# Patient Record
Sex: MALE | Race: BLACK OR AFRICAN AMERICAN | NOT HISPANIC OR LATINO | URBAN - METROPOLITAN AREA
[De-identification: names, ages, dates, MRNs, and addresses within clinical notes are randomized per-mention and may not be internally consistent; named-entity substitution may affect disease eponyms.]

---

## 2021-06-28 ENCOUNTER — INPATIENT (INPATIENT)
Facility: HOSPITAL | Age: 71
LOS: 36 days | Discharge: NURSING, REHAB OR LONG-TERM CARE | DRG: 640 | End: 2021-08-04
Attending: GENERAL ACUTE CARE HOSPITAL | Admitting: GENERAL ACUTE CARE HOSPITAL
Payer: MEDICARE

## 2021-06-28 VITALS
OXYGEN SATURATION: 98 % | RESPIRATION RATE: 18 BRPM | HEART RATE: 137 BPM | TEMPERATURE: 98 F | DIASTOLIC BLOOD PRESSURE: 76 MMHG | SYSTOLIC BLOOD PRESSURE: 115 MMHG | HEIGHT: 76 IN

## 2021-06-28 DIAGNOSIS — R53.1 WEAKNESS: ICD-10-CM

## 2021-06-28 LAB
ALBUMIN SERPL ELPH-MCNC: 2.9 G/DL — LOW (ref 3.3–5)
ALP SERPL-CCNC: 460 U/L — HIGH (ref 40–120)
ALT FLD-CCNC: 14 U/L — SIGNIFICANT CHANGE UP (ref 10–45)
ANION GAP SERPL CALC-SCNC: 14 MMOL/L — SIGNIFICANT CHANGE UP (ref 5–17)
APPEARANCE UR: CLEAR — SIGNIFICANT CHANGE UP
APTT BLD: 32.3 SEC — SIGNIFICANT CHANGE UP (ref 27.5–35.5)
AST SERPL-CCNC: 19 U/L — SIGNIFICANT CHANGE UP (ref 10–40)
BACTERIA # UR AUTO: NEGATIVE — SIGNIFICANT CHANGE UP
BASOPHILS # BLD AUTO: 0 K/UL — SIGNIFICANT CHANGE UP (ref 0–0.2)
BASOPHILS NFR BLD AUTO: 0 % — SIGNIFICANT CHANGE UP (ref 0–2)
BILIRUB SERPL-MCNC: 1.3 MG/DL — HIGH (ref 0.2–1.2)
BILIRUB UR-MCNC: NEGATIVE — SIGNIFICANT CHANGE UP
BUN SERPL-MCNC: 25 MG/DL — HIGH (ref 7–23)
CALCIUM SERPL-MCNC: 9 MG/DL — SIGNIFICANT CHANGE UP (ref 8.4–10.5)
CHLORIDE SERPL-SCNC: 97 MMOL/L — SIGNIFICANT CHANGE UP (ref 96–108)
CO2 SERPL-SCNC: 22 MMOL/L — SIGNIFICANT CHANGE UP (ref 22–31)
COLOR SPEC: ABNORMAL
COMMENT - URINE: SIGNIFICANT CHANGE UP
CREAT SERPL-MCNC: 0.68 MG/DL — SIGNIFICANT CHANGE UP (ref 0.5–1.3)
DIFF PNL FLD: ABNORMAL
EOSINOPHIL # BLD AUTO: 0.03 K/UL — SIGNIFICANT CHANGE UP (ref 0–0.5)
EOSINOPHIL NFR BLD AUTO: 0.9 % — SIGNIFICANT CHANGE UP (ref 0–6)
EPI CELLS # UR: 1 /HPF — SIGNIFICANT CHANGE UP
GAS PNL BLDV: SIGNIFICANT CHANGE UP
GAS PNL BLDV: SIGNIFICANT CHANGE UP
GLUCOSE BLDC GLUCOMTR-MCNC: 334 MG/DL — HIGH (ref 70–99)
GLUCOSE BLDC GLUCOMTR-MCNC: 439 MG/DL — HIGH (ref 70–99)
GLUCOSE BLDC GLUCOMTR-MCNC: 449 MG/DL — HIGH (ref 70–99)
GLUCOSE BLDC GLUCOMTR-MCNC: 472 MG/DL — CRITICAL HIGH (ref 70–99)
GLUCOSE SERPL-MCNC: 596 MG/DL — CRITICAL HIGH (ref 70–99)
GLUCOSE UR QL: ABNORMAL
HCT VFR BLD CALC: 26.4 % — LOW (ref 39–50)
HGB BLD-MCNC: 8.1 G/DL — LOW (ref 13–17)
HYALINE CASTS # UR AUTO: 1 /LPF — SIGNIFICANT CHANGE UP (ref 0–2)
INR BLD: 1.42 RATIO — HIGH (ref 0.88–1.16)
KETONES UR-MCNC: NEGATIVE — SIGNIFICANT CHANGE UP
LEUKOCYTE ESTERASE UR-ACNC: ABNORMAL
LYMPHOCYTES # BLD AUTO: 0.61 K/UL — LOW (ref 1–3.3)
LYMPHOCYTES # BLD AUTO: 17.5 % — SIGNIFICANT CHANGE UP (ref 13–44)
MAGNESIUM SERPL-MCNC: 1.8 MG/DL — SIGNIFICANT CHANGE UP (ref 1.6–2.6)
MCHC RBC-ENTMCNC: 28.5 PG — SIGNIFICANT CHANGE UP (ref 27–34)
MCHC RBC-ENTMCNC: 30.7 GM/DL — LOW (ref 32–36)
MCV RBC AUTO: 93 FL — SIGNIFICANT CHANGE UP (ref 80–100)
MONOCYTES # BLD AUTO: 0.33 K/UL — SIGNIFICANT CHANGE UP (ref 0–0.9)
MONOCYTES NFR BLD AUTO: 9.6 % — SIGNIFICANT CHANGE UP (ref 2–14)
NEUTROPHILS # BLD AUTO: 2.49 K/UL — SIGNIFICANT CHANGE UP (ref 1.8–7.4)
NEUTROPHILS NFR BLD AUTO: 71.1 % — SIGNIFICANT CHANGE UP (ref 43–77)
NITRITE UR-MCNC: NEGATIVE — SIGNIFICANT CHANGE UP
PH UR: 6 — SIGNIFICANT CHANGE UP (ref 5–8)
PHOSPHATE SERPL-MCNC: 2.9 MG/DL — SIGNIFICANT CHANGE UP (ref 2.5–4.5)
PLATELET # BLD AUTO: 166 K/UL — SIGNIFICANT CHANGE UP (ref 150–400)
POTASSIUM SERPL-MCNC: 5.2 MMOL/L — SIGNIFICANT CHANGE UP (ref 3.5–5.3)
POTASSIUM SERPL-SCNC: 5.2 MMOL/L — SIGNIFICANT CHANGE UP (ref 3.5–5.3)
PROT SERPL-MCNC: 6.3 G/DL — SIGNIFICANT CHANGE UP (ref 6–8.3)
PROT UR-MCNC: SIGNIFICANT CHANGE UP
PROTHROM AB SERPL-ACNC: 16.8 SEC — HIGH (ref 10.6–13.6)
RAPID RVP RESULT: SIGNIFICANT CHANGE UP
RBC # BLD: 2.84 M/UL — LOW (ref 4.2–5.8)
RBC # FLD: 23.3 % — HIGH (ref 10.3–14.5)
RBC CASTS # UR COMP ASSIST: 2 /HPF — SIGNIFICANT CHANGE UP (ref 0–4)
SARS-COV-2 RNA SPEC QL NAA+PROBE: SIGNIFICANT CHANGE UP
SODIUM SERPL-SCNC: 133 MMOL/L — LOW (ref 135–145)
SP GR SPEC: 1.03 — HIGH (ref 1.01–1.02)
UROBILINOGEN FLD QL: NEGATIVE — SIGNIFICANT CHANGE UP
WBC # BLD: 3.46 K/UL — LOW (ref 3.8–10.5)
WBC # FLD AUTO: 3.46 K/UL — LOW (ref 3.8–10.5)
WBC UR QL: 3 /HPF — SIGNIFICANT CHANGE UP (ref 0–5)

## 2021-06-28 PROCEDURE — 93010 ELECTROCARDIOGRAM REPORT: CPT

## 2021-06-28 PROCEDURE — 74177 CT ABD & PELVIS W/CONTRAST: CPT | Mod: 26,MA

## 2021-06-28 PROCEDURE — 71260 CT THORAX DX C+: CPT | Mod: 26

## 2021-06-28 PROCEDURE — 99285 EMERGENCY DEPT VISIT HI MDM: CPT

## 2021-06-28 PROCEDURE — 71045 X-RAY EXAM CHEST 1 VIEW: CPT | Mod: 26

## 2021-06-28 RX ORDER — PIPERACILLIN AND TAZOBACTAM 4; .5 G/20ML; G/20ML
3.38 INJECTION, POWDER, LYOPHILIZED, FOR SOLUTION INTRAVENOUS ONCE
Refills: 0 | Status: COMPLETED | OUTPATIENT
Start: 2021-06-28 | End: 2021-06-28

## 2021-06-28 RX ORDER — GLUCAGON INJECTION, SOLUTION 0.5 MG/.1ML
1 INJECTION, SOLUTION SUBCUTANEOUS ONCE
Refills: 0 | Status: DISCONTINUED | OUTPATIENT
Start: 2021-06-28 | End: 2021-08-04

## 2021-06-28 RX ORDER — SODIUM CHLORIDE 9 MG/ML
1000 INJECTION INTRAMUSCULAR; INTRAVENOUS; SUBCUTANEOUS
Refills: 0 | Status: DISCONTINUED | OUTPATIENT
Start: 2021-06-28 | End: 2021-07-10

## 2021-06-28 RX ORDER — INSULIN LISPRO 100/ML
VIAL (ML) SUBCUTANEOUS AT BEDTIME
Refills: 0 | Status: DISCONTINUED | OUTPATIENT
Start: 2021-06-28 | End: 2021-08-04

## 2021-06-28 RX ORDER — INSULIN LISPRO 100/ML
VIAL (ML) SUBCUTANEOUS
Refills: 0 | Status: DISCONTINUED | OUTPATIENT
Start: 2021-06-28 | End: 2021-08-04

## 2021-06-28 RX ORDER — SODIUM CHLORIDE 9 MG/ML
1000 INJECTION, SOLUTION INTRAVENOUS ONCE
Refills: 0 | Status: COMPLETED | OUTPATIENT
Start: 2021-06-28 | End: 2021-06-28

## 2021-06-28 RX ORDER — DEXTROSE 50 % IN WATER 50 %
12.5 SYRINGE (ML) INTRAVENOUS ONCE
Refills: 0 | Status: DISCONTINUED | OUTPATIENT
Start: 2021-06-28 | End: 2021-08-04

## 2021-06-28 RX ORDER — SODIUM CHLORIDE 9 MG/ML
1000 INJECTION, SOLUTION INTRAVENOUS ONCE
Refills: 0 | Status: DISCONTINUED | OUTPATIENT
Start: 2021-06-28 | End: 2021-06-28

## 2021-06-28 RX ORDER — INSULIN LISPRO 100/ML
5 VIAL (ML) SUBCUTANEOUS ONCE
Refills: 0 | Status: COMPLETED | OUTPATIENT
Start: 2021-06-28 | End: 2021-06-28

## 2021-06-28 RX ORDER — DEXTROSE 50 % IN WATER 50 %
15 SYRINGE (ML) INTRAVENOUS ONCE
Refills: 0 | Status: DISCONTINUED | OUTPATIENT
Start: 2021-06-28 | End: 2021-08-04

## 2021-06-28 RX ORDER — SODIUM CHLORIDE 9 MG/ML
1000 INJECTION, SOLUTION INTRAVENOUS
Refills: 0 | Status: DISCONTINUED | OUTPATIENT
Start: 2021-06-28 | End: 2021-08-04

## 2021-06-28 RX ORDER — INSULIN GLARGINE 100 [IU]/ML
10 INJECTION, SOLUTION SUBCUTANEOUS AT BEDTIME
Refills: 0 | Status: COMPLETED | OUTPATIENT
Start: 2021-06-28 | End: 2021-06-28

## 2021-06-28 RX ORDER — DEXTROSE 50 % IN WATER 50 %
25 SYRINGE (ML) INTRAVENOUS ONCE
Refills: 0 | Status: DISCONTINUED | OUTPATIENT
Start: 2021-06-28 | End: 2021-08-04

## 2021-06-28 RX ADMIN — Medication 6: at 18:36

## 2021-06-28 RX ADMIN — PIPERACILLIN AND TAZOBACTAM 200 GRAM(S): 4; .5 INJECTION, POWDER, LYOPHILIZED, FOR SOLUTION INTRAVENOUS at 13:58

## 2021-06-28 RX ADMIN — Medication 5 UNIT(S): at 21:46

## 2021-06-28 RX ADMIN — Medication 4: at 23:35

## 2021-06-28 RX ADMIN — SODIUM CHLORIDE 1000 MILLILITER(S): 9 INJECTION, SOLUTION INTRAVENOUS at 16:51

## 2021-06-28 RX ADMIN — INSULIN GLARGINE 10 UNIT(S): 100 INJECTION, SOLUTION SUBCUTANEOUS at 23:36

## 2021-06-28 RX ADMIN — SODIUM CHLORIDE 1000 MILLILITER(S): 9 INJECTION, SOLUTION INTRAVENOUS at 13:16

## 2021-06-28 NOTE — ED ADULT NURSE NOTE - OBJECTIVE STATEMENT
72 yo male presents to ED from home with wife via ambulette c/o weakness and fatigue. Patient's wife states patient dx with Hodgkin's lymphoma approximately 1 month ago, started hospice care, however since stopped hospice and presents to ED today for start of treatment. Patient's wife states he was admitted to OSH approx 1 month ago for sepsis. Patient is frail appearing with chronic escamilla secondary to incontinence after recent admission. Patient denies SOB, CP, nvd, fever/chills, sick contacts, falls/loc. Patient A&OX3, breathing spontaneously , airway patent, bl clear lungs, abdomen tender to palpation, +pulses, cap refill <2 seconds. patient resting in bed, side rails up, plan of care explained. MD at bedside.

## 2021-06-28 NOTE — ED ADULT NURSE NOTE - NSIMPLEMENTINTERV_GEN_ALL_ED
Implemented All Fall Risk Interventions:  [place] to call system. Call bell, personal items and telephone within reach. Instruct patient to call for assistance. Room bathroom lighting operational. Non-slip footwear when patient is off stretcher. Physically safe environment: no spills, clutter or unnecessary equipment. Stretcher in lowest position, wheels locked, appropriate side rails in place. Provide visual cue, wrist band, yellow gown, etc. Monitor gait and stability. Monitor for mental status changes and reorient to person, place, and time. Review medications for side effects contributing to fall risk. Reinforce activity limits and safety measures with patient and family.

## 2021-06-28 NOTE — ED PROVIDER NOTE - OBJECTIVE STATEMENT
72 y/o M w/ pmhx of radha gonzalesma recently dx'd presents to the ED w/ weakness and FTT. Per pt's doctor the pt had sepsis about one month ago     Spoke w/ Dr. Larios around 1250 on 6/28/21 who states the family wants treatment and the pt is not hospice. Per Dr. Larios admission to [facility] 72 y/o M w/ pmhx of CVA this past August and got TPA per family member, radha boyd recently dx'd presents to the ED w/ weakness and FTT. Per pt's doctor the pt had sepsis about one month ago. Pt denies recent f/c, n/v, cp, sob. Does endorses some RLQ pain. Per family and PCP pt's legs have been very weak since August. Spoke w/ Dr. Larios around 1250 on 6/28/21 who states the family wants treatment and the pt is not hospice. Per Dr. Larios admission to [facility] 72 y/o M w/ pmhx of CVA this past August and got TPA per family member, hodgkin lympoma recently dx'd presents to the ED w/ weakness and FTT. Per family, pt with progressive weakness and fatigue. No trauma. No falls. Notes abdominal pain, diffuse, but worse on R side. No fevers. Pt denies recent f/c, n/v, cp, sob.     Per pt's doctor the pt admitted at OSH about one month ago (?sepsis). Per family and PCP pt's has had progressive LE weakness since August, no new or acute change to strength. Spoke w/ Dr. Larios around 1250 on 6/28/21 who states the family wants treatment and the pt is not hospice. Per Dr. Larios admission to [facility]

## 2021-06-28 NOTE — ED PROVIDER NOTE - PROGRESS NOTE DETAILS
Noted hyperglycemia and lactic acidosis. +Mild abdominal tenderness. Elevated bili - will empirically tx with Zosyn, CT abd/pelvis for further imaging

## 2021-06-28 NOTE — ED PROVIDER NOTE - CLINICAL SUMMARY MEDICAL DECISION MAKING FREE TEXT BOX
O'Domenico DO PGY-1: pt recently dx'd w/ Hodkin Lymphoma p/w gen weakness and FTT. recently had sepsis. TBA. Spoke w/ Dr. Larios. Pt will be going to Dr. [facility]. O'Fisher DO PGY-1: pt recently dx'd w/ Hodkin Lymphoma p/w gen weakness and FTT. recently had sepsis. TBA. Spoke w/ Dr. Larios. Pt will be going to Dr. Alas.    Nunu Smith MD - Attending Physician: Pt here with generalized weakness, abdominal pain. Noted tachycardia, appears significantly dehydrated. Afebrile now; however, high risk. Labs, cultures, Xr, Ua, TBA

## 2021-06-28 NOTE — ED ADULT NURSE REASSESSMENT NOTE - NS ED NURSE REASSESS COMMENT FT1
WOO Walters made aware of patient's repeat finger stick of 449. Per WOO Walters [id number], no immediate RN interventions are needed to be performed at this time. Pt is to have a repeat finger stick in 6hours per WOO Walters. Pt remains resting comfortably in stretcher, a/ox4 and in NAD at this time.

## 2021-06-28 NOTE — H&P ADULT - ASSESSMENT
70 y/o M w/ pmhx of CVA this past August and got TPA per family member, DM , BPH with obstruction s/p escamilla catheter , s/p ERCP w Sphincteretomy recent admission to [facility] for sepsis  hodgkin lympoma was not offered any chemo and was placed on hospice.   npw presenting with weakness and FTT.   pt denies cp / SOB / plapiatiaons   no focal neuro complaints .. at baseline RLE weakness   no N/V   had one episode of diarrah   no urinary s x   abdominal pain, diffuse, but worse on R side.     - failure to thrive : cultures sent   CT chest ordered   nutrition consult       - lymphoma : fu wtih Dr. Larios    DM with hyperglycemia :     - anemia  : moniot rH/H   transfuse prn     d/w pt and sister

## 2021-06-28 NOTE — ED ADULT NURSE REASSESSMENT NOTE - NS ED NURSE REASSESS COMMENT FT1
Received report from NIKOS Bonilla. Pt is a/ox4, VSS, sensory/motor function intact, speaking with soft voice, but coherently, follows commands and in NAD at this time. Lung sounds are clear and equal b/l with no labored breathing noted. Abdomen is soft, nontender and nondistended. Peripheral pulses are strong and equal b/l with no edema noted. Pt denies CP/SOB, f/c, n/v/d, dizziness/lightheadedness, numbness/tingling/weakness at this time. Pt endorsing weakness, not new compared to baseline upon arriving to ED per pt and day shift RN. Plan of care discussed and initiated to patient, pt verbalizes understanding. Will continue to monitor.

## 2021-06-28 NOTE — H&P ADULT - HISTORY OF PRESENT ILLNESS
72 y/o M w/ pmhx of CVA this past August and got TPA per family member, DM , BPH with obstruction s/p escamilla catheter , s/p ERCP w Sphincteretomy recent admission to [facility] for sepsis  hodgkin lympoma was not offered any chemo and was placed on hospice.   npw presenting with weakness and FTT.   pt denies cp / SOB / plapiatiaons   no focal neuro complaints .. at baseline RLE weakness   no N/V   had one episode of diarrah   no urinary s x   abdominal pain, diffuse, but worse on R side.

## 2021-06-28 NOTE — ED PROVIDER NOTE - NS ED ROS FT
CONSTITUTIONAL - No fever, No diaphoresis, No weight change  EYES - No eye pain, No blurred vision  ENT - No change in hearing, No sore throat, No neck pain, No rhinorrhea, No ear pain  RESPIRATORY - No shortness of breath, No cough  CARDIAC -No chest pain, No palpitations  GI - No abdominal pain, No nausea, No vomiting, No diarrhea, No constipation  - No dysuria, no frequency, no hematuria.   MUSCULOSKELETAL - No joint pain, No swelling, No back pain  NEUROLOGIC - No numbness, +weakness, No headache, No dizziness CONSTITUTIONAL - No fever, No diaphoresis, No weight change  EYES - No eye pain, No blurred vision  ENT - No change in hearing, No sore throat, No neck pain, No rhinorrhea, No ear pain  RESPIRATORY - No shortness of breath, No cough  CARDIAC -No chest pain, No palpitations  GI - No abdominal pain, No nausea, No vomiting, No diarrhea, No constipation  - No dysuria, no frequency, no hematuria.   MUSCULOSKELETAL - No joint pain, No swelling, No back pain  NEUROLOGIC - No numbness, +weakness, No headache, No dizziness    All other systems negative

## 2021-06-28 NOTE — ED PROVIDER NOTE - PHYSICAL EXAMINATION
CONSTITUTIONAL: Well-developed; well-nourished; in no acute distress.   SKIN: warm, dry  HEAD: Normocephalic; atraumatic.  EYES: no conjunctival injection. PERRL. EOMI   ENT: No nasal discharge; airway clear.  NECK: Supple; non tender.  CARD: S1, S2 normal; no murmurs, gallops, or rubs. Regular rate and rhythm.   RESP: No wheezes, rales or rhonchi. Good air movement bilaterally.   ABD: RLQ TTP.   EXT: Ambulates independently.  No cyanosis or edema.   NEURO: Alert, oriented, grossly unremarkable. facial asymmetry when asked to smile. Left lower extremity motor strength 3/5. R lower extremity 4/5.   PSYCH: Cooperative, appropriate. CONSTITUTIONAL: Chronically ill appearing, but no acute distress.   SKIN: warm, dry  HEAD: Normocephalic; atraumatic. +Temporal wasting  EYES: no conjunctival injection. PERRL. EOMI   ENT: No nasal discharge; airway clear.  NECK: Supple; non tender.  CARD: S1, S2 normal; no murmurs, gallops, or rubs. Regular rate and rhythm.   RESP: No wheezes, rales or rhonchi. Good air movement bilaterally.   ABD: TTP mild diffusely, worse in RUQ and RLQ.   EXT: No cyanosis or edema.   NEURO: Alert, oriented, grossly unremarkable. facial asymmetry when asked to smile. Left lower extremity motor strength 3/5. R lower extremity 4/5.   PSYCH: Cooperative, appropriate.

## 2021-06-29 DIAGNOSIS — E11.9 TYPE 2 DIABETES MELLITUS WITHOUT COMPLICATIONS: ICD-10-CM

## 2021-06-29 DIAGNOSIS — R49.0 DYSPHONIA: ICD-10-CM

## 2021-06-29 DIAGNOSIS — C85.90 NON-HODGKIN LYMPHOMA, UNSPECIFIED, UNSPECIFIED SITE: ICD-10-CM

## 2021-06-29 DIAGNOSIS — D64.9 ANEMIA, UNSPECIFIED: ICD-10-CM

## 2021-06-29 LAB
A1C WITH ESTIMATED AVERAGE GLUCOSE RESULT: 8.8 % — HIGH (ref 4–5.6)
ALBUMIN SERPL ELPH-MCNC: 2.8 G/DL — LOW (ref 3.3–5)
ALP SERPL-CCNC: 378 U/L — HIGH (ref 40–120)
ALT FLD-CCNC: 11 U/L — SIGNIFICANT CHANGE UP (ref 10–45)
ANION GAP SERPL CALC-SCNC: 13 MMOL/L — SIGNIFICANT CHANGE UP (ref 5–17)
AST SERPL-CCNC: 14 U/L — SIGNIFICANT CHANGE UP (ref 10–40)
BILIRUB SERPL-MCNC: 1.4 MG/DL — HIGH (ref 0.2–1.2)
BLD GP AB SCN SERPL QL: NEGATIVE — SIGNIFICANT CHANGE UP
BUN SERPL-MCNC: 20 MG/DL — SIGNIFICANT CHANGE UP (ref 7–23)
CALCIUM SERPL-MCNC: 8.8 MG/DL — SIGNIFICANT CHANGE UP (ref 8.4–10.5)
CHLORIDE SERPL-SCNC: 102 MMOL/L — SIGNIFICANT CHANGE UP (ref 96–108)
CO2 SERPL-SCNC: 25 MMOL/L — SIGNIFICANT CHANGE UP (ref 22–31)
COVID-19 SPIKE DOMAIN AB INTERP: POSITIVE
COVID-19 SPIKE DOMAIN ANTIBODY RESULT: 1.54 U/ML — HIGH
CREAT SERPL-MCNC: 0.69 MG/DL — SIGNIFICANT CHANGE UP (ref 0.5–1.3)
CULTURE RESULTS: SIGNIFICANT CHANGE UP
ESTIMATED AVERAGE GLUCOSE: 206 MG/DL — HIGH (ref 68–114)
FERRITIN SERPL-MCNC: 5749 NG/ML — HIGH (ref 30–400)
FOLATE SERPL-MCNC: 8.8 NG/ML — SIGNIFICANT CHANGE UP
GLUCOSE BLDC GLUCOMTR-MCNC: 156 MG/DL — HIGH (ref 70–99)
GLUCOSE BLDC GLUCOMTR-MCNC: 169 MG/DL — HIGH (ref 70–99)
GLUCOSE BLDC GLUCOMTR-MCNC: 176 MG/DL — HIGH (ref 70–99)
GLUCOSE BLDC GLUCOMTR-MCNC: 177 MG/DL — HIGH (ref 70–99)
GLUCOSE BLDC GLUCOMTR-MCNC: 190 MG/DL — HIGH (ref 70–99)
GLUCOSE SERPL-MCNC: 173 MG/DL — HIGH (ref 70–99)
HCT VFR BLD CALC: 25.3 % — LOW (ref 39–50)
HCV AB S/CO SERPL IA: 0.1 S/CO — SIGNIFICANT CHANGE UP (ref 0–0.99)
HCV AB SERPL-IMP: SIGNIFICANT CHANGE UP
HGB BLD-MCNC: 7.8 G/DL — LOW (ref 13–17)
IRON SATN MFR SERPL: 33 % — SIGNIFICANT CHANGE UP (ref 16–55)
IRON SATN MFR SERPL: 55 UG/DL — SIGNIFICANT CHANGE UP (ref 45–165)
LDH SERPL L TO P-CCNC: 250 U/L — HIGH (ref 50–242)
MCHC RBC-ENTMCNC: 29.4 PG — SIGNIFICANT CHANGE UP (ref 27–34)
MCHC RBC-ENTMCNC: 30.8 GM/DL — LOW (ref 32–36)
MCV RBC AUTO: 95.5 FL — SIGNIFICANT CHANGE UP (ref 80–100)
NRBC # BLD: 2 /100 WBCS — HIGH (ref 0–0)
PLATELET # BLD AUTO: 144 K/UL — LOW (ref 150–400)
POTASSIUM SERPL-MCNC: 4.6 MMOL/L — SIGNIFICANT CHANGE UP (ref 3.5–5.3)
POTASSIUM SERPL-SCNC: 4.6 MMOL/L — SIGNIFICANT CHANGE UP (ref 3.5–5.3)
PROT SERPL-MCNC: 6.2 G/DL — SIGNIFICANT CHANGE UP (ref 6–8.3)
RBC # BLD: 2.65 M/UL — LOW (ref 4.2–5.8)
RBC # FLD: 23.2 % — HIGH (ref 10.3–14.5)
RH IG SCN BLD-IMP: POSITIVE — SIGNIFICANT CHANGE UP
SARS-COV-2 IGG+IGM SERPL QL IA: 1.54 U/ML — HIGH
SARS-COV-2 IGG+IGM SERPL QL IA: POSITIVE
SODIUM SERPL-SCNC: 140 MMOL/L — SIGNIFICANT CHANGE UP (ref 135–145)
SPECIMEN SOURCE: SIGNIFICANT CHANGE UP
TIBC SERPL-MCNC: 165 UG/DL — LOW (ref 220–430)
TSH SERPL-MCNC: 0.43 UIU/ML — SIGNIFICANT CHANGE UP (ref 0.27–4.2)
UIBC SERPL-MCNC: 110 UG/DL — SIGNIFICANT CHANGE UP (ref 110–370)
URATE SERPL-MCNC: 4.5 MG/DL — SIGNIFICANT CHANGE UP (ref 3.4–8.8)
VIT B12 SERPL-MCNC: 1978 PG/ML — HIGH (ref 232–1245)
WBC # BLD: 2.36 K/UL — LOW (ref 3.8–10.5)
WBC # FLD AUTO: 2.36 K/UL — LOW (ref 3.8–10.5)

## 2021-06-29 PROCEDURE — 99222 1ST HOSP IP/OBS MODERATE 55: CPT | Mod: 25

## 2021-06-29 PROCEDURE — 31575 DIAGNOSTIC LARYNGOSCOPY: CPT

## 2021-06-29 PROCEDURE — 78472 GATED HEART PLANAR SINGLE: CPT | Mod: 26

## 2021-06-29 RX ORDER — ASPIRIN/CALCIUM CARB/MAGNESIUM 324 MG
81 TABLET ORAL DAILY
Refills: 0 | Status: DISCONTINUED | OUTPATIENT
Start: 2021-06-29 | End: 2021-08-04

## 2021-06-29 RX ORDER — ACETAMINOPHEN 500 MG
650 TABLET ORAL EVERY 6 HOURS
Refills: 0 | Status: DISCONTINUED | OUTPATIENT
Start: 2021-06-29 | End: 2021-06-30

## 2021-06-29 RX ORDER — FINASTERIDE 5 MG/1
5 TABLET, FILM COATED ORAL DAILY
Refills: 0 | Status: DISCONTINUED | OUTPATIENT
Start: 2021-06-29 | End: 2021-08-04

## 2021-06-29 RX ORDER — INSULIN GLARGINE 100 [IU]/ML
10 INJECTION, SOLUTION SUBCUTANEOUS AT BEDTIME
Refills: 0 | Status: DISCONTINUED | OUTPATIENT
Start: 2021-06-29 | End: 2021-07-08

## 2021-06-29 RX ORDER — PANTOPRAZOLE SODIUM 20 MG/1
40 TABLET, DELAYED RELEASE ORAL
Refills: 0 | Status: DISCONTINUED | OUTPATIENT
Start: 2021-06-29 | End: 2021-07-08

## 2021-06-29 RX ORDER — TAMSULOSIN HYDROCHLORIDE 0.4 MG/1
0.4 CAPSULE ORAL AT BEDTIME
Refills: 0 | Status: DISCONTINUED | OUTPATIENT
Start: 2021-06-29 | End: 2021-08-04

## 2021-06-29 RX ORDER — ACETAMINOPHEN 500 MG
650 TABLET ORAL ONCE
Refills: 0 | Status: COMPLETED | OUTPATIENT
Start: 2021-06-29 | End: 2021-06-29

## 2021-06-29 RX ADMIN — Medication 650 MILLIGRAM(S): at 16:46

## 2021-06-29 RX ADMIN — Medication 1: at 11:40

## 2021-06-29 RX ADMIN — SODIUM CHLORIDE 100 MILLILITER(S): 9 INJECTION INTRAMUSCULAR; INTRAVENOUS; SUBCUTANEOUS at 01:24

## 2021-06-29 RX ADMIN — FINASTERIDE 5 MILLIGRAM(S): 5 TABLET, FILM COATED ORAL at 16:49

## 2021-06-29 RX ADMIN — Medication 650 MILLIGRAM(S): at 06:13

## 2021-06-29 RX ADMIN — INSULIN GLARGINE 10 UNIT(S): 100 INJECTION, SOLUTION SUBCUTANEOUS at 21:42

## 2021-06-29 RX ADMIN — Medication 1: at 16:45

## 2021-06-29 RX ADMIN — SODIUM CHLORIDE 100 MILLILITER(S): 9 INJECTION INTRAMUSCULAR; INTRAVENOUS; SUBCUTANEOUS at 19:34

## 2021-06-29 RX ADMIN — Medication 1: at 07:44

## 2021-06-29 RX ADMIN — Medication 81 MILLIGRAM(S): at 16:47

## 2021-06-29 RX ADMIN — Medication 650 MILLIGRAM(S): at 06:00

## 2021-06-29 RX ADMIN — Medication 650 MILLIGRAM(S): at 17:15

## 2021-06-29 RX ADMIN — TAMSULOSIN HYDROCHLORIDE 0.4 MILLIGRAM(S): 0.4 CAPSULE ORAL at 21:42

## 2021-06-29 NOTE — PROGRESS NOTE ADULT - ASSESSMENT
72 y/o M w/ pmhx of CVA this past August and got TPA per family member, DM , BPH with obstruction s/p escamilla catheter , s/p ERCP w Sphincteretomy recent admission to [facility] for sepsis  hodgkin lympoma was not offered any chemo and was placed on hospice.   npw presenting with weakness and FTT.   pt denies cp / SOB / plapiatiaons   no focal neuro complaints .. at baseline RLE weakness   no N/V   had one episode of diarrah   no urinary s x   abdominal pain, diffuse, but worse on R side.     - failure to thrive : cultures sent   CT chest ordered   nutrition consult       - lymphoma : fu wtih Dr. Larios    DM with hyperglycemia :     - anemia  : moniot rH/H   transfuse prn     d/w pt and sister

## 2021-06-29 NOTE — PROGRESS NOTE ADULT - SUBJECTIVE AND OBJECTIVE BOX
Date of service: 06-29-21 @ 00:03      Patient is a 71y old  Male who presents with a chief complaint of                                                              INTERVAL HPI/OVERNIGHT EVENTS:    REVIEW OF SYSTEMS:     CONSTITUTIONAL: No weakness, fevers or chills  EYES/ENT: No visual changes , no ear ache   NECK: No pain or stiffness  RESPIRATORY: No cough, wheezing,  No shortness of breath  CARDIOVASCULAR: No chest pain or palpitations  GASTROINTESTINAL: No abdominal pain  . No nausea, vomiting, or hematemesis; No diarrhea or constipation. No melena or hematochezia.  GENITOURINARY: No dysuria, frequency or hematuria  NEUROLOGICAL: No numbness or weakness  SKIN: No itching, burning, rashes, or lesions                                                                                                                                                                                                                                                                                 Medications:  MEDICATIONS  (STANDING):  aspirin  chewable 81 milliGRAM(s) Oral daily  dextrose 40% Gel 15 Gram(s) Oral once  dextrose 5%. 1000 milliLiter(s) (50 mL/Hr) IV Continuous <Continuous>  dextrose 5%. 1000 milliLiter(s) (100 mL/Hr) IV Continuous <Continuous>  dextrose 50% Injectable 25 Gram(s) IV Push once  dextrose 50% Injectable 12.5 Gram(s) IV Push once  dextrose 50% Injectable 25 Gram(s) IV Push once  finasteride 5 milliGRAM(s) Oral daily  glucagon  Injectable 1 milliGRAM(s) IntraMuscular once  insulin glargine Injectable (LANTUS) 10 Unit(s) SubCutaneous at bedtime  insulin lispro (ADMELOG) corrective regimen sliding scale   SubCutaneous three times a day before meals  insulin lispro (ADMELOG) corrective regimen sliding scale   SubCutaneous at bedtime  pantoprazole    Tablet 40 milliGRAM(s) Oral before breakfast  sodium chloride 0.9%. 1000 milliLiter(s) (100 mL/Hr) IV Continuous <Continuous>  tamsulosin 0.4 milliGRAM(s) Oral at bedtime    MEDICATIONS  (PRN):  acetaminophen   Tablet .. 650 milliGRAM(s) Oral every 6 hours PRN Moderate Pain (4 - 6)       Allergies    No Known Allergies    Intolerances      Vital Signs Last 24 Hrs  T(C): 37.2 (29 Jun 2021 20:12), Max: 37.7 (29 Jun 2021 04:50)  T(F): 98.9 (29 Jun 2021 20:12), Max: 99.9 (29 Jun 2021 04:50)  HR: 103 (29 Jun 2021 20:12) (97 - 113)  BP: 103/67 (29 Jun 2021 20:12) (100/63 - 117/76)  BP(mean): --  RR: 17 (29 Jun 2021 20:12) (17 - 18)  SpO2: 97% (29 Jun 2021 20:12) (95% - 100%)  CAPILLARY BLOOD GLUCOSE      POCT Blood Glucose.: 156 mg/dL (29 Jun 2021 21:20)  POCT Blood Glucose.: 169 mg/dL (29 Jun 2021 16:29)  POCT Blood Glucose.: 176 mg/dL (29 Jun 2021 11:32)  POCT Blood Glucose.: 190 mg/dL (29 Jun 2021 07:20)  POCT Blood Glucose.: 177 mg/dL (29 Jun 2021 04:25)      06-28 @ 07:01  -  06-29 @ 07:00  --------------------------------------------------------  IN: 0 mL / OUT: 150 mL / NET: -150 mL    06-29 @ 07:01  -  06-30 @ 00:03  --------------------------------------------------------  IN: 1570 mL / OUT: 500 mL / NET: 1070 mL      Physical Exam:    Daily     Daily   General:  Well appearing, NAD, not cachetic  HEENT:  Nonicteric, PERRLA  CV:  RRR, S1S2   Lungs:  CTA B/L, no wheezes, rales, rhonchi  Abdomen:  Soft, non-tender, no distended, positive BS  Extremities:  2+ pulses, no c/c, no edema  Skin:  Warm and dry, no rashes  :  No escamilla  Neuro:  AAOx3, non-focal, grossly intact                                                                                                                                                                                                                                                                                                LABS:                               7.8    2.36  )-----------( 144      ( 29 Jun 2021 05:59 )             25.3                      06-29    140  |  102  |  20  ----------------------------<  173<H>  4.6   |  25  |  0.69    Ca    8.8      29 Jun 2021 05:59  Phos  2.9     06-28  Mg     1.8     06-28    TPro  6.2  /  Alb  2.8<L>  /  TBili  1.4<H>  /  DBili  x   /  AST  14  /  ALT  11  /  AlkPhos  378<H>  06-29                       RADIOLOGY & ADDITIONAL TESTS         I personally reviewed: [  ]EKG   [  ]CXR    [  ] CT      A/P:         Discussed with :     Augusto consultants' Notes   Time spent :

## 2021-06-29 NOTE — PHYSICAL THERAPY INITIAL EVALUATION ADULT - PRECAUTIONS/LIMITATIONS, REHAB EVAL
HISTORY AND RESULTS CONTINUED: CT CHEST: Patchy groundglass opacities and scattered nodular opacities throughout all lobes of the lungs. Findings may be related to known lymphoma or may be seen in the setting of multifocal infection. Mildly enlarged supraclavicular, mediastinal, hilar, and retroperitoneal lymphadenopathy likely related to known lymphoma. CT ABDOMEN/PELVIS: Indeterminant nodular lung opacities which dedicated follow-up chest CT can be performed for further evaluation. Abdominal lymphadenopathy, which may be related to patient's known history of lymphoma. Correlate with prior imaging if available for comparison. Enlarged prostate. CHEST XRAY: Faint 8 mm nodular opacity projecting over the left mid to lower chest. Question nipple versus pulmonary nodule. Repeat chest x-ray with nipple markers may be of benefit for further evaluation, if felt to be clinically indicated. Remainder of the lungs are clear. NM MUGA Scan: Normal resting left ventricular ejection fraction of 50% and normal right and left ventricular wall motion./fall precautions

## 2021-06-29 NOTE — CONSULT NOTE ADULT - SUBJECTIVE AND OBJECTIVE BOX
CC: hoarseness    HPI: 70 y/o M w/ pmhx of CVA this past August and got TPA per family member, DM , BPH with obstruction s/p escamilla catheter , s/p ERCP w Sphincteretomy recent admission to [facility] for sepsis, hodgkin's lymphoma was not offered any chemo and was placed on hospice. ENT was consulted for hoarseness. As per wife, pt has been having hoarseness on and off for the past few months, but worsened over the past week. Pt is currently on a modified diet. Pt admits to sore throat, but denies fevers, cough, SOB, hemoptysis.        PAST MEDICAL & SURGICAL HISTORY:  Cerebrovascular accident (CVA)    Diabetes mellitus    No significant past surgical history      Allergies    No Known Allergies    Intolerances      MEDICATIONS  (STANDING):  dextrose 40% Gel 15 Gram(s) Oral once  dextrose 5%. 1000 milliLiter(s) (50 mL/Hr) IV Continuous <Continuous>  dextrose 5%. 1000 milliLiter(s) (100 mL/Hr) IV Continuous <Continuous>  dextrose 50% Injectable 25 Gram(s) IV Push once  dextrose 50% Injectable 12.5 Gram(s) IV Push once  dextrose 50% Injectable 25 Gram(s) IV Push once  glucagon  Injectable 1 milliGRAM(s) IntraMuscular once  insulin lispro (ADMELOG) corrective regimen sliding scale   SubCutaneous three times a day before meals  insulin lispro (ADMELOG) corrective regimen sliding scale   SubCutaneous at bedtime  sodium chloride 0.9%. 1000 milliLiter(s) (100 mL/Hr) IV Continuous <Continuous>    MEDICATIONS  (PRN):      Social History: no tobacco use    Family history: no pertinent FHx    ROS:   ENT: all negative except as noted in HPI   CV: denies palpitations  Pulm: denies SOB, cough, hemoptysis  GI: denies change in apetite, indigestion, n/v  : denies pertinent urinary symptoms, urgency  Neuro: denies numbness/tingling, loss of sensation  Psych: denies anxiety  MS: denies muscle weakness, instability  Heme: denies easy bruising or bleeding  Endo: denies heat/cold intolerance, excessive sweating  Vascular: denies LE edema    Vital Signs Last 24 Hrs  T(C): 36.6 (29 Jun 2021 11:14), Max: 38 (28 Jun 2021 20:52)  T(F): 97.8 (29 Jun 2021 11:14), Max: 100.4 (28 Jun 2021 20:52)  HR: 97 (29 Jun 2021 11:14) (97 - 119)  BP: 104/70 (29 Jun 2021 11:14) (97/82 - 124/77)  BP(mean): 89 (28 Jun 2021 18:30) (89 - 89)  RR: 18 (29 Jun 2021 11:14) (16 - 18)  SpO2: 96% (29 Jun 2021 11:14) (96% - 100%)                          7.8    2.36  )-----------( 144      ( 29 Jun 2021 05:59 )             25.3    06-29    140  |  102  |  20  ----------------------------<  173<H>  4.6   |  25  |  0.69    Ca    8.8      29 Jun 2021 05:59  Phos  2.9     06-28  Mg     1.8     06-28    TPro  6.2  /  Alb  2.8<L>  /  TBili  1.4<H>  /  DBili  x   /  AST  14  /  ALT  11  /  AlkPhos  378<H>  06-29   PT/INR - ( 28 Jun 2021 13:49 )   PT: 16.8 sec;   INR: 1.42 ratio         PTT - ( 28 Jun 2021 13:49 )  PTT:32.3 sec    PHYSICAL EXAM:  Gen: NAD  Skin: No rashes, bruises, or lesions  Head: Normocephalic, Atraumatic  Face: no edema, erythema, or fluctuance. Parotid glands soft without mass  Eyes: no scleral injection  Nose: Nares bilaterally patent, no discharge  Mouth: No Stridor / Drooling / Trismus.  Mucosa moist, tongue/uvula midline, oropharynx clear  Neck: Flat, supple, no lymphadenopathy, trachea midline, no masses  Lymphatic: No lymphadenopathy  Resp: breathing easily, no stridor  CV: no peripheral edema/cyanosis  GI: nondistended   Peripheral vascular: no JVD or edema  Neuro: facial nerve intact, no facial droop    Fiberoptic Indirect laryngoscopy:  (Scope #2 used)      Reason for Laryngoscopy:    Patient was unable to cooperate with mirror.  Left VC weakness, small glottic gap, airway patent, Nasopharynx, oropharynx, and hypopharynx clear, no bleeding. Tongue base, posterior pharyngeal wall, vallecula, epiglottis, and subglottis appear normal. No erythema, edema, pooling of secretions, masses or lesions. Airway patent, no foreign body visualized. No glottic/supraglottic edema.  arytenoids, vestibular folds, ventricles, pyriform sinuses, and aryepiglottic folds appear normal bilaterally.      CC: hoarseness    HPI: 70 y/o M w/ pmhx of CVA this past August and got TPA per family member, DM , BPH with obstruction s/p escamilla catheter , s/p ERCP w Sphincteretomy recent admission to [facility] for sepsis, hodgkin's lymphoma was not offered any chemo and was placed on hospice. ENT was consulted for hoarseness. As per wife, pt has been having hoarseness on and off for the past few months, but worsened over the past week. Pt is currently on a modified diet. Pt admits to sore throat, but denies fevers, cough, SOB, hemoptysis.      PAST MEDICAL & SURGICAL HISTORY:  Cerebrovascular accident (CVA)    Diabetes mellitus    No significant past surgical history      Allergies    No Known Allergies    Intolerances      MEDICATIONS  (STANDING):  dextrose 40% Gel 15 Gram(s) Oral once  dextrose 5%. 1000 milliLiter(s) (50 mL/Hr) IV Continuous <Continuous>  dextrose 5%. 1000 milliLiter(s) (100 mL/Hr) IV Continuous <Continuous>  dextrose 50% Injectable 25 Gram(s) IV Push once  dextrose 50% Injectable 12.5 Gram(s) IV Push once  dextrose 50% Injectable 25 Gram(s) IV Push once  glucagon  Injectable 1 milliGRAM(s) IntraMuscular once  insulin lispro (ADMELOG) corrective regimen sliding scale   SubCutaneous three times a day before meals  insulin lispro (ADMELOG) corrective regimen sliding scale   SubCutaneous at bedtime  sodium chloride 0.9%. 1000 milliLiter(s) (100 mL/Hr) IV Continuous <Continuous>    MEDICATIONS  (PRN):      Social History: no tobacco use    Family history: no pertinent FHx    ROS:   ENT: all negative except as noted in HPI   CV: denies palpitations  Pulm: denies SOB, cough, hemoptysis  GI: denies change in apetite, indigestion, n/v  : denies pertinent urinary symptoms, urgency  Neuro: denies numbness/tingling, loss of sensation  Psych: denies anxiety  MS: denies muscle weakness, instability  Heme: denies easy bruising or bleeding  Endo: denies heat/cold intolerance, excessive sweating  Vascular: denies LE edema    Vital Signs Last 24 Hrs  T(C): 36.6 (29 Jun 2021 11:14), Max: 38 (28 Jun 2021 20:52)  T(F): 97.8 (29 Jun 2021 11:14), Max: 100.4 (28 Jun 2021 20:52)  HR: 97 (29 Jun 2021 11:14) (97 - 119)  BP: 104/70 (29 Jun 2021 11:14) (97/82 - 124/77)  BP(mean): 89 (28 Jun 2021 18:30) (89 - 89)  RR: 18 (29 Jun 2021 11:14) (16 - 18)  SpO2: 96% (29 Jun 2021 11:14) (96% - 100%)                          7.8    2.36  )-----------( 144      ( 29 Jun 2021 05:59 )             25.3    06-29    140  |  102  |  20  ----------------------------<  173<H>  4.6   |  25  |  0.69    Ca    8.8      29 Jun 2021 05:59  Phos  2.9     06-28  Mg     1.8     06-28    TPro  6.2  /  Alb  2.8<L>  /  TBili  1.4<H>  /  DBili  x   /  AST  14  /  ALT  11  /  AlkPhos  378<H>  06-29   PT/INR - ( 28 Jun 2021 13:49 )   PT: 16.8 sec;   INR: 1.42 ratio         PTT - ( 28 Jun 2021 13:49 )  PTT:32.3 sec    PHYSICAL EXAM:  Gen: NAD  Skin: No rashes, bruises, or lesions  Head: Normocephalic, Atraumatic  Face: no edema, erythema, or fluctuance. Parotid glands soft without mass  Eyes: no scleral injection  Nose: Nares bilaterally patent, no discharge  Mouth: No Stridor / Drooling / Trismus.  Mucosa moist, tongue/uvula midline, oropharynx clear  Neck: Flat, supple, no lymphadenopathy, trachea midline, no masses  Lymphatic: No lymphadenopathy  Resp: breathing easily, no stridor  CV: no peripheral edema/cyanosis  GI: nondistended   Peripheral vascular: no JVD or edema  Neuro: facial nerve intact, no facial droop    Fiberoptic Indirect laryngoscopy:  (Scope #2 used)      Reason for Laryngoscopy:    Patient was unable to cooperate with mirror.  Cords mobile but overall very poor effort and small glottic gap, airway patent, Nasopharynx, oropharynx, and hypopharynx clear, no bleeding. Tongue base, posterior pharyngeal wall, vallecula, epiglottis, and subglottis appear normal. No erythema, edema, pooling of secretions, masses or lesions. Airway patent, no foreign body visualized. No glottic/supraglottic edema.  arytenoids, vestibular folds, ventricles, pyriform sinuses, and aryepiglottic folds appear normal bilaterally.

## 2021-06-29 NOTE — CONSULT NOTE ADULT - SUBJECTIVE AND OBJECTIVE BOX
History of Present Illness:   70 y/o M w/ pmhx of CVA this past August and got TPA per family member, DM , BPH with obstruction s/p escamilla catheter , s/p ERCP w Sphincteretomy recent admission to [facility] for sepsis  hodgkin lympoma was not offered any chemo and was placed on hospice.   npw presenting with weakness and FTT.   pt denies cp / SOB / plapiatiaons   no focal neuro complaints .. at baseline RLE weakness   no N/V   had one episode of diarrah   no urinary s x   abdominal pain, diffuse, but worse on R side.     PAST MEDICAL & SURGICAL HISTORY:  Cerebrovascular accident (CVA)  Diabetes mellitus  HTN  BPH  No significant past surgical history  	  Endocrine history obtained with help from family at the bedside.  Patient has history of diabetes, A1C 8.8%, was on oral meds at home (Janumet 50/1000 BID), family reports patient previously for hospice and FS testing was not recommended, follows up with PCP for diabetes management.  Endo was consulted for glycemic control.     History of Present Illness:   70 y/o M w/ pmhx of CVA this past August and got TPA per family member, DM , BPH with obstruction s/p escamilla catheter , s/p ERCP w Sphincteretomy recent admission to [facility] for sepsis  hodgkin lympoma was not offered any chemo and was placed on hospice.   npw  presenting with weakness and FTT.   pt denies cp / SOB / plapiatiaons   no focal neuro complaints .. at baseline RLE weakness   no N/V   had one episode of diarrah   no urinary s x   abdominal pain, diffuse, but worse on R side.     PAST MEDICAL & SURGICAL HISTORY:  Cerebrovascular accident (CVA)  Diabetes mellitus  HTN  BPH  No significant past surgical history  	  Endocrine history obtained with help from family at the bedside.  Patient has history of diabetes, A1C 8.8%, was on oral meds at home (Janumet 50/1000 BID), family reports patient previously for hospice and FS testing was not recommended, follows up with PCP for diabetes management.  Endo was consulted for glycemic control.

## 2021-06-29 NOTE — CONSULT NOTE ADULT - PROBLEM SELECTOR RECOMMENDATION 9
Anti reflux meds  Diet per speech   F/U with ENT as outpt Anti reflux meds  Diet per speech   F/U with ENT as outpt  Rec. Speech therapy

## 2021-06-29 NOTE — CONSULT NOTE ADULT - PROBLEM SELECTOR RECOMMENDATION 9
Will start Lantus 10u at bedtime and continue Admelog correction scale coverage ac/hs.  Will continue monitoring FS and FU.  Patient counseled for compliance with consistent low carb diet. Discussed with family at bedside.

## 2021-06-29 NOTE — PHYSICAL THERAPY INITIAL EVALUATION ADULT - DISCHARGE DISPOSITION, PT EVAL
[facility]hab. *IF discharge home recommend home PT, assist with ALL ADLs and functional mobility, recommend mechanical (pola) lift for transfers and ambulance transportation home.

## 2021-06-29 NOTE — PHYSICAL THERAPY INITIAL EVALUATION ADULT - ADDITIONAL COMMENTS
pt states he lives with spouse in a private home with 5 steps to enter (+handrail) and a flight of steps inside (+handrail). Pt states prior to admission being independent with all functional mobility and ADLs. pt states he owns a RW, shower chair, wheelchair, and hospital bed.

## 2021-06-29 NOTE — CONSULT NOTE ADULT - ASSESSMENT
Assessment  DMT2: 71y Male with DM T2 with hyperglycemia, A1C 8.8%, was on oral meds/Janumet at home, admitted with weakness/fatigue and hyperglycemia -500 range, patient received one-time dose Lantus last night, now on insulin coverage, blood sugars are improving and trending within overall acceptable range today, no hypoglycemic episodes. Patient has history of lymphoma, appears lethargic, on puree diet, has poor appetite.  Lymphoma: on medications, monitored, FU Heme/Onc.  Anemia: stable, monitored.      Shahriar Kahn MD  Cell: [phone]  Office: [phone]       Assessment  DMT2: 71y Male with DM T2 with hyperglycemia, A1C 8.8%, was on oral meds/Janumet at home, admitted with weakness/fatigue and  hyperglycemia -500 range, patient received one-time dose Lantus last night, now on insulin coverage, blood sugars are improving and trending within overall acceptable range today, no hypoglycemic episodes. Patient has history of lymphoma, appears lethargic, on puree diet, has poor appetite.  Lymphoma: on medications, monitored, FU Heme/Onc.  Anemia: stable, monitored.      Shahriar Kahn MD  Cell: [phone]  Office: [phone]

## 2021-06-29 NOTE — CONSULT NOTE ADULT - ASSESSMENT
1) Hodgkin's lymphoma  - repeat CT scans, results pending  - obtain muga  - pending work up can consider systemic treatment vs hospice.    2) ENT- voice change is a new finding as per patient's sister.  - would ask ent to eval.    3)Hyperglycemia- mgmt as per med    4)Weakness. 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  Would ask neuro to eval.    5) anemia- will obtain anemia work up.  1) Hodgkin's lymphoma  - repeat CT scans, results pending  - obtain muga  - pending work up can consider systemic treatment vs hospice.    2) ENT- voice change is a new finding as per patient's sister.  - would ask ent to eval., speech and swallow as well    3)Hyperglycemia- mgmt as per med    4)Weakness. 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  Would ask neuro to eval.    5) anemia- will obtain anemia work up.

## 2021-06-29 NOTE — PHYSICAL THERAPY INITIAL EVALUATION ADULT - GAIT TRAINING, PT EVAL
GOAL: Pt will ambulate 100 feet with least restrictive device as appropriate independently within 4 weeks

## 2021-06-29 NOTE — CONSULT NOTE ADULT - SUBJECTIVE AND OBJECTIVE BOX
LIZETT RIVERA  MRN-[id number]    Patient is a 71y old  Male who presents with a chief complaint of     HPI:  72 y/o M w/ pmhx of CVA this past August and got TPA per family member, DM , BPH with obstruction s/p escamilla catheter , s/p ERCP w Sphincteretomy recent admission to [facility] for sepsis  hodgkin lympoma was not offered any chemo and was placed on hospice.   now presenting with weakness and FTT.   pt denies cp / SOB / plapiatiaons   no focal neuro complaints . at baseline RLE weakness   no N/V   had one episode of diarrhea   no urinary s x   abdominal pain, diffuse, but worse on R side.   	 (28 Jun 2021 22:08)  Hoarseness of voice.       PAST MEDICAL & SURGICAL HISTORY:  Cerebrovascular accident (CVA)  Diabetes mellitus    No significant past surgical history      MEDICATIONS  (STANDING):  dextrose 40% Gel 15 Gram(s) Oral once  dextrose 5%. 1000 milliLiter(s) (50 mL/Hr) IV Continuous <Continuous>  dextrose 5%. 1000 milliLiter(s) (100 mL/Hr) IV Continuous <Continuous>  dextrose 50% Injectable 25 Gram(s) IV Push once  dextrose 50% Injectable 12.5 Gram(s) IV Push once  dextrose 50% Injectable 25 Gram(s) IV Push once  glucagon  Injectable 1 milliGRAM(s) IntraMuscular once  insulin lispro (ADMELOG) corrective regimen sliding scale   SubCutaneous three times a day before meals  insulin lispro (ADMELOG) corrective regimen sliding scale   SubCutaneous at bedtime  sodium chloride 0.9%. 1000 milliLiter(s) (100 mL/Hr) IV Continuous <Continuous>    MEDICATIONS  (PRN):    Allergies    No Known Allergies      Social History  , Retired. No tob.  No etoh    Family History  FAMILY HISTORY:  No pertinent family history in first degree relatives        Review of System  REVIEW OF SYSTEMS      General:	Denies fatigue, fevers, chills, sweats, decreased appetite.    Skin/Breast: denies pruritis, rash  	  Ophthalmologic: no change in vision or blurring  	  HEENT	Denies dry mouth, oral sores, dysphagia,  change in hearing.    Respiratory and Thorax:  cough, sob, wheeze, hemoptysis  	  Cardiovascular:	no cp , palp, orthopnea    Gastrointestinal:	no n/v/d constipation    Genitourinary:	no dysuria of frequency, no hematuria, no flank pain    Musculoskeletal:	no bone or joint pain. no muscle aches.     Neurological:	no change in sensory or motor function. no headache. no weakness.     Psychiatric:	no depression, no anxiety, insomnia.     Hematology/Lymphatics:	no bleeding or bruising        Vitals  Vital Signs Last 24 Hrs  T(C): 37.7 (29 Jun 2021 04:50), Max: 38 (28 Jun 2021 20:52)  T(F): 99.9 (29 Jun 2021 04:50), Max: 100.4 (28 Jun 2021 20:52)  HR: 108 (29 Jun 2021 04:50) (108 - 137)  BP: 108/69 (29 Jun 2021 04:50) (97/82 - 124/77)  BP(mean): 89 (28 Jun 2021 18:30) (89 - 89)  RR: 18 (29 Jun 2021 04:50) (16 - 22)  SpO2: 99% (29 Jun 2021 04:50) (98% - 100%)    Physical Exam  PHYSICAL EXAM:      Constitutional: NAD    Eyes: PERRLA EOMI, anicteric sclera    Heent :No oral sores, no pharyngeal injection. moist mucosa.    Neck: supple, no jvd, no LAD    Respiratory: CTA b/l     Cardiovascular: s1s2, no m/g/r    Gastrointestinal: soft, nt, nd, + BS    Extremities: no c/c/e    Neurological:A&O x 3 moves all ext.    Skin: no rash on exposed skin    Lymph Nodes: no lymphadenopathy.      Lab  CBC Full  -  ( 29 Jun 2021 05:59 )  WBC Count : 2.36 K/uL  RBC Count : 2.65 M/uL  Hemoglobin : 7.8 g/dL  Hematocrit : 25.3 %  Platelet Count - Automated : 144 K/uL  Mean Cell Volume : 95.5 fl  Mean Cell Hemoglobin : 29.4 pg  Mean Cell Hemoglobin Concentration : 30.8 gm/dL  Auto Neutrophil # : x  Auto Lymphocyte # : x  Auto Monocyte # : x  Auto Eosinophil # : x  Auto Basophil # : x  Auto Neutrophil % : x  Auto Lymphocyte % : x  Auto Monocyte % : x  Auto Eosinophil % : x  Auto Basophil % : x    06-29    140  |  102  |  20  ----------------------------<  173<H>  4.6   |  25  |  0.69    Ca    8.8      29 Jun 2021 05:59  Phos  2.9     06-28  Mg     1.8     06-28    TPro  6.2  /  Alb  2.8<L>  /  TBili  1.4<H>  /  DBili  x   /  AST  14  /  ALT  11  /  AlkPhos  378<H>  06-29    PT/INR - ( 28 Jun 2021 13:49 )   PT: 16.8 sec;   INR: 1.42 ratio         PTT - ( 28 Jun 2021 13:49 )  PTT:32.3 sec    Rad:    Assessment/Plan         LIZETT RIVERA  MRN-[id number]    Patient is a 71y old  Male who presents with a chief complaint of     HPI:  72 y/o M w/ pmhx of CVA this past August and got TPA per family member, DM , BPH with obstruction s/p escamilla catheter , s/p ERCP w Sphincteretomy recent admission to [facility] for sepsis  hodgkin lympoma was not offered any chemo and was placed on hospice.   now presenting with weakness and FTT. Patient was independent and self reliant until 2/2021  pt denies cp / SOB / plapiatiaons   no focal neuro complaints . at baseline RLE weakness   no N/V   had one episode of diarrhea   no urinary s x   abdominal pain, diffuse, but worse on R side.   	 (28 Jun 2021 22:08)  Hoarseness of voice. New in onset.      PAST MEDICAL & SURGICAL HISTORY:  Cerebrovascular accident (CVA)  Diabetes mellitus  HTN  BPH    No significant past surgical history      MEDICATIONS  (STANDING):  dextrose 40% Gel 15 Gram(s) Oral once  dextrose 5%. 1000 milliLiter(s) (50 mL/Hr) IV Continuous <Continuous>  dextrose 5%. 1000 milliLiter(s) (100 mL/Hr) IV Continuous <Continuous>  dextrose 50% Injectable 25 Gram(s) IV Push once  dextrose 50% Injectable 12.5 Gram(s) IV Push once  dextrose 50% Injectable 25 Gram(s) IV Push once  glucagon  Injectable 1 milliGRAM(s) IntraMuscular once  insulin lispro (ADMELOG) corrective regimen sliding scale   SubCutaneous three times a day before meals  insulin lispro (ADMELOG) corrective regimen sliding scale   SubCutaneous at bedtime  sodium chloride 0.9%. 1000 milliLiter(s) (100 mL/Hr) IV Continuous <Continuous>    MEDICATIONS  (PRN):    Allergies    No Known Allergies      Social History  , Retired. No tob.  No etoh    Family History  FAMILY HISTORY:  No pertinent family history in first degree relatives        Review of System  REVIEW OF SYSTEMS      General:	Denies fatigue, fevers, chills, sweats, decreased appetite.    Skin/Breast: denies pruritis, rash  	  Ophthalmologic: no change in vision or blurring  	  HEENT	Denies dry mouth, oral sores, dysphagia,  change in hearing.    Respiratory and Thorax:  cough, sob, wheeze, hemoptysis  	  Cardiovascular:	no cp , palp, orthopnea    Gastrointestinal:	no n/v/d constipation    Genitourinary:	no dysuria of frequency, no hematuria, no flank pain    Musculoskeletal:	no bone or joint pain. no muscle aches.     Neurological:	no change in sensory or motor function. no headache. no weakness.     Psychiatric:	no depression, no anxiety, insomnia.     Hematology/Lymphatics:	no bleeding or bruising        Vitals  Vital Signs Last 24 Hrs  T(C): 37.7 (29 Jun 2021 04:50), Max: 38 (28 Jun 2021 20:52)  T(F): 99.9 (29 Jun 2021 04:50), Max: 100.4 (28 Jun 2021 20:52)  HR: 108 (29 Jun 2021 04:50) (108 - 137)  BP: 108/69 (29 Jun 2021 04:50) (97/82 - 124/77)  BP(mean): 89 (28 Jun 2021 18:30) (89 - 89)  RR: 18 (29 Jun 2021 04:50) (16 - 22)  SpO2: 99% (29 Jun 2021 04:50) (98% - 100%)      PHYSICAL EXAM:    Constitutional: NAD    Eyes: PERRLA EOMI, anicteric sclera    Heent :No oral sores, no pharyngeal injection. moist mucosa.    Neck: supple, no jvd, no LAD    Respiratory: CTA b/l     Cardiovascular: s1s2, no m/g/r    Gastrointestinal: soft, nt, nd, + BS    Extremities: no c/c/e    Neurological:A&O x 3 moves all ext.    Skin: no rash on exposed skin    Lymph Nodes: no lymphadenopathy.      Lab  CBC Full  -  ( 29 Jun 2021 05:59 )  WBC Count : 2.36 K/uL  RBC Count : 2.65 M/uL  Hemoglobin : 7.8 g/dL  Hematocrit : 25.3 %  Platelet Count - Automated : 144 K/uL  Mean Cell Volume : 95.5 fl  Mean Cell Hemoglobin : 29.4 pg  Mean Cell Hemoglobin Concentration : 30.8 gm/dL  Auto Neutrophil # : x  Auto Lymphocyte # : x  Auto Monocyte # : x  Auto Eosinophil # : x  Auto Basophil # : x  Auto Neutrophil % : x  Auto Lymphocyte % : x  Auto Monocyte % : x  Auto Eosinophil % : x  Auto Basophil % : x    06-29    140  |  102  |  20  ----------------------------<  173<H>  4.6   |  25  |  0.69    Ca    8.8      29 Jun 2021 05:59  Phos  2.9     06-28  Mg     1.8     06-28    TPro  6.2  /  Alb  2.8<L>  /  TBili  1.4<H>  /  DBili  x   /  AST  14  /  ALT  11  /  AlkPhos  378<H>  06-29    PT/INR - ( 28 Jun 2021 13:49 )   PT: 16.8 sec;   INR: 1.42 ratio         PTT - ( 28 Jun 2021 13:49 )  PTT:32.3 sec    Rad:    Assessment/Plan

## 2021-06-29 NOTE — PHYSICAL THERAPY INITIAL EVALUATION ADULT - GENERAL OBSERVATIONS, REHAB EVAL
pt vel 35 min eval poor. pt with diagnosis of hodgkin lymphoma, was home on hospice, now a/w FTT and weakness. pt rec'd in bed, peripheral IV line, NAD, agreed to session, hypophonic, A&Ox2, following all 1 step commands. bed mobility, max Ax1. pt with poor balance at EOB. unable to safely transfer/participate further at this time. Pt left in bed, alarm, RN Barbara aware, NAD, all lines intact, cb in reach, all needs met/5Ps.

## 2021-06-29 NOTE — PROVIDER CONTACT NOTE (OTHER) - ASSESSMENT
Pt is A&Ox2, VSS, denies cp/sob. Pt complaint of abdominal pain 5/10, pt states last BM was 2 days ago

## 2021-06-29 NOTE — CONSULT NOTE ADULT - ASSESSMENT
72yo male with hoarseness likely due to left vocal cord weakness and a small glottic gap found bedside indirect laryngoscopy. 70yo male with hoarseness likely due to left vocal cord weakness (poor conditioning) and a small glottic gap found bedside indirect laryngoscopy. 72yo male with hoarseness likely due to overall weakness (poor conditioning) and a small glottic gap found bedside indirect laryngoscopy. No vocal cord paralysis appreciated.

## 2021-06-29 NOTE — PHYSICAL THERAPY INITIAL EVALUATION ADULT - PLANNED THERAPY INTERVENTIONS, PT EVAL
GOAL: Pt will negotiate 5 steps with unilateral handrail in a step-to pattern independently within 4 weeks./balance training/bed mobility training/gait training/strengthening/transfer training

## 2021-06-29 NOTE — PHYSICAL THERAPY INITIAL EVALUATION ADULT - PERTINENT HX OF CURRENT PROBLEM, REHAB EVAL
70 y/o M with PMH of CVA this past August and got tPA per family member, DM, BPH with obstruction s/p escamilla catheter, s/p ERCP with sphincterectomy and recent admission to [facility] for sepsis in setting of hodgkin lymphoma, was not offered chemo, and was placed on hospice. pt p/w weakness and FTT.

## 2021-06-29 NOTE — CONSULT NOTE ADULT - ATTENDING COMMENTS
Laryngoscopy with normal mobility but significant overall weakness limiting his effort. recommend speech therapy for voice exercises and also swallow eval.

## 2021-06-30 LAB
ALBUMIN SERPL ELPH-MCNC: 2.5 G/DL — LOW (ref 3.3–5)
ALP SERPL-CCNC: 342 U/L — HIGH (ref 40–120)
ALT FLD-CCNC: 9 U/L — LOW (ref 10–45)
ANION GAP SERPL CALC-SCNC: 12 MMOL/L — SIGNIFICANT CHANGE UP (ref 5–17)
AST SERPL-CCNC: 17 U/L — SIGNIFICANT CHANGE UP (ref 10–40)
BILIRUB SERPL-MCNC: 1.2 MG/DL — SIGNIFICANT CHANGE UP (ref 0.2–1.2)
BLD GP AB SCN SERPL QL: NEGATIVE — SIGNIFICANT CHANGE UP
BUN SERPL-MCNC: 20 MG/DL — SIGNIFICANT CHANGE UP (ref 7–23)
CALCIUM SERPL-MCNC: 8.2 MG/DL — LOW (ref 8.4–10.5)
CHLORIDE SERPL-SCNC: 106 MMOL/L — SIGNIFICANT CHANGE UP (ref 96–108)
CO2 SERPL-SCNC: 23 MMOL/L — SIGNIFICANT CHANGE UP (ref 22–31)
CREAT SERPL-MCNC: 0.75 MG/DL — SIGNIFICANT CHANGE UP (ref 0.5–1.3)
GLUCOSE BLDC GLUCOMTR-MCNC: 142 MG/DL — HIGH (ref 70–99)
GLUCOSE BLDC GLUCOMTR-MCNC: 148 MG/DL — HIGH (ref 70–99)
GLUCOSE BLDC GLUCOMTR-MCNC: 149 MG/DL — HIGH (ref 70–99)
GLUCOSE BLDC GLUCOMTR-MCNC: 208 MG/DL — HIGH (ref 70–99)
GLUCOSE SERPL-MCNC: 120 MG/DL — HIGH (ref 70–99)
HCT VFR BLD CALC: 23.6 % — LOW (ref 39–50)
HCT VFR BLD CALC: 24.9 % — LOW (ref 39–50)
HGB BLD-MCNC: 7.1 G/DL — LOW (ref 13–17)
HGB BLD-MCNC: 7.8 G/DL — LOW (ref 13–17)
MAGNESIUM SERPL-MCNC: 1.7 MG/DL — SIGNIFICANT CHANGE UP (ref 1.6–2.6)
MCHC RBC-ENTMCNC: 29.2 PG — SIGNIFICANT CHANGE UP (ref 27–34)
MCHC RBC-ENTMCNC: 30 PG — SIGNIFICANT CHANGE UP (ref 27–34)
MCHC RBC-ENTMCNC: 30.1 GM/DL — LOW (ref 32–36)
MCHC RBC-ENTMCNC: 31.3 GM/DL — LOW (ref 32–36)
MCV RBC AUTO: 95.8 FL — SIGNIFICANT CHANGE UP (ref 80–100)
MCV RBC AUTO: 97.1 FL — SIGNIFICANT CHANGE UP (ref 80–100)
NRBC # BLD: 0 /100 WBCS — SIGNIFICANT CHANGE UP (ref 0–0)
NRBC # BLD: 1 /100 WBCS — HIGH (ref 0–0)
PLATELET # BLD AUTO: 106 K/UL — LOW (ref 150–400)
POTASSIUM SERPL-MCNC: 4.2 MMOL/L — SIGNIFICANT CHANGE UP (ref 3.5–5.3)
POTASSIUM SERPL-SCNC: 4.2 MMOL/L — SIGNIFICANT CHANGE UP (ref 3.5–5.3)
PROT SERPL-MCNC: 5 G/DL — LOW (ref 6–8.3)
PROT SERPL-MCNC: 5 G/DL — LOW (ref 6–8.3)
PROT SERPL-MCNC: 5.6 G/DL — LOW (ref 6–8.3)
RBC # BLD: 2.43 M/UL — LOW (ref 4.2–5.8)
RBC # BLD: 2.6 M/UL — LOW (ref 4.2–5.8)
RBC # FLD: 21.4 % — HIGH (ref 10.3–14.5)
RBC # FLD: 23.7 % — HIGH (ref 10.3–14.5)
RH IG SCN BLD-IMP: POSITIVE — SIGNIFICANT CHANGE UP
SODIUM SERPL-SCNC: 141 MMOL/L — SIGNIFICANT CHANGE UP (ref 135–145)
WBC # BLD: 2.01 K/UL — LOW (ref 3.8–10.5)
WBC # BLD: 2.61 K/UL — LOW (ref 3.8–10.5)
WBC # FLD AUTO: 2.01 K/UL — LOW (ref 3.8–10.5)
WBC # FLD AUTO: 2.61 K/UL — LOW (ref 3.8–10.5)

## 2021-06-30 RX ORDER — ACETAMINOPHEN 500 MG
650 TABLET ORAL EVERY 6 HOURS
Refills: 0 | Status: DISCONTINUED | OUTPATIENT
Start: 2021-06-30 | End: 2021-08-04

## 2021-06-30 RX ORDER — PIPERACILLIN AND TAZOBACTAM 4; .5 G/20ML; G/20ML
3.38 INJECTION, POWDER, LYOPHILIZED, FOR SOLUTION INTRAVENOUS ONCE
Refills: 0 | Status: COMPLETED | OUTPATIENT
Start: 2021-06-30 | End: 2021-06-30

## 2021-06-30 RX ORDER — PIPERACILLIN AND TAZOBACTAM 4; .5 G/20ML; G/20ML
3.38 INJECTION, POWDER, LYOPHILIZED, FOR SOLUTION INTRAVENOUS EVERY 8 HOURS
Refills: 0 | Status: DISCONTINUED | OUTPATIENT
Start: 2021-06-30 | End: 2021-07-03

## 2021-06-30 RX ADMIN — TAMSULOSIN HYDROCHLORIDE 0.4 MILLIGRAM(S): 0.4 CAPSULE ORAL at 22:38

## 2021-06-30 RX ADMIN — Medication 650 MILLIGRAM(S): at 16:57

## 2021-06-30 RX ADMIN — FINASTERIDE 5 MILLIGRAM(S): 5 TABLET, FILM COATED ORAL at 11:22

## 2021-06-30 RX ADMIN — SODIUM CHLORIDE 100 MILLILITER(S): 9 INJECTION INTRAMUSCULAR; INTRAVENOUS; SUBCUTANEOUS at 15:43

## 2021-06-30 RX ADMIN — INSULIN GLARGINE 10 UNIT(S): 100 INJECTION, SOLUTION SUBCUTANEOUS at 22:04

## 2021-06-30 RX ADMIN — PIPERACILLIN AND TAZOBACTAM 25 GRAM(S): 4; .5 INJECTION, POWDER, LYOPHILIZED, FOR SOLUTION INTRAVENOUS at 23:32

## 2021-06-30 RX ADMIN — PANTOPRAZOLE SODIUM 40 MILLIGRAM(S): 20 TABLET, DELAYED RELEASE ORAL at 05:46

## 2021-06-30 RX ADMIN — PIPERACILLIN AND TAZOBACTAM 200 GRAM(S): 4; .5 INJECTION, POWDER, LYOPHILIZED, FOR SOLUTION INTRAVENOUS at 22:41

## 2021-06-30 RX ADMIN — Medication 650 MILLIGRAM(S): at 17:27

## 2021-06-30 RX ADMIN — Medication 81 MILLIGRAM(S): at 11:22

## 2021-06-30 RX ADMIN — Medication 2: at 11:45

## 2021-06-30 NOTE — SWALLOW BEDSIDE ASSESSMENT ADULT - COMMENTS
6/28 CT Chest: IMPRESSION: Patchy groundglass opacities and scattered nodular opacities throughout all lobes of the lungs. Findings may be related to known lymphoma or may be seen in the setting of multifocal infection. Mildly enlarged supraclavicular, mediastinal, hilar, and retroperitoneal lymphadenopathy likely related to known lymphoma.

## 2021-06-30 NOTE — SWALLOW BEDSIDE ASSESSMENT ADULT - SLP PERTINENT HISTORY OF CURRENT PROBLEM
70 y/o M w/ pmhx of CVA this past August and got TPA per family member, DM , BPH with obstruction s/p escamilla catheter , s/p ERCP w Sphincteretomy recent admission to [facility] for sepsis  hodgkin lympoma was not offered any chemo and was placed on hospice. now presenting with weakness and FTT. Cultures sent. CT chest ordered . Heme/onc consulted -> pending work up can consider systemic treatment vs hospice. ENT consulted for hoarseness -> left vocal cord weakness (poor conditioning) and a small glottic gap found bedside indirect laryngoscopy. Pt on modified diet. Diet per speech.

## 2021-06-30 NOTE — PROGRESS NOTE ADULT - ASSESSMENT
Assessment  DMT2: 71y Male with DM T2 with hyperglycemia, A1C 8.8%, was on oral meds/Janumet at home, admitted with weakness/fatigue and hyperglycemia, now on basal insulin and coverage, blood sugars are improving and in overall acceptable range with intermittent elevations, no hypoglycemic episodes, he appears comfortable though lethargic, not eating much.  Lymphoma: on medications, monitored, FU Heme/Onc.  Anemia: stable, monitored.      Shahriar Kahn MD  Cell: [phone]  Office: [phone]       Assessment  DMT2: 71y Male with DM T2 with hyperglycemia, A1C 8.8%, was on oral meds/Janumet at home, admitted with weakness/fatigue and hyperglycemia, now on basal insulin and coverage, blood sugars are improving and in overall acceptable range  with intermittent elevations, no hypoglycemic episodes, he appears comfortable though lethargic, not eating much.  Lymphoma: on medications, monitored, FU Heme/Onc.  Anemia: stable, monitored.      Shahriar Kahn MD  Cell: [phone]  Office: [phone]

## 2021-06-30 NOTE — PROGRESS NOTE ADULT - ASSESSMENT
1) Hodgkin's lymphoma  - repeat CT scans, results pending  - obtain muga  - pending work up can consider systemic treatment vs hospice.    2) ENT- voice change is a new finding as per patient's sister.  - would ask ent to eval., speech and swallow as well    3)Hyperglycemia- mgmt as per med    4)Weakness. 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  Would ask neuro to eval.    5) anemia- will obtain anemia work up.  1) Hodgkin's lymphoma  - repeat CT scans, results noted  - obtain muga  - will plan family meeting to discuss rx plan vs supportive care    2) ENT- voice change is a new finding as per patient's sister.  - ENT input note, f/u s/s    3)Hyperglycemia- mgmt as per med    4)Weakness. 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  Would ask neuro and PT to eval.    5) anemia- w/u this far unremarkable. possible aocd.  will order stool studies.  would either repeat cbc or transfuse 1u

## 2021-06-30 NOTE — PROGRESS NOTE ADULT - SUBJECTIVE AND OBJECTIVE BOX
Chief complaint  Patient is a 71y old  Male who presents with a chief complaint of  Review of systems  Patient in bed, looks comfortable, no hypoglycemic episodes.    Labs and Fingersticks  CAPILLARY BLOOD GLUCOSE      POCT Blood Glucose.: 208 mg/dL (30 Jun 2021 11:35)  POCT Blood Glucose.: 149 mg/dL (30 Jun 2021 07:19)  POCT Blood Glucose.: 156 mg/dL (29 Jun 2021 21:20)  POCT Blood Glucose.: 169 mg/dL (29 Jun 2021 16:29)      Anion Gap, Serum: 12 (06-30 @ 06:24)  Anion Gap, Serum: 13 (06-29 @ 05:59)      Calcium, Total Serum: 8.2 *L* (06-30 @ 06:24)  Calcium, Total Serum: 8.8 (06-29 @ 05:59)  Albumin, Serum: 2.5 *L* (06-30 @ 06:24)  Albumin, Serum: 2.8 *L* (06-29 @ 05:59)    Alanine Aminotransferase (ALT/SGPT): 9 *L* (06-30 @ 06:24)  Alanine Aminotransferase (ALT/SGPT): 11 (06-29 @ 05:59)  Alkaline Phosphatase, Serum: 342 *H* (06-30 @ 06:24)  Alkaline Phosphatase, Serum: 378 *H* (06-29 @ 05:59)  Aspartate Aminotransferase (AST/SGOT): 17 (06-30 @ 06:24)  Aspartate Aminotransferase (AST/SGOT): 14 (06-29 @ 05:59)        06-30    141  |  106  |  20  ----------------------------<  120<H>  4.2   |  23  |  0.75    Ca    8.2<L>      30 Jun 2021 06:24  Mg     1.7     06-30    TPro  5.0<L>  /  Alb  x   /  TBili  x   /  DBili  x   /  AST  x   /  ALT  x   /  AlkPhos  x   06-30                        7.1    2.61  )-----------( 106      ( 30 Jun 2021 06:25 )             23.6     Medications  MEDICATIONS  (STANDING):  aspirin  chewable 81 milliGRAM(s) Oral daily  dextrose 40% Gel 15 Gram(s) Oral once  dextrose 5%. 1000 milliLiter(s) (50 mL/Hr) IV Continuous <Continuous>  dextrose 5%. 1000 milliLiter(s) (100 mL/Hr) IV Continuous <Continuous>  dextrose 50% Injectable 25 Gram(s) IV Push once  dextrose 50% Injectable 12.5 Gram(s) IV Push once  dextrose 50% Injectable 25 Gram(s) IV Push once  finasteride 5 milliGRAM(s) Oral daily  glucagon  Injectable 1 milliGRAM(s) IntraMuscular once  insulin glargine Injectable (LANTUS) 10 Unit(s) SubCutaneous at bedtime  insulin lispro (ADMELOG) corrective regimen sliding scale   SubCutaneous three times a day before meals  insulin lispro (ADMELOG) corrective regimen sliding scale   SubCutaneous at bedtime  pantoprazole    Tablet 40 milliGRAM(s) Oral before breakfast  sodium chloride 0.9%. 1000 milliLiter(s) (100 mL/Hr) IV Continuous <Continuous>  tamsulosin 0.4 milliGRAM(s) Oral at bedtime      Physical Exam  General: Patient comfortable in bed  Vital Signs Last 12 Hrs  T(F): 97.5 (06-30-21 @ 11:16), Max: 97.8 (06-30-21 @ 04:19)  HR: 100 (06-30-21 @ 11:16) (100 - 113)  BP: 109/71 (06-30-21 @ 11:16) (109/71 - 120/75)  BP(mean): --  RR: 18 (06-30-21 @ 11:16) (17 - 18)  SpO2: 98% (06-30-21 @ 11:16) (97% - 98%)  Neck: No palpable thyroid nodules.  CVS: S1S2, No murmurs         Chief complaint  Patient is a 71y old  Male who presents with a chief complaint of  Review of systems  Patient in bed, looks comfortable, no hypoglycemic episodes.    Labs and Fingersticks  CAPILLARY BLOOD GLUCOSE      POCT Blood Glucose.: 208 mg/dL (30 Jun 2021 11:35)  POCT Blood Glucose.: 149 mg/dL (30 Jun 2021 07:19)  POCT Blood Glucose.: 156 mg/dL (29 Jun 2021 21:20)  POCT Blood Glucose.: 169 mg/dL (29 Jun 2021 16:29)      Anion Gap, Serum: 12 (06-30 @ 06:24)  Anion Gap, Serum: 13 (06-29 @ 05:59)      Calcium, Total Serum: 8.2 *L* (06-30 @ 06:24)  Calcium, Total Serum: 8.8 (06-29 @ 05:59)  Albumin, Serum: 2.5 *L* (06-30 @ 06:24)  Albumin, Serum: 2.8 *L* (06-29 @ 05:59)    Alanine Aminotransferase (ALT/SGPT): 9 *L* (06-30 @ 06:24)  Alanine Aminotransferase (ALT/SGPT): 11 (06-29 @ 05:59)  Alkaline Phosphatase, Serum: 342 *H* (06-30 @ 06:24)  Alkaline Phosphatase, Serum: 378 *H* (06-29 @ 05:59)  Aspartate Aminotransferase (AST/SGOT): 17 (06-30 @ 06:24)  Aspartate Aminotransferase (AST/SGOT): 14 (06-29 @ 05:59)        06-30    141  |  106  |  20  ----------------------------<  120<H>  4.2   |  23  |  0.75    Ca    8.2<L>      30 Jun 2021 06:24  Mg     1.7     06-30    TPro  5.0<L>  /  Alb  x   /  TBili  x   /  DBili  x   /  AST  x   /  ALT  x   /  AlkPhos  x   06-30                        7.1    2.61  )-----------( 106      ( 30 Jun 2021 06:25 )             23.6     Medications  MEDICATIONS  (STANDING):  aspirin  chewable 81 milliGRAM(s) Oral daily  dextrose 40% Gel 15 Gram(s) Oral once  dextrose 5%. 1000 milliLiter(s) (50 mL/Hr) IV Continuous <Continuous>  dextrose 5%. 1000 milliLiter(s) (100 mL/Hr) IV Continuous <Continuous>  dextrose 50% Injectable 25 Gram(s) IV Push once  dextrose 50% Injectable 12.5 Gram(s) IV Push once  dextrose 50% Injectable 25 Gram(s) IV Push once  finasteride 5 milliGRAM(s) Oral daily  glucagon  Injectable 1 milliGRAM(s) IntraMuscular once  insulin glargine Injectable (LANTUS) 10 Unit(s) SubCutaneous at bedtime  insulin lispro (ADMELOG) corrective regimen sliding scale   SubCutaneous three times a day before meals  insulin lispro (ADMELOG) corrective regimen sliding scale   SubCutaneous at bedtime  pantoprazole    Tablet 40 milliGRAM(s) Oral before breakfast  sodium chloride 0.9%. 1000 milliLiter(s) (100 mL/Hr) IV Continuous <Continuous>  tamsulosin 0.4 milliGRAM(s) Oral at bedtime      Physical Exam  General: Patient comfortable in bed  Vital Signs Last 12 Hrs  T(F): 97.5 (06-30-21 @ 11:16), Max: 97.8 (06-30-21 @ 04:19)  HR: 100 (06-30-21 @ 11:16) (100 - 113)  BP: 109/71 (06-30-21 @ 11:16) (109/71 - 120/75)  BP(mean): --  RR: 18 (06-30-21 @ 11:16) (17 - 18)  SpO2: 98% (06-30-21 @ 11:16) (97% - 98%)  Neck: No palpable thyroid nodules.  CVS: S1S2, No murmurs

## 2021-06-30 NOTE — CONSULT NOTE ADULT - ASSESSMENT
72 y/o AAM w/ Stroke this past August and got TPA per family member had L weakness resolved, DM , BPH with obstruction s/p escamilla catheter , s/p ERCP w Sphincteretomy recent admission to [facility] for sepsis  hodgkin lympoma was not offered any chemo and was placed on hospice.   npw presenting with generalized weakness and FTT.   has baseline R LE weakness.  + hypophonic   Hgb 7.1, platelets 106, elevated alk phos, A1c 8.8%  o/e with R NLF flattening, hypophonic, dysarthria, and generalized weakness. more likely 2/2 underlying medical condition as opposed to new stroke.   - check CTH   - c/w ASA 81mg daily  - lipitor 40mg if no CI. elevated alk phos  - check A1c and lipids.   - telemetry  - PT/OT/SS/SLP, OOBC  - check FS, glucose control <180  - GI/DVT ppx  - Counseling on diet, exercise, and medication adherence was done  - Counseling on smoking cessation and alcohol consumption offered when appropriate.  - Pain assessed and judicious use of narcotics when appropriate was discussed.    - Stroke education given when appropriate.  - Importance of fall prevention discussed.   - Differential diagnosis and plan of care discussed with patient and/or family and primary team  - Thank you for allowing me to participate in the care of this patient. Call with questions.   Juan العراقي MD  Vascular Neurology  Office: [phone]  70 y/o AAM w/ Stroke this past August and got TPA per family member had L weakness resolved, DM , BPH with obstruction s/p escamilla catheter , s/p ERCP w Sphincteretomy recent admission to [facility] for sepsis  hodgkin lympoma was not offered any chemo and was placed on hospice.   npw presenting with generalized weakness and FTT.   has baseline R LE weakness.  + hypophonic   Hgb 7.1, platelets 106, elevated alk phos, A1c 8.8%  o/e with R NLF flattening, hypophonic, dysarthria, and generalized weakness. more likely 2/2 underlying medical condition as opposed to new stroke.   - check CTH   - c/w ASA 81mg daily  - correct metabolic derrangements   - lipitor 40mg if no CI. elevated alk phos  - check A1c and lipids.   - telemetry  - PT/OT/SS/SLP, OOBC  - check FS, glucose control <180  - GI/DVT ppx  - Counseling on diet, exercise, and medication adherence was done  - Counseling on smoking cessation and alcohol consumption offered when appropriate.  - Pain assessed and judicious use of narcotics when appropriate was discussed.    - Stroke education given when appropriate.  - Importance of fall prevention discussed.   - Differential diagnosis and plan of care discussed with patient and/or family and primary team  - Thank you for allowing me to participate in the care of this patient. Call with questions.   - [facility] discussion for hospice   Juan العراقي MD  Vascular Neurology  Office: [phone]

## 2021-06-30 NOTE — PROGRESS NOTE ADULT - SUBJECTIVE AND OBJECTIVE BOX
Date of service: 06-30-21 @ 23:53      Patient is a 71y old  Male who presents with a chief complaint of                                                              INTERVAL HPI/OVERNIGHT EVENTS:    REVIEW OF SYSTEMS:     CONSTITUTIONAL: No weakness, fevers or chills  EYES/ENT: No visual changes , no ear ache   NECK: No pain or stiffness  RESPIRATORY: No cough, wheezing,  No shortness of breath  CARDIOVASCULAR: No chest pain or palpitations  GASTROINTESTINAL: No abdominal pain  . No nausea, vomiting, or hematemesis; No diarrhea or constipation. No melena or hematochezia.  GENITOURINARY: No dysuria, frequency or hematuria  NEUROLOGICAL: No numbness or weakness  SKIN: No itching, burning, rashes, or lesions                                                                                                                                                                                                                                                                                 Medications:  MEDICATIONS  (STANDING):  aspirin  chewable 81 milliGRAM(s) Oral daily  dextrose 40% Gel 15 Gram(s) Oral once  dextrose 5%. 1000 milliLiter(s) (50 mL/Hr) IV Continuous <Continuous>  dextrose 5%. 1000 milliLiter(s) (100 mL/Hr) IV Continuous <Continuous>  dextrose 50% Injectable 25 Gram(s) IV Push once  dextrose 50% Injectable 12.5 Gram(s) IV Push once  dextrose 50% Injectable 25 Gram(s) IV Push once  finasteride 5 milliGRAM(s) Oral daily  glucagon  Injectable 1 milliGRAM(s) IntraMuscular once  insulin glargine Injectable (LANTUS) 10 Unit(s) SubCutaneous at bedtime  insulin lispro (ADMELOG) corrective regimen sliding scale   SubCutaneous three times a day before meals  insulin lispro (ADMELOG) corrective regimen sliding scale   SubCutaneous at bedtime  pantoprazole    Tablet 40 milliGRAM(s) Oral before breakfast  piperacillin/tazobactam IVPB.. 3.375 Gram(s) IV Intermittent every 8 hours  sodium chloride 0.9%. 1000 milliLiter(s) (100 mL/Hr) IV Continuous <Continuous>  tamsulosin 0.4 milliGRAM(s) Oral at bedtime    MEDICATIONS  (PRN):  acetaminophen   Tablet .. 650 milliGRAM(s) Oral every 6 hours PRN Temp greater or equal to 38C (100.4F), Moderate Pain (4 - 6)       Allergies    No Known Allergies    Intolerances      Vital Signs Last 24 Hrs  T(C): 36.8 (30 Jun 2021 20:21), Max: 38.5 (30 Jun 2021 16:46)  T(F): 98.3 (30 Jun 2021 20:21), Max: 101.3 (30 Jun 2021 16:46)  HR: 87 (30 Jun 2021 20:21) (87 - 113)  BP: 97/61 (30 Jun 2021 20:21) (97/61 - 120/75)  BP(mean): --  RR: 17 (30 Jun 2021 20:21) (17 - 18)  SpO2: 96% (30 Jun 2021 20:21) (96% - 98%)  CAPILLARY BLOOD GLUCOSE      POCT Blood Glucose.: 142 mg/dL (30 Jun 2021 21:09)  POCT Blood Glucose.: 148 mg/dL (30 Jun 2021 16:26)  POCT Blood Glucose.: 208 mg/dL (30 Jun 2021 11:35)  POCT Blood Glucose.: 149 mg/dL (30 Jun 2021 07:19)      06-29 @ 07:01  -  06-30 @ 07:00  --------------------------------------------------------  IN: 1570 mL / OUT: 900 mL / NET: 670 mL    06-30 @ 07:01  -  06-30 @ 23:53  --------------------------------------------------------  IN: 240 mL / OUT: 0 mL / NET: 240 mL      Physical Exam:    Daily     Daily   General:  Well appearing, NAD, not cachetic  HEENT:  Nonicteric, PERRLA  CV:  RRR, S1S2   Lungs:  CTA B/L, no wheezes, rales, rhonchi  Abdomen:  Soft, non-tender, no distended, positive BS  Extremities:  2+ pulses, no c/c, no edema  Skin:  Warm and dry, no rashes  :  No escamilla  Neuro:  AAOx3, non-focal, grossly intact                                                                                                                                                                                                                                                                                                LABS:                               7.8    2.01  )-----------( x        ( 30 Jun 2021 22:47 )             24.9                      06-30    141  |  106  |  20  ----------------------------<  120<H>  4.2   |  23  |  0.75    Ca    8.2<L>      30 Jun 2021 06:24  Mg     1.7     06-30    TPro  5.0<L>  /  Alb  x   /  TBili  x   /  DBili  x   /  AST  x   /  ALT  x   /  AlkPhos  x   06-30                       RADIOLOGY & ADDITIONAL TESTS         I personally reviewed: [  ]EKG   [  ]CXR    [  ] CT      A/P:         Discussed with :     [facility] consultants' Notes   Time spent :

## 2021-06-30 NOTE — PROVIDER CONTACT NOTE (OTHER) - SITUATION
PT noted with elevated temp prior to Blood transfusion.  Temp noted 100.2 then 99.3.  Provider made aware.  Okay to give blood.  Temp 15 min later rectal 101.2.  Tylenol ordered to be given.

## 2021-06-30 NOTE — SWALLOW BEDSIDE ASSESSMENT ADULT - SWALLOW EVAL: DIAGNOSIS
Pt presents with an oropharyngeal dysphagia. There is significant prolonged and inefficient mastication with solid textures, with minimal residue remaining on the lingual surface after the swallow. Pt required verbal cued to clear residue via repeat swallows and liquid wash. There is suspected delayed pharyngeal swallow trigger. Immediate cough post-swallow with thin liquids is concerning for laryngeal penetration/aspiration. Pt presents with: 1) An oropharyngeal dysphagia. There is significant prolonged and inefficient mastication with solid textures, with minimal residue remaining on the lingual surface after the swallow. Pt required verbal cued to clear residue via repeat swallows and liquid wash. There is suspected delayed pharyngeal swallow trigger. Immediate cough post-swallow with thin liquids is concerning for laryngeal penetration/aspiration. 2) Hypophonia

## 2021-06-30 NOTE — SWALLOW BEDSIDE ASSESSMENT ADULT - ASPIRATION PRECAUTIONS
Monitor for s/s aspiration/laryngeal penetration. If noted:  D/C p.o. intake, provide non-oral nutrition/hydration/meds, and contact this service @ [id number]

## 2021-06-30 NOTE — PROGRESS NOTE ADULT - ASSESSMENT
72 y/o M w/ pmhx of CVA this past August and got TPA per family member, DM , BPH with obstruction s/p escamilla catheter , s/p ERCP w Sphincteretomy recent admission to [facility] for sepsis  hodgkin lympoma was not offered any chemo and was placed on hospice.   npw presenting with weakness and FTT.   pt denies cp / SOB / plapiatiaons   no focal neuro complaints .. at baseline RLE weakness   no N/V   had one episode of diarrah   no urinary s x   abdominal pain, diffuse, but worse on R side.     - failure to thrive : cultures sent   CT chest ordered   nutrition consult       - lymphoma : fu wtih Dr. Larios    DM with hyperglycemia :     - anemia  : moniot rH/H   transfuse prn     d/w pt and sister

## 2021-06-30 NOTE — CHART NOTE - NSCHARTNOTEFT_GEN_A_CORE
70 y/o M w pmhx stroke this past August with TPA per family member, DM , BPH with obstruction s/p escamilla catheter, s/p ERCP recent admission to [facility] for sepsis  Hodgkin lymphoma was not offered any chemo and was placed on hospice. Now presenting with generalized weakness. Notified by RN - patient's oral temperature 100.2, repeat 101.3 just prior to blood transfusion. Patient also c/o mild frontal headache x 1 hour. Patient denies nausea, vomiting, lightheadedness, visual changes, CP, SOB, palpitations, abdominal pain.        Labs                      7.1    2.61  )-----------( 106      ( 30 Jun 2021 06:25 )             23.6   06-30    141  |  106  |  20  ----------------------------<  120<H>  4.2   |  23  |  0.75    Ca    8.2<L>      30 Jun 2021 06:24  Mg     1.7     06-30  TPro  5.0<L>  /  Alb  x   /  TBili  x   /  DBili  x   /  AST  x   /  ALT  x   /  AlkPhos  x   06-30      Vital Signs Last 24 Hrs  T(C): 38.5 (30 Jun 2021 16:46), Max: 38.5 (30 Jun 2021 16:46)  T(F): 101.3 (30 Jun 2021 16:46), Max: 101.3 (30 Jun 2021 16:46)  HR: 98 (30 Jun 2021 16:46) (98 - 113)  BP: 104/69 (30 Jun 2021 16:46) (100/63 - 120/75)  RR: 18 (30 Jun 2021 16:46) (17 - 18)  SpO2: 97% (30 Jun 2021 16:46) (95% - 98%)    Physical Exam:   General: NAD; appropriate affect   Head: Normocephalic, atraumatic   ENT: Moist mucus membranes; hypophonic   CV: RRR; no murmur   Resp: CTA bilat, no wheezes, rhonchi, or rales, normal WOB  GI: Soft, nontender, nondistended, normoactive bowel sounds  Extremities: No edema   Skin: No rashes or lesions    A/P Fever, unknown origin  --PO Tylenol  --BCx x 2, UA  --Empiric zosyn to cover for possible aspiration pneumonia   --OK to proceed with blood transfusion per Dr. Alas  --Pending CT head and neck r/o metastatic disease    MARIE ZelayaC   Department of Medicine 70 y/o M w pmhx stroke this past August with TPA per family member, DM , BPH with obstruction s/p escamilla catheter, s/p ERCP recent admission to [facility] for sepsis  Hodgkin lymphoma was not offered any chemo and was placed on hospice. Now presenting with generalized weakness. Notified by RN - patient's oral temperature 100.2, rectal 101.3 just prior to blood transfusion. Patient also c/o mild frontal headache x 1 hour. Patient denies nausea, vomiting, lightheadedness, visual changes, CP, SOB, palpitations, abdominal pain.        Labs                      7.1    2.61  )-----------( 106      ( 30 Jun 2021 06:25 )             23.6   06-30    141  |  106  |  20  ----------------------------<  120<H>  4.2   |  23  |  0.75    Ca    8.2<L>      30 Jun 2021 06:24  Mg     1.7     06-30  TPro  5.0<L>  /  Alb  x   /  TBili  x   /  DBili  x   /  AST  x   /  ALT  x   /  AlkPhos  x   06-30      Vital Signs Last 24 Hrs  T(C): 38.5 (30 Jun 2021 16:46), Max: 38.5 (30 Jun 2021 16:46)  T(F): 101.3 (30 Jun 2021 16:46), Max: 101.3 (30 Jun 2021 16:46)  HR: 98 (30 Jun 2021 16:46) (98 - 113)  BP: 104/69 (30 Jun 2021 16:46) (100/63 - 120/75)  RR: 18 (30 Jun 2021 16:46) (17 - 18)  SpO2: 97% (30 Jun 2021 16:46) (95% - 98%)    Physical Exam:   General: NAD; appropriate affect   Head: Normocephalic, atraumatic   ENT: Moist mucus membranes; hypophonic   CV: RRR; no murmur   Resp: CTA bilat, no wheezes, rhonchi, or rales, normal WOB  GI: Soft, nontender, nondistended, normoactive bowel sounds  Extremities: No edema   Skin: No rashes or lesions    A/P Fever, unknown origin  --PO Tylenol  --BCx x 2, UA  --Empiric zosyn to cover for possible aspiration pneumonia   --OK to proceed with blood transfusion per Dr. Alas  --Pending CT head and neck r/o metastatic disease    MARIE ZelayaC   Department of Medicine 70 y/o M w pmhx stroke this past August with TPA per family member, DM , BPH with obstruction s/p escamilla catheter, s/p ERCP recent admission to [facility] for sepsis, hx Hodgkin lymphoma was not offered any chemo and was placed on hospice. Now presenting with generalized weakness. Notified by RN - patient's oral temperature 100.2 F just prior to blood transfusion. Repeat rectal 101.3 F. Patient also c/o mild frontal headache x 1 hour. Patient denies nausea, vomiting, lightheadedness, visual changes, CP, SOB, palpitations, abdominal pain.        Labs                      7.1    2.61  )-----------( 106      ( 30 Jun 2021 06:25 )             23.6   06-30    141  |  106  |  20  ----------------------------<  120<H>  4.2   |  23  |  0.75    Ca    8.2<L>      30 Jun 2021 06:24  Mg     1.7     06-30  TPro  5.0<L>  /  Alb  x   /  TBili  x   /  DBili  x   /  AST  x   /  ALT  x   /  AlkPhos  x   06-30      Vital Signs Last 24 Hrs  T(C): 38.5 (30 Jun 2021 16:46), Max: 38.5 (30 Jun 2021 16:46)  T(F): 101.3 (30 Jun 2021 16:46), Max: 101.3 (30 Jun 2021 16:46)  HR: 98 (30 Jun 2021 16:46) (98 - 113)  BP: 104/69 (30 Jun 2021 16:46) (100/63 - 120/75)  RR: 18 (30 Jun 2021 16:46) (17 - 18)  SpO2: 97% (30 Jun 2021 16:46) (95% - 98%)    Physical Exam:   General: NAD; appropriate affect   Head: Normocephalic, atraumatic   ENT: Moist mucus membranes; hypophonic   CV: RRR; no murmur   Resp: CTA bilat, no wheezes, rhonchi, or rales, normal WOB  GI: Soft, nontender, nondistended, normoactive bowel sounds  Extremities: No edema   Skin: No rashes or lesions    A/P Fever:  --PO Tylenol  --BCx x 2, UA  --Empiric zosyn to cover for possible aspiration pneumonia - start after cultures drawn  --OK to proceed with blood transfusion per Dr. Alas  --Pending CT head and neck r/o metastatic disease    Shira Reed PA-C   Department of Medicine      **Addendum @ 20:12 - patient tolerated blood transfusion well, afebrile, check CBC in 2 hours

## 2021-06-30 NOTE — PROGRESS NOTE ADULT - SUBJECTIVE AND OBJECTIVE BOX
Date of service: 06-30-21 @ 00:01      Patient is a 71y old  Male who presents with a chief complaint of                                                              INTERVAL HPI/OVERNIGHT EVENTS:    REVIEW OF SYSTEMS:     CONSTITUTIONAL: No weakness, fevers or chills  EYES/ENT: No visual changes , no ear ache   NECK: No pain or stiffness  RESPIRATORY: No cough, wheezing,  No shortness of breath  CARDIOVASCULAR: No chest pain or palpitations  GASTROINTESTINAL: No abdominal pain  . No nausea, vomiting, or hematemesis; No diarrhea or constipation. No melena or hematochezia.  GENITOURINARY: No dysuria, frequency or hematuria  NEUROLOGICAL: No numbness or weakness  SKIN: No itching, burning, rashes, or lesions                                                                                                                                                                                                                                                                                 Medications:  MEDICATIONS  (STANDING):  aspirin  chewable 81 milliGRAM(s) Oral daily  dextrose 40% Gel 15 Gram(s) Oral once  dextrose 5%. 1000 milliLiter(s) (50 mL/Hr) IV Continuous <Continuous>  dextrose 5%. 1000 milliLiter(s) (100 mL/Hr) IV Continuous <Continuous>  dextrose 50% Injectable 25 Gram(s) IV Push once  dextrose 50% Injectable 12.5 Gram(s) IV Push once  dextrose 50% Injectable 25 Gram(s) IV Push once  finasteride 5 milliGRAM(s) Oral daily  glucagon  Injectable 1 milliGRAM(s) IntraMuscular once  insulin glargine Injectable (LANTUS) 10 Unit(s) SubCutaneous at bedtime  insulin lispro (ADMELOG) corrective regimen sliding scale   SubCutaneous three times a day before meals  insulin lispro (ADMELOG) corrective regimen sliding scale   SubCutaneous at bedtime  pantoprazole    Tablet 40 milliGRAM(s) Oral before breakfast  sodium chloride 0.9%. 1000 milliLiter(s) (100 mL/Hr) IV Continuous <Continuous>  tamsulosin 0.4 milliGRAM(s) Oral at bedtime    MEDICATIONS  (PRN):  acetaminophen   Tablet .. 650 milliGRAM(s) Oral every 6 hours PRN Moderate Pain (4 - 6)       Allergies    No Known Allergies    Intolerances      Vital Signs Last 24 Hrs  T(C): 37.2 (29 Jun 2021 20:12), Max: 37.7 (29 Jun 2021 04:50)  T(F): 98.9 (29 Jun 2021 20:12), Max: 99.9 (29 Jun 2021 04:50)  HR: 103 (29 Jun 2021 20:12) (97 - 113)  BP: 103/67 (29 Jun 2021 20:12) (100/63 - 117/76)  BP(mean): --  RR: 17 (29 Jun 2021 20:12) (17 - 18)  SpO2: 97% (29 Jun 2021 20:12) (95% - 100%)  CAPILLARY BLOOD GLUCOSE      POCT Blood Glucose.: 156 mg/dL (29 Jun 2021 21:20)  POCT Blood Glucose.: 169 mg/dL (29 Jun 2021 16:29)  POCT Blood Glucose.: 176 mg/dL (29 Jun 2021 11:32)  POCT Blood Glucose.: 190 mg/dL (29 Jun 2021 07:20)  POCT Blood Glucose.: 177 mg/dL (29 Jun 2021 04:25)      06-28 @ 07:01  -  06-29 @ 07:00  --------------------------------------------------------  IN: 0 mL / OUT: 150 mL / NET: -150 mL    06-29 @ 07:01  -  06-30 @ 00:01  --------------------------------------------------------  IN: 1570 mL / OUT: 500 mL / NET: 1070 mL      Physical Exam:    Daily     Daily   General:  Well appearing, NAD, not cachetic  HEENT:  Nonicteric, PERRLA  CV:  RRR, S1S2   Lungs:  CTA B/L, no wheezes, rales, rhonchi  Abdomen:  Soft, non-tender, no distended, positive BS  Extremities:  2+ pulses, no c/c, no edema  Skin:  Warm and dry, no rashes  :  No escamilla  Neuro:  AAOx3, non-focal, grossly intact                                                                                                                                                                                                                                                                                                LABS:                               7.8    2.36  )-----------( 144      ( 29 Jun 2021 05:59 )             25.3                      06-29    140  |  102  |  20  ----------------------------<  173<H>  4.6   |  25  |  0.69    Ca    8.8      29 Jun 2021 05:59  Phos  2.9     06-28  Mg     1.8     06-28    TPro  6.2  /  Alb  2.8<L>  /  TBili  1.4<H>  /  DBili  x   /  AST  14  /  ALT  11  /  AlkPhos  378<H>  06-29                       RADIOLOGY & ADDITIONAL TESTS         I personally reviewed: [  ]EKG   [  ]CXR    [  ] CT      A/P:         Discussed with :     [facility] consultants' Notes   Time spent :

## 2021-06-30 NOTE — PROGRESS NOTE ADULT - SUBJECTIVE AND OBJECTIVE BOX
LIZETT RIVERA  MRN-[id number]    Patient is a 71y old  Male who presents with a chief complaint of     Review of System  REVIEW OF SYSTEMS      General:	Denies fatigue, fevers, chills, sweats, decreased appetite.    Skin/Breast: denies pruritis, rash  	  Ophthalmologic: no change in vision or blurring  	  HEENT	Denies dry mouth, oral sores, dysphagia,  change in hearing.    Respiratory and Thorax:  cough, sob, wheeze, hemoptysis  	  Cardiovascular:	no cp , palp, orthopnea    Gastrointestinal:	no n/v/d constipation    Genitourinary:	no dysuria of frequency, no hematuria, no flank pain    Musculoskeletal:	no bone or joint pain. no muscle aches.     Neurological:	no change in sensory or motor function. no headache. no weakness.     Psychiatric:	no depression, no anxiety, insomnia.     Hematology/Lymphatics:	no bleeding or bruising        Current Meds  MEDICATIONS  (STANDING):  aspirin  chewable 81 milliGRAM(s) Oral daily  dextrose 40% Gel 15 Gram(s) Oral once  dextrose 5%. 1000 milliLiter(s) (50 mL/Hr) IV Continuous <Continuous>  dextrose 5%. 1000 milliLiter(s) (100 mL/Hr) IV Continuous <Continuous>  dextrose 50% Injectable 25 Gram(s) IV Push once  dextrose 50% Injectable 12.5 Gram(s) IV Push once  dextrose 50% Injectable 25 Gram(s) IV Push once  finasteride 5 milliGRAM(s) Oral daily  glucagon  Injectable 1 milliGRAM(s) IntraMuscular once  insulin glargine Injectable (LANTUS) 10 Unit(s) SubCutaneous at bedtime  insulin lispro (ADMELOG) corrective regimen sliding scale   SubCutaneous three times a day before meals  insulin lispro (ADMELOG) corrective regimen sliding scale   SubCutaneous at bedtime  pantoprazole    Tablet 40 milliGRAM(s) Oral before breakfast  sodium chloride 0.9%. 1000 milliLiter(s) (100 mL/Hr) IV Continuous <Continuous>  tamsulosin 0.4 milliGRAM(s) Oral at bedtime    MEDICATIONS  (PRN):  acetaminophen   Tablet .. 650 milliGRAM(s) Oral every 6 hours PRN Moderate Pain (4 - 6)      Vitals  Vital Signs Last 24 Hrs  T(C): 36.6 (30 Jun 2021 04:19), Max: 37.2 (29 Jun 2021 20:12)  T(F): 97.8 (30 Jun 2021 04:19), Max: 98.9 (29 Jun 2021 20:12)  HR: 113 (30 Jun 2021 04:19) (97 - 113)  BP: 120/75 (30 Jun 2021 04:19) (100/63 - 120/75)  BP(mean): --  RR: 17 (30 Jun 2021 04:19) (17 - 18)  SpO2: 97% (30 Jun 2021 04:19) (95% - 97%)    Physical Exam  PHYSICAL EXAM:      Constitutional: NAD    Eyes: PERRLA EOMI, anicteric sclera    Heent :No oral sores, no pharyngeal injection. moist mucosa.    Neck: supple, no jvd, no LAD    Respiratory: CTA b/l     Cardiovascular: s1s2, no m/g/r    Gastrointestinal: soft, nt, nd, + BS    Extremities: no c/c/e    Neurological:A&O x 3 moves all ext.    Skin: no rash on exposed skin    Lymph Nodes: no lymphadenopathy.              Lab  CBC Full  -  ( 30 Jun 2021 06:25 )  WBC Count : 2.61 K/uL  RBC Count : 2.43 M/uL  Hemoglobin : 7.1 g/dL  Hematocrit : 23.6 %  Platelet Count - Automated : 106 K/uL  Mean Cell Volume : 97.1 fl  Mean Cell Hemoglobin : 29.2 pg  Mean Cell Hemoglobin Concentration : 30.1 gm/dL  Auto Neutrophil # : x  Auto Lymphocyte # : x  Auto Monocyte # : x  Auto Eosinophil # : x  Auto Basophil # : x  Auto Neutrophil % : x  Auto Lymphocyte % : x  Auto Monocyte % : x  Auto Eosinophil % : x  Auto Basophil % : x    06-30    141  |  106  |  20  ----------------------------<  120<H>  4.2   |  23  |  0.75    Ca    8.2<L>      30 Jun 2021 06:24  Phos  2.9     06-28  Mg     1.7     06-30    TPro  5.6<L>  /  Alb  2.5<L>  /  TBili  1.2  /  DBili  x   /  AST  17  /  ALT  9<L>  /  AlkPhos  342<H>  06-30    PT/INR - ( 28 Jun 2021 13:49 )   PT: 16.8 sec;   INR: 1.42 ratio         PTT - ( 28 Jun 2021 13:49 )  PTT:32.3 sec    Rad:    Assessment/Plan   LIZETT RIVERA  MRN-[id number]    Patient is a 71y old  Male who presents with a chief complaint of     Review of System    No new events    Current Meds  MEDICATIONS  (STANDING):  aspirin  chewable 81 milliGRAM(s) Oral daily  dextrose 40% Gel 15 Gram(s) Oral once  dextrose 5%. 1000 milliLiter(s) (50 mL/Hr) IV Continuous <Continuous>  dextrose 5%. 1000 milliLiter(s) (100 mL/Hr) IV Continuous <Continuous>  dextrose 50% Injectable 25 Gram(s) IV Push once  dextrose 50% Injectable 12.5 Gram(s) IV Push once  dextrose 50% Injectable 25 Gram(s) IV Push once  finasteride 5 milliGRAM(s) Oral daily  glucagon  Injectable 1 milliGRAM(s) IntraMuscular once  insulin glargine Injectable (LANTUS) 10 Unit(s) SubCutaneous at bedtime  insulin lispro (ADMELOG) corrective regimen sliding scale   SubCutaneous three times a day before meals  insulin lispro (ADMELOG) corrective regimen sliding scale   SubCutaneous at bedtime  pantoprazole    Tablet 40 milliGRAM(s) Oral before breakfast  sodium chloride 0.9%. 1000 milliLiter(s) (100 mL/Hr) IV Continuous <Continuous>  tamsulosin 0.4 milliGRAM(s) Oral at bedtime    MEDICATIONS  (PRN):  acetaminophen   Tablet .. 650 milliGRAM(s) Oral every 6 hours PRN Moderate Pain (4 - 6)        Vital Signs Last 24 Hrs  T(C): 36.6 (30 Jun 2021 04:19), Max: 37.2 (29 Jun 2021 20:12)  T(F): 97.8 (30 Jun 2021 04:19), Max: 98.9 (29 Jun 2021 20:12)  HR: 113 (30 Jun 2021 04:19) (97 - 113)  BP: 120/75 (30 Jun 2021 04:19) (100/63 - 120/75)  BP(mean): --  RR: 17 (30 Jun 2021 04:19) (17 - 18)  SpO2: 97% (30 Jun 2021 04:19) (95% - 97%)      PHYSICAL EXAM:      NAD    Lab  CBC Full  -  ( 30 Jun 2021 06:25 )  WBC Count : 2.61 K/uL  RBC Count : 2.43 M/uL  Hemoglobin : 7.1 g/dL  Hematocrit : 23.6 %  Platelet Count - Automated : 106 K/uL  Mean Cell Volume : 97.1 fl  Mean Cell Hemoglobin : 29.2 pg  Mean Cell Hemoglobin Concentration : 30.1 gm/dL  Auto Neutrophil # : x  Auto Lymphocyte # : x  Auto Monocyte # : x  Auto Eosinophil # : x  Auto Basophil # : x  Auto Neutrophil % : x  Auto Lymphocyte % : x  Auto Monocyte % : x  Auto Eosinophil % : x  Auto Basophil % : x    06-30    141  |  106  |  20  ----------------------------<  120<H>  4.2   |  23  |  0.75    Ca    8.2<L>      30 Jun 2021 06:24  Phos  2.9     06-28  Mg     1.7     06-30    TPro  5.6<L>  /  Alb  2.5<L>  /  TBili  1.2  /  DBili  x   /  AST  17  /  ALT  9<L>  /  AlkPhos  342<H>  06-30    PT/INR - ( 28 Jun 2021 13:49 )   PT: 16.8 sec;   INR: 1.42 ratio         PTT - ( 28 Jun 2021 13:49 )  PTT:32.3 sec    Rad:    Assessment/Plan

## 2021-06-30 NOTE — SWALLOW BEDSIDE ASSESSMENT ADULT - SLP GENERAL OBSERVATIONS
Pt encountered in bed, awake, on room air. A&Ox2, to self and place. Significant hypophonia. Able to follow directives for exam purposes.

## 2021-06-30 NOTE — PROGRESS NOTE ADULT - ASSESSMENT
70 y/o M w/ pmhx of CVA this past August and got TPA per family member, DM , BPH with obstruction s/p escamilla catheter , s/p ERCP w Sphincteretomy recent admission to [facility] for sepsis  hodgkin lympoma was not offered any chemo and was placed on hospice.   npw presenting with weakness and FTT.   pt denies cp / SOB / plapiatiaons   no focal neuro complaints .. at baseline RLE weakness   no N/V   had one episode of diarrah   no urinary s x   abdominal pain, diffuse, but worse on R side.     - failure to thrive : cultures sent   CT chest ordered   nutrition consult       - lymphoma : fu wtih Dr. Larios    DM with hyperglycemia :     - anemia  : moniot rH/H   transfuse prn     d/w pt and sister

## 2021-06-30 NOTE — CONSULT NOTE ADULT - SUBJECTIVE AND OBJECTIVE BOX
Neurology Consult    Reason for Consult: Patient is a 71y old  Male who presents with a chief complaint of weakness     HPI:  72 y/o AAM w/ Stroke this past August and got TPA per family member had L weakness resolved, DM , BPH with obstruction s/p escamilla catheter , s/p ERCP w Sphincteretomy recent admission to [facility] for sepsis  hodgkin lympoma was not offered any chemo and was placed on hospice.   npw presenting with generalized weakness and FTT.   has baseline R LE weakness.  + hypophonic   Hgb 7.1, platelets 106, elevated alk phos, A1c 8.8%,      PAST MEDICAL & SURGICAL HISTORY:  Cerebrovascular accident (CVA)    Diabetes mellitus    No significant past surgical history        Allergies: Allergies    No Known Allergies    Intolerances        Social History: Denies toxic habits including tobacco, ETOH or illicit drugs.    Family History: FAMILY HISTORY:  No pertinent family history in first degree relatives    . No family history of strokes    Medications: MEDICATIONS  (STANDING):  aspirin  chewable 81 milliGRAM(s) Oral daily  dextrose 40% Gel 15 Gram(s) Oral once  dextrose 5%. 1000 milliLiter(s) (50 mL/Hr) IV Continuous <Continuous>  dextrose 5%. 1000 milliLiter(s) (100 mL/Hr) IV Continuous <Continuous>  dextrose 50% Injectable 25 Gram(s) IV Push once  dextrose 50% Injectable 12.5 Gram(s) IV Push once  dextrose 50% Injectable 25 Gram(s) IV Push once  finasteride 5 milliGRAM(s) Oral daily  glucagon  Injectable 1 milliGRAM(s) IntraMuscular once  insulin glargine Injectable (LANTUS) 10 Unit(s) SubCutaneous at bedtime  insulin lispro (ADMELOG) corrective regimen sliding scale   SubCutaneous three times a day before meals  insulin lispro (ADMELOG) corrective regimen sliding scale   SubCutaneous at bedtime  pantoprazole    Tablet 40 milliGRAM(s) Oral before breakfast  sodium chloride 0.9%. 1000 milliLiter(s) (100 mL/Hr) IV Continuous <Continuous>  tamsulosin 0.4 milliGRAM(s) Oral at bedtime    MEDICATIONS  (PRN):  acetaminophen   Tablet .. 650 milliGRAM(s) Oral every 6 hours PRN Moderate Pain (4 - 6)      Review of Systems:  CONSTITUTIONAL:  No weight loss, fever, chills, weakness or fatigue.  HEENT:  Eyes:  No visual loss, blurred vision, double vision or yellow sclera. Ears, Nose, Throat:  No hearing loss, sneezing, congestion, runny nose or sore throat. + hypohponic   SKIN:  No rash or itching.  CARDIOVASCULAR:  No chest pain, chest pressure or chest discomfort. No palpitations or edema.  RESPIRATORY:  No shortness of breath, cough or sputum.  GASTROINTESTINAL:  No anorexia, nausea, vomiting or diarrhea. No abdominal pain or blood.  GENITOURINARY:  No burning on urination or incontinence   NEUROLOGICAL:  No headache, dizziness, syncope, paralysis, ataxia, numbness or tingling in the extremities. No change in bowel or bladder control. no limb weakness. no vision changes.   MUSCULOSKELETAL:  No muscle, back pain, joint pain or stiffness.  HEMATOLOGIC:  No anemia, bleeding or bruising.  LYMPHATICS:  No enlarged nodes. No history of splenectomy.  PSYCHIATRIC:  No history of depression or anxiety.  ENDOCRINOLOGIC:  No reports of sweating, cold or heat intolerance. No polyuria or polydipsia.      Vitals:  Vital Signs Last 24 Hrs  T(C): 36.6 (2021 04:19), Max: 37.2 (2021 20:12)  T(F): 97.8 (2021 04:19), Max: 98.9 (2021 20:12)  HR: 113 (2021 04:19) (97 - 113)  BP: 120/75 (2021 04:19) (100/63 - 120/75)  BP(mean): --  RR: 17 (2021 04:19) (17 - 18)  SpO2: 97% (2021 04:19) (95% - 97%)    General Exam:   General Appearance: Appropriately dressed and in no acute distress       Head: Normocephalic, atraumatic and no dysmorphic features  Ear, Nose, and Throat: Moist mucous membranes  CVS: S1S2+  Resp: No SOB, no wheeze or rhonchi  GI: soft NT/ND  Extremities: No edema or cyanosis  Skin: No bruises or rashes     Neurological Exam:  Mental Status: Awake, alert and oriented x 1-2.  Able to follow simple and complex verbal commands. Able to name and repeat. fluent speech. No obvious aphasia mild dysarthria, + hypophonic   Cranial Nerves: PERRL, EOMI, VFFC, sensation V1-V3 intact,  R facial asymmetry, equal elevation of palate, scm/trap 5/5, tongue is midline on protrusion. no obvious papilledema on fundoscopic exam. hearing is grossly intact.   Motor: generalized weakness but FRANCOIS. minimal weakness B/L LE R>L  Sensation: Intact to light touch and pinprick throughout. no right/left confusion. no extinction to tactile on DSS. Romberg was negative.   Reflexes: 1+ throughout at biceps, brachioradialis, triceps, patellars and ankles bilaterally and equal. No clonus. R toe and L toe were both downgoing.  Coordination: No dysmetria on FNF    Gait: deferred     Data/Labs/Imaging which I personally reviewed.     Labs:     CBC Full  -  ( 2021 06:25 )  WBC Count : 2.61 K/uL  RBC Count : 2.43 M/uL  Hemoglobin : 7.1 g/dL  Hematocrit : 23.6 %  Platelet Count - Automated : 106 K/uL  Mean Cell Volume : 97.1 fl  Mean Cell Hemoglobin : 29.2 pg  Mean Cell Hemoglobin Concentration : 30.1 gm/dL  Auto Neutrophil # : x  Auto Lymphocyte # : x  Auto Monocyte # : x  Auto Eosinophil # : x  Auto Basophil # : x  Auto Neutrophil % : x  Auto Lymphocyte % : x  Auto Monocyte % : x  Auto Eosinophil % : x  Auto Basophil % : x        141  |  106  |  20  ----------------------------<  120<H>  4.2   |  23  |  0.75    Ca    8.2<L>      2021 06:24  Phos  2.9       Mg     1.7         TPro  5.6<L>  /  Alb  2.5<L>  /  TBili  1.2  /  DBili  x   /  AST  17  /  ALT  9<L>  /  AlkPhos  342<H>      LIVER FUNCTIONS - ( 2021 06:24 )  Alb: 2.5 g/dL / Pro: 5.6 g/dL / ALK PHOS: 342 U/L / ALT: 9 U/L / AST: 17 U/L / GGT: x           PT/INR - ( 2021 13:49 )   PT: 16.8 sec;   INR: 1.42 ratio         PTT - ( 2021 13:49 )  PTT:32.3 sec  Urinalysis Basic - ( 2021 13:27 )    Color: Dark Yellow / Appearance: Clear / S.032 / pH: x  Gluc: x / Ketone: Negative  / Bili: Negative / Urobili: Negative   Blood: x / Protein: Trace / Nitrite: Negative   Leuk Esterase: Small / RBC: 2 /hpf / WBC 3 /HPF   Sq Epi: x / Non Sq Epi: 1 /hpf / Bacteria: Negative

## 2021-06-30 NOTE — PROGRESS NOTE ADULT - PROBLEM SELECTOR PLAN 1
Will continue current insulin regimen for now. Will continue monitoring FS, log, and FU.  Counseled for compliance with consistent low-carb diet.

## 2021-07-01 LAB
A1C WITH ESTIMATED AVERAGE GLUCOSE RESULT: 7.7 % — HIGH (ref 4–5.6)
ANION GAP SERPL CALC-SCNC: 11 MMOL/L — SIGNIFICANT CHANGE UP (ref 5–17)
BUN SERPL-MCNC: 18 MG/DL — SIGNIFICANT CHANGE UP (ref 7–23)
CALCIUM SERPL-MCNC: 7.7 MG/DL — LOW (ref 8.4–10.5)
CHLORIDE SERPL-SCNC: 107 MMOL/L — SIGNIFICANT CHANGE UP (ref 96–108)
CHOLEST SERPL-MCNC: 63 MG/DL — SIGNIFICANT CHANGE UP
CK MB CFR SERPL CALC: 1 NG/ML — SIGNIFICANT CHANGE UP (ref 0–6.7)
CK SERPL-CCNC: <10 U/L — LOW (ref 30–200)
CO2 SERPL-SCNC: 21 MMOL/L — LOW (ref 22–31)
CREAT SERPL-MCNC: 0.76 MG/DL — SIGNIFICANT CHANGE UP (ref 0.5–1.3)
ESTIMATED AVERAGE GLUCOSE: 174 MG/DL — HIGH (ref 68–114)
GLUCOSE BLDC GLUCOMTR-MCNC: 130 MG/DL — HIGH (ref 70–99)
GLUCOSE BLDC GLUCOMTR-MCNC: 140 MG/DL — HIGH (ref 70–99)
GLUCOSE BLDC GLUCOMTR-MCNC: 231 MG/DL — HIGH (ref 70–99)
GLUCOSE BLDC GLUCOMTR-MCNC: 231 MG/DL — HIGH (ref 70–99)
GLUCOSE SERPL-MCNC: 134 MG/DL — HIGH (ref 70–99)
HCT VFR BLD CALC: 22.4 % — LOW (ref 39–50)
HDLC SERPL-MCNC: 12 MG/DL — LOW
HGB BLD-MCNC: 7.1 G/DL — LOW (ref 13–17)
LIPID PNL WITH DIRECT LDL SERPL: 30 MG/DL — SIGNIFICANT CHANGE UP
MCHC RBC-ENTMCNC: 30.2 PG — SIGNIFICANT CHANGE UP (ref 27–34)
MCHC RBC-ENTMCNC: 31.7 GM/DL — LOW (ref 32–36)
MCV RBC AUTO: 95.3 FL — SIGNIFICANT CHANGE UP (ref 80–100)
NON HDL CHOLESTEROL: 50 MG/DL — SIGNIFICANT CHANGE UP
NRBC # BLD: 0 /100 WBCS — SIGNIFICANT CHANGE UP (ref 0–0)
PLATELET # BLD AUTO: 71 K/UL — LOW (ref 150–400)
PLATELET # BLD AUTO: 85 K/UL — LOW (ref 150–400)
POTASSIUM SERPL-MCNC: 3.7 MMOL/L — SIGNIFICANT CHANGE UP (ref 3.5–5.3)
POTASSIUM SERPL-SCNC: 3.7 MMOL/L — SIGNIFICANT CHANGE UP (ref 3.5–5.3)
RBC # BLD: 2.35 M/UL — LOW (ref 4.2–5.8)
RBC # FLD: 21.2 % — HIGH (ref 10.3–14.5)
SODIUM SERPL-SCNC: 139 MMOL/L — SIGNIFICANT CHANGE UP (ref 135–145)
TRIGL SERPL-MCNC: 100 MG/DL — SIGNIFICANT CHANGE UP
TROPONIN T, HIGH SENSITIVITY RESULT: 21 NG/L — SIGNIFICANT CHANGE UP (ref 0–51)
WBC # BLD: 1.93 K/UL — LOW (ref 3.8–10.5)
WBC # FLD AUTO: 1.93 K/UL — LOW (ref 3.8–10.5)

## 2021-07-01 PROCEDURE — 99222 1ST HOSP IP/OBS MODERATE 55: CPT

## 2021-07-01 PROCEDURE — 93010 ELECTROCARDIOGRAM REPORT: CPT

## 2021-07-01 RX ORDER — QUETIAPINE FUMARATE 200 MG/1
25 TABLET, FILM COATED ORAL AT BEDTIME
Refills: 0 | Status: DISCONTINUED | OUTPATIENT
Start: 2021-07-01 | End: 2021-08-04

## 2021-07-01 RX ORDER — ONDANSETRON 8 MG/1
4 TABLET, FILM COATED ORAL ONCE
Refills: 0 | Status: COMPLETED | OUTPATIENT
Start: 2021-07-01 | End: 2021-07-01

## 2021-07-01 RX ADMIN — PANTOPRAZOLE SODIUM 40 MILLIGRAM(S): 20 TABLET, DELAYED RELEASE ORAL at 05:05

## 2021-07-01 RX ADMIN — PIPERACILLIN AND TAZOBACTAM 25 GRAM(S): 4; .5 INJECTION, POWDER, LYOPHILIZED, FOR SOLUTION INTRAVENOUS at 08:09

## 2021-07-01 RX ADMIN — FINASTERIDE 5 MILLIGRAM(S): 5 TABLET, FILM COATED ORAL at 12:12

## 2021-07-01 RX ADMIN — Medication 2: at 18:08

## 2021-07-01 RX ADMIN — Medication 81 MILLIGRAM(S): at 12:11

## 2021-07-01 RX ADMIN — TAMSULOSIN HYDROCHLORIDE 0.4 MILLIGRAM(S): 0.4 CAPSULE ORAL at 22:01

## 2021-07-01 RX ADMIN — SODIUM CHLORIDE 100 MILLILITER(S): 9 INJECTION INTRAMUSCULAR; INTRAVENOUS; SUBCUTANEOUS at 15:33

## 2021-07-01 RX ADMIN — PIPERACILLIN AND TAZOBACTAM 25 GRAM(S): 4; .5 INJECTION, POWDER, LYOPHILIZED, FOR SOLUTION INTRAVENOUS at 23:00

## 2021-07-01 RX ADMIN — PIPERACILLIN AND TAZOBACTAM 25 GRAM(S): 4; .5 INJECTION, POWDER, LYOPHILIZED, FOR SOLUTION INTRAVENOUS at 15:33

## 2021-07-01 RX ADMIN — INSULIN GLARGINE 10 UNIT(S): 100 INJECTION, SOLUTION SUBCUTANEOUS at 22:01

## 2021-07-01 RX ADMIN — Medication 650 MILLIGRAM(S): at 06:34

## 2021-07-01 RX ADMIN — ONDANSETRON 4 MILLIGRAM(S): 8 TABLET, FILM COATED ORAL at 15:10

## 2021-07-01 RX ADMIN — QUETIAPINE FUMARATE 25 MILLIGRAM(S): 200 TABLET, FILM COATED ORAL at 22:01

## 2021-07-01 RX ADMIN — Medication 650 MILLIGRAM(S): at 07:30

## 2021-07-01 NOTE — PROGRESS NOTE ADULT - ASSESSMENT
72 y/o M w/ pmhx of CVA this past August and got TPA per family member, DM , BPH with obstruction s/p escamilla catheter , s/p ERCP w Sphincteretomy recent admission to [facility] for sepsis  hodgkin lympoma was not offered any chemo and was placed on hospice.   npw presenting with weakness and FTT.   pt denies cp / SOB / plapiatiaons   no focal neuro complaints .. at baseline RLE weakness   no N/V   had one episode of diarrah   no urinary s x   abdominal pain, diffuse, but worse on R side.     - failure to thrive : cultures sent   CT chest ordered   nutrition consult       - lymphoma : fu wtih Dr. Larios    DM with hyperglycemia :     - anemia  : moniot rH/H   transfuse prn     d/w pt and sister

## 2021-07-01 NOTE — DIETITIAN INITIAL EVALUATION ADULT. - REASON FOR ADMISSION
Per chart, "72 y/o M w/ pmhx of DM T2 with hyperglycemia, A1C 8.8%,on oral meds/Janumet,CVA this past August and got TPA per family member, baseline RLE weakness, DM , BPH with obstruction s/p escamilla catheter , s/p ERCP w Sphincteretomy recent admission to [facility] for sepsis and hodgkin lymphoma was not offered any chemo and was placed on hospice. P/w presenting with weakness and FTT."

## 2021-07-01 NOTE — BH CONSULTATION LIAISON ASSESSMENT NOTE - RISK ASSESSMENT
Risk factors: SI without intent, delirium, acute/chronic medical issues    Protective factors: no intent for harm, no h/o SA/SIB, no h/o psych admissions, no access to weapons, no active substance abuse, spirituality, domiciled, intact marriage, social supports    Pt is at chronically elevated risk of harm mitigated by multiple protective factors and does not meet criteria for psychiatric admission at this time.

## 2021-07-01 NOTE — DIETITIAN INITIAL EVALUATION ADULT. - SIGNS/SYMPTOMS
11.4% weight loss x3 months; <75% of EER >/=3 months; visual signs of moderate muscle mass depletion

## 2021-07-01 NOTE — BH CONSULTATION LIAISON ASSESSMENT NOTE - HPI (INCLUDE ILLNESS QUALITY, SEVERITY, DURATION, TIMING, CONTEXT, MODIFYING FACTORS, ASSOCIATED SIGNS AND SYMPTOMS)
DISCHARGE 71M domiciled with wife, retired, with no formal PPH, no prior SA or psych admissions, no active substance abuse, PMH significant for CVA this past August, DM, BPH with obstruction s/p escamilla catheter, s/p ERCP with sphincteretomy, recent admission to [facility] for sepsis, Hodgkin Lymphoma placed on hospice, now p/w weakness and FTT pending optimization for potential chemotherapy, psychiatry consulted to evaluate for depression, SI, and capacity to make decisions regarding chemo.    On interview pt seen lying in bed, alert, oriented to self and being in a hospital, off on month, disoriented to situation.  Pt states he was in another facility and "came back after 1 day."  States he has cancer, but unable to specify what kind, unaware of the plan to be optimized for chemo.  Repeatedly stating he doesn't know why he’s in the hospital and wants to go home, even after it is explained to him.  Pt struggles to give his age or describe how many grandchildren he has, limited historian.  Reports he feels confused upon awakening in the morning, not knowing where he is which lasts throughout the day. He doesn’t remember coming to the hospital but knows that his sister brought him in. He says “I'm upset at my sister for bringing me here.”  Reports SI, states he just wants to die, acknowledges statement to cut his wrists but states he will never do it, he is [employer] and knows that "it is wrong."  Denies prior SA or psych admissions.  He endorses feeling anxious and depressed.  Reports his sister and wife help take care of him.  Denies AVH, denies substance use, denies access to guns.    Collateral obtained from pt's nephew Kevin who states pt has seemed confused to family for past several days, disoriented at times.  Pt has also been depressed, ambivalent about receiving chemo because the first hospital told the pt his case was terminal and he only had a short time to live, which nephew thinks might have affected how the pt thinks about things.  States pt has made statements about wanting to die, but family is not concerned he will try to harm himself, they state he is expressing frustration, confirms pt has no h/o attempts or admissions.

## 2021-07-01 NOTE — BH CONSULTATION LIAISON ASSESSMENT NOTE - NSICDXPASTMEDICALHX_GEN_ALL_CORE_FT
PAST MEDICAL HISTORY:  Cerebrovascular accident (CVA)     Diabetes mellitus     Hodgkin lymphoma

## 2021-07-01 NOTE — DIETITIAN INITIAL EVALUATION ADULT. - PERTINENT LABORATORY DATA
(7/1) Na 139, BUN 18, Cr 0.76, <H>, K+ 3.7; POCT Blood Glucose x24 hrs: 130-208 mg/dL   (6/29) Hgb A1c 8.8%

## 2021-07-01 NOTE — PROGRESS NOTE ADULT - SUBJECTIVE AND OBJECTIVE BOX
Neurology Progress Note    S: Patient seen and examined.  . patient denied CP, SOB, HA or pain. now on 1:1 for SI     Medication:  acetaminophen   Tablet .. 650 milliGRAM(s) Oral every 6 hours PRN  aspirin  chewable 81 milliGRAM(s) Oral daily  dextrose 40% Gel 15 Gram(s) Oral once  dextrose 5%. 1000 milliLiter(s) IV Continuous <Continuous>  dextrose 5%. 1000 milliLiter(s) IV Continuous <Continuous>  dextrose 50% Injectable 25 Gram(s) IV Push once  dextrose 50% Injectable 12.5 Gram(s) IV Push once  dextrose 50% Injectable 25 Gram(s) IV Push once  finasteride 5 milliGRAM(s) Oral daily  glucagon  Injectable 1 milliGRAM(s) IntraMuscular once  insulin glargine Injectable (LANTUS) 10 Unit(s) SubCutaneous at bedtime  insulin lispro (ADMELOG) corrective regimen sliding scale   SubCutaneous three times a day before meals  insulin lispro (ADMELOG) corrective regimen sliding scale   SubCutaneous at bedtime  pantoprazole    Tablet 40 milliGRAM(s) Oral before breakfast  piperacillin/tazobactam IVPB.. 3.375 Gram(s) IV Intermittent every 8 hours  sodium chloride 0.9%. 1000 milliLiter(s) IV Continuous <Continuous>  tamsulosin 0.4 milliGRAM(s) Oral at bedtime      Vitals:  Vital Signs Last 24 Hrs  T(C): 39.2 (01 Jul 2021 04:00), Max: 39.2 (01 Jul 2021 04:00)  T(F): 102.5 (01 Jul 2021 04:00), Max: 102.5 (01 Jul 2021 04:00)  HR: 98 (01 Jul 2021 04:00) (87 - 100)  BP: 112/67 (01 Jul 2021 04:00) (97/61 - 112/68)  BP(mean): --  RR: 18 (01 Jul 2021 04:00) (17 - 18)  SpO2: 97% (01 Jul 2021 04:00) (96% - 98%)    General Exam:   General Appearance: Appropriately dressed and in no acute distress       Head: Normocephalic, atraumatic and no dysmorphic features  Ear, Nose, and Throat: Moist mucous membranes  CVS: S1S2+  Resp: No SOB, no wheeze or rhonchi  GI: soft NT/ND  Extremities: No edema or cyanosis  Skin: No bruises or rashes     Neurological Exam:  Mental Status: Awake, alert and oriented x 1-2.  Able to follow simple and complex verbal commands. Able to name and repeat. fluent speech. No obvious aphasia mild dysarthria, + hypophonic   Cranial Nerves: PERRL, EOMI, VFFC, sensation V1-V3 intact,  R facial asymmetry, equal elevation of palate, scm/trap 5/5, tongue is midline on protrusion. no obvious papilledema on fundoscopic exam. hearing is grossly intact.   Motor: generalized weakness but FRANCOIS. minimal weakness B/L LE R>L  Sensation: Intact to light touch and pinprick throughout. no right/left confusion. no extinction to tactile on DSS. Romberg was negative.   Reflexes: 1+ throughout at biceps, brachioradialis, triceps, patellars and ankles bilaterally and equal. No clonus. R toe and L toe were both downgoing.  Coordination: No dysmetria on FNF    Gait: deferred     I personally reviewed the below data/images/labs:      CBC Full  -  ( 01 Jul 2021 09:49 )  WBC Count : 1.93 K/uL  RBC Count : 2.35 M/uL  Hemoglobin : 7.1 g/dL  Hematocrit : 22.4 %  Platelet Count - Automated : 71 K/uL  Mean Cell Volume : 95.3 fl  Mean Cell Hemoglobin : 30.2 pg  Mean Cell Hemoglobin Concentration : 31.7 gm/dL  Auto Neutrophil # : x  Auto Lymphocyte # : x  Auto Monocyte # : x  Auto Eosinophil # : x  Auto Basophil # : x  Auto Neutrophil % : x  Auto Lymphocyte % : x  Auto Monocyte % : x  Auto Eosinophil % : x  Auto Basophil % : x    07-01    139  |  107  |  18  ----------------------------<  134<H>  3.7   |  21<L>  |  0.76    Ca    7.7<L>      01 Jul 2021 09:49  Mg     1.7     06-30    TPro  5.0<L>  /  Alb  x   /  TBili  x   /  DBili  x   /  AST  x   /  ALT  x   /  AlkPhos  x   06-30    LIVER FUNCTIONS - ( 30 Jun 2021 08:54 )  Alb: x     / Pro: 5.0 g/dL / ALK PHOS: x     / ALT: x     / AST: x     / GGT: x

## 2021-07-01 NOTE — DIETITIAN INITIAL EVALUATION ADULT. - OTHER CALCULATIONS
estimated nutrient needs based on obtained bed-scale weight of 87.7 Kg with considerations for malnutrition; fluids per team

## 2021-07-01 NOTE — DIETITIAN INITIAL EVALUATION ADULT. - ADD RECOMMEND
1. Recommend liberalize to Consistent Carbohydrate Diet {Evening Snack}; defer consistency per SLP recommendations 2. Recommend Glucerna x3/day (provides additional 220kcal, 10g pro per 8 oz. serving) -- monitor Pt's acceptance, consider Ensure per Pt's preference if FS are WNL 3. New malnutrition alert placed; will follow-up according to protocol 4. Obtain food preferences, honor as able 5. Provide nutritionally relevant education as appropriate 6. Nutrition-focused physical exam as appropriate 7. Consider multivitamin if no contraindications 8. Monitor weight trends, PO intake, GI function, electrolytes, and skin integrity

## 2021-07-01 NOTE — PROGRESS NOTE ADULT - PROBLEM SELECTOR PLAN 1
Will continue current insulin regimen for now. Will continue monitoring FS and FU.  Counseled for compliance with consistent low-carb diet, nutritional shakes and supplements as tolerated. FU RD recommendations.  Can DC on his prior Janumet 50/1000, endo FU 3 months.

## 2021-07-01 NOTE — BH CONSULTATION LIAISON ASSESSMENT NOTE - NSBHCHARTREVIEWINVESTIGATE_PSY_A_CORE FT
< from: 12 Lead ECG (06.28.21 @ 12:52) >      Ventricular Rate 125 BPM    Atrial Rate 125 BPM    P-R Interval 148 ms    QRS Duration 86 ms    Q-T Interval 300 ms    QTC Calculation(Bazett) 433 ms    P Axis 54 degrees    R Axis 29 degrees    T Axis 73 degrees    Diagnosis Line SINUS TACHYCARDIA  NONSPECIFIC T WAVE ABNORMALITY  ABNORMAL ECG  NO PREVIOUS ECGS AVAILABLE  Confirmed by YOSELIN JAMESON MD ([id number]) on 6/29/2021 9:35:43 PM    < end of copied text >    < from: CT Chest w/ IV Cont (06.28.21 @ 19:01) >        < end of copied text >

## 2021-07-01 NOTE — DIETITIAN INITIAL EVALUATION ADULT. - +GENDER
Patient Seen in: [facility]      History   Patient presents with:  Laceration Abrasion (integumentary)    Stated Complaint: right index laceration    HPI    81-year-old female presents today for evaluation of right index fi with redness, swelling around the distal phalanx, distal tip of the finger. Localized tenderness. Superficial oozing of the blood without any active bleeding. No foreign bodies. No bony tenderness. Good cap refill, pulses, sensations.   Area of skin av Statement Selected

## 2021-07-01 NOTE — PROGRESS NOTE ADULT - ASSESSMENT
Assessment  DMT2: 71y Male with DM T2 with hyperglycemia, A1C 8.8%, was on oral meds/Janumet at home, admitted with weakness/fatigue and hyperglycemia, now on basal insulin and coverage, blood sugars are improved/stable and trending within acceptable range, no hypoglycemic episodes. Patient appears comfortable in NAD, not eating much, now on constant observation for SI.  Lymphoma: on medications, monitored, FU Heme/Onc.  Anemia: stable, monitored.      Shahriar Kahn MD  Cell: [phone]  Office: [phone]       Assessment  DMT2: 71y Male with DM T2 with hyperglycemia, A1C 8.8%, was on oral meds/Janumet at home, admitted with weakness/fatigue and hyperglycemia, now on basal insulin and coverage, blood sugars are improved/stable and trending  within acceptable range, no hypoglycemic episodes. Patient appears comfortable in NAD, not eating much, now on constant observation for SI.  Lymphoma: on medications, monitored, FU Heme/Onc.  Anemia: stable, monitored.      Shahriar Kahn MD  Cell: [phone]  Office: [phone]

## 2021-07-01 NOTE — CONSULT NOTE ADULT - SUBJECTIVE AND OBJECTIVE BOX
HPI:   Patient is a 71y male with history of cva in the past. had a stay back in April for FTT and diagnosed with Hodgkins Disease and had an  found to have diffuse adenopathy and had a bx. He also underwent ercp and sphinterotomy. He has been requiring a escamilla catheter for bph and retention. He was readmitted to [facility] about a month ago with fever, treated for escamilla associated uti and also given xarxio for neutropenia attributed to his lymphoma. At some point after the diagnosis it was decided patient was not a chemotherapy candidate and has been on hospice. He is now admitted to [facility] 3 days ago for further evaluation of weakness and FTT with fever.  The patient has hoarseness but denies any pain anywhere.     REVIEW OF SYSTEMS:  All other review of systems negative (Comprehensive ROS)    PAST MEDICAL & SURGICAL HISTORY:  Cerebrovascular accident (CVA)    Diabetes mellitus    Hodgkin lymphoma    No significant past surgical history        Allergies    No Known Allergies    Intolerances        Antimicrobials Day #  :3  piperacillin/tazobactam IVPB.. 3.375 Gram(s) IV Intermittent every 8 hours    Other Medications:  acetaminophen   Tablet .. 650 milliGRAM(s) Oral every 6 hours PRN  aspirin  chewable 81 milliGRAM(s) Oral daily  dextrose 40% Gel 15 Gram(s) Oral once  dextrose 5%. 1000 milliLiter(s) IV Continuous <Continuous>  dextrose 5%. 1000 milliLiter(s) IV Continuous <Continuous>  dextrose 50% Injectable 25 Gram(s) IV Push once  dextrose 50% Injectable 12.5 Gram(s) IV Push once  dextrose 50% Injectable 25 Gram(s) IV Push once  finasteride 5 milliGRAM(s) Oral daily  glucagon  Injectable 1 milliGRAM(s) IntraMuscular once  insulin glargine Injectable (LANTUS) 10 Unit(s) SubCutaneous at bedtime  insulin lispro (ADMELOG) corrective regimen sliding scale   SubCutaneous three times a day before meals  insulin lispro (ADMELOG) corrective regimen sliding scale   SubCutaneous at bedtime  pantoprazole    Tablet 40 milliGRAM(s) Oral before breakfast  QUEtiapine 25 milliGRAM(s) Oral at bedtime  sodium chloride 0.9%. 1000 milliLiter(s) IV Continuous <Continuous>  tamsulosin 0.4 milliGRAM(s) Oral at bedtime      FAMILY HISTORY:  No pertinent family history in first degree relatives        SOCIAL HISTORY:  Smoking: [ ]Yes [ ]No  ETOH: [ ]Yes [ ]No  Drug Use: [ ]Yes [ ]No   [ ] Single[ ]    T(F): 98.2 (07-01-21 @ 14:50), Max: 102.5 (07-01-21 @ 04:00)  HR: 93 (07-01-21 @ 14:50)  BP: 99/68 (07-01-21 @ 14:50)  RR: 18 (07-01-21 @ 14:50)  SpO2: 98% (07-01-21 @ 14:50)  Wt(kg): --    PHYSICAL EXAM:  General: alert, no acute distress  Eyes:  anicteric, no conjunctival injection, no discharge  Oropharynx: no lesions or injection 	  Neck: supple, without adenopathy  Lungs: clear to auscultation  Heart: regular rate and rhythm; no murmur, rubs or gallops  Abdomen: soft, nondistended, nontender, without mass or organomegaly  Skin: no lesions  Extremities: no clubbing, cyanosis, or edema  Neurologic: alert, oriented, moves all extremities    LAB RESULTS:                        7.1    1.93  )-----------( 71       ( 01 Jul 2021 09:49 )             22.4     07-01    139  |  107  |  18  ----------------------------<  134<H>  3.7   |  21<L>  |  0.76    Ca    7.7<L>      01 Jul 2021 09:49  Mg     1.7     06-30    TPro  5.0<L>  /  Alb  x   /  TBili  x   /  DBili  x   /  AST  x   /  ALT  x   /  AlkPhos  x   06-30    LIVER FUNCTIONS - ( 30 Jun 2021 08:54 )  Alb: x     / Pro: 5.0 g/dL / ALK PHOS: x     / ALT: x     / AST: x     / GGT: x               MICROBIOLOGY:  RECENT CULTURES:  06-28 @ 17:26 .Urine Clean Catch (Midstream)     <10,000 CFU/mL Normal Urogenital Jade      06-28 @ 16:28 .Blood Blood-Peripheral     No growth to date.      06-28 @ 16:26 .Blood Blood-Peripheral     No growth to date.            RADIOLOGY REVIEWED:    < from: CT Abdomen and Pelvis w/ IV Cont (06.28.21 @ 14:26) >    EXAM:  CT ABDOMEN AND PELVIS IC                            PROCEDURE DATE:  06/28/2021            INTERPRETATION:  CLINICAL INFORMATION: Right lower quadrant pain. Hodgkin's lymphoma.    COMPARISON: None.    CONTRAST/COMPLICATIONS:  IV Contrast: Omnipaque 350  90 cc administered   10 cc discarded  Oral Contrast: None  Complications: None reported    PROCEDURE:  CT of the Abdomen and Pelvis was performed.  Sagittal and coronal reformats were performed.    FINDINGS:  LOWER CHEST: Nodular opacities in the right middle and bilateral lower lobes.    LIVER: Within normal limits.  BILE DUCTS: Normal caliber.  GALLBLADDER: Within normal limits.  SPLEEN: Within normal limits.  PANCREAS: Within normal limits.  ADRENALS: Within normal limits.  KIDNEYS/URETERS: Duplex left collecting system/proximal ureters. Distal ureter is not well evaluated. Right renal cyst. No hydronephrosis.    BLADDER: Escamilla catheter.  REPRODUCTIVE ORGANS: Prostate is enlarged, measuring 6.5 cm in transverse dimension.    BOWEL: No bowel obstruction. Appendix is not visualized. No evidence of inflammation in the pericecal region.  PERITONEUM: No ascites.  VESSELS: Atherosclerotic changes.  RETROPERITONEUM/LYMPH NODES: Periportal and retroperitoneal lymphadenopathy. For reference, a conglomerate periportal node (2, 34) measures 5.1 x 4.4 cm. A left para-aortic lymph node (2, 53) measures 2.6 x 1.4 cm.  ABDOMINAL WALL: Left inguinal hernia containing nonobstructed sigmoid colon.  BONES: Degenerative changes.    IMPRESSION:  Indeterminant nodular lung opacities which dedicated follow-up chest CT can be performed for further evaluation.    Abdominal lymphadenopathy, which may be related to patient's known history of lymphoma. Correlate with prior imaging if availablefor comparison.    Enlarged prostate.        < end of copied text >    < from: CT Chest w/ IV Cont (06.28.21 @ 19:01) >    EXAM:  CT CHEST IC                            PROCEDURE DATE:  06/28/2021            INTERPRETATION:  CLINICAL INFORMATION: Lymphoma, evaluate extent of thoracic disease.    COMPARISON: CT abdomen pelvis dated 6/28/2021. Chest x-ray dated 6/20/2021.    CONTRAST/COMPLICATIONS:  IV Contrast: 40 mL of Omnipaque 350 were administered. 60 mL discarded.  Oral Contrast: None.  Complications: None reported.    PROCEDURE:  CT of the Chest was performed.  Sagittal and coronal reformats were performed.    FINDINGS: The quality of the images are degraded by respiratory motion and streak artifact.    LUNGS AND AIRWAYS: Patent central airways. Patchy groundglass opacities and scattered nodular opacities throughout all lobes of the lungs. For example:  A left upper lobe nodule measures 0.9 cm (series 3 image 33).  A right middle lobe nodule measures 2.0 x 0.8 cm (series 3 image 76).  3.5 x 2.1 cm solid opacity within the superior segment of right lower lobe (series 3 image 71).  A right lower lobe nodule at the costophrenic angle measures 1.9 x 1.1 cm (series 3 image 108).    PLEURA: No pleural effusion or pneumothorax.    MEDIASTINUM AND SARTHAK: Mildly enlarged mediastinal, hilar, and supraclavicular lymphadenopathy. A right hilar lymph node measures 1.8 x 1.4 cm (series 3 image 90). A left supraclavicular node measures 2.5 x 1.5 cm (series 3 image 19). A subcarinal node measures 2.2 x 1.3 cm (series 3 image 63).    VESSELS: Coronary artery calcifications.    HEART: Heart size is normal. No pericardial effusion.    CHEST WALL AND LOWER NECK: Within normal limits.    VISUALIZED UPPER ABDOMEN: Retroperitoneal lymphadenopathy, as seen on abdominal CT from the same date.    BONES: Degenerative changes of the spine.    IMPRESSION:    Patchy groundglass opacities and scattered nodular opacities throughout all lobes of the lungs. Findings may be related to known lymphoma or may be seen in the setting of multifocal infection.    Mildly enlarged supraclavicular, mediastinal, hilar, and retroperitoneal lymphadenopathy likely related to known lymphoma.      < end of copied text >      Impression:  72 y/o man dx with hodgkin disease back in April, had ercp and sphincterotomy as well. It was decided he is not a chemotx candidate for unclear reasons to me. He was sent to hospice. He was admitted to [facility] in septic shock back in early june, urine grew enterococcus , bc neg, he has a chronic escamilla and required some manipulations. Sepsis was attributed to escamilla associated uti. He also was pancytopenic and required xarxio. He went home to hospice. He now returns to hospital for FTT and fever. THe patient is hoarse but not other new symptoms. He has no pain, escamilla is still in lace. He was given zosyn for the past couple of days for fever, but fever persists. Imaging shows adenopathy and lung nodules with some ggo as well. HE is not coughing. He remains pancytopenic. I suspect that we are witnessing the natural history of untreated Hodgkins Disease but given the lung findings will add doxycycline in case of atypical pneumonia and check for legionella. HE may need xarxio as he did during prior admissions. He is borderline neutropenic so will also do fungal markers given the lung findings. His urine culture was negative so not looking like a uti at present. He was scoped by ent and no throat infection found.         Recommendations:  will continue zosyn pending final cultures  will add doxycycline and check urine legionella  await planned head and neck ct  check fungal markers but not fungitel since on zosyn

## 2021-07-01 NOTE — DIETITIAN INITIAL EVALUATION ADULT. - OTHER INFO
Pt reports poor appetite/PO intake in-house; states that he did not consume dinner last night or breakfast this morning. Per recall, Pt likely meeting <75% of EER >/=3 months. Pt reports that he had been consuming x3 Ensure Enlive PTA and would like to continue to receive x3 nutritional oral supplementation in-house.     Pt with HbA1c of 8.8% indicating suboptimal glycemic control. Pt unable to provide DM management PTA, unavailable at this time per H&P note. Education on nutritional management of DM inappropriate at this time.     GI: Pt without current complaints of nausea or vomiting. No BM documented at this time per nursing flowsheets; Pt is currently not on bowel regimen in-house.     (6/30) SLP performed bed-side swallow evaluation and recommended to continue dysphagia 1 with nectar thick liquids.     No dosing weight listed per chart at this time.  No weight Hx available per [facility].   RD obtained bed-scale weight of 87.7 Kg at time of visit.  Pt unable to recall UBW, however endorses ~11.3 Kg weight loss over the past 3 months.  -- Pt with reported 11.4% weight loss x3 months; clinically significant per ASPEN guidelines.

## 2021-07-01 NOTE — DIETITIAN INITIAL EVALUATION ADULT. - REASON
Nutrition-focused physical exam at time of visit; defer as appropriate. RD did observe overt visual signs of moderate muscle mass depletion to Pt's clavicles.

## 2021-07-01 NOTE — BH CONSULTATION LIAISON ASSESSMENT NOTE - NSBHCHARTREVIEWLAB_PSY_A_CORE FT
7.1    1.93  )-----------( 71 ( 01 Jul 2021 09:49 )             22.4     07-01    139  |  107  |  18  ----------------------------<  134<H>  3.7   |  21<L>  |  0.76    Ca    7.7<L>      01 Jul 2021 09:49  Mg     1.7     06-30    TPro  5.0<L>  /  Alb  x   /  TBili  x   /  DBili  x   /  AST  x   /  ALT  x   /  AlkPhos  x   06-30

## 2021-07-01 NOTE — DIETITIAN INITIAL EVALUATION ADULT. - REASON INDICATOR FOR ASSESSMENT
Consult received and appreciated.  Source: electronic medical record, Pt  -- Pt noted "confused, alert" per nursing flowsheets. Able to respond to most of RD questions.

## 2021-07-01 NOTE — BH CONSULTATION LIAISON ASSESSMENT NOTE - NSBHCHARTREVIEWVS_PSY_A_CORE FT
Vital Signs Last 24 Hrs  T(C): 39.2 (01 Jul 2021 04:00), Max: 39.2 (01 Jul 2021 04:00)  T(F): 102.5 (01 Jul 2021 04:00), Max: 102.5 (01 Jul 2021 04:00)  HR: 98 (01 Jul 2021 04:00) (87 - 100)  BP: 112/67 (01 Jul 2021 04:00) (97/61 - 112/68)  BP(mean): --  RR: 18 (01 Jul 2021 04:00) (17 - 18)  SpO2: 97% (01 Jul 2021 04:00) (96% - 98%)

## 2021-07-01 NOTE — DIETITIAN INITIAL EVALUATION ADULT. - PERTINENT MEDS FT
MEDICATIONS  (STANDING):  aspirin  chewable 81 milliGRAM(s) Oral daily  dextrose 40% Gel 15 Gram(s) Oral once  dextrose 5%. 1000 milliLiter(s) (50 mL/Hr) IV Continuous <Continuous>  dextrose 5%. 1000 milliLiter(s) (100 mL/Hr) IV Continuous <Continuous>  dextrose 50% Injectable 25 Gram(s) IV Push once  dextrose 50% Injectable 12.5 Gram(s) IV Push once  dextrose 50% Injectable 25 Gram(s) IV Push once  finasteride 5 milliGRAM(s) Oral daily  glucagon  Injectable 1 milliGRAM(s) IntraMuscular once  insulin glargine Injectable (LANTUS) 10 Unit(s) SubCutaneous at bedtime  insulin lispro (ADMELOG) corrective regimen sliding scale   SubCutaneous three times a day before meals  insulin lispro (ADMELOG) corrective regimen sliding scale   SubCutaneous at bedtime  pantoprazole    Tablet 40 milliGRAM(s) Oral before breakfast  piperacillin/tazobactam IVPB.. 3.375 Gram(s) IV Intermittent every 8 hours  sodium chloride 0.9%. 1000 milliLiter(s) (100 mL/Hr) IV Continuous <Continuous>  tamsulosin 0.4 milliGRAM(s) Oral at bedtime    MEDICATIONS  (PRN):  acetaminophen   Tablet .. 650 milliGRAM(s) Oral every 6 hours PRN Temp greater or equal to 38C (100.4F), Moderate Pain (4 - 6)

## 2021-07-01 NOTE — PROGRESS NOTE ADULT - SUBJECTIVE AND OBJECTIVE BOX
Chief complaint  Patient is a 71y old  Male who presents with a chief complaint of Per chart, "72 y/o M w/ pmhx of DM T2 with hyperglycemia, A1C 8.8%,on oral meds/Janumet,CVA this past August and got TPA per family member, baseline RLE weakness, DM , BPH with obstruction s/p escamilla catheter , s/p ERCP w Sphincteretomy recent admission to [facility] for sepsis and hodgkin lymphoma was not offered any chemo and was placed on hospice. P/w presenting with weakness and FTT." (01 Jul 2021 11:43)   Review of systems  Patient on 1:1 for SI, looks comfortable, no hypoglycemic episodes.    Labs and Fingersticks  CAPILLARY BLOOD GLUCOSE      POCT Blood Glucose.: 140 mg/dL (01 Jul 2021 11:32)  POCT Blood Glucose.: 130 mg/dL (01 Jul 2021 07:39)  POCT Blood Glucose.: 142 mg/dL (30 Jun 2021 21:09)  POCT Blood Glucose.: 148 mg/dL (30 Jun 2021 16:26)      Anion Gap, Serum: 11 (07-01 @ 09:49)  Anion Gap, Serum: 12 (06-30 @ 06:24)      Calcium, Total Serum: 7.7 *L* (07-01 @ 09:49)  Calcium, Total Serum: 8.2 *L* (06-30 @ 06:24)  Albumin, Serum: 2.5 *L* (06-30 @ 06:24)    Alanine Aminotransferase (ALT/SGPT): 9 *L* (06-30 @ 06:24)  Alkaline Phosphatase, Serum: 342 *H* (06-30 @ 06:24)  Aspartate Aminotransferase (AST/SGOT): 17 (06-30 @ 06:24)        07-01    139  |  107  |  18  ----------------------------<  134<H>  3.7   |  21<L>  |  0.76    Ca    7.7<L>      01 Jul 2021 09:49  Mg     1.7     06-30    TPro  5.0<L>  /  Alb  x   /  TBili  x   /  DBili  x   /  AST  x   /  ALT  x   /  AlkPhos  x   06-30                        7.1    1.93  )-----------( 72       ( 01 Jul 2021 09:49 )             22.4     Medications  MEDICATIONS  (STANDING):  aspirin  chewable 81 milliGRAM(s) Oral daily  dextrose 40% Gel 15 Gram(s) Oral once  dextrose 5%. 1000 milliLiter(s) (50 mL/Hr) IV Continuous <Continuous>  dextrose 5%. 1000 milliLiter(s) (100 mL/Hr) IV Continuous <Continuous>  dextrose 50% Injectable 25 Gram(s) IV Push once  dextrose 50% Injectable 12.5 Gram(s) IV Push once  dextrose 50% Injectable 25 Gram(s) IV Push once  finasteride 5 milliGRAM(s) Oral daily  glucagon  Injectable 1 milliGRAM(s) IntraMuscular once  insulin glargine Injectable (LANTUS) 10 Unit(s) SubCutaneous at bedtime  insulin lispro (ADMELOG) corrective regimen sliding scale   SubCutaneous three times a day before meals  insulin lispro (ADMELOG) corrective regimen sliding scale   SubCutaneous at bedtime  pantoprazole    Tablet 40 milliGRAM(s) Oral before breakfast  piperacillin/tazobactam IVPB.. 3.375 Gram(s) IV Intermittent every 8 hours  QUEtiapine 25 milliGRAM(s) Oral at bedtime  sodium chloride 0.9%. 1000 milliLiter(s) (100 mL/Hr) IV Continuous <Continuous>  tamsulosin 0.4 milliGRAM(s) Oral at bedtime      Physical Exam  General: Patient comfortable in bed  Vital Signs Last 12 Hrs  T(F): 98 (07-01-21 @ 11:26), Max: 102.5 (07-01-21 @ 04:00)  HR: 77 (07-01-21 @ 11:26) (77 - 98)  BP: 103/65 (07-01-21 @ 11:26) (103/65 - 112/67)  BP(mean): --  RR: 18 (07-01-21 @ 11:26) (18 - 18)  SpO2: 96% (07-01-21 @ 11:26) (96% - 97%)         Chief complaint  Patient is a 71y old  Male who presents with a chief complaint of Per chart, "72 y/o M w/ pmhx of DM T2 with hyperglycemia, A1C 8.8%,on oral meds/Janumet,CVA this past August and got TPA per family member, baseline RLE weakness, DM , BPH with obstruction s/p escamilla catheter , s/p ERCP w Sphincteretomy recent admission to [facility] for sepsis and hodgkin lymphoma was not offered any chemo and was placed on hospice. P/w presenting with weakness and FTT." (01 Jul 2021 11:43)   Review of systems  Patient on 1:1 for SI, looks comfortable, no hypoglycemic episodes.    Labs and Fingersticks  CAPILLARY BLOOD GLUCOSE      POCT Blood Glucose.: 140 mg/dL (01 Jul 2021 11:32)  POCT Blood Glucose.: 130 mg/dL (01 Jul 2021 07:39)  POCT Blood Glucose.: 142 mg/dL (30 Jun 2021 21:09)  POCT Blood Glucose.: 148 mg/dL (30 Jun 2021 16:26)      Anion Gap, Serum: 11 (07-01 @ 09:49)  Anion Gap, Serum: 12 (06-30 @ 06:24)      Calcium, Total Serum: 7.7 *L* (07-01 @ 09:49)  Calcium, Total Serum: 8.2 *L* (06-30 @ 06:24)  Albumin, Serum: 2.5 *L* (06-30 @ 06:24)    Alanine Aminotransferase (ALT/SGPT): 9 *L* (06-30 @ 06:24)  Alkaline Phosphatase, Serum: 342 *H* (06-30 @ 06:24)  Aspartate Aminotransferase (AST/SGOT): 17 (06-30 @ 06:24)        07-01    139  |  107  |  18  ----------------------------<  134<H>  3.7   |  21<L>  |  0.76    Ca    7.7<L>      01 Jul 2021 09:49  Mg     1.7     06-30    TPro  5.0<L>  /  Alb  x   /  TBili  x   /  DBili  x   /  AST  x   /  ALT  x   /  AlkPhos  x   06-30                        7.1    1.93  )-----------( 72       ( 01 Jul 2021 09:49 )             22.4     Medications  MEDICATIONS  (STANDING):  aspirin  chewable 81 milliGRAM(s) Oral daily  dextrose 40% Gel 15 Gram(s) Oral once  dextrose 5%. 1000 milliLiter(s) (50 mL/Hr) IV Continuous <Continuous>  dextrose 5%. 1000 milliLiter(s) (100 mL/Hr) IV Continuous <Continuous>  dextrose 50% Injectable 25 Gram(s) IV Push once  dextrose 50% Injectable 12.5 Gram(s) IV Push once  dextrose 50% Injectable 25 Gram(s) IV Push once  finasteride 5 milliGRAM(s) Oral daily  glucagon  Injectable 1 milliGRAM(s) IntraMuscular once  insulin glargine Injectable (LANTUS) 10 Unit(s) SubCutaneous at bedtime  insulin lispro (ADMELOG) corrective regimen sliding scale   SubCutaneous three times a day before meals  insulin lispro (ADMELOG) corrective regimen sliding scale   SubCutaneous at bedtime  pantoprazole    Tablet 40 milliGRAM(s) Oral before breakfast  piperacillin/tazobactam IVPB.. 3.375 Gram(s) IV Intermittent every 8 hours  QUEtiapine 25 milliGRAM(s) Oral at bedtime  sodium chloride 0.9%. 1000 milliLiter(s) (100 mL/Hr) IV Continuous <Continuous>  tamsulosin 0.4 milliGRAM(s) Oral at bedtime      Physical Exam  General: Patient comfortable in bed  Vital Signs Last 12 Hrs  T(F): 98 (07-01-21 @ 11:26), Max: 102.5 (07-01-21 @ 04:00)  HR: 77 (07-01-21 @ 11:26) (77 - 98)  BP: 103/65 (07-01-21 @ 11:26) (103/65 - 112/67)  BP(mean): --  RR: 18 (07-01-21 @ 11:26) (18 - 18)  SpO2: 96% (07-01-21 @ 11:26) (96% - 97%)

## 2021-07-01 NOTE — PROGRESS NOTE ADULT - SUBJECTIVE AND OBJECTIVE BOX
LIZETT RIVERA  MRN-[id number]    Patient is a 71y old  Male who presents with a chief complaint of     Review of System  REVIEW OF SYSTEMS      General:	Denies fatigue, fevers, chills, sweats, decreased appetite.    Skin/Breast: denies pruritis, rash  	  Ophthalmologic: no change in vision or blurring  	  HEENT	Denies dry mouth, oral sores, dysphagia,  change in hearing.    Respiratory and Thorax:  cough, sob, wheeze, hemoptysis  	  Cardiovascular:	no cp , palp, orthopnea    Gastrointestinal:	no n/v/d constipation    Genitourinary:	no dysuria of frequency, no hematuria, no flank pain    Musculoskeletal:	no bone or joint pain. no muscle aches.     Neurological:	no change in sensory or motor function. no headache. no weakness.     Psychiatric:	no depression, no anxiety, insomnia.     Hematology/Lymphatics:	no bleeding or bruising        Current Meds  MEDICATIONS  (STANDING):  aspirin  chewable 81 milliGRAM(s) Oral daily  dextrose 40% Gel 15 Gram(s) Oral once  dextrose 5%. 1000 milliLiter(s) (50 mL/Hr) IV Continuous <Continuous>  dextrose 5%. 1000 milliLiter(s) (100 mL/Hr) IV Continuous <Continuous>  dextrose 50% Injectable 25 Gram(s) IV Push once  dextrose 50% Injectable 12.5 Gram(s) IV Push once  dextrose 50% Injectable 25 Gram(s) IV Push once  finasteride 5 milliGRAM(s) Oral daily  glucagon  Injectable 1 milliGRAM(s) IntraMuscular once  insulin glargine Injectable (LANTUS) 10 Unit(s) SubCutaneous at bedtime  insulin lispro (ADMELOG) corrective regimen sliding scale   SubCutaneous three times a day before meals  insulin lispro (ADMELOG) corrective regimen sliding scale   SubCutaneous at bedtime  pantoprazole    Tablet 40 milliGRAM(s) Oral before breakfast  piperacillin/tazobactam IVPB.. 3.375 Gram(s) IV Intermittent every 8 hours  sodium chloride 0.9%. 1000 milliLiter(s) (100 mL/Hr) IV Continuous <Continuous>  tamsulosin 0.4 milliGRAM(s) Oral at bedtime    MEDICATIONS  (PRN):  acetaminophen   Tablet .. 650 milliGRAM(s) Oral every 6 hours PRN Temp greater or equal to 38C (100.4F), Moderate Pain (4 - 6)      Vitals  Vital Signs Last 24 Hrs  T(C): 39.2 (01 Jul 2021 04:00), Max: 39.2 (01 Jul 2021 04:00)  T(F): 102.5 (01 Jul 2021 04:00), Max: 102.5 (01 Jul 2021 04:00)  HR: 98 (01 Jul 2021 04:00) (87 - 100)  BP: 112/67 (01 Jul 2021 04:00) (97/61 - 112/68)  BP(mean): --  RR: 18 (01 Jul 2021 04:00) (17 - 18)  SpO2: 97% (01 Jul 2021 04:00) (96% - 98%)    Physical Exam  PHYSICAL EXAM:      Constitutional: NAD    Eyes: PERRLA EOMI, anicteric sclera    Heent :No oral sores, no pharyngeal injection. moist mucosa.    Neck: supple, no jvd, no LAD    Respiratory: CTA b/l     Cardiovascular: s1s2, no m/g/r    Gastrointestinal: soft, nt, nd, + BS    Extremities: no c/c/e    Neurological:A&O x 3 moves all ext.    Skin: no rash on exposed skin    Lymph Nodes: no lymphadenopathy.        Lab  CBC Full  -  ( 30 Jun 2021 22:47 )  WBC Count : 2.01 K/uL  RBC Count : 2.60 M/uL  Hemoglobin : 7.8 g/dL  Hematocrit : 24.9 %  Platelet Count - Automated : 85 K/uL  Mean Cell Volume : 95.8 fl  Mean Cell Hemoglobin : 30.0 pg  Mean Cell Hemoglobin Concentration : 31.3 gm/dL  Auto Neutrophil # : x  Auto Lymphocyte # : x  Auto Monocyte # : x  Auto Eosinophil # : x  Auto Basophil # : x  Auto Neutrophil % : x  Auto Lymphocyte % : x  Auto Monocyte % : x  Auto Eosinophil % : x  Auto Basophil % : x    06-30    141  |  106  |  20  ----------------------------<  120<H>  4.2   |  23  |  0.75    Ca    8.2<L>      30 Jun 2021 06:24  Mg     1.7     06-30    TPro  5.0<L>  /  Alb  x   /  TBili  x   /  DBili  x   /  AST  x   /  ALT  x   /  AlkPhos  x   06-30        Rad:    Assessment/Plan

## 2021-07-01 NOTE — BH CONSULTATION LIAISON ASSESSMENT NOTE - NSSUICPROTFACT_PSY_ALL_CORE
Responsibility to children, family, or others/Identifies reasons for living/Supportive social network of family or friends/Fear of death or the actual act of killing self/Cultural, spiritual and/or moral attitudes against suicide/Mu-ism beliefs

## 2021-07-01 NOTE — PROGRESS NOTE ADULT - ASSESSMENT
72 y/o AAM w/ Stroke this past August and got TPA per family member had L weakness resolved, DM , BPH with obstruction s/p escamilla catheter , s/p ERCP w Sphincteretomy recent admission to [facility] for sepsis  hodgkin lympoma was not offered any chemo and was placed on hospice.   npw presenting with generalized weakness and FTT.   has baseline R LE weakness.  + hypophonic   Hgb 7.1, platelets 106, elevated alk phos, A1c 8.8%  on 1:1 for + SI   o/e with R NLF flattening, hypophonic, dysarthria, and generalized weakness. more likely 2/2 underlying medical condition as opposed to new stroke.   - check CTH pending.   - psych f/u for SI   - c/w ASA 81mg daily  - correct metabolic derangements   - lipitor 40mg if no CI. elevated alk phos  - check A1c and lipids.   - telemetry  - PT/OT/SS/SLP, OOBC  - check FS, glucose control <180  - GI/DVT ppx  - Counseling on diet, exercise, and medication adherence was done  - Counseling on smoking cessation and alcohol consumption offered when appropriate.  - Pain assessed and judicious use of narcotics when appropriate was discussed.    - Stroke education given when appropriate.  - Importance of fall prevention discussed.   - Differential diagnosis and plan of care discussed with patient and/or family and primary team  - Thank you for allowing me to participate in the care of this patient. Call with questions.   - GOC discussion for hospice ; family meeting planned   Juan العراقي MD  Vascular Neurology  Office: [phone]

## 2021-07-01 NOTE — PROGRESS NOTE ADULT - SUBJECTIVE AND OBJECTIVE BOX
Date of service: 07-01-21 @ 23:57      Patient is a 71y old  Male who presents with a chief complaint of FTT, hodgkin disease (01 Jul 2021 17:25)                                                               INTERVAL HPI/OVERNIGHT EVENTS:    REVIEW OF SYSTEMS:     CONSTITUTIONAL: No weakness, fevers or chills  EYES/ENT: No visual changes , no ear ache   NECK: No pain or stiffness  RESPIRATORY: No cough, wheezing,  No shortness of breath  CARDIOVASCULAR: No chest pain or palpitations  GASTROINTESTINAL: No abdominal pain  . No nausea, vomiting, or hematemesis; No diarrhea or constipation. No melena or hematochezia.  GENITOURINARY: No dysuria, frequency or hematuria  NEUROLOGICAL: No numbness or weakness  SKIN: No itching, burning, rashes, or lesions                                                                                                                                                                                                                                                                                 Medications:  MEDICATIONS  (STANDING):  aspirin  chewable 81 milliGRAM(s) Oral daily  dextrose 40% Gel 15 Gram(s) Oral once  dextrose 5%. 1000 milliLiter(s) (50 mL/Hr) IV Continuous <Continuous>  dextrose 5%. 1000 milliLiter(s) (100 mL/Hr) IV Continuous <Continuous>  dextrose 50% Injectable 25 Gram(s) IV Push once  dextrose 50% Injectable 12.5 Gram(s) IV Push once  dextrose 50% Injectable 25 Gram(s) IV Push once  finasteride 5 milliGRAM(s) Oral daily  glucagon  Injectable 1 milliGRAM(s) IntraMuscular once  insulin glargine Injectable (LANTUS) 10 Unit(s) SubCutaneous at bedtime  insulin lispro (ADMELOG) corrective regimen sliding scale   SubCutaneous three times a day before meals  insulin lispro (ADMELOG) corrective regimen sliding scale   SubCutaneous at bedtime  pantoprazole    Tablet 40 milliGRAM(s) Oral before breakfast  piperacillin/tazobactam IVPB.. 3.375 Gram(s) IV Intermittent every 8 hours  QUEtiapine 25 milliGRAM(s) Oral at bedtime  sodium chloride 0.9%. 1000 milliLiter(s) (100 mL/Hr) IV Continuous <Continuous>  tamsulosin 0.4 milliGRAM(s) Oral at bedtime    MEDICATIONS  (PRN):  acetaminophen   Tablet .. 650 milliGRAM(s) Oral every 6 hours PRN Temp greater or equal to 38C (100.4F), Moderate Pain (4 - 6)       Allergies    No Known Allergies    Intolerances      Vital Signs Last 24 Hrs  T(C): 36.8 (01 Jul 2021 14:50), Max: 39.2 (01 Jul 2021 04:00)  T(F): 98.2 (01 Jul 2021 14:50), Max: 102.5 (01 Jul 2021 04:00)  HR: 93 (01 Jul 2021 14:50) (77 - 98)  BP: 99/68 (01 Jul 2021 14:50) (99/68 - 112/67)  BP(mean): --  RR: 18 (01 Jul 2021 14:50) (18 - 18)  SpO2: 98% (01 Jul 2021 14:50) (96% - 98%)  CAPILLARY BLOOD GLUCOSE      POCT Blood Glucose.: 231 mg/dL (01 Jul 2021 21:02)  POCT Blood Glucose.: 231 mg/dL (01 Jul 2021 17:48)  POCT Blood Glucose.: 140 mg/dL (01 Jul 2021 11:32)  POCT Blood Glucose.: 130 mg/dL (01 Jul 2021 07:39)      06-30 @ 07:01 - 07-01 @ 07:00  --------------------------------------------------------  IN: 240 mL / OUT: 700 mL / NET: -460 mL    07-01 @ 07:01 - 07-01 @ 23:57  --------------------------------------------------------  IN: 1700 mL / OUT: 300 mL / NET: 1400 mL      Physical Exam:    Daily     Daily   General:  Well appearing, NAD, not cachetic  HEENT:  Nonicteric, PERRLA  CV:  RRR, S1S2   Lungs:  CTA B/L, no wheezes, rales, rhonchi  Abdomen:  Soft, non-tender, no distended, positive BS  Extremities:  2+ pulses, no c/c, no edema  Skin:  Warm and dry, no rashes  :  No escamilla  Neuro:  AAOx3, non-focal, grossly intact                                                                                                                                                                                                                                                                                                LABS:                               7.1    1.93  )-----------( 72       ( 01 Jul 2021 09:49 )             22.4                      07-01    139  |  107  |  18  ----------------------------<  134<H>  3.7   |  21<L>  |  0.76    Ca    7.7<L>      01 Jul 2021 09:49  Mg     1.7     06-30    TPro  5.0<L>  /  Alb  x   /  TBili  x   /  DBili  x   /  AST  x   /  ALT  x   /  AlkPhos  x   06-30                       RADIOLOGY & ADDITIONAL TESTS         I personally reviewed: [  ]EKG   [  ]CXR    [  ] CT      A/P:         Discussed with :     Augusto consultants' Notes   Time spent :

## 2021-07-01 NOTE — PROGRESS NOTE ADULT - ASSESSMENT
1) Hodgkin's lymphoma  - repeat CT scans, results noted  - obtain muga  - will plan family meeting to discuss rx plan vs supportive care    2) ENT- voice change is a new finding as per patient's sister.  - ENT input note, f/u s/s    3)Hyperglycemia- mgmt as per med    4)Weakness. 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  - Neurology input noted.     5) Pancytopenia  anemia- w/u this far unremarkable. possible aocd.  transfusional support as needed    Leukopenia- unclear etiology- possibly due to infxn or malignancy. Also could be related to zosyn  will consider bmbx as part of work up with family when we have meeting on friday.    Thrombocytopenia- has developed while here.  Can't r/o malignancy vs. due to zosyn.   manage supportively for now.    6. Fever- possibly related to malignancy.   Will ask ID to [email or web address].

## 2021-07-01 NOTE — DIETITIAN INITIAL EVALUATION ADULT. - PHYSCIAL ASSESSMENT
Weight per obtained bed-scale weight at time of visit.    Skin: no pressure-related injuries per nursing flowsheets

## 2021-07-01 NOTE — BH CONSULTATION LIAISON ASSESSMENT NOTE - DESCRIPTION
Lives with wife, who takes care of him on a daily basis along with his sister. Retired, worked at . No history of tobacco, alcohol, or illicit drug use. He has 2 sons and 4 grandchildren who lives locally.

## 2021-07-01 NOTE — DIETITIAN NUTRITION RISK NOTIFICATION - TREATMENT: THE FOLLOWING DIET HAS BEEN RECOMMENDED
Diet, Dysphagia 1 Pureed-Nectar Consistency Fluid:   Consistent Carbohydrate {No Snacks} (CSTCHO)  Supplement Feeding Modality:  Oral  Ensure Enlive Servings Per Day:  3       Frequency:  Daily (07-01-21 @ 12:09) [Active]

## 2021-07-01 NOTE — BH CONSULTATION LIAISON ASSESSMENT NOTE - NSBHCONSULTRECOMMENDOTHER_PSY_A_CORE FT
1. Start Seroquel 25mg PO qHS, monitor EKG for QTc<500, FDA BB warning d/w pt's family, in agreement for use.    2. PRN: Seroquel 25mg PO q6h PRN agitation.  Melatonin 3mg PO qHS PRN insomnia.    3. Can consider CTH for AMS    4. Minimize use of benzos, opioids, anticholinergics, or other deliriogenic agents when possible.  Maintain sleep wake cycle.  Provide frequent reorientation and redirection.  Family member at bedside if possible. Assess for need for glasses and hearing aid (if applicable).    5. Pt does not have capacity to make decisions regarding chemo    6. OP psych f/u at [facility]- [phone]/[facility] Crisis clinic- [phone]

## 2021-07-01 NOTE — BH CONSULTATION LIAISON ASSESSMENT NOTE - SUMMARY
71M domiciled with wife, retired, with no formal PPH, no prior SA or psych admissions, no active substance abuse, PMH significant for CVA this past August, DM, BPH with obstruction s/p escamilla catheter, s/p ERCP with sphincteretomy, recent admission to [facility] for sepsis, Hodgkin Lymphoma placed on hospice, now p/w weakness and FTT pending optimization for potential chemotherapy, psychiatry consulted to evaluate for depression, SI, and capacity to make decisions regarding chemo.  Pt AOx1 on interview, disoriented to situation and intermittently to place, expressing depressed mood and SI in the setting of health stressors without intent for harm (cites Hindu as protective factor), family not concerned for self-harm.  Pt unable to demonstrate understanding of his condition or proposed treatments; unable to give a consistent choice; therefore, does not have capacity to make decisions regarding chemo at present time.

## 2021-07-01 NOTE — CHART NOTE - NSCHARTNOTEFT_GEN_A_CORE
MEDICINE PA NOTE    Event Summary:   Notified by RN patient c/o chest pain 4/10 - resolved by the time patient seen and evaluated at bedside. Pt admits to left sided chest pain which lasted a few seconds to minutes and occurred while working with PT in bed. Of note patient c/o nausea a few minutes earlier.   Denies SOB, radiation, jaw pain, neck pain.   Vital signs stable.     >VITAL SIGNS:  T(C): 36.8 (07-01-21 @ 14:50), Max: 39.2 (07-01-21 @ 04:00)  T(F): 98.2 (07-01-21 @ 14:50), Max: 102.5 (07-01-21 @ 04:00)  HR: 93 (07-01-21 @ 14:50) (77 - 98)  BP: 99/68 (07-01-21 @ 14:50) (97/61 - 112/68)  RR: 18 (07-01-21 @ 14:50) (17 - 18)  SpO2: 98% (07-01-21 @ 14:50) (96% - 98%)    >PHYSICAL EXAM:  GENERAL: NAD  NERVOUS SYSTEM:  AOX1-2  CHEST/LUNG: Clear to percussion bilaterally; No rales, rhonchi, wheezing, or rubs  HEART: Regular rate and rhythm; No murmurs, rubs, or gallops  ABDOMEN: Soft, Nontender, Nondistended; Bowel sounds present  EXTREMITIES:  2+ Peripheral Pulses, No clubbing, cyanosis, or edema  LYMPH: No lymphadenopathy noted    >ASSESSMENT/PLAN:   72 y/o M w/ pmhx of DM T2 with hyperglycemia, A1C 8.8%,on oral meds/Janumet,CVA this past August and got TPA per family member, baseline RLE weakness, DM , BPH with obstruction s/p escamilla catheter, s/p ERCP w sphincterotomy recent admission to [facility] for sepsis and Hodgkin lymphoma was not offered any chemo and was placed on hospice. P/w presenting with weakness and FTT. Awaiting family meeting with heme/onc tomorrow AM to discuss rx plan vs supportive care. Also started on Zosyn on 6/30 for fever and possible aspiration PNA.   Now c/o chest pain while working with PT in bed, self resolved.     # Chest pain  -EKG no ST-T wave abn noted  -stat cardiac enzymes sent and were negative  -Zofran for nausea  -continue to monitor vitals/labs      WOO Amin  [id number]

## 2021-07-02 LAB
ANION GAP SERPL CALC-SCNC: 13 MMOL/L — SIGNIFICANT CHANGE UP (ref 5–17)
BUN SERPL-MCNC: 19 MG/DL — SIGNIFICANT CHANGE UP (ref 7–23)
CALCIUM SERPL-MCNC: 7.8 MG/DL — LOW (ref 8.4–10.5)
CHLORIDE SERPL-SCNC: 107 MMOL/L — SIGNIFICANT CHANGE UP (ref 96–108)
CO2 SERPL-SCNC: 21 MMOL/L — LOW (ref 22–31)
CREAT SERPL-MCNC: 0.76 MG/DL — SIGNIFICANT CHANGE UP (ref 0.5–1.3)
GLUCOSE BLDC GLUCOMTR-MCNC: 122 MG/DL — HIGH (ref 70–99)
GLUCOSE BLDC GLUCOMTR-MCNC: 157 MG/DL — HIGH (ref 70–99)
GLUCOSE BLDC GLUCOMTR-MCNC: 188 MG/DL — HIGH (ref 70–99)
GLUCOSE BLDC GLUCOMTR-MCNC: 217 MG/DL — HIGH (ref 70–99)
GLUCOSE SERPL-MCNC: 135 MG/DL — HIGH (ref 70–99)
HCT VFR BLD CALC: 27.3 % — LOW (ref 39–50)
HGB BLD-MCNC: 8.5 G/DL — LOW (ref 13–17)
MAGNESIUM SERPL-MCNC: 1.4 MG/DL — LOW (ref 1.6–2.6)
MCHC RBC-ENTMCNC: 29.6 PG — SIGNIFICANT CHANGE UP (ref 27–34)
MCHC RBC-ENTMCNC: 31.1 GM/DL — LOW (ref 32–36)
MCV RBC AUTO: 95.1 FL — SIGNIFICANT CHANGE UP (ref 80–100)
NRBC # BLD: 0 /100 WBCS — SIGNIFICANT CHANGE UP (ref 0–0)
PHOSPHATE SERPL-MCNC: 2.1 MG/DL — LOW (ref 2.5–4.5)
PLATELET # BLD AUTO: 68 K/UL — LOW (ref 150–400)
POTASSIUM SERPL-MCNC: 3.6 MMOL/L — SIGNIFICANT CHANGE UP (ref 3.5–5.3)
POTASSIUM SERPL-SCNC: 3.6 MMOL/L — SIGNIFICANT CHANGE UP (ref 3.5–5.3)
RBC # BLD: 2.87 M/UL — LOW (ref 4.2–5.8)
RBC # FLD: 21.3 % — HIGH (ref 10.3–14.5)
SODIUM SERPL-SCNC: 141 MMOL/L — SIGNIFICANT CHANGE UP (ref 135–145)
WBC # BLD: 2.03 K/UL — LOW (ref 3.8–10.5)
WBC # FLD AUTO: 2.03 K/UL — LOW (ref 3.8–10.5)

## 2021-07-02 PROCEDURE — 99232 SBSQ HOSP IP/OBS MODERATE 35: CPT

## 2021-07-02 RX ORDER — LIDOCAINE HCL 20 MG/ML
1 VIAL (ML) INJECTION ONCE
Refills: 0 | Status: COMPLETED | OUTPATIENT
Start: 2021-07-02 | End: 2021-07-02

## 2021-07-02 RX ADMIN — Medication 81 MILLIGRAM(S): at 12:17

## 2021-07-02 RX ADMIN — TAMSULOSIN HYDROCHLORIDE 0.4 MILLIGRAM(S): 0.4 CAPSULE ORAL at 22:24

## 2021-07-02 RX ADMIN — PIPERACILLIN AND TAZOBACTAM 25 GRAM(S): 4; .5 INJECTION, POWDER, LYOPHILIZED, FOR SOLUTION INTRAVENOUS at 08:33

## 2021-07-02 RX ADMIN — Medication 650 MILLIGRAM(S): at 06:19

## 2021-07-02 RX ADMIN — Medication 1: at 07:51

## 2021-07-02 RX ADMIN — FINASTERIDE 5 MILLIGRAM(S): 5 TABLET, FILM COATED ORAL at 12:17

## 2021-07-02 RX ADMIN — INSULIN GLARGINE 10 UNIT(S): 100 INJECTION, SOLUTION SUBCUTANEOUS at 22:19

## 2021-07-02 RX ADMIN — Medication 2: at 12:15

## 2021-07-02 RX ADMIN — SODIUM CHLORIDE 100 MILLILITER(S): 9 INJECTION INTRAMUSCULAR; INTRAVENOUS; SUBCUTANEOUS at 16:44

## 2021-07-02 RX ADMIN — Medication 1: at 16:45

## 2021-07-02 RX ADMIN — QUETIAPINE FUMARATE 25 MILLIGRAM(S): 200 TABLET, FILM COATED ORAL at 22:24

## 2021-07-02 RX ADMIN — Medication 650 MILLIGRAM(S): at 06:56

## 2021-07-02 RX ADMIN — Medication 1 MILLILITER(S): at 15:59

## 2021-07-02 RX ADMIN — PIPERACILLIN AND TAZOBACTAM 25 GRAM(S): 4; .5 INJECTION, POWDER, LYOPHILIZED, FOR SOLUTION INTRAVENOUS at 16:41

## 2021-07-02 RX ADMIN — PANTOPRAZOLE SODIUM 40 MILLIGRAM(S): 20 TABLET, DELAYED RELEASE ORAL at 04:48

## 2021-07-02 NOTE — CHART NOTE - NSCHARTNOTEFT_GEN_A_CORE
Patient is currently complaining of a burning sensation around the catheter, which was exchanged this AM by the RN for leakage of urine overnight    Urology was called to evaluate the patient for persistent leakage of urine around the escamilla catheter without drainage through it.  Per family, this issue has occurred before he was admitted to the hospital.    Upon inspection, the catheter is currently draining clear urine with some sediment.    T(C): 37.8 (07-02-21 @ 05:00), Max: 38.8 (07-02-21 @ 04:42)  HR: 107 (07-02-21 @ 04:42) (93 - 107)  BP: 114/73 (07-02-21 @ 04:42) (99/68 - 114/73)  RR: 18 (07-02-21 @ 04:42) (18 - 18)  SpO2: 98% (07-02-21 @ 04:42) (98% - 98%)  Wt(kg): --    Physical Exam:    General: NAD, A+Ox3  Abdomen: soft, non-tender, non-distended      07-01 @ 07:01  -  07-02 @ 07:00  --------------------------------------------------------  IN: 1700 mL / OUT: 900 mL / NET: 800 mL      Escamilla catheter currently draining clear urine; no active leakage seen      A/P: 71 year old male with leakage around the escamilla catheter, recently exchanged this AM and currently draining    -catheter is in good position and draining well  -no need for irrigation or catheter replacement at this time  -pain control as needed  -f/u with outside urologist upon discharge

## 2021-07-02 NOTE — BH CONSULTATION LIAISON PROGRESS NOTE - NSBHCHARTREVIEWVS_PSY_A_CORE FT
Vital Signs Last 24 Hrs  T(C): 36.8 (02 Jul 2021 11:32), Max: 38.8 (02 Jul 2021 04:42)  T(F): 98.3 (02 Jul 2021 11:32), Max: 101.9 (02 Jul 2021 04:42)  HR: 90 (02 Jul 2021 11:32) (90 - 107)  BP: 106/69 (02 Jul 2021 11:32) (99/68 - 114/73)  BP(mean): --  RR: 18 (02 Jul 2021 04:42) (18 - 18)  SpO2: 97% (02 Jul 2021 11:32) (97% - 98%)

## 2021-07-02 NOTE — PROGRESS NOTE ADULT - SUBJECTIVE AND OBJECTIVE BOX
LIZETT RIVERA  MRN-[id number]    Patient is a 71y old  Male who presents with a chief complaint of FTT, hodgkin disease (01 Jul 2021 17:25)      Review of Systems  Patient continues to have trouble participating with ROS due to dysphonia.  Patient seen for family meeting with family members present at bedside    Current Meds  MEDICATIONS  (STANDING):  aspirin  chewable 81 milliGRAM(s) Oral daily  dextrose 40% Gel 15 Gram(s) Oral once  dextrose 5%. 1000 milliLiter(s) (50 mL/Hr) IV Continuous <Continuous>  dextrose 5%. 1000 milliLiter(s) (100 mL/Hr) IV Continuous <Continuous>  dextrose 50% Injectable 25 Gram(s) IV Push once  dextrose 50% Injectable 12.5 Gram(s) IV Push once  dextrose 50% Injectable 25 Gram(s) IV Push once  finasteride 5 milliGRAM(s) Oral daily  glucagon  Injectable 1 milliGRAM(s) IntraMuscular once  insulin glargine Injectable (LANTUS) 10 Unit(s) SubCutaneous at bedtime  insulin lispro (ADMELOG) corrective regimen sliding scale   SubCutaneous three times a day before meals  insulin lispro (ADMELOG) corrective regimen sliding scale   SubCutaneous at bedtime  pantoprazole    Tablet 40 milliGRAM(s) Oral before breakfast  piperacillin/tazobactam IVPB.. 3.375 Gram(s) IV Intermittent every 8 hours  QUEtiapine 25 milliGRAM(s) Oral at bedtime  sodium chloride 0.9%. 1000 milliLiter(s) (100 mL/Hr) IV Continuous <Continuous>  tamsulosin 0.4 milliGRAM(s) Oral at bedtime    MEDICATIONS  (PRN):  acetaminophen   Tablet .. 650 milliGRAM(s) Oral every 6 hours PRN Temp greater or equal to 38C (100.4F), Moderate Pain (4 - 6)      Vital Signs Last 24 Hrs  T(C): 37.8 (02 Jul 2021 05:00), Max: 38.8 (02 Jul 2021 04:42)  T(F): 100 (02 Jul 2021 05:00), Max: 101.9 (02 Jul 2021 04:42)  HR: 107 (02 Jul 2021 04:42) (77 - 107)  BP: 114/73 (02 Jul 2021 04:42) (99/68 - 114/73)  BP(mean): --  RR: 18 (02 Jul 2021 04:42) (18 - 18)  SpO2: 98% (02 Jul 2021 04:42) (96% - 98%)      PHYSICAL EXAM:    Constitutional: NAD    Eyes: PERRLA EOMI, anicteric sclera    Heent :No oral sores, no pharyngeal injection. moist mucosa.    Neck: supple, no jvd, no LAD    Respiratory: CTA b/l     Cardiovascular: s1s2, no m/g/r    Gastrointestinal: soft, nt, nd, + BS    Extremities: no c/c/e    Neurological:A&O x 3 moves all ext.    Skin: no rash on exposed skin    Lymph Nodes: no lymphadenopathy.      Lab  CBC Full  -  ( 02 Jul 2021 06:30 )  WBC Count : 2.03 K/uL  RBC Count : 2.87 M/uL  Hemoglobin : 8.5 g/dL  Hematocrit : 27.3 %  Platelet Count - Automated : 68 K/uL  Mean Cell Volume : 95.1 fl  Mean Cell Hemoglobin : 29.6 pg  Mean Cell Hemoglobin Concentration : 31.1 gm/dL  Auto Neutrophil # : x  Auto Lymphocyte # : x  Auto Monocyte # : x  Auto Eosinophil # : x  Auto Basophil # : x  Auto Neutrophil % : x  Auto Lymphocyte % : x  Auto Monocyte % : x  Auto Eosinophil % : x  Auto Basophil % : x    07-02    141  |  107  |  19  ----------------------------<  135<H>  3.6   |  21<L>  |  0.76    Ca    7.8<L>      02 Jul 2021 06:30  Phos  2.1     07-02  Mg     1.4     07-02          Rad:    Assessment/Plan

## 2021-07-02 NOTE — PROGRESS NOTE ADULT - ASSESSMENT
72 y/o AAM w/ Stroke this past August and got TPA per family member had L weakness resolved, DM , BPH with obstruction s/p escamilla catheter , s/p ERCP w Sphincteretomy recent admission to [facility] for sepsis  hodgkin lympoma was not offered any chemo and was placed on hospice.   npw presenting with generalized weakness and FTT.   has baseline R LE weakness.  + hypophonic   Hgb 7.1, platelets 106, elevated alk phos, A1c 8.8%  on 1:1 for + SI   LDL 30, A1c 7.7   o/e with R NLF flattening, hypophonic, dysarthria, and generalized weakness. more likely 2/2 underlying medical condition as opposed to new stroke.   - check CTH pending. and soft tissue neck   - fever 101.9, infectious workup.   - psych f/u for SI --> no capacity.   - c/w ASA 81mg daily  - correct metabolic derangements   - lipitor 40mg if no CI. elevated alk phos   - telemetry  - PT/OT/SS/SLP, OOBC  - check FS, glucose control <180  - GI/DVT ppx  - Counseling on diet, exercise, and medication adherence was done  - Counseling on smoking cessation and alcohol consumption offered when appropriate.  - Pain assessed and judicious use of narcotics when appropriate was discussed.    - Stroke education given when appropriate.  - Importance of fall prevention discussed.   - Differential diagnosis and plan of care discussed with patient and/or family and primary team  - Thank you for allowing me to participate in the care of this patient. Call with questions.   - GOC discussion for hospice ; family meeting planned   Juan العراقي MD  Vascular Neurology  Office: [phone]

## 2021-07-02 NOTE — PROGRESS NOTE ADULT - ASSESSMENT
1) Hodgkin's lymphoma  - repeat CT scans, results noted  -Family meeting held at bedside. We discussed rx options vs comfort care.   I explained that HD is a potentially treatable and curable entity.  Patient not a good candidate for CTX at this time and i'd therefore favor treatment with immunotherapy at this time, Nivolumab. Consent obtained.   Would try and give monthly.  I explained that inpt rehab and PT are crucial elements of any potential recovery. Pt isn't sure he is willing to pursue this. If patient chooses to go home, then he would have to be able to return to my office to pursue additional rx.    Patient's spouse states that she can't take proper care of him at home. He will need input from JAZZY, CC     2) ENT- voice change is a new finding as per patient's sister.  - ENT input note, f/u s/s    3)Hyperglycemia- mgmt as per med    4)Weakness. 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  - Neurology input noted.     5) Pancytopenia  anemia- w/u this far unremarkable. possible aocd.  transfusional support as needed    Leukopenia- unclear etiology- possibly due to infxn or malignancy. Also could be related to zosyn  findigs d/w ID     Thrombocytopenia- has developed while here.  Can't r/o malignancy vs. due to zosyn.   manage supportively for now.    6. Fever- possibly related to malignancy.   ID following

## 2021-07-02 NOTE — PROGRESS NOTE ADULT - SUBJECTIVE AND OBJECTIVE BOX
Neurology Progress Note    S: Patient seen and examined.  . patient denied CP, SOB, HA or pain. now on 1:1 for SI family at bedside. febrile 101.9    Medication:  MEDICATIONS  (STANDING):  aspirin  chewable 81 milliGRAM(s) Oral daily  dextrose 40% Gel 15 Gram(s) Oral once  dextrose 5%. 1000 milliLiter(s) (50 mL/Hr) IV Continuous <Continuous>  dextrose 5%. 1000 milliLiter(s) (100 mL/Hr) IV Continuous <Continuous>  dextrose 50% Injectable 25 Gram(s) IV Push once  dextrose 50% Injectable 12.5 Gram(s) IV Push once  dextrose 50% Injectable 25 Gram(s) IV Push once  finasteride 5 milliGRAM(s) Oral daily  glucagon  Injectable 1 milliGRAM(s) IntraMuscular once  insulin glargine Injectable (LANTUS) 10 Unit(s) SubCutaneous at bedtime  insulin lispro (ADMELOG) corrective regimen sliding scale   SubCutaneous three times a day before meals  insulin lispro (ADMELOG) corrective regimen sliding scale   SubCutaneous at bedtime  pantoprazole    Tablet 40 milliGRAM(s) Oral before breakfast  piperacillin/tazobactam IVPB.. 3.375 Gram(s) IV Intermittent every 8 hours  QUEtiapine 25 milliGRAM(s) Oral at bedtime  sodium chloride 0.9%. 1000 milliLiter(s) (100 mL/Hr) IV Continuous <Continuous>  tamsulosin 0.4 milliGRAM(s) Oral at bedtime    MEDICATIONS  (PRN):  acetaminophen   Tablet .. 650 milliGRAM(s) Oral every 6 hours PRN Temp greater or equal to 38C (100.4F), Moderate Pain (4 - 6)      Vitals:  Vital Signs Last 24 Hrs  T(C): 36.8 (02 Jul 2021 11:32), Max: 38.8 (02 Jul 2021 04:42)  T(F): 98.3 (02 Jul 2021 11:32), Max: 101.9 (02 Jul 2021 04:42)  HR: 90 (02 Jul 2021 11:32) (90 - 107)  BP: 106/69 (02 Jul 2021 11:32) (99/68 - 114/73)  BP(mean): --  RR: 18 (02 Jul 2021 04:42) (18 - 18)  SpO2: 97% (02 Jul 2021 11:32) (97% - 98%)    General Exam:   General Appearance: Appropriately dressed and in no acute distress       Head: Normocephalic, atraumatic and no dysmorphic features  Ear, Nose, and Throat: Moist mucous membranes  CVS: S1S2+  Resp: No SOB, no wheeze or rhonchi  GI: soft NT/ND  Extremities: No edema or cyanosis  Skin: No bruises or rashes     Neurological Exam:  Mental Status: Awake, alert and oriented x 1-2.  Able to follow simple and complex verbal commands. Able to name and repeat. fluent speech. No obvious aphasia mild dysarthria, + hypophonic   Cranial Nerves: PERRL, EOMI, VFFC, sensation V1-V3 intact,  R facial asymmetry, equal elevation of palate, scm/trap 5/5, tongue is midline on protrusion. no obvious papilledema on fundoscopic exam. hearing is grossly intact.   Motor: generalized weakness but FRANCOIS. minimal weakness B/L LE R>L  Sensation: Intact to light touch and pinprick throughout. no right/left confusion. no extinction to tactile on DSS. Romberg was negative.   Reflexes: 1+ throughout at biceps, brachioradialis, triceps, patellars and ankles bilaterally and equal. No clonus. R toe and L toe were both downgoing.  Coordination: No dysmetria on FNF    Gait: deferred     I personally reviewed the below data/images/labs:    CBC Full  -  ( 02 Jul 2021 06:30 )  WBC Count : 2.03 K/uL  RBC Count : 2.87 M/uL  Hemoglobin : 8.5 g/dL  Hematocrit : 27.3 %  Platelet Count - Automated : 68 K/uL  Mean Cell Volume : 95.1 fl  Mean Cell Hemoglobin : 29.6 pg  Mean Cell Hemoglobin Concentration : 31.1 gm/dL  Auto Neutrophil # : x  Auto Lymphocyte # : x  Auto Monocyte # : x  Auto Eosinophil # : x  Auto Basophil # : x  Auto Neutrophil % : x  Auto Lymphocyte % : x  Auto Monocyte % : x  Auto Eosinophil % : x  Auto Basophil % : x      07-02    141  |  107  |  19  ----------------------------<  135<H>  3.6   |  21<L>  |  0.76    Ca    7.8<L>      02 Jul 2021 06:30  Phos  2.1     07-02  Mg     1.4     07-02    < from: CT Chest w/ IV Cont (06.28.21 @ 19:01) >    EXAM:  CT CHEST IC                            PROCEDURE DATE:  06/28/2021            INTERPRETATION:  CLINICAL INFORMATION: Lymphoma, evaluate extent of thoracic disease.    COMPARISON: CT abdomen pelvis dated 6/28/2021. Chest x-ray dated 6/20/2021.    CONTRAST/COMPLICATIONS:  IV Contrast: 40 mL of Omnipaque 350 were administered. 60 mL discarded.  Oral Contrast: None.  Complications: None reported.    PROCEDURE:  CT of the Chest was performed.  Sagittal and coronal reformats were performed.    FINDINGS: The quality of the images are degraded by respiratory motion and streak artifact.    LUNGS AND AIRWAYS: Patent central airways. Patchy groundglass opacities and scattered nodular opacities throughout all lobes of the lungs. For example:  A left upper lobe nodule measures 0.9 cm (series 3 image 33).  A right middle lobe nodule measures 2.0 x 0.8 cm (series 3 image 76).  3.5 x 2.1 cm solid opacity within the superior segment of right lower lobe (series 3 image 71).  A right lower lobe nodule at the costophrenic angle measures 1.9 x 1.1 cm (series 3 image 108).    PLEURA: No pleural effusion or pneumothorax.    MEDIASTINUM AND SARTHAK: Mildly enlarged mediastinal, hilar, and supraclavicular lymphadenopathy. A right hilar lymph node measures 1.8 x 1.4 cm (series 3 image 90). A left supraclavicular node measures 2.5 x 1.5 cm (series 3 image 19). A subcarinal node measures 2.2 x 1.3 cm (series 3 image 63).    VESSELS: Coronary artery calcifications.    HEART: Heart size is normal. No pericardial effusion.    CHEST WALL AND LOWER NECK: Within normal limits.    VISUALIZED UPPER ABDOMEN: Retroperitoneal lymphadenopathy, as seen on abdominal CT from the same date.    BONES: Degenerative changes of the spine.    IMPRESSION:    Patchy groundglass opacities and scattered nodular opacities throughout all lobes of the lungs. Findings may be related to known lymphoma or may be seen in the setting of multifocal infection.    Mildly enlarged supraclavicular, mediastinal, hilar, and retroperitoneal lymphadenopathy likely related to known lymphoma.              LEXIS BAKER MD; Resident Radiology  This document has been electronically signed.  ISABEL JACOBO MD; Attending Radiologist  This document has been electronically signed. Jun 29 [id number]    < end of copied text >

## 2021-07-02 NOTE — BH CONSULTATION LIAISON PROGRESS NOTE - NSBHCHARTREVIEWLAB_PSY_A_CORE FT
8.5    2.03  )-----------( 68 ( 02 Jul 2021 06:30 )             27.3     07-02    141  |  107  |  19  ----------------------------<  135<H>  3.6   |  21<L>  |  0.76    Ca    7.8<L>      02 Jul 2021 06:30  Phos  2.1     07-02  Mg     1.4     07-02

## 2021-07-02 NOTE — PROGRESS NOTE ADULT - ASSESSMENT
70 y/o M w/ pmhx of CVA this past August and got TPA per family member, DM , BPH with obstruction s/p escamilla catheter , s/p ERCP w Sphincteretomy recent admission to [facility] for sepsis  hodgkin lympoma was not offered any chemo and was placed on hospice.   npw presenting with weakness and FTT.   pt denies cp / SOB / plapiatiaons   no focal neuro complaints .. at baseline RLE weakness   no N/V   had one episode of diarrah   no urinary s x   abdominal pain, diffuse, but worse on R side.     - failure to thrive : cultures sent   CT chest ordered   nutrition consult       - lymphoma : fu wtih Dr. Larios: possible immunotherapy and chemo at later time post rehab if pt/family agree     DM with hyperglycemia :     - anemia  : moniot rH/H   transfuse prn     - fever : doubt infectious etiology   fu with ID     d/w pt and sister

## 2021-07-02 NOTE — CHART NOTE - NSCHARTNOTEFT_GEN_A_CORE
Nutrition Follow Up Note  Patient seen for: malnutrition follow-up    Chart reviewed, events noted.    Source: [x] Patient       [x] EMR        [] RN        [] Family at bedside       [] Other:    -If unable to interview patient: [] Trach/Vent/BiPAP  [] Disoriented/confused/inappropriate to interview    Diet Order:   Diet, Dysphagia 1 Pureed-Nectar Consistency Fluid:   Consistent Carbohydrate {No Snacks} (CSTCHO)  Supplement Feeding Modality:  Oral  Ensure Enlive Servings Per Day:  3       Frequency:  Daily (07-01-21)    Pt visited at bedside who reports poor appetite, consuming <50% of dinner last night and breakfast this morning. States that he consumed ~50% of Ensure this morning and accidentally left it on the tray to have been thrown out. Pt requesting more Ensure at time of visit, RD to provide as able.     GI: No current complaints of nausea or vomiting.  No BM documented per chart. Bowel Regimen? [] Yes   [x] No    Current dosing weight: no dosing weight per chart  Weight via bed-scale obtained yesterday: 87.7 Kg (7/1)    Nutritionally Pertinent MEDICATIONS  (STANDING):  dextrose 40% Gel  dextrose 5%.  dextrose 5%.  dextrose 50% Injectable  dextrose 50% Injectable  dextrose 50% Injectable  finasteride  glucagon  Injectable  insulin glargine Injectable (LANTUS)  insulin lispro (ADMELOG) corrective regimen sliding scale  insulin lispro (ADMELOG) corrective regimen sliding scale  pantoprazole    Tablet  piperacillin/tazobactam IVPB..  sodium chloride 0.9%.  tamsulosin    Pertinent Labs: 07-02 @ 06:30: Na 141, BUN 19, Cr 0.76, <H>, K+ 3.6, Phos 2.1<L>, Mg 1.4<L>; POCT Blood Glucose x24 hrs: 140-231 mg/dL     A1C with Estimated Average Glucose Result: 7.7 % (07-01-21 @ 12:40)    Skin: no pressure-related injuries per nursing flowsheets   Edema: no edema noted per nursing flowsheets     Estimated Needs:   [x] no change since previous assessment    Previous Nutrition Diagnosis: Severe Malnutrition, Chronic.  Nutrition Diagnosis is: [x] ongoing --- being addressed with nutritional oral supplementation, monitoring PO intake and weight trends     New Nutrition Diagnosis: [x] Not applicable    Education Provided:       [] Yes:  [x] No:     Recommendations/Interventions:  1. Continue diet; consistency per SLP recommendations  2. Continue Ensure Enlive x3/day (provides 350cal, 20Gm protein per 8oz serving) as ordered   2. Consider bowel regimen as Pt without documented BM per chart at this time  3. Obtain food preferences, honor as able   4. Continue to monitor weight trends, PO intake, GI function, electrolytes, and skin integrity     RD remains available upon request and will follow up per protocol.     Maria Victoria Walter RD CDN (Pager [phone])

## 2021-07-02 NOTE — BH CONSULTATION LIAISON PROGRESS NOTE - NSBHASSESSMENTFT_PSY_ALL_CORE
71M domiciled with wife, retired, with no formal PPH, no prior SA or psych admissions, no active substance abuse, PMH significant for CVA this past August, DM, BPH with obstruction s/p escamilla catheter, s/p ERCP with sphincteretomy, recent admission to [facility] for sepsis, Hodgkin Lymphoma placed on hospice, now p/w weakness and FTT pending optimization for potential chemotherapy, psychiatry consulted to evaluate for depression, SI, and capacity to make decisions regarding chemo.  Pt AOx1 on interview, disoriented to situation and intermittently to place, expressing depressed mood and SI in the setting of health stressors without intent for harm (cites Anglican as protective factor), family not concerned for self-harm.  Pt unable to demonstrate understanding of his condition or proposed treatments; unable to give a consistent choice; therefore, does not have capacity to make decisions regarding chemo at present time.  7/2: Pt with improving mental status since starting Seroquel, more amenable for tx, denies active or passive SIIP/HIIP and mood is better, although still c/o trouble sleeping.  Family without acute safety concerns.

## 2021-07-02 NOTE — PROGRESS NOTE ADULT - ASSESSMENT
72 yo male with failure to thrive in  setting of recent diagnosis of Hodgkins lymphoma.  He has been treated for enterococcal UTI at [facility] although never bacteremic.  He also has had a recent ERCP.He requires a chronic escamilla.  I suspect B symptoms of lymphoma,  this is a diagnosis  of exclusion.  His chest CT findings are nonspecific and he does not seem to have respiratory symptoms.  Antibiotics are empiric.Blood and urine cultures are negative  Suggest:  1.With negative blood cultures  can stop zosyn at any point  2.Will await legionella urine antigen, will not add additional antimicrobials at this point  3.Can consider a Pulmonary opinion on CT scan findings  4.I think ultimately he can be followed off antibiotics and receive immunotherapy

## 2021-07-02 NOTE — PROGRESS NOTE ADULT - ASSESSMENT
Assessment  DMT2: 71y Male with DM T2 with hyperglycemia, A1C 8.8%, was on oral meds/Janumet at home, admitted with weakness/fatigue and hyperglycemia, now on basal insulin and coverage, blood sugars are fluctuating within overall acceptable range with intermittent elevations, no hypoglycemic episodes, appears comfortable in NAD, eating small portions on dysphagia diet.  Lymphoma: on medications, monitored, FU Heme/Onc.  Anemia: stable, monitored.      Shahriar Kahn MD  Cell: [phone]  Office: [phone]       Assessment  DMT2: 71y Male with DM T2 with hyperglycemia, A1C 8.8%, was on oral meds/Janumet at home, admitted with weakness/fatigue and hyperglycemia, now on  basal insulin and coverage, blood sugars are fluctuating within overall acceptable range with intermittent elevations, no hypoglycemic episodes, appears comfortable in NAD, eating small portions on dysphagia diet.  Lymphoma: on medications, monitored, FU Heme/Onc.  Anemia: stable, monitored.      Shahriar Kahn MD  Cell: [phone]  Office: [phone]

## 2021-07-02 NOTE — PROGRESS NOTE ADULT - SUBJECTIVE AND OBJECTIVE BOX
Date of service: 07-02-21 @ 12:56      Patient is a 71y old  Male who presents with a chief complaint of failure to thrive (02 Jul 2021 11:06)                                                               INTERVAL HPI/OVERNIGHT EVENTS:    REVIEW OF SYSTEMS:     CONSTITUTIONAL: No weakness, fevers or chills  RESPIRATORY: No cough, wheezing,  No shortness of breath  CARDIOVASCULAR: No chest pain or palpitations  GASTROINTESTINAL: No abdominal pain  . No nausea, vomiting, or hematemesis; No diarrhea or constipation. No melena or hematochezia.  GENITOURINARY: No dysuria, frequency or hematuria  NEUROLOGICAL: No numbness or weakness                                                                                                                                                                                                                                                                                Medications:  MEDICATIONS  (STANDING):  aspirin  chewable 81 milliGRAM(s) Oral daily  dextrose 40% Gel 15 Gram(s) Oral once  dextrose 5%. 1000 milliLiter(s) (50 mL/Hr) IV Continuous <Continuous>  dextrose 5%. 1000 milliLiter(s) (100 mL/Hr) IV Continuous <Continuous>  dextrose 50% Injectable 25 Gram(s) IV Push once  dextrose 50% Injectable 12.5 Gram(s) IV Push once  dextrose 50% Injectable 25 Gram(s) IV Push once  finasteride 5 milliGRAM(s) Oral daily  glucagon  Injectable 1 milliGRAM(s) IntraMuscular once  insulin glargine Injectable (LANTUS) 10 Unit(s) SubCutaneous at bedtime  insulin lispro (ADMELOG) corrective regimen sliding scale   SubCutaneous three times a day before meals  insulin lispro (ADMELOG) corrective regimen sliding scale   SubCutaneous at bedtime  pantoprazole    Tablet 40 milliGRAM(s) Oral before breakfast  piperacillin/tazobactam IVPB.. 3.375 Gram(s) IV Intermittent every 8 hours  QUEtiapine 25 milliGRAM(s) Oral at bedtime  sodium chloride 0.9%. 1000 milliLiter(s) (100 mL/Hr) IV Continuous <Continuous>  tamsulosin 0.4 milliGRAM(s) Oral at bedtime    MEDICATIONS  (PRN):  acetaminophen   Tablet .. 650 milliGRAM(s) Oral every 6 hours PRN Temp greater or equal to 38C (100.4F), Moderate Pain (4 - 6)       Allergies    No Known Allergies    Intolerances      Vital Signs Last 24 Hrs  T(C): 36.8 (02 Jul 2021 11:32), Max: 38.8 (02 Jul 2021 04:42)  T(F): 98.3 (02 Jul 2021 11:32), Max: 101.9 (02 Jul 2021 04:42)  HR: 90 (02 Jul 2021 11:32) (90 - 107)  BP: 106/69 (02 Jul 2021 11:32) (99/68 - 114/73)  BP(mean): --  RR: 18 (02 Jul 2021 04:42) (18 - 18)  SpO2: 97% (02 Jul 2021 11:32) (97% - 98%)  CAPILLARY BLOOD GLUCOSE      POCT Blood Glucose.: 217 mg/dL (02 Jul 2021 11:54)  POCT Blood Glucose.: 157 mg/dL (02 Jul 2021 07:44)  POCT Blood Glucose.: 231 mg/dL (01 Jul 2021 21:02)  POCT Blood Glucose.: 231 mg/dL (01 Jul 2021 17:48)      07-01 @ 07:01  -  07-02 @ 07:00  --------------------------------------------------------  IN: 1700 mL / OUT: 900 mL / NET: 800 mL    07-02 @ 07:01 - 07-02 @ 12:56  --------------------------------------------------------  IN: 230 mL / OUT: 200 mL / NET: 30 mL      Physical Exam:    Daily     Daily   General:  cachetic  HEENT:  Nonicteric, PERRLA  CV:  RRR, S1S2   Lungs:  CTA B/L, no wheezes, rales, rhonchi  Abdomen:  Soft, non-tender, no distended, positive BS  Extremities:  2+ pulses, no c/c, no edema  Skin:  Warm and dry, no rashes  :  No escamilla  Neuro:  AAOx3, non-focal, grossly intact                                                                                                                                                                                                                                                                                                LABS:                               8.5    2.03  )-----------( 68       ( 02 Jul 2021 06:30 )             27.3                      07-02    141  |  107  |  19  ----------------------------<  135<H>  3.6   |  21<L>  |  0.76    Ca    7.8<L>      02 Jul 2021 06:30  Phos  2.1     07-02  Mg     1.4     07-02                         RADIOLOGY & ADDITIONAL TESTS         I personally reviewed: [  ]EKG   [  ]CXR    [  ] CT      A/P:         Discussed with :     Augusto consultants' Notes   Time spent :

## 2021-07-02 NOTE — PROGRESS NOTE ADULT - SUBJECTIVE AND OBJECTIVE BOX
CC: f/u for failure to thrive and fever    Patient reports: he is withdrawn, offers no focal complaints    REVIEW OF SYSTEMS:  All other review of systems negative (Comprehensive ROS)    Antimicrobials Day #  :day 2-3  piperacillin/tazobactam IVPB.. 3.375 Gram(s) IV Intermittent every 8 hours    Other Medications Reviewed    T(F): 100 (07-02-21 @ 05:00), Max: 101.9 (07-02-21 @ 04:42)  HR: 107 (07-02-21 @ 04:42)  BP: 114/73 (07-02-21 @ 04:42)  RR: 18 (07-02-21 @ 04:42)  SpO2: 98% (07-02-21 @ 04:42)  Wt(kg): --    PHYSICAL EXAM:  General: alert, no acute distress  Eyes:  anicteric, no conjunctival injection, no discharge  Oropharynx: no lesions or injection 	  Neck: supple, without adenopathy  Lungs: clear to auscultation, decreased at bases  Heart: regular rate and rhythm; no murmur, rubs or gallops  Abdomen: soft, nondistended, nontender, without mass or organomegaly  Skin: no lesions  Extremities: no clubbing, cyanosis, or edema  Neurologic: alert, oriented, moves all extremities  : escamilla in place  LAB RESULTS:                        8.5    2.03  )-----------( 68       ( 02 Jul 2021 06:30 )             27.3     07-02    141  |  107  |  19  ----------------------------<  135<H>  3.6   |  21<L>  |  0.76    Ca    7.8<L>      02 Jul 2021 06:30  Phos  2.1     07-02  Mg     1.4     07-02          MICROBIOLOGY:  RECENT CULTURES:  07-01 @ 02:34 .Blood Blood     No growth to date.      06-28 @ 17:26 .Urine Clean Catch (Midstream)     <10,000 CFU/mL Normal Urogenital Jade      06-28 @ 16:28 .Blood Blood-Peripheral     No growth to date.      06-28 @ 16:26 .Blood Blood-Peripheral     No growth to date.          RADIOLOGY REVIEWED:  < from: CT Chest w/ IV Cont (06.28.21 @ 19:01) >  IMPRESSION:    Patchy groundglass opacities and scattered nodular opacities throughout all lobes of the lungs. Findings may be related to known lymphoma or may be seen in the setting of multifocal infection.    Mildly enlarged supraclavicular, mediastinal, hilar, and retroperitoneal lymphadenopathy likely related to known lymphoma.    < end of copied text >  < from: CT Abdomen and Pelvis w/ IV Cont (06.28.21 @ 14:26) >  IMPRESSION:  Indeterminant nodular lung opacities which dedicated follow-up chest CT can be performed for further evaluation.    Abdominal lymphadenopathy, which may be related to patient's known history of lymphoma. Correlate with prior imaging if availablefor comparison.    Enlarged prostate.    < end of copied text >

## 2021-07-02 NOTE — BH CONSULTATION LIAISON PROGRESS NOTE - CURRENT MEDICATION
MEDICATIONS  (STANDING):  aspirin  chewable 81 milliGRAM(s) Oral daily  dextrose 40% Gel 15 Gram(s) Oral once  dextrose 5%. 1000 milliLiter(s) (50 mL/Hr) IV Continuous <Continuous>  dextrose 5%. 1000 milliLiter(s) (100 mL/Hr) IV Continuous <Continuous>  dextrose 50% Injectable 25 Gram(s) IV Push once  dextrose 50% Injectable 12.5 Gram(s) IV Push once  dextrose 50% Injectable 25 Gram(s) IV Push once  finasteride 5 milliGRAM(s) Oral daily  glucagon  Injectable 1 milliGRAM(s) IntraMuscular once  insulin glargine Injectable (LANTUS) 10 Unit(s) SubCutaneous at bedtime  insulin lispro (ADMELOG) corrective regimen sliding scale   SubCutaneous three times a day before meals  insulin lispro (ADMELOG) corrective regimen sliding scale   SubCutaneous at bedtime  pantoprazole    Tablet 40 milliGRAM(s) Oral before breakfast  piperacillin/tazobactam IVPB.. 3.375 Gram(s) IV Intermittent every 8 hours  QUEtiapine 25 milliGRAM(s) Oral at bedtime  sodium chloride 0.9%. 1000 milliLiter(s) (100 mL/Hr) IV Continuous <Continuous>  tamsulosin 0.4 milliGRAM(s) Oral at bedtime    MEDICATIONS  (PRN):  acetaminophen   Tablet .. 650 milliGRAM(s) Oral every 6 hours PRN Temp greater or equal to 38C (100.4F), Moderate Pain (4 - 6)

## 2021-07-02 NOTE — BH CONSULTATION LIAISON PROGRESS NOTE - NSBHFUPINTERVALHXFT_PSY_A_CORE
Pt was seen lying in bed with family at bedside, including sister, sister in law, wife, and nephew. Pt interviewed privately; states he feels “alright”, denies feeling depressed or anxious and denies active or passive SIIP.  Better oriented today to person, place, slightly off on date. Endorses feeling "much better" now that his family is here with him. He also states he is motivated to get better, wants to start chemo after his physician discussed a concrete treatment plan.  Complains that he didn’t sleep well last night 2/2 worrying about his family, unable to elaborate.     Spoke with family separately who report pt seems better to them today, and they are encouraging and supportive him to get better and start chemo, they state he is more hopeful and told them he wants to engage in tx.  They have no concerns for suicidality.

## 2021-07-02 NOTE — BH CONSULTATION LIAISON PROGRESS NOTE - NSBHCHARTREVIEWINVESTIGATE_PSY_A_CORE FT
< from: 12 Lead ECG (06.28.21 @ 12:52) >      Ventricular Rate 125 BPM    Atrial Rate 125 BPM    P-R Interval 148 ms    QRS Duration 86 ms    Q-T Interval 300 ms    QTC Calculation(Bazett) 433 ms    P Axis 54 degrees    R Axis 29 degrees    T Axis 73 degrees    Diagnosis Line SINUS TACHYCARDIA  NONSPECIFIC T WAVE ABNORMALITY  ABNORMAL ECG  NO PREVIOUS ECGS AVAILABLE  Confirmed by YOSELIN JAMESON MD ([id number]) on 6/29/2021 9:35:43 PM    < end of copied text >    < from: CT Chest w/ IV Cont (06.28.21 @ 19:01) >        < end of copied text >

## 2021-07-02 NOTE — PROGRESS NOTE ADULT - SUBJECTIVE AND OBJECTIVE BOX
Chief complaint  Patient is a 71y old  Male who presents with a chief complaint of FTT, hodgkin disease (01 Jul 2021 17:25)   Review of systems  Patient in bed, looks comfortable, no hypoglycemic episodes.    Labs and Fingersticks  CAPILLARY BLOOD GLUCOSE      POCT Blood Glucose.: 157 mg/dL (02 Jul 2021 07:44)  POCT Blood Glucose.: 231 mg/dL (01 Jul 2021 21:02)  POCT Blood Glucose.: 231 mg/dL (01 Jul 2021 17:48)  POCT Blood Glucose.: 140 mg/dL (01 Jul 2021 11:32)      Anion Gap, Serum: 13 (07-02 @ 06:30)  Anion Gap, Serum: 11 (07-01 @ 09:49)      Calcium, Total Serum: 7.8 *L* (07-02 @ 06:30)  Calcium, Total Serum: 7.7 *L* (07-01 @ 09:49)          07-02    141  |  107  |  19  ----------------------------<  135<H>  3.6   |  21<L>  |  0.76    Ca    7.8<L>      02 Jul 2021 06:30  Phos  2.1     07-02  Mg     1.4     07-02                          8.5    2.03  )-----------( 68       ( 02 Jul 2021 06:30 )             27.3     Medications  MEDICATIONS  (STANDING):  aspirin  chewable 81 milliGRAM(s) Oral daily  dextrose 40% Gel 15 Gram(s) Oral once  dextrose 5%. 1000 milliLiter(s) (50 mL/Hr) IV Continuous <Continuous>  dextrose 5%. 1000 milliLiter(s) (100 mL/Hr) IV Continuous <Continuous>  dextrose 50% Injectable 25 Gram(s) IV Push once  dextrose 50% Injectable 12.5 Gram(s) IV Push once  dextrose 50% Injectable 25 Gram(s) IV Push once  finasteride 5 milliGRAM(s) Oral daily  glucagon  Injectable 1 milliGRAM(s) IntraMuscular once  insulin glargine Injectable (LANTUS) 10 Unit(s) SubCutaneous at bedtime  insulin lispro (ADMELOG) corrective regimen sliding scale   SubCutaneous three times a day before meals  insulin lispro (ADMELOG) corrective regimen sliding scale   SubCutaneous at bedtime  pantoprazole    Tablet 40 milliGRAM(s) Oral before breakfast  piperacillin/tazobactam IVPB.. 3.375 Gram(s) IV Intermittent every 8 hours  QUEtiapine 25 milliGRAM(s) Oral at bedtime  sodium chloride 0.9%. 1000 milliLiter(s) (100 mL/Hr) IV Continuous <Continuous>  tamsulosin 0.4 milliGRAM(s) Oral at bedtime      Physical Exam  General: Patient comfortable in bed  Vital Signs Last 12 Hrs  T(F): 100 (07-02-21 @ 05:00), Max: 101.9 (07-02-21 @ 04:42)  HR: 107 (07-02-21 @ 04:42) (107 - 107)  BP: 114/73 (07-02-21 @ 04:42) (114/73 - 114/73)  BP(mean): --  RR: 18 (07-02-21 @ 04:42) (18 - 18)  SpO2: 98% (07-02-21 @ 04:42) (98% - 98%)               Chief complaint  Patient is a 71y old  Male who presents with a chief complaint of FTT, hodgkin disease (01 Jul 2021 17:25)   Review of systems  Patient in bed, looks comfortable, no hypoglycemic episodes.    Labs and Fingersticks  CAPILLARY BLOOD GLUCOSE      POCT Blood Glucose.: 157 mg/dL (02 Jul 2021 07:44)  POCT Blood Glucose.: 231 mg/dL (01 Jul 2021 21:02)  POCT Blood Glucose.: 231 mg/dL (01 Jul 2021 17:48)  POCT Blood Glucose.: 140 mg/dL (01 Jul 2021 11:32)      Anion Gap, Serum: 13 (07-02 @ 06:30)  Anion Gap, Serum: 11 (07-01 @ 09:49)      Calcium, Total Serum: 7.8 *L* (07-02 @ 06:30)  Calcium, Total Serum: 7.7 *L* (07-01 @ 09:49)          07-02    141  |  107  |  19  ----------------------------<  135<H>  3.6   |  21<L>  |  0.76    Ca    7.8<L>      02 Jul 2021 06:30  Phos  2.1     07-02  Mg     1.4     07-02                          8.5    2.03  )-----------( 68       ( 02 Jul 2021 06:30 )             27.3     Medications  MEDICATIONS  (STANDING):  aspirin  chewable 81 milliGRAM(s) Oral daily  dextrose 40% Gel 15 Gram(s) Oral once  dextrose 5%. 1000 milliLiter(s) (50 mL/Hr) IV Continuous <Continuous>  dextrose 5%. 1000 milliLiter(s) (100 mL/Hr) IV Continuous <Continuous>  dextrose 50% Injectable 25 Gram(s) IV Push once  dextrose 50% Injectable 12.5 Gram(s) IV Push once  dextrose 50% Injectable 25 Gram(s) IV Push once  finasteride 5 milliGRAM(s) Oral daily  glucagon  Injectable 1 milliGRAM(s) IntraMuscular once  insulin glargine Injectable (LANTUS) 10 Unit(s) SubCutaneous at bedtime  insulin lispro (ADMELOG) corrective regimen sliding scale   SubCutaneous three times a day before meals  insulin lispro (ADMELOG) corrective regimen sliding scale   SubCutaneous at bedtime  pantoprazole    Tablet 40 milliGRAM(s) Oral before breakfast  piperacillin/tazobactam IVPB.. 3.375 Gram(s) IV Intermittent every 8 hours  QUEtiapine 25 milliGRAM(s) Oral at bedtime  sodium chloride 0.9%. 1000 milliLiter(s) (100 mL/Hr) IV Continuous <Continuous>  tamsulosin 0.4 milliGRAM(s) Oral at bedtime      Physical Exam  General: Patient comfortable in bed  Vital Signs Last 12 Hrs  T(F): 100 (07-02-21 @ 05:00), Max: 101.9 (07-02-21 @ 04:42)  HR: 107 (07-02-21 @ 04:42) (107 - 107)  BP: 114/73 (07-02-21 @ 04:42) (114/73 - 114/73)  BP(mean): --  RR: 18 (07-02-21 @ 04:42) (18 - 18)  SpO2: 98% (07-02-21 @ 04:42) (98% - 98%)

## 2021-07-02 NOTE — BH CONSULTATION LIAISON PROGRESS NOTE - NSBHCONSULTRECOMMENDOTHER_PSY_A_CORE FT
1. C/w Seroquel 25mg PO qHS, monitor EKG for QTc<500.  Can use melatonin 3mg PO qHS PRN insomnia as pt c/o poor sleep.    2. PRN: Seroquel 25mg PO q6h PRN agitation.      3. Minimize use of benzos, opioids, anticholinergics, or other deliriogenic agents when possible.  Maintain sleep wake cycle.  Provide frequent reorientation and redirection.  Family member at bedside if possible. Assess for need for glasses and hearing aid (if applicable).    4. OP psych f/u at [facility] GO- [phone]/[facility]- [phone]

## 2021-07-03 LAB
ANION GAP SERPL CALC-SCNC: 13 MMOL/L — SIGNIFICANT CHANGE UP (ref 5–17)
BLD GP AB SCN SERPL QL: NEGATIVE — SIGNIFICANT CHANGE UP
BUN SERPL-MCNC: 16 MG/DL — SIGNIFICANT CHANGE UP (ref 7–23)
CALCIUM SERPL-MCNC: 7.4 MG/DL — LOW (ref 8.4–10.5)
CHLORIDE SERPL-SCNC: 106 MMOL/L — SIGNIFICANT CHANGE UP (ref 96–108)
CO2 SERPL-SCNC: 20 MMOL/L — LOW (ref 22–31)
CREAT SERPL-MCNC: 0.71 MG/DL — SIGNIFICANT CHANGE UP (ref 0.5–1.3)
CULTURE RESULTS: SIGNIFICANT CHANGE UP
CULTURE RESULTS: SIGNIFICANT CHANGE UP
GLUCOSE BLDC GLUCOMTR-MCNC: 111 MG/DL — HIGH (ref 70–99)
GLUCOSE BLDC GLUCOMTR-MCNC: 160 MG/DL — HIGH (ref 70–99)
GLUCOSE BLDC GLUCOMTR-MCNC: 185 MG/DL — HIGH (ref 70–99)
GLUCOSE BLDC GLUCOMTR-MCNC: 227 MG/DL — HIGH (ref 70–99)
GLUCOSE SERPL-MCNC: 102 MG/DL — HIGH (ref 70–99)
HCT VFR BLD CALC: 24 % — LOW (ref 39–50)
HCT VFR BLD CALC: 24.2 % — LOW (ref 39–50)
HGB BLD-MCNC: 7.4 G/DL — LOW (ref 13–17)
HGB BLD-MCNC: 7.5 G/DL — LOW (ref 13–17)
MCHC RBC-ENTMCNC: 29.7 PG — SIGNIFICANT CHANGE UP (ref 27–34)
MCHC RBC-ENTMCNC: 29.8 PG — SIGNIFICANT CHANGE UP (ref 27–34)
MCHC RBC-ENTMCNC: 30.8 GM/DL — LOW (ref 32–36)
MCHC RBC-ENTMCNC: 31 GM/DL — LOW (ref 32–36)
MCV RBC AUTO: 96 FL — SIGNIFICANT CHANGE UP (ref 80–100)
MCV RBC AUTO: 96.4 FL — SIGNIFICANT CHANGE UP (ref 80–100)
NRBC # BLD: 0 /100 WBCS — SIGNIFICANT CHANGE UP (ref 0–0)
NRBC # BLD: 0 /100 WBCS — SIGNIFICANT CHANGE UP (ref 0–0)
PLATELET # BLD AUTO: 62 K/UL — LOW (ref 150–400)
PLATELET # BLD AUTO: 63 K/UL — LOW (ref 150–400)
POTASSIUM SERPL-MCNC: 3.6 MMOL/L — SIGNIFICANT CHANGE UP (ref 3.5–5.3)
POTASSIUM SERPL-SCNC: 3.6 MMOL/L — SIGNIFICANT CHANGE UP (ref 3.5–5.3)
RBC # BLD: 2.49 M/UL — LOW (ref 4.2–5.8)
RBC # BLD: 2.52 M/UL — LOW (ref 4.2–5.8)
RBC # FLD: 21.4 % — HIGH (ref 10.3–14.5)
RBC # FLD: 21.7 % — HIGH (ref 10.3–14.5)
RH IG SCN BLD-IMP: POSITIVE — SIGNIFICANT CHANGE UP
SODIUM SERPL-SCNC: 139 MMOL/L — SIGNIFICANT CHANGE UP (ref 135–145)
SPECIMEN SOURCE: SIGNIFICANT CHANGE UP
SPECIMEN SOURCE: SIGNIFICANT CHANGE UP
WBC # BLD: 2.02 K/UL — LOW (ref 3.8–10.5)
WBC # BLD: 2.3 K/UL — LOW (ref 3.8–10.5)
WBC # FLD AUTO: 2.02 K/UL — LOW (ref 3.8–10.5)
WBC # FLD AUTO: 2.3 K/UL — LOW (ref 3.8–10.5)

## 2021-07-03 PROCEDURE — 70460 CT HEAD/BRAIN W/DYE: CPT | Mod: 26

## 2021-07-03 PROCEDURE — 70491 CT SOFT TISSUE NECK W/DYE: CPT | Mod: 26

## 2021-07-03 RX ADMIN — QUETIAPINE FUMARATE 25 MILLIGRAM(S): 200 TABLET, FILM COATED ORAL at 21:23

## 2021-07-03 RX ADMIN — TAMSULOSIN HYDROCHLORIDE 0.4 MILLIGRAM(S): 0.4 CAPSULE ORAL at 21:23

## 2021-07-03 RX ADMIN — PANTOPRAZOLE SODIUM 40 MILLIGRAM(S): 20 TABLET, DELAYED RELEASE ORAL at 08:46

## 2021-07-03 RX ADMIN — SODIUM CHLORIDE 100 MILLILITER(S): 9 INJECTION INTRAMUSCULAR; INTRAVENOUS; SUBCUTANEOUS at 11:54

## 2021-07-03 RX ADMIN — Medication 1: at 11:55

## 2021-07-03 RX ADMIN — INSULIN GLARGINE 10 UNIT(S): 100 INJECTION, SOLUTION SUBCUTANEOUS at 21:23

## 2021-07-03 RX ADMIN — Medication 2: at 16:47

## 2021-07-03 RX ADMIN — Medication 81 MILLIGRAM(S): at 11:55

## 2021-07-03 RX ADMIN — FINASTERIDE 5 MILLIGRAM(S): 5 TABLET, FILM COATED ORAL at 11:56

## 2021-07-03 RX ADMIN — PIPERACILLIN AND TAZOBACTAM 25 GRAM(S): 4; .5 INJECTION, POWDER, LYOPHILIZED, FOR SOLUTION INTRAVENOUS at 00:56

## 2021-07-03 NOTE — PROGRESS NOTE ADULT - SUBJECTIVE AND OBJECTIVE BOX
Chief complaint    Patient is a 71y old  Male who presents with a chief complaint of fever and failure to thrive (03 Jul 2021 07:27)   Review of systems  Patient in bed, appears comfortable.    Labs and Fingersticks  CAPILLARY BLOOD GLUCOSE      POCT Blood Glucose.: 227 mg/dL (03 Jul 2021 16:32)  POCT Blood Glucose.: 185 mg/dL (03 Jul 2021 11:34)  POCT Blood Glucose.: 111 mg/dL (03 Jul 2021 07:36)  POCT Blood Glucose.: 122 mg/dL (02 Jul 2021 21:19)      Anion Gap, Serum: 13 (07-03 @ 07:31)  Anion Gap, Serum: 13 (07-02 @ 06:30)      Calcium, Total Serum: 7.4 *L* (07-03 @ 07:31)  Calcium, Total Serum: 7.8 *L* (07-02 @ 06:30)          07-03    139  |  106  |  16  ----------------------------<  102<H>  3.6   |  20<L>  |  0.71    Ca    7.4<L>      03 Jul 2021 07:31  Phos  2.1     07-02  Mg     1.4     07-02                          7.4    2.30  )-----------( 63       ( 03 Jul 2021 16:01 )             24.0     Medications  MEDICATIONS  (STANDING):  aspirin  chewable 81 milliGRAM(s) Oral daily  dextrose 40% Gel 15 Gram(s) Oral once  dextrose 5%. 1000 milliLiter(s) (50 mL/Hr) IV Continuous <Continuous>  dextrose 5%. 1000 milliLiter(s) (100 mL/Hr) IV Continuous <Continuous>  dextrose 50% Injectable 25 Gram(s) IV Push once  dextrose 50% Injectable 12.5 Gram(s) IV Push once  dextrose 50% Injectable 25 Gram(s) IV Push once  finasteride 5 milliGRAM(s) Oral daily  glucagon  Injectable 1 milliGRAM(s) IntraMuscular once  insulin glargine Injectable (LANTUS) 10 Unit(s) SubCutaneous at bedtime  insulin lispro (ADMELOG) corrective regimen sliding scale   SubCutaneous three times a day before meals  insulin lispro (ADMELOG) corrective regimen sliding scale   SubCutaneous at bedtime  pantoprazole    Tablet 40 milliGRAM(s) Oral before breakfast  QUEtiapine 25 milliGRAM(s) Oral at bedtime  sodium chloride 0.9%. 1000 milliLiter(s) (100 mL/Hr) IV Continuous <Continuous>  tamsulosin 0.4 milliGRAM(s) Oral at bedtime      Physical Exam  General: Patient comfortable in bed  Vital Signs Last 12 Hrs  T(F): 97.8 (07-03-21 @ 19:54), Max: 98.6 (07-03-21 @ 15:15)  HR: 102 (07-03-21 @ 19:54) (102 - 106)  BP: 111/67 (07-03-21 @ 19:54) (106/70 - 116/71)  BP(mean): --  RR: 18 (07-03-21 @ 19:54) (18 - 18)  SpO2: 97% (07-03-21 @ 19:54) (97% - 100%)  Neck: No palpable thyroid nodules.  CVS: S1S2, No murmurs  Respiratory: No wheezing, no crepitations  GI: Abdomen soft, bowel sounds positive  Musculoskeletal:  edema lower extremities.     Diagnostics

## 2021-07-03 NOTE — PROGRESS NOTE ADULT - SUBJECTIVE AND OBJECTIVE BOX
Date of service: 07-03-21 @ 23:02      Patient is a 71y old  Male who presents with a chief complaint of fever and failure to thrive (03 Jul 2021 07:27)                                                               INTERVAL HPI/OVERNIGHT EVENTS:    REVIEW OF SYSTEMS:     CONSTITUTIONAL: No weakness, fevers or chills  EYES/ENT: No visual changes , no ear ache   NECK: No pain or stiffness  RESPIRATORY: No cough, wheezing,  No shortness of breath  CARDIOVASCULAR: No chest pain or palpitations  GASTROINTESTINAL: No abdominal pain  . No nausea, vomiting, or hematemesis; No diarrhea or constipation. No melena or hematochezia.  GENITOURINARY: No dysuria, frequency or hematuria  NEUROLOGICAL: No numbness or weakness  SKIN: No itching, burning, rashes, or lesions                                                                                                                                                                                                                                                                                 Medications:  MEDICATIONS  (STANDING):  aspirin  chewable 81 milliGRAM(s) Oral daily  dextrose 40% Gel 15 Gram(s) Oral once  dextrose 5%. 1000 milliLiter(s) (50 mL/Hr) IV Continuous <Continuous>  dextrose 5%. 1000 milliLiter(s) (100 mL/Hr) IV Continuous <Continuous>  dextrose 50% Injectable 25 Gram(s) IV Push once  dextrose 50% Injectable 12.5 Gram(s) IV Push once  dextrose 50% Injectable 25 Gram(s) IV Push once  finasteride 5 milliGRAM(s) Oral daily  glucagon  Injectable 1 milliGRAM(s) IntraMuscular once  insulin glargine Injectable (LANTUS) 10 Unit(s) SubCutaneous at bedtime  insulin lispro (ADMELOG) corrective regimen sliding scale   SubCutaneous three times a day before meals  insulin lispro (ADMELOG) corrective regimen sliding scale   SubCutaneous at bedtime  pantoprazole    Tablet 40 milliGRAM(s) Oral before breakfast  QUEtiapine 25 milliGRAM(s) Oral at bedtime  sodium chloride 0.9%. 1000 milliLiter(s) (100 mL/Hr) IV Continuous <Continuous>  tamsulosin 0.4 milliGRAM(s) Oral at bedtime    MEDICATIONS  (PRN):  acetaminophen   Tablet .. 650 milliGRAM(s) Oral every 6 hours PRN Temp greater or equal to 38C (100.4F), Moderate Pain (4 - 6)       Allergies    No Known Allergies    Intolerances      Vital Signs Last 24 Hrs  T(C): 36.6 (03 Jul 2021 19:54), Max: 37.1 (03 Jul 2021 04:59)  T(F): 97.8 (03 Jul 2021 19:54), Max: 98.8 (03 Jul 2021 04:59)  HR: 102 (03 Jul 2021 19:54) (102 - 106)  BP: 111/67 (03 Jul 2021 19:54) (102/67 - 116/71)  BP(mean): --  RR: 18 (03 Jul 2021 19:54) (18 - 18)  SpO2: 97% (03 Jul 2021 19:54) (97% - 100%)  CAPILLARY BLOOD GLUCOSE      POCT Blood Glucose.: 160 mg/dL (03 Jul 2021 21:01)  POCT Blood Glucose.: 227 mg/dL (03 Jul 2021 16:32)  POCT Blood Glucose.: 185 mg/dL (03 Jul 2021 11:34)  POCT Blood Glucose.: 111 mg/dL (03 Jul 2021 07:36)      07-02 @ 07:01  -  07-03 @ 07:00  --------------------------------------------------------  IN: 2810 mL / OUT: 1150 mL / NET: 1660 mL    07-03 @ 07:01  -  07-04 @ 00:02  --------------------------------------------------------  IN: 1440 mL / OUT: 1100 mL / NET: 340 mL      Physical Exam:    Daily     Daily   General:  Well appearing, NAD, not cachetic  HEENT:  Nonicteric, PERRLA  CV:  RRR, S1S2   Lungs:  CTA B/L, no wheezes, rales, rhonchi  Abdomen:  Soft, non-tender, no distended, positive BS  Extremities:  2+ pulses, no c/c, no edema  Skin:  Warm and dry, no rashes  :  No escamilla  Neuro:  AAOx3, non-focal, grossly intact                                                                                                                                                                                                                                                                                                LABS:                               7.4    2.30  )-----------( 63       ( 03 Jul 2021 16:01 )             24.0                      07-03    139  |  106  |  16  ----------------------------<  102<H>  3.6   |  20<L>  |  0.71    Ca    7.4<L>      03 Jul 2021 07:31  Phos  2.1     07-02  Mg     1.4     07-02                         RADIOLOGY & ADDITIONAL TESTS         I personally reviewed: [  ]EKG   [  ]CXR    [  ] CT      A/P:         Discussed with :     [facility] consultants' Notes   Time spent :

## 2021-07-03 NOTE — PROGRESS NOTE ADULT - ASSESSMENT
70 yo male with failure to thrive in  setting of recent diagnosis of Hodgkins lymphoma.  He has been treated for enterococcal UTI at [facility] although never bacteremic.  He also has had a recent ERCP.He requires a chronic escamilla.  I suspect B symptoms of lymphoma,  this is a diagnosis  of exclusion.  His chest CT findings are nonspecific and he does not seem to have respiratory symptoms.  Antibiotics are empiric.Blood and urine cultures are negative  Appreciate neurology input  Suggest:  1.With negative blood cultures  can stop zosyn at any point, will d/c today  2.Will await legionella urine antigen, will not add additional antimicrobials at this point  3.Can consider a Pulmonary opinion on CT scan findings  4.I think ultimately he can be followed off antibiotics and receive immunotherapy  5.additional ID w/u pending course, neoplastic fever is always a diagnosis of exclusion

## 2021-07-03 NOTE — PROGRESS NOTE ADULT - ASSESSMENT
72 y/o M w/ pmhx of CVA this past August and got TPA per family member, DM , BPH with obstruction s/p escamilla catheter , s/p ERCP w Sphincteretomy recent admission to [facility] for sepsis  hodgkin lympoma was not offered any chemo and was placed on hospice.   npw presenting with weakness and FTT.   pt denies cp / SOB / plapiatiaons   no focal neuro complaints .. at baseline RLE weakness   no N/V   had one episode of diarrah   no urinary s x   abdominal pain, diffuse, but worse on R side.     - failure to thrive : cultures sent   CT chest ordered   nutrition consult       - lymphoma : fu wtih Dr. Larios: possible immunotherapy and chemo at later time post rehab if pt/family agree     DM with hyperglycemia :     - anemia  : moniot rH/H   transfuse prn     - fever : doubt infectious etiology   fu with ID     d/w pt and sister

## 2021-07-04 LAB
ANION GAP SERPL CALC-SCNC: 11 MMOL/L — SIGNIFICANT CHANGE UP (ref 5–17)
BUN SERPL-MCNC: 13 MG/DL — SIGNIFICANT CHANGE UP (ref 7–23)
CALCIUM SERPL-MCNC: 7.2 MG/DL — LOW (ref 8.4–10.5)
CHLORIDE SERPL-SCNC: 107 MMOL/L — SIGNIFICANT CHANGE UP (ref 96–108)
CO2 SERPL-SCNC: 20 MMOL/L — LOW (ref 22–31)
CREAT SERPL-MCNC: 0.53 MG/DL — SIGNIFICANT CHANGE UP (ref 0.5–1.3)
GLUCOSE BLDC GLUCOMTR-MCNC: 114 MG/DL — HIGH (ref 70–99)
GLUCOSE BLDC GLUCOMTR-MCNC: 175 MG/DL — HIGH (ref 70–99)
GLUCOSE BLDC GLUCOMTR-MCNC: 197 MG/DL — HIGH (ref 70–99)
GLUCOSE BLDC GLUCOMTR-MCNC: 229 MG/DL — HIGH (ref 70–99)
GLUCOSE SERPL-MCNC: 87 MG/DL — SIGNIFICANT CHANGE UP (ref 70–99)
HCT VFR BLD CALC: 21.3 % — LOW (ref 39–50)
HCT VFR BLD CALC: 24.3 % — LOW (ref 39–50)
HGB BLD-MCNC: 6.6 G/DL — CRITICAL LOW (ref 13–17)
HGB BLD-MCNC: 7.6 G/DL — LOW (ref 13–17)
MCHC RBC-ENTMCNC: 28.4 PG — SIGNIFICANT CHANGE UP (ref 27–34)
MCHC RBC-ENTMCNC: 29.6 PG — SIGNIFICANT CHANGE UP (ref 27–34)
MCHC RBC-ENTMCNC: 31 GM/DL — LOW (ref 32–36)
MCHC RBC-ENTMCNC: 31.3 GM/DL — LOW (ref 32–36)
MCV RBC AUTO: 90.7 FL — SIGNIFICANT CHANGE UP (ref 80–100)
MCV RBC AUTO: 95.5 FL — SIGNIFICANT CHANGE UP (ref 80–100)
NRBC # BLD: 0 /100 WBCS — SIGNIFICANT CHANGE UP (ref 0–0)
NRBC # BLD: 0 /100 WBCS — SIGNIFICANT CHANGE UP (ref 0–0)
PLATELET # BLD AUTO: 46 K/UL — LOW (ref 150–400)
PLATELET # BLD AUTO: 52 K/UL — LOW (ref 150–400)
POTASSIUM SERPL-MCNC: 3.6 MMOL/L — SIGNIFICANT CHANGE UP (ref 3.5–5.3)
POTASSIUM SERPL-SCNC: 3.6 MMOL/L — SIGNIFICANT CHANGE UP (ref 3.5–5.3)
RBC # BLD: 2.23 M/UL — LOW (ref 4.2–5.8)
RBC # BLD: 2.68 M/UL — LOW (ref 4.2–5.8)
RBC # FLD: 21.2 % — HIGH (ref 10.3–14.5)
RBC # FLD: 22.7 % — HIGH (ref 10.3–14.5)
SODIUM SERPL-SCNC: 138 MMOL/L — SIGNIFICANT CHANGE UP (ref 135–145)
WBC # BLD: 1.55 K/UL — LOW (ref 3.8–10.5)
WBC # BLD: 1.83 K/UL — LOW (ref 3.8–10.5)
WBC # FLD AUTO: 1.55 K/UL — LOW (ref 3.8–10.5)
WBC # FLD AUTO: 1.83 K/UL — LOW (ref 3.8–10.5)

## 2021-07-04 RX ADMIN — INSULIN GLARGINE 10 UNIT(S): 100 INJECTION, SOLUTION SUBCUTANEOUS at 21:25

## 2021-07-04 RX ADMIN — Medication 1: at 16:24

## 2021-07-04 RX ADMIN — Medication 1: at 11:56

## 2021-07-04 RX ADMIN — QUETIAPINE FUMARATE 25 MILLIGRAM(S): 200 TABLET, FILM COATED ORAL at 21:06

## 2021-07-04 RX ADMIN — SODIUM CHLORIDE 100 MILLILITER(S): 9 INJECTION INTRAMUSCULAR; INTRAVENOUS; SUBCUTANEOUS at 06:53

## 2021-07-04 RX ADMIN — Medication 650 MILLIGRAM(S): at 08:04

## 2021-07-04 RX ADMIN — SODIUM CHLORIDE 100 MILLILITER(S): 9 INJECTION INTRAMUSCULAR; INTRAVENOUS; SUBCUTANEOUS at 22:31

## 2021-07-04 RX ADMIN — TAMSULOSIN HYDROCHLORIDE 0.4 MILLIGRAM(S): 0.4 CAPSULE ORAL at 21:06

## 2021-07-04 RX ADMIN — PANTOPRAZOLE SODIUM 40 MILLIGRAM(S): 20 TABLET, DELAYED RELEASE ORAL at 05:42

## 2021-07-04 RX ADMIN — Medication 81 MILLIGRAM(S): at 11:54

## 2021-07-04 RX ADMIN — Medication 650 MILLIGRAM(S): at 08:34

## 2021-07-04 RX ADMIN — FINASTERIDE 5 MILLIGRAM(S): 5 TABLET, FILM COATED ORAL at 11:54

## 2021-07-04 NOTE — PROGRESS NOTE ADULT - SUBJECTIVE AND OBJECTIVE BOX
Date of service: 07-04-21 @ 23:38      Patient is a 71y old  Male who presents with a chief complaint of failure to thrive and fever (04 Jul 2021 07:09)                                                               INTERVAL HPI/OVERNIGHT EVENTS:    REVIEW OF SYSTEMS:     CONSTITUTIONAL: No weakness, fevers or chills  EYES/ENT: No visual changes , no ear ache   NECK: No pain or stiffness  RESPIRATORY: No cough, wheezing,  No shortness of breath  CARDIOVASCULAR: No chest pain or palpitations  GASTROINTESTINAL: No abdominal pain  . No nausea, vomiting, or hematemesis; No diarrhea or constipation. No melena or hematochezia.  GENITOURINARY: No dysuria, frequency or hematuria  NEUROLOGICAL: No numbness or weakness  SKIN: No itching, burning, rashes, or lesions                                                                                                                                                                                                                                                                                 Medications:  MEDICATIONS  (STANDING):  aspirin  chewable 81 milliGRAM(s) Oral daily  dextrose 40% Gel 15 Gram(s) Oral once  dextrose 5%. 1000 milliLiter(s) (50 mL/Hr) IV Continuous <Continuous>  dextrose 5%. 1000 milliLiter(s) (100 mL/Hr) IV Continuous <Continuous>  dextrose 50% Injectable 25 Gram(s) IV Push once  dextrose 50% Injectable 12.5 Gram(s) IV Push once  dextrose 50% Injectable 25 Gram(s) IV Push once  finasteride 5 milliGRAM(s) Oral daily  glucagon  Injectable 1 milliGRAM(s) IntraMuscular once  insulin glargine Injectable (LANTUS) 10 Unit(s) SubCutaneous at bedtime  insulin lispro (ADMELOG) corrective regimen sliding scale   SubCutaneous three times a day before meals  insulin lispro (ADMELOG) corrective regimen sliding scale   SubCutaneous at bedtime  pantoprazole    Tablet 40 milliGRAM(s) Oral before breakfast  QUEtiapine 25 milliGRAM(s) Oral at bedtime  sodium chloride 0.9%. 1000 milliLiter(s) (100 mL/Hr) IV Continuous <Continuous>  tamsulosin 0.4 milliGRAM(s) Oral at bedtime    MEDICATIONS  (PRN):  acetaminophen   Tablet .. 650 milliGRAM(s) Oral every 6 hours PRN Temp greater or equal to 38C (100.4F), Moderate Pain (4 - 6)       Allergies    No Known Allergies    Intolerances      Vital Signs Last 24 Hrs  T(C): 37.2 (04 Jul 2021 20:04), Max: 37.7 (04 Jul 2021 08:00)  T(F): 98.9 (04 Jul 2021 20:04), Max: 99.9 (04 Jul 2021 08:00)  HR: 99 (04 Jul 2021 20:04) (87 - 100)  BP: 113/67 (04 Jul 2021 20:04) (100/63 - 115/70)  BP(mean): --  RR: 17 (04 Jul 2021 20:04) (17 - 18)  SpO2: 98% (04 Jul 2021 20:04) (97% - 99%)  CAPILLARY BLOOD GLUCOSE      POCT Blood Glucose.: 229 mg/dL (04 Jul 2021 21:02)  POCT Blood Glucose.: 197 mg/dL (04 Jul 2021 16:18)  POCT Blood Glucose.: 175 mg/dL (04 Jul 2021 11:44)  POCT Blood Glucose.: 114 mg/dL (04 Jul 2021 07:28)      07-03 @ 07:01 - 07-04 @ 07:00  --------------------------------------------------------  IN: 1440 mL / OUT: 1100 mL / NET: 340 mL    07-04 @ 07:01  -  07-04 @ 23:38  --------------------------------------------------------  IN: 2300 mL / OUT: 1600 mL / NET: 700 mL      Physical Exam:    Daily     Daily   General:  Well appearing, NAD, not cachetic  HEENT:  Nonicteric, PERRLA  CV:  RRR, S1S2   Lungs:  CTA B/L, no wheezes, rales, rhonchi  Abdomen:  Soft, non-tender, no distended, positive BS  Extremities:  2+ pulses, no c/c, no edema  Skin:  Warm and dry, no rashes  :  No escamilla  Neuro:  AAOx3, non-focal, grossly intact                                                                                                                                                                                                                                                                                                LABS:                               7.6    1.83  )-----------( 46       ( 04 Jul 2021 15:32 )             24.3                      07-04    138  |  107  |  13  ----------------------------<  87  3.6   |  20<L>  |  0.53    Ca    7.2<L>      04 Jul 2021 06:12                         RADIOLOGY & ADDITIONAL TESTS         I personally reviewed: [  ]EKG   [  ]CXR    [  ] CT      A/P:         Discussed with :     [facility] consultants' Notes   Time spent :

## 2021-07-04 NOTE — PROGRESS NOTE ADULT - SUBJECTIVE AND OBJECTIVE BOX
Chief complaint    Patient is a 71y old  Male who presents with a chief complaint of failure to thrive and fever (04 Jul 2021 07:09)   Review of systems  Patient in bed, appears comfortable.    Labs and Fingersticks  CAPILLARY BLOOD GLUCOSE        POCT Blood Glucose.: 175 mg/dL (04 Jul 2021 11:44)  POCT Blood Glucose.: 114 mg/dL (04 Jul 2021 07:28)  POCT Blood Glucose.: 160 mg/dL (03 Jul 2021 21:01)  POCT Blood Glucose.: 227 mg/dL (03 Jul 2021 16:32)      Anion Gap, Serum: 11 (07-04 @ 06:12)  Anion Gap, Serum: 13 (07-03 @ 07:31)      Calcium, Total Serum: 7.2 *L* (07-04 @ 06:12)  Calcium, Total Serum: 7.4 *L* (07-03 @ 07:31)          07-04    138  |  107  |  13  ----------------------------<  87  3.6   |  20<L>  |  0.53    Ca    7.2<L>      04 Jul 2021 06:12                          7.6    1.83  )-----------( 46       ( 04 Jul 2021 15:32 )             24.3     Medications  MEDICATIONS  (STANDING):  aspirin  chewable 81 milliGRAM(s) Oral daily  dextrose 40% Gel 15 Gram(s) Oral once  dextrose 5%. 1000 milliLiter(s) (50 mL/Hr) IV Continuous <Continuous>  dextrose 5%. 1000 milliLiter(s) (100 mL/Hr) IV Continuous <Continuous>  dextrose 50% Injectable 25 Gram(s) IV Push once  dextrose 50% Injectable 12.5 Gram(s) IV Push once  dextrose 50% Injectable 25 Gram(s) IV Push once  finasteride 5 milliGRAM(s) Oral daily  glucagon  Injectable 1 milliGRAM(s) IntraMuscular once  insulin glargine Injectable (LANTUS) 10 Unit(s) SubCutaneous at bedtime  insulin lispro (ADMELOG) corrective regimen sliding scale   SubCutaneous three times a day before meals  insulin lispro (ADMELOG) corrective regimen sliding scale   SubCutaneous at bedtime  pantoprazole    Tablet 40 milliGRAM(s) Oral before breakfast  QUEtiapine 25 milliGRAM(s) Oral at bedtime  sodium chloride 0.9%. 1000 milliLiter(s) (100 mL/Hr) IV Continuous <Continuous>  tamsulosin 0.4 milliGRAM(s) Oral at bedtime      Physical Exam  General: Patient comfortable in bed  Vital Signs Last 12 Hrs  T(F): 98.6 (07-04-21 @ 12:09), Max: 99.9 (07-04-21 @ 08:00)  HR: 87 (07-04-21 @ 12:09) (87 - 100)  BP: 115/70 (07-04-21 @ 12:09) (100/63 - 115/70)  BP(mean): --  RR: 18 (07-04-21 @ 12:09) (18 - 18)  SpO2: 99% (07-04-21 @ 12:09) (97% - 99%)  Neck: No palpable thyroid nodules.  CVS: S1S2, No murmurs  Respiratory: No wheezing, no crepitations  GI: Abdomen soft, bowel sounds positive  Musculoskeletal:  edema lower extremities.     Diagnostics

## 2021-07-04 NOTE — PROGRESS NOTE ADULT - SUBJECTIVE AND OBJECTIVE BOX
CC: f/u for fever    Patient reports: he is withdrawn with hypophonic voice, he offers no complaints    REVIEW OF SYSTEMS:  All other review of systems negative (Comprehensive ROS)    Antimicrobials Day #  :off    Other Medications Reviewed  MEDICATIONS  (STANDING):  aspirin  chewable 81 milliGRAM(s) Oral daily  dextrose 40% Gel 15 Gram(s) Oral once  dextrose 5%. 1000 milliLiter(s) (50 mL/Hr) IV Continuous <Continuous>  dextrose 5%. 1000 milliLiter(s) (100 mL/Hr) IV Continuous <Continuous>  dextrose 50% Injectable 25 Gram(s) IV Push once  dextrose 50% Injectable 12.5 Gram(s) IV Push once  dextrose 50% Injectable 25 Gram(s) IV Push once  finasteride 5 milliGRAM(s) Oral daily  glucagon  Injectable 1 milliGRAM(s) IntraMuscular once  insulin glargine Injectable (LANTUS) 10 Unit(s) SubCutaneous at bedtime  insulin lispro (ADMELOG) corrective regimen sliding scale   SubCutaneous three times a day before meals  insulin lispro (ADMELOG) corrective regimen sliding scale   SubCutaneous at bedtime  pantoprazole    Tablet 40 milliGRAM(s) Oral before breakfast  QUEtiapine 25 milliGRAM(s) Oral at bedtime  sodium chloride 0.9%. 1000 milliLiter(s) (100 mL/Hr) IV Continuous <Continuous>  tamsulosin 0.4 milliGRAM(s) Oral at bedtime    T(F): 97.9 (07-04-21 @ 04:58), Max: 98.6 (07-03-21 @ 15:15)  HR: 100 (07-04-21 @ 04:58)  BP: 115/68 (07-04-21 @ 04:58)  RR: 18 (07-04-21 @ 04:58)  SpO2: 98% (07-04-21 @ 04:58)  Wt(kg): --    PHYSICAL EXAM:  General: alert, no acute distress  Eyes:  anicteric, no conjunctival injection, no discharge  Oropharynx: no lesions or injection 	  Neck: supple, without adenopathy  Lungs: clear to auscultation  Heart: regular rate and rhythm; no murmur, rubs or gallops  Abdomen: soft, nondistended, nontender, without mass or organomegaly  Skin: no lesions  Extremities: no clubbing, cyanosis, or edema  Neurologic: alert, oriented, moves all extremities, generalized weakness    LAB RESULTS:                        6.6    1.55  )-----------( 52       ( 04 Jul 2021 06:12 )             21.3     07-04    138  |  107  |  13  ----------------------------<  87  3.6   |  20<L>  |  0.53    Ca    7.2<L>      04 Jul 2021 06:12          MICROBIOLOGY:  RECENT CULTURES:  07-01 @ 02:34 .Blood Blood     No growth to date.          RADIOLOGY REVIEWED:    < from: CT Neck Soft Tissue w/ IV Cont (07.03.21 @ 15:37) >  IMPRESSION:  CT head:  -No acute intracranial findings.  -No abnormal intracranial enhancement identified by CT. Consider MRI with contrast for increased sensitivity.    CT neck:  -Again noted enlarged left supraclavicular node.  -Asymmetric enlargement of the left palatine tonsil, suspicious for lymphomatous involvement given history. Correlate with direct visualization.    < end of copied text >

## 2021-07-04 NOTE — PROGRESS NOTE ADULT - ASSESSMENT
70 yo male with failure to thrive in  setting of recent diagnosis of Hodgkins lymphoma.  He has been treated for enterococcal UTI at [facility] although never bacteremic.  He also has had a recent ERCP.He requires a chronic escamilla.  I suspect B symptoms of lymphoma,  this is a diagnosis  of exclusion.  His chest CT findings are nonspecific and he does not seem to have respiratory symptoms.  Antibiotics are empiric.Blood and urine cultures are negative  Appreciate neurology input  Empiric zosyn stopped 7/3 with negative blood cultures  Legionella antigen never sent but his respiratory status is stable.  Cytopenias may reflect lymphoma, I think he would be toxic in appearance if he had a systemic infection with marrow involvement.  Suggest:  1.observe off antibiotics  2.Will await legionella urine antigen, will not add additional antimicrobials at this point  3.Can consider a Pulmonary opinion on CT scan findings  4.I think ultimately he can be followed off antibiotics and receive immunotherapy  5.additional ID w/u pending course, neoplastic fever is always a diagnosis of exclusion

## 2021-07-04 NOTE — PROGRESS NOTE ADULT - ASSESSMENT
72 y/o M w/ pmhx of CVA this past August and got TPA per family member, DM , BPH with obstruction s/p escamilla catheter , s/p ERCP w Sphincteretomy recent admission to [facility] for sepsis  hodgkin lympoma was not offered any chemo and was placed on hospice.   npw presenting with weakness and FTT.   pt denies cp / SOB / plapiatiaons   no focal neuro complaints .. at baseline RLE weakness   no N/V   had one episode of diarrah   no urinary s x   abdominal pain, diffuse, but worse on R side.     - failure to thrive : cultures sent   CT chest noted   nutrition consult       - lymphoma : fu wtih Dr. Larios: possible immunotherapy and chemo at later time post rehab if pt/family agree     DM with hyperglycemia :     - anemia  : moniot rH/H   transfuse prn     - fever : doubt infectious etiology   fu with ID     - CT neck : Asymmetric enlargement of the left palatine tonsil, suspicious for lymphomatous involvement given history. Correlate with direct visualization.      d/w pt and sister

## 2021-07-04 NOTE — PROGRESS NOTE ADULT - ASSESSMENT
Assessment  DMT2: 71y Male with DM T2 with hyperglycemia, A1C 8.8%, was on oral meds/Janumet at home, admitted with weakness/fatigue and hyperglycemia, on  basal insulin and coverage, FS within acceptable range, , no hypoglycemic episodes, eating partial meals.  Lymphoma: on medications, monitored, FU Heme/Onc.  Anemia: stable, monitored.      Shahriar Kahn MD  Cell: [phone]  Office: [phone]

## 2021-07-04 NOTE — PROGRESS NOTE ADULT - ASSESSMENT
70 y/o AAM w/ Stroke this past August and got TPA per family member had L weakness resolved, DM , BPH with obstruction s/p escamilla catheter , s/p ERCP w Sphincteretomy recent admission to [facility] for sepsis  hodgkin lympoma was not offered any chemo and was placed on hospice.   npw presenting with generalized weakness and FTT.   has baseline R LE weakness.  + hypophonic   Hgb 7.1, platelets 106, elevated alk phos, A1c 8.8%  off 1:1 for + SI. off 1:1 now   LDL 30, A1c 7.7   repeat CTH 7/3 as above. CT neck as above   o/e with R NLF flattening, hypophonic, dysarthria, and generalized weakness. more likely 2/2 underlying medical condition as opposed to new stroke.   - fever 101.9, infectious workup. now afebrile.    - psych f/u for SI --> no capacity. now off 1:1   - c/w ASA 81mg daily  - correct metabolic derangements   - lipitor 40mg if no CI. elevated alk phos   - telemetry  - heme/onc recs appreciated.   - PT/OT/SS/SLP, OOBC  - check FS, glucose control <180  - GI/DVT ppx  - Counseling on diet, exercise, and medication adherence was done  - Counseling on smoking cessation and alcohol consumption offered when appropriate.  - Pain assessed and judicious use of narcotics when appropriate was discussed.    - Stroke education given when appropriate.  - Importance of fall prevention discussed.   - Differential diagnosis and plan of care discussed with patient and/or family and primary team  - Thank you for allowing me to participate in the care of this patient. Call with questions.   - [facility] discussion for hospice  dc plan   Juan العراقي MD  Vascular Neurology  Office: [phone]

## 2021-07-04 NOTE — PROGRESS NOTE ADULT - ASSESSMENT
1) Hodgkin's lymphoma  - repeat CT scans, results noted  -Family meeting held at bedside. We discussed rx options vs comfort care.   I explained that HD is a potentially treatable and curable entity.  Patient not a good candidate for CTX at this time and i'd therefore favor treatment with immunotherapy at this time, Nivolumab. Consent obtained.   Would try and give monthly.  I explained that inpt rehab and PT are crucial elements of any potential recovery. Pt isn't sure he is willing to pursue this. If patient chooses to go home, then he would have to be able to return to my office to pursue additional rx.    Patient's spouse states that she can't take proper care of him at home. He will need input from JAZZY, CC     2) ENT- voice change is a new finding as per patient's sister.  - ENT input note, f/u s/s    3)Hyperglycemia- mgmt as per med    4)Weakness. 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  - Neurology input noted.     5) Pancytopenia  anemia- w/u this far unremarkable. possible aocd.  transfusional support as needed    Leukopenia- unclear etiology- possibly due to infxn or malignancy. Also could be related to zosyn  findigs d/w ID     Thrombocytopenia- has developed while here.  Can't r/o malignancy vs. due to zosyn.   manage supportively for now.    6. Fever- possibly related to malignancy.   ID following 1) Hodgkin's lymphoma  - repeat CT scans, results noted  -Family meeting held at bedside on 7/2. We discussed rx options vs comfort care.   I explained that HD is a potentially treatable and curable entity.  Patient not a good candidate for CTX at this time and i'd therefore favor treatment with immunotherapy at this time, Nivolumab. Consent obtained.   Would try and give monthly.  I explained that inpt rehab and PT are crucial elements of any potential recovery.    Hope to get drug approval and to proceed with rx on Tuesday     2) ENT- voice change is a new finding as per patient's sister.  - ENT input note, f/u s/s    3)Hyperglycemia- mgmt as per med    4)Weakness. 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  - Neurology input noted.     5) Pancytopenia  anemia- w/u this far unremarkable. possible aocd.  transfusional support as needed    Leukopenia- unclear etiology- possibly due to infxn or malignancy. Also could be related to zosyn  findigs d/w ID     Thrombocytopenia- has developed while here.  Can't r/o malignancy vs. due to zosyn.   manage supportively for now.    6. Fever- possibly related to malignancy.   ID following

## 2021-07-04 NOTE — CHART NOTE - NSCHARTNOTEFT_GEN_A_CORE
MRN-[id number]  DAYANA RIVERAENCE    Interval History: Patient with CBC resulted as below:                          6.6    1.55  )-----------( 52       ( 04 Jul 2021 06:12 )             21.3       Downtrend from 7.4/24.0.  Patient HD stable with no overt signs of bleeding.  Anemia secondary likely to malignancy.  Will continue to closely follow and assess.  Day team and attending to follow.    -Uche Stone PA-C, [id number], Dept of Medicine

## 2021-07-04 NOTE — PROGRESS NOTE ADULT - SUBJECTIVE AND OBJECTIVE BOX
LIZETT RIVERA  MRN-[id number]    Patient is a 71y old  Male who presents with a chief complaint of fever and failure to thrive (03 Jul 2021 07:27)      Review of System  REVIEW OF SYSTEMS      General:	Denies fatigue, fevers, chills, sweats, decreased appetite.    Skin/Breast: denies pruritis, rash  	  Ophthalmologic: no change in vision or blurring  	  HEENT	Denies dry mouth, oral sores, dysphagia,  change in hearing.    Respiratory and Thorax:  cough, sob, wheeze, hemoptysis  	  Cardiovascular:	no cp , palp, orthopnea    Gastrointestinal:	no n/v/d constipation    Genitourinary:	no dysuria of frequency, no hematuria, no flank pain    Musculoskeletal:	no bone or joint pain. no muscle aches.     Neurological:	no change in sensory or motor function. no headache. no weakness.     Psychiatric:	no depression, no anxiety, insomnia.     Hematology/Lymphatics:	no bleeding or bruising        Current Meds  MEDICATIONS  (STANDING):  aspirin  chewable 81 milliGRAM(s) Oral daily  dextrose 40% Gel 15 Gram(s) Oral once  dextrose 5%. 1000 milliLiter(s) (50 mL/Hr) IV Continuous <Continuous>  dextrose 5%. 1000 milliLiter(s) (100 mL/Hr) IV Continuous <Continuous>  dextrose 50% Injectable 25 Gram(s) IV Push once  dextrose 50% Injectable 12.5 Gram(s) IV Push once  dextrose 50% Injectable 25 Gram(s) IV Push once  finasteride 5 milliGRAM(s) Oral daily  glucagon  Injectable 1 milliGRAM(s) IntraMuscular once  insulin glargine Injectable (LANTUS) 10 Unit(s) SubCutaneous at bedtime  insulin lispro (ADMELOG) corrective regimen sliding scale   SubCutaneous three times a day before meals  insulin lispro (ADMELOG) corrective regimen sliding scale   SubCutaneous at bedtime  pantoprazole    Tablet 40 milliGRAM(s) Oral before breakfast  QUEtiapine 25 milliGRAM(s) Oral at bedtime  sodium chloride 0.9%. 1000 milliLiter(s) (100 mL/Hr) IV Continuous <Continuous>  tamsulosin 0.4 milliGRAM(s) Oral at bedtime    MEDICATIONS  (PRN):  acetaminophen   Tablet .. 650 milliGRAM(s) Oral every 6 hours PRN Temp greater or equal to 38C (100.4F), Moderate Pain (4 - 6)      Vitals  Vital Signs Last 24 Hrs  T(C): 36.6 (03 Jul 2021 19:54), Max: 37.1 (03 Jul 2021 04:59)  T(F): 97.8 (03 Jul 2021 19:54), Max: 98.8 (03 Jul 2021 04:59)  HR: 102 (03 Jul 2021 19:54) (102 - 106)  BP: 111/67 (03 Jul 2021 19:54) (102/67 - 116/71)  BP(mean): --  RR: 18 (03 Jul 2021 19:54) (18 - 18)  SpO2: 97% (03 Jul 2021 19:54) (97% - 100%)    Physical Exam  PHYSICAL EXAM:      Constitutional: NAD    Eyes: PERRLA EOMI, anicteric sclera    Heent :No oral sores, no pharyngeal injection. moist mucosa.    Neck: supple, no jvd, no LAD    Respiratory: CTA b/l     Cardiovascular: s1s2, no m/g/r    Gastrointestinal: soft, nt, nd, + BS    Extremities: no c/c/e    Neurological:A&O x 3 moves all ext.    Skin: no rash on exposed skin    Lymph Nodes: no lymphadenopathy.              Lab  CBC Full  -  ( 03 Jul 2021 16:01 )  WBC Count : 2.30 K/uL  RBC Count : 2.49 M/uL  Hemoglobin : 7.4 g/dL  Hematocrit : 24.0 %  Platelet Count - Automated : 63 K/uL  Mean Cell Volume : 96.4 fl  Mean Cell Hemoglobin : 29.7 pg  Mean Cell Hemoglobin Concentration : 30.8 gm/dL  Auto Neutrophil # : x  Auto Lymphocyte # : x  Auto Monocyte # : x  Auto Eosinophil # : x  Auto Basophil # : x  Auto Neutrophil % : x  Auto Lymphocyte % : x  Auto Monocyte % : x  Auto Eosinophil % : x  Auto Basophil % : x    07-03    139  |  106  |  16  ----------------------------<  102<H>  3.6   |  20<L>  |  0.71    Ca    7.4<L>      03 Jul 2021 07:31  Phos  2.1     07-02  Mg     1.4     07-02          Rad:    Assessment/Plan   LIZETT RIVERA  MRN-[id number]    Patient is a 71y old  Male who presents with a chief complaint of fever and failure to thrive (03 Jul 2021 07:27)      Review of System    Resting comfortably  s/p transfusion    Current Meds  MEDICATIONS  (STANDING):  aspirin  chewable 81 milliGRAM(s) Oral daily  dextrose 40% Gel 15 Gram(s) Oral once  dextrose 5%. 1000 milliLiter(s) (50 mL/Hr) IV Continuous <Continuous>  dextrose 5%. 1000 milliLiter(s) (100 mL/Hr) IV Continuous <Continuous>  dextrose 50% Injectable 25 Gram(s) IV Push once  dextrose 50% Injectable 12.5 Gram(s) IV Push once  dextrose 50% Injectable 25 Gram(s) IV Push once  finasteride 5 milliGRAM(s) Oral daily  glucagon  Injectable 1 milliGRAM(s) IntraMuscular once  insulin glargine Injectable (LANTUS) 10 Unit(s) SubCutaneous at bedtime  insulin lispro (ADMELOG) corrective regimen sliding scale   SubCutaneous three times a day before meals  insulin lispro (ADMELOG) corrective regimen sliding scale   SubCutaneous at bedtime  pantoprazole    Tablet 40 milliGRAM(s) Oral before breakfast  QUEtiapine 25 milliGRAM(s) Oral at bedtime  sodium chloride 0.9%. 1000 milliLiter(s) (100 mL/Hr) IV Continuous <Continuous>  tamsulosin 0.4 milliGRAM(s) Oral at bedtime    MEDICATIONS  (PRN):  acetaminophen   Tablet .. 650 milliGRAM(s) Oral every 6 hours PRN Temp greater or equal to 38C (100.4F), Moderate Pain (4 - 6)      Vital Signs Last 24 Hrs  T(C): 36.6 (03 Jul 2021 19:54), Max: 37.1 (03 Jul 2021 04:59)  T(F): 97.8 (03 Jul 2021 19:54), Max: 98.8 (03 Jul 2021 04:59)  HR: 102 (03 Jul 2021 19:54) (102 - 106)  BP: 111/67 (03 Jul 2021 19:54) (102/67 - 116/71)  BP(mean): --  RR: 18 (03 Jul 2021 19:54) (18 - 18)  SpO2: 97% (03 Jul 2021 19:54) (97% - 100%)      PHYSICAL EXAM:       NAD    Lab  CBC Full  -  ( 03 Jul 2021 16:01 )  WBC Count : 2.30 K/uL  RBC Count : 2.49 M/uL  Hemoglobin : 7.4 g/dL  Hematocrit : 24.0 %  Platelet Count - Automated : 63 K/uL  Mean Cell Volume : 96.4 fl  Mean Cell Hemoglobin : 29.7 pg  Mean Cell Hemoglobin Concentration : 30.8 gm/dL  Auto Neutrophil # : x  Auto Lymphocyte # : x  Auto Monocyte # : x  Auto Eosinophil # : x  Auto Basophil # : x  Auto Neutrophil % : x  Auto Lymphocyte % : x  Auto Monocyte % : x  Auto Eosinophil % : x  Auto Basophil % : x    07-03    139  |  106  |  16  ----------------------------<  102<H>  3.6   |  20<L>  |  0.71    Ca    7.4<L>      03 Jul 2021 07:31  Phos  2.1     07-02  Mg     1.4     07-02          Rad:    Assessment/Plan

## 2021-07-04 NOTE — PROGRESS NOTE ADULT - SUBJECTIVE AND OBJECTIVE BOX
Neurology Progress Note    S: Patient seen and examined.  . patient denied CP, SOB, HA or pain. off 1:1    Medication:  MEDICATIONS  (STANDING):  aspirin  chewable 81 milliGRAM(s) Oral daily  dextrose 40% Gel 15 Gram(s) Oral once  dextrose 5%. 1000 milliLiter(s) (50 mL/Hr) IV Continuous <Continuous>  dextrose 5%. 1000 milliLiter(s) (100 mL/Hr) IV Continuous <Continuous>  dextrose 50% Injectable 25 Gram(s) IV Push once  dextrose 50% Injectable 12.5 Gram(s) IV Push once  dextrose 50% Injectable 25 Gram(s) IV Push once  finasteride 5 milliGRAM(s) Oral daily  glucagon  Injectable 1 milliGRAM(s) IntraMuscular once  insulin glargine Injectable (LANTUS) 10 Unit(s) SubCutaneous at bedtime  insulin lispro (ADMELOG) corrective regimen sliding scale   SubCutaneous three times a day before meals  insulin lispro (ADMELOG) corrective regimen sliding scale   SubCutaneous at bedtime  pantoprazole    Tablet 40 milliGRAM(s) Oral before breakfast  QUEtiapine 25 milliGRAM(s) Oral at bedtime  sodium chloride 0.9%. 1000 milliLiter(s) (100 mL/Hr) IV Continuous <Continuous>  tamsulosin 0.4 milliGRAM(s) Oral at bedtime    MEDICATIONS  (PRN):  acetaminophen   Tablet .. 650 milliGRAM(s) Oral every 6 hours PRN Temp greater or equal to 38C (100.4F), Moderate Pain (4 - 6)      Vitals:  Vital Signs Last 24 Hrs  T(C): 36.6 (04 Jul 2021 04:58), Max: 37 (03 Jul 2021 15:15)  T(F): 97.9 (04 Jul 2021 04:58), Max: 98.6 (03 Jul 2021 15:15)  HR: 100 (04 Jul 2021 04:58) (100 - 106)  BP: 115/68 (04 Jul 2021 04:58) (106/70 - 116/71)  BP(mean): --  RR: 18 (04 Jul 2021 04:58) (18 - 18)  SpO2: 98% (04 Jul 2021 04:58) (97% - 100%)    General Exam:   General Appearance: Appropriately dressed and in no acute distress       Head: Normocephalic, atraumatic and no dysmorphic features  Ear, Nose, and Throat: Moist mucous membranes  CVS: S1S2+  Resp: No SOB, no wheeze or rhonchi  GI: soft NT/ND  Extremities: No edema or cyanosis  Skin: No bruises or rashes     Neurological Exam:  Mental Status: Awake, alert and oriented x 1-2.  Able to follow simple and complex verbal commands. Able to name and repeat. fluent speech. No obvious aphasia mild dysarthria, + hypophonic   Cranial Nerves: PERRL, EOMI, VFFC, sensation V1-V3 intact,  R facial asymmetry, equal elevation of palate, scm/trap 5/5, tongue is midline on protrusion. no obvious papilledema on fundoscopic exam. hearing is grossly intact.   Motor: generalized weakness but FRANCOIS. minimal weakness B/L LE R>L  Sensation: Intact to light touch and pinprick throughout. no right/left confusion. no extinction to tactile on DSS. Romberg was negative.   Reflexes: 1+ throughout at biceps, brachioradialis, triceps, patellars and ankles bilaterally and equal. No clonus. R toe and L toe were both downgoing.  Coordination: No dysmetria on FNF    Gait: deferred     I personally reviewed the below data/images/labs:    CBC Full  -  ( 04 Jul 2021 06:12 )  WBC Count : 1.55 K/uL  RBC Count : 2.23 M/uL  Hemoglobin : 6.6 g/dL  Hematocrit : 21.3 %  Platelet Count - Automated : 52 K/uL  Mean Cell Volume : 95.5 fl  Mean Cell Hemoglobin : 29.6 pg  Mean Cell Hemoglobin Concentration : 31.0 gm/dL  Auto Neutrophil # : x  Auto Lymphocyte # : x  Auto Monocyte # : x  Auto Eosinophil # : x  Auto Basophil # : x  Auto Neutrophil % : x  Auto Lymphocyte % : x  Auto Monocyte % : x  Auto Eosinophil % : x  Auto Basophil % : x    07-04    138  |  107  |  13  ----------------------------<  87  3.6   |  20<L>  |  0.53    Ca    7.2<L>      04 Jul 2021 06:12        < from: CT Chest w/ IV Cont (06.28.21 @ 19:01) >    EXAM:  CT CHEST IC                            PROCEDURE DATE:  06/28/2021            INTERPRETATION:  CLINICAL INFORMATION: Lymphoma, evaluate extent of thoracic disease.    COMPARISON: CT abdomen pelvis dated 6/28/2021. Chest x-ray dated 6/20/2021.    CONTRAST/COMPLICATIONS:  IV Contrast: 40 mL of Omnipaque 350 were administered. 60 mL discarded.  Oral Contrast: None.  Complications: None reported.    PROCEDURE:  CT of the Chest was performed.  Sagittal and coronal reformats were performed.    FINDINGS: The quality of the images are degraded by respiratory motion and streak artifact.    LUNGS AND AIRWAYS: Patent central airways. Patchy groundglass opacities and scattered nodular opacities throughout all lobes of the lungs. For example:  A left upper lobe nodule measures 0.9 cm (series 3 image 33).  A right middle lobe nodule measures 2.0 x 0.8 cm (series 3 image 76).  3.5 x 2.1 cm solid opacity within the superior segment of right lower lobe (series 3 image 71).  A right lower lobe nodule at the costophrenic angle measures 1.9 x 1.1 cm (series 3 image 108).    PLEURA: No pleural effusion or pneumothorax.    MEDIASTINUM AND SARTHAK: Mildly enlarged mediastinal, hilar, and supraclavicular lymphadenopathy. A right hilar lymph node measures 1.8 x 1.4 cm (series 3 image 90). A left supraclavicular node measures 2.5 x 1.5 cm (series 3 image 19). A subcarinal node measures 2.2 x 1.3 cm (series 3 image 63).    VESSELS: Coronary artery calcifications.    HEART: Heart size is normal. No pericardial effusion.    CHEST WALL AND LOWER NECK: Within normal limits.    VISUALIZED UPPER ABDOMEN: Retroperitoneal lymphadenopathy, as seen on abdominal CT from the same date.    BONES: Degenerative changes of the spine.    IMPRESSION:    Patchy groundglass opacities and scattered nodular opacities throughout all lobes of the lungs. Findings may be related to known lymphoma or may be seen in the setting of multifocal infection.    Mildly enlarged supraclavicular, mediastinal, hilar, and retroperitoneal lymphadenopathy likely related to known lymphoma.              LEXIS BAKER MD; Resident Radiology  This document has been electronically signed.  ISABEL JACOBO MD; Attending Radiologist  This document has been electronically signed. Jun 29 [id number]    < end of copied text >      < from: CT Neck Soft Tissue w/ IV Cont (07.03.21 @ 15:37) >    EXAM:  CT NECK SOFT TISSUE IC                          EXAM:  CT BRAIN IC                            PROCEDURE DATE:  07/03/2021            INTERPRETATION:  CLINICAL INDICATION: Hodgkin's lymphoma. Rule out metastatic disease.    TECHNIQUE: CT of the head and neck soft tissues was performed before and after administration of IV contrast. Contrast dose: 90 cc Omnipaque 300 IV contrast.    COMPARISON: CT chest 6/28/2021.    FINDINGS:    CT HEAD:  No acute transcortical infarct or intracranial hemorrhage. No abnormal intracranial enhancement is identified.    White matter hypoattenuating foci are noted, nonspecific but likely representing small vessel disease.    The ventricles are normal without evidence of hydrocephalus. There are no extra-axial fluid collections.    The visualized intraorbital contents are unremarkable. Mild mucosal thickening in the right maxillary sinus. The mastoid air cells are clear. The visualized soft tissues and osseous structures appear normal.    CT NECK:  Aerodigestive structures: Asymmetric enlargement of the left palatine tonsil is noted. Remainder of the aerodigestive structures are unremarkable.    Lymph nodes: Again noted left supraclavicular node measuring up to 2.4 x 1.5 cm.    Parotid and submandibular glands:  Normal.    Thyroid gland: Normal.    Vascular structures: Unremarkable.    Osseous structures: No fracture, dislocation or destructive lesion.    Partially visualized lung apices: Normal.      IMPRESSION:  CT head:  -No acute intracranial findings.  -No abnormal intracranial enhancement identified by CT. Consider MRI with contrast for increased sensitivity.    CT neck:  -Again noted enlarged left supraclavicular node.  -Asymmetric enlargement of the left palatine tonsil, suspicious for lymphomatous involvement given history. Correlate with direct visualization.                ZOEY TORRES MD; Attending Radiologist  This document has been electronically signed. Jul  3 2021  3:51PM    < end of copied text >

## 2021-07-04 NOTE — CHART NOTE - NSCHARTNOTEFT_GEN_A_CORE
New order received for bedside swallow evaluation, requesting input re: diet advancement. Pt being followed by this service. Pt seen for initial evaluation on 6/30, with recommendations for a dysphagia 1 diet with nectar thick liquids. Pt presented with an oropharyngeal dysphagia and hypophonia. There is significant prolonged and inefficient mastication with solid textures, with minimal residue remaining on the lingual surface after the swallow. Pt required verbal cued to clear residue via repeat swallows and liquid wash. There is suspected delayed pharyngeal swallow trigger. Immediate cough post-swallow with thin liquids is concerning for laryngeal penetration/aspiration. Given dysphagic profile, do not suspect that pt is a candidate for diet advancement. CONNIE Arnold made aware. This service will continue to follow, as schedule allows, to ensure diet tolerance.     Keren Moore CCC-SLP pgr #[phone] New order received for bedside swallow evaluation, requesting input re: diet advancement. Pt being followed by this service. Pt seen for initial evaluation on 6/30, with recommendations for a dysphagia 1 diet with nectar thick liquids. Pt presented with an oropharyngeal dysphagia and hypophonia. There was significant prolonged and inefficient mastication with solid textures, with minimal residue remaining on the lingual surface after the swallow. Pt required verbal cued to clear residue via repeat swallows and liquid wash. There was suspected delayed pharyngeal swallow trigger. Immediate cough post-swallow with thin liquids is concerning for laryngeal penetration/aspiration. Given dysphagic profile, do not suspect that pt is a candidate for diet advancement. CONNIE Arnold made aware. This service will continue to follow, as schedule allows, to ensure diet tolerance.     Keren Moore CCC-SLP pgr #[phone]

## 2021-07-05 LAB
ANION GAP SERPL CALC-SCNC: 10 MMOL/L — SIGNIFICANT CHANGE UP (ref 5–17)
BUN SERPL-MCNC: 12 MG/DL — SIGNIFICANT CHANGE UP (ref 7–23)
CALCIUM SERPL-MCNC: 6.8 MG/DL — LOW (ref 8.4–10.5)
CHLORIDE SERPL-SCNC: 109 MMOL/L — HIGH (ref 96–108)
CO2 SERPL-SCNC: 18 MMOL/L — LOW (ref 22–31)
CREAT SERPL-MCNC: 0.47 MG/DL — LOW (ref 0.5–1.3)
GLUCOSE BLDC GLUCOMTR-MCNC: 124 MG/DL — HIGH (ref 70–99)
GLUCOSE BLDC GLUCOMTR-MCNC: 140 MG/DL — HIGH (ref 70–99)
GLUCOSE BLDC GLUCOMTR-MCNC: 168 MG/DL — HIGH (ref 70–99)
GLUCOSE BLDC GLUCOMTR-MCNC: 210 MG/DL — HIGH (ref 70–99)
GLUCOSE SERPL-MCNC: 121 MG/DL — HIGH (ref 70–99)
HAV IGM SER-ACNC: SIGNIFICANT CHANGE UP
HBV CORE AB SER-ACNC: REACTIVE
HBV CORE IGM SER-ACNC: SIGNIFICANT CHANGE UP
HBV SURFACE AB SER-ACNC: REACTIVE
HBV SURFACE AG SER-ACNC: SIGNIFICANT CHANGE UP
HCT VFR BLD CALC: 22.8 % — LOW (ref 39–50)
HCV AB S/CO SERPL IA: 0.13 S/CO — SIGNIFICANT CHANGE UP (ref 0–0.99)
HCV AB SERPL-IMP: SIGNIFICANT CHANGE UP
HGB BLD-MCNC: 7.2 G/DL — LOW (ref 13–17)
LEGIONELLA AG UR QL: NEGATIVE — SIGNIFICANT CHANGE UP
MCHC RBC-ENTMCNC: 28.3 PG — SIGNIFICANT CHANGE UP (ref 27–34)
MCHC RBC-ENTMCNC: 31.6 GM/DL — LOW (ref 32–36)
MCV RBC AUTO: 89.8 FL — SIGNIFICANT CHANGE UP (ref 80–100)
NRBC # BLD: 0 /100 WBCS — SIGNIFICANT CHANGE UP (ref 0–0)
PLATELET # BLD AUTO: 45 K/UL — LOW (ref 150–400)
POTASSIUM SERPL-MCNC: 3.2 MMOL/L — LOW (ref 3.5–5.3)
POTASSIUM SERPL-SCNC: 3.2 MMOL/L — LOW (ref 3.5–5.3)
RBC # BLD: 2.54 M/UL — LOW (ref 4.2–5.8)
RBC # FLD: 23.2 % — HIGH (ref 10.3–14.5)
SODIUM SERPL-SCNC: 137 MMOL/L — SIGNIFICANT CHANGE UP (ref 135–145)
WBC # BLD: 1.53 K/UL — LOW (ref 3.8–10.5)
WBC # FLD AUTO: 1.53 K/UL — LOW (ref 3.8–10.5)

## 2021-07-05 RX ORDER — ALPRAZOLAM 0.25 MG
0.25 TABLET ORAL ONCE
Refills: 0 | Status: DISCONTINUED | OUTPATIENT
Start: 2021-07-05 | End: 2021-07-06

## 2021-07-05 RX ADMIN — Medication 650 MILLIGRAM(S): at 19:06

## 2021-07-05 RX ADMIN — INSULIN GLARGINE 10 UNIT(S): 100 INJECTION, SOLUTION SUBCUTANEOUS at 21:48

## 2021-07-05 RX ADMIN — Medication 650 MILLIGRAM(S): at 02:14

## 2021-07-05 RX ADMIN — PANTOPRAZOLE SODIUM 40 MILLIGRAM(S): 20 TABLET, DELAYED RELEASE ORAL at 06:22

## 2021-07-05 RX ADMIN — QUETIAPINE FUMARATE 25 MILLIGRAM(S): 200 TABLET, FILM COATED ORAL at 21:50

## 2021-07-05 RX ADMIN — Medication 1: at 16:31

## 2021-07-05 RX ADMIN — TAMSULOSIN HYDROCHLORIDE 0.4 MILLIGRAM(S): 0.4 CAPSULE ORAL at 21:50

## 2021-07-05 RX ADMIN — Medication 81 MILLIGRAM(S): at 12:08

## 2021-07-05 RX ADMIN — Medication 650 MILLIGRAM(S): at 01:44

## 2021-07-05 RX ADMIN — FINASTERIDE 5 MILLIGRAM(S): 5 TABLET, FILM COATED ORAL at 12:07

## 2021-07-05 RX ADMIN — Medication 650 MILLIGRAM(S): at 18:13

## 2021-07-05 RX ADMIN — SODIUM CHLORIDE 100 MILLILITER(S): 9 INJECTION INTRAMUSCULAR; INTRAVENOUS; SUBCUTANEOUS at 06:48

## 2021-07-05 NOTE — PROGRESS NOTE ADULT - ASSESSMENT
Assessment  DMT2: 71y Male with DM T2 with hyperglycemia, A1C 8.8%, was on oral meds/Janumet at home, admitted with weakness/fatigue and hyperglycemia, on  basal insulin and coverage, blood sugars trending within acceptable range with intermittent elevations, no hypoglycemic episodes, eating partial meals, failed speech & swallow eval, appears comfortable.  Lymphoma: on medications, monitored, FU Heme/Onc.  Anemia: stable, monitored.      Shahriar Kahn MD  Cell: [phone]  Office: [phone]       Assessment  DMT2: 71y Male with DM T2 with hyperglycemia, A1C 8.8%, was on oral meds/Janumet at home, admitted with weakness/fatigue and hyperglycemia, on  basal insulin and coverage, blood sugars trending within acceptable range with intermittent elevations, no  hypoglycemic episodes, eating partial meals, failed speech & swallow eval, appears comfortable.  Lymphoma: on medications, monitored, FU Heme/Onc.  Anemia: stable, monitored.      Shahriar Kahn MD  Cell: [phone]  Office: [phone]

## 2021-07-05 NOTE — PROGRESS NOTE ADULT - ASSESSMENT
1) Hodgkin's lymphoma  - repeat CT scans, results noted  -Family meeting held at bedside on 7/2. We discussed rx options vs comfort care.   I explained that HD is a potentially treatable and curable entity.  Patient not a good candidate for CTX at this time and i'd therefore favor treatment with immunotherapy at this time, Nivolumab. Consent obtained.   Would try and give monthly.  I explained that inpt rehab and PT are crucial elements of any potential recovery.    Hope to get drug approval and to proceed with rx on Tuesday     2) ENT- voice change is a new finding as per patient's sister. Better today  - ENT input note, f/u s/s    3)Hyperglycemia- mgmt as per med    4)Weakness. 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  - Neurology input noted.     5) Pancytopenia  anemia- w/u this far unremarkable. possible aocd.  transfusional support as needed    Leukopenia- unclear etiology- possibly due to infxn or malignancy. Also could be related to zosyn. Now off of abx. monitor cbc     Thrombocytopenia- has developed while here.  Can't r/o malignancy vs. due to zosyn.   manage supportively for now.    6. Fever- possibly related to malignancy.   ID following

## 2021-07-05 NOTE — PROGRESS NOTE ADULT - SUBJECTIVE AND OBJECTIVE BOX
Chief complaint  Patient is a 71y old  Male who presents with a chief complaint of lymphoma (05 Jul 2021 11:05)   Review of systems  Patient in bed, looks comfortable, no hypoglycemic episodes.    Labs and Fingersticks  CAPILLARY BLOOD GLUCOSE      POCT Blood Glucose.: 140 mg/dL (05 Jul 2021 11:48)  POCT Blood Glucose.: 124 mg/dL (05 Jul 2021 07:37)  POCT Blood Glucose.: 229 mg/dL (04 Jul 2021 21:02)  POCT Blood Glucose.: 197 mg/dL (04 Jul 2021 16:18)      Medications  MEDICATIONS  (STANDING):  aspirin  chewable 81 milliGRAM(s) Oral daily  dextrose 40% Gel 15 Gram(s) Oral once  dextrose 5%. 1000 milliLiter(s) (50 mL/Hr) IV Continuous <Continuous>  dextrose 5%. 1000 milliLiter(s) (100 mL/Hr) IV Continuous <Continuous>  dextrose 50% Injectable 25 Gram(s) IV Push once  dextrose 50% Injectable 12.5 Gram(s) IV Push once  dextrose 50% Injectable 25 Gram(s) IV Push once  finasteride 5 milliGRAM(s) Oral daily  glucagon  Injectable 1 milliGRAM(s) IntraMuscular once  insulin glargine Injectable (LANTUS) 10 Unit(s) SubCutaneous at bedtime  insulin lispro (ADMELOG) corrective regimen sliding scale   SubCutaneous three times a day before meals  insulin lispro (ADMELOG) corrective regimen sliding scale   SubCutaneous at bedtime  pantoprazole    Tablet 40 milliGRAM(s) Oral before breakfast  QUEtiapine 25 milliGRAM(s) Oral at bedtime  sodium chloride 0.9%. 1000 milliLiter(s) (100 mL/Hr) IV Continuous <Continuous>  tamsulosin 0.4 milliGRAM(s) Oral at bedtime      Physical Exam  General: Patient comfortable in bed  Vital Signs Last 12 Hrs  T(F): 98.4 (07-05-21 @ 11:21), Max: 100.9 (07-05-21 @ 02:00)  HR: 94 (07-05-21 @ 11:21) (94 - 94)  BP: 109/69 (07-05-21 @ 11:21) (108/70 - 109/69)  BP(mean): --  RR: 18 (07-05-21 @ 11:21) (18 - 18)  SpO2: 100% (07-05-21 @ 11:21) (97% - 100%)         Chief complaint  Patient is a 71y old  Male who presents with a chief complaint of lymphoma (05 Jul 2021 11:05)   Review of systems  Patient in bed, looks comfortable, no hypoglycemic episodes.    Labs and Fingersticks  CAPILLARY BLOOD GLUCOSE      POCT Blood Glucose.: 140 mg/dL (05 Jul 2021 11:48)  POCT Blood Glucose.: 124 mg/dL (05 Jul 2021 07:37)  POCT Blood Glucose.: 229 mg/dL (04 Jul 2021 21:02)  POCT Blood Glucose.: 197 mg/dL (04 Jul 2021 16:18)      Medications  MEDICATIONS  (STANDING):  aspirin  chewable 81 milliGRAM(s) Oral daily  dextrose 40% Gel 15 Gram(s) Oral once  dextrose 5%. 1000 milliLiter(s) (50 mL/Hr) IV Continuous <Continuous>  dextrose 5%. 1000 milliLiter(s) (100 mL/Hr) IV Continuous <Continuous>  dextrose 50% Injectable 25 Gram(s) IV Push once  dextrose 50% Injectable 12.5 Gram(s) IV Push once  dextrose 50% Injectable 25 Gram(s) IV Push once  finasteride 5 milliGRAM(s) Oral daily  glucagon  Injectable 1 milliGRAM(s) IntraMuscular once  insulin glargine Injectable (LANTUS) 10 Unit(s) SubCutaneous at bedtime  insulin lispro (ADMELOG) corrective regimen sliding scale   SubCutaneous three times a day before meals  insulin lispro (ADMELOG) corrective regimen sliding scale   SubCutaneous at bedtime  pantoprazole    Tablet 40 milliGRAM(s) Oral before breakfast  QUEtiapine 25 milliGRAM(s) Oral at bedtime  sodium chloride 0.9%. 1000 milliLiter(s) (100 mL/Hr) IV Continuous <Continuous>  tamsulosin 0.4 milliGRAM(s) Oral at bedtime      Physical Exam  General: Patient comfortable in bed  Vital Signs Last 12 Hrs  T(F): 98.4 (07-05-21 @ 11:21), Max: 100.9 (07-05-21 @ 02:00)  HR: 94 (07-05-21 @ 11:21) (94 - 94)  BP: 109/69 (07-05-21 @ 11:21) (108/70 - 109/69)  BP(mean): --  RR: 18 (07-05-21 @ 11:21) (18 - 18)  SpO2: 100% (07-05-21 @ 11:21) (97% - 100%)

## 2021-07-05 NOTE — PROGRESS NOTE ADULT - SUBJECTIVE AND OBJECTIVE BOX
CC: f/u for fever    Patient reports feels ok, voice is much batter    REVIEW OF SYSTEMS:  All other review of systems negative (Comprehensive ROS)    Antimicrobials Day #  :    Other Medications Reviewed    T(F): 98.5 (07-05-21 @ 04:56), Max: 100.9 (07-05-21 @ 02:00)  HR: 94 (07-05-21 @ 04:56)  BP: 108/70 (07-05-21 @ 04:56)  RR: 18 (07-05-21 @ 04:56)  SpO2: 97% (07-05-21 @ 04:56)  Wt(kg): --    PHYSICAL EXAM:  General: alert, no acute distress  Eyes:  anicteric, no conjunctival injection, no discharge  Oropharynx: no lesions or injection 	  Neck: supple, without adenopathy  Lungs: clear to auscultation  Heart: regular rate and rhythm; no murmur, rubs or gallops  Abdomen: soft, nondistended, nontender, without mass or organomegaly  Skin: no lesions  Extremities: no clubbing, cyanosis, or edema  Neurologic: alert, oriented, moves all extremities    LAB RESULTS:                        7.2    1.53  )-----------( 45       ( 05 Jul 2021 06:50 )             22.8     07-05    137  |  109<H>  |  12  ----------------------------<  121<H>  3.2<L>   |  18<L>  |  0.47<L>    Ca    6.8<L>      05 Jul 2021 06:51          MICROBIOLOGY:  RECENT CULTURES:  07-01 @ 02:34 .Blood Blood     No growth to date.          RADIOLOGY REVIEWED:  < from: CT Neck Soft Tissue w/ IV Cont (07.03.21 @ 15:37) >  EXAM:  CT NECK SOFT TISSUE IC                          EXAM:  CT BRAIN IC                            PROCEDURE DATE:  07/03/2021            INTERPRETATION:  CLINICAL INDICATION: Hodgkin's lymphoma. Rule out metastatic disease.    TECHNIQUE: CT of the head and neck soft tissues was performed before and after administration of IV contrast. Contrast dose: 90 cc Omnipaque 300 IV contrast.    COMPARISON: CT chest 6/28/2021.    FINDINGS:    CT HEAD:  No acute transcortical infarct or intracranial hemorrhage. No abnormal intracranial enhancement is identified.    White matter hypoattenuating foci are noted, nonspecific but likely representing small vessel disease.    The ventricles are normal without evidence of hydrocephalus. There are no extra-axial fluid collections.    The visualized intraorbital contents are unremarkable. Mild mucosal thickening in the right maxillary sinus. The mastoid air cells are clear. The visualized soft tissues and osseous structures appear normal.    CT NECK:  Aerodigestive structures: Asymmetric enlargement of the left palatine tonsil is noted. Remainder of the aerodigestive structures are unremarkable.    Lymph nodes: Again noted left supraclavicular node measuring up to 2.4 x 1.5 cm.    Parotid and submandibular glands:  Normal.    Thyroid gland: Normal.    Vascular structures: Unremarkable.    Osseous structures: No fracture, dislocation or destructive lesion.    Partially visualized lung apices: Normal.      IMPRESSION:  CT head:  -No acute intracranial findings.  -No abnormal intracranial enhancement identified by CT. Consider MRI with contrast for increased sensitivity.    CT neck:  -Again noted enlarged left supraclavicular node.  -Asymmetric enlargement of the left palatine tonsil, suspicious for lymphomatous involvement given history. Correlate with direct visualization.    < from: CT Chest w/ IV Cont (06.28.21 @ 19:01) >    EXAM:  CT CHEST IC                            PROCEDURE DATE:  06/28/2021            INTERPRETATION:  CLINICAL INFORMATION: Lymphoma, evaluate extent of thoracic disease.    COMPARISON: CT abdomen pelvis dated 6/28/2021. Chest x-ray dated 6/20/2021.    CONTRAST/COMPLICATIONS:  IV Contrast: 40 mL of Omnipaque 350 were administered. 60 mL discarded.  Oral Contrast: None.  Complications: None reported.    PROCEDURE:  CT of the Chest was performed.  Sagittal and coronal reformats were performed.    FINDINGS: The quality of the images are degraded by respiratory motion and streak artifact.    LUNGS AND AIRWAYS: Patent central airways. Patchy groundglass opacities and scattered nodular opacities throughout all lobes of the lungs. For example:  A left upper lobe nodule measures 0.9 cm (series 3 image 33).  A right middle lobe nodule measures 2.0 x 0.8 cm (series 3 image 76).  3.5 x 2.1 cm solid opacity within the superior segment of right lower lobe (series 3 image 71).  A right lower lobe nodule at the costophrenic angle measures 1.9 x 1.1 cm (series 3 image 108).    PLEURA: No pleural effusion or pneumothorax.    MEDIASTINUM AND SARTHAK: Mildly enlarged mediastinal, hilar, and supraclavicular lymphadenopathy. A right hilar lymph node measures 1.8 x 1.4 cm (series 3 image 90). A left supraclavicular node measures 2.5 x 1.5 cm (series 3 image 19). A subcarinal node measures 2.2 x 1.3 cm (series 3 image 63).    VESSELS: Coronary artery calcifications.    HEART: Heart size is normal. No pericardial effusion.    CHEST WALL AND LOWER NECK: Within normal limits.    VISUALIZED UPPER ABDOMEN: Retroperitoneal lymphadenopathy, as seen on abdominal CT from the same date.    BONES: Degenerative changes of the spine.    IMPRESSION:    Patchy groundglass opacities and scattered nodular opacities throughout all lobes of the lungs. Findings may be related to known lymphoma or may be seen in the setting of multifocal infection.    Mildly enlarged supraclavicular, mediastinal, hilar, and retroperitoneal lymphadenopathy likely related to known lymphoma.      Assessment: Patient diagnosed with hodgkins disease back in April, required ercp and sphincterotomy, has required escamilla for retention, had a stay last month at [facility] for sepsis. He was deemed not a chemo candidate for unclear reasons and is now here for reassessment. He has been with hypophonation and it is better, found to have likely tonsillar involvement with disease as well as lung. He has been with fevers and cytopenia but no infection has been found on cultures and not behaving like a pneumonia but legionella pends. He had a course of zosyn no stopped. Cytopenia concern raised for antibiotic induced now off. Plan to start immunotherapy tomorrow noted.     Plan:  monitor off antibiotics  will reorder fungal markers since never sent and now off zosyn  await legionella ag  check strongyloides, hep serology and quant gold

## 2021-07-05 NOTE — PROGRESS NOTE ADULT - SUBJECTIVE AND OBJECTIVE BOX
Date of service: 07-05-21 @ 23:52      Patient is a 71y old  Male who presents with a chief complaint of lymphoma (05 Jul 2021 11:05)                                                               INTERVAL HPI/OVERNIGHT EVENTS:    REVIEW OF SYSTEMS:     CONSTITUTIONAL: No weakness, fevers or chills  EYES/ENT: No visual changes , no ear ache   NECK: No pain or stiffness  RESPIRATORY: No cough, wheezing,  No shortness of breath  CARDIOVASCULAR: No chest pain or palpitations  GASTROINTESTINAL: No abdominal pain  . No nausea, vomiting, or hematemesis; No diarrhea or constipation. No melena or hematochezia.  GENITOURINARY: No dysuria, frequency or hematuria  NEUROLOGICAL: No numbness or weakness  SKIN: No itching, burning, rashes, or lesions                                                                                                                                                                                                                                                                                 Medications:  MEDICATIONS  (STANDING):  ALPRAZolam 0.25 milliGRAM(s) Oral once  aspirin  chewable 81 milliGRAM(s) Oral daily  dextrose 40% Gel 15 Gram(s) Oral once  dextrose 5%. 1000 milliLiter(s) (50 mL/Hr) IV Continuous <Continuous>  dextrose 5%. 1000 milliLiter(s) (100 mL/Hr) IV Continuous <Continuous>  dextrose 50% Injectable 25 Gram(s) IV Push once  dextrose 50% Injectable 12.5 Gram(s) IV Push once  dextrose 50% Injectable 25 Gram(s) IV Push once  finasteride 5 milliGRAM(s) Oral daily  glucagon  Injectable 1 milliGRAM(s) IntraMuscular once  insulin glargine Injectable (LANTUS) 10 Unit(s) SubCutaneous at bedtime  insulin lispro (ADMELOG) corrective regimen sliding scale   SubCutaneous three times a day before meals  insulin lispro (ADMELOG) corrective regimen sliding scale   SubCutaneous at bedtime  pantoprazole    Tablet 40 milliGRAM(s) Oral before breakfast  QUEtiapine 25 milliGRAM(s) Oral at bedtime  sodium chloride 0.9%. 1000 milliLiter(s) (100 mL/Hr) IV Continuous <Continuous>  tamsulosin 0.4 milliGRAM(s) Oral at bedtime    MEDICATIONS  (PRN):  acetaminophen   Tablet .. 650 milliGRAM(s) Oral every 6 hours PRN Temp greater or equal to 38C (100.4F), Moderate Pain (4 - 6)       Allergies    No Known Allergies    Intolerances      Vital Signs Last 24 Hrs  T(C): 37.1 (05 Jul 2021 20:14), Max: 38.3 (05 Jul 2021 02:00)  T(F): 98.8 (05 Jul 2021 20:14), Max: 100.9 (05 Jul 2021 02:00)  HR: 102 (05 Jul 2021 20:14) (94 - 108)  BP: 107/60 (05 Jul 2021 20:14) (107/60 - 122/71)  BP(mean): --  RR: 18 (05 Jul 2021 20:14) (18 - 18)  SpO2: 98% (05 Jul 2021 20:14) (96% - 100%)  CAPILLARY BLOOD GLUCOSE      POCT Blood Glucose.: 210 mg/dL (05 Jul 2021 21:11)  POCT Blood Glucose.: 168 mg/dL (05 Jul 2021 16:10)  POCT Blood Glucose.: 140 mg/dL (05 Jul 2021 11:48)  POCT Blood Glucose.: 124 mg/dL (05 Jul 2021 07:37)      07-04 @ 07:01  -  07-05 @ 07:00  --------------------------------------------------------  IN: 3700 mL / OUT: 2000 mL / NET: 1700 mL    07-05 @ 07:01  -  07-05 @ 23:52  --------------------------------------------------------  IN: 440 mL / OUT: 380 mL / NET: 60 mL      Physical Exam:    Daily     Daily   General:  Well appearing, NAD, not cachetic  HEENT:  Nonicteric, PERRLA  CV:  RRR, S1S2   Lungs:  CTA B/L, no wheezes, rales, rhonchi  Abdomen:  Soft, non-tender, no distended, positive BS  Extremities:  2+ pulses, no c/c, no edema  Skin:  Warm and dry, no rashes  :  No escamilla  Neuro:  AAOx3, non-focal, grossly intact                                                                                                                                                                                                                                                                                                LABS:                               7.2    1.53  )-----------( 45       ( 05 Jul 2021 06:50 )             22.8                      07-05    137  |  109<H>  |  12  ----------------------------<  121<H>  3.2<L>   |  18<L>  |  0.47<L>    Ca    6.8<L>      05 Jul 2021 06:51                         RADIOLOGY & ADDITIONAL TESTS         I personally reviewed: [  ]EKG   [  ]CXR    [  ] CT      A/P:         Discussed with :     [facility] consultants' Notes   Time spent :

## 2021-07-05 NOTE — PROGRESS NOTE ADULT - SUBJECTIVE AND OBJECTIVE BOX
LIZETT RIVERA  MRN-[id number]    Patient is a 71y old  Male who presents with a chief complaint of failure to thrive and fever (04 Jul 2021 07:09)      Review of System    Voice seems stronger.  Comfortable sitting up in bed.    Current Meds  MEDICATIONS  (STANDING):  aspirin  chewable 81 milliGRAM(s) Oral daily  dextrose 40% Gel 15 Gram(s) Oral once  dextrose 5%. 1000 milliLiter(s) (50 mL/Hr) IV Continuous <Continuous>  dextrose 5%. 1000 milliLiter(s) (100 mL/Hr) IV Continuous <Continuous>  dextrose 50% Injectable 25 Gram(s) IV Push once  dextrose 50% Injectable 12.5 Gram(s) IV Push once  dextrose 50% Injectable 25 Gram(s) IV Push once  finasteride 5 milliGRAM(s) Oral daily  glucagon  Injectable 1 milliGRAM(s) IntraMuscular once  insulin glargine Injectable (LANTUS) 10 Unit(s) SubCutaneous at bedtime  insulin lispro (ADMELOG) corrective regimen sliding scale   SubCutaneous three times a day before meals  insulin lispro (ADMELOG) corrective regimen sliding scale   SubCutaneous at bedtime  pantoprazole    Tablet 40 milliGRAM(s) Oral before breakfast  QUEtiapine 25 milliGRAM(s) Oral at bedtime  sodium chloride 0.9%. 1000 milliLiter(s) (100 mL/Hr) IV Continuous <Continuous>  tamsulosin 0.4 milliGRAM(s) Oral at bedtime    MEDICATIONS  (PRN):  acetaminophen   Tablet .. 650 milliGRAM(s) Oral every 6 hours PRN Temp greater or equal to 38C (100.4F), Moderate Pain (4 - 6)      Vital Signs Last 24 Hrs  T(C): 36.9 (05 Jul 2021 04:56), Max: 38.3 (05 Jul 2021 02:00)  T(F): 98.5 (05 Jul 2021 04:56), Max: 100.9 (05 Jul 2021 02:00)  HR: 94 (05 Jul 2021 04:56) (87 - 108)  BP: 108/70 (05 Jul 2021 04:56) (106/63 - 122/71)  BP(mean): --  RR: 18 (05 Jul 2021 04:56) (17 - 18)  SpO2: 97% (05 Jul 2021 04:56) (96% - 99%)    Physical Exam    NAD    Eyes: PERRLA EOMI, anicteric sclera    Heent :No oral sores, no pharyngeal injection. moist mucosa.    Neck: supple, no jvd, no LAD    Respiratory: CTA b/l     Cardiovascular: s1s2, no m/g/r    Gastrointestinal: soft, nt, nd, + BS    Extremities: no c/c/e    Neurological:A&O x 3 moves all ext.    Skin: no rash on exposed skin    Lymph Nodes: no lymphadenopathy.      Lab  CBC Full  -  ( 05 Jul 2021 06:50 )  WBC Count : 1.53 K/uL  RBC Count : 2.54 M/uL  Hemoglobin : 7.2 g/dL  Hematocrit : 22.8 %  Platelet Count - Automated : 45 K/uL  Mean Cell Volume : 89.8 fl  Mean Cell Hemoglobin : 28.3 pg  Mean Cell Hemoglobin Concentration : 31.6 gm/dL  Auto Neutrophil # : x  Auto Lymphocyte # : x  Auto Monocyte # : x  Auto Eosinophil # : x  Auto Basophil # : x  Auto Neutrophil % : x  Auto Lymphocyte % : x  Auto Monocyte % : x  Auto Eosinophil % : x  Auto Basophil % : x    07-05    137  |  109<H>  |  12  ----------------------------<  121<H>  3.2<L>   |  18<L>  |  0.47<L>    Ca    6.8<L>      05 Jul 2021 06:51          Rad:    Assessment/Plan

## 2021-07-05 NOTE — PROGRESS NOTE ADULT - SUBJECTIVE AND OBJECTIVE BOX
Neurology Progress Note    S: Patient seen and examined.  . patient denied CP, SOB, HA or pain.  doing okay Tmax 100.9    Medication:  MEDICATIONS  (STANDING):  aspirin  chewable 81 milliGRAM(s) Oral daily  dextrose 40% Gel 15 Gram(s) Oral once  dextrose 5%. 1000 milliLiter(s) (50 mL/Hr) IV Continuous <Continuous>  dextrose 5%. 1000 milliLiter(s) (100 mL/Hr) IV Continuous <Continuous>  dextrose 50% Injectable 25 Gram(s) IV Push once  dextrose 50% Injectable 12.5 Gram(s) IV Push once  dextrose 50% Injectable 25 Gram(s) IV Push once  finasteride 5 milliGRAM(s) Oral daily  glucagon  Injectable 1 milliGRAM(s) IntraMuscular once  insulin glargine Injectable (LANTUS) 10 Unit(s) SubCutaneous at bedtime  insulin lispro (ADMELOG) corrective regimen sliding scale   SubCutaneous three times a day before meals  insulin lispro (ADMELOG) corrective regimen sliding scale   SubCutaneous at bedtime  pantoprazole    Tablet 40 milliGRAM(s) Oral before breakfast  QUEtiapine 25 milliGRAM(s) Oral at bedtime  sodium chloride 0.9%. 1000 milliLiter(s) (100 mL/Hr) IV Continuous <Continuous>  tamsulosin 0.4 milliGRAM(s) Oral at bedtime    MEDICATIONS  (PRN):  acetaminophen   Tablet .. 650 milliGRAM(s) Oral every 6 hours PRN Temp greater or equal to 38C (100.4F), Moderate Pain (4 - 6)      Vitals:  Vital Signs Last 24 Hrs  T(C): 36.9 (05 Jul 2021 04:56), Max: 38.3 (05 Jul 2021 02:00)  T(F): 98.5 (05 Jul 2021 04:56), Max: 100.9 (05 Jul 2021 02:00)  HR: 94 (05 Jul 2021 04:56) (87 - 108)  BP: 108/70 (05 Jul 2021 04:56) (106/63 - 122/71)  BP(mean): --  RR: 18 (05 Jul 2021 04:56) (17 - 18)  SpO2: 97% (05 Jul 2021 04:56) (96% - 99%)    General Exam:   General Appearance: Appropriately dressed and in no acute distress       Head: Normocephalic, atraumatic and no dysmorphic features  Ear, Nose, and Throat: Moist mucous membranes  CVS: S1S2+  Resp: No SOB, no wheeze or rhonchi  GI: soft NT/ND  Extremities: No edema or cyanosis  Skin: No bruises or rashes     Neurological Exam:  Mental Status: Awake, alert and oriented x 1-2.  Able to follow simple and complex verbal commands. Able to name and repeat. fluent speech. No obvious aphasia mild dysarthria, + hypophonic   Cranial Nerves: PERRL, EOMI, VFFC, sensation V1-V3 intact,  R facial asymmetry, equal elevation of palate, scm/trap 5/5, tongue is midline on protrusion. no obvious papilledema on fundoscopic exam. hearing is grossly intact.   Motor: generalized weakness but FRANCOIS. minimal weakness B/L LE R>L  Sensation: Intact to light touch and pinprick throughout. no right/left confusion. no extinction to tactile on DSS. Romberg was negative.   Reflexes: 1+ throughout at biceps, brachioradialis, triceps, patellars and ankles bilaterally and equal. No clonus. R toe and L toe were both downgoing.  Coordination: No dysmetria on FNF    Gait: deferred     I personally reviewed the below data/images/labs:    CBC Full  -  ( 05 Jul 2021 06:50 )  WBC Count : 1.53 K/uL  RBC Count : 2.54 M/uL  Hemoglobin : 7.2 g/dL  Hematocrit : 22.8 %  Platelet Count - Automated : 45 K/uL  Mean Cell Volume : 89.8 fl  Mean Cell Hemoglobin : 28.3 pg  Mean Cell Hemoglobin Concentration : 31.6 gm/dL  Auto Neutrophil # : x  Auto Lymphocyte # : x  Auto Monocyte # : x  Auto Eosinophil # : x  Auto Basophil # : x  Auto Neutrophil % : x  Auto Lymphocyte % : x  Auto Monocyte % : x  Auto Eosinophil % : x  Auto Basophil % : x      07-05    137  |  109<H>  |  12  ----------------------------<  121<H>  3.2<L>   |  18<L>  |  0.47<L>    Ca    6.8<L>      05 Jul 2021 06:51          < from: CT Chest w/ IV Cont (06.28.21 @ 19:01) >    EXAM:  CT CHEST IC                            PROCEDURE DATE:  06/28/2021            INTERPRETATION:  CLINICAL INFORMATION: Lymphoma, evaluate extent of thoracic disease.    COMPARISON: CT abdomen pelvis dated 6/28/2021. Chest x-ray dated 6/20/2021.    CONTRAST/COMPLICATIONS:  IV Contrast: 40 mL of Omnipaque 350 were administered. 60 mL discarded.  Oral Contrast: None.  Complications: None reported.    PROCEDURE:  CT of the Chest was performed.  Sagittal and coronal reformats were performed.    FINDINGS: The quality of the images are degraded by respiratory motion and streak artifact.    LUNGS AND AIRWAYS: Patent central airways. Patchy groundglass opacities and scattered nodular opacities throughout all lobes of the lungs. For example:  A left upper lobe nodule measures 0.9 cm (series 3 image 33).  A right middle lobe nodule measures 2.0 x 0.8 cm (series 3 image 76).  3.5 x 2.1 cm solid opacity within the superior segment of right lower lobe (series 3 image 71).  A right lower lobe nodule at the costophrenic angle measures 1.9 x 1.1 cm (series 3 image 108).    PLEURA: No pleural effusion or pneumothorax.    MEDIASTINUM AND SARTHAK: Mildly enlarged mediastinal, hilar, and supraclavicular lymphadenopathy. A right hilar lymph node measures 1.8 x 1.4 cm (series 3 image 90). A left supraclavicular node measures 2.5 x 1.5 cm (series 3 image 19). A subcarinal node measures 2.2 x 1.3 cm (series 3 image 63).    VESSELS: Coronary artery calcifications.    HEART: Heart size is normal. No pericardial effusion.    CHEST WALL AND LOWER NECK: Within normal limits.    VISUALIZED UPPER ABDOMEN: Retroperitoneal lymphadenopathy, as seen on abdominal CT from the same date.    BONES: Degenerative changes of the spine.    IMPRESSION:    Patchy groundglass opacities and scattered nodular opacities throughout all lobes of the lungs. Findings may be related to known lymphoma or may be seen in the setting of multifocal infection.    Mildly enlarged supraclavicular, mediastinal, hilar, and retroperitoneal lymphadenopathy likely related to known lymphoma.              LEXIS BAKER MD; Resident Radiology  This document has been electronically signed.  ISABEL JACOBO MD; Attending Radiologist  This document has been electronically signed. Jun 29 [id number]    < end of copied text >      < from: CT Neck Soft Tissue w/ IV Cont (07.03.21 @ 15:37) >    EXAM:  CT NECK SOFT TISSUE IC                          EXAM:  CT BRAIN IC                            PROCEDURE DATE:  07/03/2021            INTERPRETATION:  CLINICAL INDICATION: Hodgkin's lymphoma. Rule out metastatic disease.    TECHNIQUE: CT of the head and neck soft tissues was performed before and after administration of IV contrast. Contrast dose: 90 cc Omnipaque 300 IV contrast.    COMPARISON: CT chest 6/28/2021.    FINDINGS:    CT HEAD:  No acute transcortical infarct or intracranial hemorrhage. No abnormal intracranial enhancement is identified.    White matter hypoattenuating foci are noted, nonspecific but likely representing small vessel disease.    The ventricles are normal without evidence of hydrocephalus. There are no extra-axial fluid collections.    The visualized intraorbital contents are unremarkable. Mild mucosal thickening in the right maxillary sinus. The mastoid air cells are clear. The visualized soft tissues and osseous structures appear normal.    CT NECK:  Aerodigestive structures: Asymmetric enlargement of the left palatine tonsil is noted. Remainder of the aerodigestive structures are unremarkable.    Lymph nodes: Again noted left supraclavicular node measuring up to 2.4 x 1.5 cm.    Parotid and submandibular glands:  Normal.    Thyroid gland: Normal.    Vascular structures: Unremarkable.    Osseous structures: No fracture, dislocation or destructive lesion.    Partially visualized lung apices: Normal.      IMPRESSION:  CT head:  -No acute intracranial findings.  -No abnormal intracranial enhancement identified by CT. Consider MRI with contrast for increased sensitivity.    CT neck:  -Again noted enlarged left supraclavicular node.  -Asymmetric enlargement of the left palatine tonsil, suspicious for lymphomatous involvement given history. Correlate with direct visualization.                ZOEY TORRES MD; Attending Radiologist  This document has been electronically signed. Jul  3 2021  3:51PM    < end of copied text >

## 2021-07-05 NOTE — PROGRESS NOTE ADULT - ASSESSMENT
70 y/o AAM w/ Stroke this past August and got TPA per family member had L weakness resolved, DM , BPH with obstruction s/p escamilla catheter , s/p ERCP w Sphincteretomy recent admission to [facility] for sepsis  hodgkin lympoma was not offered any chemo and was placed on hospice.   npw presenting with generalized weakness and FTT.   has baseline R LE weakness.  + hypophonic   Hgb 7.1, platelets 106, elevated alk phos, A1c 8.8%  off 1:1 for + SI. off 1:1 now   LDL 30, A1c 7.7   repeat CTH 7/3 as above. CT neck as above   o/e with R NLF flattening, hypophonic, dysarthria, and generalized weakness. more likely 2/2 underlying medical condition as opposed to new stroke.   - fever was  101.9, infectious workup.  Tmax 100.9   - psych f/u for SI --> no capacity. now off 1:1   - c/w ASA 81mg daily  - correct metabolic derangements   - lipitor 40mg if no CI. elevated alk phos   - telemetry  - heme/onc recs appreciated. --> possible treatment 7/6 with immunotherapy if approved   - PT/OT/SS/SLP, OOBC  - check FS, glucose control <180  - GI/DVT ppx  - Counseling on diet, exercise, and medication adherence was done  - Counseling on smoking cessation and alcohol consumption offered when appropriate.  - Pain assessed and judicious use of narcotics when appropriate was discussed.    - Stroke education given when appropriate.  - Importance of fall prevention discussed.   - Differential diagnosis and plan of care discussed with patient and/or family and primary team  - Thank you for allowing me to participate in the care of this patient. Call with questions.   - [facility] discussion for hospice  dc plan   Juan العراقي MD  Vascular Neurology  Office: [phone]

## 2021-07-06 LAB
% ALBUMIN: 44 % — SIGNIFICANT CHANGE UP
% ALPHA 1: 9.6 % — SIGNIFICANT CHANGE UP
% ALPHA 2: 13.2 % — SIGNIFICANT CHANGE UP
% BETA: 12.3 % — SIGNIFICANT CHANGE UP
% GAMMA: 20.9 % — SIGNIFICANT CHANGE UP
ALBUMIN SERPL ELPH-MCNC: 2.1 G/DL — LOW (ref 3.3–5)
ALBUMIN SERPL ELPH-MCNC: 2.2 G/DL — LOW (ref 3.6–5.5)
ALBUMIN/GLOB SERPL ELPH: 0.8 RATIO — SIGNIFICANT CHANGE UP
ALP SERPL-CCNC: 567 U/L — HIGH (ref 40–120)
ALPHA1 GLOB SERPL ELPH-MCNC: 0.5 G/DL — HIGH (ref 0.1–0.4)
ALPHA2 GLOB SERPL ELPH-MCNC: 0.7 G/DL — SIGNIFICANT CHANGE UP (ref 0.5–1)
ALT FLD-CCNC: 17 U/L — SIGNIFICANT CHANGE UP (ref 10–45)
ANION GAP SERPL CALC-SCNC: 13 MMOL/L — SIGNIFICANT CHANGE UP (ref 5–17)
ANISOCYTOSIS BLD QL: SIGNIFICANT CHANGE UP
AST SERPL-CCNC: 35 U/L — SIGNIFICANT CHANGE UP (ref 10–40)
B-GLOBULIN SERPL ELPH-MCNC: 0.6 G/DL — SIGNIFICANT CHANGE UP (ref 0.5–1)
BASE EXCESS BLDV CALC-SCNC: -4.6 MMOL/L — LOW (ref -2–2)
BASOPHILS # BLD AUTO: 0.02 K/UL — SIGNIFICANT CHANGE UP (ref 0–0.2)
BASOPHILS NFR BLD AUTO: 0.9 % — SIGNIFICANT CHANGE UP (ref 0–2)
BILIRUB SERPL-MCNC: 1.7 MG/DL — HIGH (ref 0.2–1.2)
BUN SERPL-MCNC: 13 MG/DL — SIGNIFICANT CHANGE UP (ref 7–23)
CA-I SERPL-SCNC: 1 MMOL/L — LOW (ref 1.12–1.3)
CALCIUM SERPL-MCNC: 7.1 MG/DL — LOW (ref 8.4–10.5)
CHLORIDE BLDV-SCNC: 108 MMOL/L — SIGNIFICANT CHANGE UP (ref 96–108)
CHLORIDE SERPL-SCNC: 104 MMOL/L — SIGNIFICANT CHANGE UP (ref 96–108)
CO2 BLDV-SCNC: 18 MMOL/L — LOW (ref 22–30)
CO2 SERPL-SCNC: 16 MMOL/L — LOW (ref 22–31)
CREAT SERPL-MCNC: 0.54 MG/DL — SIGNIFICANT CHANGE UP (ref 0.5–1.3)
CRP SERPL-MCNC: 55 MG/L — HIGH (ref 0–4)
CRYPTOC AG FLD QL: NEGATIVE — SIGNIFICANT CHANGE UP
CULTURE RESULTS: SIGNIFICANT CHANGE UP
CULTURE RESULTS: SIGNIFICANT CHANGE UP
DACRYOCYTES BLD QL SMEAR: SLIGHT — SIGNIFICANT CHANGE UP
EOSINOPHIL # BLD AUTO: 0 K/UL — SIGNIFICANT CHANGE UP (ref 0–0.5)
EOSINOPHIL NFR BLD AUTO: 0 % — SIGNIFICANT CHANGE UP (ref 0–6)
GAMMA GLOBULIN: 1 G/DL — SIGNIFICANT CHANGE UP (ref 0.6–1.6)
GAMMA INTERFERON BACKGROUND BLD IA-ACNC: 0.61 IU/ML — SIGNIFICANT CHANGE UP
GAS PNL BLDV: 132 MMOL/L — LOW (ref 135–145)
GAS PNL BLDV: SIGNIFICANT CHANGE UP
GAS PNL BLDV: SIGNIFICANT CHANGE UP
GLUCOSE BLDC GLUCOMTR-MCNC: 121 MG/DL — HIGH (ref 70–99)
GLUCOSE BLDC GLUCOMTR-MCNC: 141 MG/DL — HIGH (ref 70–99)
GLUCOSE BLDC GLUCOMTR-MCNC: 151 MG/DL — HIGH (ref 70–99)
GLUCOSE BLDC GLUCOMTR-MCNC: 163 MG/DL — HIGH (ref 70–99)
GLUCOSE BLDC GLUCOMTR-MCNC: 173 MG/DL — HIGH (ref 70–99)
GLUCOSE BLDV-MCNC: 174 MG/DL — HIGH (ref 70–99)
GLUCOSE SERPL-MCNC: 169 MG/DL — HIGH (ref 70–99)
HCO3 BLDV-SCNC: 17 MMOL/L — LOW (ref 21–29)
HCT VFR BLD CALC: 25.6 % — LOW (ref 39–50)
HCT VFR BLDA CALC: 26 % — LOW (ref 39–50)
HGB BLD CALC-MCNC: 8.4 G/DL — LOW (ref 13–17)
HGB BLD-MCNC: 8.2 G/DL — LOW (ref 13–17)
INTERPRETATION SERPL IFE-IMP: SIGNIFICANT CHANGE UP
LACTATE BLDV-MCNC: 3.6 MMOL/L — HIGH (ref 0.7–2)
LYMPHOCYTES # BLD AUTO: 0.03 K/UL — LOW (ref 1–3.3)
LYMPHOCYTES # BLD AUTO: 1.7 % — LOW (ref 13–44)
M TB IFN-G BLD-IMP: NEGATIVE — SIGNIFICANT CHANGE UP
M TB IFN-G CD4+ BCKGRND COR BLD-ACNC: 0.02 IU/ML — SIGNIFICANT CHANGE UP
M TB IFN-G CD4+CD8+ BCKGRND COR BLD-ACNC: 0 IU/ML — SIGNIFICANT CHANGE UP
MAGNESIUM SERPL-MCNC: 1.2 MG/DL — LOW (ref 1.6–2.6)
MANUAL SMEAR VERIFICATION: SIGNIFICANT CHANGE UP
MCHC RBC-ENTMCNC: 28.5 PG — SIGNIFICANT CHANGE UP (ref 27–34)
MCHC RBC-ENTMCNC: 32 GM/DL — SIGNIFICANT CHANGE UP (ref 32–36)
MCV RBC AUTO: 88.9 FL — SIGNIFICANT CHANGE UP (ref 80–100)
MONOCYTES # BLD AUTO: 0.03 K/UL — SIGNIFICANT CHANGE UP (ref 0–0.9)
MONOCYTES NFR BLD AUTO: 1.7 % — LOW (ref 2–14)
MYELOCYTES NFR BLD: 0.9 % — HIGH (ref 0–0)
NEUTROPHILS # BLD AUTO: 1.65 K/UL — LOW (ref 1.8–7.4)
NEUTROPHILS NFR BLD AUTO: 93.9 % — HIGH (ref 43–77)
NRBC # BLD: 1 /100 — HIGH (ref 0–0)
OTHER CELLS CSF MANUAL: 10 ML/DL — LOW (ref 18–22)
OVALOCYTES BLD QL SMEAR: SLIGHT — SIGNIFICANT CHANGE UP
PCO2 BLDV: 22 MMHG — LOW (ref 35–50)
PH BLDV: 7.5 — HIGH (ref 7.35–7.45)
PHOSPHATE SERPL-MCNC: 1.3 MG/DL — LOW (ref 2.5–4.5)
PLAT MORPH BLD: NORMAL — SIGNIFICANT CHANGE UP
PLATELET # BLD AUTO: 63 K/UL — LOW (ref 150–400)
PO2 BLDV: 54 MMHG — HIGH (ref 25–45)
POIKILOCYTOSIS BLD QL AUTO: SLIGHT — SIGNIFICANT CHANGE UP
POLYCHROMASIA BLD QL SMEAR: SLIGHT — SIGNIFICANT CHANGE UP
POTASSIUM BLDV-SCNC: 3.3 MMOL/L — LOW (ref 3.5–5.3)
POTASSIUM SERPL-MCNC: 3.5 MMOL/L — SIGNIFICANT CHANGE UP (ref 3.5–5.3)
POTASSIUM SERPL-SCNC: 3.5 MMOL/L — SIGNIFICANT CHANGE UP (ref 3.5–5.3)
PROCALCITONIN SERPL-MCNC: 3.58 NG/ML — HIGH (ref 0.02–0.1)
PROT PATTERN SERPL ELPH-IMP: SIGNIFICANT CHANGE UP
PROT SERPL-MCNC: 5.3 G/DL — LOW (ref 6–8.3)
QUANT TB PLUS MITOGEN MINUS NIL: 0.8 IU/ML — SIGNIFICANT CHANGE UP
RBC # BLD: 2.88 M/UL — LOW (ref 4.2–5.8)
RBC # FLD: 22.7 % — HIGH (ref 10.3–14.5)
RBC BLD AUTO: ABNORMAL
SAO2 % BLDV: 90 % — HIGH (ref 67–88)
SCHISTOCYTES BLD QL AUTO: SLIGHT — SIGNIFICANT CHANGE UP
SODIUM SERPL-SCNC: 133 MMOL/L — LOW (ref 135–145)
SPECIMEN SOURCE: SIGNIFICANT CHANGE UP
SPECIMEN SOURCE: SIGNIFICANT CHANGE UP
TARGETS BLD QL SMEAR: SLIGHT — SIGNIFICANT CHANGE UP
VARIANT LYMPHS # BLD: 0.9 % — SIGNIFICANT CHANGE UP (ref 0–6)
WBC # BLD: 1.76 K/UL — LOW (ref 3.8–10.5)
WBC # FLD AUTO: 1.76 K/UL — LOW (ref 3.8–10.5)

## 2021-07-06 PROCEDURE — 99291 CRITICAL CARE FIRST HOUR: CPT

## 2021-07-06 PROCEDURE — 71045 X-RAY EXAM CHEST 1 VIEW: CPT | Mod: 26

## 2021-07-06 PROCEDURE — 93010 ELECTROCARDIOGRAM REPORT: CPT

## 2021-07-06 RX ORDER — MAGNESIUM SULFATE 500 MG/ML
2 VIAL (ML) INJECTION ONCE
Refills: 0 | Status: COMPLETED | OUTPATIENT
Start: 2021-07-06 | End: 2021-07-07

## 2021-07-06 RX ORDER — PIPERACILLIN AND TAZOBACTAM 4; .5 G/20ML; G/20ML
3.38 INJECTION, POWDER, LYOPHILIZED, FOR SOLUTION INTRAVENOUS ONCE
Refills: 0 | Status: COMPLETED | OUTPATIENT
Start: 2021-07-06 | End: 2021-07-06

## 2021-07-06 RX ORDER — SODIUM CHLORIDE 9 MG/ML
1000 INJECTION INTRAMUSCULAR; INTRAVENOUS; SUBCUTANEOUS ONCE
Refills: 0 | Status: COMPLETED | OUTPATIENT
Start: 2021-07-06 | End: 2021-07-06

## 2021-07-06 RX ORDER — NIVOLUMAB 10 MG/ML
480 INJECTION INTRAVENOUS ONCE
Refills: 0 | Status: COMPLETED | OUTPATIENT
Start: 2021-07-06 | End: 2021-07-06

## 2021-07-06 RX ORDER — ALLOPURINOL 300 MG
300 TABLET ORAL DAILY
Refills: 0 | Status: DISCONTINUED | OUTPATIENT
Start: 2021-07-06 | End: 2021-08-04

## 2021-07-06 RX ORDER — PIPERACILLIN AND TAZOBACTAM 4; .5 G/20ML; G/20ML
3.38 INJECTION, POWDER, LYOPHILIZED, FOR SOLUTION INTRAVENOUS EVERY 8 HOURS
Refills: 0 | Status: DISCONTINUED | OUTPATIENT
Start: 2021-07-07 | End: 2021-07-07

## 2021-07-06 RX ORDER — VANCOMYCIN HCL 1 G
1000 VIAL (EA) INTRAVENOUS ONCE
Refills: 0 | Status: COMPLETED | OUTPATIENT
Start: 2021-07-06 | End: 2021-07-06

## 2021-07-06 RX ORDER — POTASSIUM PHOSPHATE, MONOBASIC POTASSIUM PHOSPHATE, DIBASIC 236; 224 MG/ML; MG/ML
30 INJECTION, SOLUTION INTRAVENOUS ONCE
Refills: 0 | Status: COMPLETED | OUTPATIENT
Start: 2021-07-06 | End: 2021-07-07

## 2021-07-06 RX ORDER — ACETAMINOPHEN 500 MG
1000 TABLET ORAL ONCE
Refills: 0 | Status: COMPLETED | OUTPATIENT
Start: 2021-07-06 | End: 2021-07-06

## 2021-07-06 RX ADMIN — TAMSULOSIN HYDROCHLORIDE 0.4 MILLIGRAM(S): 0.4 CAPSULE ORAL at 21:06

## 2021-07-06 RX ADMIN — PIPERACILLIN AND TAZOBACTAM 200 GRAM(S): 4; .5 INJECTION, POWDER, LYOPHILIZED, FOR SOLUTION INTRAVENOUS at 22:28

## 2021-07-06 RX ADMIN — Medication 0.25 MILLIGRAM(S): at 18:17

## 2021-07-06 RX ADMIN — INSULIN GLARGINE 10 UNIT(S): 100 INJECTION, SOLUTION SUBCUTANEOUS at 21:17

## 2021-07-06 RX ADMIN — Medication 300 MILLIGRAM(S): at 11:06

## 2021-07-06 RX ADMIN — SODIUM CHLORIDE 100 MILLILITER(S): 9 INJECTION INTRAMUSCULAR; INTRAVENOUS; SUBCUTANEOUS at 11:51

## 2021-07-06 RX ADMIN — PANTOPRAZOLE SODIUM 40 MILLIGRAM(S): 20 TABLET, DELAYED RELEASE ORAL at 06:41

## 2021-07-06 RX ADMIN — FINASTERIDE 5 MILLIGRAM(S): 5 TABLET, FILM COATED ORAL at 11:07

## 2021-07-06 RX ADMIN — Medication 81 MILLIGRAM(S): at 11:06

## 2021-07-06 RX ADMIN — Medication 400 MILLIGRAM(S): at 21:10

## 2021-07-06 RX ADMIN — Medication 1: at 16:25

## 2021-07-06 RX ADMIN — NIVOLUMAB 196 MILLIGRAM(S): 10 INJECTION INTRAVENOUS at 16:18

## 2021-07-06 RX ADMIN — SODIUM CHLORIDE 1000 MILLILITER(S): 9 INJECTION INTRAMUSCULAR; INTRAVENOUS; SUBCUTANEOUS at 22:14

## 2021-07-06 RX ADMIN — QUETIAPINE FUMARATE 25 MILLIGRAM(S): 200 TABLET, FILM COATED ORAL at 21:07

## 2021-07-06 RX ADMIN — Medication 1000 MILLIGRAM(S): at 21:40

## 2021-07-06 RX ADMIN — Medication 250 MILLIGRAM(S): at 22:28

## 2021-07-06 NOTE — ADVANCED PRACTICE NURSE CONSULT - ASSESSMENT
Pt with dose 1/6 cycle 1 of tx, labs noted in sunrise, height, weight and bsa verified, okay to proceed with tx as per MD Sanz.  Pt with left TLC + blood return noted, no pain, redness or swelling noted at the site.  Pt premedicated as noted in sunrise.  Pt adminsitered cyclophosphamide 200 mg/m2 = 350 mg iv over 1 hour via locked pump. Pt is under sedation and intubated.

## 2021-07-06 NOTE — RAPID RESPONSE TEAM SUMMARY - NSADDTLFINDINGSRRT_GEN_ALL_CORE
On arrival to RRT pts /70, HR 140s sinus tachycardia on EKG, SpO2 97% on RA, RR 24, , febrile to 103.2F orally. Pt  AAOx2 and hypophonic as he has been this admission, without focal deficits, diaphoretic with c/o RUQ pain nontender, nondistended on abdominal exam. As per primary team, pt has been tachycardic throughout the night as high as 150s while mounting a fever for which he had received Ofirmev 1g IVPB about 1 hr prior to RRT however with no response. To note, a family meeting was held at pts bedside with Oncology team 7/2 for which he was deemed not a good candidate for chemotherapy at this time however agreed to undergo immunotherapy with Nivolumab for which he had received today (7/6). Pt had last received a short course of Zosyn (6/28-6/30) and is currently being monitored off antibiotics at this time, most recent cultures (6/5) NGTD.

## 2021-07-06 NOTE — PROGRESS NOTE ADULT - SUBJECTIVE AND OBJECTIVE BOX
Date of service: 07-06-21 @ 23:13      Patient is a 71y old  Male who presents with a chief complaint of fever, FTT (06 Jul 2021 17:35)                                                               INTERVAL HPI/OVERNIGHT EVENTS:    REVIEW OF SYSTEMS:     CONSTITUTIONAL: No weakness, fevers or chills  EYES/ENT: No visual changes , no ear ache   NECK: No pain or stiffness  RESPIRATORY: No cough, wheezing,  No shortness of breath  CARDIOVASCULAR: No chest pain or palpitations  GASTROINTESTINAL: No abdominal pain  . No nausea, vomiting, or hematemesis; No diarrhea or constipation. No melena or hematochezia.  GENITOURINARY: No dysuria, frequency or hematuria  NEUROLOGICAL: No numbness or weakness  SKIN: No itching, burning, rashes, or lesions                                                                                                                                                                                                                                                                                 Medications:  MEDICATIONS  (STANDING):  allopurinol 300 milliGRAM(s) Oral daily  aspirin  chewable 81 milliGRAM(s) Oral daily  dextrose 40% Gel 15 Gram(s) Oral once  dextrose 5%. 1000 milliLiter(s) (50 mL/Hr) IV Continuous <Continuous>  dextrose 5%. 1000 milliLiter(s) (100 mL/Hr) IV Continuous <Continuous>  dextrose 50% Injectable 25 Gram(s) IV Push once  dextrose 50% Injectable 12.5 Gram(s) IV Push once  dextrose 50% Injectable 25 Gram(s) IV Push once  finasteride 5 milliGRAM(s) Oral daily  glucagon  Injectable 1 milliGRAM(s) IntraMuscular once  insulin glargine Injectable (LANTUS) 10 Unit(s) SubCutaneous at bedtime  insulin lispro (ADMELOG) corrective regimen sliding scale   SubCutaneous three times a day before meals  insulin lispro (ADMELOG) corrective regimen sliding scale   SubCutaneous at bedtime  magnesium sulfate  IVPB 2 Gram(s) IV Intermittent once  pantoprazole    Tablet 40 milliGRAM(s) Oral before breakfast  piperacillin/tazobactam IVPB.. 3.375 Gram(s) IV Intermittent every 8 hours  potassium phosphate IVPB 30 milliMole(s) IV Intermittent once  QUEtiapine 25 milliGRAM(s) Oral at bedtime  sodium chloride 0.9%. 1000 milliLiter(s) (100 mL/Hr) IV Continuous <Continuous>  tamsulosin 0.4 milliGRAM(s) Oral at bedtime    MEDICATIONS  (PRN):  acetaminophen   Tablet .. 650 milliGRAM(s) Oral every 6 hours PRN Temp greater or equal to 38C (100.4F), Moderate Pain (4 - 6)       Allergies    No Known Allergies    Intolerances      Vital Signs Last 24 Hrs  T(C): 38.2 (06 Jul 2021 22:37), Max: 39.1 (06 Jul 2021 20:57)  T(F): 100.8 (06 Jul 2021 22:37), Max: 102.4 (06 Jul 2021 20:57)  HR: 145 (06 Jul 2021 22:37) (102 - 169)  BP: 103/61 (06 Jul 2021 22:37) (103/61 - 127/63)  BP(mean): --  RR: 18 (06 Jul 2021 22:37) (18 - 18)  SpO2: 96% (06 Jul 2021 22:37) (96% - 100%)  CAPILLARY BLOOD GLUCOSE      POCT Blood Glucose.: 173 mg/dL (06 Jul 2021 21:53)  POCT Blood Glucose.: 163 mg/dL (06 Jul 2021 21:13)  POCT Blood Glucose.: 151 mg/dL (06 Jul 2021 16:19)  POCT Blood Glucose.: 141 mg/dL (06 Jul 2021 11:27)  POCT Blood Glucose.: 121 mg/dL (06 Jul 2021 07:12)      07-05 @ 07:01  -  07-06 @ 07:00  --------------------------------------------------------  IN: 1890 mL / OUT: 1030 mL / NET: 860 mL    07-06 @ 07:01  -  07-06 @ 23:13  --------------------------------------------------------  IN: 578 mL / OUT: 800 mL / NET: -222 mL      Physical Exam:    Daily     Daily   General:  Well appearing, NAD, not cachetic  HEENT:  Nonicteric, PERRLA  CV:  RRR, S1S2   Lungs:  CTA B/L, no wheezes, rales, rhonchi  Abdomen:  Soft, non-tender, no distended, positive BS  Extremities:  2+ pulses, no c/c, no edema  Skin:  Warm and dry, no rashes  :  No escamilla  Neuro:  AAOx3, non-focal, grossly intact                                                                                                                                                                                                                                                                                                LABS:                               8.2    1.76  )-----------( 63       ( 06 Jul 2021 22:16 )             25.6                      07-06    133<L>  |  104  |  13  ----------------------------<  169<H>  3.5   |  16<L>  |  0.54    Ca    7.1<L>      06 Jul 2021 22:16  Phos  1.3     07-06  Mg     1.2     07-06    TPro  5.3<L>  /  Alb  2.1<L>  /  TBili  1.7<H>  /  DBili  x   /  AST  35  /  ALT  17  /  AlkPhos  567<H>  07-06                       RADIOLOGY & ADDITIONAL TESTS         I personally reviewed: [  ]EKG   [  ]CXR    [  ] CT      A/P:         Discussed with :     [facility] consultants' Notes   Time spent :

## 2021-07-06 NOTE — PROGRESS NOTE ADULT - ASSESSMENT
1) Hodgkin's lymphoma  - repeat CT scans, results noted  -Family meeting held at bedside on 7/2. We discussed rx options vs comfort care.   I explained that HD is a potentially treatable and curable entity.  Patient not a good candidate for CTX at this time and i'd therefore favor treatment with immunotherapy at this time, Nivolumab. Consent obtained.   Would try and give monthly.  I explained that inpt rehab and PT are crucial elements of any potential recovery.  - will add allopurinol  Hope to get drug approval and to proceed with rx on Tuesday     2) ENT- voice change is a new finding as per patient's sister. Better today  - ENT input note, f/u s/s    3)Hyperglycemia- mgmt as per med    4)Weakness. 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  - Neurology input noted.     5) Pancytopenia  anemia- w/u this far unremarkable. possible aocd.  transfusional support as needed    Leukopenia- unclear etiology- possibly due to infxn or malignancy. Also could be related to zosyn. Now off of abx. monitor cbc     Thrombocytopenia- has developed while here.  Can't r/o malignancy vs. due to zosyn.   manage supportively for now.    6. Fever- possibly related to malignancy.   ID following

## 2021-07-06 NOTE — PROGRESS NOTE ADULT - SUBJECTIVE AND OBJECTIVE BOX
Neurology Progress Note    S: Patient seen and examined.  . patient denied CP, SOB, HA or pain.  doing okay afebrile     Medication:  MEDICATIONS  (STANDING):  MEDICATIONS  (STANDING):  allopurinol 300 milliGRAM(s) Oral daily  ALPRAZolam 0.25 milliGRAM(s) Oral once  aspirin  chewable 81 milliGRAM(s) Oral daily  dextrose 40% Gel 15 Gram(s) Oral once  dextrose 5%. 1000 milliLiter(s) (50 mL/Hr) IV Continuous <Continuous>  dextrose 5%. 1000 milliLiter(s) (100 mL/Hr) IV Continuous <Continuous>  dextrose 50% Injectable 25 Gram(s) IV Push once  dextrose 50% Injectable 12.5 Gram(s) IV Push once  dextrose 50% Injectable 25 Gram(s) IV Push once  finasteride 5 milliGRAM(s) Oral daily  glucagon  Injectable 1 milliGRAM(s) IntraMuscular once  insulin glargine Injectable (LANTUS) 10 Unit(s) SubCutaneous at bedtime  insulin lispro (ADMELOG) corrective regimen sliding scale   SubCutaneous three times a day before meals  insulin lispro (ADMELOG) corrective regimen sliding scale   SubCutaneous at bedtime  pantoprazole    Tablet 40 milliGRAM(s) Oral before breakfast  QUEtiapine 25 milliGRAM(s) Oral at bedtime  sodium chloride 0.9%. 1000 milliLiter(s) (100 mL/Hr) IV Continuous <Continuous>  tamsulosin 0.4 milliGRAM(s) Oral at bedtime    MEDICATIONS  (PRN):  acetaminophen   Tablet .. 650 milliGRAM(s) Oral every 6 hours PRN Temp greater or equal to 38C (100.4F), Moderate Pain (4 - 6)        Vitals:  Vital Signs Last 24 Hrs  T(C): 37.6 (06 Jul 2021 11:17), Max: 37.6 (06 Jul 2021 11:17)  T(F): 99.6 (06 Jul 2021 11:17), Max: 99.6 (06 Jul 2021 11:17)  HR: 102 (06 Jul 2021 11:17) (102 - 108)  BP: 110/73 (06 Jul 2021 11:17) (107/60 - 118/74)  BP(mean): --  RR: 18 (06 Jul 2021 11:17) (18 - 18)  SpO2: 100% (06 Jul 2021 11:17) (98% - 100%)    General Exam:   General Appearance: Appropriately dressed and in no acute distress       Head: Normocephalic, atraumatic and no dysmorphic features  Ear, Nose, and Throat: Moist mucous membranes  CVS: S1S2+  Resp: No SOB, no wheeze or rhonchi  GI: soft NT/ND  Extremities: No edema or cyanosis  Skin: No bruises or rashes     Neurological Exam:  Mental Status: Awake, alert and oriented x 1-2.  Able to follow simple and complex verbal commands. Able to name and repeat. fluent speech. No obvious aphasia mild dysarthria, + hypophonic   Cranial Nerves: PERRL, EOMI, VFFC, sensation V1-V3 intact,  R facial asymmetry, equal elevation of palate, scm/trap 5/5, tongue is midline on protrusion. no obvious papilledema on fundoscopic exam. hearing is grossly intact.   Motor: generalized weakness but FRANCOIS. minimal weakness B/L LE R>L  Sensation: Intact to light touch and pinprick throughout. no right/left confusion. no extinction to tactile on DSS. Romberg was negative.   Reflexes: 1+ throughout at biceps, brachioradialis, triceps, patellars and ankles bilaterally and equal. No clonus. R toe and L toe were both downgoing.  Coordination: No dysmetria on FNF    Gait: deferred     I personally reviewed the below data/images/labs:    CBC Full  -  ( 05 Jul 2021 06:50 )  WBC Count : 1.53 K/uL  RBC Count : 2.54 M/uL  Hemoglobin : 7.2 g/dL  Hematocrit : 22.8 %  Platelet Count - Automated : 45 K/uL  Mean Cell Volume : 89.8 fl  Mean Cell Hemoglobin : 28.3 pg  Mean Cell Hemoglobin Concentration : 31.6 gm/dL  Auto Neutrophil # : x  Auto Lymphocyte # : x  Auto Monocyte # : x  Auto Eosinophil # : x  Auto Basophil # : x  Auto Neutrophil % : x  Auto Lymphocyte % : x  Auto Monocyte % : x  Auto Eosinophil % : x  Auto Basophil % : x    07-05    137  |  109<H>  |  12  ----------------------------<  121<H>  3.2<L>   |  18<L>  |  0.47<L>    Ca    6.8<L>      05 Jul 2021 06:51          < from: CT Chest w/ IV Cont (06.28.21 @ 19:01) >    EXAM:  CT CHEST IC                            PROCEDURE DATE:  06/28/2021            INTERPRETATION:  CLINICAL INFORMATION: Lymphoma, evaluate extent of thoracic disease.    COMPARISON: CT abdomen pelvis dated 6/28/2021. Chest x-ray dated 6/20/2021.    CONTRAST/COMPLICATIONS:  IV Contrast: 40 mL of Omnipaque 350 were administered. 60 mL discarded.  Oral Contrast: None.  Complications: None reported.    PROCEDURE:  CT of the Chest was performed.  Sagittal and coronal reformats were performed.    FINDINGS: The quality of the images are degraded by respiratory motion and streak artifact.    LUNGS AND AIRWAYS: Patent central airways. Patchy groundglass opacities and scattered nodular opacities throughout all lobes of the lungs. For example:  A left upper lobe nodule measures 0.9 cm (series 3 image 33).  A right middle lobe nodule measures 2.0 x 0.8 cm (series 3 image 76).  3.5 x 2.1 cm solid opacity within the superior segment of right lower lobe (series 3 image 71).  A right lower lobe nodule at the costophrenic angle measures 1.9 x 1.1 cm (series 3 image 108).    PLEURA: No pleural effusion or pneumothorax.    MEDIASTINUM AND SARTHAK: Mildly enlarged mediastinal, hilar, and supraclavicular lymphadenopathy. A right hilar lymph node measures 1.8 x 1.4 cm (series 3 image 90). A left supraclavicular node measures 2.5 x 1.5 cm (series 3 image 19). A subcarinal node measures 2.2 x 1.3 cm (series 3 image 63).    VESSELS: Coronary artery calcifications.    HEART: Heart size is normal. No pericardial effusion.    CHEST WALL AND LOWER NECK: Within normal limits.    VISUALIZED UPPER ABDOMEN: Retroperitoneal lymphadenopathy, as seen on abdominal CT from the same date.    BONES: Degenerative changes of the spine.    IMPRESSION:    Patchy groundglass opacities and scattered nodular opacities throughout all lobes of the lungs. Findings may be related to known lymphoma or may be seen in the setting of multifocal infection.    Mildly enlarged supraclavicular, mediastinal, hilar, and retroperitoneal lymphadenopathy likely related to known lymphoma.              LEXIS BAKER MD; Resident Radiology  This document has been electronically signed.  ISABEL JACOBO MD; Attending Radiologist  This document has been electronically signed. Jun 29 [id number]    < end of copied text >      < from: CT Neck Soft Tissue w/ IV Cont (07.03.21 @ 15:37) >    EXAM:  CT NECK SOFT TISSUE IC                          EXAM:  CT BRAIN IC                            PROCEDURE DATE:  07/03/2021            INTERPRETATION:  CLINICAL INDICATION: Hodgkin's lymphoma. Rule out metastatic disease.    TECHNIQUE: CT of the head and neck soft tissues was performed before and after administration of IV contrast. Contrast dose: 90 cc Omnipaque 300 IV contrast.    COMPARISON: CT chest 6/28/2021.    FINDINGS:    CT HEAD:  No acute transcortical infarct or intracranial hemorrhage. No abnormal intracranial enhancement is identified.    White matter hypoattenuating foci are noted, nonspecific but likely representing small vessel disease.    The ventricles are normal without evidence of hydrocephalus. There are no extra-axial fluid collections.    The visualized intraorbital contents are unremarkable. Mild mucosal thickening in the right maxillary sinus. The mastoid air cells are clear. The visualized soft tissues and osseous structures appear normal.    CT NECK:  Aerodigestive structures: Asymmetric enlargement of the left palatine tonsil is noted. Remainder of the aerodigestive structures are unremarkable.    Lymph nodes: Again noted left supraclavicular node measuring up to 2.4 x 1.5 cm.    Parotid and submandibular glands:  Normal.    Thyroid gland: Normal.    Vascular structures: Unremarkable.    Osseous structures: No fracture, dislocation or destructive lesion.    Partially visualized lung apices: Normal.      IMPRESSION:  CT head:  -No acute intracranial findings.  -No abnormal intracranial enhancement identified by CT. Consider MRI with contrast for increased sensitivity.    CT neck:  -Again noted enlarged left supraclavicular node.  -Asymmetric enlargement of the left palatine tonsil, suspicious for lymphomatous involvement given history. Correlate with direct visualization.                ZOEY TORRES MD; Attending Radiologist  This document has been electronically signed. Jul  3 2021  3:51PM    < end of copied text >

## 2021-07-06 NOTE — RAPID RESPONSE TEAM SUMMARY - NSOTHERSPECIFYRRT3_GEN_ALL_CORE
CXR, VBG with lytes, CBC, CMP, Mg, Phos, CRP, ESR, procalcitonin, blood cx x2, UA, MRSA swab, fungitell, cooling blanket, RUQ ultrasound.

## 2021-07-06 NOTE — PROGRESS NOTE ADULT - SUBJECTIVE AND OBJECTIVE BOX
CC: f/u for  fever  Patient reports he feels ok    REVIEW OF SYSTEMS:  All other review of systems negative (Comprehensive ROS)    Antimicrobials Day #  :    Other Medications Reviewed    T(F): 99.6 (07-06-21 @ 11:17), Max: 99.6 (07-06-21 @ 11:17)  HR: 102 (07-06-21 @ 11:17)  BP: 110/73 (07-06-21 @ 11:17)  RR: 18 (07-06-21 @ 11:17)  SpO2: 100% (07-06-21 @ 11:17)  Wt(kg): --    PHYSICAL EXAM:  General: alert, no acute distress  Eyes:  anicteric, no conjunctival injection, no discharge  Oropharynx: no lesions or injection 	  Neck: supple, without adenopathy  Lungs: clear to auscultation  Heart: regular rate and rhythm; no murmur, rubs or gallops  Abdomen: soft, nondistended, nontender, without mass or organomegaly  Skin: no lesions  Extremities: no clubbing, cyanosis, or edema  Neurologic: awake, weak all over, oriented, moves all extremities  backside no wounds  scrotum no swelling  LAB RESULTS:                        7.2    1.53  )-----------( 45       ( 05 Jul 2021 06:50 )             22.8     07-05    137  |  109<H>  |  12  ----------------------------<  121<H>  3.2<L>   |  18<L>  |  0.47<L>    Ca    6.8<L>      05 Jul 2021 06:51          MICROBIOLOGY:  RECENT CULTURES:  07-05 @ 03:54 .Blood Blood-Peripheral     No growth to date.          RADIOLOGY REVIEWED:  < from: CT Neck Soft Tissue w/ IV Cont (07.03.21 @ 15:37) >  EXAM:  CT NECK SOFT TISSUE IC                          EXAM:  CT BRAIN IC                            PROCEDURE DATE:  07/03/2021            INTERPRETATION:  CLINICAL INDICATION: Hodgkin's lymphoma. Rule out metastatic disease.    TECHNIQUE: CT of the head and neck soft tissues was performed before and after administration of IV contrast. Contrast dose: 90 cc Omnipaque 300 IV contrast.    COMPARISON: CT chest 6/28/2021.    FINDINGS:    CT HEAD:  No acute transcortical infarct or intracranial hemorrhage. No abnormal intracranial enhancement is identified.    White matter hypoattenuating foci are noted, nonspecific but likely representing small vessel disease.    The ventricles are normal without evidence of hydrocephalus. There are no extra-axial fluid collections.    The visualized intraorbital contents are unremarkable. Mild mucosal thickening in the right maxillary sinus. The mastoid air cells are clear. The visualized soft tissues and osseous structures appear normal.    CT NECK:  Aerodigestive structures: Asymmetric enlargement of the left palatine tonsil is noted. Remainder of the aerodigestive structures are unremarkable.    Lymph nodes: Again noted left supraclavicular node measuring up to 2.4 x 1.5 cm.    Parotid and submandibular glands:  Normal.    Thyroid gland: Normal.    Vascular structures: Unremarkable.    Osseous structures: No fracture, dislocation or destructive lesion.    Partially visualized lung apices: Normal.      IMPRESSION:  CT head:  -No acute intracranial findings.  -No abnormal intracranial enhancement identified by CT. Consider MRI with contrast for increased sensitivity.    CT neck:  -Again noted enlarged left supraclavicular node.  -Asymmetric enlargement of the left palatine tonsil, suspicious for lymphomatous involvement given history. Correlate with direct visualization.                ZOEY TORRES MD; Attending Radiologist  This document has been electronically signed. Jul  3 2021  3:51PM    < end of copied text >  `< from: CT Abdomen and Pelvis w/ IV Cont (06.28.21 @ 14:26) >      PROCEDURE DATE:  06/28/2021            INTERPRETATION:  CLINICAL INFORMATION: Right lower quadrant pain. Hodgkin's lymphoma.    COMPARISON: None.    CONTRAST/COMPLICATIONS:  IV Contrast: Omnipaque 350  90 cc administered   10 cc discarded  Oral Contrast: None  Complications: None reported    PROCEDURE:  CT of the Abdomen and Pelvis was performed.  Sagittal and coronal reformats were performed.    FINDINGS:  LOWER CHEST: Nodular opacities in the right middle and bilateral lower lobes.    LIVER: Within normal limits.  BILE DUCTS: Normal caliber.  GALLBLADDER: Within normal limits.  SPLEEN: Within normal limits.  PANCREAS: Within normal limits.  ADRENALS: Within normal limits.  KIDNEYS/URETERS: Duplex left collecting system/proximal ureters. Distal ureter is not well evaluated. Right renal cyst. No hydronephrosis.    BLADDER: Escamilla catheter.  REPRODUCTIVE ORGANS: Prostate is enlarged, measuring 6.5 cm in transverse dimension.    BOWEL: No bowel obstruction. Appendix is not visualized. No evidence of inflammation in the pericecal region.  PERITONEUM: No ascites.  VESSELS: Atherosclerotic changes.  RETROPERITONEUM/LYMPH NODES: Periportal and retroperitoneal lymphadenopathy. For reference, a conglomerate periportal node (2, 34) measures 5.1 x 4.4 cm. A left para-aortic lymph node (2, 53) measures 2.6 x 1.4 cm.  ABDOMINAL WALL: Left inguinal hernia containing nonobstructed sigmoid colon.  BONES: Degenerative changes.    IMPRESSION:  Indeterminant nodular lung opacities which dedicated follow-up chest CT can be performed for further evaluation.    Abdominal lymphadenopathy, which may be related to patient's known history of lymphoma. Correlate with prior imaging if availablefor comparison.    Enlarged prostate.                TRISTAN BAKER MD; Attending Radiologist    < end of copied text >              < from: CT Chest w/ IV Cont (06.28.21 @ 19:01) >    EXAM:  CT CHEST IC                            PROCEDURE DATE:  06/28/2021            INTERPRETATION:  CLINICAL INFORMATION: Lymphoma, evaluate extent of thoracic disease.    COMPARISON: CT abdomen pelvis dated 6/28/2021. Chest x-ray dated 6/20/2021.    CONTRAST/COMPLICATIONS:  IV Contrast: 40 mL of Omnipaque 350 were administered. 60 mL discarded.  Oral Contrast: None.  Complications: None reported.    PROCEDURE:  CT of the Chest was performed.  Sagittal and coronal reformats were performed.    FINDINGS: The quality of the images are degraded by respiratory motion and streak artifact.    LUNGS AND AIRWAYS: Patent central airways. Patchy groundglass opacities and scattered nodular opacities throughout all lobes of the lungs. For example:  A left upper lobe nodule measures 0.9 cm (series 3 image 33).  A right middle lobe nodule measures 2.0 x 0.8 cm (series 3 image 76).  3.5 x 2.1 cm solid opacity within the superior segment of right lower lobe (series 3 image 71).  A right lower lobe nodule at the costophrenic angle measures 1.9 x 1.1 cm (series 3 image 108).    PLEURA: No pleural effusion or pneumothorax.    MEDIASTINUM AND SARTHAK: Mildly enlarged mediastinal, hilar, and supraclavicular lymphadenopathy. A right hilar lymph node measures 1.8 x 1.4 cm (series 3 image 90). A left supraclavicular node measures 2.5 x 1.5 cm (series 3 image 19). A subcarinal node measures 2.2 x 1.3 cm (series 3 image 63).    VESSELS: Coronary artery calcifications.    HEART: Heart size is normal. No pericardial effusion.    CHEST WALL AND LOWER NECK: Within normal limits.    VISUALIZED UPPER ABDOMEN: Retroperitoneal lymphadenopathy, as seen on abdominal CT from the same date.    BONES: Degenerative changes of the spine.    IMPRESSION:    Patchy groundglass opacities and scattered nodular opacities throughout all lobes of the lungs. Findings may be related to known lymphoma or may be seen in the setting of multifocal infection.    Mildly enlarged supraclavicular, mediastinal, hilar, and retroperitoneal lymphadenopathy likely related to known lymphoma.              LEXIS BAKER     < end of copied text >  Assessment:  Patient with recently diagnosed hodgkin disease, sent to hospice without any treatment, had recent stay [facility] for sepsis maybe from gu infection, has chronic escamilla, here for FTT, Fever which is likely b symptom. Has pancytopenia presumedly from the hodgkin. He is planned to start check point inhibitor. 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 There is not large but there is a small risk the cpi could reactivate hep b  Plan:  continue to monitor off abx  check hep b dna

## 2021-07-06 NOTE — CHART NOTE - NSCHARTNOTEFT_GEN_A_CORE
7/6/2021 2223    Critical Care Note--Medicine Attending    See  RRT sheet for detail, assessment and full plan.   I personally provided 35 minutes of critical care time for fever, tachycardia, chills  in this 72 y/o M with recently diagnosed Hodgkin's lymphoma with pancytopaenia on immunotherapy, evaluated with bedside assessment and treated with IVF, empiric IV antibiotics, cultures, cooling blanket with improvement of symptoms.  Hemodynamics stable.  Patient aware of course and agrees with above.  Given patient's comorbidities, patient's long term prognosis is guarded.   Emotional support provided to patient.  Care reviewed with house staff and covering NP/PA, covering NP/PA to update family and primary provider.    Fritz Song MD  [phone] 7/6/2021 2223    Critical Care Note--Medicine Attending    See  RRT sheet for detail, assessment and full plan.   I personally provided 35 minutes of critical care time for fever, tachycardia, chills  in this 72 y/o M--patient UNKNOWN to me previously, with recently diagnosed Hodgkin's lymphoma with pancytopaenia on immunotherapy, evaluated with bedside assessment and treated with IVF, empiric IV antibiotics, cultures, cooling blanket with improvement of symptoms.  Hemodynamics stable.  Patient aware of course and agrees with above.  Given patient's comorbidities, patient's long term prognosis is guarded.   Emotional support provided to patient.  Care reviewed with house staff and covering NP/PA, covering NP/PA to update family and primary provider.    Fritz Song MD  [phone] 7/6/2021 2223    Critical Care Note--Medicine Attending    See  RRT sheet for detail, assessment and full plan.   I personally provided 35 minutes of critical care time for fever, tachycardia, chills  in this 70 y/o M--patient UNKNOWN to me previously, with recently diagnosed Hodgkin's lymphoma with pancytopaenia on immunotherapy, evaluated with bedside assessment and treated with IVF, empiric IV antibiotics, cultures, cooling blanket with improvement of symptoms.  Chest radiograph.  Hemodynamics stable.  Patient aware of course and agrees with above.  Given patient's comorbidities, patient's long term prognosis is guarded.   Emotional support provided to patient.  Care reviewed with house staff and covering NP/PA, covering NP/PA to update family and primary provider.    Fritz Song MD  [phone]

## 2021-07-06 NOTE — CHART NOTE - NSCHARTNOTEFT_GEN_A_CORE
Pt is a 70 yo male with failure to thrive in setting of recent diagnosis of Hodgkins lymphoma. Pt is a 70 yo male with failure to thrive in setting of recent diagnosis of Hodgkins lymphoma.    Pt seen for initial bedside swallow evaluation on 6/30, with recommendations for a dysphagia 1 diet with nectar thick liquids. Pt presented with: 1) An oropharyngeal dysphagia. There is significant prolonged and inefficient mastication with solid textures, with minimal residue remaining on the lingual surface after the swallow. Pt required verbal cued to clear residue via repeat swallows and liquid wash. There is suspected delayed pharyngeal swallow trigger. Immediate cough post-swallow with thin liquids is concerning for laryngeal penetration/aspiration. 2) Hypophonia    Pt seen for follow-up on this date to ensure diet tolerance. Pt encountered in bed, awake, on room air. Pt with significant improvement in hypophonic vocal quality from previous assessment. Requesting diet advancement 2/2 dislike of current textures. Pt self-administered PO trials of thick puree, nectar-thick, and thin liquids. Swallow function notable for suspected delayed pharyngeal swallow trigger. Slight change in vocal quality noted post-swallow with thin liquids; however, no coughing as noted on previous assessment. Change in vocal quality may be suggestive of laryngeal penetration/aspiration. Would therefore recommend an MBS to objectively assess swallow function. Pt made aware and in agreement with recommendation. 5 p's addressed and pt left in no distress.     Impressions: Pt presents with improved vocal quality and an oropharyngeal dysphagia, though with clinical improvement from previous assessment.     Recommendations:  1. Continue current diet, dysphagia 1 with nectar thick liquids  2. Maintain aspiration precautions  3. An MBS is recommended to objectively assess candidacy for diet advancement. Will tentatively schedule for tomorrow, 7/7     Pt, NIKOS Carrion, and WOO Willett made aware.

## 2021-07-06 NOTE — RAPID RESPONSE TEAM SUMMARY - NSOTHERINTERVENTIONSRRT_GEN_ALL_CORE
Sinus tachycardia I/s/o Fever. Pt cooled with ice packs with slight improvement in tachycardia to 130s, to be placed on cooling blanket and can receive another dose of IV Ofirmev 6 hrs s/p last dose. Recommend to complete 1L IVF bolus and continue maintenance IVF. Continue Antibiotics for now and f/u with ID in am and f/u infectious workup (cultures, procal, CRP, ESR, fungitell, MRSA swab, CXR). F/u RUQ ultrasound and CMP as pt c/o RUQ pain. Discussed with primary team at bedside. Sinus tachycardia I/s/o Fever. Pt cooled with ice packs with slight improvement in tachycardia to 130s, to be placed on cooling blanket and can receive another dose of IV Ofirmev 6 hrs s/p last dose. Recommend to complete 1L IVF bolus and continue maintenance IVF. Continue Antibiotics for now and f/u with ID in am and f/u infectious workup (cultures, procal, CRP, ESR, fungitell, MRSA swab). F/u CXR as pt high risk for aspiration on dysphagia diet and hypophonic at baseline. F/u RUQ ultrasound and CMP as pt c/o RUQ pain. Discussed with primary team at bedside. Sinus tachycardia I/s/o Fever. Pt cooled with ice packs with slight improvement in tachycardia to 130s, to be placed on cooling blanket and can receive another dose of IV Ofirmev 6 hrs s/p last dose if still febrile. Recommend to complete 1L IVF bolus and continue maintenance IVF. Continue Antibiotics for now and f/u with ID in am and infectious workup (cultures, procal, CRP, ESR, fungitell, MRSA swab). F/u CXR as pt high risk for aspiration on dysphagia diet and hypophonic at baseline. RUQ ultrasound ordered and f/u CMP as pt c/o RUQ pain. Discussed with primary team at bedside. Sinus tachycardia I/s/o Fever. Pt cooled with ice packs with slight improvement in symptoms and tachycardia to 130s, to be placed on cooling blanket and can receive another dose of IV Ofirmev 6 hrs s/p last dose if still febrile. Recommend to complete 1L IVF bolus and continue maintenance IVF. Continue Antibiotics for now and f/u with ID in am and infectious workup (cultures, procal, CRP, ESR, fungitell, MRSA swab). F/u CXR as pt high risk for aspiration on dysphagia diet and hypophonic at baseline. RUQ ultrasound ordered and f/u CMP as pt c/o RUQ pain. Discussed with primary team at bedside.

## 2021-07-06 NOTE — PROGRESS NOTE ADULT - ASSESSMENT
70 y/o M w/ pmhx of CVA this past August and got TPA per family member, DM , BPH with obstruction s/p escamilla catheter , s/p ERCP w Sphincteretomy recent admission to [facility] for sepsis  hodgkin lympoma was not offered any chemo and was placed on hospice.   npw presenting with weakness and FTT.   pt denies cp / SOB / plapiatiaons   no focal neuro complaints .. at baseline RLE weakness   no N/V   had one episode of diarrah   no urinary s x   abdominal pain, diffuse, but worse on R side.     - failure to thrive : cultures neg  CT chest noted   nutrition consult       - lymphoma : fu wtih Dr. Larios: possible immunotherapy and chemo at later time post rehab if pt/family agree     DM with hyperglycemia : improved     - anemia  : moniot rH/H   transfuse prn     - fever : doubt infectious etiology   fu with ID     - CT neck : Asymmetric enlargement of the left palatine tonsil, suspicious for lymphomatous involvement given history. Correlate with direct visualization.      d/w pt and sister

## 2021-07-06 NOTE — RAPID RESPONSE TEAM SUMMARY - NSSITUATIONBACKGROUNDRRT_GEN_ALL_CORE
72 yo M with PMHx of CVA 8/2020 s/p TPA, RLE weakness and hypophonia, DM, BPH with obstruction s/p escamilla catheter, s/p ERCP with sphincterectomy with recent admission for sepsis and recent diagnosis of Hodgkin's lymphoma c/b pancytopenia not offered chemo and placed on hospice now admitted 6/28 with generalized weakness and FTT. RRT called this evening for tachycardia.

## 2021-07-06 NOTE — PROGRESS NOTE ADULT - SUBJECTIVE AND OBJECTIVE BOX
Chief complaint  Patient is a 71y old  Male who presents with a chief complaint of lymphoma (05 Jul 2021 11:05)   Review of systems  Patient in bed, looks comfortable, no hypoglycemic episodes.    Labs and Fingersticks  CAPILLARY BLOOD GLUCOSE      POCT Blood Glucose.: 141 mg/dL (06 Jul 2021 11:27)  POCT Blood Glucose.: 121 mg/dL (06 Jul 2021 07:12)  POCT Blood Glucose.: 210 mg/dL (05 Jul 2021 21:11)  POCT Blood Glucose.: 168 mg/dL (05 Jul 2021 16:10)      Anion Gap, Serum: 10 (07-05 @ 06:51)      Calcium, Total Serum: 6.8 *L* (07-05 @ 06:51)          07-05    137  |  109<H>  |  12  ----------------------------<  121<H>  3.2<L>   |  18<L>  |  0.47<L>    Ca    6.8<L>      05 Jul 2021 06:51                          7.2    1.53  )-----------( 45       ( 05 Jul 2021 06:50 )             22.8     Medications  MEDICATIONS  (STANDING):  allopurinol 300 milliGRAM(s) Oral daily  ALPRAZolam 0.25 milliGRAM(s) Oral once  aspirin  chewable 81 milliGRAM(s) Oral daily  dextrose 40% Gel 15 Gram(s) Oral once  dextrose 5%. 1000 milliLiter(s) (50 mL/Hr) IV Continuous <Continuous>  dextrose 5%. 1000 milliLiter(s) (100 mL/Hr) IV Continuous <Continuous>  dextrose 50% Injectable 25 Gram(s) IV Push once  dextrose 50% Injectable 12.5 Gram(s) IV Push once  dextrose 50% Injectable 25 Gram(s) IV Push once  finasteride 5 milliGRAM(s) Oral daily  glucagon  Injectable 1 milliGRAM(s) IntraMuscular once  insulin glargine Injectable (LANTUS) 10 Unit(s) SubCutaneous at bedtime  insulin lispro (ADMELOG) corrective regimen sliding scale   SubCutaneous three times a day before meals  insulin lispro (ADMELOG) corrective regimen sliding scale   SubCutaneous at bedtime  nivolumab IVPB (eMAR) 480 milliGRAM(s) IV Intermittent once  pantoprazole    Tablet 40 milliGRAM(s) Oral before breakfast  QUEtiapine 25 milliGRAM(s) Oral at bedtime  sodium chloride 0.9%. 1000 milliLiter(s) (100 mL/Hr) IV Continuous <Continuous>  tamsulosin 0.4 milliGRAM(s) Oral at bedtime      Physical Exam  General: Patient comfortable in bed  Vital Signs Last 12 Hrs  T(F): 99.6 (07-06-21 @ 11:17), Max: 99.6 (07-06-21 @ 11:17)  HR: 102 (07-06-21 @ 11:17) (102 - 108)  BP: 110/73 (07-06-21 @ 11:17) (110/73 - 118/74)  BP(mean): --  RR: 18 (07-06-21 @ 11:17) (18 - 18)  SpO2: 100% (07-06-21 @ 11:17) (99% - 100%)  Neck: No palpable thyroid nodules.  CVS: S1S2, No murmurs  Respiratory: No wheezing, no crepitations  GI: Abdomen soft, bowel sounds positive  Musculoskeletal:  edema lower extremities.   Skin: No skin rashes, no ecchymosis    Diagnostics             Chief complaint  Patient is a 71y old  Male who presents with a chief complaint of lymphoma (05 Jul 2021 11:05)   Review of systems  Patient in bed, looks comfortable, no hypoglycemic episodes.    Labs and Fingersticks  CAPILLARY BLOOD GLUCOSE      POCT Blood Glucose.: 141 mg/dL (06 Jul 2021 11:27)  POCT Blood Glucose.: 121 mg/dL (06 Jul 2021 07:12)  POCT Blood Glucose.: 210 mg/dL (05 Jul 2021 21:11)  POCT Blood Glucose.: 168 mg/dL (05 Jul 2021 16:10)      Anion Gap, Serum: 10 (07-05 @ 06:51)      Calcium, Total Serum: 6.8 *L* (07-05 @ 06:51)          07-05    137  |  109<H>  |  12  ----------------------------<  121<H>  3.2<L>   |  18<L>  |  0.47<L>    Ca    6.8<L>      05 Jul 2021 06:51                          7.2    1.53  )-----------( 45       ( 05 Jul 2021 06:50 )             22.8     Medications  MEDICATIONS  (STANDING):  allopurinol 300 milliGRAM(s) Oral daily  ALPRAZolam 0.25 milliGRAM(s) Oral once  aspirin  chewable 81 milliGRAM(s) Oral daily  dextrose 40% Gel 15 Gram(s) Oral once  dextrose 5%. 1000 milliLiter(s) (50 mL/Hr) IV Continuous <Continuous>  dextrose 5%. 1000 milliLiter(s) (100 mL/Hr) IV Continuous <Continuous>  dextrose 50% Injectable 25 Gram(s) IV Push once  dextrose 50% Injectable 12.5 Gram(s) IV Push once  dextrose 50% Injectable 25 Gram(s) IV Push once  finasteride 5 milliGRAM(s) Oral daily  glucagon  Injectable 1 milliGRAM(s) IntraMuscular once  insulin glargine Injectable (LANTUS) 10 Unit(s) SubCutaneous at bedtime  insulin lispro (ADMELOG) corrective regimen sliding scale   SubCutaneous three times a day before meals  insulin lispro (ADMELOG) corrective regimen sliding scale   SubCutaneous at bedtime  nivolumab IVPB (eMAR) 480 milliGRAM(s) IV Intermittent once  pantoprazole    Tablet 40 milliGRAM(s) Oral before breakfast  QUEtiapine 25 milliGRAM(s) Oral at bedtime  sodium chloride 0.9%. 1000 milliLiter(s) (100 mL/Hr) IV Continuous <Continuous>  tamsulosin 0.4 milliGRAM(s) Oral at bedtime      Physical Exam  General: Patient comfortable in bed  Vital Signs Last 12 Hrs  T(F): 99.6 (07-06-21 @ 11:17), Max: 99.6 (07-06-21 @ 11:17)  HR: 102 (07-06-21 @ 11:17) (102 - 108)  BP: 110/73 (07-06-21 @ 11:17) (110/73 - 118/74)  BP(mean): --  RR: 18 (07-06-21 @ 11:17) (18 - 18)  SpO2: 100% (07-06-21 @ 11:17) (99% - 100%)  Neck: No palpable thyroid nodules.  CVS: S1S2, No murmurs  Respiratory: No wheezing, no crepitations  GI: Abdomen soft, bowel sounds positive  Musculoskeletal:  edema lower extremities.   Skin: No skin rashes, no ecchymosis    Diagnostics

## 2021-07-06 NOTE — PROGRESS NOTE ADULT - SUBJECTIVE AND OBJECTIVE BOX
LIZETT RIVERA  MRN-[id number]    Patient is a 71y old  Male who presents with a chief complaint of lymphoma (05 Jul 2021 11:05)      Review of System    Feels ok.  No new events    Current Meds  MEDICATIONS  (STANDING):  ALPRAZolam 0.25 milliGRAM(s) Oral once  aspirin  chewable 81 milliGRAM(s) Oral daily  dextrose 40% Gel 15 Gram(s) Oral once  dextrose 5%. 1000 milliLiter(s) (50 mL/Hr) IV Continuous <Continuous>  dextrose 5%. 1000 milliLiter(s) (100 mL/Hr) IV Continuous <Continuous>  dextrose 50% Injectable 25 Gram(s) IV Push once  dextrose 50% Injectable 12.5 Gram(s) IV Push once  dextrose 50% Injectable 25 Gram(s) IV Push once  finasteride 5 milliGRAM(s) Oral daily  glucagon  Injectable 1 milliGRAM(s) IntraMuscular once  insulin glargine Injectable (LANTUS) 10 Unit(s) SubCutaneous at bedtime  insulin lispro (ADMELOG) corrective regimen sliding scale   SubCutaneous three times a day before meals  insulin lispro (ADMELOG) corrective regimen sliding scale   SubCutaneous at bedtime  pantoprazole    Tablet 40 milliGRAM(s) Oral before breakfast  QUEtiapine 25 milliGRAM(s) Oral at bedtime  sodium chloride 0.9%. 1000 milliLiter(s) (100 mL/Hr) IV Continuous <Continuous>  tamsulosin 0.4 milliGRAM(s) Oral at bedtime    MEDICATIONS  (PRN):  acetaminophen   Tablet .. 650 milliGRAM(s) Oral every 6 hours PRN Temp greater or equal to 38C (100.4F), Moderate Pain (4 - 6)      Vital Signs Last 24 Hrs  T(C): 36.3 (06 Jul 2021 04:26), Max: 37.1 (05 Jul 2021 20:14)  T(F): 97.4 (06 Jul 2021 04:26), Max: 98.8 (05 Jul 2021 20:14)  HR: 108 (06 Jul 2021 04:26) (94 - 108)  BP: 118/74 (06 Jul 2021 04:26) (107/60 - 118/74)  BP(mean): --  RR: 18 (06 Jul 2021 04:26) (18 - 18)  SpO2: 99% (06 Jul 2021 04:26) (98% - 100%)      PHYSICAL EXAM:    NAD      Lab  CBC Full  -  ( 05 Jul 2021 06:50 )  WBC Count : 1.53 K/uL  RBC Count : 2.54 M/uL  Hemoglobin : 7.2 g/dL  Hematocrit : 22.8 %  Platelet Count - Automated : 45 K/uL  Mean Cell Volume : 89.8 fl  Mean Cell Hemoglobin : 28.3 pg  Mean Cell Hemoglobin Concentration : 31.6 gm/dL  Auto Neutrophil # : x  Auto Lymphocyte # : x  Auto Monocyte # : x  Auto Eosinophil # : x  Auto Basophil # : x  Auto Neutrophil % : x  Auto Lymphocyte % : x  Auto Monocyte % : x  Auto Eosinophil % : x  Auto Basophil % : x    07-05    137  |  109<H>  |  12  ----------------------------<  121<H>  3.2<L>   |  18<L>  |  0.47<L>    Ca    6.8<L>      05 Jul 2021 06:51          Rad:    Assessment/Plan

## 2021-07-06 NOTE — ADVANCED PRACTICE NURSE CONSULT - ASSESSMENT
Pt with day 1/1 tx, labs noted in sunrise, okay to proceed with tx as per MD Larios pending approval.  Pt with right piv + blood return noted, no pain, redness or swelling noted at site.  Pt administered Nivolumab 480 mg iv over 30 minutes via locked pump.  Pt with family at bedside, written material provided.  Emotional support provided.  Report given to area rn.

## 2021-07-06 NOTE — PROGRESS NOTE ADULT - ASSESSMENT
70 y/o AAM w/ Stroke this past August and got TPA per family member had L weakness resolved, DM , BPH with obstruction s/p escamilla catheter , s/p ERCP w Sphincteretomy recent admission to [facility] for sepsis  hodgkin lympoma was not offered any chemo and was placed on hospice.   npw presenting with generalized weakness and FTT.   has baseline R LE weakness.  + hypophonic   Hgb 7.1, platelets 106, elevated alk phos, A1c 8.8%  off 1:1 for + SI. off 1:1 now   LDL 30, A1c 7.7   repeat CTH 7/3 as above. CT neck as above   afebrile today   o/e with R NLF flattening, hypophonic, dysarthria, and generalized weakness. more likely 2/2 underlying medical condition as opposed to new stroke.   - fever was  101.9, infectious workup.  Tmax 100.9 --> now afebrtile   - psych f/u for SI --> no capacity. now off 1:1   - c/w ASA 81mg daily  - correct metabolic derangements   - lipitor 40mg if no CI. elevated alk phos   - telemetry  - heme/onc recs appreciated. --> possible treatment 7/6 with immunotherapy if approved   - PT/OT/SS/SLP, OOBC  - check FS, glucose control <180  - GI/DVT ppx  - Counseling on diet, exercise, and medication adherence was done  - Counseling on smoking cessation and alcohol consumption offered when appropriate.  - Pain assessed and judicious use of narcotics when appropriate was discussed.    - Stroke education given when appropriate.  - Importance of fall prevention discussed.   - Differential diagnosis and plan of care discussed with patient and/or family and primary team  - Thank you for allowing me to participate in the care of this patient. Call with questions.   - GOC discussion for hospice  dc plan   Juan العراقي MD  Vascular Neurology  Office: [phone]

## 2021-07-06 NOTE — PROGRESS NOTE ADULT - ASSESSMENT
The patient is a 41y Male complaining of hand pain/injury.   Assessment  DMT2: 71y Male with DM T2 with hyperglycemia, A1C 8.8%, was on oral meds/Janumet at home, admitted with weakness/fatigue and hyperglycemia, on  basal insulin and coverage, FS in acceptable range, no hypoglycemic episodes, eating partial meals on dysphagia diet, planning possible MBS tomorrow.  Lymphoma: on medications, monitored, FU Heme/Onc.  Anemia: stable, monitored.      Shahriar Kahn MD  Cell: [phone]  Office: [phone]       Assessment  DMT2: 71y Male with DM T2 with hyperglycemia, A1C 8.8%, was on oral meds/Janumet at home, admitted with weakness/fatigue and hyperglycemia, on  basal insulin and coverage, FS in acceptable range, no hypoglycemic episodes, eating partial meals on dysphagia diet,  planning possible MBS tomorrow.  Lymphoma: on medications, monitored, FU Heme/Onc.  Anemia: stable, monitored.      Shahriar Kahn MD  Cell: [phone]  Office: [phone]

## 2021-07-07 LAB
ANION GAP SERPL CALC-SCNC: 14 MMOL/L — SIGNIFICANT CHANGE UP (ref 5–17)
APPEARANCE UR: ABNORMAL
BACTERIA # UR AUTO: NEGATIVE — SIGNIFICANT CHANGE UP
BILIRUB UR-MCNC: NEGATIVE — SIGNIFICANT CHANGE UP
BLD GP AB SCN SERPL QL: NEGATIVE — SIGNIFICANT CHANGE UP
BUN SERPL-MCNC: 11 MG/DL — SIGNIFICANT CHANGE UP (ref 7–23)
CALCIUM SERPL-MCNC: 5.9 MG/DL — CRITICAL LOW (ref 8.4–10.5)
CHLORIDE SERPL-SCNC: 110 MMOL/L — HIGH (ref 96–108)
CO2 SERPL-SCNC: 13 MMOL/L — LOW (ref 22–31)
COLOR SPEC: YELLOW — SIGNIFICANT CHANGE UP
COMMENT - URINE: SIGNIFICANT CHANGE UP
CREAT SERPL-MCNC: 0.44 MG/DL — LOW (ref 0.5–1.3)
DIFF PNL FLD: NEGATIVE — SIGNIFICANT CHANGE UP
EPI CELLS # UR: 3 /HPF — SIGNIFICANT CHANGE UP
ERYTHROCYTE [SEDIMENTATION RATE] IN BLOOD: 45 MM/HR — HIGH (ref 0–20)
GALACTOMANNAN AG SERPL-ACNC: 0.07 INDEX — SIGNIFICANT CHANGE UP (ref 0–0.49)
GLUCOSE BLDC GLUCOMTR-MCNC: 104 MG/DL — HIGH (ref 70–99)
GLUCOSE BLDC GLUCOMTR-MCNC: 108 MG/DL — HIGH (ref 70–99)
GLUCOSE BLDC GLUCOMTR-MCNC: 133 MG/DL — HIGH (ref 70–99)
GLUCOSE BLDC GLUCOMTR-MCNC: 148 MG/DL — HIGH (ref 70–99)
GLUCOSE SERPL-MCNC: 128 MG/DL — HIGH (ref 70–99)
GLUCOSE UR QL: NEGATIVE — SIGNIFICANT CHANGE UP
HAPTOGLOB SERPL-MCNC: 94 MG/DL — SIGNIFICANT CHANGE UP (ref 34–200)
HBV DNA # SERPL NAA+PROBE: SIGNIFICANT CHANGE UP IU/ML
HBV DNA SERPL NAA+PROBE-LOG#: SIGNIFICANT CHANGE UP LOG10IU/ML
HCT VFR BLD CALC: 20.3 % — CRITICAL LOW (ref 39–50)
HCT VFR BLD CALC: 28.7 % — LOW (ref 39–50)
HGB BLD-MCNC: 6.2 G/DL — CRITICAL LOW (ref 13–17)
HGB BLD-MCNC: 9.1 G/DL — LOW (ref 13–17)
HYALINE CASTS # UR AUTO: 21 /LPF — HIGH (ref 0–2)
KETONES UR-MCNC: NEGATIVE — SIGNIFICANT CHANGE UP
LACTATE SERPL-SCNC: 3.6 MMOL/L — HIGH (ref 0.7–2)
LACTATE SERPL-SCNC: 4.1 MMOL/L — CRITICAL HIGH (ref 0.7–2)
LACTATE SERPL-SCNC: 4.7 MMOL/L — CRITICAL HIGH (ref 0.7–2)
LEUKOCYTE ESTERASE UR-ACNC: NEGATIVE — SIGNIFICANT CHANGE UP
MCHC RBC-ENTMCNC: 27.7 PG — SIGNIFICANT CHANGE UP (ref 27–34)
MCHC RBC-ENTMCNC: 28.3 PG — SIGNIFICANT CHANGE UP (ref 27–34)
MCHC RBC-ENTMCNC: 30.5 GM/DL — LOW (ref 32–36)
MCHC RBC-ENTMCNC: 31.7 GM/DL — LOW (ref 32–36)
MCV RBC AUTO: 87.5 FL — SIGNIFICANT CHANGE UP (ref 80–100)
MCV RBC AUTO: 92.7 FL — SIGNIFICANT CHANGE UP (ref 80–100)
MRSA PCR RESULT.: SIGNIFICANT CHANGE UP
NITRITE UR-MCNC: NEGATIVE — SIGNIFICANT CHANGE UP
NRBC # BLD: 0 /100 WBCS — SIGNIFICANT CHANGE UP (ref 0–0)
NRBC # BLD: 0 /100 WBCS — SIGNIFICANT CHANGE UP (ref 0–0)
PH UR: 5 — SIGNIFICANT CHANGE UP (ref 5–8)
PLATELET # BLD AUTO: 62 K/UL — LOW (ref 150–400)
PLATELET # BLD AUTO: 67 K/UL — LOW (ref 150–400)
POTASSIUM SERPL-MCNC: 3.6 MMOL/L — SIGNIFICANT CHANGE UP (ref 3.5–5.3)
POTASSIUM SERPL-SCNC: 3.6 MMOL/L — SIGNIFICANT CHANGE UP (ref 3.5–5.3)
PROT UR-MCNC: ABNORMAL
RBC # BLD: 2.19 M/UL — LOW (ref 4.2–5.8)
RBC # BLD: 3.28 M/UL — LOW (ref 4.2–5.8)
RBC # FLD: 22.4 % — HIGH (ref 10.3–14.5)
RBC # FLD: 22.8 % — HIGH (ref 10.3–14.5)
RBC CASTS # UR COMP ASSIST: 2 /HPF — SIGNIFICANT CHANGE UP (ref 0–4)
RH IG SCN BLD-IMP: POSITIVE — SIGNIFICANT CHANGE UP
S AUREUS DNA NOSE QL NAA+PROBE: SIGNIFICANT CHANGE UP
SODIUM SERPL-SCNC: 137 MMOL/L — SIGNIFICANT CHANGE UP (ref 135–145)
SP GR SPEC: 1.02 — SIGNIFICANT CHANGE UP (ref 1.01–1.02)
UROBILINOGEN FLD QL: ABNORMAL
WBC # BLD: 1.6 K/UL — LOW (ref 3.8–10.5)
WBC # BLD: 2.43 K/UL — LOW (ref 3.8–10.5)
WBC # FLD AUTO: 1.6 K/UL — LOW (ref 3.8–10.5)
WBC # FLD AUTO: 2.43 K/UL — LOW (ref 3.8–10.5)
WBC UR QL: 5 /HPF — SIGNIFICANT CHANGE UP (ref 0–5)

## 2021-07-07 PROCEDURE — 76700 US EXAM ABDOM COMPLETE: CPT | Mod: 26

## 2021-07-07 RX ORDER — METOPROLOL TARTRATE 50 MG
5 TABLET ORAL ONCE
Refills: 0 | Status: DISCONTINUED | OUTPATIENT
Start: 2021-07-07 | End: 2021-07-07

## 2021-07-07 RX ORDER — ACETAMINOPHEN 500 MG
1000 TABLET ORAL ONCE
Refills: 0 | Status: COMPLETED | OUTPATIENT
Start: 2021-07-07 | End: 2021-07-07

## 2021-07-07 RX ORDER — SODIUM ZIRCONIUM CYCLOSILICATE 10 G/10G
10 POWDER, FOR SUSPENSION ORAL ONCE
Refills: 0 | Status: DISCONTINUED | OUTPATIENT
Start: 2021-07-07 | End: 2021-07-07

## 2021-07-07 RX ADMIN — TAMSULOSIN HYDROCHLORIDE 0.4 MILLIGRAM(S): 0.4 CAPSULE ORAL at 21:00

## 2021-07-07 RX ADMIN — QUETIAPINE FUMARATE 25 MILLIGRAM(S): 200 TABLET, FILM COATED ORAL at 21:04

## 2021-07-07 RX ADMIN — FINASTERIDE 5 MILLIGRAM(S): 5 TABLET, FILM COATED ORAL at 11:01

## 2021-07-07 RX ADMIN — Medication 50 GRAM(S): at 00:07

## 2021-07-07 RX ADMIN — INSULIN GLARGINE 10 UNIT(S): 100 INJECTION, SOLUTION SUBCUTANEOUS at 22:09

## 2021-07-07 RX ADMIN — Medication 81 MILLIGRAM(S): at 11:01

## 2021-07-07 RX ADMIN — SODIUM CHLORIDE 100 MILLILITER(S): 9 INJECTION INTRAMUSCULAR; INTRAVENOUS; SUBCUTANEOUS at 13:01

## 2021-07-07 RX ADMIN — Medication 1000 MILLIGRAM(S): at 06:33

## 2021-07-07 RX ADMIN — PIPERACILLIN AND TAZOBACTAM 25 GRAM(S): 4; .5 INJECTION, POWDER, LYOPHILIZED, FOR SOLUTION INTRAVENOUS at 05:05

## 2021-07-07 RX ADMIN — Medication 400 MILLIGRAM(S): at 06:03

## 2021-07-07 RX ADMIN — POTASSIUM PHOSPHATE, MONOBASIC POTASSIUM PHOSPHATE, DIBASIC 83.33 MILLIMOLE(S): 236; 224 INJECTION, SOLUTION INTRAVENOUS at 01:29

## 2021-07-07 RX ADMIN — Medication 300 MILLIGRAM(S): at 11:01

## 2021-07-07 NOTE — PROGRESS NOTE ADULT - SUBJECTIVE AND OBJECTIVE BOX
CC: f/u for fever    Patient reports feels fine now    REVIEW OF SYSTEMS:  All other review of systems negative (Comprehensive ROS)    Antimicrobials Day #  :1  piperacillin/tazobactam IVPB.. 3.375 Gram(s) IV Intermittent every 8 hours    Other Medications Reviewed    T(F): 97.7 (07-07-21 @ 08:48), Max: 102.4 (07-06-21 @ 20:57)  HR: 102 (07-07-21 @ 08:48)  BP: 100/65 (07-07-21 @ 08:48)  RR: 18 (07-07-21 @ 08:48)  SpO2: 100% (07-07-21 @ 08:48)  Wt(kg): --    PHYSICAL EXAM:  General: alert, no acute distress  Eyes:  anicteric, no conjunctival injection, no discharge  Oropharynx: no lesions or injection 	  Neck: supple, without adenopathy  Lungs: clear to auscultation  Heart: regular rate and rhythm; no murmur, rubs or gallops  Abdomen: soft, nondistended, nontender, without mass or organomegaly  Skin: no lesions  Extremities: no clubbing, cyanosis, or edema  Neurologic: alert, oriented, moves all extremities  back with small area of excoriation  LAB RESULTS:                        6.2    1.60  )-----------( 62       ( 07 Jul 2021 06:16 )             20.3     07-07    137  |  110<H>  |  11  ----------------------------<  128<H>  3.6   |  13<L>  |  0.44<L>    Ca    5.9<LL>      07 Jul 2021 06:16  Phos  1.3     07-06  Mg     1.2     07-06    TPro  5.3<L>  /  Alb  2.1<L>  /  TBili  1.7<H>  /  DBili  x   /  AST  35  /  ALT  17  /  AlkPhos  567<H>  07-06    LIVER FUNCTIONS - ( 06 Jul 2021 22:16 )  Alb: 2.1 g/dL / Pro: 5.3 g/dL / ALK PHOS: 567 U/L / ALT: 17 U/L / AST: 35 U/L / GGT: x             MICROBIOLOGY:  RECENT CULTURES:  07-05 @ 03:54 .Blood Blood-Peripheral     No growth to date.          RADIOLOGY REVIEWED:  < from: Xray Chest 1 View AP/PA (07.06.21 @ 22:19) >  ******PRELIMINARY REPORT******    ******PRELIMINARY REPORT******          EXAM:  XR CHEST AP OR PA 1V                            PROCEDURE DATE:  07/06/2021      ******PRELIMINARY REPORT******    ******PRELIMINARY REPORT******              INTERPRETATION:  Clear lungs    Spoke with Zoroastrianism.    < end of copied text >              < from: CT Neck Soft Tissue w/ IV Cont (07.03.21 @ 15:37) >  EXAM:  CT NECK SOFT TISSUE IC                          EXAM:  CT BRAIN IC                            PROCEDURE DATE:  07/03/2021            INTERPRETATION:  CLINICAL INDICATION: Hodgkin's lymphoma. Rule out metastatic disease.    TECHNIQUE: CT of the head and neck soft tissues was performed before and after administration of IV contrast. Contrast dose: 90 cc Omnipaque 300 IV contrast.    COMPARISON: CT chest 6/28/2021.    FINDINGS:    CT HEAD:  No acute transcortical infarct or intracranial hemorrhage. No abnormal intracranial enhancement is identified.    White matter hypoattenuating foci are noted, nonspecific but likely representing small vessel disease.    The ventricles are normal without evidence of hydrocephalus. There are no extra-axial fluid collections.    The visualized intraorbital contents are unremarkable. Mild mucosal thickening in the right maxillary sinus. The mastoid air cells are clear. The visualized soft tissues and osseous structures appear normal.    CT NECK:  Aerodigestive structures: Asymmetric enlargement of the left palatine tonsil is noted. Remainder of the aerodigestive structures are unremarkable.    Lymph nodes: Again noted left supraclavicular node measuring up to 2.4 x 1.5 cm.    Parotid and submandibular glands:  Normal.    Thyroid gland: Normal.    Vascular structures: Unremarkable.    Osseous structures: No fracture, dislocation or destructive lesion.    Partially visualized lung apices: Normal.      IMPRESSION:  CT head:  -No acute intracranial findings.  -No abnormal intracranial enhancement identified by CT. Consider MRI with contrast for increased sensitivity.    CT neck:  -Again noted enlarged left supraclavicular node.  -Asymmetric enlargement of the left palatine tonsil, suspicious for lymphomatous involvement given history. Correlate with direct visualization.  `< from: CT Chest w/ IV Cont (06.28.21 @ 19:01) >    EXAM:  CT CHEST IC                            PROCEDURE DATE:  06/28/2021            INTERPRETATION:  CLINICAL INFORMATION: Lymphoma, evaluate extent of thoracic disease.    COMPARISON: CT abdomen pelvis dated 6/28/2021. Chest x-ray dated 6/20/2021.    CONTRAST/COMPLICATIONS:  IV Contrast: 40 mL of Omnipaque 350 were administered. 60 mL discarded.  Oral Contrast: None.  Complications: None reported.    PROCEDURE:  CT of the Chest was performed.  Sagittal and coronal reformats were performed.    FINDINGS: The quality of the images are degraded by respiratory motion and streak artifact.    LUNGS AND AIRWAYS: Patent central airways. Patchy groundglass opacities and scattered nodular opacities throughout all lobes of the lungs. For example:  A left upper lobe nodule measures 0.9 cm (series 3 image 33).  A right middle lobe nodule measures 2.0 x 0.8 cm (series 3 image 76).  3.5 x 2.1 cm solid opacity within the superior segment of right lower lobe (series 3 image 71).  A right lower lobe nodule at the costophrenic angle measures 1.9 x 1.1 cm (series 3 image 108).    PLEURA: No pleural effusion or pneumothorax.    MEDIASTINUM AND SARTHAK: Mildly enlarged mediastinal, hilar, and supraclavicular lymphadenopathy. A right hilar lymph node measures 1.8 x 1.4 cm (series 3 image 90). A left supraclavicular node measures 2.5 x 1.5 cm (series 3 image 19). A subcarinal node measures 2.2 x 1.3 cm (series 3 image 63).    VESSELS: Coronary artery calcifications.    HEART: Heart size is normal. No pericardial effusion.    CHEST WALL AND LOWER NECK: Within normal limits.    VISUALIZED UPPER ABDOMEN: Retroperitoneal lymphadenopathy, as seen on abdominal CT from the same date.    BONES: Degenerative changes of the spine.    IMPRESSION:    Patchy groundglass opacities and scattered nodular opacities throughout all lobes of the lungs. Findings may be related to known lymphoma or may be seen in the setting of multifocal infection.    Mildly enlarged supraclavicular, mediastinal, hilar, and retroperitoneal lymphadenopathy likely related to known lymphoma.    < from: CT Abdomen and Pelvis w/ IV Cont (06.28.21 @ 14:26) >  EXAM:  CT ABDOMEN AND PELVIS IC                            PROCEDURE DATE:  06/28/2021            INTERPRETATION:  CLINICAL INFORMATION: Right lower quadrant pain. Hodgkin's lymphoma.    COMPARISON: None.    CONTRAST/COMPLICATIONS:  IV Contrast: Omnipaque 350  90 cc administered   10 cc discarded  Oral Contrast: None  Complications: None reported    PROCEDURE:  CT of the Abdomen and Pelvis was performed.  Sagittal and coronal reformats were performed.    FINDINGS:  LOWER CHEST: Nodular opacities in the right middle and bilateral lower lobes.    LIVER: Within normal limits.  BILE DUCTS: Normal caliber.  GALLBLADDER: Within normal limits.  SPLEEN: Within normal limits.  PANCREAS: Within normal limits.  ADRENALS: Within normal limits.  KIDNEYS/URETERS: Duplex left collecting system/proximal ureters. Distal ureter is not well evaluated. Right renal cyst. No hydronephrosis.    BLADDER: Escamilla catheter.  REPRODUCTIVE ORGANS: Prostate is enlarged, measuring 6.5 cm in transverse dimension.    BOWEL: No bowel obstruction. Appendix is not visualized. No evidence of inflammation in the pericecal region.  PERITONEUM: No ascites.  VESSELS: Atherosclerotic changes.  RETROPERITONEUM/LYMPH NODES: Periportal and retroperitoneal lymphadenopathy. For reference, a conglomerate periportal node (2, 34) measures 5.1 x 4.4 cm. A left para-aortic lymph node (2, 53) measures 2.6 x 1.4 cm.  ABDOMINAL WALL: Left inguinal hernia containing nonobstructed sigmoid colon.  BONES: Degenerative changes.    IMPRESSION:  Indeterminant nodular lung opacities which dedicated follow-up chest CT can be performed for further evaluation.    Abdominal lymphadenopathy, which may be related to patient's known history of lymphoma. Correlate with prior imaging if availablefor comparison.    Enlarged prostate.      Assessment:  Patient with hodgkins disease diagnosed in April, had recent stay at [facility] for sepsis maybe gu origin, has chronic escamilla, deemed hospice candidate so sent to home hospice, now comes in for treatment with FTT and ongoing fever. Yesterday had episode of fever with tachycardia which I still suspect are b symptoms but await latest cultures. He continues to oxygenate fine, renal function is normal, mentation is normal, exam unrevealing, clear cxr. High alk p present since admission and likely is from his underlying disease. Recent ct shows no liver or gb lesions.   Plan:  will hold further zosyn for now  await planned ruq us  await heme plans for check point inhibitor

## 2021-07-07 NOTE — PROGRESS NOTE ADULT - SUBJECTIVE AND OBJECTIVE BOX
Chief complaint  Patient is a 71y old  Male who presents with a chief complaint of fever, FTT (07 Jul 2021 10:31)   Review of systems    RRT overnight for fever and tachycardia.  Patient in bed, looks comfortable, no hypoglycemic episodes.    Labs and Fingersticks  CAPILLARY BLOOD GLUCOSE      POCT Blood Glucose.: 108 mg/dL (07 Jul 2021 11:30)  POCT Blood Glucose.: 133 mg/dL (07 Jul 2021 07:18)  POCT Blood Glucose.: 173 mg/dL (06 Jul 2021 21:53)  POCT Blood Glucose.: 163 mg/dL (06 Jul 2021 21:13)  POCT Blood Glucose.: 151 mg/dL (06 Jul 2021 16:19)      Medications  MEDICATIONS  (STANDING):  allopurinol 300 milliGRAM(s) Oral daily  aspirin  chewable 81 milliGRAM(s) Oral daily  dextrose 40% Gel 15 Gram(s) Oral once  dextrose 5%. 1000 milliLiter(s) (50 mL/Hr) IV Continuous <Continuous>  dextrose 5%. 1000 milliLiter(s) (100 mL/Hr) IV Continuous <Continuous>  dextrose 50% Injectable 25 Gram(s) IV Push once  dextrose 50% Injectable 12.5 Gram(s) IV Push once  dextrose 50% Injectable 25 Gram(s) IV Push once  finasteride 5 milliGRAM(s) Oral daily  glucagon  Injectable 1 milliGRAM(s) IntraMuscular once  insulin glargine Injectable (LANTUS) 10 Unit(s) SubCutaneous at bedtime  insulin lispro (ADMELOG) corrective regimen sliding scale   SubCutaneous three times a day before meals  insulin lispro (ADMELOG) corrective regimen sliding scale   SubCutaneous at bedtime  pantoprazole    Tablet 40 milliGRAM(s) Oral before breakfast  QUEtiapine 25 milliGRAM(s) Oral at bedtime  sodium chloride 0.9%. 1000 milliLiter(s) (100 mL/Hr) IV Continuous <Continuous>  tamsulosin 0.4 milliGRAM(s) Oral at bedtime      Physical Exam  General: Patient comfortable in bed  Vital Signs Last 12 Hrs  T(F): 97.7 (07-07-21 @ 11:21), Max: 100.1 (07-07-21 @ 05:52)  HR: 92 (07-07-21 @ 11:21) (92 - 136)  BP: 106/71 (07-07-21 @ 11:21) (100/65 - 119/72)  BP(mean): --  RR: 18 (07-07-21 @ 11:21) (18 - 18)  SpO2: 100% (07-07-21 @ 11:21) (99% - 100%)             Chief complaint  Patient is a 71y old  Male who presents with a chief complaint of fever, FTT (07 Jul 2021 10:31)   Review of systems    RRT overnight for fever and tachycardia.  Patient in bed, looks comfortable, no hypoglycemic episodes.    Labs and Fingersticks  CAPILLARY BLOOD GLUCOSE      POCT Blood Glucose.: 108 mg/dL (07 Jul 2021 11:30)  POCT Blood Glucose.: 133 mg/dL (07 Jul 2021 07:18)  POCT Blood Glucose.: 173 mg/dL (06 Jul 2021 21:53)  POCT Blood Glucose.: 163 mg/dL (06 Jul 2021 21:13)  POCT Blood Glucose.: 151 mg/dL (06 Jul 2021 16:19)      Medications  MEDICATIONS  (STANDING):  allopurinol 300 milliGRAM(s) Oral daily  aspirin  chewable 81 milliGRAM(s) Oral daily  dextrose 40% Gel 15 Gram(s) Oral once  dextrose 5%. 1000 milliLiter(s) (50 mL/Hr) IV Continuous <Continuous>  dextrose 5%. 1000 milliLiter(s) (100 mL/Hr) IV Continuous <Continuous>  dextrose 50% Injectable 25 Gram(s) IV Push once  dextrose 50% Injectable 12.5 Gram(s) IV Push once  dextrose 50% Injectable 25 Gram(s) IV Push once  finasteride 5 milliGRAM(s) Oral daily  glucagon  Injectable 1 milliGRAM(s) IntraMuscular once  insulin glargine Injectable (LANTUS) 10 Unit(s) SubCutaneous at bedtime  insulin lispro (ADMELOG) corrective regimen sliding scale   SubCutaneous three times a day before meals  insulin lispro (ADMELOG) corrective regimen sliding scale   SubCutaneous at bedtime  pantoprazole    Tablet 40 milliGRAM(s) Oral before breakfast  QUEtiapine 25 milliGRAM(s) Oral at bedtime  sodium chloride 0.9%. 1000 milliLiter(s) (100 mL/Hr) IV Continuous <Continuous>  tamsulosin 0.4 milliGRAM(s) Oral at bedtime      Physical Exam  General: Patient comfortable in bed  Vital Signs Last 12 Hrs  T(F): 97.7 (07-07-21 @ 11:21), Max: 100.1 (07-07-21 @ 05:52)  HR: 92 (07-07-21 @ 11:21) (92 - 136)  BP: 106/71 (07-07-21 @ 11:21) (100/65 - 119/72)  BP(mean): --  RR: 18 (07-07-21 @ 11:21) (18 - 18)  SpO2: 100% (07-07-21 @ 11:21) (99% - 100%)

## 2021-07-07 NOTE — PROGRESS NOTE ADULT - ASSESSMENT
70 y/o M w/ pmhx of CVA this past August and got TPA per family member, DM , BPH with obstruction s/p escamilla catheter , s/p ERCP w Sphincteretomy recent admission to [facility] for sepsis  hodgkin lympoma was not offered any chemo and was placed on hospice.   npw presenting with weakness and FTT.   pt denies cp / SOB / plapiatiaons   no focal neuro complaints .. at baseline RLE weakness   no N/V   had one episode of diarrah   no urinary s x   abdominal pain, diffuse, but worse on R side.     - failure to thrive : cultures neg  CT chest noted   nutrition consult     - lymphoma : fu wtih Dr. Larios: s/p  immunotherapy and chemo at later time post rehab if pt/family agree     DM with hyperglycemia : improved     - anemia  : moniot rH/H post transfusion     - fever : doubt infectious etiology   fu cultures   abx dced   cont ivf     -voice changes  CT neck : Asymmetric enlargement of the left palatine tonsil, suspicious for lymphomatous involvement given history. Correlate with direct visualization.      d/w pt and sister

## 2021-07-07 NOTE — SWALLOW VFSS/MBS ASSESSMENT ADULT - DIAGNOSTIC IMPRESSIONS
Pt planned for MBS this morning however noted with RRT overnight for fever and tachycardia, now pending transfusion. Case d/w RN and PA: Brennon who agree that pt is not appropriate or stable to come off floor for exam. This service will f/u at bedside to assess tolerance of current diet. Order for MBS to be discontinued.

## 2021-07-07 NOTE — PROGRESS NOTE ADULT - ASSESSMENT
Assessment  DMT2: 71y Male with DM T2 with hyperglycemia, A1C 8.8%, was on oral meds/Janumet at home, admitted with weakness/fatigue and hyperglycemia, on  basal insulin and coverage, blood sugars trending within acceptable range, no hypoglycemic episodes. Patient is s/p RRT overnight for fever and tachycardia, MBS delayed today, appears comfortable in NAD.  Lymphoma: on medications, monitored, FU Heme/Onc.  Anemia: stable, monitored.      Shahriar Kahn MD  Cell: [phone]  Office: [phone]       Assessment  DMT2: 71y Male with DM T2 with hyperglycemia, A1C 8.8%, was on oral meds/Janumet at home, admitted with weakness/fatigue and hyperglycemia, on  basal insulin and coverage, blood sugars trending within acceptable range, no hypoglycemic episodes.  Patient is s/p RRT overnight for fever and tachycardia, MBS delayed today, appears comfortable in NAD.  Lymphoma: on medications, monitored, FU Heme/Onc.  Anemia: stable, monitored.      Shahriar Kahn MD  Cell: [phone]  Office: [phone]

## 2021-07-07 NOTE — PROGRESS NOTE ADULT - ASSESSMENT
1) Hodgkin's lymphoma  - repeat CT scans, results noted  -Family meeting held at bedside on 7/2. We discussed rx options vs comfort care.   I explained that HD is a potentially treatable and curable entity.  Patient not a good candidate for CTX at this time and i'd therefore favor treatment with immunotherapy at this time, Nivolumab. Consent obtained.   Would try and give monthly.  I explained that inpt rehab and PT are crucial elements of any potential recovery.  - will add allopurinol  - pt s/p 1st rx with opdivo. next due on 8/3     2) ENT- voice change is a new finding as per patient's sister. Better   - ENT input note, f/u s/s    3)Hyperglycemia- mgmt as per med    4)Weakness. 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  - Neurology input noted.  - cont pt.  - bed to chair     5) Pancytopenia  anemia- w/u this far unremarkable. possible aocd.  Stool guaiac requested  transfusional support as needed    Leukopenia- unclear etiology- possibly due to infxn or malignancy. Also could be related to zosyn. Now off of abx. monitor cbc     Thrombocytopenia- has developed while here.  Can't r/o malignancy vs. due to zosyn.   manage supportively for now.    6. Fever- possibly related to malignancy.   ID following

## 2021-07-07 NOTE — PROGRESS NOTE ADULT - SUBJECTIVE AND OBJECTIVE BOX
Date of service: 07-07-21 @ 22:49      Patient is a 71y old  Male who presents with a chief complaint of fever, FTT (07 Jul 2021 10:31)                                                               INTERVAL HPI/OVERNIGHT EVENTS:  fever overnight     REVIEW OF SYSTEMS:     CONSTITUTIONAL: No weakness, fevers or chills  RESPIRATORY: No cough, wheezing,  No shortness of breath  CARDIOVASCULAR: No chest pain or palpitations  GASTROINTESTINAL: No abdominal pain  . No nausea, vomiting, or hematemesis; No diarrhea or constipation. No melena or hematochezia.  GENITOURINARY: No dysuria, frequency or hematuria  NEUROLOGICAL: No numbness or weakness                                                                                                                                                                                                                                                                                 Medications:  MEDICATIONS  (STANDING):  allopurinol 300 milliGRAM(s) Oral daily  aspirin  chewable 81 milliGRAM(s) Oral daily  dextrose 40% Gel 15 Gram(s) Oral once  dextrose 5%. 1000 milliLiter(s) (50 mL/Hr) IV Continuous <Continuous>  dextrose 5%. 1000 milliLiter(s) (100 mL/Hr) IV Continuous <Continuous>  dextrose 50% Injectable 25 Gram(s) IV Push once  dextrose 50% Injectable 12.5 Gram(s) IV Push once  dextrose 50% Injectable 25 Gram(s) IV Push once  finasteride 5 milliGRAM(s) Oral daily  glucagon  Injectable 1 milliGRAM(s) IntraMuscular once  insulin glargine Injectable (LANTUS) 10 Unit(s) SubCutaneous at bedtime  insulin lispro (ADMELOG) corrective regimen sliding scale   SubCutaneous three times a day before meals  insulin lispro (ADMELOG) corrective regimen sliding scale   SubCutaneous at bedtime  pantoprazole    Tablet 40 milliGRAM(s) Oral before breakfast  QUEtiapine 25 milliGRAM(s) Oral at bedtime  sodium chloride 0.9%. 1000 milliLiter(s) (100 mL/Hr) IV Continuous <Continuous>  tamsulosin 0.4 milliGRAM(s) Oral at bedtime    MEDICATIONS  (PRN):  acetaminophen   Tablet .. 650 milliGRAM(s) Oral every 6 hours PRN Temp greater or equal to 38C (100.4F), Moderate Pain (4 - 6)       Allergies    No Known Allergies    Intolerances      Vital Signs Last 24 Hrs  T(C): 36.7 (07 Jul 2021 19:28), Max: 37.8 (07 Jul 2021 02:41)  T(F): 98.1 (07 Jul 2021 19:28), Max: 100.1 (07 Jul 2021 05:52)  HR: 123 (07 Jul 2021 19:28) (92 - 136)  BP: 109/72 (07 Jul 2021 19:28) (100/65 - 122/72)  BP(mean): --  RR: 18 (07 Jul 2021 19:28) (18 - 18)  SpO2: 100% (07 Jul 2021 19:28) (99% - 100%)  CAPILLARY BLOOD GLUCOSE      POCT Blood Glucose.: 148 mg/dL (07 Jul 2021 22:04)  POCT Blood Glucose.: 104 mg/dL (07 Jul 2021 16:07)  POCT Blood Glucose.: 108 mg/dL (07 Jul 2021 11:30)  POCT Blood Glucose.: 133 mg/dL (07 Jul 2021 07:18)      07-06 @ 07:01  -  07-07 @ 07:00  --------------------------------------------------------  IN: 578 mL / OUT: 1000 mL / NET: -422 mL    07-07 @ 07:01  -  07-07 @ 22:49  --------------------------------------------------------  IN: 1901 mL / OUT: 550 mL / NET: 1351 mL      Physical Exam:    Daily     Daily   General:  Well appearing, NAD, not cachetic  HEENT:  Nonicteric, PERRLA  CV:  RRR, S1S2   Lungs:  CTA B/L, no wheezes, rales, rhonchi  Abdomen:  Soft, non-tender, no distended, positive BS  Extremities:  no edema   Firth in place                                                                                                                                                                                                                                                                                                LABS:                               9.1    2.43  )-----------( 67       ( 07 Jul 2021 19:26 )             28.7                      07-07    137  |  110<H>  |  11  ----------------------------<  128<H>  3.6   |  13<L>  |  0.44<L>    Ca    5.9<LL>      07 Jul 2021 06:16  Phos  1.3     07-06  Mg     1.2     07-06    TPro  5.3<L>  /  Alb  2.1<L>  /  TBili  1.7<H>  /  DBili  x   /  AST  35  /  ALT  17  /  AlkPhos  567<H>  07-06                       RADIOLOGY & ADDITIONAL TESTS         I personally reviewed: [  ]EKG   [  ]CXR    [  ] CT      A/P:         Discussed with :     Augusto consultants' Notes   Time spent :

## 2021-07-07 NOTE — PROGRESS NOTE ADULT - SUBJECTIVE AND OBJECTIVE BOX
LIZETT RIVERA  MRN-[id number]    Patient is a 71y old  Male who presents with a chief complaint of fever, FTT (06 Jul 2021 17:35)      Review of System  REVIEW OF SYSTEMS      General:	Denies fatigue, fevers, chills, sweats, decreased appetite.    Skin/Breast: denies pruritis, rash  	  Ophthalmologic: no change in vision or blurring  	  HEENT	Denies dry mouth, oral sores, dysphagia,  change in hearing.    Respiratory and Thorax:  cough, sob, wheeze, hemoptysis  	  Cardiovascular:	no cp , palp, orthopnea    Gastrointestinal:	no n/v/d constipation    Genitourinary:	no dysuria of frequency, no hematuria, no flank pain    Musculoskeletal:	no bone or joint pain. no muscle aches.     Neurological:	no change in sensory or motor function. no headache. no weakness.     Psychiatric:	no depression, no anxiety, insomnia.     Hematology/Lymphatics:	no bleeding or bruising        Current Meds  MEDICATIONS  (STANDING):  allopurinol 300 milliGRAM(s) Oral daily  aspirin  chewable 81 milliGRAM(s) Oral daily  dextrose 40% Gel 15 Gram(s) Oral once  dextrose 5%. 1000 milliLiter(s) (50 mL/Hr) IV Continuous <Continuous>  dextrose 5%. 1000 milliLiter(s) (100 mL/Hr) IV Continuous <Continuous>  dextrose 50% Injectable 25 Gram(s) IV Push once  dextrose 50% Injectable 12.5 Gram(s) IV Push once  dextrose 50% Injectable 25 Gram(s) IV Push once  finasteride 5 milliGRAM(s) Oral daily  glucagon  Injectable 1 milliGRAM(s) IntraMuscular once  insulin glargine Injectable (LANTUS) 10 Unit(s) SubCutaneous at bedtime  insulin lispro (ADMELOG) corrective regimen sliding scale   SubCutaneous three times a day before meals  insulin lispro (ADMELOG) corrective regimen sliding scale   SubCutaneous at bedtime  pantoprazole    Tablet 40 milliGRAM(s) Oral before breakfast  piperacillin/tazobactam IVPB.. 3.375 Gram(s) IV Intermittent every 8 hours  QUEtiapine 25 milliGRAM(s) Oral at bedtime  sodium chloride 0.9%. 1000 milliLiter(s) (100 mL/Hr) IV Continuous <Continuous>  tamsulosin 0.4 milliGRAM(s) Oral at bedtime    MEDICATIONS  (PRN):  acetaminophen   Tablet .. 650 milliGRAM(s) Oral every 6 hours PRN Temp greater or equal to 38C (100.4F), Moderate Pain (4 - 6)      Vitals  Vital Signs Last 24 Hrs  T(C): 37.8 (07 Jul 2021 05:52), Max: 39.1 (06 Jul 2021 20:57)  T(F): 100.1 (07 Jul 2021 05:52), Max: 102.4 (06 Jul 2021 20:57)  HR: 123 (07 Jul 2021 05:52) (102 - 169)  BP: 109/70 (07 Jul 2021 05:52) (103/61 - 127/63)  BP(mean): --  RR: 18 (07 Jul 2021 05:52) (18 - 18)  SpO2: 100% (07 Jul 2021 05:52) (96% - 100%)    Physical Exam  PHYSICAL EXAM:      Constitutional: NAD    Eyes: PERRLA EOMI, anicteric sclera    Heent :No oral sores, no pharyngeal injection. moist mucosa.    Neck: supple, no jvd, no LAD    Respiratory: CTA b/l     Cardiovascular: s1s2, no m/g/r    Gastrointestinal: soft, nt, nd, + BS    Extremities: no c/c/e    Neurological:A&O x 3 moves all ext.    Skin: no rash on exposed skin    Lymph Nodes: no lymphadenopathy.              Lab  CBC Full  -  ( 07 Jul 2021 06:16 )  WBC Count : 1.60 K/uL  RBC Count : 2.19 M/uL  Hemoglobin : 6.2 g/dL  Hematocrit : 20.3 %  Platelet Count - Automated : 62 K/uL  Mean Cell Volume : 92.7 fl  Mean Cell Hemoglobin : 28.3 pg  Mean Cell Hemoglobin Concentration : 30.5 gm/dL  Auto Neutrophil # : x  Auto Lymphocyte # : x  Auto Monocyte # : x  Auto Eosinophil # : x  Auto Basophil # : x  Auto Neutrophil % : x  Auto Lymphocyte % : x  Auto Monocyte % : x  Auto Eosinophil % : x  Auto Basophil % : x    07-07    137  |  110<H>  |  11  ----------------------------<  128<H>  3.6   |  13<L>  |  0.44<L>    Ca    5.9<LL>      07 Jul 2021 06:16  Phos  1.3     07-06  Mg     1.2     07-06    TPro  5.3<L>  /  Alb  2.1<L>  /  TBili  1.7<H>  /  DBili  x   /  AST  35  /  ALT  17  /  AlkPhos  567<H>  07-06        Rad:    Assessment/Plan   LIZETT RIVERA  MRN-[id number]    Patient is a 71y old  Male who presents with a chief complaint of fever, FTT (06 Jul 2021 17:35)      Review of System    weak.  no new complaints    Current Meds  MEDICATIONS  (STANDING):  allopurinol 300 milliGRAM(s) Oral daily  aspirin  chewable 81 milliGRAM(s) Oral daily  dextrose 40% Gel 15 Gram(s) Oral once  dextrose 5%. 1000 milliLiter(s) (50 mL/Hr) IV Continuous <Continuous>  dextrose 5%. 1000 milliLiter(s) (100 mL/Hr) IV Continuous <Continuous>  dextrose 50% Injectable 25 Gram(s) IV Push once  dextrose 50% Injectable 12.5 Gram(s) IV Push once  dextrose 50% Injectable 25 Gram(s) IV Push once  finasteride 5 milliGRAM(s) Oral daily  glucagon  Injectable 1 milliGRAM(s) IntraMuscular once  insulin glargine Injectable (LANTUS) 10 Unit(s) SubCutaneous at bedtime  insulin lispro (ADMELOG) corrective regimen sliding scale   SubCutaneous three times a day before meals  insulin lispro (ADMELOG) corrective regimen sliding scale   SubCutaneous at bedtime  pantoprazole    Tablet 40 milliGRAM(s) Oral before breakfast  piperacillin/tazobactam IVPB.. 3.375 Gram(s) IV Intermittent every 8 hours  QUEtiapine 25 milliGRAM(s) Oral at bedtime  sodium chloride 0.9%. 1000 milliLiter(s) (100 mL/Hr) IV Continuous <Continuous>  tamsulosin 0.4 milliGRAM(s) Oral at bedtime    MEDICATIONS  (PRN):  acetaminophen   Tablet .. 650 milliGRAM(s) Oral every 6 hours PRN Temp greater or equal to 38C (100.4F), Moderate Pain (4 - 6)      Vitals  Vital Signs Last 24 Hrs  T(C): 37.8 (07 Jul 2021 05:52), Max: 39.1 (06 Jul 2021 20:57)  T(F): 100.1 (07 Jul 2021 05:52), Max: 102.4 (06 Jul 2021 20:57)  HR: 123 (07 Jul 2021 05:52) (102 - 169)  BP: 109/70 (07 Jul 2021 05:52) (103/61 - 127/63)  BP(mean): --  RR: 18 (07 Jul 2021 05:52) (18 - 18)  SpO2: 100% (07 Jul 2021 05:52) (96% - 100%)      PHYSICAL EXAM:    NAD    Lab  CBC Full  -  ( 07 Jul 2021 06:16 )  WBC Count : 1.60 K/uL  RBC Count : 2.19 M/uL  Hemoglobin : 6.2 g/dL  Hematocrit : 20.3 %  Platelet Count - Automated : 62 K/uL  Mean Cell Volume : 92.7 fl  Mean Cell Hemoglobin : 28.3 pg  Mean Cell Hemoglobin Concentration : 30.5 gm/dL  Auto Neutrophil # : x  Auto Lymphocyte # : x  Auto Monocyte # : x  Auto Eosinophil # : x  Auto Basophil # : x  Auto Neutrophil % : x  Auto Lymphocyte % : x  Auto Monocyte % : x  Auto Eosinophil % : x  Auto Basophil % : x    07-07    137  |  110<H>  |  11  ----------------------------<  128<H>  3.6   |  13<L>  |  0.44<L>    Ca    5.9<LL>      07 Jul 2021 06:16  Phos  1.3     07-06  Mg     1.2     07-06    TPro  5.3<L>  /  Alb  2.1<L>  /  TBili  1.7<H>  /  DBili  x   /  AST  35  /  ALT  17  /  AlkPhos  567<H>  07-06        Rad:    Assessment/Plan

## 2021-07-07 NOTE — CHART NOTE - NSCHARTNOTEFT_GEN_A_CORE
Medicine PA    Patient is a 71y old  Male who presents with a chief complaint of fever, FTT (06 Jul 2021 17:35)  RN notified patient is having a temp of 102.6 w/ HR in 150's.   EKG done sinus tachycardia w/  bpm. IV Tylenol ordered. Spoke with  and recommended to continue monitoring off abx and not to remove Sauceda.   After vitals were rechecked temp increased to 103.2, now pt is diaphoretic, does not feel good and felt lightheadedness w/ increased HR to 150's,  also complained of RUQ pain which was nontender, nondistended on abdominal exam.  RRT was called.   As per RRT- Blood, EKG, IV fluid bolus, CXR, VBG with lytes, CBC, CMP, Mg, Phos, CRP, ESR, procalcitonin, blood cx x2, UA, MRSA swab, fungitell, cooling blanket, RUQ ultrasound.  1L IVF Bolus, Vancomycin 1g x1, Zosyn 3.375 q8 hrs  Placed on cooling blanket and continue maintenance IVF.  Continue Antibiotics for now and f/u with ID in am and infectious workup (cultures, procal, CRP, ESR, fungitell, MRSA swab).  RUQ ultrasound ordered and f/u CMP as pt c/o RUQ pain.  Mg and Phosp supplemented    Vital Signs Last 24 Hrs  T(C): 37.8 (07 Jul 2021 02:41), Max: 39.1 (06 Jul 2021 20:57)  T(F): 100 (07 Jul 2021 02:41), Max: 102.4 (06 Jul 2021 20:57)  HR: 124 (07 Jul 2021 00:08) (102 - 169)  BP: 122/72 (07 Jul 2021 00:08) (103/61 - 127/63)  RR: 18 (07 Jul 2021 00:08) (18 - 18)  SpO2: 100% (07 Jul 2021 00:08) (96% - 100%)      Labs:                        8.2    1.76  )-----------( 63       ( 06 Jul 2021 22:16 )             25.6     07-06    133<L>  |  104  |  13  ----------------------------<  169<H>  3.5   |  16<L>  |  0.54    Ca    7.1<L>      06 Jul 2021 22:16  Phos  1.3     07-06  Mg     1.2     07-06    TPro  5.3<L>  /  Alb  2.1<L>  /  TBili  1.7<H>  /  DBili  x   /  AST  35  /  ALT  17  /  AlkPhos  567<H>  07-06        Radiology:  < from: Xray Chest 1 View AP/PA (07.06.21 @ 22:19) >  INTERPRETATION:  Clear lungs  Spoke with [employer].  ******PRELIMINARY REPORT******    < end of copied text >    Addendum:    Lactate 3.6>> repeat after 3 hours 4.7. Continue IVF and abx. Lactate for AM.   VS Q4. Low threshold for MICU evaluation if patient clinically decompensates.     Lily Watt PA-C  Spectra #[id number]

## 2021-07-07 NOTE — PROVIDER CONTACT NOTE (OTHER) - ASSESSMENT
Patient is A&Ox2-3, able to make needs known. Patient denies any pain, SOB, palpitations. No distress at this time.

## 2021-07-08 LAB
ANION GAP SERPL CALC-SCNC: 11 MMOL/L — SIGNIFICANT CHANGE UP (ref 5–17)
BUN SERPL-MCNC: 11 MG/DL — SIGNIFICANT CHANGE UP (ref 7–23)
CALCIUM SERPL-MCNC: 6.8 MG/DL — LOW (ref 8.4–10.5)
CHLORIDE SERPL-SCNC: 105 MMOL/L — SIGNIFICANT CHANGE UP (ref 96–108)
CO2 SERPL-SCNC: 18 MMOL/L — LOW (ref 22–31)
CREAT SERPL-MCNC: 0.41 MG/DL — LOW (ref 0.5–1.3)
FUNGITELL: 256 PG/ML — HIGH
GLUCOSE BLDC GLUCOMTR-MCNC: 135 MG/DL — HIGH (ref 70–99)
GLUCOSE BLDC GLUCOMTR-MCNC: 158 MG/DL — HIGH (ref 70–99)
GLUCOSE BLDC GLUCOMTR-MCNC: 191 MG/DL — HIGH (ref 70–99)
GLUCOSE BLDC GLUCOMTR-MCNC: 75 MG/DL — SIGNIFICANT CHANGE UP (ref 70–99)
GLUCOSE SERPL-MCNC: 84 MG/DL — SIGNIFICANT CHANGE UP (ref 70–99)
HCT VFR BLD CALC: 22.9 % — LOW (ref 39–50)
HCT VFR BLD CALC: 24.4 % — LOW (ref 39–50)
HCT VFR BLD CALC: 24.8 % — LOW (ref 39–50)
HGB BLD-MCNC: 7.4 G/DL — LOW (ref 13–17)
HGB BLD-MCNC: 7.8 G/DL — LOW (ref 13–17)
HGB BLD-MCNC: 8.1 G/DL — LOW (ref 13–17)
MCHC RBC-ENTMCNC: 27.8 PG — SIGNIFICANT CHANGE UP (ref 27–34)
MCHC RBC-ENTMCNC: 28 PG — SIGNIFICANT CHANGE UP (ref 27–34)
MCHC RBC-ENTMCNC: 28.3 PG — SIGNIFICANT CHANGE UP (ref 27–34)
MCHC RBC-ENTMCNC: 32 GM/DL — SIGNIFICANT CHANGE UP (ref 32–36)
MCHC RBC-ENTMCNC: 32.3 GM/DL — SIGNIFICANT CHANGE UP (ref 32–36)
MCHC RBC-ENTMCNC: 32.7 GM/DL — SIGNIFICANT CHANGE UP (ref 32–36)
MCV RBC AUTO: 86.1 FL — SIGNIFICANT CHANGE UP (ref 80–100)
MCV RBC AUTO: 86.7 FL — SIGNIFICANT CHANGE UP (ref 80–100)
MCV RBC AUTO: 87.5 FL — SIGNIFICANT CHANGE UP (ref 80–100)
NRBC # BLD: 0 /100 WBCS — SIGNIFICANT CHANGE UP (ref 0–0)
NRBC # BLD: 0 /100 WBCS — SIGNIFICANT CHANGE UP (ref 0–0)
OB PNL STL: NEGATIVE — SIGNIFICANT CHANGE UP
PLATELET # BLD AUTO: 46 K/UL — LOW (ref 150–400)
PLATELET # BLD AUTO: 53 K/UL — LOW (ref 150–400)
PLATELET # BLD AUTO: 56 K/UL — LOW (ref 150–400)
POTASSIUM SERPL-MCNC: 3.5 MMOL/L — SIGNIFICANT CHANGE UP (ref 3.5–5.3)
POTASSIUM SERPL-SCNC: 3.5 MMOL/L — SIGNIFICANT CHANGE UP (ref 3.5–5.3)
RBC # BLD: 2.66 M/UL — LOW (ref 4.2–5.8)
RBC # BLD: 2.79 M/UL — LOW (ref 4.2–5.8)
RBC # BLD: 2.86 M/UL — LOW (ref 4.2–5.8)
RBC # FLD: 22.5 % — HIGH (ref 10.3–14.5)
RBC # FLD: 22.6 % — HIGH (ref 10.3–14.5)
RBC # FLD: 22.7 % — HIGH (ref 10.3–14.5)
SODIUM SERPL-SCNC: 134 MMOL/L — LOW (ref 135–145)
WBC # BLD: 1.8 K/UL — LOW (ref 3.8–10.5)
WBC # BLD: 2.03 K/UL — LOW (ref 3.8–10.5)
WBC # BLD: 2.11 K/UL — LOW (ref 3.8–10.5)
WBC # FLD AUTO: 1.8 K/UL — LOW (ref 3.8–10.5)
WBC # FLD AUTO: 2.03 K/UL — LOW (ref 3.8–10.5)
WBC # FLD AUTO: 2.11 K/UL — LOW (ref 3.8–10.5)

## 2021-07-08 PROCEDURE — 93970 EXTREMITY STUDY: CPT | Mod: 26

## 2021-07-08 PROCEDURE — 93306 TTE W/DOPPLER COMPLETE: CPT | Mod: 26

## 2021-07-08 RX ORDER — INSULIN GLARGINE 100 [IU]/ML
8 INJECTION, SOLUTION SUBCUTANEOUS AT BEDTIME
Refills: 0 | Status: DISCONTINUED | OUTPATIENT
Start: 2021-07-08 | End: 2021-07-12

## 2021-07-08 RX ORDER — POTASSIUM CHLORIDE 20 MEQ
40 PACKET (EA) ORAL ONCE
Refills: 0 | Status: COMPLETED | OUTPATIENT
Start: 2021-07-08 | End: 2021-07-08

## 2021-07-08 RX ORDER — PANTOPRAZOLE SODIUM 20 MG/1
40 TABLET, DELAYED RELEASE ORAL DAILY
Refills: 0 | Status: DISCONTINUED | OUTPATIENT
Start: 2021-07-08 | End: 2021-08-04

## 2021-07-08 RX ADMIN — INSULIN GLARGINE 8 UNIT(S): 100 INJECTION, SOLUTION SUBCUTANEOUS at 21:36

## 2021-07-08 RX ADMIN — QUETIAPINE FUMARATE 25 MILLIGRAM(S): 200 TABLET, FILM COATED ORAL at 20:33

## 2021-07-08 RX ADMIN — Medication 81 MILLIGRAM(S): at 11:39

## 2021-07-08 RX ADMIN — Medication 300 MILLIGRAM(S): at 11:39

## 2021-07-08 RX ADMIN — Medication 40 MILLIEQUIVALENT(S): at 09:19

## 2021-07-08 RX ADMIN — FINASTERIDE 5 MILLIGRAM(S): 5 TABLET, FILM COATED ORAL at 11:39

## 2021-07-08 RX ADMIN — Medication 1: at 16:32

## 2021-07-08 RX ADMIN — PANTOPRAZOLE SODIUM 40 MILLIGRAM(S): 20 TABLET, DELAYED RELEASE ORAL at 11:39

## 2021-07-08 RX ADMIN — TAMSULOSIN HYDROCHLORIDE 0.4 MILLIGRAM(S): 0.4 CAPSULE ORAL at 20:33

## 2021-07-08 NOTE — CHART NOTE - NSCHARTNOTEFT_GEN_A_CORE
Nutrition Follow Up Note  Patient seen for: malnutrition follow-up    Chart reviewed, events noted.    Source: [] Patient       [x] EMR        [] RN        [] Family at bedside       [] Other:  - If unable to interview patient: [] Trach/Vent/BiPAP  [] Disoriented/confused/inappropriate to interview  - Pt off floor at time of visit, per chart Pt scheduled for MBS today    Diet Order:   Diet, Dysphagia 1 Pureed-Nectar Consistency Fluid:   Consistent Carbohydrate {No Snacks} (CSTCHO)  Supplement Feeding Modality:  Oral  Ensure Enlive Servings Per Day:  3       Frequency:  Daily (07-01-21)    Pt off floor likely for MBS, will continue to defer diet texture per SLP recommendations. Per RN flowsheets, Pt with ~45% intake over the past week. Pt likely consuming Ensure Enlive as well, per previous discussion with Pt he stated that he tolerates nutritional oral supplementation and enjoys them.     GI: Per chart, Pt without GI distress (nausea/vomiting/diarrhea/constipation). Last BM x1 today (7/8) per chart.   Bowel Regimen? [] Yes   [x] No    Current dosing weight: no dosing weight per chart  Weight via bed-scale obtained yesterday: 87.7 Kg (7/1)    Nutritionally Pertinent MEDICATIONS  (STANDING):  allopurinol  dextrose 40% Gel  dextrose 5%.  dextrose 5%.  dextrose 50% Injectable  dextrose 50% Injectable  dextrose 50% Injectable  finasteride  glucagon  Injectable  insulin glargine Injectable (LANTUS)  insulin lispro (ADMELOG) corrective regimen sliding scale  insulin lispro (ADMELOG) corrective regimen sliding scale  pantoprazole   Suspension  sodium chloride 0.9%.  tamsulosin    Pertinent Labs: 07-08 @ 06:11: Na 134<L>, BUN 11, Cr 0.41<L>, BG 84, K+ 3.5; POCT Blood Glucose x24 hrs:  mg/dL     A1C with Estimated Average Glucose Result: 7.7 % (07-01-21 @ 12:40)    Skin: no pressure-related injuries per nursing flowsheets   Edema: no edema noted per nursing flowsheets     Estimated Needs:   [x] no change since previous assessment    Previous Nutrition Diagnosis: Severe Malnutrition, Chronic.  Nutrition Diagnosis is: [x] ongoing --- being addressed with nutritional oral supplementation, monitoring PO intake and weight trends     New Nutrition Diagnosis: [x] Not applicable    Education Provided:       [] Yes:  [x] No:     Recommendations/Interventions:  1. Continue diet; consistency per SLP recommendations  2. Continue Ensure Enlive x3/day (provides 350cal, 20Gm protein per 8oz serving) as ordered   3. Obtain food preferences, honor as able   4. Continue to monitor weight trends, PO intake, GI function, electrolytes, and skin integrity     RD remains available upon request and will follow up per protocol.     Maria Victoria Walter RD CDN (Pager [phone]).

## 2021-07-08 NOTE — PROGRESS NOTE ADULT - SUBJECTIVE AND OBJECTIVE BOX
LIZETT RIVERA  MRN-[id number]    Patient is a 71y old  Male who presents with a chief complaint of fever, FTT (07 Jul 2021 10:31)      Review of System    Without complaints.  eager to leave hospital    Current Meds  MEDICATIONS  (STANDING):  allopurinol 300 milliGRAM(s) Oral daily  aspirin  chewable 81 milliGRAM(s) Oral daily  dextrose 40% Gel 15 Gram(s) Oral once  dextrose 5%. 1000 milliLiter(s) (50 mL/Hr) IV Continuous <Continuous>  dextrose 5%. 1000 milliLiter(s) (100 mL/Hr) IV Continuous <Continuous>  dextrose 50% Injectable 25 Gram(s) IV Push once  dextrose 50% Injectable 12.5 Gram(s) IV Push once  dextrose 50% Injectable 25 Gram(s) IV Push once  finasteride 5 milliGRAM(s) Oral daily  glucagon  Injectable 1 milliGRAM(s) IntraMuscular once  insulin glargine Injectable (LANTUS) 8 Unit(s) SubCutaneous at bedtime  insulin lispro (ADMELOG) corrective regimen sliding scale   SubCutaneous three times a day before meals  insulin lispro (ADMELOG) corrective regimen sliding scale   SubCutaneous at bedtime  pantoprazole   Suspension 40 milliGRAM(s) Oral daily  QUEtiapine 25 milliGRAM(s) Oral at bedtime  sodium chloride 0.9%. 1000 milliLiter(s) (100 mL/Hr) IV Continuous <Continuous>  tamsulosin 0.4 milliGRAM(s) Oral at bedtime    MEDICATIONS  (PRN):  acetaminophen   Tablet .. 650 milliGRAM(s) Oral every 6 hours PRN Temp greater or equal to 38C (100.4F), Moderate Pain (4 - 6)      Vital Signs Last 24 Hrs  T(C): 36.9 (08 Jul 2021 16:53), Max: 37.7 (08 Jul 2021 04:15)  T(F): 98.4 (08 Jul 2021 16:53), Max: 99.8 (08 Jul 2021 04:15)  HR: 99 (08 Jul 2021 16:53) (99 - 123)  BP: 114/77 (08 Jul 2021 16:53) (101/69 - 114/77)  BP(mean): --  RR: 18 (08 Jul 2021 16:53) (18 - 18)  SpO2: 98% (08 Jul 2021 16:53) (96% - 100%)    PHYSICAL EXAM:      NAD    Lab  CBC Full  -  ( 08 Jul 2021 14:25 )  WBC Count : 2.03 K/uL  RBC Count : 2.66 M/uL  Hemoglobin : 7.4 g/dL  Hematocrit : 22.9 %  Platelet Count - Automated : 56 K/uL  Mean Cell Volume : 86.1 fl  Mean Cell Hemoglobin : 27.8 pg  Mean Cell Hemoglobin Concentration : 32.3 gm/dL  Auto Neutrophil # : x  Auto Lymphocyte # : x  Auto Monocyte # : x  Auto Eosinophil # : x  Auto Basophil # : x  Auto Neutrophil % : x  Auto Lymphocyte % : x  Auto Monocyte % : x  Auto Eosinophil % : x  Auto Basophil % : x    07-08    134<L>  |  105  |  11  ----------------------------<  84  3.5   |  18<L>  |  0.41<L>    Ca    6.8<L>      08 Jul 2021 06:11  Phos  1.3     07-06  Mg     1.2     07-06    TPro  5.3<L>  /  Alb  2.1<L>  /  TBili  1.7<H>  /  DBili  x   /  AST  35  /  ALT  17  /  AlkPhos  567<H>  07-06        Rad:    Assessment/Plan

## 2021-07-08 NOTE — PROGRESS NOTE ADULT - ASSESSMENT
Assessment  DMT2: 71y Male with DM T2 with hyperglycemia, A1C 8.8%, was on oral meds/Janumet at home, admitted with weakness/fatigue and hyperglycemia, on basal insulin and coverage, blood sugars trending down this AM, no hypoglycemic episodes. Patient eating partial meals on dysphagia diet + nutritional supplements/shakes, FLORINDA anglin reviewed, he appears comfortable in NAD.  Lymphoma: on medications, monitored, FU Heme/Onc.  Anemia: stable, monitored.      Shahriar Kahn MD  Cell: [phone]  Office: [phone]       Assessment  DMT2: 71y Male with DM T2 with hyperglycemia, A1C 8.8%, was on oral meds/Janumet at home, admitted with weakness/fatigue and hyperglycemia, on basal insulin and coverage, blood sugars trending  down this AM, no hypoglycemic episodes. Patient eating partial meals on dysphagia diet + nutritional supplements/shakes, FLORINDA anglin reviewed, he appears comfortable in NAD.  Lymphoma: on medications, monitored, FU Heme/Onc.  Anemia: stable, monitored.      Shahriar Kahn MD  Cell: [phone]  Office: [phone]

## 2021-07-08 NOTE — PROGRESS NOTE ADULT - SUBJECTIVE AND OBJECTIVE BOX
Neurology Progress Note    S: Patient seen and examined.  . patient denied CP, SOB, HA or pain.  doing okay  was a bit tachycardic     Medication:  MEDICATIONS  (STANDING):  allopurinol 300 milliGRAM(s) Oral daily  aspirin  chewable 81 milliGRAM(s) Oral daily  dextrose 40% Gel 15 Gram(s) Oral once  dextrose 5%. 1000 milliLiter(s) (50 mL/Hr) IV Continuous <Continuous>  dextrose 5%. 1000 milliLiter(s) (100 mL/Hr) IV Continuous <Continuous>  dextrose 50% Injectable 25 Gram(s) IV Push once  dextrose 50% Injectable 12.5 Gram(s) IV Push once  dextrose 50% Injectable 25 Gram(s) IV Push once  finasteride 5 milliGRAM(s) Oral daily  glucagon  Injectable 1 milliGRAM(s) IntraMuscular once  insulin glargine Injectable (LANTUS) 10 Unit(s) SubCutaneous at bedtime  insulin lispro (ADMELOG) corrective regimen sliding scale   SubCutaneous three times a day before meals  insulin lispro (ADMELOG) corrective regimen sliding scale   SubCutaneous at bedtime  pantoprazole   Suspension 40 milliGRAM(s) Oral daily  QUEtiapine 25 milliGRAM(s) Oral at bedtime  sodium chloride 0.9%. 1000 milliLiter(s) (100 mL/Hr) IV Continuous <Continuous>  tamsulosin 0.4 milliGRAM(s) Oral at bedtime    MEDICATIONS  (PRN):  acetaminophen   Tablet .. 650 milliGRAM(s) Oral every 6 hours PRN Temp greater or equal to 38C (100.4F), Moderate Pain (4 - 6)        Vitals:  Vital Signs Last 24 Hrs  T(C): 36.6 (08 Jul 2021 09:33), Max: 37.7 (08 Jul 2021 04:15)  T(F): 97.9 (08 Jul 2021 09:33), Max: 99.8 (08 Jul 2021 04:15)  HR: 114 (08 Jul 2021 04:15) (93 - 123)  BP: 104/63 (08 Jul 2021 04:15) (104/63 - 115/77)  BP(mean): --  RR: 18 (08 Jul 2021 04:15) (18 - 18)  SpO2: 96% (08 Jul 2021 04:15) (96% - 100%)    General Exam:   General Appearance: Appropriately dressed and in no acute distress       Head: Normocephalic, atraumatic and no dysmorphic features  Ear, Nose, and Throat: Moist mucous membranes  CVS: S1S2+  Resp: No SOB, no wheeze or rhonchi  GI: soft NT/ND  Extremities: No edema or cyanosis  Skin: No bruises or rashes     Neurological Exam:  Mental Status: Awake, alert and oriented x 1-2.  Able to follow simple and complex verbal commands. Able to name and repeat. fluent speech. No obvious aphasia mild dysarthria, + hypophonic   Cranial Nerves: PERRL, EOMI, VFFC, sensation V1-V3 intact,  R facial asymmetry, equal elevation of palate, scm/trap 5/5, tongue is midline on protrusion. no obvious papilledema on fundoscopic exam. hearing is grossly intact.   Motor: generalized weakness but FRANCOIS. minimal weakness B/L LE R>L  Sensation: Intact to light touch and pinprick throughout. no right/left confusion. no extinction to tactile on DSS. Romberg was negative.   Reflexes: 1+ throughout at biceps, brachioradialis, triceps, patellars and ankles bilaterally and equal. No clonus. R toe and L toe were both downgoing.  Coordination: No dysmetria on FNF    Gait: deferred     I personally reviewed the below data/images/labs:    CBC Full  -  ( 08 Jul 2021 06:11 )  WBC Count : 1.80 K/uL  RBC Count : 2.86 M/uL  Hemoglobin : 8.1 g/dL  Hematocrit : 24.8 %  Platelet Count - Automated : 53 K/uL  Mean Cell Volume : 86.7 fl  Mean Cell Hemoglobin : 28.3 pg  Mean Cell Hemoglobin Concentration : 32.7 gm/dL  Auto Neutrophil # : x  Auto Lymphocyte # : x  Auto Monocyte # : x  Auto Eosinophil # : x  Auto Basophil # : x  Auto Neutrophil % : x  Auto Lymphocyte % : x  Auto Monocyte % : x  Auto Eosinophil % : x  Auto Basophil % : x    07-08    134<L>  |  105  |  11  ----------------------------<  84  3.5   |  18<L>  |  0.41<L>    Ca    6.8<L>      08 Jul 2021 06:11  Phos  1.3     07-06  Mg     1.2     07-06    TPro  5.3<L>  /  Alb  2.1<L>  /  TBili  1.7<H>  /  DBili  x   /  AST  35  /  ALT  17  /  AlkPhos  567<H>  07-06        < from: CT Chest w/ IV Cont (06.28.21 @ 19:01) >    EXAM:  CT CHEST IC                            PROCEDURE DATE:  06/28/2021            INTERPRETATION:  CLINICAL INFORMATION: Lymphoma, evaluate extent of thoracic disease.    COMPARISON: CT abdomen pelvis dated 6/28/2021. Chest x-ray dated 6/20/2021.    CONTRAST/COMPLICATIONS:  IV Contrast: 40 mL of Omnipaque 350 were administered. 60 mL discarded.  Oral Contrast: None.  Complications: None reported.    PROCEDURE:  CT of the Chest was performed.  Sagittal and coronal reformats were performed.    FINDINGS: The quality of the images are degraded by respiratory motion and streak artifact.    LUNGS AND AIRWAYS: Patent central airways. Patchy groundglass opacities and scattered nodular opacities throughout all lobes of the lungs. For example:  A left upper lobe nodule measures 0.9 cm (series 3 image 33).  A right middle lobe nodule measures 2.0 x 0.8 cm (series 3 image 76).  3.5 x 2.1 cm solid opacity within the superior segment of right lower lobe (series 3 image 71).  A right lower lobe nodule at the costophrenic angle measures 1.9 x 1.1 cm (series 3 image 108).    PLEURA: No pleural effusion or pneumothorax.    MEDIASTINUM AND SARTHAK: Mildly enlarged mediastinal, hilar, and supraclavicular lymphadenopathy. A right hilar lymph node measures 1.8 x 1.4 cm (series 3 image 90). A left supraclavicular node measures 2.5 x 1.5 cm (series 3 image 19). A subcarinal node measures 2.2 x 1.3 cm (series 3 image 63).    VESSELS: Coronary artery calcifications.    HEART: Heart size is normal. No pericardial effusion.    CHEST WALL AND LOWER NECK: Within normal limits.    VISUALIZED UPPER ABDOMEN: Retroperitoneal lymphadenopathy, as seen on abdominal CT from the same date.    BONES: Degenerative changes of the spine.    IMPRESSION:    Patchy groundglass opacities and scattered nodular opacities throughout all lobes of the lungs. Findings may be related to known lymphoma or may be seen in the setting of multifocal infection.    Mildly enlarged supraclavicular, mediastinal, hilar, and retroperitoneal lymphadenopathy likely related to known lymphoma.              LEXIS BAKER MD; Resident Radiology  This document has been electronically signed.  ISABEL JACOBO MD; Attending Radiologist  This document has been electronically signed. Jun 29 [id number]    < end of copied text >      < from: CT Neck Soft Tissue w/ IV Cont (07.03.21 @ 15:37) >    EXAM:  CT NECK SOFT TISSUE IC                          EXAM:  CT BRAIN IC                            PROCEDURE DATE:  07/03/2021            INTERPRETATION:  CLINICAL INDICATION: Hodgkin's lymphoma. Rule out metastatic disease.    TECHNIQUE: CT of the head and neck soft tissues was performed before and after administration of IV contrast. Contrast dose: 90 cc Omnipaque 300 IV contrast.    COMPARISON: CT chest 6/28/2021.    FINDINGS:    CT HEAD:  No acute transcortical infarct or intracranial hemorrhage. No abnormal intracranial enhancement is identified.    White matter hypoattenuating foci are noted, nonspecific but likely representing small vessel disease.    The ventricles are normal without evidence of hydrocephalus. There are no extra-axial fluid collections.    The visualized intraorbital contents are unremarkable. Mild mucosal thickening in the right maxillary sinus. The mastoid air cells are clear. The visualized soft tissues and osseous structures appear normal.    CT NECK:  Aerodigestive structures: Asymmetric enlargement of the left palatine tonsil is noted. Remainder of the aerodigestive structures are unremarkable.    Lymph nodes: Again noted left supraclavicular node measuring up to 2.4 x 1.5 cm.    Parotid and submandibular glands:  Normal.    Thyroid gland: Normal.    Vascular structures: Unremarkable.    Osseous structures: No fracture, dislocation or destructive lesion.    Partially visualized lung apices: Normal.      IMPRESSION:  CT head:  -No acute intracranial findings.  -No abnormal intracranial enhancement identified by CT. Consider MRI with contrast for increased sensitivity.    CT neck:  -Again noted enlarged left supraclavicular node.  -Asymmetric enlargement of the left palatine tonsil, suspicious for lymphomatous involvement given history. Correlate with direct visualization.                ZOEY TORRES MD; Attending Radiologist  This document has been electronically signed. Jul  3 2021  3:51PM    < end of copied text >

## 2021-07-08 NOTE — PROGRESS NOTE ADULT - PROBLEM SELECTOR PLAN 1
Will decrease Lantus to 8u at bedtime and continue coverage scale. Will continue monitoring FS and FU.  Counseled for compliance with consistent low-carb diet, nutritional shakes and supplements as tolerated. FU RD recommendations.  Can DC on his prior Janumet 50/1000, endo FU 3 months.

## 2021-07-08 NOTE — PROGRESS NOTE ADULT - ASSESSMENT
70 y/o M w/ pmhx of CVA this past August and got TPA per family member, DM , BPH with obstruction s/p escamilla catheter , s/p ERCP w Sphincteretomy recent admission to [facility] for sepsis  hodgkin lympoma was not offered any chemo and was placed on hospice.   npw presenting with weakness and FTT.   pt denies cp / SOB / plapiatiaons   no focal neuro complaints .. at baseline RLE weakness   no N/V   had one episode of diarrah   no urinary s x   abdominal pain, diffuse, but worse on R side.     - failure to thrive : cultures neg  CT chest noted   nutrition consult     - lymphoma : d/w Dr. Larios: s/p  immunotherapy and chemo at later time post rehab       DM with hyperglycemia : improved     - anemia  : moniot rH/H post transfusion     - fever : doubt infectious etiology   culture:neg   abx dced   cont ivf     -voice changes  : CT neck : Asymmetric enlargement of the left palatine tonsil, suspicious for lymphomatous involvement given history. Correlate with direct visualization.  ENT had eval pt : no gross pathology on visulaization   S/S eval       Tahcycardia : paige check VA dupelx r/o DVT

## 2021-07-08 NOTE — PROGRESS NOTE ADULT - ASSESSMENT
1) Hodgkin's lymphoma  - repeat CT scans, results noted  -Family meeting held at bedside on 7/2. We discussed rx options vs comfort care.   - allopurinol  - pt s/p 1st rx with opdivo. next due on 8/3     2) ENT- voice change is a new finding as per patient's sister. Better   - ENT input note, f/u s/s    3)Hyperglycemia- mgmt as per med    4)Weakness. 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  - Neurology input noted.  - cont pt.  - bed to chair     5) Pancytopenia  anemia- w/u this far unremarkable. possible aocd.  Stool guaiac requested  transfusional support as needed    Leukopenia- unclear etiology- possibly due to infxn or malignancy. Also could be related to zosyn. Now off of abx. monitor cbc     Thrombocytopenia- has developed while here.  Can't r/o malignancy vs. due to zosyn.   manage supportively for now.    6. Fever- possibly related to malignancy.   ID following

## 2021-07-08 NOTE — PROGRESS NOTE ADULT - SUBJECTIVE AND OBJECTIVE BOX
Date of service: 07-08-21 @ 22:10      Patient is a 71y old  Male who presents with a chief complaint of fever, ftt (08 Jul 2021 20:24)                                                               INTERVAL HPI/OVERNIGHT EVENTS:    REVIEW OF SYSTEMS:     CONSTITUTIONAL: No weakness, fevers or chills  EYES/ENT: No visual changes , no ear ache   NECK: No pain or stiffness  RESPIRATORY: No cough, wheezing,  No shortness of breath  CARDIOVASCULAR: No chest pain or palpitations  GASTROINTESTINAL: No abdominal pain  . No nausea, vomiting, or hematemesis; No diarrhea or constipation. No melena or hematochezia.  GENITOURINARY: No dysuria, frequency or hematuria  NEUROLOGICAL: No numbness or weakness  SKIN: No itching, burning, rashes, or lesions                                                                                                                                                                                                                                                                                 Medications:  MEDICATIONS  (STANDING):  allopurinol 300 milliGRAM(s) Oral daily  aspirin  chewable 81 milliGRAM(s) Oral daily  dextrose 40% Gel 15 Gram(s) Oral once  dextrose 5%. 1000 milliLiter(s) (50 mL/Hr) IV Continuous <Continuous>  dextrose 5%. 1000 milliLiter(s) (100 mL/Hr) IV Continuous <Continuous>  dextrose 50% Injectable 25 Gram(s) IV Push once  dextrose 50% Injectable 12.5 Gram(s) IV Push once  dextrose 50% Injectable 25 Gram(s) IV Push once  finasteride 5 milliGRAM(s) Oral daily  glucagon  Injectable 1 milliGRAM(s) IntraMuscular once  insulin glargine Injectable (LANTUS) 8 Unit(s) SubCutaneous at bedtime  insulin lispro (ADMELOG) corrective regimen sliding scale   SubCutaneous three times a day before meals  insulin lispro (ADMELOG) corrective regimen sliding scale   SubCutaneous at bedtime  pantoprazole   Suspension 40 milliGRAM(s) Oral daily  QUEtiapine 25 milliGRAM(s) Oral at bedtime  sodium chloride 0.9%. 1000 milliLiter(s) (100 mL/Hr) IV Continuous <Continuous>  tamsulosin 0.4 milliGRAM(s) Oral at bedtime    MEDICATIONS  (PRN):  acetaminophen   Tablet .. 650 milliGRAM(s) Oral every 6 hours PRN Temp greater or equal to 38C (100.4F), Moderate Pain (4 - 6)       Allergies    No Known Allergies    Intolerances      Vital Signs Last 24 Hrs  T(C): 37.1 (08 Jul 2021 20:35), Max: 37.7 (08 Jul 2021 04:15)  T(F): 98.7 (08 Jul 2021 20:35), Max: 99.8 (08 Jul 2021 04:15)  HR: 105 (08 Jul 2021 20:35) (99 - 116)  BP: 115/73 (08 Jul 2021 20:35) (101/69 - 115/73)  BP(mean): --  RR: 18 (08 Jul 2021 20:35) (18 - 18)  SpO2: 98% (08 Jul 2021 20:35) (96% - 98%)  CAPILLARY BLOOD GLUCOSE      POCT Blood Glucose.: 191 mg/dL (08 Jul 2021 21:21)  POCT Blood Glucose.: 158 mg/dL (08 Jul 2021 16:15)  POCT Blood Glucose.: 135 mg/dL (08 Jul 2021 11:31)  POCT Blood Glucose.: 75 mg/dL (08 Jul 2021 07:27)      07-07 @ 07:01  -  07-08 @ 07:00  --------------------------------------------------------  IN: 1901 mL / OUT: 1400 mL / NET: 501 mL    07-08 @ 07:01  -  07-08 @ 22:10  --------------------------------------------------------  IN: 1440 mL / OUT: 550 mL / NET: 890 mL      Physical Exam:    Daily     Daily   General:  Well appearing, NAD, not cachetic  HEENT:  Nonicteric, PERRLA  CV:  RRR, S1S2   Lungs:  CTA B/L, no wheezes, rales, rhonchi  Abdomen:  Soft, non-tender, no distended, positive BS  Extremities:  2+ pulses, no c/c, no edema  Skin:  Warm and dry, no rashes  :  No escamilla  Neuro:  AAOx3, non-focal, grossly intact                                                                                                                                                                                                                                                                                                LABS:                               7.8    2.11  )-----------( 46       ( 08 Jul 2021 20:34 )             24.4                      07-08    134<L>  |  105  |  11  ----------------------------<  84  3.5   |  18<L>  |  0.41<L>    Ca    6.8<L>      08 Jul 2021 06:11  Phos  1.3     07-06  Mg     1.2     07-06    TPro  5.3<L>  /  Alb  2.1<L>  /  TBili  1.7<H>  /  DBili  x   /  AST  35  /  ALT  17  /  AlkPhos  567<H>  07-06                       RADIOLOGY & ADDITIONAL TESTS         I personally reviewed: [  ]EKG   [  ]CXR    [  ] CT      A/P:         Discussed with :     Augusto consultants' Notes   Time spent :

## 2021-07-08 NOTE — PROGRESS NOTE ADULT - SUBJECTIVE AND OBJECTIVE BOX
CC: f/u for fever  Patient reports feels ok, does not want to turn now    REVIEW OF SYSTEMS:  All other review of systems negative (Comprehensive ROS)    Antimicrobials Day #  :    Other Medications Reviewed    T(F): 98.4 (21 @ 16:53), Max: 99.8 (21 @ 04:15)  HR: 99 (21 @ 16:53)  BP: 114/77 (21 @ 16:53)  RR: 18 (21 @ 16:53)  SpO2: 98% (21 @ 16:53)  Wt(kg): --    PHYSICAL EXAM:  General: alert, no acute distress  Eyes:  anicteric, no conjunctival injection, no discharge  Oropharynx: no lesions or injection 	  Neck: supple, without adenopathy  Lungs: anterior clear to auscultation  Heart: regular rate and rhythm; no murmur, rubs or gallops  Abdomen: soft, nondistended, nontender, without mass or organomegaly  Skin: no lesions  Extremities: no clubbing, cyanosis, or edema  Neurologic: alert, oriented, moves all extremities    LAB RESULTS:                        7.4    2.03  )-----------( 56       ( 2021 14:25 )             22.9     07-08    134<L>  |  105  |  11  ----------------------------<  84  3.5   |  18<L>  |  0.41<L>    Ca    6.8<L>      2021 06:11  Phos  1.3     07-  Mg     1.2     07-06    TPro  5.3<L>  /  Alb  2.1<L>  /  TBili  1.7<H>  /  DBili  x   /  AST  35  /  ALT  17  /  AlkPhos  567<H>  07-    LIVER FUNCTIONS - ( 2021 22:16 )  Alb: 2.1 g/dL / Pro: 5.3 g/dL / ALK PHOS: 567 U/L / ALT: 17 U/L / AST: 35 U/L / GGT: x           Urinalysis Basic - ( 2021 10:45 )    Color: Yellow / Appearance: Slightly Turbid / S.025 / pH: x  Gluc: x / Ketone: Negative  / Bili: Negative / Urobili: 2 mg/dL   Blood: x / Protein: Trace / Nitrite: Negative   Leuk Esterase: Negative / RBC: 2 /hpf / WBC 5 /HPF   Sq Epi: x / Non Sq Epi: 3 /hpf / Bacteria: Negative      MICROBIOLOGY:  RECENT CULTURES:   @ 01:44 .Blood Blood-Peripheral     No growth to date.       @ 03:54 .Blood Blood-Peripheral     No growth to date.          RADIOLOGY REVIEWED:    < from: CT Abdomen and Pelvis w/ IV Cont (21 @ 14:26) >  EXAM:  CT ABDOMEN AND PELVIS IC                            PROCEDURE DATE:  2021            INTERPRETATION:  CLINICAL INFORMATION: Right lower quadrant pain. Hodgkin's lymphoma.    COMPARISON: None.    CONTRAST/COMPLICATIONS:  IV Contrast: Omnipaque 350  90 cc administered   10 cc discarded  Oral Contrast: None  Complications: None reported    PROCEDURE:  CT of the Abdomen and Pelvis was performed.  Sagittal and coronal reformats were performed.    FINDINGS:  LOWER CHEST: Nodular opacities in the right middle and bilateral lower lobes.    LIVER: Within normal limits.  BILE DUCTS: Normal caliber.  GALLBLADDER: Within normal limits.  SPLEEN: Within normal limits.  PANCREAS: Within normal limits.  ADRENALS: Within normal limits.  KIDNEYS/URETERS: Duplex left collecting system/proximal ureters. Distal ureter is not well evaluated. Right renal cyst. No hydronephrosis.    BLADDER: Escamilla catheter.  REPRODUCTIVE ORGANS: Prostate is enlarged, measuring 6.5 cm in transverse dimension.    BOWEL: No bowel obstruction. Appendix is not visualized. No evidence of inflammation in the pericecal region.  PERITONEUM: No ascites.  VESSELS: Atherosclerotic changes.  RETROPERITONEUM/LYMPH NODES: Periportal and retroperitoneal lymphadenopathy. For reference, a conglomerate periportal node (2, 34) measures 5.1 x 4.4 cm. A left para-aortic lymph node (2, 53) measures 2.6 x 1.4 cm.  ABDOMINAL WALL: Left inguinal hernia containing nonobstructed sigmoid colon.  BONES: Degenerative changes.    IMPRESSION:  Indeterminant nodular lung opacities which dedicated follow-up chest CT can be performed for further evaluation.    Abdominal lymphadenopathy, which may be related to patient's known history of lymphoma. Correlate with prior imaging if availablefor comparison.    Enlarged prostate.                < end of copied text >            Assessment:  Patient dx with hodgkin disease in April, deemed not chemo candidate, sent to home hospice, recent stay for sepsis at [facility], has chronic escamilla, came in with FTT, fever, no infection found. Now received check point inhibitor. Had fever and tachycardia 2 nights ago. I think that was a reaction to the immunotx. No infection found.He is stable going forward. Hep b dna is undetectable. Will hold off on hep b tx  Plan:  Monitor off antibiotics

## 2021-07-08 NOTE — PROGRESS NOTE ADULT - ASSESSMENT
72 y/o AAM w/ Stroke this past August and got TPA per family member had L weakness resolved, DM , BPH with obstruction s/p escamilla catheter , s/p ERCP w Sphincteretomy recent admission to [facility] for sepsis  hodgkin lympoma was not offered any chemo and was placed on hospice.   npw presenting with generalized weakness and FTT.   has baseline R LE weakness.  + hypophonic   Hgb 7.1, platelets 106, elevated alk phos, A1c 8.8%  off 1:1 for + SI. off 1:1 now   LDL 30, A1c 7.7   repeat CTH 7/3 as above. CT neck as above   afebrile today was a bit tachycardic   o/e with R NLF flattening, hypophonic, dysarthria, and generalized weakness. more likely 2/2 underlying medical condition as opposed to new stroke.   - fever was  101.9, infectious workup.  Tmax 100.9 --> now afebrtile   - psych f/u for SI --> no capacity. now off 1:1   - c/w ASA 81mg daily  - correct metabolic derangements   - lipitor 40mg if no CI. elevated alk phos   - telemetry  - heme/onc recs appreciated. --> possible treatment 7/6 with immunotherapy if approved   - PT/OT/SS/SLP, OOBC  - check FS, glucose control <180  - GI/DVT ppx  - Counseling on diet, exercise, and medication adherence was done  - Counseling on smoking cessation and alcohol consumption offered when appropriate.  - Pain assessed and judicious use of narcotics when appropriate was discussed.    - Stroke education given when appropriate.  - Importance of fall prevention discussed.   - Differential diagnosis and plan of care discussed with patient and/or family and primary team  - Thank you for allowing me to participate in the care of this patient. Call with questions.   - C discussion for hospice  dc plan   Juan العراقي MD  Vascular Neurology  Office: [phone]  70 y/o AAM w/ Stroke this past August and got TPA per family member had L weakness resolved, DM , BPH with obstruction s/p escamilla catheter , s/p ERCP w Sphincteretomy recent admission to [facility] for sepsis  hodgkin lympoma was not offered any chemo and was placed on hospice.   npw presenting with generalized weakness and FTT.   has baseline R LE weakness.  + hypophonic   Hgb 7.1, platelets 106, elevated alk phos, A1c 8.8%  off 1:1 for + SI. off 1:1   LDL 30, A1c 7.7   repeat CTH 7/3 as above. CT neck as above   afebrile today was a bit tachycardic but mental status better   o/e with R NLF flattening, hypophonic, dysarthria, and generalized weakness. more likely 2/2 underlying medical condition as opposed to new stroke.   - fever was  101.9, infectious workup.  Tmax 100.9 --> now afebrile, infectious workup    - psych f/u for SI --> no capacity. now off 1:1   - c/w ASA 81mg daily  - correct metabolic derangements   - lipitor 40mg if no CI. elevated alk phos   - telemetry  - heme/onc recs appreciated. --> possible treatment 7/6 with immunotherapy if approved   - PT/OT/SS/SLP, OOBC  - check FS, glucose control <180  - GI/DVT ppx  - Counseling on diet, exercise, and medication adherence was done  - Counseling on smoking cessation and alcohol consumption offered when appropriate.  - Pain assessed and judicious use of narcotics when appropriate was discussed.    - Stroke education given when appropriate.  - Importance of fall prevention discussed.   - Differential diagnosis and plan of care discussed with patient and/or family and primary team  - Thank you for allowing me to participate in the care of this patient. Call with questions.   - [facility] discussion for hospice  dc plan   Juan العراقي MD  Vascular Neurology  Office: [phone]

## 2021-07-08 NOTE — PROGRESS NOTE ADULT - SUBJECTIVE AND OBJECTIVE BOX
Chief complaint  Patient is a 71y old  Male who presents with a chief complaint of fever, FTT (07 Jul 2021 10:31)   Review of systems  Patient in bed, looks comfortable, no hypoglycemic episodes.    Labs and Fingersticks  CAPILLARY BLOOD GLUCOSE      POCT Blood Glucose.: 158 mg/dL (08 Jul 2021 16:15)  POCT Blood Glucose.: 135 mg/dL (08 Jul 2021 11:31)  POCT Blood Glucose.: 75 mg/dL (08 Jul 2021 07:27)  POCT Blood Glucose.: 148 mg/dL (07 Jul 2021 22:04)      Anion Gap, Serum: 11 (07-08 @ 06:11)  Anion Gap, Serum: 14 (07-07 @ 06:16)  Anion Gap, Serum: 13 (07-06 @ 22:16)      Calcium, Total Serum: 6.8 *L* (07-08 @ 06:11)  Calcium, Total Serum: 5.9 *LL* (07-07 @ 06:16)  Calcium, Total Serum: 7.1 *L* (07-06 @ 22:16)  Albumin, Serum: 2.1 *L* (07-06 @ 22:16)    Alanine Aminotransferase (ALT/SGPT): 17 (07-06 @ 22:16)  Alkaline Phosphatase, Serum: 567 *H* (07-06 @ 22:16)  Aspartate Aminotransferase (AST/SGOT): 35 (07-06 @ 22:16)        07-08    134<L>  |  105  |  11  ----------------------------<  84  3.5   |  18<L>  |  0.41<L>    Ca    6.8<L>      08 Jul 2021 06:11  Phos  1.3     07-06  Mg     1.2     07-06    TPro  5.3<L>  /  Alb  2.1<L>  /  TBili  1.7<H>  /  DBili  x   /  AST  35  /  ALT  17  /  AlkPhos  567<H>  07-06                        7.4    2.03  )-----------( 56       ( 08 Jul 2021 14:25 )             22.9     Medications  MEDICATIONS  (STANDING):  allopurinol 300 milliGRAM(s) Oral daily  aspirin  chewable 81 milliGRAM(s) Oral daily  dextrose 40% Gel 15 Gram(s) Oral once  dextrose 5%. 1000 milliLiter(s) (50 mL/Hr) IV Continuous <Continuous>  dextrose 5%. 1000 milliLiter(s) (100 mL/Hr) IV Continuous <Continuous>  dextrose 50% Injectable 25 Gram(s) IV Push once  dextrose 50% Injectable 12.5 Gram(s) IV Push once  dextrose 50% Injectable 25 Gram(s) IV Push once  finasteride 5 milliGRAM(s) Oral daily  glucagon  Injectable 1 milliGRAM(s) IntraMuscular once  insulin glargine Injectable (LANTUS) 8 Unit(s) SubCutaneous at bedtime  insulin lispro (ADMELOG) corrective regimen sliding scale   SubCutaneous three times a day before meals  insulin lispro (ADMELOG) corrective regimen sliding scale   SubCutaneous at bedtime  pantoprazole   Suspension 40 milliGRAM(s) Oral daily  QUEtiapine 25 milliGRAM(s) Oral at bedtime  sodium chloride 0.9%. 1000 milliLiter(s) (100 mL/Hr) IV Continuous <Continuous>  tamsulosin 0.4 milliGRAM(s) Oral at bedtime      Physical Exam  General: Patient comfortable in bed  Vital Signs Last 12 Hrs  T(F): 98 (07-08-21 @ 11:59), Max: 98 (07-08-21 @ 11:59)  HR: 108 (07-08-21 @ 11:59) (108 - 108)  BP: 101/69 (07-08-21 @ 11:59) (101/69 - 101/69)  BP(mean): --  RR: 18 (07-08-21 @ 11:59) (18 - 18)  SpO2: 97% (07-08-21 @ 11:59) (97% - 97%)  Neck: No palpable thyroid nodules.  CVS: S1S2, No murmurs  Respiratory: No wheezing, no crepitations  GI: Abdomen soft, bowel sounds positive  Musculoskeletal:  edema lower extremities.   Skin: No skin rashes, no ecchymosis    Diagnostics             Chief complaint  Patient is a 71y old  Male who presents with a chief complaint of fever, FTT (07 Jul 2021 10:31)   Review of systems  Patient in bed, looks comfortable, no hypoglycemic episodes.    Labs and Fingersticks  CAPILLARY BLOOD GLUCOSE      POCT Blood Glucose.: 158 mg/dL (08 Jul 2021 16:15)  POCT Blood Glucose.: 135 mg/dL (08 Jul 2021 11:31)  POCT Blood Glucose.: 75 mg/dL (08 Jul 2021 07:27)  POCT Blood Glucose.: 148 mg/dL (07 Jul 2021 22:04)      Anion Gap, Serum: 11 (07-08 @ 06:11)  Anion Gap, Serum: 14 (07-07 @ 06:16)  Anion Gap, Serum: 13 (07-06 @ 22:16)      Calcium, Total Serum: 6.8 *L* (07-08 @ 06:11)  Calcium, Total Serum: 5.9 *LL* (07-07 @ 06:16)  Calcium, Total Serum: 7.1 *L* (07-06 @ 22:16)  Albumin, Serum: 2.1 *L* (07-06 @ 22:16)    Alanine Aminotransferase (ALT/SGPT): 17 (07-06 @ 22:16)  Alkaline Phosphatase, Serum: 567 *H* (07-06 @ 22:16)  Aspartate Aminotransferase (AST/SGOT): 35 (07-06 @ 22:16)        07-08    134<L>  |  105  |  11  ----------------------------<  84  3.5   |  18<L>  |  0.41<L>    Ca    6.8<L>      08 Jul 2021 06:11  Phos  1.3     07-06  Mg     1.2     07-06    TPro  5.3<L>  /  Alb  2.1<L>  /  TBili  1.7<H>  /  DBili  x   /  AST  35  /  ALT  17  /  AlkPhos  567<H>  07-06                        7.4    2.03  )-----------( 56       ( 08 Jul 2021 14:25 )             22.9     Medications  MEDICATIONS  (STANDING):  allopurinol 300 milliGRAM(s) Oral daily  aspirin  chewable 81 milliGRAM(s) Oral daily  dextrose 40% Gel 15 Gram(s) Oral once  dextrose 5%. 1000 milliLiter(s) (50 mL/Hr) IV Continuous <Continuous>  dextrose 5%. 1000 milliLiter(s) (100 mL/Hr) IV Continuous <Continuous>  dextrose 50% Injectable 25 Gram(s) IV Push once  dextrose 50% Injectable 12.5 Gram(s) IV Push once  dextrose 50% Injectable 25 Gram(s) IV Push once  finasteride 5 milliGRAM(s) Oral daily  glucagon  Injectable 1 milliGRAM(s) IntraMuscular once  insulin glargine Injectable (LANTUS) 8 Unit(s) SubCutaneous at bedtime  insulin lispro (ADMELOG) corrective regimen sliding scale   SubCutaneous three times a day before meals  insulin lispro (ADMELOG) corrective regimen sliding scale   SubCutaneous at bedtime  pantoprazole   Suspension 40 milliGRAM(s) Oral daily  QUEtiapine 25 milliGRAM(s) Oral at bedtime  sodium chloride 0.9%. 1000 milliLiter(s) (100 mL/Hr) IV Continuous <Continuous>  tamsulosin 0.4 milliGRAM(s) Oral at bedtime      Physical Exam  General: Patient comfortable in bed  Vital Signs Last 12 Hrs  T(F): 98 (07-08-21 @ 11:59), Max: 98 (07-08-21 @ 11:59)  HR: 108 (07-08-21 @ 11:59) (108 - 108)  BP: 101/69 (07-08-21 @ 11:59) (101/69 - 101/69)  BP(mean): --  RR: 18 (07-08-21 @ 11:59) (18 - 18)  SpO2: 97% (07-08-21 @ 11:59) (97% - 97%)  Neck: No palpable thyroid nodules.  CVS: S1S2, No murmurs  Respiratory: No wheezing, no crepitations  GI: Abdomen soft, bowel sounds positive  Musculoskeletal:  edema lower extremities.   Skin: No skin rashes, no ecchymosis    Diagnostics

## 2021-07-09 LAB
ALBUMIN SERPL ELPH-MCNC: 1.6 G/DL — LOW (ref 3.3–5)
ALP SERPL-CCNC: 475 U/L — HIGH (ref 40–120)
ALT FLD-CCNC: 20 U/L — SIGNIFICANT CHANGE UP (ref 10–45)
ANION GAP SERPL CALC-SCNC: 9 MMOL/L — SIGNIFICANT CHANGE UP (ref 5–17)
AST SERPL-CCNC: 47 U/L — HIGH (ref 10–40)
BASOPHILS # BLD AUTO: 0 K/UL — SIGNIFICANT CHANGE UP (ref 0–0.2)
BASOPHILS NFR BLD AUTO: 0 % — SIGNIFICANT CHANGE UP (ref 0–2)
BILIRUB SERPL-MCNC: 0.8 MG/DL — SIGNIFICANT CHANGE UP (ref 0.2–1.2)
BLASTS # FLD: 0.9 % — HIGH (ref 0–0)
BUN SERPL-MCNC: 10 MG/DL — SIGNIFICANT CHANGE UP (ref 7–23)
CALCIUM SERPL-MCNC: 7.1 MG/DL — LOW (ref 8.4–10.5)
CHLORIDE SERPL-SCNC: 106 MMOL/L — SIGNIFICANT CHANGE UP (ref 96–108)
CO2 SERPL-SCNC: 19 MMOL/L — LOW (ref 22–31)
CREAT SERPL-MCNC: 0.37 MG/DL — LOW (ref 0.5–1.3)
EOSINOPHIL # BLD AUTO: 0 K/UL — SIGNIFICANT CHANGE UP (ref 0–0.5)
EOSINOPHIL NFR BLD AUTO: 0 % — SIGNIFICANT CHANGE UP (ref 0–6)
GIANT PLATELETS BLD QL SMEAR: PRESENT — SIGNIFICANT CHANGE UP
GLUCOSE BLDC GLUCOMTR-MCNC: 132 MG/DL — HIGH (ref 70–99)
GLUCOSE BLDC GLUCOMTR-MCNC: 157 MG/DL — HIGH (ref 70–99)
GLUCOSE BLDC GLUCOMTR-MCNC: 189 MG/DL — HIGH (ref 70–99)
GLUCOSE BLDC GLUCOMTR-MCNC: 95 MG/DL — SIGNIFICANT CHANGE UP (ref 70–99)
GLUCOSE SERPL-MCNC: 98 MG/DL — SIGNIFICANT CHANGE UP (ref 70–99)
HCT VFR BLD CALC: 22.9 % — LOW (ref 39–50)
HCT VFR BLD CALC: 24.5 % — LOW (ref 39–50)
HGB BLD-MCNC: 7.3 G/DL — LOW (ref 13–17)
HGB BLD-MCNC: 7.7 G/DL — LOW (ref 13–17)
LDH SERPL L TO P-CCNC: 290 U/L — HIGH (ref 50–242)
LYMPHOCYTES # BLD AUTO: 0.17 K/UL — LOW (ref 1–3.3)
LYMPHOCYTES # BLD AUTO: 8.3 % — LOW (ref 13–44)
MAGNESIUM SERPL-MCNC: 1.4 MG/DL — LOW (ref 1.6–2.6)
MANUAL SMEAR VERIFICATION: SIGNIFICANT CHANGE UP
MCHC RBC-ENTMCNC: 27.5 PG — SIGNIFICANT CHANGE UP (ref 27–34)
MCHC RBC-ENTMCNC: 27.9 PG — SIGNIFICANT CHANGE UP (ref 27–34)
MCHC RBC-ENTMCNC: 31.4 GM/DL — LOW (ref 32–36)
MCHC RBC-ENTMCNC: 31.9 GM/DL — LOW (ref 32–36)
MCV RBC AUTO: 87.4 FL — SIGNIFICANT CHANGE UP (ref 80–100)
MCV RBC AUTO: 87.5 FL — SIGNIFICANT CHANGE UP (ref 80–100)
METAMYELOCYTES # FLD: 1.8 % — HIGH (ref 0–0)
MONOCYTES # BLD AUTO: 0.11 K/UL — SIGNIFICANT CHANGE UP (ref 0–0.9)
MONOCYTES NFR BLD AUTO: 5.5 % — SIGNIFICANT CHANGE UP (ref 2–14)
NEUTROPHILS # BLD AUTO: 1.6 K/UL — LOW (ref 1.8–7.4)
NEUTROPHILS NFR BLD AUTO: 78 % — HIGH (ref 43–77)
NEUTS BAND # BLD: 0.9 % — SIGNIFICANT CHANGE UP (ref 0–8)
NRBC # BLD: 0 /100 WBCS — SIGNIFICANT CHANGE UP (ref 0–0)
NRBC # BLD: 1 /100 — HIGH (ref 0–0)
PLAT MORPH BLD: NORMAL — SIGNIFICANT CHANGE UP
PLATELET # BLD AUTO: 44 K/UL — LOW (ref 150–400)
PLATELET # BLD AUTO: 47 K/UL — LOW (ref 150–400)
POTASSIUM SERPL-MCNC: 3.8 MMOL/L — SIGNIFICANT CHANGE UP (ref 3.5–5.3)
POTASSIUM SERPL-SCNC: 3.8 MMOL/L — SIGNIFICANT CHANGE UP (ref 3.5–5.3)
PROT SERPL-MCNC: 4.4 G/DL — LOW (ref 6–8.3)
RBC # BLD: 2.62 M/UL — LOW (ref 4.2–5.8)
RBC # BLD: 2.8 M/UL — LOW (ref 4.2–5.8)
RBC # FLD: 22.7 % — HIGH (ref 10.3–14.5)
RBC # FLD: 22.7 % — HIGH (ref 10.3–14.5)
RBC BLD AUTO: SIGNIFICANT CHANGE UP
SMUDGE CELLS # BLD: PRESENT — SIGNIFICANT CHANGE UP
SODIUM SERPL-SCNC: 134 MMOL/L — LOW (ref 135–145)
STRONGYLOIDES AB SER-ACNC: NEGATIVE — SIGNIFICANT CHANGE UP
T4 FREE SERPL-MCNC: 1 NG/DL — SIGNIFICANT CHANGE UP (ref 0.9–1.8)
TSH SERPL-MCNC: 1 UIU/ML — SIGNIFICANT CHANGE UP (ref 0.27–4.2)
URATE SERPL-MCNC: 2.1 MG/DL — LOW (ref 3.4–8.8)
VARIANT LYMPHS # BLD: 4.6 % — SIGNIFICANT CHANGE UP (ref 0–6)
WBC # BLD: 1.74 K/UL — LOW (ref 3.8–10.5)
WBC # BLD: 2.03 K/UL — LOW (ref 3.8–10.5)
WBC # FLD AUTO: 1.74 K/UL — LOW (ref 3.8–10.5)
WBC # FLD AUTO: 2.03 K/UL — LOW (ref 3.8–10.5)

## 2021-07-09 RX ORDER — MAGNESIUM SULFATE 500 MG/ML
2 VIAL (ML) INJECTION ONCE
Refills: 0 | Status: COMPLETED | OUTPATIENT
Start: 2021-07-09 | End: 2021-07-09

## 2021-07-09 RX ORDER — POTASSIUM CHLORIDE 20 MEQ
20 PACKET (EA) ORAL ONCE
Refills: 0 | Status: COMPLETED | OUTPATIENT
Start: 2021-07-09 | End: 2021-07-09

## 2021-07-09 RX ADMIN — Medication 20 MILLIEQUIVALENT(S): at 10:51

## 2021-07-09 RX ADMIN — Medication 81 MILLIGRAM(S): at 11:25

## 2021-07-09 RX ADMIN — Medication 300 MILLIGRAM(S): at 11:26

## 2021-07-09 RX ADMIN — QUETIAPINE FUMARATE 25 MILLIGRAM(S): 200 TABLET, FILM COATED ORAL at 21:06

## 2021-07-09 RX ADMIN — TAMSULOSIN HYDROCHLORIDE 0.4 MILLIGRAM(S): 0.4 CAPSULE ORAL at 21:07

## 2021-07-09 RX ADMIN — Medication 50 GRAM(S): at 10:54

## 2021-07-09 RX ADMIN — PANTOPRAZOLE SODIUM 40 MILLIGRAM(S): 20 TABLET, DELAYED RELEASE ORAL at 11:26

## 2021-07-09 RX ADMIN — FINASTERIDE 5 MILLIGRAM(S): 5 TABLET, FILM COATED ORAL at 11:25

## 2021-07-09 RX ADMIN — INSULIN GLARGINE 8 UNIT(S): 100 INJECTION, SOLUTION SUBCUTANEOUS at 21:28

## 2021-07-09 RX ADMIN — Medication 1: at 16:33

## 2021-07-09 NOTE — PROGRESS NOTE ADULT - ASSESSMENT
72 y/o M w/ pmhx of CVA this past August and got TPA per family member, DM , BPH with obstruction s/p escamilla catheter , s/p ERCP w Sphincteretomy recent admission to [facility] for sepsis  hodgkin lympoma was not offered any chemo and was placed on hospice.   npw presenting with weakness and FTT.   pt denies cp / SOB / plapiatiaons   no focal neuro complaints .. at baseline RLE weakness   no N/V   had one episode of diarrah   no urinary s x   abdominal pain, diffuse, but worse on R side.     - failure to thrive : cultures neg  CT chest noted   nutrition consult     - lymphoma : d/w Dr. Larios: s/p  immunotherapy and chemo at later time post rehab       DM with hyperglycemia : improved     - anemia  : moniot rH/H post transfusion     - fever : doubt infectious etiology   culture:neg   abx dced   cont ivf     -voice changes  : CT neck : Asymmetric enlargement of the left palatine tonsil, suspicious for lymphomatous involvement given history. Correlate with direct visualization.  ENT had eval pt : no gross pathology on visulaization   S/S eval : MBS       Tahcycardia : paige check VA dupelx r/o DVT     paraplegia functional : however will check MR lumbar sacral

## 2021-07-09 NOTE — PROGRESS NOTE ADULT - SUBJECTIVE AND OBJECTIVE BOX
CC: f/u for  fever  Patient reports  feels ok today  REVIEW OF SYSTEMS:  All other review of systems negative (Comprehensive ROS)    Antimicrobials Day #  :    Other Medications Reviewed    T(F): 99.3 (07-09-21 @ 04:10), Max: 99.3 (07-09-21 @ 04:10)  HR: 102 (07-09-21 @ 04:10)  BP: 107/67 (07-09-21 @ 04:10)  RR: 18 (07-09-21 @ 04:10)  SpO2: 100% (07-09-21 @ 04:10)  Wt(kg): --    PHYSICAL EXAM:  General: alert, no acute distress  Eyes:  anicteric, no conjunctival injection, no discharge  Oropharynx: no lesions or injection 	  Neck: supple, without adenopathy  Lungs: clear to auscultation  Heart: regular rate and rhythm; no murmur, rubs or gallops  Abdomen: soft, nondistended, nontender, without mass or organomegaly  Skin: no lesions  Extremities: no clubbing, cyanosis, or edema  Neurologic: alert, oriented, moves all extremities  backside skin tear  LAB RESULTS:                        7.3    1.74  )-----------( 44       ( 09 Jul 2021 06:20 )             22.9     07-09    134<L>  |  106  |  10  ----------------------------<  98  3.8   |  19<L>  |  0.37<L>    Ca    7.1<L>      09 Jul 2021 06:13  Mg     1.4     07-09    TPro  4.4<L>  /  Alb  1.6<L>  /  TBili  0.8  /  DBili  x   /  AST  47<H>  /  ALT  20  /  AlkPhos  475<H>  07-09    LIVER FUNCTIONS - ( 09 Jul 2021 06:13 )  Alb: 1.6 g/dL / Pro: 4.4 g/dL / ALK PHOS: 475 U/L / ALT: 20 U/L / AST: 47 U/L / GGT: x             MICROBIOLOGY:  RECENT CULTURES:  07-07 @ 01:44 .Blood Blood-Peripheral     No growth to date.      07-05 @ 03:54 .Blood Blood-Peripheral     No growth to date.          RADIOLOGY REVIEWED:  PROCEDURE DATE:  06/28/2021            INTERPRETATION:  CLINICAL INFORMATION: Right lower quadrant pain. Hodgkin's lymphoma.    COMPARISON: None.    CONTRAST/COMPLICATIONS:  IV Contrast: Omnipaque 350  90 cc administered   10 cc discarded  Oral Contrast: None  Complications: None reported    PROCEDURE:  CT of the Abdomen and Pelvis was performed.  Sagittal and coronal reformats were performed.    FINDINGS:  LOWER CHEST: Nodular opacities in the right middle and bilateral lower lobes.    LIVER: Within normal limits.  BILE DUCTS: Normal caliber.  GALLBLADDER: Within normal limits.  SPLEEN: Within normal limits.  PANCREAS: Within normal limits.  ADRENALS: Within normal limits.  KIDNEYS/URETERS: Duplex left collecting system/proximal ureters. Distal ureter is not well evaluated. Right renal cyst. No hydronephrosis.    BLADDER: Escamilla catheter.  REPRODUCTIVE ORGANS: Prostate is enlarged, measuring 6.5 cm in transverse dimension.    BOWEL: No bowel obstruction. Appendix is not visualized. No evidence of inflammation in the pericecal region.  PERITONEUM: No ascites.  VESSELS: Atherosclerotic changes.  RETROPERITONEUM/LYMPH NODES: Periportal and retroperitoneal lymphadenopathy. For reference, a conglomerate periportal node (2, 34) measures 5.1 x 4.4 cm. A left para-aortic lymph node (2, 53) measures 2.6 x 1.4 cm.  ABDOMINAL WALL: Left inguinal hernia containing nonobstructed sigmoid colon.  BONES: Degenerative changes.    IMPRESSION:  Indeterminant nodular lung opacities which dedicated follow-up chest CT can be performed for further evaluation.    Abdominal lymphadenopathy, which may be related to patient's known history of lymphoma. Correlate with prior imaging if availablefor comparison.    Enlarged prostate.                < end of copied text >            Assessment:  Patient dx with hodgkin disease in April, deemed not chemo candidate, sent to home hospice, recent stay for sepsis [facility], has chronic escamilla, came in with FTT, fever, no infection found. Now received check point inhibitor. Had fever and tachycardia 2 nights ago. I think that was a reaction to the immunotx. No infection found.He is stable going forward. Hep b dna is undetectable. Will hold off on hep b tx. Has leukopenia likely from his disease  Plan:  Monitor off antibiotics  No ID contraindication to further immunotx and steroids if needed            Assessment:    Plan:

## 2021-07-09 NOTE — PROGRESS NOTE ADULT - SUBJECTIVE AND OBJECTIVE BOX
Date of service: 07-09-21 @ 19:28      Patient is a 71y old  Male who presents with a chief complaint of FTT (09 Jul 2021 10:57)                                                               INTERVAL HPI/OVERNIGHT EVENTS:    REVIEW OF SYSTEMS:     CONSTITUTIONAL: No weakness, fevers or chills  RESPIRATORY: No cough, wheezing,  No shortness of breath  CARDIOVASCULAR: No chest pain or palpitations  GASTROINTESTINAL: No abdominal pain  . No nausea, vomiting, or hematemesis; No diarrhea or constipation. No melena or hematochezia.  GENITOURINARY: No dysuria, frequency or hematuria  NEUROLOGICAL: No numbness or weakness                                                                                                                                                                                                                                                                                Medications:  MEDICATIONS  (STANDING):  allopurinol 300 milliGRAM(s) Oral daily  aspirin  chewable 81 milliGRAM(s) Oral daily  dextrose 40% Gel 15 Gram(s) Oral once  dextrose 5%. 1000 milliLiter(s) (50 mL/Hr) IV Continuous <Continuous>  dextrose 5%. 1000 milliLiter(s) (100 mL/Hr) IV Continuous <Continuous>  dextrose 50% Injectable 25 Gram(s) IV Push once  dextrose 50% Injectable 12.5 Gram(s) IV Push once  dextrose 50% Injectable 25 Gram(s) IV Push once  finasteride 5 milliGRAM(s) Oral daily  glucagon  Injectable 1 milliGRAM(s) IntraMuscular once  insulin glargine Injectable (LANTUS) 8 Unit(s) SubCutaneous at bedtime  insulin lispro (ADMELOG) corrective regimen sliding scale   SubCutaneous three times a day before meals  insulin lispro (ADMELOG) corrective regimen sliding scale   SubCutaneous at bedtime  pantoprazole   Suspension 40 milliGRAM(s) Oral daily  QUEtiapine 25 milliGRAM(s) Oral at bedtime  sodium chloride 0.9%. 1000 milliLiter(s) (100 mL/Hr) IV Continuous <Continuous>  tamsulosin 0.4 milliGRAM(s) Oral at bedtime    MEDICATIONS  (PRN):  acetaminophen   Tablet .. 650 milliGRAM(s) Oral every 6 hours PRN Temp greater or equal to 38C (100.4F), Moderate Pain (4 - 6)       Allergies    No Known Allergies    Intolerances      Vital Signs Last 24 Hrs  T(C): 36.7 (09 Jul 2021 17:28), Max: 37.4 (09 Jul 2021 04:10)  T(F): 98 (09 Jul 2021 17:28), Max: 99.3 (09 Jul 2021 04:10)  HR: 94 (09 Jul 2021 17:28) (94 - 105)  BP: 114/74 (09 Jul 2021 17:28) (107/67 - 115/73)  BP(mean): --  RR: 18 (09 Jul 2021 17:28) (18 - 18)  SpO2: 98% (09 Jul 2021 17:28) (97% - 100%)  CAPILLARY BLOOD GLUCOSE      POCT Blood Glucose.: 189 mg/dL (09 Jul 2021 16:12)  POCT Blood Glucose.: 132 mg/dL (09 Jul 2021 11:41)  POCT Blood Glucose.: 95 mg/dL (09 Jul 2021 07:43)  POCT Blood Glucose.: 191 mg/dL (08 Jul 2021 21:21)      07-08 @ 07:01  -  07-09 @ 07:00  --------------------------------------------------------  IN: 1440 mL / OUT: 1750 mL / NET: -310 mL    07-09 @ 07:01  -  07-09 @ 19:28  --------------------------------------------------------  IN: 0 mL / OUT: 400 mL / NET: -400 mL      Physical Exam:    Daily     Daily   General:  Well appearing, NAD, not cachetic  HEENT:  Nonicteric, PERRLA  CV:  RRR, S1S2   Lungs:  CTA B/L, no wheezes, rales, rhonchi  Abdomen:  Soft, non-tender, no distended, positive BS  Extremities:  edema                                                                                                                                                                                                                                                                                                LABS:                               7.7    2.03  )-----------( 47       ( 09 Jul 2021 18:56 )             24.5                      07-09    134<L>  |  106  |  10  ----------------------------<  98  3.8   |  19<L>  |  0.37<L>    Ca    7.1<L>      09 Jul 2021 06:13  Mg     1.4     07-09    TPro  4.4<L>  /  Alb  1.6<L>  /  TBili  0.8  /  DBili  x   /  AST  47<H>  /  ALT  20  /  AlkPhos  475<H>  07-09                       RADIOLOGY & ADDITIONAL TESTS         I personally reviewed: [  ]EKG   [  ]CXR    [  ] CT      A/P:         Discussed with :     [facility] consultants' Notes   Time spent :

## 2021-07-09 NOTE — PROGRESS NOTE ADULT - SUBJECTIVE AND OBJECTIVE BOX
LIZETT RIVERA  MRN-[id number]    Patient is a 71y old  Male who presents with a chief complaint of fever, ftt (08 Jul 2021 20:24)      Review of System  REVIEW OF SYSTEMS      General:	Denies fatigue, fevers, chills, sweats, decreased appetite.    Skin/Breast: denies pruritis, rash  	  Ophthalmologic: no change in vision or blurring  	  HEENT	Denies dry mouth, oral sores, dysphagia,  change in hearing.    Respiratory and Thorax:  cough, sob, wheeze, hemoptysis  	  Cardiovascular:	no cp , palp, orthopnea    Gastrointestinal:	no n/v/d constipation    Genitourinary:	no dysuria of frequency, no hematuria, no flank pain    Musculoskeletal:	no bone or joint pain. no muscle aches.     Neurological:	no change in sensory or motor function. no headache. no weakness.     Psychiatric:	no depression, no anxiety, insomnia.     Hematology/Lymphatics:	no bleeding or bruising        Current Meds  MEDICATIONS  (STANDING):  allopurinol 300 milliGRAM(s) Oral daily  aspirin  chewable 81 milliGRAM(s) Oral daily  dextrose 40% Gel 15 Gram(s) Oral once  dextrose 5%. 1000 milliLiter(s) (50 mL/Hr) IV Continuous <Continuous>  dextrose 5%. 1000 milliLiter(s) (100 mL/Hr) IV Continuous <Continuous>  dextrose 50% Injectable 25 Gram(s) IV Push once  dextrose 50% Injectable 12.5 Gram(s) IV Push once  dextrose 50% Injectable 25 Gram(s) IV Push once  finasteride 5 milliGRAM(s) Oral daily  glucagon  Injectable 1 milliGRAM(s) IntraMuscular once  insulin glargine Injectable (LANTUS) 8 Unit(s) SubCutaneous at bedtime  insulin lispro (ADMELOG) corrective regimen sliding scale   SubCutaneous three times a day before meals  insulin lispro (ADMELOG) corrective regimen sliding scale   SubCutaneous at bedtime  magnesium sulfate  IVPB 2 Gram(s) IV Intermittent once  pantoprazole   Suspension 40 milliGRAM(s) Oral daily  potassium chloride    Tablet ER 20 milliEquivalent(s) Oral once  QUEtiapine 25 milliGRAM(s) Oral at bedtime  sodium chloride 0.9%. 1000 milliLiter(s) (100 mL/Hr) IV Continuous <Continuous>  tamsulosin 0.4 milliGRAM(s) Oral at bedtime    MEDICATIONS  (PRN):  acetaminophen   Tablet .. 650 milliGRAM(s) Oral every 6 hours PRN Temp greater or equal to 38C (100.4F), Moderate Pain (4 - 6)      Vitals  Vital Signs Last 24 Hrs  T(C): 37.4 (09 Jul 2021 04:10), Max: 37.4 (09 Jul 2021 04:10)  T(F): 99.3 (09 Jul 2021 04:10), Max: 99.3 (09 Jul 2021 04:10)  HR: 102 (09 Jul 2021 04:10) (99 - 108)  BP: 107/67 (09 Jul 2021 04:10) (101/69 - 115/73)  BP(mean): --  RR: 18 (09 Jul 2021 04:10) (18 - 18)  SpO2: 100% (09 Jul 2021 04:10) (97% - 100%)    Physical Exam  PHYSICAL EXAM:      Constitutional: NAD    Eyes: PERRLA EOMI, anicteric sclera    Heent :No oral sores, no pharyngeal injection. moist mucosa.    Neck: supple, no jvd, no LAD    Respiratory: CTA b/l     Cardiovascular: s1s2, no m/g/r    Gastrointestinal: soft, nt, nd, + BS    Extremities: no c/c/e    Neurological:A&O x 3 moves all ext.    Skin: no rash on exposed skin    Lymph Nodes: no lymphadenopathy.              Lab  CBC Full  -  ( 09 Jul 2021 06:20 )  WBC Count : 1.74 K/uL  RBC Count : 2.62 M/uL  Hemoglobin : 7.3 g/dL  Hematocrit : 22.9 %  Platelet Count - Automated : 44 K/uL  Mean Cell Volume : 87.4 fl  Mean Cell Hemoglobin : 27.9 pg  Mean Cell Hemoglobin Concentration : 31.9 gm/dL  Auto Neutrophil # : x  Auto Lymphocyte # : x  Auto Monocyte # : x  Auto Eosinophil # : x  Auto Basophil # : x  Auto Neutrophil % : x  Auto Lymphocyte % : x  Auto Monocyte % : x  Auto Eosinophil % : x  Auto Basophil % : x    07-09    134<L>  |  106  |  10  ----------------------------<  98  3.8   |  19<L>  |  0.37<L>    Ca    7.1<L>      09 Jul 2021 06:13  Mg     1.4     07-09    TPro  4.4<L>  /  Alb  1.6<L>  /  TBili  0.8  /  DBili  x   /  AST  47<H>  /  ALT  20  /  AlkPhos  475<H>  07-09        Rad:    Assessment/Plan   LIZETT RIVERA  MRN-[id number]    Patient is a 71y old  Male who presents with a chief complaint of fever, ftt (08 Jul 2021 20:24)      Review of System    Feels ok      MEDICATIONS  (STANDING):  allopurinol 300 milliGRAM(s) Oral daily  aspirin  chewable 81 milliGRAM(s) Oral daily  dextrose 40% Gel 15 Gram(s) Oral once  dextrose 5%. 1000 milliLiter(s) (50 mL/Hr) IV Continuous <Continuous>  dextrose 5%. 1000 milliLiter(s) (100 mL/Hr) IV Continuous <Continuous>  dextrose 50% Injectable 25 Gram(s) IV Push once  dextrose 50% Injectable 12.5 Gram(s) IV Push once  dextrose 50% Injectable 25 Gram(s) IV Push once  finasteride 5 milliGRAM(s) Oral daily  glucagon  Injectable 1 milliGRAM(s) IntraMuscular once  insulin glargine Injectable (LANTUS) 8 Unit(s) SubCutaneous at bedtime  insulin lispro (ADMELOG) corrective regimen sliding scale   SubCutaneous three times a day before meals  insulin lispro (ADMELOG) corrective regimen sliding scale   SubCutaneous at bedtime  magnesium sulfate  IVPB 2 Gram(s) IV Intermittent once  pantoprazole   Suspension 40 milliGRAM(s) Oral daily  potassium chloride    Tablet ER 20 milliEquivalent(s) Oral once  QUEtiapine 25 milliGRAM(s) Oral at bedtime  sodium chloride 0.9%. 1000 milliLiter(s) (100 mL/Hr) IV Continuous <Continuous>  tamsulosin 0.4 milliGRAM(s) Oral at bedtime    MEDICATIONS  (PRN):  acetaminophen   Tablet .. 650 milliGRAM(s) Oral every 6 hours PRN Temp greater or equal to 38C (100.4F), Moderate Pain (4 - 6)      Vitals  Vital Signs Last 24 Hrs  T(C): 37.4 (09 Jul 2021 04:10), Max: 37.4 (09 Jul 2021 04:10)  T(F): 99.3 (09 Jul 2021 04:10), Max: 99.3 (09 Jul 2021 04:10)  HR: 102 (09 Jul 2021 04:10) (99 - 108)  BP: 107/67 (09 Jul 2021 04:10) (101/69 - 115/73)  BP(mean): --  RR: 18 (09 Jul 2021 04:10) (18 - 18)  SpO2: 100% (09 Jul 2021 04:10) (97% - 100%)      PHYSICAL EXAM:      Constitutional: NAD    Eyes: PERRLA EOMI, anicteric sclera    Heent :No oral sores, no pharyngeal injection. moist mucosa.    Neck: supple, no jvd, no LAD    Respiratory: CTA b/l     Cardiovascular: s1s2, no m/g/r    Gastrointestinal: soft, nt, nd, + BS    Extremities: no c/c/e    Neurological:A&O x 3 moves all ext.    Skin: no rash on exposed skin    Lymph Nodes: no lymphadenopathy.              Lab  CBC Full  -  ( 09 Jul 2021 06:20 )  WBC Count : 1.74 K/uL  RBC Count : 2.62 M/uL  Hemoglobin : 7.3 g/dL  Hematocrit : 22.9 %  Platelet Count - Automated : 44 K/uL  Mean Cell Volume : 87.4 fl  Mean Cell Hemoglobin : 27.9 pg  Mean Cell Hemoglobin Concentration : 31.9 gm/dL  Auto Neutrophil # : x  Auto Lymphocyte # : x  Auto Monocyte # : x  Auto Eosinophil # : x  Auto Basophil # : x  Auto Neutrophil % : x  Auto Lymphocyte % : x  Auto Monocyte % : x  Auto Eosinophil % : x  Auto Basophil % : x    07-09    134<L>  |  106  |  10  ----------------------------<  98  3.8   |  19<L>  |  0.37<L>    Ca    7.1<L>      09 Jul 2021 06:13  Mg     1.4     07-09    TPro  4.4<L>  /  Alb  1.6<L>  /  TBili  0.8  /  DBili  x   /  AST  47<H>  /  ALT  20  /  AlkPhos  475<H>  07-09        Rad:    Assessment/Plan   LIZETT RIVERA  MRN-[id number]    Patient is a 71y old  Male who presents with a chief complaint of fever, ftt (08 Jul 2021 20:24)      Review of System    Feels ok      MEDICATIONS  (STANDING):  allopurinol 300 milliGRAM(s) Oral daily  aspirin  chewable 81 milliGRAM(s) Oral daily  dextrose 40% Gel 15 Gram(s) Oral once  dextrose 5%. 1000 milliLiter(s) (50 mL/Hr) IV Continuous <Continuous>  dextrose 5%. 1000 milliLiter(s) (100 mL/Hr) IV Continuous <Continuous>  dextrose 50% Injectable 25 Gram(s) IV Push once  dextrose 50% Injectable 12.5 Gram(s) IV Push once  dextrose 50% Injectable 25 Gram(s) IV Push once  finasteride 5 milliGRAM(s) Oral daily  glucagon  Injectable 1 milliGRAM(s) IntraMuscular once  insulin glargine Injectable (LANTUS) 8 Unit(s) SubCutaneous at bedtime  insulin lispro (ADMELOG) corrective regimen sliding scale   SubCutaneous three times a day before meals  insulin lispro (ADMELOG) corrective regimen sliding scale   SubCutaneous at bedtime  magnesium sulfate  IVPB 2 Gram(s) IV Intermittent once  pantoprazole   Suspension 40 milliGRAM(s) Oral daily  potassium chloride    Tablet ER 20 milliEquivalent(s) Oral once  QUEtiapine 25 milliGRAM(s) Oral at bedtime  sodium chloride 0.9%. 1000 milliLiter(s) (100 mL/Hr) IV Continuous <Continuous>  tamsulosin 0.4 milliGRAM(s) Oral at bedtime    MEDICATIONS  (PRN):  acetaminophen   Tablet .. 650 milliGRAM(s) Oral every 6 hours PRN Temp greater or equal to 38C (100.4F), Moderate Pain (4 - 6)      Vitals  Vital Signs Last 24 Hrs  T(C): 37.4 (09 Jul 2021 04:10), Max: 37.4 (09 Jul 2021 04:10)  T(F): 99.3 (09 Jul 2021 04:10), Max: 99.3 (09 Jul 2021 04:10)  HR: 102 (09 Jul 2021 04:10) (99 - 108)  BP: 107/67 (09 Jul 2021 04:10) (101/69 - 115/73)  BP(mean): --  RR: 18 (09 Jul 2021 04:10) (18 - 18)  SpO2: 100% (09 Jul 2021 04:10) (97% - 100%)      PHYSICAL EXAM:       NAD    Lab  CBC Full  -  ( 09 Jul 2021 06:20 )  WBC Count : 1.74 K/uL  RBC Count : 2.62 M/uL  Hemoglobin : 7.3 g/dL  Hematocrit : 22.9 %  Platelet Count - Automated : 44 K/uL  Mean Cell Volume : 87.4 fl  Mean Cell Hemoglobin : 27.9 pg  Mean Cell Hemoglobin Concentration : 31.9 gm/dL  Auto Neutrophil # : x  Auto Lymphocyte # : x  Auto Monocyte # : x  Auto Eosinophil # : x  Auto Basophil # : x  Auto Neutrophil % : x  Auto Lymphocyte % : x  Auto Monocyte % : x  Auto Eosinophil % : x  Auto Basophil % : x    07-09    134<L>  |  106  |  10  ----------------------------<  98  3.8   |  19<L>  |  0.37<L>    Ca    7.1<L>      09 Jul 2021 06:13  Mg     1.4     07-09    TPro  4.4<L>  /  Alb  1.6<L>  /  TBili  0.8  /  DBili  x   /  AST  47<H>  /  ALT  20  /  AlkPhos  475<H>  07-09        Rad:    Assessment/Plan

## 2021-07-09 NOTE — PROGRESS NOTE ADULT - ASSESSMENT
Assessment  DMT2: 71y Male with DM T2 with hyperglycemia, A1C 8.8%, was on oral meds/Janumet at home, admitted with weakness/fatigue and hyperglycemia, on basal insulin and coverage, decreased insulin dose yesterday, blood sugars trending within overall acceptable range, no hypoglycemic episodes. Patient eating partial meals on dysphagia diet + nutritional supplements/shakes, appears comfortable, pending DC to rehab.  Lymphoma: on medications, monitored, FU Heme/Onc.  Anemia: stable, monitored.      Shahriar Kahn MD  Cell: [phone]  Office: [phone]       Assessment  DMT2: 71y Male with DM T2 with hyperglycemia, A1C 8.8%, was on oral meds/Janumet at home, admitted with weakness/fatigue and hyperglycemia,  on basal insulin and coverage, decreased insulin dose yesterday, blood sugars trending within overall acceptable range, no hypoglycemic episodes. Patient eating partial meals on dysphagia diet + nutritional supplements/shakes, appears comfortable, pending DC to rehab.  Lymphoma: on medications, monitored, FU Heme/Onc.  Anemia: stable, monitored.      Shahriar Kahn MD  Cell: [phone]  Office: [phone]

## 2021-07-09 NOTE — PROGRESS NOTE ADULT - SUBJECTIVE AND OBJECTIVE BOX
Chief complaint  Patient is a 71y old  Male who presents with a chief complaint of FTT (09 Jul 2021 10:57)   Review of systems  Patient in bed, looks comfortable, no hypoglycemic episodes.    Labs and Fingersticks  CAPILLARY BLOOD GLUCOSE      POCT Blood Glucose.: 132 mg/dL (09 Jul 2021 11:41)  POCT Blood Glucose.: 95 mg/dL (09 Jul 2021 07:43)  POCT Blood Glucose.: 191 mg/dL (08 Jul 2021 21:21)  POCT Blood Glucose.: 158 mg/dL (08 Jul 2021 16:15)      Anion Gap, Serum: 9 (07-09 @ 06:13)  Anion Gap, Serum: 11 (07-08 @ 06:11)      Calcium, Total Serum: 7.1 *L* (07-09 @ 06:13)  Calcium, Total Serum: 6.8 *L* (07-08 @ 06:11)  Albumin, Serum: 1.6 *L* (07-09 @ 06:13)    Alanine Aminotransferase (ALT/SGPT): 20 (07-09 @ 06:13)  Alkaline Phosphatase, Serum: 475 *H* (07-09 @ 06:13)  Aspartate Aminotransferase (AST/SGOT): 47 *H* (07-09 @ 06:13)        07-09    134<L>  |  106  |  10  ----------------------------<  98  3.8   |  19<L>  |  0.37<L>    Ca    7.1<L>      09 Jul 2021 06:13  Mg     1.4     07-09    TPro  4.4<L>  /  Alb  1.6<L>  /  TBili  0.8  /  DBili  x   /  AST  47<H>  /  ALT  20  /  AlkPhos  475<H>  07-09                        7.3    1.74  )-----------( 44       ( 09 Jul 2021 06:20 )             22.9     Medications  MEDICATIONS  (STANDING):  allopurinol 300 milliGRAM(s) Oral daily  aspirin  chewable 81 milliGRAM(s) Oral daily  dextrose 40% Gel 15 Gram(s) Oral once  dextrose 5%. 1000 milliLiter(s) (50 mL/Hr) IV Continuous <Continuous>  dextrose 5%. 1000 milliLiter(s) (100 mL/Hr) IV Continuous <Continuous>  dextrose 50% Injectable 25 Gram(s) IV Push once  dextrose 50% Injectable 12.5 Gram(s) IV Push once  dextrose 50% Injectable 25 Gram(s) IV Push once  finasteride 5 milliGRAM(s) Oral daily  glucagon  Injectable 1 milliGRAM(s) IntraMuscular once  insulin glargine Injectable (LANTUS) 8 Unit(s) SubCutaneous at bedtime  insulin lispro (ADMELOG) corrective regimen sliding scale   SubCutaneous three times a day before meals  insulin lispro (ADMELOG) corrective regimen sliding scale   SubCutaneous at bedtime  pantoprazole   Suspension 40 milliGRAM(s) Oral daily  QUEtiapine 25 milliGRAM(s) Oral at bedtime  sodium chloride 0.9%. 1000 milliLiter(s) (100 mL/Hr) IV Continuous <Continuous>  tamsulosin 0.4 milliGRAM(s) Oral at bedtime      Physical Exam  General: Patient comfortable in bed  Vital Signs Last 12 Hrs  T(F): 98.5 (07-09-21 @ 11:17), Max: 99.3 (07-09-21 @ 04:10)  HR: 94 (07-09-21 @ 11:17) (94 - 102)  BP: 109/68 (07-09-21 @ 11:17) (107/67 - 109/68)  BP(mean): --  RR: 18 (07-09-21 @ 11:17) (18 - 18)  SpO2: 97% (07-09-21 @ 11:17) (97% - 100%)         Chief complaint  Patient is a 71y old  Male who presents with a chief complaint of FTT (09 Jul 2021 10:57)   Review of systems  Patient in bed, looks comfortable, no hypoglycemic episodes.    Labs and Fingersticks  CAPILLARY BLOOD GLUCOSE      POCT Blood Glucose.: 132 mg/dL (09 Jul 2021 11:41)  POCT Blood Glucose.: 95 mg/dL (09 Jul 2021 07:43)  POCT Blood Glucose.: 191 mg/dL (08 Jul 2021 21:21)  POCT Blood Glucose.: 158 mg/dL (08 Jul 2021 16:15)      Anion Gap, Serum: 9 (07-09 @ 06:13)  Anion Gap, Serum: 11 (07-08 @ 06:11)      Calcium, Total Serum: 7.1 *L* (07-09 @ 06:13)  Calcium, Total Serum: 6.8 *L* (07-08 @ 06:11)  Albumin, Serum: 1.6 *L* (07-09 @ 06:13)    Alanine Aminotransferase (ALT/SGPT): 20 (07-09 @ 06:13)  Alkaline Phosphatase, Serum: 475 *H* (07-09 @ 06:13)  Aspartate Aminotransferase (AST/SGOT): 47 *H* (07-09 @ 06:13)        07-09    134<L>  |  106  |  10  ----------------------------<  98  3.8   |  19<L>  |  0.37<L>    Ca    7.1<L>      09 Jul 2021 06:13  Mg     1.4     07-09    TPro  4.4<L>  /  Alb  1.6<L>  /  TBili  0.8  /  DBili  x   /  AST  47<H>  /  ALT  20  /  AlkPhos  475<H>  07-09                        7.3    1.74  )-----------( 44       ( 09 Jul 2021 06:20 )             22.9     Medications  MEDICATIONS  (STANDING):  allopurinol 300 milliGRAM(s) Oral daily  aspirin  chewable 81 milliGRAM(s) Oral daily  dextrose 40% Gel 15 Gram(s) Oral once  dextrose 5%. 1000 milliLiter(s) (50 mL/Hr) IV Continuous <Continuous>  dextrose 5%. 1000 milliLiter(s) (100 mL/Hr) IV Continuous <Continuous>  dextrose 50% Injectable 25 Gram(s) IV Push once  dextrose 50% Injectable 12.5 Gram(s) IV Push once  dextrose 50% Injectable 25 Gram(s) IV Push once  finasteride 5 milliGRAM(s) Oral daily  glucagon  Injectable 1 milliGRAM(s) IntraMuscular once  insulin glargine Injectable (LANTUS) 8 Unit(s) SubCutaneous at bedtime  insulin lispro (ADMELOG) corrective regimen sliding scale   SubCutaneous three times a day before meals  insulin lispro (ADMELOG) corrective regimen sliding scale   SubCutaneous at bedtime  pantoprazole   Suspension 40 milliGRAM(s) Oral daily  QUEtiapine 25 milliGRAM(s) Oral at bedtime  sodium chloride 0.9%. 1000 milliLiter(s) (100 mL/Hr) IV Continuous <Continuous>  tamsulosin 0.4 milliGRAM(s) Oral at bedtime      Physical Exam  General: Patient comfortable in bed  Vital Signs Last 12 Hrs  T(F): 98.5 (07-09-21 @ 11:17), Max: 99.3 (07-09-21 @ 04:10)  HR: 94 (07-09-21 @ 11:17) (94 - 102)  BP: 109/68 (07-09-21 @ 11:17) (107/67 - 109/68)  BP(mean): --  RR: 18 (07-09-21 @ 11:17) (18 - 18)  SpO2: 97% (07-09-21 @ 11:17) (97% - 100%)

## 2021-07-09 NOTE — PROGRESS NOTE ADULT - ASSESSMENT
1) Hodgkin's lymphoma  - repeat CT scans, results noted  -Family meeting held at bedside on 7/2. We discussed rx options vs comfort care.   - allopurinol  - pt s/p 1st rx with opdivo. next due on 8/3     2) ENT- voice change is a new finding as per patient's sister. Better   - ENT input note, f/u s/s    3)Hyperglycemia- mgmt as per med    4)Weakness. 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  - Neurology input noted.  - cont pt.  - bed to chair     5) Pancytopenia  anemia- w/u this far unremarkable. possible aocd.  Stool guaiac requested  transfusional support as needed    Leukopenia- unclear etiology- possibly due to infxn or malignancy. Also could be related to zosyn. Now off of abx. monitor cbc     Thrombocytopenia- has developed while here.  Can't r/o malignancy vs. due to zosyn.   manage supportively for now.    6. Fever- possibly related to malignancy.   ID following 1) Hodgkin's lymphoma  - repeat CT scans, results noted  -Family meeting held at bedside on 7/2. We discussed rx options vs comfort care.   - allopurinol  - pt s/p 1st rx with opdivo. next due on 8/3     2) ENT- voice change is a new finding as per patient's sister. Better   - ENT input note, f/u s/s  - MBS planned    3)Hyperglycemia- mgmt as per med    4)Weakness. 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  - Neurology input noted.  - cont pt.  - bed to chair   - lumbar mri later today    5) Pancytopenia  anemia- w/u this far unremarkable. possible aocd.  Stool guaiac requested  transfusional support as needed    Leukopenia- unclear etiology- possibly due to infxn or malignancy. Also could be related to zosyn. Now off of abx. monitor cbc     Thrombocytopenia- has developed while here.  Can't r/o malignancy vs. due to zosyn.   manage supportively for now.    6. Fever- possibly related to malignancy.   ID following

## 2021-07-09 NOTE — PROGRESS NOTE ADULT - PROBLEM SELECTOR PLAN 1
Will continue Lantus 8u at bedtime as well as coverage scale. Will continue monitoring FS and FU.   for compliance with consistent low-carb diet, nutritional shakes and supplements as tolerated. FU RD recommendations.  Can DC on his prior Janumet 50/1000, endo FU 3 months.

## 2021-07-10 DIAGNOSIS — C81.98 HODGKIN LYMPHOMA, UNSPECIFIED, LYMPH NODES OF MULTIPLE SITES: ICD-10-CM

## 2021-07-10 DIAGNOSIS — R53.1 WEAKNESS: ICD-10-CM

## 2021-07-10 LAB
ALBUMIN SERPL ELPH-MCNC: 1.8 G/DL — LOW (ref 3.3–5)
ALP SERPL-CCNC: 501 U/L — HIGH (ref 40–120)
ALT FLD-CCNC: 23 U/L — SIGNIFICANT CHANGE UP (ref 10–45)
ANION GAP SERPL CALC-SCNC: 8 MMOL/L — SIGNIFICANT CHANGE UP (ref 5–17)
AST SERPL-CCNC: 47 U/L — HIGH (ref 10–40)
BILIRUB SERPL-MCNC: 0.7 MG/DL — SIGNIFICANT CHANGE UP (ref 0.2–1.2)
BUN SERPL-MCNC: 9 MG/DL — SIGNIFICANT CHANGE UP (ref 7–23)
CALCIUM SERPL-MCNC: 7.1 MG/DL — LOW (ref 8.4–10.5)
CHLORIDE SERPL-SCNC: 106 MMOL/L — SIGNIFICANT CHANGE UP (ref 96–108)
CO2 SERPL-SCNC: 21 MMOL/L — LOW (ref 22–31)
CREAT SERPL-MCNC: 0.42 MG/DL — LOW (ref 0.5–1.3)
CULTURE RESULTS: SIGNIFICANT CHANGE UP
CULTURE RESULTS: SIGNIFICANT CHANGE UP
GLUCOSE BLDC GLUCOMTR-MCNC: 105 MG/DL — HIGH (ref 70–99)
GLUCOSE BLDC GLUCOMTR-MCNC: 189 MG/DL — HIGH (ref 70–99)
GLUCOSE BLDC GLUCOMTR-MCNC: 204 MG/DL — HIGH (ref 70–99)
GLUCOSE BLDC GLUCOMTR-MCNC: 225 MG/DL — HIGH (ref 70–99)
GLUCOSE SERPL-MCNC: 89 MG/DL — SIGNIFICANT CHANGE UP (ref 70–99)
HCT VFR BLD CALC: 24.1 % — LOW (ref 39–50)
HGB BLD-MCNC: 7.7 G/DL — LOW (ref 13–17)
MAGNESIUM SERPL-MCNC: 1.6 MG/DL — SIGNIFICANT CHANGE UP (ref 1.6–2.6)
MCHC RBC-ENTMCNC: 27.9 PG — SIGNIFICANT CHANGE UP (ref 27–34)
MCHC RBC-ENTMCNC: 32 GM/DL — SIGNIFICANT CHANGE UP (ref 32–36)
MCV RBC AUTO: 87.3 FL — SIGNIFICANT CHANGE UP (ref 80–100)
NRBC # BLD: 0 /100 WBCS — SIGNIFICANT CHANGE UP (ref 0–0)
PLATELET # BLD AUTO: 56 K/UL — LOW (ref 150–400)
POTASSIUM SERPL-MCNC: 3.9 MMOL/L — SIGNIFICANT CHANGE UP (ref 3.5–5.3)
POTASSIUM SERPL-SCNC: 3.9 MMOL/L — SIGNIFICANT CHANGE UP (ref 3.5–5.3)
PROT SERPL-MCNC: 4.7 G/DL — LOW (ref 6–8.3)
RBC # BLD: 2.76 M/UL — LOW (ref 4.2–5.8)
RBC # FLD: 22.5 % — HIGH (ref 10.3–14.5)
SARS-COV-2 RNA SPEC QL NAA+PROBE: SIGNIFICANT CHANGE UP
SODIUM SERPL-SCNC: 135 MMOL/L — SIGNIFICANT CHANGE UP (ref 135–145)
SPECIMEN SOURCE: SIGNIFICANT CHANGE UP
SPECIMEN SOURCE: SIGNIFICANT CHANGE UP
WBC # BLD: 1.89 K/UL — LOW (ref 3.8–10.5)
WBC # FLD AUTO: 1.89 K/UL — LOW (ref 3.8–10.5)

## 2021-07-10 PROCEDURE — 74230 X-RAY XM SWLNG FUNCJ C+: CPT | Mod: 26

## 2021-07-10 RX ADMIN — Medication 2: at 16:48

## 2021-07-10 RX ADMIN — FINASTERIDE 5 MILLIGRAM(S): 5 TABLET, FILM COATED ORAL at 12:26

## 2021-07-10 RX ADMIN — Medication 650 MILLIGRAM(S): at 21:40

## 2021-07-10 RX ADMIN — PANTOPRAZOLE SODIUM 40 MILLIGRAM(S): 20 TABLET, DELAYED RELEASE ORAL at 12:26

## 2021-07-10 RX ADMIN — TAMSULOSIN HYDROCHLORIDE 0.4 MILLIGRAM(S): 0.4 CAPSULE ORAL at 20:58

## 2021-07-10 RX ADMIN — Medication 81 MILLIGRAM(S): at 12:26

## 2021-07-10 RX ADMIN — QUETIAPINE FUMARATE 25 MILLIGRAM(S): 200 TABLET, FILM COATED ORAL at 20:58

## 2021-07-10 RX ADMIN — Medication 1: at 12:24

## 2021-07-10 RX ADMIN — Medication 650 MILLIGRAM(S): at 20:58

## 2021-07-10 RX ADMIN — INSULIN GLARGINE 8 UNIT(S): 100 INJECTION, SOLUTION SUBCUTANEOUS at 21:26

## 2021-07-10 RX ADMIN — Medication 300 MILLIGRAM(S): at 12:26

## 2021-07-10 NOTE — PROGRESS NOTE ADULT - SUBJECTIVE AND OBJECTIVE BOX
SUBJECTIVE / OVERNIGHT EVENTS:  --- Coverage for Dr. Alas ---   No overnight events.  feeling better  No complaints.  No cp, sob, abdominal pain.  No n/v/d. no HA/dizziness.     --------------------------------------------------------------------------------------------  LABS:                        7.7    2.03  )-----------( 47       ( 09 Jul 2021 18:56 )             24.5     07-10    135  |  106  |  9   ----------------------------<  89  3.9   |  21<L>  |  0.42<L>    Ca    7.1<L>      10 Jul 2021 06:19  Mg     1.6     07-10    TPro  4.7<L>  /  Alb  1.8<L>  /  TBili  0.7  /  DBili  x   /  AST  47<H>  /  ALT  23  /  AlkPhos  501<H>  07-10      CAPILLARY BLOOD GLUCOSE      POCT Blood Glucose.: 189 mg/dL (10 Jul 2021 12:19)  POCT Blood Glucose.: 105 mg/dL (10 Jul 2021 07:42)  POCT Blood Glucose.: 157 mg/dL (09 Jul 2021 21:17)  POCT Blood Glucose.: 189 mg/dL (09 Jul 2021 16:12)            RADIOLOGY & ADDITIONAL TESTS:    Imaging Personally Reviewed:  [x] YES  [ ] NO    Consultant(s) Notes Reviewed:  [x] YES  [ ] NO    MEDICATIONS  (STANDING):  allopurinol 300 milliGRAM(s) Oral daily  aspirin  chewable 81 milliGRAM(s) Oral daily  dextrose 40% Gel 15 Gram(s) Oral once  dextrose 5%. 1000 milliLiter(s) (50 mL/Hr) IV Continuous <Continuous>  dextrose 5%. 1000 milliLiter(s) (100 mL/Hr) IV Continuous <Continuous>  dextrose 50% Injectable 25 Gram(s) IV Push once  dextrose 50% Injectable 12.5 Gram(s) IV Push once  dextrose 50% Injectable 25 Gram(s) IV Push once  finasteride 5 milliGRAM(s) Oral daily  glucagon  Injectable 1 milliGRAM(s) IntraMuscular once  insulin glargine Injectable (LANTUS) 8 Unit(s) SubCutaneous at bedtime  insulin lispro (ADMELOG) corrective regimen sliding scale   SubCutaneous three times a day before meals  insulin lispro (ADMELOG) corrective regimen sliding scale   SubCutaneous at bedtime  pantoprazole   Suspension 40 milliGRAM(s) Oral daily  QUEtiapine 25 milliGRAM(s) Oral at bedtime  tamsulosin 0.4 milliGRAM(s) Oral at bedtime    MEDICATIONS  (PRN):  acetaminophen   Tablet .. 650 milliGRAM(s) Oral every 6 hours PRN Temp greater or equal to 38C (100.4F), Moderate Pain (4 - 6)      Care Discussed with Consultants/Other Providers [x] YES  [ ] NO    Vital Signs Last 24 Hrs  T(C): 36.4 (10 Jul 2021 10:46), Max: 36.7 (09 Jul 2021 17:28)  T(F): 97.6 (10 Jul 2021 10:46), Max: 98 (09 Jul 2021 17:28)  HR: 84 (10 Jul 2021 10:46) (84 - 94)  BP: 123/69 (10 Jul 2021 10:46) (104/68 - 123/69)  BP(mean): --  RR: 18 (10 Jul 2021 10:46) (18 - 18)  SpO2: 100% (10 Jul 2021 10:46) (98% - 100%)  I&O's Summary    09 Jul 2021 07:01  -  10 Jul 2021 07:00  --------------------------------------------------------  IN: 1450 mL / OUT: 1600 mL / NET: -150 mL      PHYSICAL EXAM:  GENERAL: NAD, well-developed, comfortable  HEAD:  Atraumatic, Normocephalic  EYES: EOMI, PERRLA, conjunctiva and sclera clear  NECK: Supple, No JVD  CHEST/LUNG: mild decrease breath sounds bilaterally; No wheeze   HEART: Regular rate and rhythm; No murmurs, rubs, or gallops  ABDOMEN: Soft, Nontender, Nondistended; Bowel sounds present  Neuro: AAOx2-3, no focal weakness, mild right sided weakness baseline  EXTREMITIES:  2+ Peripheral Pulses, No clubbing, cyanosis, or edema  SKIN: No rashes or lesions

## 2021-07-10 NOTE — PROGRESS NOTE ADULT - ASSESSMENT
Assessment  DMT2: 71y Male with DM T2 with hyperglycemia, A1C 8.8%, was on oral meds/Janumet at home, admitted with weakness/fatigue and hyperglycemia,  on basal insulin and coverage, decreased insulin dose yesterday, blood sugars improving no hypoglycemic episodes. Patient eating partial meals on dysphagia diet + nutritional supplements/shakes, appears comfortable, pending DC to rehab.  Lymphoma: on medications, monitored, FU Heme/Onc.  Anemia: stable, monitored.      Shahriar Kahn MD  Cell: [phone]  Office: [phone]

## 2021-07-10 NOTE — SWALLOW VFSS/MBS ASSESSMENT ADULT - DIAGNOSTIC IMPRESSIONS
70 yo M admitted with failure to thrive in setting of Hodgkins lymphoma. Patient presents on MBS with an oropharyngeal dysphagia. Reduced mastication on hard solids with bolus expelled. Oral residue with reduced sensation noted for soft solids however able to clear volitionally given min cueing for lingual sweep. Premature spillage of liquids and reduced airway closure (in setting of known left vocal cord paresis) result in silent aspiration of thin liquids. Cued cough is not effective in clearing aspirated material. Left head rotation is not effective in improving airway protection. Improved airway protection is achieved for thin liquids with use of teaspoon administration in chin tuck position; min cueing for timely/effective use of strategy. Laryngeal penetration is noted on cup sips of nectar thickened liquids. Penetrated material is cleared on spontaneous repeat swallow. There is no evidence of laryngeal penetration or aspiration on soft solids.

## 2021-07-10 NOTE — PROGRESS NOTE ADULT - SUBJECTIVE AND OBJECTIVE BOX
CC: f/u for fever    Patient reports he is ok    REVIEW OF SYSTEMS:  All other review of systems negative (Comprehensive ROS)    Antimicrobials Day #  :    Other Medications Reviewed    T(F): 97.7 (07-10-21 @ 20:52), Max: 97.9 (07-09-21 @ 21:22)  HR: 98 (07-10-21 @ 20:52)  BP: 113/73 (07-10-21 @ 20:52)  RR: 18 (07-10-21 @ 20:52)  SpO2: 99% (07-10-21 @ 20:52)  Wt(kg): --    PHYSICAL EXAM:  General: alert, no acute distress  Eyes:  anicteric, no conjunctival injection, no discharge  Oropharynx: no lesions or injection 	  Neck: supple, without adenopathy  Lungs: clear to auscultation  Heart: regular rate and rhythm; no murmur, rubs or gallops  Abdomen: soft, nondistended, nontender, without mass or organomegaly  Skin: no lesions  Extremities: no clubbing, cyanosis, or edema  Neurologic: alert, oriented, moves all extremities    LAB RESULTS:                        7.7    1.89  )-----------( 56       ( 10 Jul 2021 18:38 )             24.1     07-10    135  |  106  |  9   ----------------------------<  89  3.9   |  21<L>  |  0.42<L>    Ca    7.1<L>      10 Jul 2021 06:19  Mg     1.6     07-10    TPro  4.7<L>  /  Alb  1.8<L>  /  TBili  0.7  /  DBili  x   /  AST  47<H>  /  ALT  23  /  AlkPhos  501<H>  07-10    LIVER FUNCTIONS - ( 10 Jul 2021 06:19 )  Alb: 1.8 g/dL / Pro: 4.7 g/dL / ALK PHOS: 501 U/L / ALT: 23 U/L / AST: 47 U/L / GGT: x             MICROBIOLOGY:  RECENT CULTURES:  07-07 @ 01:44 .Blood Blood-Peripheral     No growth to date.          RADIOLOGY REVIEWED:              Assessment:  Patient with hodgkin lymphoma, sent to hospice now admitted for FTT, fever, no infection, now s/p check point inhibitor. Had fever after drug infusion and a  transfusion, no infection found.  Suspect just reactive to blood and the check point inhibitor. No infection is apparent at present. . Prior fever B symptoms  Plan:  monitor off abx

## 2021-07-10 NOTE — PROGRESS NOTE ADULT - ASSESSMENT
70 y/o M w/ PMHx of CVA this past August and got TPA per family member, DM, BPH with obstruction s/p escamilla catheter, s/p ERCP w Sphincterectomy recent admission to [facility] for sepsis  Hodgkin lymphoma was not offered any chemo and was placed on hospice.     now presenting with weakness and FTT.   pt denies cp / SOB / palpitations   no focal neuro complaints .. at baseline RLE weakness   no N/V   had one episode of diarrhea   no urinary sx  abdominal pain, diffuse, but worse on R side.     - failure to thrive: cultures neg  CT chest noted   nutrition consult   s/p IVF.     - lymphoma: d/w Dr. Larios: s/p immunotherapy and chemo at later time post rehab     - DM with hyperglycemia : improved     - anemia: monitor H/H post transfusion, stable.     - fever: doubt infectious etiology   likely due to immunotherapy/lymphoma. culture: neg   abx dced. s/p IVF, can resume based on MBS results and speech eval.   ID f/u appreciated.     -voice changes: CT neck : Asymmetric enlargement of the left palatine tonsil, suspicious for lymphomatous involvement given history. Correlate with direct visualization.  ENT had eval pt: no gross pathology on visualization   S/S eval: MBS planned today. f/u speech eval.     - Tachycardia: VA duplex r/o DVT     - paraplegia functional: MR lumbar sacral pending. ?Claustrophobic. unable to tolerate. can reattempt with pre-medication with Ativan.

## 2021-07-10 NOTE — SWALLOW VFSS/MBS ASSESSMENT ADULT - ADDITIONAL RECOMMENDATIONS
SLP will follow up at bedside to ensure timely and effective use of strategies for potential advancement to thin liquids. As per d/w patient, dysphagia 3, nectar to be maintained in the interim.

## 2021-07-10 NOTE — PROGRESS NOTE ADULT - SUBJECTIVE AND OBJECTIVE BOX
Chief complaint    Patient is a 71y old  Male who presents with a chief complaint of Hodgkin's disease; FTT (10 Jul 2021 10:18)   Review of systems  Patient in bed, appears comfortable.    Labs and Fingersticks  CAPILLARY BLOOD GLUCOSE      POCT Blood Glucose.: 105 mg/dL (10 Jul 2021 07:42)  POCT Blood Glucose.: 157 mg/dL (09 Jul 2021 21:17)  POCT Blood Glucose.: 189 mg/dL (09 Jul 2021 16:12)  POCT Blood Glucose.: 132 mg/dL (09 Jul 2021 11:41)      Anion Gap, Serum: 8 (07-10 @ 06:19)  Anion Gap, Serum: 9 (07-09 @ 06:13)      Calcium, Total Serum: 7.1 *L* (07-10 @ 06:19)  Calcium, Total Serum: 7.1 *L* (07-09 @ 06:13)  Albumin, Serum: 1.8 *L* (07-10 @ 06:19)  Albumin, Serum: 1.6 *L* (07-09 @ 06:13)    Alanine Aminotransferase (ALT/SGPT): 23 (07-10 @ 06:19)  Alanine Aminotransferase (ALT/SGPT): 20 (07-09 @ 06:13)  Alkaline Phosphatase, Serum: 501 *H* (07-10 @ 06:19)  Alkaline Phosphatase, Serum: 475 *H* (07-09 @ 06:13)  Aspartate Aminotransferase (AST/SGOT): 47 *H* (07-10 @ 06:19)  Aspartate Aminotransferase (AST/SGOT): 47 *H* (07-09 @ 06:13)        07-10    135  |  106  |  9   ----------------------------<  89  3.9   |  21<L>  |  0.42<L>    Ca    7.1<L>      10 Jul 2021 06:19  Mg     1.6     07-10    TPro  4.7<L>  /  Alb  1.8<L>  /  TBili  0.7  /  DBili  x   /  AST  47<H>  /  ALT  23  /  AlkPhos  501<H>  07-10                        7.7    2.03  )-----------( 47       ( 09 Jul 2021 18:56 )             24.5     Medications  MEDICATIONS  (STANDING):  allopurinol 300 milliGRAM(s) Oral daily  aspirin  chewable 81 milliGRAM(s) Oral daily  dextrose 40% Gel 15 Gram(s) Oral once  dextrose 5%. 1000 milliLiter(s) (50 mL/Hr) IV Continuous <Continuous>  dextrose 5%. 1000 milliLiter(s) (100 mL/Hr) IV Continuous <Continuous>  dextrose 50% Injectable 25 Gram(s) IV Push once  dextrose 50% Injectable 12.5 Gram(s) IV Push once  dextrose 50% Injectable 25 Gram(s) IV Push once  finasteride 5 milliGRAM(s) Oral daily  glucagon  Injectable 1 milliGRAM(s) IntraMuscular once  insulin glargine Injectable (LANTUS) 8 Unit(s) SubCutaneous at bedtime  insulin lispro (ADMELOG) corrective regimen sliding scale   SubCutaneous three times a day before meals  insulin lispro (ADMELOG) corrective regimen sliding scale   SubCutaneous at bedtime  pantoprazole   Suspension 40 milliGRAM(s) Oral daily  QUEtiapine 25 milliGRAM(s) Oral at bedtime  sodium chloride 0.9%. 1000 milliLiter(s) (100 mL/Hr) IV Continuous <Continuous>  tamsulosin 0.4 milliGRAM(s) Oral at bedtime      Physical Exam  General: Patient comfortable in bed  Vital Signs Last 12 Hrs  T(F): 97.6 (07-10-21 @ 10:46), Max: 97.6 (07-10-21 @ 10:46)  HR: 84 (07-10-21 @ 10:46) (84 - 87)  BP: 123/69 (07-10-21 @ 10:46) (104/68 - 123/69)  BP(mean): --  RR: 18 (07-10-21 @ 10:46) (18 - 18)  SpO2: 100% (07-10-21 @ 10:46) (100% - 100%)  Neck: No palpable thyroid nodules.  CVS: S1S2, No murmurs  Respiratory: No wheezing, no crepitations  GI: Abdomen soft, bowel sounds positive  Musculoskeletal:  edema lower extremities.     Diagnostics

## 2021-07-10 NOTE — PROGRESS NOTE ADULT - SUBJECTIVE AND OBJECTIVE BOX
Patient is a 71y old  Male who presents with a chief complaint of FTT , Hodgkin's lymphoma; s/p Opdivo       Pt is seen and examined  pt is awake and lying in bed/out of bed to chair  pt seems comfortable and denies any complaints at this time      REVIEW OF SYSTEMS:    CONSTITUTIONAL: No weakness, fevers or chills  EYES/ENT: No visual changes;  No vertigo or throat pain   NECK: No pain or stiffness  RESPIRATORY: No cough, wheezing, hemoptysis; No shortness of breath  CARDIOVASCULAR: No chest pain or palpitations  GASTROINTESTINAL: No abdominal or epigastric pain. No nausea, vomiting, or hematemesis; No diarrhea or constipation. No melena or hematochezia.  GENITOURINARY: No dysuria, frequency or hematuria  NEUROLOGICAL: No numbness or weakness  SKIN: No itching, burning, rashes, or lesions     MEDICATIONS  (STANDING):  allopurinol 300 milliGRAM(s) Oral daily  aspirin  chewable 81 milliGRAM(s) Oral daily  dextrose 40% Gel 15 Gram(s) Oral once  dextrose 5%. 1000 milliLiter(s) (50 mL/Hr) IV Continuous <Continuous>  dextrose 5%. 1000 milliLiter(s) (100 mL/Hr) IV Continuous <Continuous>  dextrose 50% Injectable 25 Gram(s) IV Push once  dextrose 50% Injectable 12.5 Gram(s) IV Push once  dextrose 50% Injectable 25 Gram(s) IV Push once  finasteride 5 milliGRAM(s) Oral daily  glucagon  Injectable 1 milliGRAM(s) IntraMuscular once  insulin glargine Injectable (LANTUS) 8 Unit(s) SubCutaneous at bedtime  insulin lispro (ADMELOG) corrective regimen sliding scale   SubCutaneous three times a day before meals  insulin lispro (ADMELOG) corrective regimen sliding scale   SubCutaneous at bedtime  pantoprazole   Suspension 40 milliGRAM(s) Oral daily  QUEtiapine 25 milliGRAM(s) Oral at bedtime  sodium chloride 0.9%. 1000 milliLiter(s) (100 mL/Hr) IV Continuous <Continuous>  tamsulosin 0.4 milliGRAM(s) Oral at bedtime    MEDICATIONS  (PRN):  acetaminophen   Tablet .. 650 milliGRAM(s) Oral every 6 hours PRN Temp greater or equal to 38C (100.4F), Moderate Pain (4 - 6)      Allergies    No Known Allergies    Intolerances        Vital Signs Last 24 Hrs  T(C): 36.4 (10 Jul 2021 00:10), Max: 36.9 (09 Jul 2021 11:17)  T(F): 97.5 (10 Jul 2021 00:10), Max: 98.5 (09 Jul 2021 11:17)  HR: 87 (10 Jul 2021 00:10) (87 - 94)  BP: 104/68 (10 Jul 2021 00:10) (104/68 - 114/74)  BP(mean): --  RR: 18 (10 Jul 2021 00:10) (18 - 18)  SpO2: 100% (10 Jul 2021 00:10) (97% - 100%)    PHYSICAL EXAM  General: adult in NAD  HEENT: clear oropharynx, anicteric sclera, pink conjunctiva  Neck: supple  CV: normal S1/S2 with no murmur rubs or gallops  Lungs: positive air movement b/l ant lungs,clear to auscultation, no wheezes, no rales  Abdomen: soft non-tender non-distended, no hepatosplenomegaly  Ext: no clubbing cyanosis or edema  Skin: no rashes and no petechiae  Neuro: alert and oriented X 4, no focal deficits    LABS:                      7.7    2.03  )-----------( 47       ( 09 Jul 2021 18:56 )             24.5         Mean Cell Volume : 87.5 fl  Mean Cell Hemoglobin : 27.5 pg  Mean Cell Hemoglobin Concentration : 31.4 gm/dL  Auto Neutrophil # : 1.60 K/uL  Auto Lymphocyte # : 0.17 K/uL  Auto Monocyte # : 0.11 K/uL  Auto Eosinophil # : 0.00 K/uL  Auto Basophil # : 0.00 K/uL  Auto Neutrophil % : 78.0 %  Auto Lymphocyte % : 8.3 %  Auto Monocyte % : 5.5 %  Auto Eosinophil % : 0.0 %  Auto Basophil % : 0.0 %    Serial CBC's  07-09 @ 18:56  Hct-24.5 / Hgb-7.7 / Plat-47 / RBC-2.80 / WBC-2.03    Serial CBC's  07-09 @ 06:20  Hct-22.9 / Hgb-7.3 / Plat-44 / RBC-2.62 / WBC-1.74      Serial CBC's  07-08 @ 20:34  Hct-24.4 / Hgb-7.8 / Plat-46 / RBC-2.79 / WBC-2.11      Serial CBC's  07-08 @ 14:25  Hct-22.9 / Hgb-7.4 / Plat-56 / RBC-2.66 / WBC-2.03    Serial CBC's  07-08 @ 06:11  Hct-24.8 / Hgb-8.1 / Plat-53 / RBC-2.86 / WBC-1.80      07-10    135  |  106  |  9   ----------------------------<  89  3.9   |  21<L>  |  0.42<L>    Ca    7.1<L>      10 Jul 2021 06:19  Mg     1.6     07-10    TPro  4.7<L>  /  Alb  1.8<L>  /  TBili  0.7  /  DBili  x   /  AST  47<H>  /  ALT  23  /  AlkPhos  501<H>  07-10          RADIOLOGY & ADDITIONAL STUDIES:    < from: CT Chest w/ IV Cont (06.28.21 @ 19:01) >  EXAM:  CT CHEST IC                            PROCEDURE DATE:  06/28/2021            INTERPRETATION:  CLINICAL INFORMATION: Lymphoma, evaluate extent of thoracic disease.    COMPARISON: CT abdomen pelvis dated 6/28/2021. Chest x-ray dated 6/20/2021.    CONTRAST/COMPLICATIONS:  IV Contrast: 40 mL of Omnipaque 350 were administered. 60 mL discarded.  Oral Contrast: None.  Complications: None reported.    PROCEDURE:  CT of the Chest was performed.  Sagittal and coronal reformats were performed.    FINDINGS: The quality of the images are degraded by respiratory motion and streak artifact.    LUNGS AND AIRWAYS: Patent central airways. Patchy groundglass opacities and scattered nodular opacities throughout all lobes of the lungs. For example:  A left upper lobe nodule measures 0.9 cm (series 3 image 33).  A right middle lobe nodule measures 2.0 x 0.8 cm (series 3 image 76).  3.5 x 2.1 cm solid opacity within the superior segment of right lower lobe (series 3 image 71).  A right lower lobe nodule at the costophrenic angle measures 1.9 x 1.1 cm (series 3 image 108).    PLEURA: No pleural effusion or pneumothorax.    MEDIASTINUM AND SARTHAK: Mildly enlarged mediastinal, hilar, and supraclavicular lymphadenopathy. A right hilar lymph node measures 1.8 x 1.4 cm (series 3 image 90). A left supraclavicular node measures 2.5 x 1.5 cm (series 3 image 19). A subcarinal node measures 2.2 x 1.3 cm (series 3 image 63).    VESSELS: Coronary artery calcifications.    HEART: Heart size is normal. No pericardial effusion.    CHEST WALL AND LOWER NECK: Within normal limits.    VISUALIZED UPPER ABDOMEN: Retroperitoneal lymphadenopathy, as seen on abdominal CT from the same date.    BONES: Degenerative changes of the spine.    IMPRESSION:    Patchy groundglass opacities and scattered nodular opacities throughout all lobes of the lungs. Findings may be related to known lymphoma or may be seen in the setting of multifocal infection.    Mildly enlarged supraclavicular, mediastinal, hilar, and retroperitoneal lymphadenopathy likely related to known lymphoma.    LEXIS BAKER MD; Resident Radiology    < end of copied text >    < from: CT Neck Soft Tissue w/ IV Cont (07.03.21 @ 15:37) >    EXAM:  CT NECK SOFT TISSUE IC                          EXAM:  CT BRAIN IC                            PROCEDURE DATE:  07/03/2021            INTERPRETATION:  CLINICAL INDICATION: Hodgkin's lymphoma. Rule out metastatic disease.    TECHNIQUE: CT of the head and neck soft tissues was performed before and after administration of IV contrast. Contrast dose: 90 cc Omnipaque 300 IV contrast.    COMPARISON: CT chest 6/28/2021.    FINDINGS:    CT HEAD:  No acute transcortical infarct or intracranial hemorrhage. No abnormal intracranial enhancement is identified.    White matter hypoattenuating foci are noted, nonspecific but likely representing small vessel disease.    The ventricles are normal without evidence of hydrocephalus. There are no extra-axial fluid collections.    The visualized intraorbital contents are unremarkable. Mild mucosal thickening in the right maxillary sinus. The mastoid air cells are clear. The visualized soft tissues and osseous structures appear normal.    CT NECK:  Aerodigestive structures: Asymmetric enlargement of the left palatine tonsil is noted. Remainder of the aerodigestive structures are unremarkable.    Lymph nodes: Again noted left supraclavicular node measuring up to 2.4 x 1.5 cm.    Parotid and submandibular glands:  Normal.    Thyroid gland: Normal.    Vascular structures: Unremarkable.    Osseous structures: No fracture, dislocation or destructive lesion.    Partially visualized lung apices: Normal.      IMPRESSION:  CT head:  -No acute intracranial findings.  -No abnormal intracranial enhancement identified by CT. Consider MRI with contrast for increased sensitivity.    CT neck:  -Again noted enlarged left supraclavicular node.  -Asymmetric enlargement of the left palatine tonsil, suspicious for lymphomatous involvement given history. Correlate with direct visualization.        ZOEY TORRES MD; Attending Radiologist  This document has been electronically signed. Jul  3 2021  3:51PM    < end of copied text >  < from: CT Abdomen and Pelvis w/ IV Cont (06.28.21 @ 14:26) >    EXAM:  CT ABDOMEN AND PELVIS IC                            PROCEDURE DATE:  06/28/2021        INTERPRETATION:  CLINICAL INFORMATION: Right lower quadrant pain. Hodgkin's lymphoma.    COMPARISON: None.    CONTRAST/COMPLICATIONS:  IV Contrast: Omnipaque 350  90 cc administered   10 cc discarded  Oral Contrast: None  Complications: None reported    PROCEDURE:  CT of the Abdomen and Pelvis was performed.  Sagittal and coronal reformats were performed.    FINDINGS:  LOWER CHEST: Nodular opacities in the right middle and bilateral lower lobes.    LIVER: Within normal limits.  BILE DUCTS: Normal caliber.  GALLBLADDER: Within normal limits.  SPLEEN: Within normal limits.  PANCREAS: Within normal limits.  ADRENALS: Within normal limits.  KIDNEYS/URETERS: Duplex left collecting system/proximal ureters. Distal ureter is not well evaluated. Right renal cyst. No hydronephrosis.    BLADDER: Sauceda catheter.  REPRODUCTIVE ORGANS: Prostate is enlarged, measuring 6.5 cm in transverse dimension.    BOWEL: No bowel obstruction. Appendix is not visualized. No evidence of inflammation in the pericecal region.  PERITONEUM: No ascites.  VESSELS: Atherosclerotic changes.  RETROPERITONEUM/LYMPH NODES: Periportal and retroperitoneal lymphadenopathy. For reference, a conglomerate periportal node (2, 34) measures 5.1 x 4.4 cm. A left para-aortic lymph node (2, 53) measures 2.6 x 1.4 cm.  ABDOMINAL WALL: Left inguinal hernia containing nonobstructed sigmoid colon.  BONES: Degenerative changes.    IMPRESSION:  Indeterminant nodular lung opacities which dedicated follow-up chest CT can be performed for further evaluation.    Abdominal lymphadenopathy, which may be related to patient's known history of lymphoma. Correlate with prior imaging if availablefor comparison.    Enlarged prostate.                TRISTAN BAKER MD; Attending Radiologist    < end of copied text >    Assessment           Patient is a 71y old  Male who presents with a chief complaint of FTT , Hodgkin's lymphoma; s/p Opdivo       Pt is seen and examined  pt is awake and lying in bed/  pt seems comfortable and denies any complaints at this time; c/o diet; would want regular diet      REVIEW OF SYSTEMS:    CONSTITUTIONAL: No weakness, fevers or chills  EYES/ENT: No visual changes;  No vertigo or throat pain   NECK: No pain or stiffness  RESPIRATORY: No cough, wheezing, hemoptysis; No shortness of breath  CARDIOVASCULAR: No chest pain or palpitations  GASTROINTESTINAL: No abdominal or epigastric pain. No nausea, vomiting, or hematemesis; No diarrhea or constipation. No melena or hematochezia.  GENITOURINARY: No dysuria, frequency or hematuria  NEUROLOGICAL: No numbness or weakness  SKIN: No itching, burning, rashes, or lesions     MEDICATIONS  (STANDING):  allopurinol 300 milliGRAM(s) Oral daily  aspirin  chewable 81 milliGRAM(s) Oral daily  dextrose 40% Gel 15 Gram(s) Oral once  dextrose 5%. 1000 milliLiter(s) (50 mL/Hr) IV Continuous <Continuous>  dextrose 5%. 1000 milliLiter(s) (100 mL/Hr) IV Continuous <Continuous>  dextrose 50% Injectable 25 Gram(s) IV Push once  dextrose 50% Injectable 12.5 Gram(s) IV Push once  dextrose 50% Injectable 25 Gram(s) IV Push once  finasteride 5 milliGRAM(s) Oral daily  glucagon  Injectable 1 milliGRAM(s) IntraMuscular once  insulin glargine Injectable (LANTUS) 8 Unit(s) SubCutaneous at bedtime  insulin lispro (ADMELOG) corrective regimen sliding scale   SubCutaneous three times a day before meals  insulin lispro (ADMELOG) corrective regimen sliding scale   SubCutaneous at bedtime  pantoprazole   Suspension 40 milliGRAM(s) Oral daily  QUEtiapine 25 milliGRAM(s) Oral at bedtime  sodium chloride 0.9%. 1000 milliLiter(s) (100 mL/Hr) IV Continuous <Continuous>  tamsulosin 0.4 milliGRAM(s) Oral at bedtime    MEDICATIONS  (PRN):  acetaminophen   Tablet .. 650 milliGRAM(s) Oral every 6 hours PRN Temp greater or equal to 38C (100.4F), Moderate Pain (4 - 6)      Allergies    No Known Allergies    Intolerances      Vital Signs Last 24 Hrs  T(C): 36.4 (10 Jul 2021 00:10), Max: 36.9 (09 Jul 2021 11:17)  T(F): 97.5 (10 Jul 2021 00:10), Max: 98.5 (09 Jul 2021 11:17)  HR: 87 (10 Jul 2021 00:10) (87 - 94)  BP: 104/68 (10 Jul 2021 00:10) (104/68 - 114/74)  BP(mean): --  RR: 18 (10 Jul 2021 00:10) (18 - 18)  SpO2: 100% (10 Jul 2021 00:10) (97% - 100%)    PHYSICAL EXAM  General: adult in NAD  HEENT: clear oropharynx, anicteric sclera, pink conjunctiva  Neck: supple  CV: normal S1/S2 with no murmur rubs or gallops  Lungs: positive air movement b/l ant lungs,clear to auscultation, no wheezes, no rales  Abdomen: soft non-tender non-distended, no hepatosplenomegaly  Ext: no clubbing cyanosis or edema  Skin: no rashes and no petechiae  Neuro: alert and oriented X 4, no focal deficits  Lymph nodes: no peripheral lymph nodes appreciated    LABS:                      7.7    2.03  )-----------( 47       ( 09 Jul 2021 18:56 )             24.5         Mean Cell Volume : 87.5 fl  Mean Cell Hemoglobin : 27.5 pg  Mean Cell Hemoglobin Concentration : 31.4 gm/dL  Auto Neutrophil # : 1.60 K/uL  Auto Lymphocyte # : 0.17 K/uL  Auto Monocyte # : 0.11 K/uL  Auto Eosinophil # : 0.00 K/uL  Auto Basophil # : 0.00 K/uL  Auto Neutrophil % : 78.0 %  Auto Lymphocyte % : 8.3 %  Auto Monocyte % : 5.5 %  Auto Eosinophil % : 0.0 %  Auto Basophil % : 0.0 %    Serial CBC's  07-09 @ 18:56  Hct-24.5 / Hgb-7.7 / Plat-47 / RBC-2.80 / WBC-2.03    Serial CBC's  07-09 @ 06:20  Hct-22.9 / Hgb-7.3 / Plat-44 / RBC-2.62 / WBC-1.74      Serial CBC's  07-08 @ 20:34  Hct-24.4 / Hgb-7.8 / Plat-46 / RBC-2.79 / WBC-2.11      Serial CBC's  07-08 @ 14:25  Hct-22.9 / Hgb-7.4 / Plat-56 / RBC-2.66 / WBC-2.03    Serial CBC's  07-08 @ 06:11  Hct-24.8 / Hgb-8.1 / Plat-53 / RBC-2.86 / WBC-1.80      07-10    135  |  106  |  9   ----------------------------<  89  3.9   |  21<L>  |  0.42<L>    Ca    7.1<L>      10 Jul 2021 06:19  Mg     1.6     07-10    TPro  4.7<L>  /  Alb  1.8<L>  /  TBili  0.7  /  DBili  x   /  AST  47<H>  /  ALT  23  /  AlkPhos  501<H>  07-10          RADIOLOGY & ADDITIONAL STUDIES:    < from: CT Chest w/ IV Cont (06.28.21 @ 19:01) >  EXAM:  CT CHEST IC                            PROCEDURE DATE:  06/28/2021            INTERPRETATION:  CLINICAL INFORMATION: Lymphoma, evaluate extent of thoracic disease.    COMPARISON: CT abdomen pelvis dated 6/28/2021. Chest x-ray dated 6/20/2021.    CONTRAST/COMPLICATIONS:  IV Contrast: 40 mL of Omnipaque 350 were administered. 60 mL discarded.  Oral Contrast: None.  Complications: None reported.    PROCEDURE:  CT of the Chest was performed.  Sagittal and coronal reformats were performed.    FINDINGS: The quality of the images are degraded by respiratory motion and streak artifact.    LUNGS AND AIRWAYS: Patent central airways. Patchy groundglass opacities and scattered nodular opacities throughout all lobes of the lungs. For example:  A left upper lobe nodule measures 0.9 cm (series 3 image 33).  A right middle lobe nodule measures 2.0 x 0.8 cm (series 3 image 76).  3.5 x 2.1 cm solid opacity within the superior segment of right lower lobe (series 3 image 71).  A right lower lobe nodule at the costophrenic angle measures 1.9 x 1.1 cm (series 3 image 108).    PLEURA: No pleural effusion or pneumothorax.    MEDIASTINUM AND SARTHAK: Mildly enlarged mediastinal, hilar, and supraclavicular lymphadenopathy. A right hilar lymph node measures 1.8 x 1.4 cm (series 3 image 90). A left supraclavicular node measures 2.5 x 1.5 cm (series 3 image 19). A subcarinal node measures 2.2 x 1.3 cm (series 3 image 63).    VESSELS: Coronary artery calcifications.    HEART: Heart size is normal. No pericardial effusion.    CHEST WALL AND LOWER NECK: Within normal limits.    VISUALIZED UPPER ABDOMEN: Retroperitoneal lymphadenopathy, as seen on abdominal CT from the same date.    BONES: Degenerative changes of the spine.    IMPRESSION:    Patchy groundglass opacities and scattered nodular opacities throughout all lobes of the lungs. Findings may be related to known lymphoma or may be seen in the setting of multifocal infection.    Mildly enlarged supraclavicular, mediastinal, hilar, and retroperitoneal lymphadenopathy likely related to known lymphoma.    LEXIS BAKER MD; Resident Radiology    < end of copied text >    < from: CT Neck Soft Tissue w/ IV Cont (07.03.21 @ 15:37) >    EXAM:  CT NECK SOFT TISSUE IC                          EXAM:  CT BRAIN IC                            PROCEDURE DATE:  07/03/2021            INTERPRETATION:  CLINICAL INDICATION: Hodgkin's lymphoma. Rule out metastatic disease.    TECHNIQUE: CT of the head and neck soft tissues was performed before and after administration of IV contrast. Contrast dose: 90 cc Omnipaque 300 IV contrast.    COMPARISON: CT chest 6/28/2021.    FINDINGS:    CT HEAD:  No acute transcortical infarct or intracranial hemorrhage. No abnormal intracranial enhancement is identified.    White matter hypoattenuating foci are noted, nonspecific but likely representing small vessel disease.    The ventricles are normal without evidence of hydrocephalus. There are no extra-axial fluid collections.    The visualized intraorbital contents are unremarkable. Mild mucosal thickening in the right maxillary sinus. The mastoid air cells are clear. The visualized soft tissues and osseous structures appear normal.    CT NECK:  Aerodigestive structures: Asymmetric enlargement of the left palatine tonsil is noted. Remainder of the aerodigestive structures are unremarkable.    Lymph nodes: Again noted left supraclavicular node measuring up to 2.4 x 1.5 cm.    Parotid and submandibular glands:  Normal.    Thyroid gland: Normal.    Vascular structures: Unremarkable.    Osseous structures: No fracture, dislocation or destructive lesion.    Partially visualized lung apices: Normal.      IMPRESSION:  CT head:  -No acute intracranial findings.  -No abnormal intracranial enhancement identified by CT. Consider MRI with contrast for increased sensitivity.    CT neck:  -Again noted enlarged left supraclavicular node.  -Asymmetric enlargement of the left palatine tonsil, suspicious for lymphomatous involvement given history. Correlate with direct visualization.        ZOEY TORRES MD; Attending Radiologist  This document has been electronically signed. Jul  3 2021  3:51PM    < end of copied text >  < from: CT Abdomen and Pelvis w/ IV Cont (06.28.21 @ 14:26) >    EXAM:  CT ABDOMEN AND PELVIS IC                            PROCEDURE DATE:  06/28/2021        INTERPRETATION:  CLINICAL INFORMATION: Right lower quadrant pain. Hodgkin's lymphoma.    COMPARISON: None.    CONTRAST/COMPLICATIONS:  IV Contrast: Omnipaque 350  90 cc administered   10 cc discarded  Oral Contrast: None  Complications: None reported    PROCEDURE:  CT of the Abdomen and Pelvis was performed.  Sagittal and coronal reformats were performed.    FINDINGS:  LOWER CHEST: Nodular opacities in the right middle and bilateral lower lobes.    LIVER: Within normal limits.  BILE DUCTS: Normal caliber.  GALLBLADDER: Within normal limits.  SPLEEN: Within normal limits.  PANCREAS: Within normal limits.  ADRENALS: Within normal limits.  KIDNEYS/URETERS: Duplex left collecting system/proximal ureters. Distal ureter is not well evaluated. Right renal cyst. No hydronephrosis.    BLADDER: Sauceda catheter.  REPRODUCTIVE ORGANS: Prostate is enlarged, measuring 6.5 cm in transverse dimension.    BOWEL: No bowel obstruction. Appendix is not visualized. No evidence of inflammation in the pericecal region.  PERITONEUM: No ascites.  VESSELS: Atherosclerotic changes.  RETROPERITONEUM/LYMPH NODES: Periportal and retroperitoneal lymphadenopathy. For reference, a conglomerate periportal node (2, 34) measures 5.1 x 4.4 cm. A left para-aortic lymph node (2, 53) measures 2.6 x 1.4 cm.  ABDOMINAL WALL: Left inguinal hernia containing nonobstructed sigmoid colon.  BONES: Degenerative changes.    IMPRESSION:  Indeterminant nodular lung opacities which dedicated follow-up chest CT can be performed for further evaluation.    Abdominal lymphadenopathy, which may be related to patient's known history of lymphoma. Correlate with prior imaging if availablefor comparison.    Enlarged prostate.                TRISTAN BAKER MD; Attending Radiologist    < end of copied text >    Assessment

## 2021-07-10 NOTE — PROGRESS NOTE ADULT - PROBLEM SELECTOR PLAN 1
- s//p Opdivo on 7/6/2021  - next opdivo due 8/3/2021    Blood counts low  Would transfuse if Hb <7 if pt asymptomatic  Transfuse for plts <20 K without bleeding or plts <50k if bleeding present

## 2021-07-11 LAB
ANION GAP SERPL CALC-SCNC: 10 MMOL/L — SIGNIFICANT CHANGE UP (ref 5–17)
BUN SERPL-MCNC: 9 MG/DL — SIGNIFICANT CHANGE UP (ref 7–23)
CALCIUM SERPL-MCNC: 7.7 MG/DL — LOW (ref 8.4–10.5)
CHLORIDE SERPL-SCNC: 108 MMOL/L — SIGNIFICANT CHANGE UP (ref 96–108)
CO2 SERPL-SCNC: 23 MMOL/L — SIGNIFICANT CHANGE UP (ref 22–31)
CREAT SERPL-MCNC: 0.38 MG/DL — LOW (ref 0.5–1.3)
GLUCOSE BLDC GLUCOMTR-MCNC: 116 MG/DL — HIGH (ref 70–99)
GLUCOSE BLDC GLUCOMTR-MCNC: 184 MG/DL — HIGH (ref 70–99)
GLUCOSE BLDC GLUCOMTR-MCNC: 240 MG/DL — HIGH (ref 70–99)
GLUCOSE BLDC GLUCOMTR-MCNC: 255 MG/DL — HIGH (ref 70–99)
GLUCOSE SERPL-MCNC: 132 MG/DL — HIGH (ref 70–99)
HCT VFR BLD CALC: 23.1 % — LOW (ref 39–50)
HCT VFR BLD CALC: 25.5 % — LOW (ref 39–50)
HGB BLD-MCNC: 7.2 G/DL — LOW (ref 13–17)
HGB BLD-MCNC: 8 G/DL — LOW (ref 13–17)
MAGNESIUM SERPL-MCNC: 1.4 MG/DL — LOW (ref 1.6–2.6)
MCHC RBC-ENTMCNC: 27.5 PG — SIGNIFICANT CHANGE UP (ref 27–34)
MCHC RBC-ENTMCNC: 27.6 PG — SIGNIFICANT CHANGE UP (ref 27–34)
MCHC RBC-ENTMCNC: 31.2 GM/DL — LOW (ref 32–36)
MCHC RBC-ENTMCNC: 31.4 GM/DL — LOW (ref 32–36)
MCV RBC AUTO: 87.9 FL — SIGNIFICANT CHANGE UP (ref 80–100)
MCV RBC AUTO: 88.2 FL — SIGNIFICANT CHANGE UP (ref 80–100)
NRBC # BLD: 0 /100 WBCS — SIGNIFICANT CHANGE UP (ref 0–0)
NRBC # BLD: 1 /100 WBCS — HIGH (ref 0–0)
PLATELET # BLD AUTO: 38 K/UL — LOW (ref 150–400)
PLATELET # BLD AUTO: 49 K/UL — LOW (ref 150–400)
POTASSIUM SERPL-MCNC: 3.8 MMOL/L — SIGNIFICANT CHANGE UP (ref 3.5–5.3)
POTASSIUM SERPL-SCNC: 3.8 MMOL/L — SIGNIFICANT CHANGE UP (ref 3.5–5.3)
RBC # BLD: 2.62 M/UL — LOW (ref 4.2–5.8)
RBC # BLD: 2.9 M/UL — LOW (ref 4.2–5.8)
RBC # FLD: 22.1 % — HIGH (ref 10.3–14.5)
RBC # FLD: 22.2 % — HIGH (ref 10.3–14.5)
SODIUM SERPL-SCNC: 141 MMOL/L — SIGNIFICANT CHANGE UP (ref 135–145)
WBC # BLD: 1.58 K/UL — LOW (ref 3.8–10.5)
WBC # BLD: 2.1 K/UL — LOW (ref 3.8–10.5)
WBC # FLD AUTO: 1.58 K/UL — LOW (ref 3.8–10.5)
WBC # FLD AUTO: 2.1 K/UL — LOW (ref 3.8–10.5)

## 2021-07-11 PROCEDURE — 72158 MRI LUMBAR SPINE W/O & W/DYE: CPT | Mod: 26

## 2021-07-11 RX ORDER — SENNA PLUS 8.6 MG/1
2 TABLET ORAL AT BEDTIME
Refills: 0 | Status: DISCONTINUED | OUTPATIENT
Start: 2021-07-11 | End: 2021-08-04

## 2021-07-11 RX ORDER — POTASSIUM CHLORIDE 20 MEQ
40 PACKET (EA) ORAL ONCE
Refills: 0 | Status: COMPLETED | OUTPATIENT
Start: 2021-07-11 | End: 2021-07-11

## 2021-07-11 RX ORDER — MAGNESIUM SULFATE 500 MG/ML
1 VIAL (ML) INJECTION ONCE
Refills: 0 | Status: COMPLETED | OUTPATIENT
Start: 2021-07-11 | End: 2021-07-11

## 2021-07-11 RX ORDER — POLYETHYLENE GLYCOL 3350 17 G/17G
17 POWDER, FOR SOLUTION ORAL DAILY
Refills: 0 | Status: DISCONTINUED | OUTPATIENT
Start: 2021-07-11 | End: 2021-08-04

## 2021-07-11 RX ADMIN — FINASTERIDE 5 MILLIGRAM(S): 5 TABLET, FILM COATED ORAL at 11:38

## 2021-07-11 RX ADMIN — TAMSULOSIN HYDROCHLORIDE 0.4 MILLIGRAM(S): 0.4 CAPSULE ORAL at 21:38

## 2021-07-11 RX ADMIN — SENNA PLUS 2 TABLET(S): 8.6 TABLET ORAL at 21:39

## 2021-07-11 RX ADMIN — Medication 3: at 16:54

## 2021-07-11 RX ADMIN — Medication 1: at 12:04

## 2021-07-11 RX ADMIN — Medication 81 MILLIGRAM(S): at 11:38

## 2021-07-11 RX ADMIN — Medication 0.5 MILLIGRAM(S): at 21:49

## 2021-07-11 RX ADMIN — POLYETHYLENE GLYCOL 3350 17 GRAM(S): 17 POWDER, FOR SOLUTION ORAL at 16:23

## 2021-07-11 RX ADMIN — INSULIN GLARGINE 8 UNIT(S): 100 INJECTION, SOLUTION SUBCUTANEOUS at 21:38

## 2021-07-11 RX ADMIN — Medication 100 GRAM(S): at 12:09

## 2021-07-11 RX ADMIN — PANTOPRAZOLE SODIUM 40 MILLIGRAM(S): 20 TABLET, DELAYED RELEASE ORAL at 11:39

## 2021-07-11 RX ADMIN — QUETIAPINE FUMARATE 25 MILLIGRAM(S): 200 TABLET, FILM COATED ORAL at 21:38

## 2021-07-11 RX ADMIN — Medication 300 MILLIGRAM(S): at 11:38

## 2021-07-11 RX ADMIN — Medication 40 MILLIEQUIVALENT(S): at 12:08

## 2021-07-11 NOTE — PROGRESS NOTE ADULT - PROBLEM SELECTOR PLAN 1
- s//p Opdivo on 7/6/2021  - next opdivo due 8/3/2021    Blood counts low  Would transfuse if Hb <7 if pt asymptomatic  Transfuse for plts <20 K without bleeding or plts <50k if bleeding present    Will order ANC for tomorrow am  Low threshold for starting antibiotics if pt spikes temp

## 2021-07-11 NOTE — PROGRESS NOTE ADULT - SUBJECTIVE AND OBJECTIVE BOX
HEM/ONC COVERAGE FOR DR SOFIA    Patient is a 71y old  Male who presents with a chief complaint of FTT , Hodgkin's lymphoma; s/p Opdivo       Pt is seen and examined  pt is awake and lying in bed/  pt seems comfortable and denies any complaints at this time; worried about MRI; notes he is claustrophobic       REVIEW OF SYSTEMS:    CONSTITUTIONAL: No weakness, fevers or chills  EYES/ENT: No visual changes;  No vertigo or throat pain   NECK: No pain or stiffness  RESPIRATORY: No cough, wheezing, hemoptysis; No shortness of breath  CARDIOVASCULAR: No chest pain or palpitations  GASTROINTESTINAL: No abdominal or epigastric pain. No nausea, vomiting, or hematemesis; No diarrhea or constipation. No melena or hematochezia.  GENITOURINARY: No dysuria, frequency or hematuria  NEUROLOGICAL: No numbness or weakness  SKIN: No itching, burning, rashes, or lesions     MEDICATIONS  (STANDING):  allopurinol 300 milliGRAM(s) Oral daily  aspirin  chewable 81 milliGRAM(s) Oral daily  dextrose 40% Gel 15 Gram(s) Oral once  dextrose 5%. 1000 milliLiter(s) (50 mL/Hr) IV Continuous <Continuous>  dextrose 5%. 1000 milliLiter(s) (100 mL/Hr) IV Continuous <Continuous>  dextrose 50% Injectable 25 Gram(s) IV Push once  dextrose 50% Injectable 12.5 Gram(s) IV Push once  dextrose 50% Injectable 25 Gram(s) IV Push once  finasteride 5 milliGRAM(s) Oral daily  glucagon  Injectable 1 milliGRAM(s) IntraMuscular once  insulin glargine Injectable (LANTUS) 8 Unit(s) SubCutaneous at bedtime  insulin lispro (ADMELOG) corrective regimen sliding scale   SubCutaneous three times a day before meals  insulin lispro (ADMELOG) corrective regimen sliding scale   SubCutaneous at bedtime  pantoprazole   Suspension 40 milliGRAM(s) Oral daily  QUEtiapine 25 milliGRAM(s) Oral at bedtime  sodium chloride 0.9%. 1000 milliLiter(s) (100 mL/Hr) IV Continuous <Continuous>  tamsulosin 0.4 milliGRAM(s) Oral at bedtime    MEDICATIONS  (PRN):  acetaminophen   Tablet .. 650 milliGRAM(s) Oral every 6 hours PRN Temp greater or equal to 38C (100.4F), Moderate Pain (4 - 6)      Allergies    No Known Allergies    Intolerances      Vital Signs Last 24 Hrs  T(C): 36.4 (10 Jul 2021 00:10), Max: 36.9 (09 Jul 2021 11:17)  T(F): 97.5 (10 Jul 2021 00:10), Max: 98.5 (09 Jul 2021 11:17)  HR: 87 (10 Jul 2021 00:10) (87 - 94)  BP: 104/68 (10 Jul 2021 00:10) (104/68 - 114/74)  BP(mean): --  RR: 18 (10 Jul 2021 00:10) (18 - 18)  SpO2: 100% (10 Jul 2021 00:10) (97% - 100%)    PHYSICAL EXAM  General: adult in NAD  HEENT: clear oropharynx, anicteric sclera, pink conjunctiva  Neck: supple  CV: normal S1/S2 with no murmur rubs or gallops  Lungs: positive air movement b/l ant lungs,clear to auscultation, no wheezes, no rales  Abdomen: soft non-tender non-distended, no hepatosplenomegaly  Ext: no clubbing cyanosis or edema  Skin: no rashes and no petechiae  Neuro: alert and oriented X 4, no focal deficits  Lymph nodes: no peripheral lymph nodes appreciated    LABS:                                   7.2    1.58  )-----------( 38       ( 07-11 @ 06:30 )             23.1                7.7    1.89  )-----------( 56       ( 07-10 @ 18:38 )             24.1                7.7    2.03  )-----------( 47       ( 07-09 @ 18:56 )             24.5       Last ANC done 7/6/2021 and was 1600      07-10    135  |  106  |  9   ----------------------------<  89  3.9   |  21<L>  |  0.42<L>    Ca    7.1<L>      10 Jul 2021 06:19  Mg     1.6     07-10    TPro  4.7<L>  /  Alb  1.8<L>  /  TBili  0.7  /  DBili  x   /  AST  47<H>  /  ALT  23  /  AlkPhos  501<H>  07-10          RADIOLOGY & ADDITIONAL STUDIES:    Swallow study done 7/10/2021 --- Rec are to continue with Dysphagia 3 diet and Nectar thickened liquid diet      < from: CT Chest w/ IV Cont (06.28.21 @ 19:01) >  EXAM:  CT CHEST IC                            PROCEDURE DATE:  06/28/2021            INTERPRETATION:  CLINICAL INFORMATION: Lymphoma, evaluate extent of thoracic disease.    COMPARISON: CT abdomen pelvis dated 6/28/2021. Chest x-ray dated 6/20/2021.    CONTRAST/COMPLICATIONS:  IV Contrast: 40 mL of Omnipaque 350 were administered. 60 mL discarded.  Oral Contrast: None.  Complications: None reported.    PROCEDURE:  CT of the Chest was performed.  Sagittal and coronal reformats were performed.    FINDINGS: The quality of the images are degraded by respiratory motion and streak artifact.    LUNGS AND AIRWAYS: Patent central airways. Patchy groundglass opacities and scattered nodular opacities throughout all lobes of the lungs. For example:  A left upper lobe nodule measures 0.9 cm (series 3 image 33).  A right middle lobe nodule measures 2.0 x 0.8 cm (series 3 image 76).  3.5 x 2.1 cm solid opacity within the superior segment of right lower lobe (series 3 image 71).  A right lower lobe nodule at the costophrenic angle measures 1.9 x 1.1 cm (series 3 image 108).    PLEURA: No pleural effusion or pneumothorax.    MEDIASTINUM AND SARTHAK: Mildly enlarged mediastinal, hilar, and supraclavicular lymphadenopathy. A right hilar lymph node measures 1.8 x 1.4 cm (series 3 image 90). A left supraclavicular node measures 2.5 x 1.5 cm (series 3 image 19). A subcarinal node measures 2.2 x 1.3 cm (series 3 image 63).    VESSELS: Coronary artery calcifications.    HEART: Heart size is normal. No pericardial effusion.    CHEST WALL AND LOWER NECK: Within normal limits.    VISUALIZED UPPER ABDOMEN: Retroperitoneal lymphadenopathy, as seen on abdominal CT from the same date.    BONES: Degenerative changes of the spine.    IMPRESSION:    Patchy groundglass opacities and scattered nodular opacities throughout all lobes of the lungs. Findings may be related to known lymphoma or may be seen in the setting of multifocal infection.    Mildly enlarged supraclavicular, mediastinal, hilar, and retroperitoneal lymphadenopathy likely related to known lymphoma.    LEXIS BAKER MD; Resident Radiology    < end of copied text >    < from: CT Neck Soft Tissue w/ IV Cont (07.03.21 @ 15:37) >    EXAM:  CT NECK SOFT TISSUE IC                          EXAM:  CT BRAIN IC                            PROCEDURE DATE:  07/03/2021            INTERPRETATION:  CLINICAL INDICATION: Hodgkin's lymphoma. Rule out metastatic disease.    TECHNIQUE: CT of the head and neck soft tissues was performed before and after administration of IV contrast. Contrast dose: 90 cc Omnipaque 300 IV contrast.    COMPARISON: CT chest 6/28/2021.    FINDINGS:    CT HEAD:  No acute transcortical infarct or intracranial hemorrhage. No abnormal intracranial enhancement is identified.    White matter hypoattenuating foci are noted, nonspecific but likely representing small vessel disease.    The ventricles are normal without evidence of hydrocephalus. There are no extra-axial fluid collections.    The visualized intraorbital contents are unremarkable. Mild mucosal thickening in the right maxillary sinus. The mastoid air cells are clear. The visualized soft tissues and osseous structures appear normal.    CT NECK:  Aerodigestive structures: Asymmetric enlargement of the left palatine tonsil is noted. Remainder of the aerodigestive structures are unremarkable.    Lymph nodes: Again noted left supraclavicular node measuring up to 2.4 x 1.5 cm.    Parotid and submandibular glands:  Normal.    Thyroid gland: Normal.    Vascular structures: Unremarkable.    Osseous structures: No fracture, dislocation or destructive lesion.    Partially visualized lung apices: Normal.      IMPRESSION:  CT head:  -No acute intracranial findings.  -No abnormal intracranial enhancement identified by CT. Consider MRI with contrast for increased sensitivity.    CT neck:  -Again noted enlarged left supraclavicular node.  -Asymmetric enlargement of the left palatine tonsil, suspicious for lymphomatous involvement given history. Correlate with direct visualization.        ZOEY TORRES MD; Attending Radiologist  This document has been electronically signed. Jul  3 2021  3:51PM    < end of copied text >  < from: CT Abdomen and Pelvis w/ IV Cont (06.28.21 @ 14:26) >    EXAM:  CT ABDOMEN AND PELVIS IC                            PROCEDURE DATE:  06/28/2021        INTERPRETATION:  CLINICAL INFORMATION: Right lower quadrant pain. Hodgkin's lymphoma.    COMPARISON: None.    CONTRAST/COMPLICATIONS:  IV Contrast: Omnipaque 350  90 cc administered   10 cc discarded  Oral Contrast: None  Complications: None reported    PROCEDURE:  CT of the Abdomen and Pelvis was performed.  Sagittal and coronal reformats were performed.    FINDINGS:  LOWER CHEST: Nodular opacities in the right middle and bilateral lower lobes.    LIVER: Within normal limits.  BILE DUCTS: Normal caliber.  GALLBLADDER: Within normal limits.  SPLEEN: Within normal limits.  PANCREAS: Within normal limits.  ADRENALS: Within normal limits.  KIDNEYS/URETERS: Duplex left collecting system/proximal ureters. Distal ureter is not well evaluated. Right renal cyst. No hydronephrosis.    BLADDER: Sauceda catheter.  REPRODUCTIVE ORGANS: Prostate is enlarged, measuring 6.5 cm in transverse dimension.    BOWEL: No bowel obstruction. Appendix is not visualized. No evidence of inflammation in the pericecal region.  PERITONEUM: No ascites.  VESSELS: Atherosclerotic changes.  RETROPERITONEUM/LYMPH NODES: Periportal and retroperitoneal lymphadenopathy. For reference, a conglomerate periportal node (2, 34) measures 5.1 x 4.4 cm. A left para-aortic lymph node (2, 53) measures 2.6 x 1.4 cm.  ABDOMINAL WALL: Left inguinal hernia containing nonobstructed sigmoid colon.  BONES: Degenerative changes.    IMPRESSION:  Indeterminant nodular lung opacities which dedicated follow-up chest CT can be performed for further evaluation.    Abdominal lymphadenopathy, which may be related to patient's known history of lymphoma. Correlate with prior imaging if availablefor comparison.    Enlarged prostate.                TRISTAN BAKER MD; Attending Radiologist    < end of copied text >    Assessment

## 2021-07-11 NOTE — PROGRESS NOTE ADULT - SUBJECTIVE AND OBJECTIVE BOX
SUBJECTIVE / OVERNIGHT EVENTS:  --- Coverage for Dr. Alas ---   tele stable 7 beats NSVT  no cp, no sob, no n/v/d. no abdominal pain.  no headache, no dizziness.   the wife and sister in law at bedside  (+BM yesterday but) report constipation and difficulty       --------------------------------------------------------------------------------------------  LABS:                        7.2    1.58  )-----------( 38       ( 11 Jul 2021 06:30 )             23.1     07-11    141  |  108  |  9   ----------------------------<  132<H>  3.8   |  23  |  0.38<L>    Ca    7.7<L>      11 Jul 2021 06:29  Mg     1.4     07-11    TPro  4.7<L>  /  Alb  1.8<L>  /  TBili  0.7  /  DBili  x   /  AST  47<H>  /  ALT  23  /  AlkPhos  501<H>  07-10      CAPILLARY BLOOD GLUCOSE      POCT Blood Glucose.: 184 mg/dL (11 Jul 2021 11:51)  POCT Blood Glucose.: 116 mg/dL (11 Jul 2021 07:48)  POCT Blood Glucose.: 204 mg/dL (10 Jul 2021 21:16)  POCT Blood Glucose.: 225 mg/dL (10 Jul 2021 16:38)            RADIOLOGY & ADDITIONAL TESTS:    Imaging Personally Reviewed:  [x] YES  [ ] NO    Consultant(s) Notes Reviewed:  [x] YES  [ ] NO    MEDICATIONS  (STANDING):  allopurinol 300 milliGRAM(s) Oral daily  aspirin  chewable 81 milliGRAM(s) Oral daily  dextrose 40% Gel 15 Gram(s) Oral once  dextrose 5%. 1000 milliLiter(s) (50 mL/Hr) IV Continuous <Continuous>  dextrose 5%. 1000 milliLiter(s) (100 mL/Hr) IV Continuous <Continuous>  dextrose 50% Injectable 25 Gram(s) IV Push once  dextrose 50% Injectable 12.5 Gram(s) IV Push once  dextrose 50% Injectable 25 Gram(s) IV Push once  finasteride 5 milliGRAM(s) Oral daily  glucagon  Injectable 1 milliGRAM(s) IntraMuscular once  insulin glargine Injectable (LANTUS) 8 Unit(s) SubCutaneous at bedtime  insulin lispro (ADMELOG) corrective regimen sliding scale   SubCutaneous three times a day before meals  insulin lispro (ADMELOG) corrective regimen sliding scale   SubCutaneous at bedtime  pantoprazole   Suspension 40 milliGRAM(s) Oral daily  QUEtiapine 25 milliGRAM(s) Oral at bedtime  senna 2 Tablet(s) Oral at bedtime  tamsulosin 0.4 milliGRAM(s) Oral at bedtime    MEDICATIONS  (PRN):  acetaminophen   Tablet .. 650 milliGRAM(s) Oral every 6 hours PRN Temp greater or equal to 38C (100.4F), Moderate Pain (4 - 6)  bisacodyl 5 milliGRAM(s) Oral every 12 hours PRN Constipation  polyethylene glycol 3350 17 Gram(s) Oral daily PRN Constipation      Care Discussed with Consultants/Other Providers [x] YES  [ ] NO    Vital Signs Last 24 Hrs  T(C): 36.6 (11 Jul 2021 11:20), Max: 36.6 (11 Jul 2021 11:20)  T(F): 97.9 (11 Jul 2021 11:20), Max: 97.9 (11 Jul 2021 11:20)  HR: 95 (11 Jul 2021 11:20) (76 - 98)  BP: 110/67 (11 Jul 2021 11:20) (102/67 - 114/71)  BP(mean): --  RR: 18 (11 Jul 2021 11:20) (18 - 18)  SpO2: 100% (11 Jul 2021 11:20) (98% - 100%)  I&O's Summary    10 Jul 2021 07:01  -  11 Jul 2021 07:00  --------------------------------------------------------  IN: 0 mL / OUT: 800 mL / NET: -800 mL    11 Jul 2021 07:01  -  11 Jul 2021 15:45  --------------------------------------------------------  IN: 0 mL / OUT: 3000 mL / NET: -3000 mL      PHYSICAL EXAM:  GENERAL: NAD, well-developed, comfortable on room air   HEAD:  Atraumatic, Normocephalic  EYES: EOMI, PERRLA, conjunctiva and sclera clear  NECK: Supple, No JVD  CHEST/LUNG: mild decrease breath sounds bilaterally; No wheeze   HEART: Regular rate and rhythm; No murmurs, rubs, or gallops  ABDOMEN: Soft, Nontender, Nondistended; Bowel sounds present  : Sauceda in place, adina color urine, neg CVAT   Neuro: AAOx2-3, no focal weakness, mild right sided weakness baseline  EXTREMITIES:  2+ Peripheral Pulses, No clubbing, cyanosis, or edema  SKIN: No rashes or lesions

## 2021-07-11 NOTE — PROGRESS NOTE ADULT - SUBJECTIVE AND OBJECTIVE BOX
Chief complaint    Patient is a 71y old  Male who presents with a chief complaint of ftt; hodgkin's (11 Jul 2021 10:20)   Review of systems  Patient in bed, appears comfortable.    Labs and Fingersticks  CAPILLARY BLOOD GLUCOSE      POCT Blood Glucose.: 240 mg/dL (11 Jul 2021 21:22)  POCT Blood Glucose.: 255 mg/dL (11 Jul 2021 16:27)  POCT Blood Glucose.: 184 mg/dL (11 Jul 2021 11:51)  POCT Blood Glucose.: 116 mg/dL (11 Jul 2021 07:48)      Anion Gap, Serum: 10 (07-11 @ 06:29)  Anion Gap, Serum: 8 (07-10 @ 06:19)      Calcium, Total Serum: 7.7 *L* (07-11 @ 06:29)  Calcium, Total Serum: 7.1 *L* (07-10 @ 06:19)  Albumin, Serum: 1.8 *L* (07-10 @ 06:19)    Alanine Aminotransferase (ALT/SGPT): 23 (07-10 @ 06:19)  Alkaline Phosphatase, Serum: 501 *H* (07-10 @ 06:19)  Aspartate Aminotransferase (AST/SGOT): 47 *H* (07-10 @ 06:19)        07-11    141  |  108  |  9   ----------------------------<  132<H>  3.8   |  23  |  0.38<L>    Ca    7.7<L>      11 Jul 2021 06:29  Mg     1.4     07-11    TPro  4.7<L>  /  Alb  1.8<L>  /  TBili  0.7  /  DBili  x   /  AST  47<H>  /  ALT  23  /  AlkPhos  501<H>  07-10                        8.0    2.10  )-----------( 49       ( 11 Jul 2021 19:09 )             25.5     Medications  MEDICATIONS  (STANDING):  allopurinol 300 milliGRAM(s) Oral daily  aspirin  chewable 81 milliGRAM(s) Oral daily  dextrose 40% Gel 15 Gram(s) Oral once  dextrose 5%. 1000 milliLiter(s) (50 mL/Hr) IV Continuous <Continuous>  dextrose 5%. 1000 milliLiter(s) (100 mL/Hr) IV Continuous <Continuous>  dextrose 50% Injectable 25 Gram(s) IV Push once  dextrose 50% Injectable 12.5 Gram(s) IV Push once  dextrose 50% Injectable 25 Gram(s) IV Push once  finasteride 5 milliGRAM(s) Oral daily  glucagon  Injectable 1 milliGRAM(s) IntraMuscular once  insulin glargine Injectable (LANTUS) 8 Unit(s) SubCutaneous at bedtime  insulin lispro (ADMELOG) corrective regimen sliding scale   SubCutaneous three times a day before meals  insulin lispro (ADMELOG) corrective regimen sliding scale   SubCutaneous at bedtime  pantoprazole   Suspension 40 milliGRAM(s) Oral daily  QUEtiapine 25 milliGRAM(s) Oral at bedtime  senna 2 Tablet(s) Oral at bedtime  tamsulosin 0.4 milliGRAM(s) Oral at bedtime      Physical Exam  General: Patient comfortable in bed  Vital Signs Last 12 Hrs  T(F): 97.6 (07-11-21 @ 20:38), Max: 97.9 (07-11-21 @ 11:20)  HR: 109 (07-11-21 @ 20:38) (87 - 109)  BP: 106/71 (07-11-21 @ 20:38) (106/71 - 117/71)  BP(mean): --  RR: 18 (07-11-21 @ 20:38) (18 - 18)  SpO2: 98% (07-11-21 @ 20:38) (98% - 100%)  Neck: No palpable thyroid nodules.  CVS: S1S2, No murmurs  Respiratory: No wheezing, no crepitations  GI: Abdomen soft, bowel sounds positive  Musculoskeletal:  edema lower extremities.     Diagnostics

## 2021-07-11 NOTE — PROGRESS NOTE ADULT - ASSESSMENT
72 y/o M w/ PMHx of CVA this past August and got TPA per family member, DM, BPH with obstruction s/p escamilla catheter, s/p ERCP w Sphincterectomy recent admission to [facility] for sepsis  Hodgkin lymphoma was not offered any chemo and was placed on hospice.     now presenting with weakness and FTT.   pt denies cp / SOB / palpitations   no focal neuro complaints .. at baseline RLE weakness   no N/V   had one episode of diarrhea   no urinary sx  abdominal pain, diffuse, but worse on R side.     - Failure to thrive: cultures neg  CT chest noted   nutrition consult   s/p IVF. Encourage PO intake: dysphagia 3 based on MBS results and speech eval.     - lymphoma: d/w Dr. Larios: s/p immunotherapy   chemo at later time post rehab     - DM with hyperglycemia : improved     - anemia: monitor H/H post transfusion, stable.     - Fever: doubt infectious etiology   likely due to immunotherapy/lymphoma. culture: neg   abx dced. s/p IVF, ID f/u appreciated.     - voice changes: CT neck : Asymmetric enlargement of the left palatine tonsil, suspicious for lymphomatous involvement given history. Correlate with direct visualization.  ENT had eval pt: no gross pathology on visualization   S/S eval: MBS done. speech: dysphagia 3    - Tachycardia: VA duplex neg for DVT   rate stable. noted 7 beats NSVT.  can consider low dose metoprolol 12.5 mg BID if recurrent    - paraplegia functional: MR lumbar sacral pending. ?Claustrophobic.   unable to tolerate. can reattempt with pre-medication with Ativan.     DVT ppx

## 2021-07-11 NOTE — PROGRESS NOTE ADULT - ASSESSMENT
Assessment  DMT2: 71y Male with DM T2 with hyperglycemia, A1C 8.8%, was on oral meds/Janumet at home, admitted with weakness/fatigue and hyperglycemia,  on basal insulin and coverage, blood sugars improving no hypoglycemic episodes. Patient eating partial meals on dysphagia diet + nutritional supplements/shakes, appears comfortable, pending DC to rehab.  Lymphoma: on medications, monitored, FU Heme/Onc.  Anemia: stable, monitored.      Shahriar Kahn MD  Cell: [phone]  Office: [phone]

## 2021-07-12 LAB
ANION GAP SERPL CALC-SCNC: 12 MMOL/L — SIGNIFICANT CHANGE UP (ref 5–17)
ANISOCYTOSIS BLD QL: SIGNIFICANT CHANGE UP
BASOPHILS # BLD AUTO: 0.02 K/UL — SIGNIFICANT CHANGE UP (ref 0–0.2)
BASOPHILS NFR BLD AUTO: 0.9 % — SIGNIFICANT CHANGE UP (ref 0–2)
BUN SERPL-MCNC: 11 MG/DL — SIGNIFICANT CHANGE UP (ref 7–23)
CALCIUM SERPL-MCNC: 8.2 MG/DL — LOW (ref 8.4–10.5)
CHLORIDE SERPL-SCNC: 103 MMOL/L — SIGNIFICANT CHANGE UP (ref 96–108)
CO2 SERPL-SCNC: 22 MMOL/L — SIGNIFICANT CHANGE UP (ref 22–31)
CREAT SERPL-MCNC: 0.52 MG/DL — SIGNIFICANT CHANGE UP (ref 0.5–1.3)
CULTURE RESULTS: SIGNIFICANT CHANGE UP
CULTURE RESULTS: SIGNIFICANT CHANGE UP
DACRYOCYTES BLD QL SMEAR: SLIGHT — SIGNIFICANT CHANGE UP
ELLIPTOCYTES BLD QL SMEAR: SLIGHT — SIGNIFICANT CHANGE UP
EOSINOPHIL # BLD AUTO: 0.07 K/UL — SIGNIFICANT CHANGE UP (ref 0–0.5)
EOSINOPHIL NFR BLD AUTO: 3.5 % — SIGNIFICANT CHANGE UP (ref 0–6)
GLUCOSE BLDC GLUCOMTR-MCNC: 155 MG/DL — HIGH (ref 70–99)
GLUCOSE BLDC GLUCOMTR-MCNC: 222 MG/DL — HIGH (ref 70–99)
GLUCOSE BLDC GLUCOMTR-MCNC: 267 MG/DL — HIGH (ref 70–99)
GLUCOSE BLDC GLUCOMTR-MCNC: 364 MG/DL — HIGH (ref 70–99)
GLUCOSE SERPL-MCNC: 211 MG/DL — HIGH (ref 70–99)
HCT VFR BLD CALC: 23.8 % — LOW (ref 39–50)
HCT VFR BLD CALC: 24.7 % — LOW (ref 39–50)
HGB BLD-MCNC: 7.5 G/DL — LOW (ref 13–17)
HGB BLD-MCNC: 7.6 G/DL — LOW (ref 13–17)
LYMPHOCYTES # BLD AUTO: 0.65 K/UL — LOW (ref 1–3.3)
LYMPHOCYTES # BLD AUTO: 31 % — SIGNIFICANT CHANGE UP (ref 13–44)
MACROCYTES BLD QL: SLIGHT — SIGNIFICANT CHANGE UP
MAGNESIUM SERPL-MCNC: 1.4 MG/DL — LOW (ref 1.6–2.6)
MANUAL SMEAR VERIFICATION: SIGNIFICANT CHANGE UP
MCHC RBC-ENTMCNC: 27.7 PG — SIGNIFICANT CHANGE UP (ref 27–34)
MCHC RBC-ENTMCNC: 28.1 PG — SIGNIFICANT CHANGE UP (ref 27–34)
MCHC RBC-ENTMCNC: 30.8 GM/DL — LOW (ref 32–36)
MCHC RBC-ENTMCNC: 31.5 GM/DL — LOW (ref 32–36)
MCV RBC AUTO: 89.1 FL — SIGNIFICANT CHANGE UP (ref 80–100)
MCV RBC AUTO: 90.1 FL — SIGNIFICANT CHANGE UP (ref 80–100)
MONOCYTES # BLD AUTO: 0.13 K/UL — SIGNIFICANT CHANGE UP (ref 0–0.9)
MONOCYTES NFR BLD AUTO: 6.2 % — SIGNIFICANT CHANGE UP (ref 2–14)
NEUTROPHILS # BLD AUTO: 1.23 K/UL — LOW (ref 1.8–7.4)
NEUTROPHILS NFR BLD AUTO: 58.4 % — SIGNIFICANT CHANGE UP (ref 43–77)
NRBC # BLD: 0 /100 WBCS — SIGNIFICANT CHANGE UP (ref 0–0)
NRBC # BLD: 3 /100 — HIGH (ref 0–0)
PHOSPHATE SERPL-MCNC: 2.8 MG/DL — SIGNIFICANT CHANGE UP (ref 2.5–4.5)
PLAT MORPH BLD: NORMAL — SIGNIFICANT CHANGE UP
PLATELET # BLD AUTO: 49 K/UL — LOW (ref 150–400)
PLATELET # BLD AUTO: 56 K/UL — LOW (ref 150–400)
POIKILOCYTOSIS BLD QL AUTO: SLIGHT — SIGNIFICANT CHANGE UP
POLYCHROMASIA BLD QL SMEAR: SLIGHT — SIGNIFICANT CHANGE UP
POTASSIUM SERPL-MCNC: 4.3 MMOL/L — SIGNIFICANT CHANGE UP (ref 3.5–5.3)
POTASSIUM SERPL-SCNC: 4.3 MMOL/L — SIGNIFICANT CHANGE UP (ref 3.5–5.3)
RBC # BLD: 2.67 M/UL — LOW (ref 4.2–5.8)
RBC # BLD: 2.74 M/UL — LOW (ref 4.2–5.8)
RBC # FLD: 22.2 % — HIGH (ref 10.3–14.5)
RBC # FLD: 22.6 % — HIGH (ref 10.3–14.5)
RBC BLD AUTO: ABNORMAL
SODIUM SERPL-SCNC: 137 MMOL/L — SIGNIFICANT CHANGE UP (ref 135–145)
SPECIMEN SOURCE: SIGNIFICANT CHANGE UP
SPECIMEN SOURCE: SIGNIFICANT CHANGE UP
WBC # BLD: 1.89 K/UL — LOW (ref 3.8–10.5)
WBC # BLD: 2.11 K/UL — LOW (ref 3.8–10.5)
WBC # FLD AUTO: 1.89 K/UL — LOW (ref 3.8–10.5)
WBC # FLD AUTO: 2.11 K/UL — LOW (ref 3.8–10.5)

## 2021-07-12 RX ORDER — INSULIN GLARGINE 100 [IU]/ML
10 INJECTION, SOLUTION SUBCUTANEOUS AT BEDTIME
Refills: 0 | Status: DISCONTINUED | OUTPATIENT
Start: 2021-07-12 | End: 2021-07-13

## 2021-07-12 RX ORDER — DEXAMETHASONE 0.5 MG/5ML
10 ELIXIR ORAL ONCE
Refills: 0 | Status: COMPLETED | OUTPATIENT
Start: 2021-07-12 | End: 2021-07-12

## 2021-07-12 RX ORDER — DEXAMETHASONE 0.5 MG/5ML
10 ELIXIR ORAL ONCE
Refills: 0 | Status: DISCONTINUED | OUTPATIENT
Start: 2021-07-12 | End: 2021-07-12

## 2021-07-12 RX ORDER — DEXAMETHASONE 0.5 MG/5ML
6 ELIXIR ORAL EVERY 6 HOURS
Refills: 0 | Status: DISCONTINUED | OUTPATIENT
Start: 2021-07-12 | End: 2021-07-18

## 2021-07-12 RX ADMIN — PANTOPRAZOLE SODIUM 40 MILLIGRAM(S): 20 TABLET, DELAYED RELEASE ORAL at 11:24

## 2021-07-12 RX ADMIN — SENNA PLUS 2 TABLET(S): 8.6 TABLET ORAL at 21:36

## 2021-07-12 RX ADMIN — Medication 81 MILLIGRAM(S): at 11:23

## 2021-07-12 RX ADMIN — TAMSULOSIN HYDROCHLORIDE 0.4 MILLIGRAM(S): 0.4 CAPSULE ORAL at 21:36

## 2021-07-12 RX ADMIN — Medication 6 MILLIGRAM(S): at 18:25

## 2021-07-12 RX ADMIN — INSULIN GLARGINE 10 UNIT(S): 100 INJECTION, SOLUTION SUBCUTANEOUS at 21:38

## 2021-07-12 RX ADMIN — Medication 2: at 12:39

## 2021-07-12 RX ADMIN — Medication 102 MILLIGRAM(S): at 17:36

## 2021-07-12 RX ADMIN — QUETIAPINE FUMARATE 25 MILLIGRAM(S): 200 TABLET, FILM COATED ORAL at 21:37

## 2021-07-12 RX ADMIN — Medication 3: at 17:26

## 2021-07-12 RX ADMIN — Medication 300 MILLIGRAM(S): at 11:24

## 2021-07-12 RX ADMIN — Medication 1: at 08:02

## 2021-07-12 RX ADMIN — FINASTERIDE 5 MILLIGRAM(S): 5 TABLET, FILM COATED ORAL at 11:23

## 2021-07-12 RX ADMIN — Medication 6: at 21:38

## 2021-07-12 NOTE — PROGRESS NOTE ADULT - PROBLEM SELECTOR PLAN 1
Will increase Lantus to 10u at bedtime and continue coverage scale. Will continue monitoring FS and FU.   for compliance with consistent low-carb diet, nutritional shakes and supplements as tolerated. FU RD recommendations.  Can DC on his prior Janumet 50/1000, endo FU 3 months.

## 2021-07-12 NOTE — PROGRESS NOTE ADULT - SUBJECTIVE AND OBJECTIVE BOX
Chief complaint  Patient is a 71y old  Male who presents with a chief complaint of ftt; hodgkin's (11 Jul 2021 10:20)   Review of systems  Patient in bed, looks comfortable, no hypoglycemic episodes.    Labs and Fingersticks  CAPILLARY BLOOD GLUCOSE      POCT Blood Glucose.: 222 mg/dL (12 Jul 2021 11:58)  POCT Blood Glucose.: 155 mg/dL (12 Jul 2021 07:56)  POCT Blood Glucose.: 240 mg/dL (11 Jul 2021 21:22)  POCT Blood Glucose.: 255 mg/dL (11 Jul 2021 16:27)      Anion Gap, Serum: 12 (07-12 @ 06:19)  Anion Gap, Serum: 10 (07-11 @ 06:29)      Calcium, Total Serum: 8.2 *L* (07-12 @ 06:19)  Calcium, Total Serum: 7.7 *L* (07-11 @ 06:29)          07-12    137  |  103  |  11  ----------------------------<  211<H>  4.3   |  22  |  0.52    Ca    8.2<L>      12 Jul 2021 06:19  Phos  2.8     07-12  Mg     1.4     07-12                          7.5    1.89  )-----------( 49       ( 12 Jul 2021 06:19 )             23.8     Medications  MEDICATIONS  (STANDING):  allopurinol 300 milliGRAM(s) Oral daily  aspirin  chewable 81 milliGRAM(s) Oral daily  dextrose 40% Gel 15 Gram(s) Oral once  dextrose 5%. 1000 milliLiter(s) (50 mL/Hr) IV Continuous <Continuous>  dextrose 5%. 1000 milliLiter(s) (100 mL/Hr) IV Continuous <Continuous>  dextrose 50% Injectable 25 Gram(s) IV Push once  dextrose 50% Injectable 12.5 Gram(s) IV Push once  dextrose 50% Injectable 25 Gram(s) IV Push once  finasteride 5 milliGRAM(s) Oral daily  glucagon  Injectable 1 milliGRAM(s) IntraMuscular once  insulin glargine Injectable (LANTUS) 10 Unit(s) SubCutaneous at bedtime  insulin lispro (ADMELOG) corrective regimen sliding scale   SubCutaneous three times a day before meals  insulin lispro (ADMELOG) corrective regimen sliding scale   SubCutaneous at bedtime  pantoprazole   Suspension 40 milliGRAM(s) Oral daily  QUEtiapine 25 milliGRAM(s) Oral at bedtime  senna 2 Tablet(s) Oral at bedtime  tamsulosin 0.4 milliGRAM(s) Oral at bedtime      Physical Exam  General: Patient comfortable in bed  Vital Signs Last 12 Hrs  T(F): 97.8 (07-12-21 @ 04:58), Max: 97.8 (07-12-21 @ 04:58)  HR: 92 (07-12-21 @ 04:58) (92 - 92)  BP: 122/71 (07-12-21 @ 04:58) (122/71 - 122/71)  BP(mean): --  RR: 18 (07-12-21 @ 04:58) (18 - 18)  SpO2: 99% (07-12-21 @ 04:58) (99% - 99%)     Chief complaint  Patient is a 71y old  Male who presents with a chief complaint of ftt; hodgkin's (11 Jul 2021 10:20)   Review of systems  Patient in bed, looks comfortable, no hypoglycemic episodes.    Labs and Fingersticks  CAPILLARY BLOOD GLUCOSE      POCT Blood Glucose.: 222 mg/dL (12 Jul 2021 11:58)  POCT Blood Glucose.: 155 mg/dL (12 Jul 2021 07:56)  POCT Blood Glucose.: 240 mg/dL (11 Jul 2021 21:22)  POCT Blood Glucose.: 255 mg/dL (11 Jul 2021 16:27)      Anion Gap, Serum: 12 (07-12 @ 06:19)  Anion Gap, Serum: 10 (07-11 @ 06:29)      Calcium, Total Serum: 8.2 *L* (07-12 @ 06:19)  Calcium, Total Serum: 7.7 *L* (07-11 @ 06:29)          07-12    137  |  103  |  11  ----------------------------<  211<H>  4.3   |  22  |  0.52    Ca    8.2<L>      12 Jul 2021 06:19  Phos  2.8     07-12  Mg     1.4     07-12                          7.5    1.89  )-----------( 49       ( 12 Jul 2021 06:19 )             23.8     Medications  MEDICATIONS  (STANDING):  allopurinol 300 milliGRAM(s) Oral daily  aspirin  chewable 81 milliGRAM(s) Oral daily  dextrose 40% Gel 15 Gram(s) Oral once  dextrose 5%. 1000 milliLiter(s) (50 mL/Hr) IV Continuous <Continuous>  dextrose 5%. 1000 milliLiter(s) (100 mL/Hr) IV Continuous <Continuous>  dextrose 50% Injectable 25 Gram(s) IV Push once  dextrose 50% Injectable 12.5 Gram(s) IV Push once  dextrose 50% Injectable 25 Gram(s) IV Push once  finasteride 5 milliGRAM(s) Oral daily  glucagon  Injectable 1 milliGRAM(s) IntraMuscular once  insulin glargine Injectable (LANTUS) 10 Unit(s) SubCutaneous at bedtime  insulin lispro (ADMELOG) corrective regimen sliding scale   SubCutaneous three times a day before meals  insulin lispro (ADMELOG) corrective regimen sliding scale   SubCutaneous at bedtime  pantoprazole   Suspension 40 milliGRAM(s) Oral daily  QUEtiapine 25 milliGRAM(s) Oral at bedtime  senna 2 Tablet(s) Oral at bedtime  tamsulosin 0.4 milliGRAM(s) Oral at bedtime      Physical Exam  General: Patient comfortable in bed  Vital Signs Last 12 Hrs  T(F): 97.8 (07-12-21 @ 04:58), Max: 97.8 (07-12-21 @ 04:58)  HR: 92 (07-12-21 @ 04:58) (92 - 92)  BP: 122/71 (07-12-21 @ 04:58) (122/71 - 122/71)  BP(mean): --  RR: 18 (07-12-21 @ 04:58) (18 - 18)  SpO2: 99% (07-12-21 @ 04:58) (99% - 99%)

## 2021-07-12 NOTE — PROGRESS NOTE ADULT - SUBJECTIVE AND OBJECTIVE BOX
CC: f/u for  fever  Patient reports  he is doing ok  REVIEW OF SYSTEMS:  All other review of systems negative (Comprehensive ROS)    Antimicrobials Day #  :    Other Medications Reviewed    T(F): 97.8 (07-12-21 @ 04:58), Max: 97.8 (07-11-21 @ 16:09)  HR: 92 (07-12-21 @ 04:58)  BP: 122/71 (07-12-21 @ 04:58)  RR: 18 (07-12-21 @ 04:58)  SpO2: 99% (07-12-21 @ 04:58)  Wt(kg): --    PHYSICAL EXAM:  General: alert, no acute distress  Eyes:  anicteric, no conjunctival injection, no discharge  Oropharynx: no lesions or injection 	  Neck: supple, without adenopathy  Lungs: clear to auscultation  Heart: regular rate and rhythm; no murmur, rubs or gallops  Abdomen: soft, nondistended, nontender, without mass or organomegaly  Skin: no lesions  Extremities: no clubbing, cyanosis, or edema  Neurologic: alert, oriented, moves all extremities    LAB RESULTS:                        7.5    1.89  )-----------( 49       ( 12 Jul 2021 06:19 )             23.8     07-12    137  |  103  |  11  ----------------------------<  211<H>  4.3   |  22  |  0.52    Ca    8.2<L>      12 Jul 2021 06:19  Phos  2.8     07-12  Mg     1.4     07-12          MICROBIOLOGY:  RECENT CULTURES:      RADIOLOGY REVIEWED:    EXAM:  MR SPINE LUMBAR WAW IC                            PROCEDURE DATE:  07/11/2021            INTERPRETATION:  Clinical indications: Lower extremity weakness    MRI of the lumbar spine was performed using sagittal T1-T2 and T2-weighted sequence fat suppression. Axial T1 and T2-weighted sequences were performed    Loss of the normal lumbar lordosis seen    Mild scoliosis is seen.    Disc desiccation is seen involving the L2-3 L3-4 L4-5 and L5-S1 levels which are secondary to degenerative changes.    There is evidence of abnormal T1 prolongation seen involving the lower thoracic lumbar sacral vertebral bodies as well as both iliac bones. This finding could be compatible with a marrow infiltrative process, possibly secondary to patient's lymphoma though other possibilities include metastasis, multiple myeloma and severe anemia must be considered.    L1-2: Hypertrophic facet joint changes are seen bilaterally. No significant, as of the spinal canal or either neural foramen.    L2-3: Bilateral hypertrophic facet joint changes seen. No significant compromise spinal canal or either neural foramen    L3-4: Disc bulge and bilateral hypertrophic facet joint change. No significant, as of the spinal canal or either neural foramen    L4-5: Disc bulge and bilateral hypertrophic facet joint changes seen. Mild to moderate narrowing of the spinal canal. Mild to moderate narrowing of both neural foramen.    L5-S1: Bilateral hypertrophic facet joint changes seen. Mild narrowing of the left neural foramen and mild to moderate right neural foramen    There is evidence of abnormal area of enhancement seen involving the dorsal aspect of the right thecal sac at the level of the L4-5 disc. This best seen on series 11 image 21. This finding measures approximately 4.7 mm. This could be related to patient's known lymphoma though the possibility of drop metastasis as well as other neoplastic etiologies cannot entirely excluded. Correlation with CSF can also be done for further evaluation. Continued close interval follow-up is recommended.    The conus appears to end at top of L2.    Bilateral renal cysts are seen.    Evaluation of the paraspinal soft tissues appear normal.    IMPRESSION: Abnormal signal seen involving the lower thoracic lumbarsacral vertebral bodies as well as both iliac bones. This is likely compatible with a marrow infiltrative process as described above.    Abnormal enhancing lesion is seen involving the dorsal aspect of the right thecal sac as described above.    Degenerative changes as described above.    --- End of Report ---                NGA MOSES MD; Attending Radiologist  This document has been electronically signed. Jul 12 2021  9:10AM            Assessment:  Patient with hodgkin lymphoma, sent to hospice now admitted for FTT, fever, no infection, now s/p check point inhibitor. Had fever after drug infusion and a  transfusion, no infection found.  Suspect just reactive to blood and the check point inhibitor. No infection is apparent at present. . Prior fever B symptoms. No infection is apparent even on mri spine.  Plan:  monitor off abx  call if further input is needed

## 2021-07-12 NOTE — PROGRESS NOTE ADULT - SUBJECTIVE AND OBJECTIVE BOX
LIZETT RIVERA  MRN-[id number]    Patient is a 71y old  Male who presents with a chief complaint of ftt; hodgkin's (11 Jul 2021 10:20)      Review of System    Without complaints    General:	Denies fatigue, fevers, chills, sweats, decreased appetite.    Skin/Breast: denies pruritis, rash  	  Ophthalmologic: no change in vision or blurring  	  HEENT	Denies dry mouth, oral sores, dysphagia,  change in hearing.    Respiratory and Thorax:  cough, sob, wheeze, hemoptysis  	  Cardiovascular:	no cp , palp, orthopnea    Gastrointestinal:	no n/v/d constipation    Genitourinary:	no dysuria of frequency, no hematuria, no flank pain    Musculoskeletal:	no bone or joint pain. no muscle aches.     Neurological:	no change in sensory or motor function. no headache. no weakness.     Psychiatric:	no depression, no anxiety, insomnia.     Hematology/Lymphatics:	no bleeding or bruising    Current Meds  MEDICATIONS  (STANDING):  allopurinol 300 milliGRAM(s) Oral daily  aspirin  chewable 81 milliGRAM(s) Oral daily  dextrose 40% Gel 15 Gram(s) Oral once  dextrose 5%. 1000 milliLiter(s) (50 mL/Hr) IV Continuous <Continuous>  dextrose 5%. 1000 milliLiter(s) (100 mL/Hr) IV Continuous <Continuous>  dextrose 50% Injectable 25 Gram(s) IV Push once  dextrose 50% Injectable 12.5 Gram(s) IV Push once  dextrose 50% Injectable 25 Gram(s) IV Push once  finasteride 5 milliGRAM(s) Oral daily  glucagon  Injectable 1 milliGRAM(s) IntraMuscular once  insulin glargine Injectable (LANTUS) 8 Unit(s) SubCutaneous at bedtime  insulin lispro (ADMELOG) corrective regimen sliding scale   SubCutaneous three times a day before meals  insulin lispro (ADMELOG) corrective regimen sliding scale   SubCutaneous at bedtime  pantoprazole   Suspension 40 milliGRAM(s) Oral daily  QUEtiapine 25 milliGRAM(s) Oral at bedtime  senna 2 Tablet(s) Oral at bedtime  tamsulosin 0.4 milliGRAM(s) Oral at bedtime    MEDICATIONS  (PRN):  acetaminophen   Tablet .. 650 milliGRAM(s) Oral every 6 hours PRN Temp greater or equal to 38C (100.4F), Moderate Pain (4 - 6)  bisacodyl 5 milliGRAM(s) Oral every 12 hours PRN Constipation  polyethylene glycol 3350 17 Gram(s) Oral daily PRN Constipation      Vital Signs Last 24 Hrs  T(C): 36.6 (12 Jul 2021 04:58), Max: 36.6 (11 Jul 2021 11:20)  T(F): 97.8 (12 Jul 2021 04:58), Max: 97.9 (11 Jul 2021 11:20)  HR: 92 (12 Jul 2021 04:58) (87 - 109)  BP: 122/71 (12 Jul 2021 04:58) (102/67 - 122/71)  BP(mean): --  RR: 18 (12 Jul 2021 04:58) (18 - 18)  SpO2: 99% (12 Jul 2021 04:58) (98% - 100%)      PHYSICAL EXAM:    Constitutional: NAD    Eyes: PERRLA EOMI, anicteric sclera    Heent :No oral sores, no pharyngeal injection. moist mucosa.    Neck: supple, no jvd, no LAD    Respiratory: CTA b/l     Cardiovascular: s1s2, no m/g/r    Gastrointestinal: soft, nt, nd, + BS    Extremities: no c/c/e    Neurological:A&O x 3 moves all ext.    Skin: no rash on exposed skin    Lymph Nodes: no lymphadenopathy.    Lab  CBC Full  -  ( 12 Jul 2021 06:19 )  WBC Count : 1.89 K/uL  RBC Count : 2.67 M/uL  Hemoglobin : 7.5 g/dL  Hematocrit : 23.8 %  Platelet Count - Automated : 49 K/uL  Mean Cell Volume : 89.1 fl  Mean Cell Hemoglobin : 28.1 pg  Mean Cell Hemoglobin Concentration : 31.5 gm/dL  Auto Neutrophil # : x  Auto Lymphocyte # : x  Auto Monocyte # : x  Auto Eosinophil # : x  Auto Basophil # : x  Auto Neutrophil % : x  Auto Lymphocyte % : x  Auto Monocyte % : x  Auto Eosinophil % : x  Auto Basophil % : x    07-12    137  |  103  |  11  ----------------------------<  211<H>  4.3   |  22  |  0.52    Ca    8.2<L>      12 Jul 2021 06:19  Phos  2.8     07-12  Mg     1.4     07-12          Rad:    Assessment/Plan

## 2021-07-12 NOTE — PROGRESS NOTE ADULT - ASSESSMENT
70 y/o M w/ PMHx of CVA this past August and got TPA per family member, DM, BPH with obstruction s/p escamilla catheter, s/p ERCP w Sphincterectomy recent admission to [facility] for sepsis  Hodgkin lymphoma was not offered any chemo and was placed on hospice.     now presenting with weakness and FTT.   pt denies cp / SOB / palpitations   no focal neuro complaints .. at baseline RLE weakness   no N/V   had one episode of diarrhea   no urinary sx  abdominal pain, diffuse, but worse on R side.     - Failure to thrive: cultures neg  CT chest noted   nutrition consult   s/p IVF. Encourage PO intake: dysphagia 3 based on MBS results and speech eval.     - lymphoma: d/w Dr. Larios: s/p immunotherapy   chemo at later time post rehab     - DM with hyperglycemia : improved     - anemia: monitor H/H post transfusion, stable.     - Fever: doubt infectious etiology   likely due to immunotherapy/lymphoma. culture: neg   abx dced. s/p IVF, ID f/u appreciated.     - voice changes: CT neck : Asymmetric enlargement of the left palatine tonsil, suspicious for lymphomatous involvement given history. Correlate with direct visualization.  ENT had eval pt: no gross pathology on visualization   S/S eval: MBS done. speech: dysphagia 3    - Tachycardia: VA duplex neg for DVT   rate stable. noted 7 beats NSVT.  can consider low dose metoprolol 12.5 mg BID if recurrent    - paraplegia functional: MR lumbar sacral noted : called and d/w nsx   d/w Dr. Bower   will check MR brain , cervcal throacic   fu with neuroonc   start steroids   d/w clover-  DVT ppx

## 2021-07-12 NOTE — CONSULT NOTE ADULT - ASSESSMENT
71M PMHx hodgkins lymphoma s/p 1 dose nivolumab, CVA 8/20 w/ no residual deficits, p/w FTT and 2-months LE weakness. No pain. On ASA 81 daily. Hg 7.5 s/p prbc x3.Plt 49. Diffuse mets in lumbar spine w/ enhancing non-compressive lesion in dorsal R thecal sac @ L4-L5. CTH neg for acute process.   Exam (baseline per fam): AAOx2-3, EOMI, PERRL, no facial. BUE 5/5, BHF 1/5, LPF/DF 4+/5, RPF/DF 4-/5. No clonus. 1+ reflexes throughout. SILT.  -q4 neuro checks  -no neurosurgical intervention  -discussed with oncologist (), he will consult neuroonc  -f/u outpatient with Dr. Pereira

## 2021-07-12 NOTE — PROGRESS NOTE ADULT - SUBJECTIVE AND OBJECTIVE BOX
Neurology Progress Note    S: Patient seen and examined.  . patient denied CP, SOB, HA or pain.  doing okay  family at bedside. MRI L spine obtained with possible CNS involvement noted     Medication:  MEDICATIONS  (STANDING):  allopurinol 300 milliGRAM(s) Oral daily  aspirin  chewable 81 milliGRAM(s) Oral daily  dexAMETHasone  Injectable 6 milliGRAM(s) IV Push every 6 hours  dextrose 40% Gel 15 Gram(s) Oral once  dextrose 5%. 1000 milliLiter(s) (50 mL/Hr) IV Continuous <Continuous>  dextrose 5%. 1000 milliLiter(s) (100 mL/Hr) IV Continuous <Continuous>  dextrose 50% Injectable 25 Gram(s) IV Push once  dextrose 50% Injectable 12.5 Gram(s) IV Push once  dextrose 50% Injectable 25 Gram(s) IV Push once  finasteride 5 milliGRAM(s) Oral daily  glucagon  Injectable 1 milliGRAM(s) IntraMuscular once  insulin glargine Injectable (LANTUS) 10 Unit(s) SubCutaneous at bedtime  insulin lispro (ADMELOG) corrective regimen sliding scale   SubCutaneous three times a day before meals  insulin lispro (ADMELOG) corrective regimen sliding scale   SubCutaneous at bedtime  pantoprazole   Suspension 40 milliGRAM(s) Oral daily  QUEtiapine 25 milliGRAM(s) Oral at bedtime  senna 2 Tablet(s) Oral at bedtime  tamsulosin 0.4 milliGRAM(s) Oral at bedtime    MEDICATIONS  (PRN):  acetaminophen   Tablet .. 650 milliGRAM(s) Oral every 6 hours PRN Temp greater or equal to 38C (100.4F), Moderate Pain (4 - 6)  bisacodyl 5 milliGRAM(s) Oral every 12 hours PRN Constipation  polyethylene glycol 3350 17 Gram(s) Oral daily PRN Constipation      Vitals:  Vital Signs Last 24 Hrs  T(C): 37 (12 Jul 2021 14:39), Max: 37 (12 Jul 2021 14:39)  T(F): 98.6 (12 Jul 2021 14:39), Max: 98.6 (12 Jul 2021 14:39)  HR: 101 (12 Jul 2021 14:39) (92 - 109)  BP: 103/69 (12 Jul 2021 14:39) (103/69 - 122/71)  BP(mean): --  RR: 18 (12 Jul 2021 14:39) (18 - 18)  SpO2: 98% (12 Jul 2021 14:39) (98% - 99%)    General Exam:   General Appearance: Appropriately dressed and in no acute distress       Head: Normocephalic, atraumatic and no dysmorphic features  Ear, Nose, and Throat: Moist mucous membranes  CVS: S1S2+  Resp: No SOB, no wheeze or rhonchi  GI: soft NT/ND  Extremities: No edema or cyanosis  Skin: No bruises or rashes     Neurological Exam:  Mental Status: Awake, alert and oriented x 1-2.  Able to follow simple and complex verbal commands. Able to name and repeat. fluent speech. No obvious aphasia mild dysarthria, + hypophonic   Cranial Nerves: PERRL, EOMI, VFFC, sensation V1-V3 intact,  R facial asymmetry, equal elevation of palate, scm/trap 5/5, tongue is midline on protrusion. no obvious papilledema on fundoscopic exam. hearing is grossly intact.   Motor: generalized weakness but FRANCOIS uppers > lowers.  Lowers at least 3/5 .    Sensation: Intact to light touch and pinprick throughout. no right/left confusion. no extinction to tactile on DSS.  .   Reflexes: 1+ throughout at biceps, brachioradialis, triceps, patellars and ankles bilaterally and equal. No clonus. upgoing toe  Coordination: No dysmetria on FNF    Gait: deferred     I personally reviewed the below data/images/labs:    CBC Full  -  ( 12 Jul 2021 17:22 )  WBC Count : 2.11 K/uL  RBC Count : 2.74 M/uL  Hemoglobin : 7.6 g/dL  Hematocrit : 24.7 %  Platelet Count - Automated : 56 K/uL  Mean Cell Volume : 90.1 fl  Mean Cell Hemoglobin : 27.7 pg  Mean Cell Hemoglobin Concentration : 30.8 gm/dL  Auto Neutrophil # : 1.23 K/uL  Auto Lymphocyte # : 0.65 K/uL  Auto Monocyte # : 0.13 K/uL  Auto Eosinophil # : 0.07 K/uL  Auto Basophil # : 0.02 K/uL  Auto Neutrophil % : 58.4 %  Auto Lymphocyte % : 31.0 %  Auto Monocyte % : 6.2 %  Auto Eosinophil % : 3.5 %  Auto Basophil % : 0.9 %      07-12    137  |  103  |  11  ----------------------------<  211<H>  4.3   |  22  |  0.52    Ca    8.2<L>      12 Jul 2021 06:19  Phos  2.8     07-12  Mg     1.4     07-12        < from: CT Chest w/ IV Cont (06.28.21 @ 19:01) >    EXAM:  CT CHEST IC                            PROCEDURE DATE:  06/28/2021            INTERPRETATION:  CLINICAL INFORMATION: Lymphoma, evaluate extent of thoracic disease.    COMPARISON: CT abdomen pelvis dated 6/28/2021. Chest x-ray dated 6/20/2021.    CONTRAST/COMPLICATIONS:  IV Contrast: 40 mL of Omnipaque 350 were administered. 60 mL discarded.  Oral Contrast: None.  Complications: None reported.    PROCEDURE:  CT of the Chest was performed.  Sagittal and coronal reformats were performed.    FINDINGS: The quality of the images are degraded by respiratory motion and streak artifact.    LUNGS AND AIRWAYS: Patent central airways. Patchy groundglass opacities and scattered nodular opacities throughout all lobes of the lungs. For example:  A left upper lobe nodule measures 0.9 cm (series 3 image 33).  A right middle lobe nodule measures 2.0 x 0.8 cm (series 3 image 76).  3.5 x 2.1 cm solid opacity within the superior segment of right lower lobe (series 3 image 71).  A right lower lobe nodule at the costophrenic angle measures 1.9 x 1.1 cm (series 3 image 108).    PLEURA: No pleural effusion or pneumothorax.    MEDIASTINUM AND SARTHAK: Mildly enlarged mediastinal, hilar, and supraclavicular lymphadenopathy. A right hilar lymph node measures 1.8 x 1.4 cm (series 3 image 90). A left supraclavicular node measures 2.5 x 1.5 cm (series 3 image 19). A subcarinal node measures 2.2 x 1.3 cm (series 3 image 63).    VESSELS: Coronary artery calcifications.    HEART: Heart size is normal. No pericardial effusion.    CHEST WALL AND LOWER NECK: Within normal limits.    VISUALIZED UPPER ABDOMEN: Retroperitoneal lymphadenopathy, as seen on abdominal CT from the same date.    BONES: Degenerative changes of the spine.    IMPRESSION:    Patchy groundglass opacities and scattered nodular opacities throughout all lobes of the lungs. Findings may be related to known lymphoma or may be seen in the setting of multifocal infection.    Mildly enlarged supraclavicular, mediastinal, hilar, and retroperitoneal lymphadenopathy likely related to known lymphoma.              LEXIS BAKER MD; Resident Radiology  This document has been electronically signed.  ISABEL JACOBO MD; Attending Radiologist  This document has been electronically signed. Jun 29 202111:[id number]    < end of copied text >      < from: CT Neck Soft Tissue w/ IV Cont (07.03.21 @ 15:37) >    EXAM:  CT NECK SOFT TISSUE IC                          EXAM:  CT BRAIN IC                            PROCEDURE DATE:  07/03/2021            INTERPRETATION:  CLINICAL INDICATION: Hodgkin's lymphoma. Rule out metastatic disease.    TECHNIQUE: CT of the head and neck soft tissues was performed before and after administration of IV contrast. Contrast dose: 90 cc Omnipaque 300 IV contrast.    COMPARISON: CT chest 6/28/2021.    FINDINGS:    CT HEAD:  No acute transcortical infarct or intracranial hemorrhage. No abnormal intracranial enhancement is identified.    White matter hypoattenuating foci are noted, nonspecific but likely representing small vessel disease.    The ventricles are normal without evidence of hydrocephalus. There are no extra-axial fluid collections.    The visualized intraorbital contents are unremarkable. Mild mucosal thickening in the right maxillary sinus. The mastoid air cells are clear. The visualized soft tissues and osseous structures appear normal.    CT NECK:  Aerodigestive structures: Asymmetric enlargement of the left palatine tonsil is noted. Remainder of the aerodigestive structures are unremarkable.    Lymph nodes: Again noted left supraclavicular node measuring up to 2.4 x 1.5 cm.    Parotid and submandibular glands:  Normal.    Thyroid gland: Normal.    Vascular structures: Unremarkable.    Osseous structures: No fracture, dislocation or destructive lesion.    Partially visualized lung apices: Normal.      IMPRESSION:  CT head:  -No acute intracranial findings.  -No abnormal intracranial enhancement identified by CT. Consider MRI with contrast for increased sensitivity.    CT neck:  -Again noted enlarged left supraclavicular node.  -Asymmetric enlargement of the left palatine tonsil, suspicious for lymphomatous involvement given history. Correlate with direct visualization.                ZOEY TORRES MD; Attending Radiologist  This document has been electronically signed. Jul  3 2021  3:51PM    < end of copied text >    < from: MR Lumbar Spine w/wo IV Cont (07.11.21 @ 23:51) >    EXAM:  MR SPINE LUMBAR WAW IC                            PROCEDURE DATE:  07/11/2021            INTERPRETATION:  Clinical indications: Lower extremity weakness    MRI of the lumbar spine was performed using sagittal T1-T2 and T2-weighted sequence fat suppression. Axial T1 and T2-weighted sequences were performed    Loss of the normal lumbar lordosis seen    Mild scoliosis is seen.    Disc desiccation is seen involving the L2-3 L3-4 L4-5 and L5-S1 levels which are secondary to degenerative changes.    There is evidence of abnormal T1 prolongation seen involving the lower thoracic lumbar sacral vertebral bodies as well as both iliac bones. This finding could be compatible with a marrow infiltrative process, possibly secondary to patient's lymphoma though other possibilities include metastasis, multiple myeloma and severe anemia must be considered.    L1-2: Hypertrophic facet joint changes are seen bilaterally. No significant, as of the spinal canal or either neural foramen.    L2-3: Bilateral hypertrophic facet joint changes seen. No significant compromise spinal canal or either neural foramen    L3-4: Disc bulge and bilateral hypertrophic facet joint change. No significant, as of the spinal canal or either neural foramen    L4-5: Disc bulge and bilateral hypertrophic facet joint changes seen. Mild to moderate narrowing of the spinal canal. Mild to moderate narrowing of both neural foramen.    L5-S1: Bilateral hypertrophic facet joint changes seen. Mild narrowing of the left neural foramen and mild to moderate right neural foramen    There is evidence of abnormal area of enhancement seen involving the dorsal aspect of the right thecal sac at the level of the L4-5 disc. This best seen on series 11 image 21. This finding measures approximately 4.7 mm. This could be related to patient's known lymphoma though the possibility of drop metastasis as well as other neoplastic etiologies cannot entirely excluded. Correlation with CSF can also be done for further evaluation. Continued close interval follow-up is recommended.    The conus appears to end at top of L2.    Bilateral renal cysts are seen.    Evaluation of the paraspinal soft tissues appear normal.    IMPRESSION: Abnormal signal seen involving the lower thoracic lumbarsacral vertebral bodies as well as both iliac bones. This is likely compatible with a marrow infiltrative process as described above.    Abnormal enhancing lesion is seen involving the dorsal aspect of the right thecal sac as described above.    Degenerative changes as described above.    --- End of Report ---      NGA MOSES MD; Attending Radiologist  This document has been electronically signed. Jul 12 2021  9:10AM    < end of copied text >

## 2021-07-12 NOTE — CONSULT NOTE ADULT - SUBJECTIVE AND OBJECTIVE BOX
p ([id number])     HPI:  72 y/o M w/ pmhx of CVA this past August and got TPA per family member, DM , BPH with obstruction s/p escamilla catheter , s/p ERCP w Sphincteretomy recent admission to [facility] for sepsis  hodgkin lympoma was not offered any chemo and was placed on hospice.   npw presenting with weakness and FTT.   pt denies cp / SOB / plapiatiaons   no focal neuro complaints .. at baseline RLE weakness   no N/V   had one episode of diarrah   no urinary s x   abdominal pain, diffuse, but worse on R side.   	 (28 Jun 2021 22:08)      Imaging: MRI L-spine w/wo: diffuse mets in L-spine w/ enhancing non-compressive lesion in dorsal R thecal sac @ L4-5    Exam: AAOx2-3, EOMI, PERRL, no facial. BUE 5/5, BHF 1/5, LPF/DF 4+/5, RPF/DF 4-/5. No clonus, 1+ reflexes throughout. SILT.    --Anticoagulation:  aspirin  chewable 81 milliGRAM(s) Oral daily    =====================  PAST MEDICAL HISTORY   Cerebrovascular accident (CVA)    Diabetes mellitus    Hodgkin lymphoma      PAST SURGICAL HISTORY   No significant past surgical history          MEDICATIONS:  Antibiotics:    Neuro:  acetaminophen   Tablet .. 650 milliGRAM(s) Oral every 6 hours PRN  QUEtiapine 25 milliGRAM(s) Oral at bedtime    Other:  allopurinol 300 milliGRAM(s) Oral daily  bisacodyl 5 milliGRAM(s) Oral every 12 hours PRN  dextrose 40% Gel 15 Gram(s) Oral once  dextrose 5%. 1000 milliLiter(s) IV Continuous <Continuous>  dextrose 5%. 1000 milliLiter(s) IV Continuous <Continuous>  dextrose 50% Injectable 25 Gram(s) IV Push once  dextrose 50% Injectable 12.5 Gram(s) IV Push once  dextrose 50% Injectable 25 Gram(s) IV Push once  finasteride 5 milliGRAM(s) Oral daily  glucagon  Injectable 1 milliGRAM(s) IntraMuscular once  insulin glargine Injectable (LANTUS) 8 Unit(s) SubCutaneous at bedtime  insulin lispro (ADMELOG) corrective regimen sliding scale   SubCutaneous three times a day before meals  insulin lispro (ADMELOG) corrective regimen sliding scale   SubCutaneous at bedtime  pantoprazole   Suspension 40 milliGRAM(s) Oral daily  polyethylene glycol 3350 17 Gram(s) Oral daily PRN  senna 2 Tablet(s) Oral at bedtime  tamsulosin 0.4 milliGRAM(s) Oral at bedtime      SOCIAL HISTORY:   Occupation:   Marital Status:     FAMILY HISTORY:  No pertinent family history in first degree relatives        ROS: Negative except per HPI    LABS:                          7.5    1.89  )-----------( 49       ( 12 Jul 2021 06:19 )             23.8     07-12    137  |  103  |  11  ----------------------------<  211<H>  4.3   |  22  |  0.52    Ca    8.2<L>      12 Jul 2021 06:19  Phos  2.8     07-12  Mg     1.4     07-12

## 2021-07-12 NOTE — PROGRESS NOTE ADULT - SUBJECTIVE AND OBJECTIVE BOX
Date of service: 07-12-21 @ 23:19      Patient is a 71y old  Male who presents with a chief complaint of fever (12 Jul 2021 14:37)                                                               INTERVAL HPI/OVERNIGHT EVENTS:    REVIEW OF SYSTEMS:     CONSTITUTIONAL: No weakness, fevers or chills  EYES/ENT: No visual changes , no ear ache   NECK: No pain or stiffness  RESPIRATORY: No cough, wheezing,  No shortness of breath  CARDIOVASCULAR: No chest pain or palpitations  GASTROINTESTINAL: No abdominal pain  . No nausea, vomiting, or hematemesis; No diarrhea or constipation. No melena or hematochezia.  GENITOURINARY: No dysuria, frequency or hematuria  NEUROLOGICAL: No numbness or weakness  SKIN: No itching, burning, rashes, or lesions                                                                                                                                                                                                                                                                                 Medications:  MEDICATIONS  (STANDING):  allopurinol 300 milliGRAM(s) Oral daily  aspirin  chewable 81 milliGRAM(s) Oral daily  dexAMETHasone  Injectable 6 milliGRAM(s) IV Push every 6 hours  dextrose 40% Gel 15 Gram(s) Oral once  dextrose 5%. 1000 milliLiter(s) (50 mL/Hr) IV Continuous <Continuous>  dextrose 5%. 1000 milliLiter(s) (100 mL/Hr) IV Continuous <Continuous>  dextrose 50% Injectable 25 Gram(s) IV Push once  dextrose 50% Injectable 12.5 Gram(s) IV Push once  dextrose 50% Injectable 25 Gram(s) IV Push once  finasteride 5 milliGRAM(s) Oral daily  glucagon  Injectable 1 milliGRAM(s) IntraMuscular once  insulin glargine Injectable (LANTUS) 10 Unit(s) SubCutaneous at bedtime  insulin lispro (ADMELOG) corrective regimen sliding scale   SubCutaneous three times a day before meals  insulin lispro (ADMELOG) corrective regimen sliding scale   SubCutaneous at bedtime  pantoprazole   Suspension 40 milliGRAM(s) Oral daily  QUEtiapine 25 milliGRAM(s) Oral at bedtime  senna 2 Tablet(s) Oral at bedtime  tamsulosin 0.4 milliGRAM(s) Oral at bedtime    MEDICATIONS  (PRN):  acetaminophen   Tablet .. 650 milliGRAM(s) Oral every 6 hours PRN Temp greater or equal to 38C (100.4F), Moderate Pain (4 - 6)  bisacodyl 5 milliGRAM(s) Oral every 12 hours PRN Constipation  polyethylene glycol 3350 17 Gram(s) Oral daily PRN Constipation       Allergies    No Known Allergies    Intolerances      Vital Signs Last 24 Hrs  T(C): 36.6 (12 Jul 2021 20:37), Max: 37 (12 Jul 2021 14:39)  T(F): 97.9 (12 Jul 2021 20:37), Max: 98.6 (12 Jul 2021 14:39)  HR: 88 (12 Jul 2021 20:37) (88 - 101)  BP: 110/71 (12 Jul 2021 20:37) (103/69 - 122/71)  BP(mean): --  RR: 18 (12 Jul 2021 20:37) (18 - 18)  SpO2: 99% (12 Jul 2021 20:37) (98% - 99%)  CAPILLARY BLOOD GLUCOSE      POCT Blood Glucose.: 364 mg/dL (12 Jul 2021 21:20)  POCT Blood Glucose.: 267 mg/dL (12 Jul 2021 16:57)  POCT Blood Glucose.: 222 mg/dL (12 Jul 2021 11:58)  POCT Blood Glucose.: 155 mg/dL (12 Jul 2021 07:56)      07-11 @ 07:01 - 07-12 @ 07:00  --------------------------------------------------------  IN: 300 mL / OUT: 4200 mL / NET: -3900 mL    07-12 @ 07:01 - 07-12 @ 23:19  --------------------------------------------------------  IN: 240 mL / OUT: 2300 mL / NET: -2060 mL      Physical Exam:    Daily     Daily   General:  Well appearing, NAD, not cachetic  HEENT:  Nonicteric, PERRLA  CV:  RRR, S1S2   Lungs:  CTA B/L, no wheezes, rales, rhonchi  Abdomen:  Soft, non-tender, no distended, positive BS  Extremities:  2+ pulses, no c/c, no edema  Skin:  Warm and dry, no rashes  :  No escamilla  Neuro:  AAOx3, non-focal, grossly intact                                                                                                                                                                                                                                                                                                LABS:                               7.6    2.11  )-----------( 56       ( 12 Jul 2021 17:22 )             24.7                      07-12    137  |  103  |  11  ----------------------------<  211<H>  4.3   |  22  |  0.52    Ca    8.2<L>      12 Jul 2021 06:19  Phos  2.8     07-12  Mg     1.4     07-12                         RADIOLOGY & ADDITIONAL TESTS         I personally reviewed: [  ]EKG   [  ]CXR    [  ] CT      A/P:         Discussed with :     [facility] consultants' Notes   Time spent :

## 2021-07-12 NOTE — PROGRESS NOTE ADULT - ASSESSMENT
72 y/o AAM w/ Stroke this past August and got TPA per family member had L weakness resolved, DM , BPH with obstruction s/p escamilla catheter , s/p ERCP w Sphincteretomy recent admission to [facility] for sepsis  hodgkin lympoma was not offered any chemo and was placed on hospice. Now off hospice   now presenting with generalized weakness and FTT.   has baseline R LE weakness.  + hypophonic   Hgb 7.1, platelets 106, elevated alk phos, A1c 8.8%  off 1:1 for + SI. off 1:1   LDL 30, A1c 7.7   repeat CTH 7/3 as above. CT neck as above   MRI L spine with T1 prolongation in throcacic lumbar sacral vertebral bodies concerning fo rmets, MM or anemia, there is also enhancement at R thecal sac L4/5 4.7mm concern for drop mets.    o/e with R NLF flattening, hypophonic, dysarthria, and generalized weakness but uppers with more mobolity than lowers. lowers 2-3/5  concern for CNS invovlement   - if in agreement with [facility] of family (was on hospice previously but now off) would pursue further CNS workup with MRI brain w/ and w/o and MRI C/T Spine and consider CSF sampling.   - Nsx, started decadron.   - neuro onc eval if in agreement with [facility].   - telemetry  - heme/onc recs appreciated. --> possible treatment 7/6 with immunotherapy if approved will consider chemo   - psych f/u for SI --> no capacity. now off 1:1   - c/w ASA 81mg daily  - correct metabolic derangements   - lipitor 40mg if no CI. elevated alk phos   - PT/OT/SS/SLP, OOBC  - check FS, glucose control <180  - GI/DVT ppx  - Counseling on diet, exercise, and medication adherence was done  - Counseling on smoking cessation and alcohol consumption offered when appropriate.  - Pain assessed and judicious use of narcotics when appropriate was discussed.    - Stroke education given when appropriate.  - Importance of fall prevention discussed.   - Differential diagnosis and plan of care discussed with patient and/or family and primary team  - Thank you for allowing me to participate in the care of this patient. Call with questions.   - GOC discussion for hospice , now reversed. family wants possible RYLEY, immunotherapy and posssible chemo if improved .  - spoke with Dr. Evette العراقي MD  Vascular Neurology  Office: [phone]

## 2021-07-12 NOTE — PROGRESS NOTE ADULT - ASSESSMENT
1) Hodgkin's lymphoma  - repeat CT scans, results noted  -Family meeting held at bedside on 7/2. We discussed rx options vs comfort care.   - allopurinol  - pt s/p 1st rx with opdivo. next due on 8/3     2) ENT- voice change is a new finding as per patient's sister. Better   - ENT input note, f/u s/s  - MBS done    3)Hyperglycemia- mgmt as per med    4)Weakness. 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  - Neurology input noted.  - cont pt.  - bed to chair   - lumbar mri , results are pending    5) Pancytopenia  anemia- w/u this far unremarkable. possible aocd.  Stool guaiac requested  transfusional support as needed    Leukopenia- unclear etiology- possibly due to infxn or malignancy. Also could be related to zosyn. Now off of abx. monitor cbc     Thrombocytopenia- has developed while here.  Can't r/o malignancy vs. due to zosyn.   manage supportively for now.    I'm not sure that a bmbx will alter our current mgmt plan, but i introduced this possibility to the patient.   he prefers to hold off for now.       6. Fever- resolved.

## 2021-07-12 NOTE — PROGRESS NOTE ADULT - ASSESSMENT
Assessment  DMT2: 71y Male with DM T2 with hyperglycemia, A1C 8.8%, was on oral meds/Janumet at home, admitted with weakness/fatigue and hyperglycemia, now on basal insulin and coverage, blood sugars are fluctuating/running high and not at target, no hypoglycemic episodes, remains on dysphagia diet, drinking nutritional supplements + shakes.  Lymphoma: on medications, monitored, FU Heme/Onc.  Anemia: stable, monitored.      Shahriar Kahn MD  Cell: [phone]  Office: [phone]       Assessment  DMT2: 71y Male with DM T2 with hyperglycemia, A1C 8.8%, was on oral meds/Janumet at home, admitted with weakness/fatigue and hyperglycemia, now on basal insulin and coverage, blood sugars are fluctuating/running high and not at target,  no hypoglycemic episodes, remains on dysphagia diet, drinking nutritional supplements + shakes.  Lymphoma: on medications, monitored, FU Heme/Onc.  Anemia: stable, monitored.      Shahriar Kahn MD  Cell: [phone]  Office: [phone]

## 2021-07-13 LAB
ANION GAP SERPL CALC-SCNC: 11 MMOL/L — SIGNIFICANT CHANGE UP (ref 5–17)
ANION GAP SERPL CALC-SCNC: 15 MMOL/L — SIGNIFICANT CHANGE UP (ref 5–17)
ANISOCYTOSIS BLD QL: SLIGHT — SIGNIFICANT CHANGE UP
BASOPHILS # BLD AUTO: 0 K/UL — SIGNIFICANT CHANGE UP (ref 0–0.2)
BASOPHILS NFR BLD AUTO: 0 % — SIGNIFICANT CHANGE UP (ref 0–2)
BUN SERPL-MCNC: 14 MG/DL — SIGNIFICANT CHANGE UP (ref 7–23)
BUN SERPL-MCNC: 16 MG/DL — SIGNIFICANT CHANGE UP (ref 7–23)
BURR CELLS BLD QL SMEAR: PRESENT — SIGNIFICANT CHANGE UP
CALCIUM SERPL-MCNC: 8.5 MG/DL — SIGNIFICANT CHANGE UP (ref 8.4–10.5)
CALCIUM SERPL-MCNC: 8.8 MG/DL — SIGNIFICANT CHANGE UP (ref 8.4–10.5)
CHLORIDE SERPL-SCNC: 101 MMOL/L — SIGNIFICANT CHANGE UP (ref 96–108)
CHLORIDE SERPL-SCNC: 99 MMOL/L — SIGNIFICANT CHANGE UP (ref 96–108)
CO2 SERPL-SCNC: 23 MMOL/L — SIGNIFICANT CHANGE UP (ref 22–31)
CO2 SERPL-SCNC: 24 MMOL/L — SIGNIFICANT CHANGE UP (ref 22–31)
CREAT SERPL-MCNC: 0.36 MG/DL — LOW (ref 0.5–1.3)
CREAT SERPL-MCNC: 0.45 MG/DL — LOW (ref 0.5–1.3)
ELLIPTOCYTES BLD QL SMEAR: SLIGHT — SIGNIFICANT CHANGE UP
EOSINOPHIL # BLD AUTO: 0 K/UL — SIGNIFICANT CHANGE UP (ref 0–0.5)
EOSINOPHIL NFR BLD AUTO: 0 % — SIGNIFICANT CHANGE UP (ref 0–6)
GLUCOSE BLDC GLUCOMTR-MCNC: 338 MG/DL — HIGH (ref 70–99)
GLUCOSE BLDC GLUCOMTR-MCNC: 401 MG/DL — HIGH (ref 70–99)
GLUCOSE BLDC GLUCOMTR-MCNC: 414 MG/DL — HIGH (ref 70–99)
GLUCOSE BLDC GLUCOMTR-MCNC: 423 MG/DL — HIGH (ref 70–99)
GLUCOSE BLDC GLUCOMTR-MCNC: 513 MG/DL — CRITICAL HIGH (ref 70–99)
GLUCOSE BLDC GLUCOMTR-MCNC: 514 MG/DL — CRITICAL HIGH (ref 70–99)
GLUCOSE SERPL-MCNC: 338 MG/DL — HIGH (ref 70–99)
GLUCOSE SERPL-MCNC: 394 MG/DL — HIGH (ref 70–99)
HCT VFR BLD CALC: 24.7 % — LOW (ref 39–50)
HCT VFR BLD CALC: 25.7 % — LOW (ref 39–50)
HGB BLD-MCNC: 7.7 G/DL — LOW (ref 13–17)
HGB BLD-MCNC: 7.9 G/DL — LOW (ref 13–17)
LYMPHOCYTES # BLD AUTO: 0.43 K/UL — LOW (ref 1–3.3)
LYMPHOCYTES # BLD AUTO: 15 % — SIGNIFICANT CHANGE UP (ref 13–44)
MACROCYTES BLD QL: SLIGHT — SIGNIFICANT CHANGE UP
MAGNESIUM SERPL-MCNC: 1.3 MG/DL — LOW (ref 1.6–2.6)
MANUAL SMEAR VERIFICATION: SIGNIFICANT CHANGE UP
MCHC RBC-ENTMCNC: 28 PG — SIGNIFICANT CHANGE UP (ref 27–34)
MCHC RBC-ENTMCNC: 28 PG — SIGNIFICANT CHANGE UP (ref 27–34)
MCHC RBC-ENTMCNC: 30.7 GM/DL — LOW (ref 32–36)
MCHC RBC-ENTMCNC: 31.2 GM/DL — LOW (ref 32–36)
MCV RBC AUTO: 89.8 FL — SIGNIFICANT CHANGE UP (ref 80–100)
MCV RBC AUTO: 91.1 FL — SIGNIFICANT CHANGE UP (ref 80–100)
MICROCYTES BLD QL: SLIGHT — SIGNIFICANT CHANGE UP
MONOCYTES # BLD AUTO: 0.23 K/UL — SIGNIFICANT CHANGE UP (ref 0–0.9)
MONOCYTES NFR BLD AUTO: 8 % — SIGNIFICANT CHANGE UP (ref 2–14)
NEUTROPHILS # BLD AUTO: 2.19 K/UL — SIGNIFICANT CHANGE UP (ref 1.8–7.4)
NEUTROPHILS NFR BLD AUTO: 76.1 % — SIGNIFICANT CHANGE UP (ref 43–77)
NEUTS BAND # BLD: 0.9 % — SIGNIFICANT CHANGE UP (ref 0–8)
NRBC # BLD: 0 /100 WBCS — SIGNIFICANT CHANGE UP (ref 0–0)
NRBC # BLD: 1 /100 — HIGH (ref 0–0)
OVALOCYTES BLD QL SMEAR: SLIGHT — SIGNIFICANT CHANGE UP
PHOSPHATE SERPL-MCNC: 2.9 MG/DL — SIGNIFICANT CHANGE UP (ref 2.5–4.5)
PLAT MORPH BLD: NORMAL — SIGNIFICANT CHANGE UP
PLATELET # BLD AUTO: 53 K/UL — LOW (ref 150–400)
PLATELET # BLD AUTO: 63 K/UL — LOW (ref 150–400)
POIKILOCYTOSIS BLD QL AUTO: SLIGHT — SIGNIFICANT CHANGE UP
POTASSIUM SERPL-MCNC: 3.9 MMOL/L — SIGNIFICANT CHANGE UP (ref 3.5–5.3)
POTASSIUM SERPL-MCNC: 4.2 MMOL/L — SIGNIFICANT CHANGE UP (ref 3.5–5.3)
POTASSIUM SERPL-SCNC: 3.9 MMOL/L — SIGNIFICANT CHANGE UP (ref 3.5–5.3)
POTASSIUM SERPL-SCNC: 4.2 MMOL/L — SIGNIFICANT CHANGE UP (ref 3.5–5.3)
RBC # BLD: 2.75 M/UL — LOW (ref 4.2–5.8)
RBC # BLD: 2.82 M/UL — LOW (ref 4.2–5.8)
RBC # FLD: 22.5 % — HIGH (ref 10.3–14.5)
RBC # FLD: 22.6 % — HIGH (ref 10.3–14.5)
RBC BLD AUTO: ABNORMAL
SODIUM SERPL-SCNC: 134 MMOL/L — LOW (ref 135–145)
SODIUM SERPL-SCNC: 139 MMOL/L — SIGNIFICANT CHANGE UP (ref 135–145)
WBC # BLD: 1.61 K/UL — LOW (ref 3.8–10.5)
WBC # BLD: 2.84 K/UL — LOW (ref 3.8–10.5)
WBC # FLD AUTO: 1.61 K/UL — LOW (ref 3.8–10.5)
WBC # FLD AUTO: 2.84 K/UL — LOW (ref 3.8–10.5)

## 2021-07-13 PROCEDURE — 93010 ELECTROCARDIOGRAM REPORT: CPT

## 2021-07-13 PROCEDURE — 99222 1ST HOSP IP/OBS MODERATE 55: CPT

## 2021-07-13 RX ORDER — INSULIN LISPRO 100/ML
6 VIAL (ML) SUBCUTANEOUS
Refills: 0 | Status: DISCONTINUED | OUTPATIENT
Start: 2021-07-13 | End: 2021-07-13

## 2021-07-13 RX ORDER — INSULIN GLARGINE 100 [IU]/ML
36 INJECTION, SOLUTION SUBCUTANEOUS AT BEDTIME
Refills: 0 | Status: DISCONTINUED | OUTPATIENT
Start: 2021-07-13 | End: 2021-07-14

## 2021-07-13 RX ORDER — MAGNESIUM SULFATE 500 MG/ML
2 VIAL (ML) INJECTION ONCE
Refills: 0 | Status: COMPLETED | OUTPATIENT
Start: 2021-07-13 | End: 2021-07-13

## 2021-07-13 RX ORDER — INSULIN GLARGINE 100 [IU]/ML
15 INJECTION, SOLUTION SUBCUTANEOUS AT BEDTIME
Refills: 0 | Status: DISCONTINUED | OUTPATIENT
Start: 2021-07-13 | End: 2021-07-13

## 2021-07-13 RX ORDER — INSULIN LISPRO 100/ML
8 VIAL (ML) SUBCUTANEOUS
Refills: 0 | Status: DISCONTINUED | OUTPATIENT
Start: 2021-07-13 | End: 2021-07-13

## 2021-07-13 RX ORDER — INSULIN LISPRO 100/ML
15 VIAL (ML) SUBCUTANEOUS
Refills: 0 | Status: DISCONTINUED | OUTPATIENT
Start: 2021-07-13 | End: 2021-07-14

## 2021-07-13 RX ORDER — INSULIN LISPRO 100/ML
5 VIAL (ML) SUBCUTANEOUS ONCE
Refills: 0 | Status: COMPLETED | OUTPATIENT
Start: 2021-07-13 | End: 2021-07-13

## 2021-07-13 RX ORDER — INSULIN GLARGINE 100 [IU]/ML
20 INJECTION, SOLUTION SUBCUTANEOUS AT BEDTIME
Refills: 0 | Status: DISCONTINUED | OUTPATIENT
Start: 2021-07-13 | End: 2021-07-13

## 2021-07-13 RX ORDER — POTASSIUM CHLORIDE 20 MEQ
20 PACKET (EA) ORAL ONCE
Refills: 0 | Status: COMPLETED | OUTPATIENT
Start: 2021-07-13 | End: 2021-07-13

## 2021-07-13 RX ADMIN — TAMSULOSIN HYDROCHLORIDE 0.4 MILLIGRAM(S): 0.4 CAPSULE ORAL at 21:54

## 2021-07-13 RX ADMIN — Medication 15 UNIT(S): at 17:10

## 2021-07-13 RX ADMIN — Medication 650 MILLIGRAM(S): at 23:36

## 2021-07-13 RX ADMIN — Medication 50 GRAM(S): at 22:38

## 2021-07-13 RX ADMIN — PANTOPRAZOLE SODIUM 40 MILLIGRAM(S): 20 TABLET, DELAYED RELEASE ORAL at 12:10

## 2021-07-13 RX ADMIN — Medication 8: at 21:53

## 2021-07-13 RX ADMIN — Medication 4: at 08:18

## 2021-07-13 RX ADMIN — Medication 300 MILLIGRAM(S): at 12:09

## 2021-07-13 RX ADMIN — Medication 6 MILLIGRAM(S): at 05:20

## 2021-07-13 RX ADMIN — INSULIN GLARGINE 36 UNIT(S): 100 INJECTION, SOLUTION SUBCUTANEOUS at 21:53

## 2021-07-13 RX ADMIN — Medication 6 MILLIGRAM(S): at 23:30

## 2021-07-13 RX ADMIN — Medication 6 MILLIGRAM(S): at 17:10

## 2021-07-13 RX ADMIN — Medication 6 MILLIGRAM(S): at 12:09

## 2021-07-13 RX ADMIN — Medication 5 UNIT(S): at 14:54

## 2021-07-13 RX ADMIN — QUETIAPINE FUMARATE 25 MILLIGRAM(S): 200 TABLET, FILM COATED ORAL at 21:54

## 2021-07-13 RX ADMIN — Medication 6: at 12:10

## 2021-07-13 RX ADMIN — Medication 6: at 17:09

## 2021-07-13 RX ADMIN — Medication 81 MILLIGRAM(S): at 12:09

## 2021-07-13 RX ADMIN — Medication 650 MILLIGRAM(S): at 22:36

## 2021-07-13 RX ADMIN — FINASTERIDE 5 MILLIGRAM(S): 5 TABLET, FILM COATED ORAL at 12:09

## 2021-07-13 RX ADMIN — Medication 20 MILLIEQUIVALENT(S): at 22:36

## 2021-07-13 RX ADMIN — Medication 6 MILLIGRAM(S): at 00:33

## 2021-07-13 NOTE — PROGRESS NOTE ADULT - ASSESSMENT
Assessment  DMT2: 71y Male with DM T2 with hyperglycemia, A1C 8.8%, was on oral meds/Janumet at home, admitted with weakness/fatigue and hyperglycemia, now on basal insulin and coverage, blood sugars are trending up/running high and not at target 2/2 high-dose steroids, no hypoglycemic episodes, remains on dysphagia diet, started on dexamethasone.  Lymphoma: on medications, monitored, FU Heme/Onc.  Anemia: stable, monitored.      Shahriar Kahn MD  Cell: [phone]  Office: [phone]       Assessment  DMT2: 71y Male with DM T2 with hyperglycemia, A1C 8.8%, was on oral meds/Janumet at home, admitted with weakness/fatigue and hyperglycemia, now on basal insulin and coverage,  blood sugars are trending up/running high and not at target 2/2 high-dose steroids, no hypoglycemic episodes, remains on dysphagia diet, started on dexamethasone.  Lymphoma: on medications, monitored, FU Heme/Onc.  Anemia: stable, monitored.      Shahriar Kahn MD  Cell: [phone]  Office: [phone]

## 2021-07-13 NOTE — PROGRESS NOTE ADULT - ASSESSMENT
72 y/o AAM w/ Stroke this past August and got TPA per family member had L weakness resolved, DM , BPH with obstruction s/p escamilla catheter , s/p ERCP w Sphincteretomy recent admission to [facility] for sepsis  hodgkin lympoma was not offered any chemo and was placed on hospice. Now off hospice  +generalized weakness and FTT and retnetion  has baseline R LE weakness.  + hypophonic, + retention h/o of BPH and obstruction . LE B/L weakness.   anemic, thrombocytopenia elevated alk phos, A1c 8.8%  off 1:1 for + SI.    LDL 30, A1c 7.7   repeat CTH 7/3 as above. CT neck as above   MRI L spine with T1 prolongation in throcacic lumbar sacral vertebral bodies concerning fo rmets, MM or anemia, there is also enhancement at R thecal sac L4/5 4.7mm concern for drop mets.    o/e with R NLF flattening, hypophonic, dysarthria, and generalized weakness but uppers with more mobility than lowers. lowers 2-3/5  concern for CNS involvement   - MRI brain w/ and w/o and MRI C/T Spine and consider CSF sampling.   - Nsx, started decadron. needs GI ppx   - neuro onc eval appreciated by Dr. Smith, await imaging   - spoke with Dr. Alas and Dr. Larios  - will sign off at this point as neuro onc following. recall with questions   Juan العراقي MD  Vascular Neurology  Office: [phone]

## 2021-07-13 NOTE — PROGRESS NOTE ADULT - SUBJECTIVE AND OBJECTIVE BOX
Date of service: 07-13-21 @ 22:14      Patient is a 71y old  Male who presents with a chief complaint of fever (12 Jul 2021 14:37)                                                               INTERVAL HPI/OVERNIGHT EVENTS:    REVIEW OF SYSTEMS:     CONSTITUTIONAL: No weakness, fevers or chills  EYES/ENT: No visual changes , no ear ache   NECK: No pain or stiffness  RESPIRATORY: No cough, wheezing,  No shortness of breath  CARDIOVASCULAR: No chest pain or palpitations  GASTROINTESTINAL: No abdominal pain  . No nausea, vomiting, or hematemesis; No diarrhea or constipation. No melena or hematochezia.  GENITOURINARY: No dysuria, frequency or hematuria  NEUROLOGICAL: No numbness or weakness  SKIN: No itching, burning, rashes, or lesions                                                                                                                                                                                                                                                                                 Medications:  MEDICATIONS  (STANDING):  allopurinol 300 milliGRAM(s) Oral daily  aspirin  chewable 81 milliGRAM(s) Oral daily  dexAMETHasone  Injectable 6 milliGRAM(s) IV Push every 6 hours  dextrose 40% Gel 15 Gram(s) Oral once  dextrose 5%. 1000 milliLiter(s) (50 mL/Hr) IV Continuous <Continuous>  dextrose 5%. 1000 milliLiter(s) (100 mL/Hr) IV Continuous <Continuous>  dextrose 50% Injectable 25 Gram(s) IV Push once  dextrose 50% Injectable 12.5 Gram(s) IV Push once  dextrose 50% Injectable 25 Gram(s) IV Push once  finasteride 5 milliGRAM(s) Oral daily  glucagon  Injectable 1 milliGRAM(s) IntraMuscular once  insulin glargine Injectable (LANTUS) 36 Unit(s) SubCutaneous at bedtime  insulin lispro (ADMELOG) corrective regimen sliding scale   SubCutaneous three times a day before meals  insulin lispro (ADMELOG) corrective regimen sliding scale   SubCutaneous at bedtime  insulin lispro Injectable (ADMELOG) 15 Unit(s) SubCutaneous three times a day before meals  magnesium sulfate  IVPB 2 Gram(s) IV Intermittent once  pantoprazole   Suspension 40 milliGRAM(s) Oral daily  potassium chloride    Tablet ER 20 milliEquivalent(s) Oral once  QUEtiapine 25 milliGRAM(s) Oral at bedtime  senna 2 Tablet(s) Oral at bedtime  tamsulosin 0.4 milliGRAM(s) Oral at bedtime    MEDICATIONS  (PRN):  acetaminophen   Tablet .. 650 milliGRAM(s) Oral every 6 hours PRN Temp greater or equal to 38C (100.4F), Moderate Pain (4 - 6)  bisacodyl 5 milliGRAM(s) Oral every 12 hours PRN Constipation  polyethylene glycol 3350 17 Gram(s) Oral daily PRN Constipation       Allergies    No Known Allergies    Intolerances      Vital Signs Last 24 Hrs  T(C): 36.7 (13 Jul 2021 20:08), Max: 36.7 (13 Jul 2021 20:08)  T(F): 98 (13 Jul 2021 20:08), Max: 98 (13 Jul 2021 20:08)  HR: 62 (13 Jul 2021 20:08) (62 - 92)  BP: 117/66 (13 Jul 2021 20:08) (106/66 - 122/75)  BP(mean): --  RR: 18 (13 Jul 2021 20:08) (18 - 18)  SpO2: 99% (13 Jul 2021 20:08) (99% - 100%)  CAPILLARY BLOOD GLUCOSE      POCT Blood Glucose.: 414 mg/dL (13 Jul 2021 21:04)  POCT Blood Glucose.: 514 mg/dL (13 Jul 2021 16:16)  POCT Blood Glucose.: 513 mg/dL (13 Jul 2021 16:15)  POCT Blood Glucose.: 423 mg/dL (13 Jul 2021 13:56)  POCT Blood Glucose.: 401 mg/dL (13 Jul 2021 11:43)  POCT Blood Glucose.: 338 mg/dL (13 Jul 2021 07:27)      07-12 @ 07:01 - 07-13 @ 07:00  --------------------------------------------------------  IN: 240 mL / OUT: 3500 mL / NET: -3260 mL    07-13 @ 07:01  -  07-13 @ 22:14  --------------------------------------------------------  IN: 480 mL / OUT: 900 mL / NET: -420 mL      Physical Exam:    Daily     Daily   General:  Well appearing, NAD, not cachetic  HEENT:  Nonicteric, PERRLA  CV:  RRR, S1S2   Lungs:  CTA B/L, no wheezes, rales, rhonchi  Abdomen:  Soft, non-tender, no distended, positive BS  Extremities:  2+ pulses, no c/c, no edema  Skin:  Warm and dry, no rashes  :  No escamilla  Neuro:  AAOx3, non-focal, grossly intact                                                                                                                                                                                                                                                                                                LABS:                               7.9    2.84  )-----------( 63       ( 13 Jul 2021 17:30 )             25.7                      07-13    139  |  101  |  16  ----------------------------<  394<H>  3.9   |  23  |  0.45<L>    Ca    8.8      13 Jul 2021 20:52  Phos  2.9     07-13  Mg     1.3     07-13                         RADIOLOGY & ADDITIONAL TESTS         I personally reviewed: [  ]EKG   [  ]CXR    [  ] CT      A/P:         Discussed with :     [facility] consultants' Notes   Time spent :  gisel

## 2021-07-13 NOTE — PROVIDER CONTACT NOTE (OTHER) - ASSESSMENT
Pt is A&Ox3-4. Pt was lying in bed at time of event. Upon assessment, pt denies CP, SOB, palpitations. Pt is A&Ox3-4. Pt was lying in bed at time of event, having a BM. Upon assessment, pt denies CP, SOB, palpitations. pt asymptomatic at time of tele episode. Pt is A&Ox3-4. Pt was lying in bed at time of event having a BM. Upon assessment, pt denies CP, SOB, palpitations. pt asymptomatic at time of tele episode.

## 2021-07-13 NOTE — PROVIDER CONTACT NOTE (OTHER) - SITUATION
Ectopy: WCT on tele (7, 5, 6 beats) Ectopy: WCT on tele (7 beats x1.2 seconds up to 186, 5 beats x1.4 seconds up to 175, 6 beats x1.9 seconds up to 170).

## 2021-07-13 NOTE — CHART NOTE - NSCHARTNOTEFT_GEN_A_CORE
**incomplete note Notified by RN pt with 3 episodes of WCT on telemetry (7 beats, then 5 beats, then 6 beats), with HR up to 186 bpm at time of event. Pt with prior h/o 13 beats WCT on telemetry on 7/11. Pt resting comfortably in bed at time of event. Currently denies chest pain, SOB, palpitations, nausea, vomiting, headache.    Vital Signs Last 24 Hrs  T(C): 36.6 (14 Jul 2021 00:04), Max: 36.7 (13 Jul 2021 20:08)  T(F): 97.8 (14 Jul 2021 00:04), Max: 98 (13 Jul 2021 20:08)  HR: 74 (14 Jul 2021 00:04) (62 - 92)  BP: 116/66 (14 Jul 2021 00:04) (106/66 - 122/75)  BP(mean): --  RR: 18 (14 Jul 2021 00:04) (18 - 18)  SpO2: 99% (14 Jul 2021 00:04) (99% - 100%)      Physical Exam:  General: WN/WD, NAD, nontoxic appearing  Neurology: nonfocal, FRANCOIS x 4  Head:  Normocephalic, atraumatic  Respiratory: CTA B/L  CV: irregular rhythm, S1S2  MSK:  + peripheral pulses, FROM all 4 extremity    Labs:                        7.9    2.84  )-----------( 63       ( 13 Jul 2021 17:30 )             25.7     07-13    139  |  101  |  16  ----------------------------<  394<H>  3.9   |  23  |  0.45<L>    Ca    8.8      13 Jul 2021 20:52  Phos  2.9     07-13  Mg     1.3     07-13          Assessment & Plan:    72 y/o M w/ pmhx of CVA this past August and got TPA per family member, DM , BPH with obstruction s/p escamilla catheter , s/p ERCP w Sphincteretomy recent admission to [facility] for sepsis  hodgkin lympoma was not offered any chemo and was placed on hospice, admitted to [facility] for weakness and FTT.     Patient now with WCT on tele (7 beats, then 5 beats, then 6 beats):    >Telemetry with NSR   >Vital signs: /66, HR 98 bpm, SpO2 98%,   >STAT EKG :         > ? junctional rhythm vs atrial fibrillation, with HR @ 82 bpm  >STAT BMP, Mg, Phos ordered        > Mg 1.3 --> Magnesium sulfate 2 mg x 1        > K 3.9 --> Potassium chloride 20 mg PO x 1        > Will recheck BMP, Mg, Phos in AM  >D/w Dr. Alas, who will have cardiology see pt in AM. As per Dr. Alas, hold off on anticoagulation for now  > Continue to monitor on telemetry  >Continue to monitor and observe patient  >Will endorse to primary team in AM      Brissa Escalona PA-C  Dept of Medicine   #[id number]

## 2021-07-13 NOTE — PROGRESS NOTE ADULT - SUBJECTIVE AND OBJECTIVE BOX
Neurology Progress Note    S: Patient seen and examined.  . patient denied CP, SOB, HA or pain.  doing okay  family at bedside. MRI L spine obtained with possible CNS involvement noted await MRI spine     Medication:  MEDICATIONS  (STANDING):  allopurinol 300 milliGRAM(s) Oral daily  aspirin  chewable 81 milliGRAM(s) Oral daily  dexAMETHasone  Injectable 6 milliGRAM(s) IV Push every 6 hours  dextrose 40% Gel 15 Gram(s) Oral once  dextrose 5%. 1000 milliLiter(s) (50 mL/Hr) IV Continuous <Continuous>  dextrose 5%. 1000 milliLiter(s) (100 mL/Hr) IV Continuous <Continuous>  dextrose 50% Injectable 25 Gram(s) IV Push once  dextrose 50% Injectable 12.5 Gram(s) IV Push once  dextrose 50% Injectable 25 Gram(s) IV Push once  finasteride 5 milliGRAM(s) Oral daily  glucagon  Injectable 1 milliGRAM(s) IntraMuscular once  insulin glargine Injectable (LANTUS) 10 Unit(s) SubCutaneous at bedtime  insulin lispro (ADMELOG) corrective regimen sliding scale   SubCutaneous three times a day before meals  insulin lispro (ADMELOG) corrective regimen sliding scale   SubCutaneous at bedtime  pantoprazole   Suspension 40 milliGRAM(s) Oral daily  QUEtiapine 25 milliGRAM(s) Oral at bedtime  senna 2 Tablet(s) Oral at bedtime  tamsulosin 0.4 milliGRAM(s) Oral at bedtime    MEDICATIONS  (PRN):  acetaminophen   Tablet .. 650 milliGRAM(s) Oral every 6 hours PRN Temp greater or equal to 38C (100.4F), Moderate Pain (4 - 6)  bisacodyl 5 milliGRAM(s) Oral every 12 hours PRN Constipation  polyethylene glycol 3350 17 Gram(s) Oral daily PRN Constipation    Vitals:  Vital Signs Last 24 Hrs  T(C): 36.4 (13 Jul 2021 11:29), Max: 37 (12 Jul 2021 14:39)  T(F): 97.6 (13 Jul 2021 11:29), Max: 98.6 (12 Jul 2021 14:39)  HR: 75 (13 Jul 2021 11:29) (75 - 101)  BP: 106/66 (13 Jul 2021 11:29) (103/69 - 122/75)  BP(mean): --  RR: 18 (13 Jul 2021 11:29) (18 - 18)  SpO2: 100% (13 Jul 2021 11:29) (98% - 100%)      General Exam:   General Appearance: Appropriately dressed and in no acute distress       Head: Normocephalic, atraumatic and no dysmorphic features  Ear, Nose, and Throat: Moist mucous membranes  CVS: S1S2+  Resp: No SOB, no wheeze or rhonchi  GI: soft NT/ND  Extremities: No edema or cyanosis  Skin: No bruises or rashes     Neurological Exam:  Mental Status: Awake, alert and oriented x 1-2.  Able to follow simple and complex verbal commands. Able to name and repeat. fluent speech. No obvious aphasia mild dysarthria, + hypophonic   Cranial Nerves: PERRL, EOMI, VFFC, sensation V1-V3 intact,  R facial asymmetry, equal elevation of palate, scm/trap 5/5, tongue is midline on protrusion. no obvious papilledema on fundoscopic exam. hearing is grossly intact.   Motor: generalized weakness but FRANCOIS uppers > lowers.  Lowers at least 2-3/5 .    Sensation: Intact to light touch and pinprick throughout. no right/left confusion. no extinction to tactile on DSS.  .   Reflexes: 1+ throughout at biceps, brachioradialis, triceps, patellars and ankles bilaterally and equal. No clonus. upgoing toe  Coordination: No dysmetria on FNF    Gait: deferred     I personally reviewed the below data/images/labs:    CBC Full  -  ( 13 Jul 2021 06:09 )  WBC Count : 1.61 K/uL  RBC Count : 2.75 M/uL  Hemoglobin : 7.7 g/dL  Hematocrit : 24.7 %  Platelet Count - Automated : 53 K/uL  Mean Cell Volume : 89.8 fl  Mean Cell Hemoglobin : 28.0 pg  Mean Cell Hemoglobin Concentration : 31.2 gm/dL  Auto Neutrophil # : x  Auto Lymphocyte # : x  Auto Monocyte # : x  Auto Eosinophil # : x  Auto Basophil # : x  Auto Neutrophil % : x  Auto Lymphocyte % : x  Auto Monocyte % : x  Auto Eosinophil % : x  Auto Basophil % : x    07-13    134<L>  |  99  |  14  ----------------------------<  338<H>  4.2   |  24  |  0.36<L>    Ca    8.5      13 Jul 2021 06:09  Phos  2.8     07-12  Mg     1.4     07-12          < from: CT Chest w/ IV Cont (06.28.21 @ 19:01) >    EXAM:  CT CHEST IC                            PROCEDURE DATE:  06/28/2021            INTERPRETATION:  CLINICAL INFORMATION: Lymphoma, evaluate extent of thoracic disease.    COMPARISON: CT abdomen pelvis dated 6/28/2021. Chest x-ray dated 6/20/2021.    CONTRAST/COMPLICATIONS:  IV Contrast: 40 mL of Omnipaque 350 were administered. 60 mL discarded.  Oral Contrast: None.  Complications: None reported.    PROCEDURE:  CT of the Chest was performed.  Sagittal and coronal reformats were performed.    FINDINGS: The quality of the images are degraded by respiratory motion and streak artifact.    LUNGS AND AIRWAYS: Patent central airways. Patchy groundglass opacities and scattered nodular opacities throughout all lobes of the lungs. For example:  A left upper lobe nodule measures 0.9 cm (series 3 image 33).  A right middle lobe nodule measures 2.0 x 0.8 cm (series 3 image 76).  3.5 x 2.1 cm solid opacity within the superior segment of right lower lobe (series 3 image 71).  A right lower lobe nodule at the costophrenic angle measures 1.9 x 1.1 cm (series 3 image 108).    PLEURA: No pleural effusion or pneumothorax.    MEDIASTINUM AND SARTHAK: Mildly enlarged mediastinal, hilar, and supraclavicular lymphadenopathy. A right hilar lymph node measures 1.8 x 1.4 cm (series 3 image 90). A left supraclavicular node measures 2.5 x 1.5 cm (series 3 image 19). A subcarinal node measures 2.2 x 1.3 cm (series 3 image 63).    VESSELS: Coronary artery calcifications.    HEART: Heart size is normal. No pericardial effusion.    CHEST WALL AND LOWER NECK: Within normal limits.    VISUALIZED UPPER ABDOMEN: Retroperitoneal lymphadenopathy, as seen on abdominal CT from the same date.    BONES: Degenerative changes of the spine.    IMPRESSION:    Patchy groundglass opacities and scattered nodular opacities throughout all lobes of the lungs. Findings may be related to known lymphoma or may be seen in the setting of multifocal infection.    Mildly enlarged supraclavicular, mediastinal, hilar, and retroperitoneal lymphadenopathy likely related to known lymphoma.              LEXIS BAKER MD; Resident Radiology  This document has been electronically signed.  ISABEL JACOBO MD; Attending Radiologist  This document has been electronically signed. Jun 29 202111:[id number]    < end of copied text >      < from: CT Neck Soft Tissue w/ IV Cont (07.03.21 @ 15:37) >    EXAM:  CT NECK SOFT TISSUE IC                          EXAM:  CT BRAIN IC                            PROCEDURE DATE:  07/03/2021            INTERPRETATION:  CLINICAL INDICATION: Hodgkin's lymphoma. Rule out metastatic disease.    TECHNIQUE: CT of the head and neck soft tissues was performed before and after administration of IV contrast. Contrast dose: 90 cc Omnipaque 300 IV contrast.    COMPARISON: CT chest 6/28/2021.    FINDINGS:    CT HEAD:  No acute transcortical infarct or intracranial hemorrhage. No abnormal intracranial enhancement is identified.    White matter hypoattenuating foci are noted, nonspecific but likely representing small vessel disease.    The ventricles are normal without evidence of hydrocephalus. There are no extra-axial fluid collections.    The visualized intraorbital contents are unremarkable. Mild mucosal thickening in the right maxillary sinus. The mastoid air cells are clear. The visualized soft tissues and osseous structures appear normal.    CT NECK:  Aerodigestive structures: Asymmetric enlargement of the left palatine tonsil is noted. Remainder of the aerodigestive structures are unremarkable.    Lymph nodes: Again noted left supraclavicular node measuring up to 2.4 x 1.5 cm.    Parotid and submandibular glands:  Normal.    Thyroid gland: Normal.    Vascular structures: Unremarkable.    Osseous structures: No fracture, dislocation or destructive lesion.    Partially visualized lung apices: Normal.      IMPRESSION:  CT head:  -No acute intracranial findings.  -No abnormal intracranial enhancement identified by CT. Consider MRI with contrast for increased sensitivity.    CT neck:  -Again noted enlarged left supraclavicular node.  -Asymmetric enlargement of the left palatine tonsil, suspicious for lymphomatous involvement given history. Correlate with direct visualization.                ZOEY TORRES MD; Attending Radiologist  This document has been electronically signed. Jul  3 2021  3:51PM    < end of copied text >    < from: MR Lumbar Spine w/wo IV Cont (07.11.21 @ 23:51) >    EXAM:  MR SPINE LUMBAR WAW IC                            PROCEDURE DATE:  07/11/2021            INTERPRETATION:  Clinical indications: Lower extremity weakness    MRI of the lumbar spine was performed using sagittal T1-T2 and T2-weighted sequence fat suppression. Axial T1 and T2-weighted sequences were performed    Loss of the normal lumbar lordosis seen    Mild scoliosis is seen.    Disc desiccation is seen involving the L2-3 L3-4 L4-5 and L5-S1 levels which are secondary to degenerative changes.    There is evidence of abnormal T1 prolongation seen involving the lower thoracic lumbar sacral vertebral bodies as well as both iliac bones. This finding could be compatible with a marrow infiltrative process, possibly secondary to patient's lymphoma though other possibilities include metastasis, multiple myeloma and severe anemia must be considered.    L1-2: Hypertrophic facet joint changes are seen bilaterally. No significant, as of the spinal canal or either neural foramen.    L2-3: Bilateral hypertrophic facet joint changes seen. No significant compromise spinal canal or either neural foramen    L3-4: Disc bulge and bilateral hypertrophic facet joint change. No significant, as of the spinal canal or either neural foramen    L4-5: Disc bulge and bilateral hypertrophic facet joint changes seen. Mild to moderate narrowing of the spinal canal. Mild to moderate narrowing of both neural foramen.    L5-S1: Bilateral hypertrophic facet joint changes seen. Mild narrowing of the left neural foramen and mild to moderate right neural foramen    There is evidence of abnormal area of enhancement seen involving the dorsal aspect of the right thecal sac at the level of the L4-5 disc. This best seen on series 11 image 21. This finding measures approximately 4.7 mm. This could be related to patient's known lymphoma though the possibility of drop metastasis as well as other neoplastic etiologies cannot entirely excluded. Correlation with CSF can also be done for further evaluation. Continued close interval follow-up is recommended.    The conus appears to end at top of L2.    Bilateral renal cysts are seen.    Evaluation of the paraspinal soft tissues appear normal.    IMPRESSION: Abnormal signal seen involving the lower thoracic lumbarsacral vertebral bodies as well as both iliac bones. This is likely compatible with a marrow infiltrative process as described above.    Abnormal enhancing lesion is seen involving the dorsal aspect of the right thecal sac as described above.    Degenerative changes as described above.    --- End of Report ---      NGA MOSES MD; Attending Radiologist  This document has been electronically signed. Jul 12 2021  9:10AM    < end of copied text >

## 2021-07-13 NOTE — PROGRESS NOTE ADULT - SUBJECTIVE AND OBJECTIVE BOX
Chief complaint  Patient is a 71y old  Male who presents with a chief complaint of fever (12 Jul 2021 14:37)   Review of systems  Patient in bed, looks comfortable, no hypoglycemic episodes.    Labs and Fingersticks  CAPILLARY BLOOD GLUCOSE      POCT Blood Glucose.: 423 mg/dL (13 Jul 2021 13:56)  POCT Blood Glucose.: 401 mg/dL (13 Jul 2021 11:43)  POCT Blood Glucose.: 338 mg/dL (13 Jul 2021 07:27)  POCT Blood Glucose.: 364 mg/dL (12 Jul 2021 21:20)  POCT Blood Glucose.: 267 mg/dL (12 Jul 2021 16:57)      Anion Gap, Serum: 11 (07-13 @ 06:09)  Anion Gap, Serum: 12 (07-12 @ 06:19)      Calcium, Total Serum: 8.5 (07-13 @ 06:09)  Calcium, Total Serum: 8.2 *L* (07-12 @ 06:19)          07-13    134<L>  |  99  |  14  ----------------------------<  338<H>  4.2   |  24  |  0.36<L>    Ca    8.5      13 Jul 2021 06:09  Phos  2.8     07-12  Mg     1.4     07-12                          7.7    1.61  )-----------( 53       ( 13 Jul 2021 06:09 )             24.7     Medications  MEDICATIONS  (STANDING):  allopurinol 300 milliGRAM(s) Oral daily  aspirin  chewable 81 milliGRAM(s) Oral daily  dexAMETHasone  Injectable 6 milliGRAM(s) IV Push every 6 hours  dextrose 40% Gel 15 Gram(s) Oral once  dextrose 5%. 1000 milliLiter(s) (50 mL/Hr) IV Continuous <Continuous>  dextrose 5%. 1000 milliLiter(s) (100 mL/Hr) IV Continuous <Continuous>  dextrose 50% Injectable 25 Gram(s) IV Push once  dextrose 50% Injectable 12.5 Gram(s) IV Push once  dextrose 50% Injectable 25 Gram(s) IV Push once  finasteride 5 milliGRAM(s) Oral daily  glucagon  Injectable 1 milliGRAM(s) IntraMuscular once  insulin glargine Injectable (LANTUS) 20 Unit(s) SubCutaneous at bedtime  insulin lispro (ADMELOG) corrective regimen sliding scale   SubCutaneous three times a day before meals  insulin lispro (ADMELOG) corrective regimen sliding scale   SubCutaneous at bedtime  insulin lispro Injectable (ADMELOG) 8 Unit(s) SubCutaneous three times a day before meals  pantoprazole   Suspension 40 milliGRAM(s) Oral daily  QUEtiapine 25 milliGRAM(s) Oral at bedtime  senna 2 Tablet(s) Oral at bedtime  tamsulosin 0.4 milliGRAM(s) Oral at bedtime      Physical Exam  General: Patient comfortable in bed  Vital Signs Last 12 Hrs  T(F): 97.6 (07-13-21 @ 11:29), Max: 97.8 (07-13-21 @ 04:57)  HR: 75 (07-13-21 @ 11:29) (75 - 92)  BP: 106/66 (07-13-21 @ 11:29) (106/66 - 122/75)  BP(mean): --  RR: 18 (07-13-21 @ 11:29) (18 - 18)  SpO2: 100% (07-13-21 @ 11:29) (99% - 100%)  Neck: No palpable thyroid nodules.  CVS: S1S2, No murmurs  Respiratory: No wheezing, no crepitations  GI: Abdomen soft, bowel sounds positive  Musculoskeletal:  edema lower extremities.   Skin: No skin rashes, no ecchymosis    Diagnostics             Chief complaint  Patient is a 71y old  Male who presents with a chief complaint of fever (12 Jul 2021 14:37)   Review of systems  Patient in bed, looks comfortable, no hypoglycemic episodes.    Labs and Fingersticks  CAPILLARY BLOOD GLUCOSE      POCT Blood Glucose.: 423 mg/dL (13 Jul 2021 13:56)  POCT Blood Glucose.: 401 mg/dL (13 Jul 2021 11:43)  POCT Blood Glucose.: 338 mg/dL (13 Jul 2021 07:27)  POCT Blood Glucose.: 364 mg/dL (12 Jul 2021 21:20)  POCT Blood Glucose.: 267 mg/dL (12 Jul 2021 16:57)      Anion Gap, Serum: 11 (07-13 @ 06:09)  Anion Gap, Serum: 12 (07-12 @ 06:19)      Calcium, Total Serum: 8.5 (07-13 @ 06:09)  Calcium, Total Serum: 8.2 *L* (07-12 @ 06:19)          07-13    134<L>  |  99  |  14  ----------------------------<  338<H>  4.2   |  24  |  0.36<L>    Ca    8.5      13 Jul 2021 06:09  Phos  2.8     07-12  Mg     1.4     07-12                          7.7    1.61  )-----------( 53       ( 13 Jul 2021 06:09 )             24.7     Medications  MEDICATIONS  (STANDING):  allopurinol 300 milliGRAM(s) Oral daily  aspirin  chewable 81 milliGRAM(s) Oral daily  dexAMETHasone  Injectable 6 milliGRAM(s) IV Push every 6 hours  dextrose 40% Gel 15 Gram(s) Oral once  dextrose 5%. 1000 milliLiter(s) (50 mL/Hr) IV Continuous <Continuous>  dextrose 5%. 1000 milliLiter(s) (100 mL/Hr) IV Continuous <Continuous>  dextrose 50% Injectable 25 Gram(s) IV Push once  dextrose 50% Injectable 12.5 Gram(s) IV Push once  dextrose 50% Injectable 25 Gram(s) IV Push once  finasteride 5 milliGRAM(s) Oral daily  glucagon  Injectable 1 milliGRAM(s) IntraMuscular once  insulin glargine Injectable (LANTUS) 20 Unit(s) SubCutaneous at bedtime  insulin lispro (ADMELOG) corrective regimen sliding scale   SubCutaneous three times a day before meals  insulin lispro (ADMELOG) corrective regimen sliding scale   SubCutaneous at bedtime  insulin lispro Injectable (ADMELOG) 8 Unit(s) SubCutaneous three times a day before meals  pantoprazole   Suspension 40 milliGRAM(s) Oral daily  QUEtiapine 25 milliGRAM(s) Oral at bedtime  senna 2 Tablet(s) Oral at bedtime  tamsulosin 0.4 milliGRAM(s) Oral at bedtime      Physical Exam  General: Patient comfortable in bed  Vital Signs Last 12 Hrs  T(F): 97.6 (07-13-21 @ 11:29), Max: 97.8 (07-13-21 @ 04:57)  HR: 75 (07-13-21 @ 11:29) (75 - 92)  BP: 106/66 (07-13-21 @ 11:29) (106/66 - 122/75)  BP(mean): --  RR: 18 (07-13-21 @ 11:29) (18 - 18)  SpO2: 100% (07-13-21 @ 11:29) (99% - 100%)  Neck: No palpable thyroid nodules.  CVS: S1S2, No murmurs  Respiratory: No wheezing, no crepitations  GI: Abdomen soft, bowel sounds positive  Musculoskeletal:  edema lower extremities.   Skin: No skin rashes, no ecchymosis    Diagnostics

## 2021-07-13 NOTE — PROGRESS NOTE ADULT - PROBLEM SELECTOR PLAN 1
Expect hyperglycemia to persist 2/2 steroid management.  Will increase Lantus to 20u at bedtime.  Will start Admelog 8u before each meal and continue Admelog correction scale coverage. Will continue monitoring FS and FU, will adjust insulin dose as steroids are tapered/dc.   for compliance with consistent low-carb diet, nutritional shakes and supplements as tolerated. FU RD recommendations.  Can DC on his prior Janumet 50/1000, endo FU 3 months. Expect hyperglycemia to persist 2/2 steroid management.  Will increase Lantus to 36u at bedtime.  Will start Admelog 15u before each meal and continue Admelog correction scale coverage. Will continue monitoring FS and FU, will adjust insulin dose as steroids are tapered/dc.   for compliance with consistent low-carb diet, nutritional shakes and supplements as tolerated. FU RD recommendations.  Can DC on his prior Janumet 50/1000, endo FU 3 months.

## 2021-07-13 NOTE — PROGRESS NOTE ADULT - SUBJECTIVE AND OBJECTIVE BOX
LIZETT RIVERA  MRN-[id number]    Patient is a 71y old  Male who presents with a chief complaint of fever (12 Jul 2021 14:37)      Review of System  REVIEW OF SYSTEMS      General:	Denies fatigue, fevers, chills, sweats, decreased appetite.    Skin/Breast: denies pruritis, rash  	  Ophthalmologic: no change in vision or blurring  	  HEENT	Denies dry mouth, oral sores, dysphagia,  change in hearing.    Respiratory and Thorax:  cough, sob, wheeze, hemoptysis  	  Cardiovascular:	no cp , palp, orthopnea    Gastrointestinal:	no n/v/d constipation    Genitourinary:	no dysuria of frequency, no hematuria, no flank pain    Musculoskeletal:	no bone or joint pain. no muscle aches.     Neurological:	no change in sensory or motor function. no headache. no weakness.     Psychiatric:	no depression, no anxiety, insomnia.     Hematology/Lymphatics:	no bleeding or bruising        Current Meds  MEDICATIONS  (STANDING):  allopurinol 300 milliGRAM(s) Oral daily  aspirin  chewable 81 milliGRAM(s) Oral daily  dexAMETHasone  Injectable 6 milliGRAM(s) IV Push every 6 hours  dextrose 40% Gel 15 Gram(s) Oral once  dextrose 5%. 1000 milliLiter(s) (50 mL/Hr) IV Continuous <Continuous>  dextrose 5%. 1000 milliLiter(s) (100 mL/Hr) IV Continuous <Continuous>  dextrose 50% Injectable 25 Gram(s) IV Push once  dextrose 50% Injectable 12.5 Gram(s) IV Push once  dextrose 50% Injectable 25 Gram(s) IV Push once  finasteride 5 milliGRAM(s) Oral daily  glucagon  Injectable 1 milliGRAM(s) IntraMuscular once  insulin glargine Injectable (LANTUS) 10 Unit(s) SubCutaneous at bedtime  insulin lispro (ADMELOG) corrective regimen sliding scale   SubCutaneous three times a day before meals  insulin lispro (ADMELOG) corrective regimen sliding scale   SubCutaneous at bedtime  pantoprazole   Suspension 40 milliGRAM(s) Oral daily  QUEtiapine 25 milliGRAM(s) Oral at bedtime  senna 2 Tablet(s) Oral at bedtime  tamsulosin 0.4 milliGRAM(s) Oral at bedtime    MEDICATIONS  (PRN):  acetaminophen   Tablet .. 650 milliGRAM(s) Oral every 6 hours PRN Temp greater or equal to 38C (100.4F), Moderate Pain (4 - 6)  bisacodyl 5 milliGRAM(s) Oral every 12 hours PRN Constipation  polyethylene glycol 3350 17 Gram(s) Oral daily PRN Constipation      Vitals  Vital Signs Last 24 Hrs  T(C): 36.6 (13 Jul 2021 04:57), Max: 37 (12 Jul 2021 14:39)  T(F): 97.8 (13 Jul 2021 04:57), Max: 98.6 (12 Jul 2021 14:39)  HR: 92 (13 Jul 2021 04:57) (88 - 101)  BP: 109/72 (13 Jul 2021 04:57) (103/69 - 110/71)  BP(mean): --  RR: 18 (13 Jul 2021 04:57) (18 - 18)  SpO2: 99% (13 Jul 2021 04:57) (98% - 99%)    Physical Exam  PHYSICAL EXAM:      Constitutional: NAD    Eyes: PERRLA EOMI, anicteric sclera    Heent :No oral sores, no pharyngeal injection. moist mucosa.    Neck: supple, no jvd, no LAD    Respiratory: CTA b/l     Cardiovascular: s1s2, no m/g/r    Gastrointestinal: soft, nt, nd, + BS    Extremities: no c/c/e    Neurological:A&O x 3 moves all ext.    Skin: no rash on exposed skin    Lymph Nodes: no lymphadenopathy.              Lab  CBC Full  -  ( 13 Jul 2021 06:09 )  WBC Count : 1.61 K/uL  RBC Count : 2.75 M/uL  Hemoglobin : 7.7 g/dL  Hematocrit : 24.7 %  Platelet Count - Automated : 53 K/uL  Mean Cell Volume : 89.8 fl  Mean Cell Hemoglobin : 28.0 pg  Mean Cell Hemoglobin Concentration : 31.2 gm/dL  Auto Neutrophil # : x  Auto Lymphocyte # : x  Auto Monocyte # : x  Auto Eosinophil # : x  Auto Basophil # : x  Auto Neutrophil % : x  Auto Lymphocyte % : x  Auto Monocyte % : x  Auto Eosinophil % : x  Auto Basophil % : x    07-13    134<L>  |  99  |  14  ----------------------------<  338<H>  4.2   |  24  |  0.36<L>    Ca    8.5      13 Jul 2021 06:09  Phos  2.8     07-12  Mg     1.4     07-12          Rad:    Assessment/Plan   LIZETT RIVERA  MRN-[id number]    Patient is a 71y old  Male who presents with a chief complaint of fever (12 Jul 2021 14:37)      Review of System    Patient without complaints. He seems to be moving his legs a bit better.  Still having trouble with voice, waxes and wanes    General:	Denies fevers, chills, sweats, decreased appetite.    Skin/Breast: denies pruritis, rash  	  Ophthalmologic: no change in vision or blurring  	  HEENT	Denies dry mouth, oral sores, dysphagia,  change in hearing.    Respiratory and Thorax: no cough, sob, wheeze, hemoptysis  	  Cardiovascular:	no cp , palp, orthopnea    Gastrointestinal:	no n/v/d constipation    Genitourinary:	no dysuria of frequency, no hematuria, no flank pain    Musculoskeletal:	no bone or joint pain. no muscle aches.     Neurological:	no change in sensory or motor function. no headache. no weakness.     Psychiatric:	no depression, no anxiety, insomnia.     Hematology/Lymphatics:	no bleeding or bruising      MEDICATIONS  (STANDING):  allopurinol 300 milliGRAM(s) Oral daily  aspirin  chewable 81 milliGRAM(s) Oral daily  dexAMETHasone  Injectable 6 milliGRAM(s) IV Push every 6 hours  dextrose 40% Gel 15 Gram(s) Oral once  dextrose 5%. 1000 milliLiter(s) (50 mL/Hr) IV Continuous <Continuous>  dextrose 5%. 1000 milliLiter(s) (100 mL/Hr) IV Continuous <Continuous>  dextrose 50% Injectable 25 Gram(s) IV Push once  dextrose 50% Injectable 12.5 Gram(s) IV Push once  dextrose 50% Injectable 25 Gram(s) IV Push once  finasteride 5 milliGRAM(s) Oral daily  glucagon  Injectable 1 milliGRAM(s) IntraMuscular once  insulin glargine Injectable (LANTUS) 10 Unit(s) SubCutaneous at bedtime  insulin lispro (ADMELOG) corrective regimen sliding scale   SubCutaneous three times a day before meals  insulin lispro (ADMELOG) corrective regimen sliding scale   SubCutaneous at bedtime  pantoprazole   Suspension 40 milliGRAM(s) Oral daily  QUEtiapine 25 milliGRAM(s) Oral at bedtime  senna 2 Tablet(s) Oral at bedtime  tamsulosin 0.4 milliGRAM(s) Oral at bedtime    MEDICATIONS  (PRN):  acetaminophen   Tablet .. 650 milliGRAM(s) Oral every 6 hours PRN Temp greater or equal to 38C (100.4F), Moderate Pain (4 - 6)  bisacodyl 5 milliGRAM(s) Oral every 12 hours PRN Constipation  polyethylene glycol 3350 17 Gram(s) Oral daily PRN Constipation    Vital Signs Last 24 Hrs  T(C): 36.6 (13 Jul 2021 04:57), Max: 37 (12 Jul 2021 14:39)  T(F): 97.8 (13 Jul 2021 04:57), Max: 98.6 (12 Jul 2021 14:39)  HR: 92 (13 Jul 2021 04:57) (88 - 101)  BP: 109/72 (13 Jul 2021 04:57) (103/69 - 110/71)  BP(mean): --  RR: 18 (13 Jul 2021 04:57) (18 - 18)  SpO2: 99% (13 Jul 2021 04:57) (98% - 99%)      PHYSICAL EXAM:    Constitutional: NAD    Eyes: PERRLA EOMI, anicteric sclera    Heent :No oral sores, no pharyngeal injection. moist mucosa.    Neck: supple, no jvd, no LAD    Respiratory: CTA b/l     Cardiovascular: s1s2, no m/g/r    Gastrointestinal: soft, nt, nd, + BS    Extremities: no c/c/e    Neurological:A&O x 3 moves all ext.    Skin: no rash on exposed skin    Lymph Nodes: no lymphadenopathy.      Lab  CBC Full  -  ( 13 Jul 2021 06:09 )  WBC Count : 1.61 K/uL  RBC Count : 2.75 M/uL  Hemoglobin : 7.7 g/dL  Hematocrit : 24.7 %  Platelet Count - Automated : 53 K/uL  Mean Cell Volume : 89.8 fl  Mean Cell Hemoglobin : 28.0 pg  Mean Cell Hemoglobin Concentration : 31.2 gm/dL  Auto Neutrophil # : x  Auto Lymphocyte # : x  Auto Monocyte # : x  Auto Eosinophil # : x  Auto Basophil # : x  Auto Neutrophil % : x  Auto Lymphocyte % : x  Auto Monocyte % : x  Auto Eosinophil % : x  Auto Basophil % : x    07-13    134<L>  |  99  |  14  ----------------------------<  338<H>  4.2   |  24  |  0.36<L>    Ca    8.5      13 Jul 2021 06:09  Phos  2.8     07-12  Mg     1.4     07-12          Rad:    Assessment/Plan

## 2021-07-13 NOTE — PROGRESS NOTE ADULT - ASSESSMENT
1) Hodgkin's lymphoma  - repeat CT scans, results noted  -[facility]- Family meeting held at bedside on 7/2. We discussed rx options vs comfort care. Family and patient wish to pursue rx. Ongoing conversations with family members have been taking place since our formal meeting.   - allopurinol  - pt s/p 1st rx with opdivo 7/6. next due on 8/3     2) ENT- voice change is a new finding as per patient's sister. Patient also with weak voice. ? due to leptomeningeal disease?  ENT has seen patient.  dysphagia has been under evaluation.     3)Hyperglycemia- mgmt as per med. Now likely will get worse with steroids now on board.    4)Weakness. 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  - Neurology input noted.  - cont pt.  - bed to chair   - lumbar mri , results noted     5) Pancytopenia. Suspect multifactorial, due to hodgkins, and possibly with role being played by recent abx.  anemia- w/u this far unremarkable. possible aocd.  transfusional support as needed    Leukopenia- unclear etiology- possibly due to infxn or malignancy. Also could be related to zosyn. Now off of abx. monitor cbc     Thrombocytopenia- has developed while here.  Can't r/o malignancy vs. due to zosyn.   manage supportively for now.    6) Leptomenigeal involvement.  MRI findings noted.  case d/w Dr. Alas, neurology and neurosurgery.  Neurooncology Dr. Smith to consult this AM.  Steroids initiated.  MRI of neuro axis ordered.       6. Fever- resolved. ID has been on board

## 2021-07-13 NOTE — CONSULT NOTE ADULT - SUBJECTIVE AND OBJECTIVE BOX
Mr. Rivera was seen in neuro oncologic evaluation - he is a 72 yo right handed man who I was asked to see by Dr. Kenji Larios, Patient, by report, was admitted to [facility]  on 6/29/ He has previously been an inpatient at [facility] for sepsis and a new diagnosis of Non-Hodgkins Lymphoma. It is unclear why he was not offered definitive treatment and instead was recommended to have hospice care.    PMH (obtained through chart review, as well as discussions with is oncologist Dr. Larios and his family members Kevin and his wife - his family insists that in August he was highly functioning, walking, driving, and caring for his autistic son. His wife reports that one day in August, he complained of a headaches - she have him Advil and about half hour later he was noted to be very confused - they called 911 and he was brought to [facility] -   She denies that he had weakness at that time. He was able to be discharged home and was again fully functional until late February, early March when he seemed to be sleeping more and was feeling generally weak. He was brought to an outside hospital - he was having urinary retention and was seen by Urology who felt this was related to an enlarged prostate -he still has a catheter in place. He states he is aware of when he needs to have a bowel movement and has control over this.    He remains feeling quite weak. He thinks his LE are weaker that his upper body and has not walked even with assistance for some time. He has also had some difficulty swallowing liquids.      He is awake and alert - oriented and fluent  Knows that is is July, 2021, Rafaelen - has good right left differentiation.  EOMI, VFF  trace flattening of the right NLF  Tongue is midline  UE - symmetrically strong  LE weak - prox 3+/5 bilaterally - knee flexion and extension  Better at ankles  Toes down    All imaging personally reviewed.    1. MRI rest of spine with contrast and brain with contrast.  2. Would check with urology whether the catheter is need.

## 2021-07-13 NOTE — PROGRESS NOTE ADULT - ASSESSMENT
72 y/o M w/ PMHx of CVA this past August and got TPA per family member, DM, BPH with obstruction s/p escamilla catheter, s/p ERCP w Sphincterectomy recent admission to [facility] for sepsis  Hodgkin lymphoma was not offered any chemo and was placed on hospice.     now presenting with weakness and FTT.   pt denies cp / SOB / palpitations   no focal neuro complaints .. at baseline RLE weakness   no N/V   had one episode of diarrhea   no urinary sx  abdominal pain, diffuse, but worse on R side.     - Failure to thrive: cultures neg  CT chest noted   nutrition consult   s/p IVF. Encourage PO intake: dysphagia 3 based on MBS results and speech eval.     - lymphoma: d/w Dr. Larios: s/p immunotherapy   chemo at later time post rehab     - DM with hyperglycemia : improved     - anemia: monitor H/H post transfusion, stable.     - Fever: doubt infectious etiology   likely due to immunotherapy/lymphoma. culture: neg   abx dced. s/p IVF, ID f/u appreciated.     - voice changes: CT neck : Asymmetric enlargement of the left palatine tonsil, suspicious for lymphomatous involvement given history. Correlate with direct visualization.  ENT had eval pt: no gross pathology on visualization   S/S eval: MBS done. speech: dysphagia 3    - Tachycardia: VA duplex neg for DVT   rate stable. noted 7 beats NSVT.  can consider low dose metoprolol 12.5 mg BID if recurrent    - paraplegia functional: MR lumbar sacral noted : called and d/w nsx   d/w Dr. Bower   will check MR brain , cervcal throacic   fu with neuroonc   start steroids   d/w clover-  DVT ppx

## 2021-07-14 DIAGNOSIS — I48.91 UNSPECIFIED ATRIAL FIBRILLATION: ICD-10-CM

## 2021-07-14 LAB
ANION GAP SERPL CALC-SCNC: 14 MMOL/L — SIGNIFICANT CHANGE UP (ref 5–17)
BUN SERPL-MCNC: 15 MG/DL — SIGNIFICANT CHANGE UP (ref 7–23)
CALCIUM SERPL-MCNC: 8.2 MG/DL — LOW (ref 8.4–10.5)
CHLORIDE SERPL-SCNC: 102 MMOL/L — SIGNIFICANT CHANGE UP (ref 96–108)
CO2 SERPL-SCNC: 24 MMOL/L — SIGNIFICANT CHANGE UP (ref 22–31)
CREAT SERPL-MCNC: 0.4 MG/DL — LOW (ref 0.5–1.3)
GLUCOSE BLDC GLUCOMTR-MCNC: 322 MG/DL — HIGH (ref 70–99)
GLUCOSE BLDC GLUCOMTR-MCNC: 332 MG/DL — HIGH (ref 70–99)
GLUCOSE BLDC GLUCOMTR-MCNC: 352 MG/DL — HIGH (ref 70–99)
GLUCOSE BLDC GLUCOMTR-MCNC: 380 MG/DL — HIGH (ref 70–99)
GLUCOSE BLDC GLUCOMTR-MCNC: 424 MG/DL — HIGH (ref 70–99)
GLUCOSE SERPL-MCNC: 308 MG/DL — HIGH (ref 70–99)
HCT VFR BLD CALC: 23 % — LOW (ref 39–50)
HCT VFR BLD CALC: 25 % — LOW (ref 39–50)
HGB BLD-MCNC: 7.1 G/DL — LOW (ref 13–17)
HGB BLD-MCNC: 7.7 G/DL — LOW (ref 13–17)
MAGNESIUM SERPL-MCNC: 1.5 MG/DL — LOW (ref 1.6–2.6)
MCHC RBC-ENTMCNC: 28.2 PG — SIGNIFICANT CHANGE UP (ref 27–34)
MCHC RBC-ENTMCNC: 28.5 PG — SIGNIFICANT CHANGE UP (ref 27–34)
MCHC RBC-ENTMCNC: 30.8 GM/DL — LOW (ref 32–36)
MCHC RBC-ENTMCNC: 30.9 GM/DL — LOW (ref 32–36)
MCV RBC AUTO: 91.3 FL — SIGNIFICANT CHANGE UP (ref 80–100)
MCV RBC AUTO: 92.6 FL — SIGNIFICANT CHANGE UP (ref 80–100)
NRBC # BLD: 0 /100 WBCS — SIGNIFICANT CHANGE UP (ref 0–0)
NRBC # BLD: 0 /100 WBCS — SIGNIFICANT CHANGE UP (ref 0–0)
PHOSPHATE SERPL-MCNC: 2.6 MG/DL — SIGNIFICANT CHANGE UP (ref 2.5–4.5)
PLATELET # BLD AUTO: 54 K/UL — LOW (ref 150–400)
PLATELET # BLD AUTO: 55 K/UL — LOW (ref 150–400)
POTASSIUM SERPL-MCNC: 4.1 MMOL/L — SIGNIFICANT CHANGE UP (ref 3.5–5.3)
POTASSIUM SERPL-SCNC: 4.1 MMOL/L — SIGNIFICANT CHANGE UP (ref 3.5–5.3)
RBC # BLD: 2.52 M/UL — LOW (ref 4.2–5.8)
RBC # BLD: 2.7 M/UL — LOW (ref 4.2–5.8)
RBC # FLD: 22.8 % — HIGH (ref 10.3–14.5)
RBC # FLD: 23.8 % — HIGH (ref 10.3–14.5)
SODIUM SERPL-SCNC: 140 MMOL/L — SIGNIFICANT CHANGE UP (ref 135–145)
WBC # BLD: 1.75 K/UL — LOW (ref 3.8–10.5)
WBC # BLD: 2.33 K/UL — LOW (ref 3.8–10.5)
WBC # FLD AUTO: 1.75 K/UL — LOW (ref 3.8–10.5)
WBC # FLD AUTO: 2.33 K/UL — LOW (ref 3.8–10.5)

## 2021-07-14 PROCEDURE — 72141 MRI NECK SPINE W/O DYE: CPT | Mod: 26

## 2021-07-14 PROCEDURE — 70551 MRI BRAIN STEM W/O DYE: CPT | Mod: 26

## 2021-07-14 PROCEDURE — 72146 MRI CHEST SPINE W/O DYE: CPT | Mod: 26,52

## 2021-07-14 RX ORDER — INSULIN GLARGINE 100 [IU]/ML
45 INJECTION, SOLUTION SUBCUTANEOUS AT BEDTIME
Refills: 0 | Status: DISCONTINUED | OUTPATIENT
Start: 2021-07-14 | End: 2021-07-15

## 2021-07-14 RX ORDER — INSULIN LISPRO 100/ML
20 VIAL (ML) SUBCUTANEOUS
Refills: 0 | Status: DISCONTINUED | OUTPATIENT
Start: 2021-07-14 | End: 2021-07-15

## 2021-07-14 RX ADMIN — TAMSULOSIN HYDROCHLORIDE 0.4 MILLIGRAM(S): 0.4 CAPSULE ORAL at 21:40

## 2021-07-14 RX ADMIN — Medication 2 MILLIGRAM(S): at 17:50

## 2021-07-14 RX ADMIN — Medication 6: at 11:55

## 2021-07-14 RX ADMIN — Medication 6 MILLIGRAM(S): at 05:32

## 2021-07-14 RX ADMIN — QUETIAPINE FUMARATE 25 MILLIGRAM(S): 200 TABLET, FILM COATED ORAL at 21:40

## 2021-07-14 RX ADMIN — Medication 6 MILLIGRAM(S): at 17:14

## 2021-07-14 RX ADMIN — Medication 81 MILLIGRAM(S): at 11:57

## 2021-07-14 RX ADMIN — Medication 5: at 16:31

## 2021-07-14 RX ADMIN — FINASTERIDE 5 MILLIGRAM(S): 5 TABLET, FILM COATED ORAL at 11:58

## 2021-07-14 RX ADMIN — Medication 20 UNIT(S): at 11:55

## 2021-07-14 RX ADMIN — Medication 6 MILLIGRAM(S): at 11:58

## 2021-07-14 RX ADMIN — Medication 300 MILLIGRAM(S): at 11:58

## 2021-07-14 RX ADMIN — Medication 20 UNIT(S): at 16:31

## 2021-07-14 RX ADMIN — INSULIN GLARGINE 45 UNIT(S): 100 INJECTION, SOLUTION SUBCUTANEOUS at 21:49

## 2021-07-14 RX ADMIN — Medication 15 UNIT(S): at 07:41

## 2021-07-14 RX ADMIN — PANTOPRAZOLE SODIUM 40 MILLIGRAM(S): 20 TABLET, DELAYED RELEASE ORAL at 11:58

## 2021-07-14 RX ADMIN — Medication 4: at 07:40

## 2021-07-14 RX ADMIN — Medication 6: at 21:50

## 2021-07-14 NOTE — PROGRESS NOTE ADULT - ASSESSMENT
70 y/o M w/ PMHx of CVA this past August and got TPA per family member, DM, BPH with obstruction s/p escamilla catheter, s/p ERCP w Sphincterectomy recent admission to [facility] for sepsis  Hodgkin lymphoma was not offered any chemo and was placed on hospice.     now presenting with weakness and FTT.   pt denies cp / SOB / palpitations   no focal neuro complaints .. at baseline RLE weakness   no N/V   had one episode of diarrhea   no urinary sx  abdominal pain, diffuse, but worse on R side.     - Failure to thrive: cultures neg  CT chest noted   nutrition consult   encourage PO intake: dysphagia 3 based on MBS results and speech eval.     - lymphoma: d/w Dr. Larios: s/p immunotherapy   likley will need chemo as inpt    - DM with hyperglycemia : improved now worse with steroids   fu with endo     - anemia: monitor H/H post transfusion, stable.     - Fever: doubt infectious etiology   likely due to immunotherapy/lymphoma. culture: neg   abx dced. s/p IVF, ID f/u appreciated.     - voice changes: CT neck : Asymmetric enlargement of the left palatine tonsil, suspicious for lymphomatous involvement given history. Correlate with direct visualization.  ENT had eval pt: no gross pathology on visualization   S/S eval: MBS done. speech: dysphagia 3    - Tachycardia: VA duplex neg for DVT   rate stable. noted 7 beats NSVT.  can consider low dose metoprolol 12.5 mg BID if recurrent    - leptomeningeal involevement from lymphoma : MR lumbar sacral noted   d/w Dr. Bower   will check MR brain , cervcal throacic spine   on steroids   will need LP   planned chemo early next week if pt and family agree       - Afib : now in sinus : monitor   appreicate cardio input   no AC at this time and no AVNB at this time

## 2021-07-14 NOTE — PROGRESS NOTE ADULT - SUBJECTIVE AND OBJECTIVE BOX
Chief complaint  Patient is a 71y old  Male who presents with a chief complaint of fever (12 Jul 2021 14:37)   Review of systems  Patient in bed, looks comfortable, no hypoglycemic episodes.    Labs and Fingersticks  CAPILLARY BLOOD GLUCOSE      POCT Blood Glucose.: 424 mg/dL (14 Jul 2021 11:48)  POCT Blood Glucose.: 322 mg/dL (14 Jul 2021 07:24)  POCT Blood Glucose.: 332 mg/dL (14 Jul 2021 01:56)  POCT Blood Glucose.: 414 mg/dL (13 Jul 2021 21:04)  POCT Blood Glucose.: 514 mg/dL (13 Jul 2021 16:16)  POCT Blood Glucose.: 513 mg/dL (13 Jul 2021 16:15)    Medications  MEDICATIONS  (STANDING):  allopurinol 300 milliGRAM(s) Oral daily  aspirin  chewable 81 milliGRAM(s) Oral daily  dexAMETHasone  Injectable 6 milliGRAM(s) IV Push every 6 hours  dextrose 40% Gel 15 Gram(s) Oral once  dextrose 5%. 1000 milliLiter(s) (50 mL/Hr) IV Continuous <Continuous>  dextrose 5%. 1000 milliLiter(s) (100 mL/Hr) IV Continuous <Continuous>  dextrose 50% Injectable 25 Gram(s) IV Push once  dextrose 50% Injectable 12.5 Gram(s) IV Push once  dextrose 50% Injectable 25 Gram(s) IV Push once  finasteride 5 milliGRAM(s) Oral daily  glucagon  Injectable 1 milliGRAM(s) IntraMuscular once  insulin glargine Injectable (LANTUS) 45 Unit(s) SubCutaneous at bedtime  insulin lispro (ADMELOG) corrective regimen sliding scale   SubCutaneous three times a day before meals  insulin lispro (ADMELOG) corrective regimen sliding scale   SubCutaneous at bedtime  insulin lispro Injectable (ADMELOG) 20 Unit(s) SubCutaneous three times a day before meals  pantoprazole   Suspension 40 milliGRAM(s) Oral daily  QUEtiapine 25 milliGRAM(s) Oral at bedtime  senna 2 Tablet(s) Oral at bedtime  tamsulosin 0.4 milliGRAM(s) Oral at bedtime      Physical Exam  General: Patient comfortable in bed  Vital Signs Last 12 Hrs  T(F): 97.5 (07-14-21 @ 11:22), Max: 97.6 (07-14-21 @ 04:17)  HR: 70 (07-14-21 @ 11:22) (55 - 81)  BP: 103/63 (07-14-21 @ 11:22) (98/62 - 103/63)  BP(mean): --  RR: 18 (07-14-21 @ 11:22) (18 - 18)  SpO2: 100% (07-14-21 @ 11:22) (100% - 100%)  Neck: No palpable thyroid nodules.     Chief complaint  Patient is a 71y old  Male who presents with a chief complaint of fever (12 Jul 2021 14:37)   Review of systems  Patient in bed, looks comfortable, no hypoglycemic episodes.    Labs and Fingersticks  CAPILLARY BLOOD GLUCOSE      POCT Blood Glucose.: 424 mg/dL (14 Jul 2021 11:48)  POCT Blood Glucose.: 322 mg/dL (14 Jul 2021 07:24)  POCT Blood Glucose.: 332 mg/dL (14 Jul 2021 01:56)  POCT Blood Glucose.: 414 mg/dL (13 Jul 2021 21:04)  POCT Blood Glucose.: 514 mg/dL (13 Jul 2021 16:16)  POCT Blood Glucose.: 513 mg/dL (13 Jul 2021 16:15)    Medications  MEDICATIONS  (STANDING):  allopurinol 300 milliGRAM(s) Oral daily  aspirin  chewable 81 milliGRAM(s) Oral daily  dexAMETHasone  Injectable 6 milliGRAM(s) IV Push every 6 hours  dextrose 40% Gel 15 Gram(s) Oral once  dextrose 5%. 1000 milliLiter(s) (50 mL/Hr) IV Continuous <Continuous>  dextrose 5%. 1000 milliLiter(s) (100 mL/Hr) IV Continuous <Continuous>  dextrose 50% Injectable 25 Gram(s) IV Push once  dextrose 50% Injectable 12.5 Gram(s) IV Push once  dextrose 50% Injectable 25 Gram(s) IV Push once  finasteride 5 milliGRAM(s) Oral daily  glucagon  Injectable 1 milliGRAM(s) IntraMuscular once  insulin glargine Injectable (LANTUS) 45 Unit(s) SubCutaneous at bedtime  insulin lispro (ADMELOG) corrective regimen sliding scale   SubCutaneous three times a day before meals  insulin lispro (ADMELOG) corrective regimen sliding scale   SubCutaneous at bedtime  insulin lispro Injectable (ADMELOG) 20 Unit(s) SubCutaneous three times a day before meals  pantoprazole   Suspension 40 milliGRAM(s) Oral daily  QUEtiapine 25 milliGRAM(s) Oral at bedtime  senna 2 Tablet(s) Oral at bedtime  tamsulosin 0.4 milliGRAM(s) Oral at bedtime      Physical Exam  General: Patient comfortable in bed  Vital Signs Last 12 Hrs  T(F): 97.5 (07-14-21 @ 11:22), Max: 97.6 (07-14-21 @ 04:17)  HR: 70 (07-14-21 @ 11:22) (55 - 81)  BP: 103/63 (07-14-21 @ 11:22) (98/62 - 103/63)  BP(mean): --  RR: 18 (07-14-21 @ 11:22) (18 - 18)  SpO2: 100% (07-14-21 @ 11:22) (100% - 100%)  Neck: No palpable thyroid nodules.

## 2021-07-14 NOTE — PROGRESS NOTE ADULT - SUBJECTIVE AND OBJECTIVE BOX
Date of service: 07-14-21 @ 21:56      Patient is a 71y old  Male who presents with a chief complaint of fever (12 Jul 2021 14:37)                                                               INTERVAL HPI/OVERNIGHT EVENTS:    REVIEW OF SYSTEMS:     CONSTITUTIONAL: No weakness, fevers or chills  RESPIRATORY: No cough, wheezing,  No shortness of breath  CARDIOVASCULAR: No chest pain or palpitations  GASTROINTESTINAL: No abdominal pain  . No nausea, vomiting, or hematemesis; No diarrhea or constipation. No melena or hematochezia.  GENITOURINARY: No dysuria, frequency or hematuria  NEUROLOGICAL: improving weakness                                                                                                                                                                                                                                                                                 Medications:  MEDICATIONS  (STANDING):  allopurinol 300 milliGRAM(s) Oral daily  aspirin  chewable 81 milliGRAM(s) Oral daily  dexAMETHasone  Injectable 6 milliGRAM(s) IV Push every 6 hours  dextrose 40% Gel 15 Gram(s) Oral once  dextrose 5%. 1000 milliLiter(s) (50 mL/Hr) IV Continuous <Continuous>  dextrose 5%. 1000 milliLiter(s) (100 mL/Hr) IV Continuous <Continuous>  dextrose 50% Injectable 25 Gram(s) IV Push once  dextrose 50% Injectable 12.5 Gram(s) IV Push once  dextrose 50% Injectable 25 Gram(s) IV Push once  finasteride 5 milliGRAM(s) Oral daily  glucagon  Injectable 1 milliGRAM(s) IntraMuscular once  insulin glargine Injectable (LANTUS) 45 Unit(s) SubCutaneous at bedtime  insulin lispro (ADMELOG) corrective regimen sliding scale   SubCutaneous three times a day before meals  insulin lispro (ADMELOG) corrective regimen sliding scale   SubCutaneous at bedtime  insulin lispro Injectable (ADMELOG) 20 Unit(s) SubCutaneous three times a day before meals  pantoprazole   Suspension 40 milliGRAM(s) Oral daily  QUEtiapine 25 milliGRAM(s) Oral at bedtime  senna 2 Tablet(s) Oral at bedtime  tamsulosin 0.4 milliGRAM(s) Oral at bedtime    MEDICATIONS  (PRN):  acetaminophen   Tablet .. 650 milliGRAM(s) Oral every 6 hours PRN Temp greater or equal to 38C (100.4F), Moderate Pain (4 - 6)  bisacodyl 5 milliGRAM(s) Oral every 12 hours PRN Constipation  polyethylene glycol 3350 17 Gram(s) Oral daily PRN Constipation       Allergies    No Known Allergies    Intolerances      Vital Signs Last 24 Hrs  T(C): 36.5 (14 Jul 2021 21:35), Max: 36.6 (14 Jul 2021 00:04)  T(F): 97.7 (14 Jul 2021 21:35), Max: 97.8 (14 Jul 2021 00:04)  HR: 55 (14 Jul 2021 21:35) (55 - 81)  BP: 138/68 (14 Jul 2021 21:35) (98/62 - 138/68)  BP(mean): --  RR: 18 (14 Jul 2021 21:35) (18 - 18)  SpO2: 99% (14 Jul 2021 21:35) (99% - 100%)  CAPILLARY BLOOD GLUCOSE      POCT Blood Glucose.: 380 mg/dL (14 Jul 2021 21:38)  POCT Blood Glucose.: 352 mg/dL (14 Jul 2021 16:25)  POCT Blood Glucose.: 424 mg/dL (14 Jul 2021 11:48)  POCT Blood Glucose.: 322 mg/dL (14 Jul 2021 07:24)  POCT Blood Glucose.: 332 mg/dL (14 Jul 2021 01:56)      07-13 @ 07:01 - 07-14 @ 07:00  --------------------------------------------------------  IN: 480 mL / OUT: 1975 mL / NET: -1495 mL    07-14 @ 07:01 - 07-14 @ 21:56  --------------------------------------------------------  IN: 480 mL / OUT: 900 mL / NET: -420 mL      Physical Exam:    Daily     Daily   General:  NAD   HEENT:  Nonicteric, PERRLA  CV:  RRR, S1S2   Lungs:  CTA B/L, no wheezes, rales, rhonchi  Abdomen:  Soft, non-tender, no distended, positive BS  Extremities:  no edema  Neuro:  AAOx3  3/5                                                                                                                                                                                                                                                                                         LABS:                               7.1    1.75  )-----------( 55       ( 14 Jul 2021 05:58 )             23.0                      07-14    140  |  102  |  15  ----------------------------<  308<H>  4.1   |  24  |  0.40<L>    Ca    8.2<L>      14 Jul 2021 05:58  Phos  2.6     07-14  Mg     1.5     07-14                         RADIOLOGY & ADDITIONAL TESTS         I personally reviewed: [  ]EKG   [  ]CXR    [  ] CT      A/P:         Discussed with :     Augusto consultants' Notes   Time spent :

## 2021-07-14 NOTE — PROGRESS NOTE ADULT - ASSESSMENT
1) Hodgkin's lymphoma  - repeat CT scans, results noted  -[facility]- Family meeting held at bedside on 7/2. We discussed rx options vs comfort care. Family and patient wish to pursue rx. Ongoing conversations with family members have been taking place since our formal meeting.   - allopurinol  - pt s/p 1st rx with opdivo 7/6. next due on 8/3.  - in light of recent development, may need to move up timetable on use of systemic CTX, ABVD     2) ENT- voice change is a new finding as per patient's sister. Patient also with weak voice. ? due to leptomeningeal disease?  ENT has seen patient.  dysphagia has been under evaluation.     3)Hyperglycemia- mgmt as per med. Now likely will get worse with steroids now on board.    4)Weakness. 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  - Neurology input noted.  - cont pt.  - bed to chair - d/w RN, and floor team  - lumbar mri , results noted     5) Pancytopenia. Suspect multifactorial, due to hodgkins, and possibly with role being played by recent abx.  anemia- w/u this far unremarkable. possible aocd.  transfusional support as needed    Leukopenia- unclear etiology- possibly due to infxn or malignancy. Also could be related to zosyn. Now off of abx. monitor cbc     Thrombocytopenia- has developed while here.  Can't r/o malignancy vs. due to zosyn.   manage supportively for now.    6) Leptomenigeal involvement.  MRI findings noted.  case d/w Dr. Alas, neurology and neurosurgery.  Neurooncology Dr. Smith on board. Case d/w her  Steroids initiated.  MRI of neuro axis ordered.   may need LP.    6. Fever- resolved. ID has been on board    7. - will s/w medicine about mgmt of andi.

## 2021-07-14 NOTE — PROGRESS NOTE ADULT - PROBLEM SELECTOR PLAN 1
Expect hyperglycemia to persist 2/2 steroid management.  Will increase Lantus to 45u at bedtime.  Will increase Admelog to 20u before each meal and continue Admelog correction scale coverage. Will continue monitoring FS and FU, will adjust insulin dose as steroids are tapered/dc.   for compliance with consistent low-carb diet, nutritional shakes and supplements as tolerated. FU RD recommendations.

## 2021-07-14 NOTE — CONSULT NOTE ADULT - ASSESSMENT
70 y/o M w/ PMHx of CVA this past August and got TPA per family member, DM, BPH with obstruction s/p escamilla catheter, s/p ERCP w Sphincterectomy recent admission to [facility] for sepsis  Hodgkin lymphoma was not offered any chemo and was placed on hospice.     now presenting with weakness and FTT.   pt denies cp / SOB / palpitations   no focal neuro complaints .. at baseline RLE weakness   no N/V   had one episode of diarrhea   no urinary sx  abdominal pain, diffuse, but worse on R side.   Afib RVR overnight. Was asymptomatic   no known history of this as per patient

## 2021-07-14 NOTE — CONSULT NOTE ADULT - PROBLEM SELECTOR RECOMMENDATION 9
PAF, now in SR   BP remains tenuous and soft   for now hold off on AV noelle blockers   Will cont monitor on Tele   Check thyroid panel   Obviously no AC given anemia and thrombocytopenia

## 2021-07-14 NOTE — PROGRESS NOTE ADULT - ASSESSMENT
Assessment  DMT2: 71y Male with DM T2 with hyperglycemia, A1C 8.8%, was on oral meds/Janumet at home, admitted with weakness/fatigue and hyperglycemia, now on moderate-dose basal bolus insulin, increased dose yesterday, blood sugars are still running consistently high and not at target 2/2 high-dose steroids, no hypoglycemic episodes, appears comfortable, remains on dexamethasone.  Lymphoma: on medications, monitored, FU Heme/Onc.  Anemia: stable, monitored.      Shahriar Kahn MD  Cell: [phone]  Office: [phone]       Assessment  DMT2: 71y Male with DM T2 with hyperglycemia, A1C 8.8%, was on oral meds/Janumet at home, admitted with weakness/fatigue and hyperglycemia, now on moderate-dose basal bolus insulin, increased dose yesterday, blood sugars are still running consistently  high and not at target 2/2 high-dose steroids, no hypoglycemic episodes, appears comfortable, remains on dexamethasone.  Lymphoma: on medications, monitored, FU Heme/Onc.  Anemia: stable, monitored.      Shahriar Kahn MD  Cell: [phone]  Office: [phone]

## 2021-07-14 NOTE — PROGRESS NOTE ADULT - SUBJECTIVE AND OBJECTIVE BOX
LIZETT RIVERA  MRN-[id number]    Patient is a 71y old  Male who presents with a chief complaint of fever (12 Jul 2021 14:37)      Review of System    Feels a bit stronger    General:	Denies fatigue, fevers, chills, sweats, decreased appetite.    Skin/Breast: denies pruritis, rash  	  Ophthalmologic: no change in vision or blurring  	  HEENT	Denies dry mouth, oral sores, dysphagia,  change in hearing.    Respiratory and Thorax:  cough, sob, wheeze, hemoptysis  	  Cardiovascular:	no cp , palp, orthopnea    Gastrointestinal:	no n/v/d constipation    Genitourinary:	no dysuria of frequency, no hematuria, no flank pain    Musculoskeletal:	no bone or joint pain. no muscle aches.     Neurological:	no change in sensory or motor function. no headache. no weakness.     Psychiatric:	no depression, no anxiety, insomnia.     Hematology/Lymphatics:	no bleeding or bruising      Current Meds  MEDICATIONS  (STANDING):  allopurinol 300 milliGRAM(s) Oral daily  aspirin  chewable 81 milliGRAM(s) Oral daily  dexAMETHasone  Injectable 6 milliGRAM(s) IV Push every 6 hours  dextrose 40% Gel 15 Gram(s) Oral once  dextrose 5%. 1000 milliLiter(s) (50 mL/Hr) IV Continuous <Continuous>  dextrose 5%. 1000 milliLiter(s) (100 mL/Hr) IV Continuous <Continuous>  dextrose 50% Injectable 25 Gram(s) IV Push once  dextrose 50% Injectable 12.5 Gram(s) IV Push once  dextrose 50% Injectable 25 Gram(s) IV Push once  finasteride 5 milliGRAM(s) Oral daily  glucagon  Injectable 1 milliGRAM(s) IntraMuscular once  insulin glargine Injectable (LANTUS) 45 Unit(s) SubCutaneous at bedtime  insulin lispro (ADMELOG) corrective regimen sliding scale   SubCutaneous at bedtime  insulin lispro (ADMELOG) corrective regimen sliding scale   SubCutaneous three times a day before meals  insulin lispro Injectable (ADMELOG) 20 Unit(s) SubCutaneous three times a day before meals  pantoprazole   Suspension 40 milliGRAM(s) Oral daily  QUEtiapine 25 milliGRAM(s) Oral at bedtime  senna 2 Tablet(s) Oral at bedtime  tamsulosin 0.4 milliGRAM(s) Oral at bedtime    MEDICATIONS  (PRN):  acetaminophen   Tablet .. 650 milliGRAM(s) Oral every 6 hours PRN Temp greater or equal to 38C (100.4F), Moderate Pain (4 - 6)  bisacodyl 5 milliGRAM(s) Oral every 12 hours PRN Constipation  polyethylene glycol 3350 17 Gram(s) Oral daily PRN Constipation    Vital Signs Last 24 Hrs  T(C): 36.4 (14 Jul 2021 11:22), Max: 36.7 (13 Jul 2021 20:08)  T(F): 97.5 (14 Jul 2021 11:22), Max: 98 (13 Jul 2021 20:08)  HR: 70 (14 Jul 2021 11:22) (55 - 81)  BP: 103/63 (14 Jul 2021 11:22) (98/62 - 117/66)  BP(mean): --  RR: 18 (14 Jul 2021 11:22) (18 - 18)  SpO2: 100% (14 Jul 2021 11:22) (99% - 100%)      PHYSICAL EXAM:    Constitutional: NAD    Eyes: PERRLA EOMI, anicteric sclera    Heent :No oral sores, no pharyngeal injection. moist mucosa.    Neck: supple, no jvd, no LAD    Respiratory: CTA b/l     Cardiovascular: s1s2, no m/g/r    Gastrointestinal: soft, nt, nd, + BS    Extremities: no c/c/e    Neurological:A&O x 3 moves all ext.    Skin: no rash on exposed skin    Lymph Nodes: no lymphadenopathy.      Lab  CBC Full  -  ( 14 Jul 2021 05:58 )  WBC Count : 1.75 K/uL  RBC Count : 2.52 M/uL  Hemoglobin : 7.1 g/dL  Hematocrit : 23.0 %  Platelet Count - Automated : 55 K/uL  Mean Cell Volume : 91.3 fl  Mean Cell Hemoglobin : 28.2 pg  Mean Cell Hemoglobin Concentration : 30.9 gm/dL  Auto Neutrophil # : x  Auto Lymphocyte # : x  Auto Monocyte # : x  Auto Eosinophil # : x  Auto Basophil # : x  Auto Neutrophil % : x  Auto Lymphocyte % : x  Auto Monocyte % : x  Auto Eosinophil % : x  Auto Basophil % : x    07-14    140  |  102  |  15  ----------------------------<  308<H>  4.1   |  24  |  0.40<L>    Ca    8.2<L>      14 Jul 2021 05:58  Phos  2.6     07-14  Mg     1.5     07-14          Rad:    Assessment/Plan

## 2021-07-14 NOTE — CONSULT NOTE ADULT - SUBJECTIVE AND OBJECTIVE BOX
CHIEF COMPLAINT:    HISTORY OF PRESENT ILLNESS:  70 y/o M w/ PMHx of CVA this past August and got TPA per family member, DM, BPH with obstruction s/p escamilla catheter, s/p ERCP w Sphincterectomy recent admission to [facility] for sepsis  Hodgkin lymphoma was not offered any chemo and was placed on hospice.     now presenting with weakness and FTT.   pt denies cp / SOB / palpitations   no focal neuro complaints .. at baseline RLE weakness   no N/V   had one episode of diarrhea   no urinary sx  abdominal pain, diffuse, but worse on R side.   Afib RVR overnight. Was asymptomatic   no known history of this as per patient       PAST MEDICAL & SURGICAL HISTORY:  Cerebrovascular accident (CVA)    Diabetes mellitus    Hodgkin lymphoma    No significant past surgical history            MEDICATIONS:  aspirin  chewable 81 milliGRAM(s) Oral daily  tamsulosin 0.4 milliGRAM(s) Oral at bedtime        acetaminophen   Tablet .. 650 milliGRAM(s) Oral every 6 hours PRN  QUEtiapine 25 milliGRAM(s) Oral at bedtime    bisacodyl 5 milliGRAM(s) Oral every 12 hours PRN  pantoprazole   Suspension 40 milliGRAM(s) Oral daily  polyethylene glycol 3350 17 Gram(s) Oral daily PRN  senna 2 Tablet(s) Oral at bedtime    allopurinol 300 milliGRAM(s) Oral daily  dexAMETHasone  Injectable 6 milliGRAM(s) IV Push every 6 hours  dextrose 40% Gel 15 Gram(s) Oral once  dextrose 50% Injectable 25 Gram(s) IV Push once  dextrose 50% Injectable 12.5 Gram(s) IV Push once  dextrose 50% Injectable 25 Gram(s) IV Push once  finasteride 5 milliGRAM(s) Oral daily  glucagon  Injectable 1 milliGRAM(s) IntraMuscular once  insulin glargine Injectable (LANTUS) 45 Unit(s) SubCutaneous at bedtime  insulin lispro (ADMELOG) corrective regimen sliding scale   SubCutaneous three times a day before meals  insulin lispro (ADMELOG) corrective regimen sliding scale   SubCutaneous at bedtime  insulin lispro Injectable (ADMELOG) 20 Unit(s) SubCutaneous three times a day before meals    dextrose 5%. 1000 milliLiter(s) IV Continuous <Continuous>  dextrose 5%. 1000 milliLiter(s) IV Continuous <Continuous>      FAMILY HISTORY:  No pertinent family history in first degree relatives        SOCIAL HISTORY:    [ ] Non-smoker  [ ] Smoker  [ ] Alcohol    Allergies    No Known Allergies    Intolerances    	    REVIEW OF SYSTEMS:  CONSTITUTIONAL: No fever, weight loss, + fatigue  EYES: No eye pain, visual disturbances, or discharge  ENMT:  No difficulty hearing, tinnitus, vertigo; No sinus or throat pain  NECK: No pain or stiffness  RESPIRATORY: No cough, wheezing, chills or hemoptysis; No Shortness of Breath  CARDIOVASCULAR: No chest pain, palpitations, passing out, dizziness, or leg swelling  GASTROINTESTINAL: No abdominal or epigastric pain. No nausea, vomiting, or hematemesis; No diarrhea or constipation. No melena or hematochezia.  GENITOURINARY: No dysuria, frequency, hematuria, or incontinence  NEUROLOGICAL: No headaches, memory loss, loss of strength, numbness, or tremors  SKIN: No itching, burning, rashes, or lesions   LYMPH Nodes: No enlarged glands  ENDOCRINE: No heat or cold intolerance; No hair loss  MUSCULOSKELETAL: No joint pain or swelling; No muscle, back, or extremity pain  PSYCHIATRIC: No depression, anxiety, mood swings, or difficulty sleeping  HEME/LYMPH: No easy bruising, or bleeding gums  ALLERY AND IMMUNOLOGIC: No hives or eczema	    [ ] All others negative	  [ ] Unable to obtain    PHYSICAL EXAM:  T(C): 36.4 (07-14-21 @ 08:45), Max: 36.7 (07-13-21 @ 20:08)  HR: 81 (07-14-21 @ 08:45) (55 - 81)  BP: 98/62 (07-14-21 @ 08:45) (98/62 - 117/66)  RR: 18 (07-14-21 @ 08:45) (18 - 18)  SpO2: 100% (07-14-21 @ 08:45) (99% - 100%)  Wt(kg): --  I&O's Summary    13 Jul 2021 07:01  -  14 Jul 2021 07:00  --------------------------------------------------------  IN: 480 mL / OUT: 1975 mL / NET: -1495 mL        Appearance: NAD  HEENT:  Dry oral mucosa, PERRL, EOMI	  Lymphatic: No lymphadenopathy  Cardiovascular: Normal S1 S2, No JVD, No murmurs, No edema  Respiratory: Lungs clear to auscultation	  Psychiatry: A & O x 3, Mood & affect appropriate  Gastrointestinal:  Soft, Non-tender, + BS	  Skin: No rashes, No ecchymoses, No cyanosis	  Neurologic: Non-focal  Extremities: Normal range of motion, No clubbing, cyanosis +edema  Vascular: Peripheral pulses palpable 2+ bilaterally    TELEMETRY: SR, PAF 180s	    ECG:  	Afib 80s   RADIOLOGY:  < from: CT Neck Soft Tissue w/ IV Cont (07.03.21 @ 15:37) >    EXAM:  CT NECK SOFT TISSUE IC                          EXAM:  CT BRAIN IC                            PROCEDURE DATE:  07/03/2021            INTERPRETATION:  CLINICAL INDICATION: Hodgkin's lymphoma. Rule out metastatic disease.    TECHNIQUE: CT of the head and neck soft tissues was performed before and after administration of IV contrast. Contrast dose: 90 cc Omnipaque 300 IV contrast.    COMPARISON: CT chest 6/28/2021.    FINDINGS:    CT HEAD:  No acute transcortical infarct or intracranial hemorrhage. No abnormal intracranial enhancement is identified.    White matter hypoattenuating foci are noted, nonspecific but likely representing small vessel disease.    The ventricles are normal without evidence of hydrocephalus. There are no extra-axial fluid collections.    The visualized intraorbital contents are unremarkable. Mild mucosal thickening in the right maxillary sinus. The mastoid air cells are clear. The visualized soft tissues and osseous structures appear normal.    CT NECK:  Aerodigestive structures: Asymmetric enlargement of the left palatine tonsil is noted. Remainder of the aerodigestive structures are unremarkable.    Lymph nodes: Again noted left supraclavicular node measuring up to 2.4 x 1.5 cm.    Parotid and submandibular glands:  Normal.    Thyroid gland: Normal.    Vascular structures: Unremarkable.    Osseous structures: No fracture, dislocation or destructive lesion.    Partially visualized lung apices: Normal.      IMPRESSION:  CT head:  -No acute intracranial findings.  -No abnormal intracranial enhancement identified by CT. Consider MRI with contrast for increased sensitivity.    CT neck:  -Again noted enlarged left supraclavicular node.  -Asymmetric enlargement of the left palatine tonsil, suspicious for lymphomatous involvement given history. Correlate with direct visualization.                ZOEY TORRES MD; Attending Radiologist  This document has been electronically signed. Jul  3 2021  3:51PM    < end of copied text >  < from: CT Chest w/ IV Cont (06.28.21 @ 19:01) >    EXAM:  CT CHEST IC                            PROCEDURE DATE:  06/28/2021            INTERPRETATION:  CLINICAL INFORMATION: Lymphoma, evaluate extent of thoracic disease.    COMPARISON: CT abdomen pelvis dated 6/28/2021. Chest x-ray dated 6/20/2021.    CONTRAST/COMPLICATIONS:  IV Contrast: 40 mL of Omnipaque 350 were administered. 60 mL discarded.  Oral Contrast: None.  Complications: None reported.    PROCEDURE:  CT of the Chest was performed.  Sagittal and coronal reformats were performed.    FINDINGS: The quality of the images are degraded by respiratory motion and streak artifact.    LUNGS AND AIRWAYS: Patent central airways. Patchy groundglass opacities and scattered nodular opacities throughout all lobes of the lungs. For example:  A left upper lobe nodule measures 0.9 cm (series 3 image 33).  A right middle lobe nodule measures 2.0 x 0.8 cm (series 3 image 76).  3.5 x 2.1 cm solid opacity within the superior segment of right lower lobe (series 3 image 71).  A right lower lobe nodule at the costophrenic angle measures 1.9 x 1.1 cm (series 3 image 108).    PLEURA: No pleural effusion or pneumothorax.    MEDIASTINUM AND SARTHAK: Mildly enlarged mediastinal, hilar, and supraclavicular lymphadenopathy. A right hilar lymph node measures 1.8 x 1.4 cm (series 3 image 90). A left supraclavicular node measures 2.5 x 1.5 cm (series 3 image 19). A subcarinal node measures 2.2 x 1.3 cm (series 3 image 63).    VESSELS: Coronary artery calcifications.    HEART: Heart size is normal. No pericardial effusion.    CHEST WALL AND LOWER NECK: Within normal limits.    VISUALIZED UPPER ABDOMEN: Retroperitoneal lymphadenopathy, as seen on abdominal CT from the same date.    BONES: Degenerative changes of the spine.    IMPRESSION:    Patchy groundglass opacities and scattered nodular opacities throughout all lobes of the lungs. Findings may be related to known lymphoma or may be seen in the setting of multifocal infection.    Mildly enlarged supraclavicular, mediastinal, hilar, and retroperitoneal lymphadenopathy likely related to known lymphoma.              LEXIS BAKER MD; Resident Radiology  This document has been electronically signed.  ISABEL JACOBO MD; Attending Radiologist  This document has been electronically signed. Jun 29 202111:[id number]    < end of copied text >    OTHER: 	  	  LABS:	 	    CARDIAC MARKERS:                                  7.1    1.75  )-----------( 55       ( 14 Jul 2021 05:58 )             23.0     07-14    140  |  102  |  15  ----------------------------<  308<H>  4.1   |  24  |  0.40<L>    Ca    8.2<L>      14 Jul 2021 05:58  Phos  2.6     07-14  Mg     1.5     07-14      proBNP:   Lipid Profile:   HgA1c:   TSH:     < from: Transthoracic Echocardiogram (07.08.21 @ 13:22) >    Patient name: LIZETT RIVERA  YOB: 1950   Age: 71 (M)   MR#: [id number]  Study Date: 7/8/2021  Location: [address] [place], [place] [id number]: [facility]  Study quality: Technically fair  Referring Physician: Matt Alas MD  Blood Pressure: 104/63 mmHg  Height: 193 cm  Weight: 122 kg  BSA: 2.5 m2  ------------------------------------------------------------------------  PROCEDURE: Transthoracic echocardiogram with 2-D, M-Mode  and complete spectral and color flow Doppler.  INDICATION: Chest pain, unspecified (R07.9)  ------------------------------------------------------------------------  Dimensions:    Normal Values:  LA:     3.5    2.0 - 4.0 cm  Ao:     3.2    2.0 - 3.8 cm  SEPTUM: 1.0    0.6 - 1.2 cm  PWT:    0.8    0.6 - 1.1 cm  LVIDd:  3.5    3.0 - 5.6 cm  LVIDs:  2.0    1.8 - 4.0 cm  Derived variables:  LVMI: 36 g/m2  RWT: 0.45  Fractional short: 42 %  EF (Visual Estimate): 70 %  Doppler Peak Velocity (m/sec): AoV=1.3  ------------------------------------------------------------------------  Observations:  Mitral Valve: Mild mitral annular calcification, otherwise  normal mitral valve. Minimal mitral regurgitation.  Aortic Valve/Aorta: Aortic valve not well visualized;  appears calcified with normal opening. Calcified  echodensity measuring approximately 0.5 cm x 0.4 cm seen on  the LVOT side of the valve. Consider additional imaging of  the aortic valve such as with FATEMEH if clinically indicated.  Peak transaortic valve gradient equals 7 mm Hg, mean  transaortic valve gradient equals 3 mm Hg, aortic valve  velocity time integral equals 20 cm. No aortic valve  regurgitation seen. Peak left ventricular outflow tract  gradient equals 4 mm Hg, LVOT velocity time integral equals  19 cm.  Aortic Root: 3.2cm.  Left Atrium: Normal left atrium.  Left Ventricle: Endocardium not well visualized; grossly  normal left ventricular systolic function. Normal left  ventricular internal dimensions and wall thickness.  Indeterminate diastolic function.  Right Heart: Normal right atrium. The right ventricle is  not well visualized; grossly normal right ventricular size  and systolic function. Tricuspid valve not well visualized.  Minimal tricuspid regurgitation.  Pericardium/Pleura: Normal pericardium with no pericardial  effusion.  Hemodynamic: Inadequate tricuspid regurgitation Doppler  envelope precludes estimation of RVSP.  ------------------------------------------------------------------------  Conclusions:  1. Mild mitral annular calcification, otherwise normal  mitral valve. Minimal mitral regurgitation.  2. Aortic valve not well visualized; appears calcified with  normal opening. Calcified echodensity measuring  approximately 0.5 cm x 0.4 cm seen on the LVOT side of the  valve. Consider additional imaging of the aortic valve such  as with FATEMEH if clinically indicated. No aortic valve  regurgitation seen.  3. Endocardium not well visualized; grossly normal left  ventricular systolic function.  4. The right ventricle is not well visualized; grossly  normal right ventricular size and systolic function.  *** No previous Echo exam.  ------------------------------------------------------------------------  Confirmed on  7/8/2021 - 17:46:10 by Dalton Valencia M.D.  ------------------------------------------------------------------------    < end of copied text >

## 2021-07-15 LAB
ANION GAP SERPL CALC-SCNC: 11 MMOL/L — SIGNIFICANT CHANGE UP (ref 5–17)
BUN SERPL-MCNC: 15 MG/DL — SIGNIFICANT CHANGE UP (ref 7–23)
CALCIUM SERPL-MCNC: 8.5 MG/DL — SIGNIFICANT CHANGE UP (ref 8.4–10.5)
CHLORIDE SERPL-SCNC: 101 MMOL/L — SIGNIFICANT CHANGE UP (ref 96–108)
CO2 SERPL-SCNC: 26 MMOL/L — SIGNIFICANT CHANGE UP (ref 22–31)
CREAT SERPL-MCNC: 0.37 MG/DL — LOW (ref 0.5–1.3)
GLUCOSE BLDC GLUCOMTR-MCNC: 286 MG/DL — HIGH (ref 70–99)
GLUCOSE BLDC GLUCOMTR-MCNC: 355 MG/DL — HIGH (ref 70–99)
GLUCOSE BLDC GLUCOMTR-MCNC: 373 MG/DL — HIGH (ref 70–99)
GLUCOSE BLDC GLUCOMTR-MCNC: 410 MG/DL — HIGH (ref 70–99)
GLUCOSE BLDC GLUCOMTR-MCNC: 412 MG/DL — HIGH (ref 70–99)
GLUCOSE SERPL-MCNC: 264 MG/DL — HIGH (ref 70–99)
HCT VFR BLD CALC: 24.1 % — LOW (ref 39–50)
HGB BLD-MCNC: 7.4 G/DL — LOW (ref 13–17)
INR BLD: 1.12 RATIO — SIGNIFICANT CHANGE UP (ref 0.88–1.16)
MCHC RBC-ENTMCNC: 28.8 PG — SIGNIFICANT CHANGE UP (ref 27–34)
MCHC RBC-ENTMCNC: 30.7 GM/DL — LOW (ref 32–36)
MCV RBC AUTO: 93.8 FL — SIGNIFICANT CHANGE UP (ref 80–100)
NRBC # BLD: 1 /100 WBCS — HIGH (ref 0–0)
PLATELET # BLD AUTO: 50 K/UL — LOW (ref 150–400)
POTASSIUM SERPL-MCNC: 3.9 MMOL/L — SIGNIFICANT CHANGE UP (ref 3.5–5.3)
POTASSIUM SERPL-SCNC: 3.9 MMOL/L — SIGNIFICANT CHANGE UP (ref 3.5–5.3)
PROTHROM AB SERPL-ACNC: 13.4 SEC — SIGNIFICANT CHANGE UP (ref 10.6–13.6)
RBC # BLD: 2.57 M/UL — LOW (ref 4.2–5.8)
RBC # FLD: 23.6 % — HIGH (ref 10.3–14.5)
SODIUM SERPL-SCNC: 138 MMOL/L — SIGNIFICANT CHANGE UP (ref 135–145)
WBC # BLD: 1.96 K/UL — LOW (ref 3.8–10.5)
WBC # FLD AUTO: 1.96 K/UL — LOW (ref 3.8–10.5)

## 2021-07-15 RX ORDER — INSULIN LISPRO 100/ML
24 VIAL (ML) SUBCUTANEOUS
Refills: 0 | Status: DISCONTINUED | OUTPATIENT
Start: 2021-07-15 | End: 2021-07-19

## 2021-07-15 RX ORDER — INSULIN GLARGINE 100 [IU]/ML
52 INJECTION, SOLUTION SUBCUTANEOUS AT BEDTIME
Refills: 0 | Status: DISCONTINUED | OUTPATIENT
Start: 2021-07-15 | End: 2021-07-19

## 2021-07-15 RX ORDER — HYDROMORPHONE HYDROCHLORIDE 2 MG/ML
1 INJECTION INTRAMUSCULAR; INTRAVENOUS; SUBCUTANEOUS ONCE
Refills: 0 | Status: DISCONTINUED | OUTPATIENT
Start: 2021-07-15 | End: 2021-07-16

## 2021-07-15 RX ADMIN — Medication 6 MILLIGRAM(S): at 00:15

## 2021-07-15 RX ADMIN — Medication 20 UNIT(S): at 11:38

## 2021-07-15 RX ADMIN — Medication 300 MILLIGRAM(S): at 11:28

## 2021-07-15 RX ADMIN — Medication 6 MILLIGRAM(S): at 11:27

## 2021-07-15 RX ADMIN — Medication 6 MILLIGRAM(S): at 17:15

## 2021-07-15 RX ADMIN — Medication 3: at 07:42

## 2021-07-15 RX ADMIN — QUETIAPINE FUMARATE 25 MILLIGRAM(S): 200 TABLET, FILM COATED ORAL at 22:10

## 2021-07-15 RX ADMIN — FINASTERIDE 5 MILLIGRAM(S): 5 TABLET, FILM COATED ORAL at 11:29

## 2021-07-15 RX ADMIN — Medication 6 MILLIGRAM(S): at 05:05

## 2021-07-15 RX ADMIN — Medication 20 UNIT(S): at 07:43

## 2021-07-15 RX ADMIN — INSULIN GLARGINE 52 UNIT(S): 100 INJECTION, SOLUTION SUBCUTANEOUS at 21:50

## 2021-07-15 RX ADMIN — TAMSULOSIN HYDROCHLORIDE 0.4 MILLIGRAM(S): 0.4 CAPSULE ORAL at 22:10

## 2021-07-15 RX ADMIN — Medication 5: at 11:38

## 2021-07-15 RX ADMIN — Medication 24 UNIT(S): at 16:35

## 2021-07-15 RX ADMIN — Medication 81 MILLIGRAM(S): at 11:28

## 2021-07-15 RX ADMIN — Medication 6: at 16:35

## 2021-07-15 RX ADMIN — PANTOPRAZOLE SODIUM 40 MILLIGRAM(S): 20 TABLET, DELAYED RELEASE ORAL at 11:28

## 2021-07-15 RX ADMIN — Medication 6: at 21:26

## 2021-07-15 NOTE — PROGRESS NOTE ADULT - SUBJECTIVE AND OBJECTIVE BOX
Date of service: 07-15-21 @ 22:55      Patient is a 71y old  Male who presents with a chief complaint of fever (12 Jul 2021 14:37)                                                               INTERVAL HPI/OVERNIGHT EVENTS:    REVIEW OF SYSTEMS:     CONSTITUTIONAL: No weakness, fevers or chills  EYES/ENT: No visual changes , no ear ache   NECK: No pain or stiffness  RESPIRATORY: No cough, wheezing,  No shortness of breath  CARDIOVASCULAR: No chest pain or palpitations  GASTROINTESTINAL: No abdominal pain  . No nausea, vomiting, or hematemesis; No diarrhea or constipation. No melena or hematochezia.  GENITOURINARY: No dysuria, frequency or hematuria  NEUROLOGICAL: No numbness or weakness  SKIN: No itching, burning, rashes, or lesions                                                                                                                                                                                                                                                                                 Medications:  MEDICATIONS  (STANDING):  allopurinol 300 milliGRAM(s) Oral daily  aspirin  chewable 81 milliGRAM(s) Oral daily  dexAMETHasone  Injectable 6 milliGRAM(s) IV Push every 6 hours  dextrose 40% Gel 15 Gram(s) Oral once  dextrose 5%. 1000 milliLiter(s) (50 mL/Hr) IV Continuous <Continuous>  dextrose 5%. 1000 milliLiter(s) (100 mL/Hr) IV Continuous <Continuous>  dextrose 50% Injectable 25 Gram(s) IV Push once  dextrose 50% Injectable 12.5 Gram(s) IV Push once  dextrose 50% Injectable 25 Gram(s) IV Push once  finasteride 5 milliGRAM(s) Oral daily  glucagon  Injectable 1 milliGRAM(s) IntraMuscular once  HYDROmorphone  Injectable 1 milliGRAM(s) IV Push once  insulin glargine Injectable (LANTUS) 52 Unit(s) SubCutaneous at bedtime  insulin lispro (ADMELOG) corrective regimen sliding scale   SubCutaneous three times a day before meals  insulin lispro (ADMELOG) corrective regimen sliding scale   SubCutaneous at bedtime  insulin lispro Injectable (ADMELOG) 24 Unit(s) SubCutaneous three times a day before meals  LORazepam     Tablet 3 milliGRAM(s) Oral once  pantoprazole   Suspension 40 milliGRAM(s) Oral daily  QUEtiapine 25 milliGRAM(s) Oral at bedtime  senna 2 Tablet(s) Oral at bedtime  tamsulosin 0.4 milliGRAM(s) Oral at bedtime    MEDICATIONS  (PRN):  acetaminophen   Tablet .. 650 milliGRAM(s) Oral every 6 hours PRN Temp greater or equal to 38C (100.4F), Moderate Pain (4 - 6)  bisacodyl 5 milliGRAM(s) Oral every 12 hours PRN Constipation  polyethylene glycol 3350 17 Gram(s) Oral daily PRN Constipation       Allergies    No Known Allergies    Intolerances      Vital Signs Last 24 Hrs  T(C): 36.6 (15 Jul 2021 20:08), Max: 36.6 (15 Jul 2021 20:08)  T(F): 97.8 (15 Jul 2021 20:08), Max: 97.8 (15 Jul 2021 20:08)  HR: 52 (15 Jul 2021 12:31) (50 - 52)  BP: 129/79 (15 Jul 2021 20:08) (116/66 - 130/71)  BP(mean): --  RR: 18 (15 Jul 2021 20:08) (18 - 18)  SpO2: 98% (15 Jul 2021 20:08) (98% - 100%)  CAPILLARY BLOOD GLUCOSE      POCT Blood Glucose.: 355 mg/dL (15 Jul 2021 21:06)  POCT Blood Glucose.: 410 mg/dL (15 Jul 2021 16:28)  POCT Blood Glucose.: 412 mg/dL (15 Jul 2021 16:27)  POCT Blood Glucose.: 373 mg/dL (15 Jul 2021 11:37)  POCT Blood Glucose.: 286 mg/dL (15 Jul 2021 07:37)      07-14 @ 07:01  -  07-15 @ 07:00  --------------------------------------------------------  IN: 480 mL / OUT: 2275 mL / NET: -1795 mL    07-15 @ 07:01  -  07-15 @ 22:55  --------------------------------------------------------  IN: 640 mL / OUT: 750 mL / NET: -110 mL      Physical Exam:    Daily     Daily   General:  Well appearing, NAD, not cachetic  HEENT:  Nonicteric, PERRLA  CV:  RRR, S1S2   Lungs:  CTA B/L, no wheezes, rales, rhonchi  Abdomen:  Soft, non-tender, no distended, positive BS  Extremities:  2+ pulses, no c/c, no edema  Skin:  Warm and dry, no rashes  :  No escamilla  Neuro:  AAOx3, non-focal, grossly intact                                                                                                                                                                                                                                                                                                LABS:                               7.4    1.96  )-----------( 50       ( 15 Jul 2021 06:35 )             24.1                      07-15    138  |  101  |  15  ----------------------------<  264<H>  3.9   |  26  |  0.37<L>    Ca    8.5      15 Jul 2021 06:35  Phos  2.6     07-14  Mg     1.5     07-14                         RADIOLOGY & ADDITIONAL TESTS         I personally reviewed: [  ]EKG   [  ]CXR    [  ] CT      A/P:         Discussed with :     [facility] consultants' Notes   Time spent :   Date of service: 07-15-21 @ 22:55      Patient is a 71y old  Male who presents with a chief complaint of fever (12 Jul 2021 14:37)                                                               INTERVAL HPI/OVERNIGHT EVENTS:    REVIEW OF SYSTEMS:     CONSTITUTIONAL: No weakness, fevers or chills  RESPIRATORY: No cough, wheezing,  No shortness of breath  CARDIOVASCULAR: No chest pain or palpitations  GASTROINTESTINAL: No abdominal pain  . No nausea, vomiting, or hematemesis; No diarrhea or constipation. No melena or hematochezia.  GENITOURINARY: No dysuria, frequency or hematuria  NEUROLOGICAL: No numbness or weakness                                                                                                                                                                                                                                                                               Medications:  MEDICATIONS  (STANDING):  allopurinol 300 milliGRAM(s) Oral daily  aspirin  chewable 81 milliGRAM(s) Oral daily  dexAMETHasone  Injectable 6 milliGRAM(s) IV Push every 6 hours  dextrose 40% Gel 15 Gram(s) Oral once  dextrose 5%. 1000 milliLiter(s) (50 mL/Hr) IV Continuous <Continuous>  dextrose 5%. 1000 milliLiter(s) (100 mL/Hr) IV Continuous <Continuous>  dextrose 50% Injectable 25 Gram(s) IV Push once  dextrose 50% Injectable 12.5 Gram(s) IV Push once  dextrose 50% Injectable 25 Gram(s) IV Push once  finasteride 5 milliGRAM(s) Oral daily  glucagon  Injectable 1 milliGRAM(s) IntraMuscular once  HYDROmorphone  Injectable 1 milliGRAM(s) IV Push once  insulin glargine Injectable (LANTUS) 52 Unit(s) SubCutaneous at bedtime  insulin lispro (ADMELOG) corrective regimen sliding scale   SubCutaneous three times a day before meals  insulin lispro (ADMELOG) corrective regimen sliding scale   SubCutaneous at bedtime  insulin lispro Injectable (ADMELOG) 24 Unit(s) SubCutaneous three times a day before meals  LORazepam     Tablet 3 milliGRAM(s) Oral once  pantoprazole   Suspension 40 milliGRAM(s) Oral daily  QUEtiapine 25 milliGRAM(s) Oral at bedtime  senna 2 Tablet(s) Oral at bedtime  tamsulosin 0.4 milliGRAM(s) Oral at bedtime    MEDICATIONS  (PRN):  acetaminophen   Tablet .. 650 milliGRAM(s) Oral every 6 hours PRN Temp greater or equal to 38C (100.4F), Moderate Pain (4 - 6)  bisacodyl 5 milliGRAM(s) Oral every 12 hours PRN Constipation  polyethylene glycol 3350 17 Gram(s) Oral daily PRN Constipation       Allergies    No Known Allergies    Intolerances      Vital Signs Last 24 Hrs  T(C): 36.6 (15 Jul 2021 20:08), Max: 36.6 (15 Jul 2021 20:08)  T(F): 97.8 (15 Jul 2021 20:08), Max: 97.8 (15 Jul 2021 20:08)  HR: 52 (15 Jul 2021 12:31) (50 - 52)  BP: 129/79 (15 Jul 2021 20:08) (116/66 - 130/71)  BP(mean): --  RR: 18 (15 Jul 2021 20:08) (18 - 18)  SpO2: 98% (15 Jul 2021 20:08) (98% - 100%)  CAPILLARY BLOOD GLUCOSE      POCT Blood Glucose.: 355 mg/dL (15 Jul 2021 21:06)  POCT Blood Glucose.: 410 mg/dL (15 Jul 2021 16:28)  POCT Blood Glucose.: 412 mg/dL (15 Jul 2021 16:27)  POCT Blood Glucose.: 373 mg/dL (15 Jul 2021 11:37)  POCT Blood Glucose.: 286 mg/dL (15 Jul 2021 07:37)      07-14 @ 07:01  -  07-15 @ 07:00  --------------------------------------------------------  IN: 480 mL / OUT: 2275 mL / NET: -1795 mL    07-15 @ 07:01  -  07-15 @ 22:55  --------------------------------------------------------  IN: 640 mL / OUT: 750 mL / NET: -110 mL      Physical Exam:    Daily     Daily   General:  Well appearing, NAD, not cachetic  HEENT:  Nonicteric, PERRLA  CV:  RRR, S1S2   Lungs:  CTA B/L, no wheezes, rales, rhonchi  Abdomen:  Soft, non-tender, no distended, positive BS  Extremities: no edema    Neuro:  AAOx3, non-focal, grossly intact                                                                                                                                                                                                                                                                                                LABS:                               7.4    1.96  )-----------( 50       ( 15 Jul 2021 06:35 )             24.1                      07-15    138  |  101  |  15  ----------------------------<  264<H>  3.9   |  26  |  0.37<L>    Ca    8.5      15 Jul 2021 06:35  Phos  2.6     07-14  Mg     1.5     07-14                         RADIOLOGY & ADDITIONAL TESTS         I personally reviewed: [  ]EKG   [  ]CXR    [  ] CT      A/P:         Discussed with :     [facility] consultants' Notes   Time spent :

## 2021-07-15 NOTE — PROGRESS NOTE ADULT - ASSESSMENT
1) Hodgkin's lymphoma  - repeat CT scans, results noted  -[facility]- Family meeting held at bedside on 7/2. We discussed rx options vs comfort care. Family and patient wish to pursue rx. Ongoing conversations with family members have been taking place since our formal meeting.   - allopurinol  - pt s/p 1st rx with [facility]div[facility] 7/6. next due on 8/3.  - in light of recent development, may need to move up timetable on use of systemic CTX, ABVD     2) ENT- voice change is a new finding as per patient's sister. Patient also with weak voice. ? due to leptomeningeal disease?  ENT has seen patient.  dysphagia has been under evaluation.     3)Hyperglycemia- mgmt as per med. Now likely will get worse with steroids now on board.    4)Weakness. 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  - Neurology input noted.  - cont pt.  - bed to chair - d/w RN, and floor team  - lumbar mri , results noted     5) Pancytopenia. Suspect multifactorial, due to hodgkins, and possibly with role being played by recent abx.  anemia- w/u this far unremarkable. possible aocd.  transfusional support as needed    Leukopenia- unclear etiology- possibly due to infxn or malignancy. Also could be related to zosyn. Now off of abx. monitor cbc     Thrombocytopenia- has developed while here.  Can't r/o malignancy vs. due to zosyn.   manage supportively for now.    6) Leptomenigeal involvement.  MRI findings noted.  case d/w Dr. Alas, neurology and neurosurgery.  Neurooncology Dr. Smith on board. Case d/w her  Steroids initiated.  MRI of neuro axis ordered.   may need LP.    6. Fever- resolved. ID has been on board    7. - will s/w medicine about mgmt of andi. 1) Hodgkin's lymphoma  - repeat CT scans, results noted  -[facility]- Family meeting held at bedside on 7/2. We discussed rx options vs comfort care. Family and patient wish to pursue rx. Ongoing conversations with family members have been taking place since our formal meeting.   - allopurinol  - pt s/p 1st rx with opdivo 7/6. next due on 8/3.  - in light of recent development, may need to move up timetable on use of systemic CTX, ABVD     2) ENT- voice change is a new finding as per patient's sister. Patient also with weak voice. ? due to leptomeningeal disease?  ENT has seen patient.  dysphagia has been under evaluation.     3)Hyperglycemia- mgmt as per med. Now likely will get worse with steroids now on board.    4)Weakness. 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  - Neurology input noted.  - cont pt.  - bed to chair - d/w RN, and floor team  - lumbar mri , results noted     5) Pancytopenia. Suspect multifactorial, due to hodgkins, and possibly with role being played by recent abx.  anemia- w/u this far unremarkable. possible aocd.  transfusional support as needed    Leukopenia- unclear etiology- possibly due to infxn or malignancy. Also could be related to zosyn. Now off of abx. monitor cbc     Thrombocytopenia- has developed while here.  Can't r/o malignancy vs. due to zosyn.   manage supportively for now.    6) Leptomenigeal involvement.  MRI findings noted.  case d/w Dr. Alas, neurology and neurosurgery.  Neurooncology Dr. Smith on board. Case d/w her  Steroids initiated.  MRI of neuro axis ordered.  Initiated, but couldn't complete contrast. This was discussed with MRI, contrast study to be completed today  may need LP.    6. Fever- resolved. ID has been on board    7. - will s/w medicine about mgmt of escamilla.

## 2021-07-15 NOTE — PROGRESS NOTE ADULT - PROBLEM SELECTOR PLAN 1
Expect hyperglycemia to persist 2/2 steroid management.  Will increase Lantus to 52u at bedtime.  Will increase Admelog to 24u before each meal and continue Admelog correction scale coverage. Will continue monitoring FS and FU, will adjust insulin dose as steroids are tapered/dc.   for compliance with consistent low-carb diet, nutritional shakes and supplements as tolerated. FU RD recommendations.

## 2021-07-15 NOTE — PROGRESS NOTE ADULT - ASSESSMENT
70 y/o M w/ PMHx of CVA this past August and got TPA per family member, DM, BPH with obstruction s/p escamilla catheter, s/p ERCP w Sphincterectomy recent admission to [facility] for sepsis  Hodgkin lymphoma was not offered any chemo and was placed on hospice.     now presenting with weakness and FTT.   pt denies cp / SOB / palpitations   no focal neuro complaints .. at baseline RLE weakness   no N/V   had one episode of diarrhea   no urinary sx  abdominal pain, diffuse, but worse on R side.   Afib RVR overnight. Was asymptomatic   no known history of this as per patient

## 2021-07-15 NOTE — PROGRESS NOTE ADULT - ASSESSMENT
Assessment  DMT2: 71y Male with DM T2 with hyperglycemia, A1C 8.8%, was on oral meds/Janumet at home, admitted with weakness/fatigue and hyperglycemia, now on moderate-dose basal bolus insulin, increased dose yesterday, blood sugars are still running high and not at target 2/2 high-dose steroids, no hypoglycemic episodes, appears comfortable, remains on dexamethasone.  Lymphoma: on medications, monitored, FU Heme/Onc.  Anemia: stable, monitored.      Shahriar Kahn MD  Cell: [phone]  Office: [phone]       Assessment  DMT2: 71y Male with DM T2 with hyperglycemia, A1C 8.8%, was on oral meds/Janumet at home, admitted with weakness/fatigue and hyperglycemia, now on moderate-dose basal bolus insulin,  increased dose yesterday, blood sugars are still running high and not at target 2/2 high-dose steroids, no hypoglycemic episodes, appears comfortable, remains on dexamethasone.  Lymphoma: on medications, monitored, FU Heme/Onc.  Anemia: stable, monitored.      Shahriar Kahn MD  Cell: [phone]  Office: [phone]

## 2021-07-15 NOTE — PROGRESS NOTE ADULT - ASSESSMENT
70 y/o M w/ PMHx of CVA this past August and got TPA per family member, DM, BPH with obstruction s/p escamilla catheter, s/p ERCP w Sphincterectomy recent admission to [facility] for sepsis  Hodgkin lymphoma was not offered any chemo and was placed on hospice.     now presenting with weakness and FTT.   pt denies cp / SOB / palpitations   no focal neuro complaints .. at baseline RLE weakness   no N/V   had one episode of diarrhea   no urinary sx  abdominal pain, diffuse, but worse on R side.     - Failure to thrive: cultures neg  CT chest noted   nutrition consult   encourage PO intake: dysphagia 3 based on MBS results and speech eval.     - lymphoma: d/w Dr. Larios: s/p immunotherapy   likley will need chemo as inpt    - DM with hyperglycemia : improved now worse with steroids   fu with endo     - anemia: monitor H/H post transfusion, stable.     - Fever: doubt infectious etiology   likely due to immunotherapy/lymphoma. culture: neg   abx dced. s/p IVF, ID f/u appreciated.     - voice changes: CT neck : Asymmetric enlargement of the left palatine tonsil, suspicious for lymphomatous involvement given history. Correlate with direct visualization.  ENT had eval pt: no gross pathology on visualization   S/S eval: MBS done. speech: dysphagia 3    - Tachycardia: VA duplex neg for DVT   rate stable. noted 7 beats NSVT.  can consider low dose metoprolol 12.5 mg BID if recurrent    - leptomeningeal involevement from lymphoma : MR lumbar sacral noted   d/w Dr. Bower   will check MR brain , cervcal throacic spine   on steroids   will need LP   planned chemo early next week if pt and family agree       - Afib : now in sinus : monitor   appreicate cardio input   no AC at this time and no AVNB at this time        70 y/o M w/ PMHx of CVA this past August and got TPA per family member, DM, BPH with obstruction s/p escamilla catheter, s/p ERCP w Sphincterectomy recent admission to [facility] for sepsis  Hodgkin lymphoma was not offered any chemo and was placed on hospice.     now presenting with weakness and FTT.   pt denies cp / SOB / palpitations   no focal neuro complaints .. at baseline RLE weakness   no N/V   had one episode of diarrhea   no urinary sx  abdominal pain, diffuse, but worse on R side.     - Failure to thrive: cultures neg  CT chest noted   nutrition consult   encourage PO intake: dysphagia 3 based on MBS results and speech eval.     - lymphoma: d/w Dr. Larios: s/p immunotherapy   likley will need chemo as inpt    - DM with hyperglycemia : improved now worse with steroids   fu with endo     - anemia: monitor H/H post transfusion, stable.     - Fever: doubt infectious etiology   likely due to immunotherapy/lymphoma. culture: neg   abx dced. s/p IVF, ID f/u appreciated.     - voice changes: CT neck : Asymmetric enlargement of the left palatine tonsil, suspicious for lymphomatous involvement given history. Correlate with direct visualization.  ENT had eval pt: no gross pathology on visualization   S/S eval: MBS done. speech: dysphagia 3    - Tachycardia: VA duplex neg for DVT   rate stable. noted 7 beats NSVT.  can consider low dose metoprolol 12.5 mg BID if recurrent    - leptomeningeal involevement from lymphoma : MR lumbar sacral noted   d/w Dr. Bower   will check MR brain , cervcal throacic spine : non contrast done : discussed with NP to expedite with cont : will give dilaudid for pain and ativan for anxiety /claustrophobia   on steroids   will need LP : planned for [facility]mm[facility]   planned chemo early next week :  d/w pt and family at bedside  agree wit plan       - Afib : now in sinus : monitor   appreicate cardio input   no AC at this time and no AVNB at this time

## 2021-07-15 NOTE — PROGRESS NOTE ADULT - SUBJECTIVE AND OBJECTIVE BOX
LIZETT RIVERA  MRN-[id number]    Patient is a 71y old  Male who presents with a chief complaint of fever (12 Jul 2021 14:37)      Review of System  REVIEW OF SYSTEMS      General:	Denies fatigue, fevers, chills, sweats, decreased appetite.    Skin/Breast: denies pruritis, rash  	  Ophthalmologic: no change in vision or blurring  	  HEENT	Denies dry mouth, oral sores, dysphagia,  change in hearing.    Respiratory and Thorax:  cough, sob, wheeze, hemoptysis  	  Cardiovascular:	no cp , palp, orthopnea    Gastrointestinal:	no n/v/d constipation    Genitourinary:	no dysuria of frequency, no hematuria, no flank pain    Musculoskeletal:	no bone or joint pain. no muscle aches.     Neurological:	no change in sensory or motor function. no headache. no weakness.     Psychiatric:	no depression, no anxiety, insomnia.     Hematology/Lymphatics:	no bleeding or bruising        Current Meds  MEDICATIONS  (STANDING):  allopurinol 300 milliGRAM(s) Oral daily  aspirin  chewable 81 milliGRAM(s) Oral daily  dexAMETHasone  Injectable 6 milliGRAM(s) IV Push every 6 hours  dextrose 40% Gel 15 Gram(s) Oral once  dextrose 5%. 1000 milliLiter(s) (50 mL/Hr) IV Continuous <Continuous>  dextrose 5%. 1000 milliLiter(s) (100 mL/Hr) IV Continuous <Continuous>  dextrose 50% Injectable 25 Gram(s) IV Push once  dextrose 50% Injectable 12.5 Gram(s) IV Push once  dextrose 50% Injectable 25 Gram(s) IV Push once  finasteride 5 milliGRAM(s) Oral daily  glucagon  Injectable 1 milliGRAM(s) IntraMuscular once  insulin glargine Injectable (LANTUS) 45 Unit(s) SubCutaneous at bedtime  insulin lispro (ADMELOG) corrective regimen sliding scale   SubCutaneous three times a day before meals  insulin lispro (ADMELOG) corrective regimen sliding scale   SubCutaneous at bedtime  insulin lispro Injectable (ADMELOG) 20 Unit(s) SubCutaneous three times a day before meals  pantoprazole   Suspension 40 milliGRAM(s) Oral daily  QUEtiapine 25 milliGRAM(s) Oral at bedtime  senna 2 Tablet(s) Oral at bedtime  tamsulosin 0.4 milliGRAM(s) Oral at bedtime    MEDICATIONS  (PRN):  acetaminophen   Tablet .. 650 milliGRAM(s) Oral every 6 hours PRN Temp greater or equal to 38C (100.4F), Moderate Pain (4 - 6)  bisacodyl 5 milliGRAM(s) Oral every 12 hours PRN Constipation  polyethylene glycol 3350 17 Gram(s) Oral daily PRN Constipation      Vitals  Vital Signs Last 24 Hrs  T(C): 36.3 (15 Jul 2021 04:15), Max: 36.5 (14 Jul 2021 21:35)  T(F): 97.4 (15 Jul 2021 04:15), Max: 97.7 (14 Jul 2021 21:35)  HR: 50 (15 Jul 2021 04:15) (50 - 81)  BP: 116/66 (15 Jul 2021 04:15) (98/62 - 138/68)  BP(mean): --  RR: 18 (15 Jul 2021 04:15) (18 - 18)  SpO2: 100% (15 Jul 2021 04:15) (99% - 100%)    Physical Exam  PHYSICAL EXAM:      Constitutional: NAD    Eyes: PERRLA EOMI, anicteric sclera    Heent :No oral sores, no pharyngeal injection. moist mucosa.    Neck: supple, no jvd, no LAD    Respiratory: CTA b/l     Cardiovascular: s1s2, no m/g/r    Gastrointestinal: soft, nt, nd, + BS    Extremities: no c/c/e    Neurological:A&O x 3 moves all ext.    Skin: no rash on exposed skin    Lymph Nodes: no lymphadenopathy.              Lab  CBC Full  -  ( 15 Jul 2021 06:35 )  WBC Count : 1.96 K/uL  RBC Count : 2.57 M/uL  Hemoglobin : 7.4 g/dL  Hematocrit : 24.1 %  Platelet Count - Automated : 50 K/uL  Mean Cell Volume : 93.8 fl  Mean Cell Hemoglobin : 28.8 pg  Mean Cell Hemoglobin Concentration : 30.7 gm/dL  Auto Neutrophil # : x  Auto Lymphocyte # : x  Auto Monocyte # : x  Auto Eosinophil # : x  Auto Basophil # : x  Auto Neutrophil % : x  Auto Lymphocyte % : x  Auto Monocyte % : x  Auto Eosinophil % : x  Auto Basophil % : x    07-15    138  |  101  |  15  ----------------------------<  264<H>  3.9   |  26  |  0.37<L>    Ca    8.5      15 Jul 2021 06:35  Phos  2.6     07-14  Mg     1.5     07-14          Rad:    Assessment/Plan   LIZETT RIVERA  MRN-[id number]    Patient is a 71y old  Male who presents with a chief complaint of fever (12 Jul 2021 14:37)      Review of System    Comfortable.  No new issues    General:	Denies fatigue, fevers, chills, sweats, decreased appetite.    Skin/Breast: denies pruritis, rash  	  Ophthalmologic: no change in vision or blurring  	  HEENT	Denies dry mouth, oral sores, dysphagia,  change in hearing.    Respiratory and Thorax:  cough, sob, wheeze, hemoptysis  	  Cardiovascular:	no cp , palp, orthopnea    Gastrointestinal:	no n/v/d constipation    Genitourinary:	no dysuria of frequency, no hematuria, no flank pain    Musculoskeletal:	no bone or joint pain. no muscle aches.     Neurological:	no change in sensory or motor function. no headache. no weakness.     Psychiatric:	no depression, no anxiety, insomnia.     Hematology/Lymphatics:	no bleeding or bruising      Current Meds  MEDICATIONS  (STANDING):  allopurinol 300 milliGRAM(s) Oral daily  aspirin  chewable 81 milliGRAM(s) Oral daily  dexAMETHasone  Injectable 6 milliGRAM(s) IV Push every 6 hours  dextrose 40% Gel 15 Gram(s) Oral once  dextrose 5%. 1000 milliLiter(s) (50 mL/Hr) IV Continuous <Continuous>  dextrose 5%. 1000 milliLiter(s) (100 mL/Hr) IV Continuous <Continuous>  dextrose 50% Injectable 25 Gram(s) IV Push once  dextrose 50% Injectable 12.5 Gram(s) IV Push once  dextrose 50% Injectable 25 Gram(s) IV Push once  finasteride 5 milliGRAM(s) Oral daily  glucagon  Injectable 1 milliGRAM(s) IntraMuscular once  insulin glargine Injectable (LANTUS) 45 Unit(s) SubCutaneous at bedtime  insulin lispro (ADMELOG) corrective regimen sliding scale   SubCutaneous three times a day before meals  insulin lispro (ADMELOG) corrective regimen sliding scale   SubCutaneous at bedtime  insulin lispro Injectable (ADMELOG) 20 Unit(s) SubCutaneous three times a day before meals  pantoprazole   Suspension 40 milliGRAM(s) Oral daily  QUEtiapine 25 milliGRAM(s) Oral at bedtime  senna 2 Tablet(s) Oral at bedtime  tamsulosin 0.4 milliGRAM(s) Oral at bedtime    MEDICATIONS  (PRN):  acetaminophen   Tablet .. 650 milliGRAM(s) Oral every 6 hours PRN Temp greater or equal to 38C (100.4F), Moderate Pain (4 - 6)  bisacodyl 5 milliGRAM(s) Oral every 12 hours PRN Constipation  polyethylene glycol 3350 17 Gram(s) Oral daily PRN Constipation      Vitals  Vital Signs Last 24 Hrs  T(C): 36.3 (15 Jul 2021 04:15), Max: 36.5 (14 Jul 2021 21:35)  T(F): 97.4 (15 Jul 2021 04:15), Max: 97.7 (14 Jul 2021 21:35)  HR: 50 (15 Jul 2021 04:15) (50 - 81)  BP: 116/66 (15 Jul 2021 04:15) (98/62 - 138/68)  BP(mean): --  RR: 18 (15 Jul 2021 04:15) (18 - 18)  SpO2: 100% (15 Jul 2021 04:15) (99% - 100%)    Physical Exam    Constitutional: NAD    Eyes: PERRLA EOMI, anicteric sclera    Heent :No oral sores, no pharyngeal injection. moist mucosa.    Neck: supple, no jvd, no LAD    Respiratory: CTA b/l     Cardiovascular: s1s2, no m/g/r    Gastrointestinal: soft, nt, nd, + BS    Extremities: no c/c/e    Neurological:A&O x 3 moves all ext.    Skin: no rash on exposed skin    Lymph Nodes: no lymphadenopathy.              Lab  CBC Full  -  ( 15 Jul 2021 06:35 )  WBC Count : 1.96 K/uL  RBC Count : 2.57 M/uL  Hemoglobin : 7.4 g/dL  Hematocrit : 24.1 %  Platelet Count - Automated : 50 K/uL  Mean Cell Volume : 93.8 fl  Mean Cell Hemoglobin : 28.8 pg  Mean Cell Hemoglobin Concentration : 30.7 gm/dL  Auto Neutrophil # : x  Auto Lymphocyte # : x  Auto Monocyte # : x  Auto Eosinophil # : x  Auto Basophil # : x  Auto Neutrophil % : x  Auto Lymphocyte % : x  Auto Monocyte % : x  Auto Eosinophil % : x  Auto Basophil % : x    07-15    138  |  101  |  15  ----------------------------<  264<H>  3.9   |  26  |  0.37<L>    Ca    8.5      15 Jul 2021 06:35  Phos  2.6     07-14  Mg     1.5     07-14          Rad:    Assessment/Plan

## 2021-07-15 NOTE — PROGRESS NOTE ADULT - PROBLEM SELECTOR PLAN 1
PAF, now in SR   BP remains tenuous and soft   for now hold off on AV noelle blockers   Will cont monitor on Tele   Check thyroid panel   Obviously no AC given anemia and thrombocytopenia.

## 2021-07-15 NOTE — PROGRESS NOTE ADULT - SUBJECTIVE AND OBJECTIVE BOX
Chief complaint  Patient is a 71y old  Male who presents with a chief complaint of fever (12 Jul 2021 14:37)   Review of systems  Patient in bed, looks comfortable, no hypoglycemic episodes.    Labs and Fingersticks  CAPILLARY BLOOD GLUCOSE      POCT Blood Glucose.: 373 mg/dL (15 Jul 2021 11:37)  POCT Blood Glucose.: 286 mg/dL (15 Jul 2021 07:37)  POCT Blood Glucose.: 380 mg/dL (14 Jul 2021 21:38)  POCT Blood Glucose.: 352 mg/dL (14 Jul 2021 16:25)    Medications  MEDICATIONS  (STANDING):  allopurinol 300 milliGRAM(s) Oral daily  aspirin  chewable 81 milliGRAM(s) Oral daily  dexAMETHasone  Injectable 6 milliGRAM(s) IV Push every 6 hours  dextrose 40% Gel 15 Gram(s) Oral once  dextrose 5%. 1000 milliLiter(s) (50 mL/Hr) IV Continuous <Continuous>  dextrose 5%. 1000 milliLiter(s) (100 mL/Hr) IV Continuous <Continuous>  dextrose 50% Injectable 25 Gram(s) IV Push once  dextrose 50% Injectable 12.5 Gram(s) IV Push once  dextrose 50% Injectable 25 Gram(s) IV Push once  finasteride 5 milliGRAM(s) Oral daily  glucagon  Injectable 1 milliGRAM(s) IntraMuscular once  insulin glargine Injectable (LANTUS) 52 Unit(s) SubCutaneous at bedtime  insulin lispro (ADMELOG) corrective regimen sliding scale   SubCutaneous three times a day before meals  insulin lispro (ADMELOG) corrective regimen sliding scale   SubCutaneous at bedtime  insulin lispro Injectable (ADMELOG) 24 Unit(s) SubCutaneous three times a day before meals  pantoprazole   Suspension 40 milliGRAM(s) Oral daily  QUEtiapine 25 milliGRAM(s) Oral at bedtime  senna 2 Tablet(s) Oral at bedtime  tamsulosin 0.4 milliGRAM(s) Oral at bedtime      Physical Exam  General: Patient comfortable in bed  Vital Signs Last 12 Hrs  T(F): 97.6 (07-15-21 @ 12:31), Max: 97.6 (07-15-21 @ 12:31)  HR: 52 (07-15-21 @ 12:31) (50 - 52)  BP: 121/73 (07-15-21 @ 12:31) (116/66 - 121/73)  BP(mean): --  RR: 18 (07-15-21 @ 12:31) (18 - 18)  SpO2: 99% (07-15-21 @ 12:31) (99% - 100%)       Chief complaint  Patient is a 71y old  Male who presents with a chief complaint of fever (12 Jul 2021 14:37)   Review of systems  Patient in bed, looks comfortable, no hypoglycemic episodes.    Labs and Fingersticks  CAPILLARY BLOOD GLUCOSE      POCT Blood Glucose.: 373 mg/dL (15 Jul 2021 11:37)  POCT Blood Glucose.: 286 mg/dL (15 Jul 2021 07:37)  POCT Blood Glucose.: 380 mg/dL (14 Jul 2021 21:38)  POCT Blood Glucose.: 352 mg/dL (14 Jul 2021 16:25)    Medications  MEDICATIONS  (STANDING):  allopurinol 300 milliGRAM(s) Oral daily  aspirin  chewable 81 milliGRAM(s) Oral daily  dexAMETHasone  Injectable 6 milliGRAM(s) IV Push every 6 hours  dextrose 40% Gel 15 Gram(s) Oral once  dextrose 5%. 1000 milliLiter(s) (50 mL/Hr) IV Continuous <Continuous>  dextrose 5%. 1000 milliLiter(s) (100 mL/Hr) IV Continuous <Continuous>  dextrose 50% Injectable 25 Gram(s) IV Push once  dextrose 50% Injectable 12.5 Gram(s) IV Push once  dextrose 50% Injectable 25 Gram(s) IV Push once  finasteride 5 milliGRAM(s) Oral daily  glucagon  Injectable 1 milliGRAM(s) IntraMuscular once  insulin glargine Injectable (LANTUS) 52 Unit(s) SubCutaneous at bedtime  insulin lispro (ADMELOG) corrective regimen sliding scale   SubCutaneous three times a day before meals  insulin lispro (ADMELOG) corrective regimen sliding scale   SubCutaneous at bedtime  insulin lispro Injectable (ADMELOG) 24 Unit(s) SubCutaneous three times a day before meals  pantoprazole   Suspension 40 milliGRAM(s) Oral daily  QUEtiapine 25 milliGRAM(s) Oral at bedtime  senna 2 Tablet(s) Oral at bedtime  tamsulosin 0.4 milliGRAM(s) Oral at bedtime      Physical Exam  General: Patient comfortable in bed  Vital Signs Last 12 Hrs  T(F): 97.6 (07-15-21 @ 12:31), Max: 97.6 (07-15-21 @ 12:31)  HR: 52 (07-15-21 @ 12:31) (50 - 52)  BP: 121/73 (07-15-21 @ 12:31) (116/66 - 121/73)  BP(mean): --  RR: 18 (07-15-21 @ 12:31) (18 - 18)  SpO2: 99% (07-15-21 @ 12:31) (99% - 100%)

## 2021-07-15 NOTE — PROGRESS NOTE ADULT - SUBJECTIVE AND OBJECTIVE BOX
Subjective: Patient seen and examined. No new events except as noted.   feels ok   no more AF episodes     REVIEW OF SYSTEMS:    CONSTITUTIONAL: + weakness, fevers or chills  EYES/ENT: No visual changes;  No vertigo or throat pain   NECK: No pain or stiffness  RESPIRATORY: No cough, wheezing, hemoptysis; No shortness of breath  CARDIOVASCULAR: No chest pain or palpitations  GASTROINTESTINAL: No abdominal or epigastric pain. No nausea, vomiting, or hematemesis; No diarrhea or constipation. No melena or hematochezia.  GENITOURINARY: No dysuria, frequency or hematuria  NEUROLOGICAL: No numbness or weakness  SKIN: No itching, burning, rashes, or lesions   All other review of systems is negative unless indicated above.    MEDICATIONS:  MEDICATIONS  (STANDING):  allopurinol 300 milliGRAM(s) Oral daily  aspirin  chewable 81 milliGRAM(s) Oral daily  dexAMETHasone  Injectable 6 milliGRAM(s) IV Push every 6 hours  dextrose 40% Gel 15 Gram(s) Oral once  dextrose 5%. 1000 milliLiter(s) (50 mL/Hr) IV Continuous <Continuous>  dextrose 5%. 1000 milliLiter(s) (100 mL/Hr) IV Continuous <Continuous>  dextrose 50% Injectable 25 Gram(s) IV Push once  dextrose 50% Injectable 12.5 Gram(s) IV Push once  dextrose 50% Injectable 25 Gram(s) IV Push once  finasteride 5 milliGRAM(s) Oral daily  glucagon  Injectable 1 milliGRAM(s) IntraMuscular once  insulin glargine Injectable (LANTUS) 45 Unit(s) SubCutaneous at bedtime  insulin lispro (ADMELOG) corrective regimen sliding scale   SubCutaneous three times a day before meals  insulin lispro (ADMELOG) corrective regimen sliding scale   SubCutaneous at bedtime  insulin lispro Injectable (ADMELOG) 20 Unit(s) SubCutaneous three times a day before meals  pantoprazole   Suspension 40 milliGRAM(s) Oral daily  QUEtiapine 25 milliGRAM(s) Oral at bedtime  senna 2 Tablet(s) Oral at bedtime  tamsulosin 0.4 milliGRAM(s) Oral at bedtime      PHYSICAL EXAM:  T(C): 36.3 (07-15-21 @ 04:15), Max: 36.5 (07-14-21 @ 21:35)  HR: 50 (07-15-21 @ 04:15) (50 - 70)  BP: 116/66 (07-15-21 @ 04:15) (103/63 - 138/68)  RR: 18 (07-15-21 @ 04:15) (18 - 18)  SpO2: 100% (07-15-21 @ 04:15) (99% - 100%)  Wt(kg): --  I&O's Summary    14 Jul 2021 07:01  -  15 Jul 2021 07:00  --------------------------------------------------------  IN: 480 mL / OUT: 2275 mL / NET: -1795 mL          Appearance: Normal	  HEENT:   Normal oral mucosa, PERRL, EOMI	  Lymphatic: No lymphadenopathy , no edema  Cardiovascular: Normal S1 S2, No JVD, No murmurs , Peripheral pulses palpable 2+ bilaterally  Respiratory: Lungs clear to auscultation, normal effort 	  Gastrointestinal:  Soft, Non-tender, + BS	  Skin: No rashes, No ecchymoses, No cyanosis, warm to touch  Musculoskeletal: Normal range of motion, normal strength  Psychiatry:  Mood & affect appropriate  Ext: No edema      LABS:    CARDIAC MARKERS:                                7.4    1.96  )-----------( 50       ( 15 Jul 2021 06:35 )             24.1     07-15    138  |  101  |  15  ----------------------------<  264<H>  3.9   |  26  |  0.37<L>    Ca    8.5      15 Jul 2021 06:35  Phos  2.6     07-14  Mg     1.5     07-14      proBNP:   Lipid Profile:   HgA1c:   TSH:             TELEMETRY: SR SHEILA Hoskins 	    ECG:  	  RADIOLOGY:   DIAGNOSTIC TESTING:  [ ] Echocardiogram:  [ ]  Catheterization:  [ ] Stress Test:    OTHER:

## 2021-07-16 LAB
ANION GAP SERPL CALC-SCNC: 14 MMOL/L — SIGNIFICANT CHANGE UP (ref 5–17)
APPEARANCE CSF: CLEAR — SIGNIFICANT CHANGE UP
BASOPHILS # BLD AUTO: 0 K/UL — SIGNIFICANT CHANGE UP (ref 0–0.2)
BASOPHILS NFR BLD AUTO: 0 % — SIGNIFICANT CHANGE UP (ref 0–2)
BUN SERPL-MCNC: 18 MG/DL — SIGNIFICANT CHANGE UP (ref 7–23)
CALCIUM SERPL-MCNC: 8.5 MG/DL — SIGNIFICANT CHANGE UP (ref 8.4–10.5)
CHLORIDE SERPL-SCNC: 100 MMOL/L — SIGNIFICANT CHANGE UP (ref 96–108)
CO2 SERPL-SCNC: 24 MMOL/L — SIGNIFICANT CHANGE UP (ref 22–31)
COLOR CSF: SIGNIFICANT CHANGE UP
CREAT SERPL-MCNC: 0.38 MG/DL — LOW (ref 0.5–1.3)
CSF PCR RESULT: SIGNIFICANT CHANGE UP
EOSINOPHIL # BLD AUTO: 0 K/UL — SIGNIFICANT CHANGE UP (ref 0–0.5)
EOSINOPHIL NFR BLD AUTO: 0 % — SIGNIFICANT CHANGE UP (ref 0–6)
GLUCOSE BLDC GLUCOMTR-MCNC: 140 MG/DL — HIGH (ref 70–99)
GLUCOSE BLDC GLUCOMTR-MCNC: 155 MG/DL — HIGH (ref 70–99)
GLUCOSE BLDC GLUCOMTR-MCNC: 188 MG/DL — HIGH (ref 70–99)
GLUCOSE BLDC GLUCOMTR-MCNC: 229 MG/DL — HIGH (ref 70–99)
GLUCOSE CSF-MCNC: 135 MG/DL — HIGH (ref 40–70)
GLUCOSE SERPL-MCNC: 216 MG/DL — HIGH (ref 70–99)
GRAM STN FLD: SIGNIFICANT CHANGE UP
HCT VFR BLD CALC: 25.5 % — LOW (ref 39–50)
HCT VFR BLD CALC: 28.4 % — LOW (ref 39–50)
HCT VFR BLD CALC: 29.9 % — LOW (ref 39–50)
HGB BLD-MCNC: 7.9 G/DL — LOW (ref 13–17)
HGB BLD-MCNC: 8.8 G/DL — LOW (ref 13–17)
HGB BLD-MCNC: 9.1 G/DL — LOW (ref 13–17)
LABORATORY COMMENT REPORT: SIGNIFICANT CHANGE UP
LDH CSF L TO P-CCNC: 44 U/L — SIGNIFICANT CHANGE UP
LDH FLD-CCNC: 44 U/L — SIGNIFICANT CHANGE UP
LYMPHOCYTES # BLD AUTO: 0.28 K/UL — LOW (ref 1–3.3)
LYMPHOCYTES # BLD AUTO: 7.9 % — LOW (ref 13–44)
MANUAL SMEAR VERIFICATION: SIGNIFICANT CHANGE UP
MCHC RBC-ENTMCNC: 29.1 PG — SIGNIFICANT CHANGE UP (ref 27–34)
MCHC RBC-ENTMCNC: 29.4 PG — SIGNIFICANT CHANGE UP (ref 27–34)
MCHC RBC-ENTMCNC: 29.4 PG — SIGNIFICANT CHANGE UP (ref 27–34)
MCHC RBC-ENTMCNC: 30.4 GM/DL — LOW (ref 32–36)
MCHC RBC-ENTMCNC: 31 GM/DL — LOW (ref 32–36)
MCHC RBC-ENTMCNC: 31 GM/DL — LOW (ref 32–36)
MCV RBC AUTO: 94.8 FL — SIGNIFICANT CHANGE UP (ref 80–100)
MCV RBC AUTO: 95 FL — SIGNIFICANT CHANGE UP (ref 80–100)
MCV RBC AUTO: 95.5 FL — SIGNIFICANT CHANGE UP (ref 80–100)
METAMYELOCYTES # FLD: 0.9 % — HIGH (ref 0–0)
MONOCYTES # BLD AUTO: 0.34 K/UL — SIGNIFICANT CHANGE UP (ref 0–0.9)
MONOCYTES NFR BLD AUTO: 9.6 % — SIGNIFICANT CHANGE UP (ref 2–14)
NEUTROPHILS # BLD AUTO: 2.89 K/UL — SIGNIFICANT CHANGE UP (ref 1.8–7.4)
NEUTROPHILS # CSF: SIGNIFICANT CHANGE UP (ref 0–6)
NEUTROPHILS NFR BLD AUTO: 81.6 % — HIGH (ref 43–77)
NIGHT BLUE STAIN TISS: SIGNIFICANT CHANGE UP
NRBC # BLD: 0 /100 WBCS — SIGNIFICANT CHANGE UP (ref 0–0)
NRBC # BLD: 1 /100 WBCS — HIGH (ref 0–0)
NRBC # BLD: 1 /100 — HIGH (ref 0–0)
NRBC NFR CSF: <1 — SIGNIFICANT CHANGE UP (ref 0–5)
PLAT MORPH BLD: NORMAL — SIGNIFICANT CHANGE UP
PLATELET # BLD AUTO: 53 K/UL — LOW (ref 150–400)
PLATELET # BLD AUTO: 56 K/UL — LOW (ref 150–400)
PLATELET # BLD AUTO: 65 K/UL — LOW (ref 150–400)
POTASSIUM SERPL-MCNC: 3.6 MMOL/L — SIGNIFICANT CHANGE UP (ref 3.5–5.3)
POTASSIUM SERPL-SCNC: 3.6 MMOL/L — SIGNIFICANT CHANGE UP (ref 3.5–5.3)
PROT CSF-MCNC: 158 MG/DL — HIGH (ref 15–45)
RBC # BLD: 2.69 M/UL — LOW (ref 4.2–5.8)
RBC # BLD: 2.99 M/UL — LOW (ref 4.2–5.8)
RBC # BLD: 3.13 M/UL — LOW (ref 4.2–5.8)
RBC # CSF: 0 /UL — SIGNIFICANT CHANGE UP (ref 0–0)
RBC # FLD: 25 % — HIGH (ref 10.3–14.5)
RBC # FLD: 25.3 % — HIGH (ref 10.3–14.5)
RBC # FLD: 25.4 % — HIGH (ref 10.3–14.5)
RBC BLD AUTO: SIGNIFICANT CHANGE UP
SODIUM SERPL-SCNC: 138 MMOL/L — SIGNIFICANT CHANGE UP (ref 135–145)
SOURCE HSV 1/2: SIGNIFICANT CHANGE UP
SPECIMEN SOURCE: SIGNIFICANT CHANGE UP
SPECIMEN SOURCE: SIGNIFICANT CHANGE UP
TUBE TYPE: SIGNIFICANT CHANGE UP
WBC # BLD: 2.5 K/UL — LOW (ref 3.8–10.5)
WBC # BLD: 2.92 K/UL — LOW (ref 3.8–10.5)
WBC # BLD: 3.54 K/UL — LOW (ref 3.8–10.5)
WBC # FLD AUTO: 2.5 K/UL — LOW (ref 3.8–10.5)
WBC # FLD AUTO: 2.92 K/UL — LOW (ref 3.8–10.5)
WBC # FLD AUTO: 3.54 K/UL — LOW (ref 3.8–10.5)

## 2021-07-16 PROCEDURE — 72142 MRI NECK SPINE W/DYE: CPT | Mod: 26

## 2021-07-16 PROCEDURE — 88108 CYTOPATH CONCENTRATE TECH: CPT | Mod: 26

## 2021-07-16 PROCEDURE — ZZZZZ: CPT

## 2021-07-16 PROCEDURE — 62328 DX LMBR SPI PNXR W/FLUOR/CT: CPT

## 2021-07-16 PROCEDURE — 99233 SBSQ HOSP IP/OBS HIGH 50: CPT

## 2021-07-16 PROCEDURE — 72147 MRI CHEST SPINE W/DYE: CPT | Mod: 26

## 2021-07-16 PROCEDURE — 70552 MRI BRAIN STEM W/DYE: CPT | Mod: 26

## 2021-07-16 RX ADMIN — Medication 24 UNIT(S): at 07:45

## 2021-07-16 RX ADMIN — Medication 1: at 17:11

## 2021-07-16 RX ADMIN — Medication 650 MILLIGRAM(S): at 18:24

## 2021-07-16 RX ADMIN — Medication 6 MILLIGRAM(S): at 14:31

## 2021-07-16 RX ADMIN — Medication 6 MILLIGRAM(S): at 00:24

## 2021-07-16 RX ADMIN — HYDROMORPHONE HYDROCHLORIDE 1 MILLIGRAM(S): 2 INJECTION INTRAMUSCULAR; INTRAVENOUS; SUBCUTANEOUS at 11:29

## 2021-07-16 RX ADMIN — Medication 6 MILLIGRAM(S): at 21:26

## 2021-07-16 RX ADMIN — TAMSULOSIN HYDROCHLORIDE 0.4 MILLIGRAM(S): 0.4 CAPSULE ORAL at 21:27

## 2021-07-16 RX ADMIN — QUETIAPINE FUMARATE 25 MILLIGRAM(S): 200 TABLET, FILM COATED ORAL at 21:26

## 2021-07-16 RX ADMIN — Medication 24 UNIT(S): at 14:41

## 2021-07-16 RX ADMIN — Medication 3 MILLIGRAM(S): at 10:01

## 2021-07-16 RX ADMIN — SENNA PLUS 2 TABLET(S): 8.6 TABLET ORAL at 21:27

## 2021-07-16 RX ADMIN — Medication 81 MILLIGRAM(S): at 14:31

## 2021-07-16 RX ADMIN — Medication 650 MILLIGRAM(S): at 18:53

## 2021-07-16 RX ADMIN — Medication 300 MILLIGRAM(S): at 14:31

## 2021-07-16 RX ADMIN — Medication 24 UNIT(S): at 17:12

## 2021-07-16 RX ADMIN — FINASTERIDE 5 MILLIGRAM(S): 5 TABLET, FILM COATED ORAL at 14:31

## 2021-07-16 RX ADMIN — Medication 1: at 14:41

## 2021-07-16 RX ADMIN — HYDROMORPHONE HYDROCHLORIDE 1 MILLIGRAM(S): 2 INJECTION INTRAMUSCULAR; INTRAVENOUS; SUBCUTANEOUS at 10:59

## 2021-07-16 RX ADMIN — Medication 6 MILLIGRAM(S): at 05:36

## 2021-07-16 RX ADMIN — PANTOPRAZOLE SODIUM 40 MILLIGRAM(S): 20 TABLET, DELAYED RELEASE ORAL at 14:30

## 2021-07-16 RX ADMIN — Medication 2: at 07:45

## 2021-07-16 RX ADMIN — INSULIN GLARGINE 52 UNIT(S): 100 INJECTION, SOLUTION SUBCUTANEOUS at 21:26

## 2021-07-16 NOTE — CHART NOTE - NSCHARTNOTEFT_GEN_A_CORE
Nutrition Follow Up Note  Patient seen for: malnutrition follow-up    Chart reviewed, events noted. Per chart, Pt with Hodgkin's Lymphoma presenting with weakness and FTT s/p 1st rx with opdivo 7/6. next due on 8/3.    Source: [] Patient       [x] EMR        [] RN        [] Family at bedside       [] Other:    -If unable to interview patient:  [x] Disoriented/confused/inappropriate to interview    Diet Order:   Diet, Dysphagia 3 Soft-Nectar Consistency Fluid:   Consistent Carbohydrate {No Snacks} (CSTCHO)  Supplement Feeding Modality:  Oral  Ensure Enlive Servings Per Day:  3       Frequency:  Daily (07-10-21)  -- SLP performed MBS on 7/10 and recommended Dysphagia 3, nectar thickened liquids    Per Pt's RN, Pt consumed ~50% of breakfast this morning and RN observed Pt drinking Ensure. Per RN flowsheets, Pt consuming % of meals over the past 5 days.     GI: Per chart, Pt without any GI distress (nausea/vomiting/diarrhea/constipation). Last BM x4 on 7/16.   Bowel Regimen? [x] Yes   [] No    Current dosing weight: 125 Kg (7/12); no new weights per chart, will continue to monitor.    Nutritionally Pertinent MEDICATIONS  (STANDING):  allopurinol  dexAMETHasone  Injectable  dextrose 40% Gel  dextrose 5%.  dextrose 5%.  dextrose 50% Injectable  dextrose 50% Injectable  dextrose 50% Injectable  finasteride  glucagon  Injectable  insulin glargine Injectable (LANTUS)  insulin lispro (ADMELOG) corrective regimen sliding scale  insulin lispro (ADMELOG) corrective regimen sliding scale  insulin lispro Injectable (ADMELOG)  pantoprazole   Suspension  senna  tamsulosin    Pertinent Labs: 07-16 @ 06:20: Na 138, BUN 18, Cr 0.38<L>, <H>, K+ 3.6; POCT Blood Glucose x24 hrs: 188-412 mg/dL     A1C with Estimated Average Glucose Result: 7.7 % (07-01-21 @ 12:40)    Skin: no pressure-related injuries per nursing flowsheets   Edema: 1+ edema to b/l feet     Estimated Needs:   [x] no change since previous assessment  [] recalculated:       Previous Nutrition Diagnosis: Severe Malnutrition, Chronic.  Nutrition Diagnosis is: [x] ongoing --- being addressed with nutritional oral supplementation, monitoring PO intake and weight trends     New Nutrition Diagnosis: [x] Not applicable    Education Provided:       [] Yes:  [x] No:     Recommendations/Interventions:  1. Continue diet; consistency per SLP recommendations  2. Continue Ensure Enlive x3/day (provides 350cal, 20Gm protein per 8oz serving) as ordered   3. Adjustments to bowel regimen as appropriate   4. Obtain food preferences, honor as able   5. Continue to monitor weight trends, PO intake, GI function, electrolytes, and skin integrity     RD remains available upon request and will follow up per protocol.     Maria Victoria Walter RD CDN (Pager [phone]).

## 2021-07-16 NOTE — PROCEDURE NOTE - ADDITIONAL PROCEDURE DETAILS
S/P FLUOROSCOPICALLY GUIDED LUMBAR PUNCTURE AT THE L3-L4 LEVEL. DRAINED APPROXIMATELY 12 ML OF CLEAR CSF. CSF SPECIMENS WERE HAND DELIVERED TO THE LABORATORY. PT TOLERATED THE PROCEDURE WELL. HEMOSTASIS SECURE.

## 2021-07-16 NOTE — PROGRESS NOTE ADULT - SUBJECTIVE AND OBJECTIVE BOX
LIZETT RIVERA  MRN-[id number]    Patient is a 71y old  Male who presents with a chief complaint of fever (12 Jul 2021 14:37)      Review of System  REVIEW OF SYSTEMS      General:	Denies fatigue, fevers, chills, sweats, decreased appetite.    Skin/Breast: denies pruritis, rash  	  Ophthalmologic: no change in vision or blurring  	  HEENT	Denies dry mouth, oral sores, dysphagia,  change in hearing.    Respiratory and Thorax:  cough, sob, wheeze, hemoptysis  	  Cardiovascular:	no cp , palp, orthopnea    Gastrointestinal:	no n/v/d constipation    Genitourinary:	no dysuria of frequency, no hematuria, no flank pain    Musculoskeletal:	no bone or joint pain. no muscle aches.     Neurological:	no change in sensory or motor function. no headache. no weakness.     Psychiatric:	no depression, no anxiety, insomnia.     Hematology/Lymphatics:	no bleeding or bruising        Current Meds  MEDICATIONS  (STANDING):  allopurinol 300 milliGRAM(s) Oral daily  aspirin  chewable 81 milliGRAM(s) Oral daily  dexAMETHasone  Injectable 6 milliGRAM(s) IV Push every 6 hours  dextrose 40% Gel 15 Gram(s) Oral once  dextrose 5%. 1000 milliLiter(s) (50 mL/Hr) IV Continuous <Continuous>  dextrose 5%. 1000 milliLiter(s) (100 mL/Hr) IV Continuous <Continuous>  dextrose 50% Injectable 25 Gram(s) IV Push once  dextrose 50% Injectable 12.5 Gram(s) IV Push once  dextrose 50% Injectable 25 Gram(s) IV Push once  finasteride 5 milliGRAM(s) Oral daily  glucagon  Injectable 1 milliGRAM(s) IntraMuscular once  insulin glargine Injectable (LANTUS) 52 Unit(s) SubCutaneous at bedtime  insulin lispro (ADMELOG) corrective regimen sliding scale   SubCutaneous three times a day before meals  insulin lispro (ADMELOG) corrective regimen sliding scale   SubCutaneous at bedtime  insulin lispro Injectable (ADMELOG) 24 Unit(s) SubCutaneous three times a day before meals  pantoprazole   Suspension 40 milliGRAM(s) Oral daily  QUEtiapine 25 milliGRAM(s) Oral at bedtime  senna 2 Tablet(s) Oral at bedtime  tamsulosin 0.4 milliGRAM(s) Oral at bedtime    MEDICATIONS  (PRN):  acetaminophen   Tablet .. 650 milliGRAM(s) Oral every 6 hours PRN Temp greater or equal to 38C (100.4F), Moderate Pain (4 - 6)  bisacodyl 5 milliGRAM(s) Oral every 12 hours PRN Constipation  polyethylene glycol 3350 17 Gram(s) Oral daily PRN Constipation      Vitals  Vital Signs Last 24 Hrs  T(C): 36.8 (16 Jul 2021 10:56), Max: 36.8 (16 Jul 2021 10:56)  T(F): 98.2 (16 Jul 2021 10:56), Max: 98.2 (16 Jul 2021 10:56)  HR: 99 (16 Jul 2021 10:56) (52 - 99)  BP: 127/72 (16 Jul 2021 10:56) (121/73 - 156/81)  BP(mean): --  RR: 18 (16 Jul 2021 10:56) (18 - 18)  SpO2: 100% (16 Jul 2021 10:56) (97% - 100%)    Physical Exam  PHYSICAL EXAM:      Constitutional: NAD    Eyes: PERRLA EOMI, anicteric sclera    Heent :No oral sores, no pharyngeal injection. moist mucosa.    Neck: supple, no jvd, no LAD    Respiratory: CTA b/l     Cardiovascular: s1s2, no m/g/r    Gastrointestinal: soft, nt, nd, + BS    Extremities: no c/c/e    Neurological:A&O x 3 moves all ext.    Skin: no rash on exposed skin    Lymph Nodes: no lymphadenopathy.              Lab  CBC Full  -  ( 16 Jul 2021 09:01 )  WBC Count : 2.92 K/uL  RBC Count : 3.13 M/uL  Hemoglobin : 9.1 g/dL  Hematocrit : 29.9 %  Platelet Count - Automated : 56 K/uL  Mean Cell Volume : 95.5 fl  Mean Cell Hemoglobin : 29.1 pg  Mean Cell Hemoglobin Concentration : 30.4 gm/dL  Auto Neutrophil # : x  Auto Lymphocyte # : x  Auto Monocyte # : x  Auto Eosinophil # : x  Auto Basophil # : x  Auto Neutrophil % : x  Auto Lymphocyte % : x  Auto Monocyte % : x  Auto Eosinophil % : x  Auto Basophil % : x    07-16    138  |  100  |  18  ----------------------------<  216<H>  3.6   |  24  |  0.38<L>    Ca    8.5      16 Jul 2021 06:20      PT/INR - ( 15 Jul 2021 15:03 )   PT: 13.4 sec;   INR: 1.12 ratio             Rad:    Assessment/Plan   LIZETT RIVERA  MRN-[id number]    Patient is a 71y old  Male who presents with a chief complaint of fever (12 Jul 2021 14:37)      Review of System    No new events  Seen earlier    Current Meds  MEDICATIONS  (STANDING):  allopurinol 300 milliGRAM(s) Oral daily  aspirin  chewable 81 milliGRAM(s) Oral daily  dexAMETHasone  Injectable 6 milliGRAM(s) IV Push every 6 hours  dextrose 40% Gel 15 Gram(s) Oral once  dextrose 5%. 1000 milliLiter(s) (50 mL/Hr) IV Continuous <Continuous>  dextrose 5%. 1000 milliLiter(s) (100 mL/Hr) IV Continuous <Continuous>  dextrose 50% Injectable 25 Gram(s) IV Push once  dextrose 50% Injectable 12.5 Gram(s) IV Push once  dextrose 50% Injectable 25 Gram(s) IV Push once  finasteride 5 milliGRAM(s) Oral daily  glucagon  Injectable 1 milliGRAM(s) IntraMuscular once  insulin glargine Injectable (LANTUS) 52 Unit(s) SubCutaneous at bedtime  insulin lispro (ADMELOG) corrective regimen sliding scale   SubCutaneous three times a day before meals  insulin lispro (ADMELOG) corrective regimen sliding scale   SubCutaneous at bedtime  insulin lispro Injectable (ADMELOG) 24 Unit(s) SubCutaneous three times a day before meals  pantoprazole   Suspension 40 milliGRAM(s) Oral daily  QUEtiapine 25 milliGRAM(s) Oral at bedtime  senna 2 Tablet(s) Oral at bedtime  tamsulosin 0.4 milliGRAM(s) Oral at bedtime    MEDICATIONS  (PRN):  acetaminophen   Tablet .. 650 milliGRAM(s) Oral every 6 hours PRN Temp greater or equal to 38C (100.4F), Moderate Pain (4 - 6)  bisacodyl 5 milliGRAM(s) Oral every 12 hours PRN Constipation  polyethylene glycol 3350 17 Gram(s) Oral daily PRN Constipation      Vitals  Vital Signs Last 24 Hrs  T(C): 36.8 (16 Jul 2021 10:56), Max: 36.8 (16 Jul 2021 10:56)  T(F): 98.2 (16 Jul 2021 10:56), Max: 98.2 (16 Jul 2021 10:56)  HR: 99 (16 Jul 2021 10:56) (52 - 99)  BP: 127/72 (16 Jul 2021 10:56) (121/73 - 156/81)  BP(mean): --  RR: 18 (16 Jul 2021 10:56) (18 - 18)  SpO2: 100% (16 Jul 2021 10:56) (97% - 100%)      PHYSICAL EXAM:    Constitutional: NAD    Lab  CBC Full  -  ( 16 Jul 2021 09:01 )  WBC Count : 2.92 K/uL  RBC Count : 3.13 M/uL  Hemoglobin : 9.1 g/dL  Hematocrit : 29.9 %  Platelet Count - Automated : 56 K/uL  Mean Cell Volume : 95.5 fl  Mean Cell Hemoglobin : 29.1 pg  Mean Cell Hemoglobin Concentration : 30.4 gm/dL  Auto Neutrophil # : x  Auto Lymphocyte # : x  Auto Monocyte # : x  Auto Eosinophil # : x  Auto Basophil # : x  Auto Neutrophil % : x  Auto Lymphocyte % : x  Auto Monocyte % : x  Auto Eosinophil % : x  Auto Basophil % : x    07-16    138  |  100  |  18  ----------------------------<  216<H>  3.6   |  24  |  0.38<L>    Ca    8.5      16 Jul 2021 06:20      PT/INR - ( 15 Jul 2021 15:03 )   PT: 13.4 sec;   INR: 1.12 ratio             Rad:    Assessment/Plan

## 2021-07-16 NOTE — PROGRESS NOTE ADULT - ASSESSMENT
1) Hodgkin's lymphoma  - repeat CT scans, results noted  -[facility]- Family meeting held at bedside on 7/2. We discussed rx options vs comfort care. Family and patient wish to pursue rx. Ongoing conversations with family members have been taking place since our formal meeting.   - allopurinol  - pt s/p 1st rx with opdivo 7/6. next due on 8/3.  - in light of recent development, may need to move up timetable on use of systemic CTX, ABVD     2) ENT- voice change is a new finding as per patient's sister. Patient also with weak voice. ? due to leptomeningeal disease?  ENT has seen patient.  dysphagia has been under evaluation.     3)Hyperglycemia- mgmt as per med. Now likely will get worse with steroids now on board.    4)Weakness. 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  - Neurology input noted.  - cont pt.  - bed to chair - d/w RN, and floor team  - lumbar mri , results noted     5) Pancytopenia. Suspect multifactorial, due to hodgkins, and possibly with role being played by recent abx.  anemia- w/u this far unremarkable. possible aocd.  transfusional support as needed    Leukopenia- unclear etiology- possibly due to infxn or malignancy. Also could be related to zosyn. Now off of abx. monitor cbc     Thrombocytopenia- has developed while here.  Can't r/o malignancy vs. due to zosyn.   manage supportively for now.    6) Leptomenigeal involvement.  MRI findings noted.  case d/w Dr. Alas, neurology and neurosurgery.  Neurooncology Dr. Smith on board. Case d/w her  Steroids initiated.  MRI of neuro axis ordered.  Initiated, but couldn't complete contrast. This was discussed with MRI, contrast study to be completed today  may need LP.    6. Fever- resolved. ID has been on board    7. - will s/w medicine about mgmt of escamilla. 1) Hodgkin's lymphoma  - repeat CT scans, results noted  -[facility]- Family meeting held at bedside on 7/2. We discussed rx options vs comfort care. Family and patient wish to pursue rx. Ongoing conversations with family members have been taking place since our formal meeting.   - allopurinol  - pt s/p 1st rx with opdivo 7/6. next due on 8/3.  - in light of recent development, may need to move up timetable on use of systemic CTX, ABVD     2) ENT- voice change is a new finding as per patient's sister. Patient also with weak voice. ? due to leptomeningeal disease?  ENT has seen patient.  dysphagia has been under evaluation.     3)Hyperglycemia- mgmt as per med. Now likely will get worse with steroids now on board.    4)Weakness. 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  - Neurology input noted.  - cont pt.  - bed to chair - d/w RN, and floor team  - lumbar mri , results noted     5) Pancytopenia. Suspect multifactorial, due to hodgkins, and possibly with role being played by recent abx.  anemia- w/u this far unremarkable. possible aocd.  transfusional support as needed    Leukopenia- unclear etiology- possibly due to infxn or malignancy. Also could be related to zosyn. Now off of abx. monitor cbc     Thrombocytopenia- has developed while here.  Can't r/o malignancy vs. due to zosyn.   manage supportively for now.    6) Leptomenigeal involvement.  MRI findings noted.  case d/w Dr. Alas, neurology and neurosurgery.  Neurooncology Dr. Smith on board. Case d/w her  Steroids initiated.  MRI of neuro axis ordered.  Initiated, but couldn't complete contrast. This was discussed with MRI, contrast study to be completed   also for LP.    6. Fever- resolved. ID has been on board

## 2021-07-16 NOTE — PROGRESS NOTE ADULT - SUBJECTIVE AND OBJECTIVE BOX
Date of service: 07-16-21 @ 12:22      Patient is a 71y old  Male who presents with a chief complaint of fever (12 Jul 2021 14:37)                                                               INTERVAL HPI/OVERNIGHT EVENTS:    REVIEW OF SYSTEMS:     CONSTITUTIONAL: No weakness, fevers or chills  RESPIRATORY: No cough, wheezing,  No shortness of breath  CARDIOVASCULAR: No chest pain or palpitations  GASTROINTESTINAL: No abdominal pain  . No nausea, vomiting, or hematemesis; No diarrhea or constipation. No melena or hematochezia.  GENITOURINARY: No dysuria, frequency or hematuria  NEUROLOGICAL: No numbness or weakness                                                                                                                                                                                                                                                                                Medications:  MEDICATIONS  (STANDING):  allopurinol 300 milliGRAM(s) Oral daily  aspirin  chewable 81 milliGRAM(s) Oral daily  dexAMETHasone  Injectable 6 milliGRAM(s) IV Push every 6 hours  dextrose 40% Gel 15 Gram(s) Oral once  dextrose 5%. 1000 milliLiter(s) (50 mL/Hr) IV Continuous <Continuous>  dextrose 5%. 1000 milliLiter(s) (100 mL/Hr) IV Continuous <Continuous>  dextrose 50% Injectable 25 Gram(s) IV Push once  dextrose 50% Injectable 12.5 Gram(s) IV Push once  dextrose 50% Injectable 25 Gram(s) IV Push once  finasteride 5 milliGRAM(s) Oral daily  glucagon  Injectable 1 milliGRAM(s) IntraMuscular once  insulin glargine Injectable (LANTUS) 52 Unit(s) SubCutaneous at bedtime  insulin lispro (ADMELOG) corrective regimen sliding scale   SubCutaneous three times a day before meals  insulin lispro (ADMELOG) corrective regimen sliding scale   SubCutaneous at bedtime  insulin lispro Injectable (ADMELOG) 24 Unit(s) SubCutaneous three times a day before meals  pantoprazole   Suspension 40 milliGRAM(s) Oral daily  QUEtiapine 25 milliGRAM(s) Oral at bedtime  senna 2 Tablet(s) Oral at bedtime  tamsulosin 0.4 milliGRAM(s) Oral at bedtime    MEDICATIONS  (PRN):  acetaminophen   Tablet .. 650 milliGRAM(s) Oral every 6 hours PRN Temp greater or equal to 38C (100.4F), Moderate Pain (4 - 6)  bisacodyl 5 milliGRAM(s) Oral every 12 hours PRN Constipation  polyethylene glycol 3350 17 Gram(s) Oral daily PRN Constipation       Allergies    No Known Allergies    Intolerances      Vital Signs Last 24 Hrs  T(C): 36.8 (16 Jul 2021 10:56), Max: 36.8 (16 Jul 2021 10:56)  T(F): 98.2 (16 Jul 2021 10:56), Max: 98.2 (16 Jul 2021 10:56)  HR: 99 (16 Jul 2021 10:56) (52 - 99)  BP: 127/72 (16 Jul 2021 10:56) (121/73 - 156/81)  BP(mean): --  RR: 18 (16 Jul 2021 10:56) (18 - 18)  SpO2: 100% (16 Jul 2021 10:56) (97% - 100%)  CAPILLARY BLOOD GLUCOSE      POCT Blood Glucose.: 229 mg/dL (16 Jul 2021 07:28)  POCT Blood Glucose.: 355 mg/dL (15 Jul 2021 21:06)  POCT Blood Glucose.: 410 mg/dL (15 Jul 2021 16:28)  POCT Blood Glucose.: 412 mg/dL (15 Jul 2021 16:27)      07-15 @ 07:01  -  07-16 @ 07:00  --------------------------------------------------------  IN: 980 mL / OUT: 1650 mL / NET: -670 mL    07-16 @ 07:01  -  07-16 @ 12:22  --------------------------------------------------------  IN: 240 mL / OUT: 0 mL / NET: 240 mL      Physical Exam:    Daily     Daily   General:  NAD   HEENT:  Nonicteric, PERRLA  CV:  RRR, S1S2   Lungs:  CTA B/L, no wheezes, rales, rhonchi  Abdomen:  Soft, non-tender, no distended, positive BS  Extremities:  no edema   Neuro:  AAOx3,              9.1    2.92  )-----------( 56       ( 16 Jul 2021 09:01 )             29.9                      07-16    138  |  100  |  18  ----------------------------<  216<H>  3.6   |  24  |  0.38<L>    Ca    8.5      16 Jul 2021 06:20                         RADIOLOGY & ADDITIONAL TESTS         I personally reviewed: [  ]EKG   [  ]CXR    [  ] CT      A/P:         Discussed with :     [facility] consultants' Notes   Time spent :

## 2021-07-16 NOTE — PROGRESS NOTE ADULT - ASSESSMENT
Assessment  DMT2: 71y Male with DM T2 with hyperglycemia, A1C 8.8%, was on oral meds/Janumet at home, admitted with weakness/fatigue and hyperglycemia, now on large-dose basal bolus insulin, increased dose yesterday, blood sugars are overall improving though still running high in the setting of high-dose steroids, no hypoglycemic episodes. Patient is eating meals, for LP today, remains on dexamethasone.  Lymphoma: on medications, monitored, FU Heme/Onc.  Anemia: stable, monitored.      Shahriar Kahn MD  Cell: [phone]  Office: [phone]       Assessment  DMT2: 71y Male with DM T2 with hyperglycemia, A1C 8.8%, was on oral meds/Janumet at home, admitted with weakness/fatigue and hyperglycemia, now on large-dose basal bolus insulin, increased dose yesterday, blood sugars are  overall improving though still running high in the setting of high-dose steroids, no hypoglycemic episodes. Patient is eating meals, for LP today, remains on dexamethasone.  Lymphoma: on medications, monitored, FU Heme/Onc.  Anemia: stable, monitored.      Shahriar Kahn MD  Cell: [phone]  Office: [phone]

## 2021-07-16 NOTE — PROGRESS NOTE ADULT - ASSESSMENT
72 y/o M w/ PMHx of CVA this past August and got TPA per family member, DM, BPH with obstruction s/p escamilla catheter, s/p ERCP w Sphincterectomy recent admission to [facility] for sepsis  Hodgkin lymphoma was not offered any chemo and was placed on hospice.     now presenting with weakness and FTT.   pt denies cp / SOB / palpitations   no focal neuro complaints .. at baseline RLE weakness   no N/V   had one episode of diarrhea   no urinary sx  abdominal pain, diffuse, but worse on R side.   Afib RVR overnight. Was asymptomatic   no known history of this as per patient

## 2021-07-16 NOTE — PROGRESS NOTE ADULT - ASSESSMENT
72 y/o M w/ PMHx of CVA this past August and got TPA per family member, DM, BPH with obstruction s/p escamilla catheter, s/p ERCP w Sphincterectomy recent admission to [facility] for sepsis  Hodgkin lymphoma was not offered any chemo and was placed on hospice.     now presenting with weakness and FTT.   pt denies cp / SOB / palpitations   no focal neuro complaints .. at baseline RLE weakness   no N/V   had one episode of diarrhea   no urinary sx  abdominal pain, diffuse, but worse on R side.     - Failure to thrive: cultures neg  CT chest noted   nutrition consult   encourage PO intake: dysphagia 3 based on MBS results     - lymphoma: d/w Dr. Larios: s/p immunotherapy   planned  chemo as inpt    - DM with hyperglycemia : improved now worse with steroids   fu with endo     - anemia: monitor H/H post transfusion, stable.     - Fever: doubt infectious etiology   likely due to immunotherapy/lymphoma. culture: neg   abx dced. s/p IVF, ID f/u appreciated.     - voice changes: CT neck : Asymmetric enlargement of the left palatine tonsil, suspicious for lymphomatous involvement given history. Correlate with direct visualization.  ENT had eval pt: no gross pathology on visualization   S/S eval: MBS done. speech: dysphagia 3    - Tachycardia: VA duplex neg for DVT   rate stable. noted 7 beats NSVT.  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 being expedited   cont  steroids   discussed with IR  LP : planned for today   planned chemo early next week       - Afib : now in sinus : monitor   appreicate cardio input   no AC at this time and no AVNB at this time

## 2021-07-16 NOTE — PRE PROCEDURE NOTE - PRE PROCEDURE EVALUATION
Neuroradiology    HPI: 71y Male with h/o Hodgkin's lymphoma with possible leptomeningeal involvement referred to Neuroradiology for LP.      PAST MEDICAL & SURGICAL HISTORY:  Cerebrovascular accident (CVA)    Diabetes mellitus    Hodgkin lymphoma    No significant past surgical history      Allergies  No Known Allergies    Medications (Abx/Cardiac/Anticoagulation/Blood Products)  aspirin  chewable: 81 milliGRAM(s) Oral (07-15 @ 11:28)  tamsulosin: 0.4 milliGRAM(s) Oral (07-15 @ 22:10)    Data:    T(C): 36.3  HR: 68  BP: 137/68  RR: 18  SpO2: 97%    -WBC 2.50 / HgB 7.9 / Hct 25.5 / Plt 53  -Na 138 / Cl 100 / BUN 18 / Glucose 216  -K 3.6 / CO2 24 / Cr 0.38  -ALT -- / Alk Phos -- / T.Bili --  -INR1.12    Labs:                        7.9    2.50  )-----------( 53       ( 16 Jul 2021 06:20 )             25.5     07-16    138  |  100  |  18  ----------------------------<  216<H>  3.6   |  24  |  0.38<L>    Ca    8.5      16 Jul 2021 06:20      PT/INR - ( 15 Jul 2021 15:03 )   PT: 13.4 sec;   INR: 1.12 ratio    Activated Partial Thromboplastin Time in AM (06.28.21 @ 13:49)    Activated Partial Thromboplastin Time: 32.3 sec      Plan: 71y Male presents for fluoroscopically guided lumbar puncture.    -Risks/Benefits/alternatives explained with the patient and/or healthcare proxy and witnessed informed consent obtained. Telephone family consent obtained from the patient's HCP/nephew and documented.    MISAEL Gunn  [facility] [id number]  Ext [phone]

## 2021-07-16 NOTE — PROGRESS NOTE ADULT - PROBLEM SELECTOR PLAN 1
Will continue current insulin regimen for now. Will continue monitoring FS and FU, will adjust insulin dose as steroids are tapered/dc.   for compliance with consistent low-carb diet, nutritional shakes and supplements as tolerated. FU RD recommendations.

## 2021-07-16 NOTE — PROGRESS NOTE ADULT - SUBJECTIVE AND OBJECTIVE BOX
Subjective: Patient seen and examined. No new events except as noted.     REVIEW OF SYSTEMS:    CONSTITUTIONAL: No weakness, fevers or chills  EYES/ENT: No visual changes;  No vertigo or throat pain   NECK: No pain or stiffness  RESPIRATORY: No cough, wheezing, hemoptysis; No shortness of breath  CARDIOVASCULAR: No chest pain or palpitations  GASTROINTESTINAL: No abdominal or epigastric pain. No nausea, vomiting, or hematemesis; No diarrhea or constipation. No melena or hematochezia.  GENITOURINARY: No dysuria, frequency or hematuria  NEUROLOGICAL: No numbness or weakness  SKIN: No itching, burning, rashes, or lesions   All other review of systems is negative unless indicated above.    MEDICATIONS:  MEDICATIONS  (STANDING):  allopurinol 300 milliGRAM(s) Oral daily  aspirin  chewable 81 milliGRAM(s) Oral daily  dexAMETHasone  Injectable 6 milliGRAM(s) IV Push every 6 hours  dextrose 40% Gel 15 Gram(s) Oral once  dextrose 5%. 1000 milliLiter(s) (50 mL/Hr) IV Continuous <Continuous>  dextrose 5%. 1000 milliLiter(s) (100 mL/Hr) IV Continuous <Continuous>  dextrose 50% Injectable 25 Gram(s) IV Push once  dextrose 50% Injectable 12.5 Gram(s) IV Push once  dextrose 50% Injectable 25 Gram(s) IV Push once  finasteride 5 milliGRAM(s) Oral daily  glucagon  Injectable 1 milliGRAM(s) IntraMuscular once  insulin glargine Injectable (LANTUS) 52 Unit(s) SubCutaneous at bedtime  insulin lispro (ADMELOG) corrective regimen sliding scale   SubCutaneous three times a day before meals  insulin lispro (ADMELOG) corrective regimen sliding scale   SubCutaneous at bedtime  insulin lispro Injectable (ADMELOG) 24 Unit(s) SubCutaneous three times a day before meals  pantoprazole   Suspension 40 milliGRAM(s) Oral daily  QUEtiapine 25 milliGRAM(s) Oral at bedtime  senna 2 Tablet(s) Oral at bedtime  tamsulosin 0.4 milliGRAM(s) Oral at bedtime      PHYSICAL EXAM:  T(C): 36.8 (07-16-21 @ 10:56), Max: 36.8 (07-16-21 @ 10:56)  HR: 99 (07-16-21 @ 10:56) (52 - 99)  BP: 127/72 (07-16-21 @ 10:56) (121/73 - 156/81)  RR: 18 (07-16-21 @ 10:56) (18 - 18)  SpO2: 100% (07-16-21 @ 10:56) (97% - 100%)  Wt(kg): --  I&O's Summary    15 Jul 2021 07:01  -  16 Jul 2021 07:00  --------------------------------------------------------  IN: 980 mL / OUT: 1650 mL / NET: -670 mL    16 Jul 2021 07:01  -  16 Jul 2021 11:29  --------------------------------------------------------  IN: 240 mL / OUT: 0 mL / NET: 240 mL          Appearance: Normal	  HEENT:   Normal oral mucosa, PERRL, EOMI	  Lymphatic: No lymphadenopathy , no edema  Cardiovascular: Normal S1 S2, No JVD, No murmurs , Peripheral pulses palpable 2+ bilaterally  Respiratory: Lungs clear to auscultation, normal effort 	  Gastrointestinal:  Soft, Non-tender, + BS	  Skin: No rashes, No ecchymoses, No cyanosis, warm to touch  Musculoskeletal: Normal range of motion, normal strength  Psychiatry:  Mood & affect appropriate  Ext: No edema      LABS:    CARDIAC MARKERS:                                9.1    2.92  )-----------( 56       ( 16 Jul 2021 09:01 )             29.9     07-16    138  |  100  |  18  ----------------------------<  216<H>  3.6   |  24  |  0.38<L>    Ca    8.5      16 Jul 2021 06:20      proBNP:   Lipid Profile:   HgA1c:   TSH:             TELEMETRY: SR 	    ECG:  	  RADIOLOGY:   DIAGNOSTIC TESTING:  [ ] Echocardiogram:  [ ]  Catheterization:  [ ] Stress Test:    OTHER:

## 2021-07-16 NOTE — PROGRESS NOTE ADULT - PROBLEM SELECTOR PLAN 1
PAF, now in SR   BP remains tenuous and soft   for now hold off on AV noelle blockers   Will cont monitor on Tele   Obviously no AC given anemia and thrombocytopenia.

## 2021-07-16 NOTE — CHART NOTE - NSCHARTNOTEFT_GEN_A_CORE
NEURO-ONCOLOGY ATTENDING:  FAVIOLA [phone]  	  CC: ABNORMAL MRI, HODGKIN’S LYMPHOMA, PARAPARESIS    SUNRISE RECORDS REVIEWED   ALLSCRIPTS RECORDS REVIEWED  LABS REVIEWED  IMAGING REVIEWED  DISCUSSED WITH YVONNE SOLIMAN)    70 yo RH man with PMHx CVA (AUGUST 2020, s/p TPA), DM, HTN, BPH, Hodgkin’s lymphoma, now with enhancing nerve roots and paraparesis.  He was admitted to [facility] on 6/28/2021. Admission notes indicate he presented with weakness (since AUGUST 2020) and failure to thrive. He had RLE weakness, denied FALLS, SOB, N/V, and fever. He had abdominal pain which was mostly diffuse, but somewhat worse on the right side and an episode of diarrhea.  A family member provided history that the patient suffers from BPH, had an obstruction requiring Escamilla catheter, was s/p ERCP with sphicterectomy during a recent admission to [facility] for sepsis and then was discharged from that institution to home hospice.  6/29/2021 – COLLEENON[facility] (Ric) evaluated patient and noted patient had been independent for ADLs until FEBRUARY 2021. There was new hoarseness of his voice. Albumin was 2.8, Total Bili was 1.4, Alk Phos=378. Given the new weakness, neurology consult requested.  6/30/2021 – NEUROLOGY (Ricardo) evaluation revealed a hypophonic voice, myelosuppression (HgB=7.1, platelets=106) and DTRs were present. Weakness felt to be related to general medical condition, not to a CVA or focal neurologic deficits.  7/6/2021 – INFECTIOUS DISEASES ([facility]) evaluation at 17:35 without major infections identified, fever thought to represent B symptoms. Hlsj=075.4 (20:57). A rapid response for fever and chills was called on the same day (22:23).  7/7/2021 – ID re-evaluation – “Yesterday had episode of fever with tachycardia which I still suspect are b symptoms but await latest cultures. He continues to oxygenate fine, renal function is normal, mentation is normal, exam unrevealing, clear cxr. High alk p present since admission and likely is from his underlying disease. Recent ct shows no liver or gb lesions.”    7/9/2021 – HEME-ONC notes indicate patient is s/p Opdivo cycle #1 on 7/6/21, with next dose planned for 8/3/2021. A [facility] meeting (with family) was held on 7/2/2021.  7/12/2021 – NEUROSURGERY (NIRUFI) evaluation for enhancing mass lesion in LS spine at L45. No neurosurgical intervention required.  7/13/2021 – NEURO-ONCOLOGY EVALUATION (ZUHAIR) reports, “PMH (obtained through chart review, as well as discussions with is oncologist Dr. Larios and his family members Kevin and his wife - his family insists that in August he was highly functioning, walking, driving, and caring for his autistic son. His wife reports that one day in August, he complained of a headaches - she have him Advil and about half hour later he was noted to be very confused - they called 911 and he was brought to [facility] -   She denies that he had weakness at that time. He was able to be discharged home and was again fully functional until late February, early March when he seemed to be sleeping more and was feeling generally weak. He was brought to an outside hospital - he was having urinary retention and was seen by Urology who felt this was related to an enlarged prostate -he still has a catheter in place. He states he is aware of when he needs to have a bowel movement and has control over this.” MRI results noted and completion of EOD recommended, with imaging of remainder of spine.  7/16/2021 – HEME-ONC considering use of systemic chemotherapy (ABVD). LP performed (opening pressure not reported, 12cc of CSF sent for studies).      IMAGING  I personally and independently reviewed available MR and CT imaging, for the following interpretation: The NEUROimaging reviewed is dated  7/16/21 (brain)	7/14/21 (spine)	7/16/21 (spine)	  In reviewing these images, I find no intracranial parenchymal HYPERINTENSITY on the T2 weighted images, with greater than usual atrophic changes.   I am concerned about   On the contrast enhanced images, there is some odd-appearing enhancement in the most caudal portion of the LS spine, likely representing CSF neoplasm.  DWI imaging shows no acute CVA.      LABS  7/16/21  CSF PROTEIN=158; GLUCOSE=135; WBC= RBC=  2.92\9.1/56      /29.9\  6/28/21  3.46\8.1/166     /26.4\    7/16/21  138|100|18/123  3.6|24|0.38 \    6/29/21 VIT E05=1162 IRON-TRRY=541; IRIN (TOTAL)=55; %IRON SAT=33  6/28/21 URINALYSIS SHOWED “MANY BUDDING YEAST” and 1000 glucose  7/7/21 URINALYSIS SHOWED MANY BUDDING YEAST, NO GLUCOSE AND TRACE PROTEIN  7/5/21 FUNGITELL WAS ELEVATED TO 256L HEP B CORE AND SURFACE AB REACTIVE.    IMPRESSION/PLAN  FEVERS – Perhaps related to the colonization of his bladder with yeast. Perhaps escamilla might be removed permanently to reduce risk for such infections. Urology input likely valuable.    L45 ENHANCING DISEASE – likely related to Hodgkin’s, even though this neoplasm rarely spreads into the CNS and when it does, such spread is often not parenchymal nor leptomeningeal, but dura. Nonetheless, LP appropriate and would look for CD30+ lymphocytes by flow cytometry in the CSF.    LEPTOMENINGEAL DISEASE – If CSF is positive, then Ommaya placement and addition of intrathecal methotrexate chemotherapy twice weekly for four weeks likely to control disease for a while.    COORDINATION OF CARE – I explained that we are waiting for final CSF results, which should be available soon. When he is able to be discharged, please coordinate an outpatient office visit with the patient and his caregivers at my office within a week of discharge.    DISPO – As this condition continues to pose significant immediate risk for morbidity and mortality, I will coordinate further, continued outpatient care with me.    TOTAL TIME SPENT WAS 76 MINUTES, OF WHICH HALF IN COUNSELLING AND COORDINATION OF CARE. NEURO-ONCOLOGY ATTENDING:  FAVIOLA [phone]  	  CC: ABNORMAL MRI, HODGKIN’S LYMPHOMA, PARAPARESIS, FAILURE TO THRIVE    PATIENT SEEN AND EXAMINED 7/16/21  SUNRISE RECORDS REVIEWED   ALLSCRIPTS RECORDS REVIEWED  LABS REVIEWED  IMAGING REVIEWED  DISCUSSED WITH YVONNE SOLIMAN)    72 yo RH man with PMHx CVA (AUGUST 2020, s/p TPA), DM, HTN, BPH, Hodgkin’s lymphoma, now with enhancing nerve roots and paraparesis.    He was admitted to [facility] on 6/28/2021. Admission notes indicate he presented with weakness (since AUGUST 2020) and failure to thrive. He had RLE weakness, denied FALLS, SOB, N/V, and fever. He had abdominal pain which was mostly diffuse, but somewhat worse on the right side and an episode of diarrhea.    A family member provided history that the patient suffers from BPH, had an obstruction requiring Escamilla catheter, was s/p ERCP with sphicterectomy during a recent admission to [facility] for sepsis and then was discharged from that institution to home hospice.    ONCOLOGIC HISTORY  6/29/2021 – COLLE[facility]ON[facility] (Ric) evaluated patient and noted patient had been independent for ADLs until FEBRUARY 2021. There was new hoarseness of his voice. Albumin was 2.8, Total Bili was 1.4, Alk Phos=378. Given the new weakness, neurology consult requested.  6[facility]30[facility]2021 [facility] revealed a hypophonic voice, myelosuppression (HgB=7.1, platelets=106) and DTRs were present. Weakness felt to be related to general medical condition, not to a CVA or focal neurologic deficits.  7/6/2021 – [facility] ([facility]) evaluation at 17:35 without major infections identified, fever thought to represent B symptoms. Nodq=590.4 (20:57). A rapid response for fever and chills was called on the same day (22:23).  7/7/2021 – ID re-evaluation – “Yesterday had episode of fever with tachycardia which I still suspect are b symptoms but await latest cultures. He continues to oxygenate fine, renal function is normal, mentation is normal, exam unrevealing, clear cxr. High alk p present since admission and likely is from his underlying disease. Recent ct shows no liver or gb lesions.”  7/9/2021 – HEME-ONC notes indicate patient is s/p Opdivo cycle #1 on 7/6/21, with next dose planned for 8/3/2021. A [facility] meeting (with family) was held on 7/2/2021.  7/12/2021 – NEUROSURGERY ([facility]) evaluation for enhancing mass lesion in LS spine at L45. No neurosurgical intervention required.  7/13/2021 – NEURO-ONCOLOGY EVALUATION (ZUHAIR) reports, “PMH (obtained through chart review, as well as discussions with is oncologist Dr. Larios and his family members Kevin and his wife - his family insists that in August he was highly functioning, walking, driving, and caring for his autistic son. His wife reports that one day in August, he complained of a headaches - she have him Advil and about half hour later he was noted to be very confused - they called 911 and he was brought to [facility] -   She denies that he had weakness at that time. He was able to be discharged home and was again fully functional until late February, early March when he seemed to be sleeping more and was feeling generally weak. He was brought to an outside hospital - he was having urinary retention and was seen by Urology who felt this was related to an enlarged prostate -he still has a catheter in place. He states he is aware of when he needs to have a bowel movement and has control over this.” MRI results noted and completion of EOD recommended, with imaging of remainder of spine.  7/16/2021 – HEME-ONC considering use of systemic chemotherapy (ABVD). LP performed (opening pressure not reported, 12cc of CSF sent for studies).    EXAMINATION  ALERT, lethargic, hypophonic. Knows day of week  Recently sedated for MRI  Follows commands  Difficult exam due to somnolence, moves all fours, but full strength is not clear.    IMAGING  I personally and independently reviewed available MR and CT imaging, for the following interpretation: The NEUROimaging reviewed is dated 7/16/21 (brain)	7/14/21 (spine)	7/16/21 (spine)	  In reviewing these images, I find no intracranial parenchymal HYPERINTENSITY on the T2 weighted images, with greater than usual atrophic changes.   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  DWI imaging shows no acute CVA.    LABS  7/16/21  CSF PROTEIN=158; GLUCOSE=135; WBC<1 RBC=0  2.92\9.1/56      /29.9\  6/28/21  3.46\8.1/166     /26.4\    7/16/21  138|100|18/123  3.6|24|0.38 \    6/29/21 VIT U20=8421 IRON-VNCU=167; IRIN (TOTAL)=55; %IRON SAT=33  6/28/21 URINALYSIS SHOWED “MANY BUDDING YEAST” and 1000 glucose  7/7/21 URINALYSIS SHOWED MANY BUDDING YEAST, NO GLUCOSE AND TRACE PROTEIN  7/5/21 FUNGITELL WAS ELEVATED TO 256L HEP B CORE AND SURFACE AB REACTIVE.    IMPRESSION/PLAN  FEVERS – Perhaps related to the colonization of his bladder with yeast. Perhaps escamilla might be removed permanently to reduce risk for such infections. Urology input likely valuable.    L45 ENHANCING DISEASE – likely related to Hodgkin’s, even though this neoplasm rarely spreads into the CNS and when it does, such spread is often not parenchymal nor leptomeningeal, but dural.     LEPTOMENINGEAL DISEASE – CSF is not positive, as there are no WBC, doubt will find CD30+ lymphocytes by flow cytometry in the CSF.  I do not think Ommaya placement and addition of intrathecal methotrexate chemotherapy twice weekly for four weeks is needed. THe enhancing disease is probably dural, not leptomeningeal. Not unreasonable to give RT to LS spine to improve LE strength.    COORDINATION OF CARE – I explained that we are waiting for final CSF results, which should be available soon. When he is able to be discharged, please coordinate an outpatient office visit with the patient and his caregivers at my office within a week of discharge.    DISPO – As this condition continues to pose significant immediate risk for morbidity and mortality, I will coordinate further, continued outpatient care with me.    TOTAL TIME SPENT WAS 96 MINUTES, OF WHICH HALF IN COUNSELLING AND COORDINATION OF CARE.

## 2021-07-16 NOTE — PROGRESS NOTE ADULT - SUBJECTIVE AND OBJECTIVE BOX
Chief complaint  Patient is a 71y old  Male who presents with a chief complaint of fever (12 Jul 2021 14:37)   Review of systems  Patient for LP today, no hypoglycemic episodes.    Labs and Fingersticks  CAPILLARY BLOOD GLUCOSE      POCT Blood Glucose.: 229 mg/dL (16 Jul 2021 07:28)  POCT Blood Glucose.: 355 mg/dL (15 Jul 2021 21:06)  POCT Blood Glucose.: 410 mg/dL (15 Jul 2021 16:28)  POCT Blood Glucose.: 412 mg/dL (15 Jul 2021 16:27)    Medications  MEDICATIONS  (STANDING):  allopurinol 300 milliGRAM(s) Oral daily  aspirin  chewable 81 milliGRAM(s) Oral daily  dexAMETHasone  Injectable 6 milliGRAM(s) IV Push every 6 hours  dextrose 40% Gel 15 Gram(s) Oral once  dextrose 5%. 1000 milliLiter(s) (50 mL/Hr) IV Continuous <Continuous>  dextrose 5%. 1000 milliLiter(s) (100 mL/Hr) IV Continuous <Continuous>  dextrose 50% Injectable 25 Gram(s) IV Push once  dextrose 50% Injectable 12.5 Gram(s) IV Push once  dextrose 50% Injectable 25 Gram(s) IV Push once  finasteride 5 milliGRAM(s) Oral daily  glucagon  Injectable 1 milliGRAM(s) IntraMuscular once  insulin glargine Injectable (LANTUS) 52 Unit(s) SubCutaneous at bedtime  insulin lispro (ADMELOG) corrective regimen sliding scale   SubCutaneous three times a day before meals  insulin lispro (ADMELOG) corrective regimen sliding scale   SubCutaneous at bedtime  insulin lispro Injectable (ADMELOG) 24 Unit(s) SubCutaneous three times a day before meals  pantoprazole   Suspension 40 milliGRAM(s) Oral daily  QUEtiapine 25 milliGRAM(s) Oral at bedtime  senna 2 Tablet(s) Oral at bedtime  tamsulosin 0.4 milliGRAM(s) Oral at bedtime      Physical Exam  General: Patient comfortable in bed  Vital Signs Last 12 Hrs  T(F): 98.2 (07-16-21 @ 10:56), Max: 98.2 (07-16-21 @ 10:56)  HR: 99 (07-16-21 @ 10:56) (68 - 99)  BP: 127/72 (07-16-21 @ 10:56) (127/72 - 156/81)  BP(mean): --  RR: 18 (07-16-21 @ 10:56) (18 - 18)  SpO2: 100% (07-16-21 @ 10:56) (97% - 100%)  Neck: No palpable thyroid nodules.         Chief complaint  Patient is a 71y old  Male who presents with a chief complaint of fever (12 Jul 2021 14:37)   Review of systems  Patient for LP today, no hypoglycemic episodes.    Labs and Fingersticks  CAPILLARY BLOOD GLUCOSE      POCT Blood Glucose.: 229 mg/dL (16 Jul 2021 07:28)  POCT Blood Glucose.: 355 mg/dL (15 Jul 2021 21:06)  POCT Blood Glucose.: 410 mg/dL (15 Jul 2021 16:28)  POCT Blood Glucose.: 412 mg/dL (15 Jul 2021 16:27)    Medications  MEDICATIONS  (STANDING):  allopurinol 300 milliGRAM(s) Oral daily  aspirin  chewable 81 milliGRAM(s) Oral daily  dexAMETHasone  Injectable 6 milliGRAM(s) IV Push every 6 hours  dextrose 40% Gel 15 Gram(s) Oral once  dextrose 5%. 1000 milliLiter(s) (50 mL/Hr) IV Continuous <Continuous>  dextrose 5%. 1000 milliLiter(s) (100 mL/Hr) IV Continuous <Continuous>  dextrose 50% Injectable 25 Gram(s) IV Push once  dextrose 50% Injectable 12.5 Gram(s) IV Push once  dextrose 50% Injectable 25 Gram(s) IV Push once  finasteride 5 milliGRAM(s) Oral daily  glucagon  Injectable 1 milliGRAM(s) IntraMuscular once  insulin glargine Injectable (LANTUS) 52 Unit(s) SubCutaneous at bedtime  insulin lispro (ADMELOG) corrective regimen sliding scale   SubCutaneous three times a day before meals  insulin lispro (ADMELOG) corrective regimen sliding scale   SubCutaneous at bedtime  insulin lispro Injectable (ADMELOG) 24 Unit(s) SubCutaneous three times a day before meals  pantoprazole   Suspension 40 milliGRAM(s) Oral daily  QUEtiapine 25 milliGRAM(s) Oral at bedtime  senna 2 Tablet(s) Oral at bedtime  tamsulosin 0.4 milliGRAM(s) Oral at bedtime      Physical Exam  General: Patient comfortable in bed  Vital Signs Last 12 Hrs  T(F): 98.2 (07-16-21 @ 10:56), Max: 98.2 (07-16-21 @ 10:56)  HR: 99 (07-16-21 @ 10:56) (68 - 99)  BP: 127/72 (07-16-21 @ 10:56) (127/72 - 156/81)  BP(mean): --  RR: 18 (07-16-21 @ 10:56) (18 - 18)  SpO2: 100% (07-16-21 @ 10:56) (97% - 100%)  Neck: No palpable thyroid nodules.

## 2021-07-17 DIAGNOSIS — D64.89 OTHER SPECIFIED ANEMIAS: ICD-10-CM

## 2021-07-17 LAB
ANION GAP SERPL CALC-SCNC: 11 MMOL/L — SIGNIFICANT CHANGE UP (ref 5–17)
APPEARANCE UR: CLEAR — SIGNIFICANT CHANGE UP
BILIRUB UR-MCNC: NEGATIVE — SIGNIFICANT CHANGE UP
BUN SERPL-MCNC: 16 MG/DL — SIGNIFICANT CHANGE UP (ref 7–23)
CALCIUM SERPL-MCNC: 8.6 MG/DL — SIGNIFICANT CHANGE UP (ref 8.4–10.5)
CHLORIDE SERPL-SCNC: 99 MMOL/L — SIGNIFICANT CHANGE UP (ref 96–108)
CO2 SERPL-SCNC: 26 MMOL/L — SIGNIFICANT CHANGE UP (ref 22–31)
COLOR SPEC: YELLOW — SIGNIFICANT CHANGE UP
CREAT SERPL-MCNC: 0.4 MG/DL — LOW (ref 0.5–1.3)
CRYPTOC AG CSF-ACNC: NEGATIVE — SIGNIFICANT CHANGE UP
DIFF PNL FLD: NEGATIVE — SIGNIFICANT CHANGE UP
GLUCOSE BLDC GLUCOMTR-MCNC: 172 MG/DL — HIGH (ref 70–99)
GLUCOSE BLDC GLUCOMTR-MCNC: 179 MG/DL — HIGH (ref 70–99)
GLUCOSE BLDC GLUCOMTR-MCNC: 190 MG/DL — HIGH (ref 70–99)
GLUCOSE BLDC GLUCOMTR-MCNC: 207 MG/DL — HIGH (ref 70–99)
GLUCOSE SERPL-MCNC: 144 MG/DL — HIGH (ref 70–99)
GLUCOSE UR QL: NEGATIVE — SIGNIFICANT CHANGE UP
HCT VFR BLD CALC: 28.1 % — LOW (ref 39–50)
HCT VFR BLD CALC: 29.1 % — LOW (ref 39–50)
HGB BLD-MCNC: 8.5 G/DL — LOW (ref 13–17)
HGB BLD-MCNC: 9 G/DL — LOW (ref 13–17)
KETONES UR-MCNC: NEGATIVE — SIGNIFICANT CHANGE UP
LEUKOCYTE ESTERASE UR-ACNC: NEGATIVE — SIGNIFICANT CHANGE UP
MCHC RBC-ENTMCNC: 29 PG — SIGNIFICANT CHANGE UP (ref 27–34)
MCHC RBC-ENTMCNC: 29.7 PG — SIGNIFICANT CHANGE UP (ref 27–34)
MCHC RBC-ENTMCNC: 30.2 GM/DL — LOW (ref 32–36)
MCHC RBC-ENTMCNC: 30.9 GM/DL — LOW (ref 32–36)
MCV RBC AUTO: 95.9 FL — SIGNIFICANT CHANGE UP (ref 80–100)
MCV RBC AUTO: 96 FL — SIGNIFICANT CHANGE UP (ref 80–100)
NITRITE UR-MCNC: NEGATIVE — SIGNIFICANT CHANGE UP
NRBC # BLD: 0 /100 WBCS — SIGNIFICANT CHANGE UP (ref 0–0)
NRBC # BLD: 1 /100 WBCS — HIGH (ref 0–0)
PH UR: 8 — SIGNIFICANT CHANGE UP (ref 5–8)
PLATELET # BLD AUTO: 63 K/UL — LOW (ref 150–400)
PLATELET # BLD AUTO: 74 K/UL — LOW (ref 150–400)
POTASSIUM SERPL-MCNC: 3.9 MMOL/L — SIGNIFICANT CHANGE UP (ref 3.5–5.3)
POTASSIUM SERPL-SCNC: 3.9 MMOL/L — SIGNIFICANT CHANGE UP (ref 3.5–5.3)
PROT UR-MCNC: SIGNIFICANT CHANGE UP
RBC # BLD: 2.93 M/UL — LOW (ref 4.2–5.8)
RBC # BLD: 3.03 M/UL — LOW (ref 4.2–5.8)
RBC # FLD: 25.6 % — HIGH (ref 10.3–14.5)
RBC # FLD: 26 % — HIGH (ref 10.3–14.5)
SODIUM SERPL-SCNC: 136 MMOL/L — SIGNIFICANT CHANGE UP (ref 135–145)
SP GR SPEC: 1.02 — SIGNIFICANT CHANGE UP (ref 1.01–1.02)
UROBILINOGEN FLD QL: NEGATIVE — SIGNIFICANT CHANGE UP
WBC # BLD: 2.52 K/UL — LOW (ref 3.8–10.5)
WBC # BLD: 3.16 K/UL — LOW (ref 3.8–10.5)
WBC # FLD AUTO: 2.52 K/UL — LOW (ref 3.8–10.5)
WBC # FLD AUTO: 3.16 K/UL — LOW (ref 3.8–10.5)

## 2021-07-17 RX ORDER — ACETAMINOPHEN 500 MG
1000 TABLET ORAL ONCE
Refills: 0 | Status: COMPLETED | OUTPATIENT
Start: 2021-07-17 | End: 2021-07-18

## 2021-07-17 RX ADMIN — Medication 650 MILLIGRAM(S): at 16:51

## 2021-07-17 RX ADMIN — Medication 24 UNIT(S): at 12:05

## 2021-07-17 RX ADMIN — Medication 24 UNIT(S): at 07:51

## 2021-07-17 RX ADMIN — Medication 650 MILLIGRAM(S): at 20:48

## 2021-07-17 RX ADMIN — Medication 6 MILLIGRAM(S): at 23:37

## 2021-07-17 RX ADMIN — Medication 1: at 07:51

## 2021-07-17 RX ADMIN — Medication 6 MILLIGRAM(S): at 13:06

## 2021-07-17 RX ADMIN — QUETIAPINE FUMARATE 25 MILLIGRAM(S): 200 TABLET, FILM COATED ORAL at 22:15

## 2021-07-17 RX ADMIN — Medication 1: at 16:50

## 2021-07-17 RX ADMIN — FINASTERIDE 5 MILLIGRAM(S): 5 TABLET, FILM COATED ORAL at 12:05

## 2021-07-17 RX ADMIN — Medication 6 MILLIGRAM(S): at 07:51

## 2021-07-17 RX ADMIN — Medication 300 MILLIGRAM(S): at 12:05

## 2021-07-17 RX ADMIN — PANTOPRAZOLE SODIUM 40 MILLIGRAM(S): 20 TABLET, DELAYED RELEASE ORAL at 09:58

## 2021-07-17 RX ADMIN — Medication 650 MILLIGRAM(S): at 23:37

## 2021-07-17 RX ADMIN — Medication 6 MILLIGRAM(S): at 18:30

## 2021-07-17 RX ADMIN — Medication 650 MILLIGRAM(S): at 07:38

## 2021-07-17 RX ADMIN — SENNA PLUS 2 TABLET(S): 8.6 TABLET ORAL at 22:15

## 2021-07-17 RX ADMIN — Medication 6 MILLIGRAM(S): at 03:48

## 2021-07-17 RX ADMIN — Medication 1: at 12:05

## 2021-07-17 RX ADMIN — Medication 24 UNIT(S): at 16:50

## 2021-07-17 RX ADMIN — Medication 650 MILLIGRAM(S): at 05:14

## 2021-07-17 RX ADMIN — Medication 81 MILLIGRAM(S): at 12:05

## 2021-07-17 RX ADMIN — Medication 650 MILLIGRAM(S): at 23:21

## 2021-07-17 RX ADMIN — TAMSULOSIN HYDROCHLORIDE 0.4 MILLIGRAM(S): 0.4 CAPSULE ORAL at 22:15

## 2021-07-17 RX ADMIN — INSULIN GLARGINE 52 UNIT(S): 100 INJECTION, SOLUTION SUBCUTANEOUS at 21:49

## 2021-07-17 NOTE — PROGRESS NOTE ADULT - ASSESSMENT
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

## 2021-07-17 NOTE — PROGRESS NOTE ADULT - ASSESSMENT
Assessment  DMT2: 71y Male with DM T2 with hyperglycemia, A1C 8.8%, was on oral meds/Janumet at home, admitted with weakness/fatigue and hyperglycemia, now on large-dose basal bolus insulin, increased dose yesterday, blood sugars are improving still not at target, no hypoglycemic episodes. Patient is eating meals, remains on dexamethasone.  Lymphoma: on medications, monitored, FU Heme/Onc.  Anemia: stable, monitored.      Shahriar Kahn MD  Cell: [phone]  Office: [phone]

## 2021-07-17 NOTE — PROGRESS NOTE ADULT - SUBJECTIVE AND OBJECTIVE BOX
HPI:  70 y/o M w/ pmhx of CVA this past August and got TPA per family member, DM , BPH with obstruction s/p escamilla catheter , s/p ERCP w Sphincteretomy with stage IV HD with bony involvement and pancytopenia. treated with Opdivo.  leg weakness, dyspepsia (unaffected by meals), urinary burning unchanged    ROS:  Negative except for: dyspepsia, dysuria, weakness    MEDICATIONS  (STANDING):  allopurinol 300 milliGRAM(s) Oral daily  aspirin  chewable 81 milliGRAM(s) Oral daily  dexAMETHasone  Injectable 6 milliGRAM(s) IV Push every 6 hours  dextrose 40% Gel 15 Gram(s) Oral once  dextrose 5%. 1000 milliLiter(s) (50 mL/Hr) IV Continuous <Continuous>  dextrose 5%. 1000 milliLiter(s) (100 mL/Hr) IV Continuous <Continuous>  dextrose 50% Injectable 25 Gram(s) IV Push once  dextrose 50% Injectable 12.5 Gram(s) IV Push once  dextrose 50% Injectable 25 Gram(s) IV Push once  finasteride 5 milliGRAM(s) Oral daily  glucagon  Injectable 1 milliGRAM(s) IntraMuscular once  insulin glargine Injectable (LANTUS) 52 Unit(s) SubCutaneous at bedtime  insulin lispro (ADMELOG) corrective regimen sliding scale   SubCutaneous three times a day before meals  insulin lispro (ADMELOG) corrective regimen sliding scale   SubCutaneous at bedtime  insulin lispro Injectable (ADMELOG) 24 Unit(s) SubCutaneous three times a day before meals  pantoprazole   Suspension 40 milliGRAM(s) Oral daily  QUEtiapine 25 milliGRAM(s) Oral at bedtime  senna 2 Tablet(s) Oral at bedtime  tamsulosin 0.4 milliGRAM(s) Oral at bedtime    MEDICATIONS  (PRN):  acetaminophen   Tablet .. 650 milliGRAM(s) Oral every 6 hours PRN Temp greater or equal to 38C (100.4F), Moderate Pain (4 - 6)  bisacodyl 5 milliGRAM(s) Oral every 12 hours PRN Constipation  polyethylene glycol 3350 17 Gram(s) Oral daily PRN Constipation      Allergies    No Known Allergies    Intolerances        Vital Signs Last 24 Hrs  T(C): 36.7 (17 Jul 2021 11:58), Max: 36.7 (17 Jul 2021 11:58)  T(F): 98.1 (17 Jul 2021 11:58), Max: 98.1 (17 Jul 2021 11:58)  HR: 60 (17 Jul 2021 11:58) (60 - 77)  BP: 131/57 (17 Jul 2021 11:58) (127/63 - 131/57)  BP(mean): --  RR: 18 (17 Jul 2021 11:58) (18 - 18)  SpO2: 99% (17 Jul 2021 11:58) (99% - 100%)    PHYSICAL EXAM:      Constitutional: chronically ill appearing, family at bedside    Eyes: anicteric    ENMT:    Neck:    Lymph nodes:    Respiratory: clear    Cardiovascular: RRR    Gastrointestinal: soft, nondistended, nontender    Extremities: no edema    Skin:                    LABS:                          9.0    2.52  )-----------( 63       ( 17 Jul 2021 06:40 )             29.1         Mean Cell Volume : 96.0 fl  Mean Cell Hemoglobin : 29.7 pg  Mean Cell Hemoglobin Concentration : 30.9 gm/dL  Auto Neutrophil # : x  Auto Lymphocyte # : x  Auto Monocyte # : x  Auto Eosinophil # : x  Auto Basophil # : x  Auto Neutrophil % : x  Auto Lymphocyte % : x  Auto Monocyte % : x  Auto Eosinophil % : x  Auto Basophil % : x    Serial CBC  Hematocrit 29.1  Hemoglobin 9.0  Plat 63  RBC 3.03  WBC 2.52  Serial CBC  Hematocrit 28.4  Hemoglobin 8.8  Plat 65  RBC 2.99  WBC 3.54  Serial CBC  Hematocrit 29.9  Hemoglobin 9.1  Plat 56  RBC 3.13  WBC 2.92  Serial CBC  Hematocrit 25.5  Hemoglobin 7.9  Plat 53  RBC 2.69  WBC 2.50  Serial CBC  Hematocrit 24.1  Hemoglobin 7.4  Plat 50  RBC 2.57  WBC 1.96  Serial CBC  Hematocrit 25.0  Hemoglobin 7.7  Plat 54  RBC 2.70  WBC 2.33  Serial CBC  Hematocrit 23.0  Hemoglobin 7.1  Plat 55  RBC 2.52  WBC 1.75  Serial CBC  Hematocrit 25.7  Hemoglobin 7.9  Plat 63  RBC 2.82  WBC 2.84    07-17    136  |  99  |  16  ----------------------------<  144<H>  3.9   |  26  |  0.40<L>    Ca    8.6      17 Jul 2021 06:37

## 2021-07-17 NOTE — PROGRESS NOTE ADULT - ASSESSMENT
72 y/o M w/ PMHx of CVA this past August and got TPA per family member, DM, BPH with obstruction s/p escamilla catheter, s/p ERCP w Sphincterectomy [facility]  Hodgkin lymphoma was not offered any chemo and was placed on hospice.     now presenting with weakness and FTT.   pt denies cp / SOB / palpitations   no focal neuro complaints .. at baseline RLE weakness   no N/V   had one episode of diarrhea   no urinary sx  abdominal pain, diffuse, but worse on R side.   Afib RVR overnight. Was asymptomatic   no known history of this as per patient

## 2021-07-17 NOTE — PROGRESS NOTE ADULT - SUBJECTIVE AND OBJECTIVE BOX
Chief complaint    Patient is a 71y old  Male who presents with a chief complaint of Hodgkin's disease (17 Jul 2021 13:52)   Review of systems  Patient in bed, appears comfortable.    Labs and Fingersticks  CAPILLARY BLOOD GLUCOSE      POCT Blood Glucose.: 179 mg/dL (17 Jul 2021 11:54)  POCT Blood Glucose.: 172 mg/dL (17 Jul 2021 07:32)  POCT Blood Glucose.: 140 mg/dL (16 Jul 2021 21:01)  POCT Blood Glucose.: 155 mg/dL (16 Jul 2021 16:22)      Anion Gap, Serum: 11 (07-17 @ 06:37)  Anion Gap, Serum: 14 (07-16 @ 06:20)      Calcium, Total Serum: 8.6 (07-17 @ 06:37)  Calcium, Total Serum: 8.5 (07-16 @ 06:20)          07-17    136  |  99  |  16  ----------------------------<  144<H>  3.9   |  26  |  0.40<L>    Ca    8.6      17 Jul 2021 06:37                          9.0    2.52  )-----------( 63       ( 17 Jul 2021 06:40 )             29.1     Medications  MEDICATIONS  (STANDING):  allopurinol 300 milliGRAM(s) Oral daily  aspirin  chewable 81 milliGRAM(s) Oral daily  dexAMETHasone  Injectable 6 milliGRAM(s) IV Push every 6 hours  dextrose 40% Gel 15 Gram(s) Oral once  dextrose 5%. 1000 milliLiter(s) (50 mL/Hr) IV Continuous <Continuous>  dextrose 5%. 1000 milliLiter(s) (100 mL/Hr) IV Continuous <Continuous>  dextrose 50% Injectable 25 Gram(s) IV Push once  dextrose 50% Injectable 12.5 Gram(s) IV Push once  dextrose 50% Injectable 25 Gram(s) IV Push once  finasteride 5 milliGRAM(s) Oral daily  glucagon  Injectable 1 milliGRAM(s) IntraMuscular once  insulin glargine Injectable (LANTUS) 52 Unit(s) SubCutaneous at bedtime  insulin lispro (ADMELOG) corrective regimen sliding scale   SubCutaneous three times a day before meals  insulin lispro (ADMELOG) corrective regimen sliding scale   SubCutaneous at bedtime  insulin lispro Injectable (ADMELOG) 24 Unit(s) SubCutaneous three times a day before meals  pantoprazole   Suspension 40 milliGRAM(s) Oral daily  QUEtiapine 25 milliGRAM(s) Oral at bedtime  senna 2 Tablet(s) Oral at bedtime  tamsulosin 0.4 milliGRAM(s) Oral at bedtime      Physical Exam  Culture - Acid Fast - CSF (collected 07-16-21 @ 18:59)  Source: .CSF CSF    Culture - CSF with Gram Stain (collected 07-16-21 @ 18:59)  Source: .CSF CSF, Shunt  Gram Stain (07-16-21 @ 20:18):    polymorphonuclear leukocytes seen    No organisms seen    by cytocentrifuge      General: Patient comfortable in bed  Vital Signs Last 12 Hrs  T(F): 98.1 (07-17-21 @ 11:58), Max: 98.1 (07-17-21 @ 11:58)  HR: 60 (07-17-21 @ 11:58) (60 - 63)  BP: 131/57 (07-17-21 @ 11:58) (127/63 - 131/57)  BP(mean): --  RR: 18 (07-17-21 @ 11:58) (18 - 18)  SpO2: 99% (07-17-21 @ 11:58) (99% - 99%)  Neck: No palpable thyroid nodules.  CVS: S1S2, No murmurs  Respiratory: No wheezing, no crepitations  GI: Abdomen soft, bowel sounds positive  Musculoskeletal:  edema lower extremities.     Diagnostics

## 2021-07-17 NOTE — PROGRESS NOTE ADULT - SUBJECTIVE AND OBJECTIVE BOX
Date of service: 07-17-21 @ 22:34      Patient is a 71y old  Male who presents with a chief complaint of Hodgkin's disease (17 Jul 2021 13:52)                                                               INTERVAL HPI/OVERNIGHT EVENTS:    REVIEW OF SYSTEMS:     CONSTITUTIONAL: No weakness, fevers or chills  EYES/ENT: No visual changes , no ear ache   NECK: No pain or stiffness  RESPIRATORY: No cough, wheezing,  No shortness of breath  CARDIOVASCULAR: No chest pain or palpitations  GASTROINTESTINAL: No abdominal pain  . No nausea, vomiting, or hematemesis; No diarrhea or constipation. No melena or hematochezia.  GENITOURINARY: No dysuria, frequency or hematuria  NEUROLOGICAL: No numbness or weakness  SKIN: No itching, burning, rashes, or lesions                                                                                                                                                                                                                                                                                 Medications:  MEDICATIONS  (STANDING):  allopurinol 300 milliGRAM(s) Oral daily  aspirin  chewable 81 milliGRAM(s) Oral daily  dexAMETHasone  Injectable 6 milliGRAM(s) IV Push every 6 hours  dextrose 40% Gel 15 Gram(s) Oral once  dextrose 5%. 1000 milliLiter(s) (50 mL/Hr) IV Continuous <Continuous>  dextrose 5%. 1000 milliLiter(s) (100 mL/Hr) IV Continuous <Continuous>  dextrose 50% Injectable 25 Gram(s) IV Push once  dextrose 50% Injectable 12.5 Gram(s) IV Push once  dextrose 50% Injectable 25 Gram(s) IV Push once  finasteride 5 milliGRAM(s) Oral daily  glucagon  Injectable 1 milliGRAM(s) IntraMuscular once  insulin glargine Injectable (LANTUS) 52 Unit(s) SubCutaneous at bedtime  insulin lispro (ADMELOG) corrective regimen sliding scale   SubCutaneous three times a day before meals  insulin lispro (ADMELOG) corrective regimen sliding scale   SubCutaneous at bedtime  insulin lispro Injectable (ADMELOG) 24 Unit(s) SubCutaneous three times a day before meals  pantoprazole   Suspension 40 milliGRAM(s) Oral daily  QUEtiapine 25 milliGRAM(s) Oral at bedtime  senna 2 Tablet(s) Oral at bedtime  tamsulosin 0.4 milliGRAM(s) Oral at bedtime    MEDICATIONS  (PRN):  acetaminophen   Tablet .. 650 milliGRAM(s) Oral every 6 hours PRN Temp greater or equal to 38C (100.4F), Moderate Pain (4 - 6)  bisacodyl 5 milliGRAM(s) Oral every 12 hours PRN Constipation  polyethylene glycol 3350 17 Gram(s) Oral daily PRN Constipation       Allergies    No Known Allergies    Intolerances      Vital Signs Last 24 Hrs  T(C): 36.6 (17 Jul 2021 20:34), Max: 36.7 (17 Jul 2021 11:58)  T(F): 97.8 (17 Jul 2021 20:34), Max: 98.1 (17 Jul 2021 11:58)  HR: 69 (17 Jul 2021 20:34) (60 - 69)  BP: 121/71 (17 Jul 2021 20:34) (121/71 - 131/57)  BP(mean): --  RR: 19 (17 Jul 2021 20:34) (18 - 19)  SpO2: 100% (17 Jul 2021 20:34) (99% - 100%)  CAPILLARY BLOOD GLUCOSE      POCT Blood Glucose.: 207 mg/dL (17 Jul 2021 21:08)  POCT Blood Glucose.: 190 mg/dL (17 Jul 2021 16:22)  POCT Blood Glucose.: 179 mg/dL (17 Jul 2021 11:54)  POCT Blood Glucose.: 172 mg/dL (17 Jul 2021 07:32)      07-16 @ 07:01  -  07-17 @ 07:00  --------------------------------------------------------  IN: 460 mL / OUT: 900 mL / NET: -440 mL    07-17 @ 07:01 - 07-17 @ 22:34  --------------------------------------------------------  IN: 950 mL / OUT: 1350 mL / NET: -400 mL      Physical Exam:    Daily     Daily   General:  Well appearing, NAD, not cachetic  HEENT:  Nonicteric, PERRLA  CV:  RRR, S1S2   Lungs:  CTA B/L, no wheezes, rales, rhonchi  Abdomen:  Soft, non-tender, no distended, positive BS  Extremities:  2+ pulses, no c/c, no edema  Skin:  Warm and dry, no rashes  :  No escamilla  Neuro:  AAOx3, non-focal, grossly intact                                                                                                                                                                                                                                                                                                LABS:                               8.5    3.16  )-----------( 74       ( 17 Jul 2021 18:16 )             28.1                      07-17    136  |  99  |  16  ----------------------------<  144<H>  3.9   |  26  |  0.40<L>    Ca    8.6      17 Jul 2021 06:37                         RADIOLOGY & ADDITIONAL TESTS         I personally reviewed: [  ]EKG   [  ]CXR    [  ] CT      A/P:         Discussed with :     [facility] consultants' Notes   Time spent :

## 2021-07-17 NOTE — PROGRESS NOTE ADULT - ASSESSMENT
72 y/o M w/ PMHx of CVA this past August and got TPA per family member, DM, BPH with obstruction s/p escamilla catheter, s/p ERCP w Sphincterectomy recent admission to [facility] for sepsis  Hodgkin lymphoma was not offered any chemo and was placed on hospice.     now presenting with weakness and FTT.   pt denies cp / SOB / palpitations   no focal neuro complaints .. at baseline RLE weakness   no N/V   had one episode of diarrhea   no urinary sx  abdominal pain, diffuse, but worse on R side.     - Failure to thrive: cultures neg  CT chest noted   nutrition consult   encourage PO intake: dysphagia 3 based on MBS results     - lymphoma: d/w Dr. Larios: s/p immunotherapy   planned  chemo as inpt    - DM with hyperglycemia : improved now worse with steroids   fu with endo     - anemia: monitor H/H post transfusion, stable.     - Fever: doubt infectious etiology   likely due to immunotherapy/lymphoma. culture: neg   abx dced. s/p IVF, ID f/u appreciated.     - voice changes: CT neck : Asymmetric enlargement of the left palatine tonsil, suspicious for lymphomatous involvement given history. Correlate with direct visualization.  ENT had eval pt: no gross pathology on visualization   S/S eval: MBS done. speech: dysphagia 3    - Tachycardia: VA duplex neg for DVT   rate stable. noted 7 beats NSVT.  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 being expedited   cont  steroids   discussed with IR  LP : planned for today   planned chemo early next week       - Afib : now in sinus : monitor   appreicate cardio input   no AC at this time and no AVNB at this time

## 2021-07-17 NOTE — PROGRESS NOTE ADULT - SUBJECTIVE AND OBJECTIVE BOX
Subjective: Patient seen and examined. No new events except as noted.     REVIEW OF SYSTEMS:    CONSTITUTIONAL: + weakness, fevers or chills  EYES/ENT: No visual changes;  No vertigo or throat pain   NECK: No pain or stiffness  RESPIRATORY: No cough, wheezing, hemoptysis; No shortness of breath  CARDIOVASCULAR: No chest pain or palpitations  GASTROINTESTINAL: No abdominal or epigastric pain. No nausea, vomiting, or hematemesis; No diarrhea or constipation. No melena or hematochezia.  GENITOURINARY: No dysuria, frequency or hematuria  NEUROLOGICAL: No numbness or weakness  SKIN: No itching, burning, rashes, or lesions   All other review of systems is negative unless indicated above.    MEDICATIONS:  MEDICATIONS  (STANDING):  allopurinol 300 milliGRAM(s) Oral daily  aspirin  chewable 81 milliGRAM(s) Oral daily  dexAMETHasone  Injectable 6 milliGRAM(s) IV Push every 6 hours  dextrose 40% Gel 15 Gram(s) Oral once  dextrose 5%. 1000 milliLiter(s) (50 mL/Hr) IV Continuous <Continuous>  dextrose 5%. 1000 milliLiter(s) (100 mL/Hr) IV Continuous <Continuous>  dextrose 50% Injectable 25 Gram(s) IV Push once  dextrose 50% Injectable 12.5 Gram(s) IV Push once  dextrose 50% Injectable 25 Gram(s) IV Push once  finasteride 5 milliGRAM(s) Oral daily  glucagon  Injectable 1 milliGRAM(s) IntraMuscular once  insulin glargine Injectable (LANTUS) 52 Unit(s) SubCutaneous at bedtime  insulin lispro (ADMELOG) corrective regimen sliding scale   SubCutaneous three times a day before meals  insulin lispro (ADMELOG) corrective regimen sliding scale   SubCutaneous at bedtime  insulin lispro Injectable (ADMELOG) 24 Unit(s) SubCutaneous three times a day before meals  pantoprazole   Suspension 40 milliGRAM(s) Oral daily  QUEtiapine 25 milliGRAM(s) Oral at bedtime  senna 2 Tablet(s) Oral at bedtime  tamsulosin 0.4 milliGRAM(s) Oral at bedtime      PHYSICAL EXAM:  T(C): 36.6 (07-17-21 @ 20:34), Max: 36.7 (07-17-21 @ 11:58)  HR: 69 (07-17-21 @ 20:34) (60 - 69)  BP: 121/71 (07-17-21 @ 20:34) (121/71 - 131/57)  RR: 19 (07-17-21 @ 20:34) (18 - 19)  SpO2: 100% (07-17-21 @ 20:34) (99% - 100%)  Wt(kg): --  I&O's Summary    16 Jul 2021 07:01  -  17 Jul 2021 07:00  --------------------------------------------------------  IN: 460 mL / OUT: 900 mL / NET: -440 mL    17 Jul 2021 07:01  -  17 Jul 2021 22:24  --------------------------------------------------------  IN: 950 mL / OUT: 650 mL / NET: 300 mL          Appearance: Normal	  HEENT:   Normal oral mucosa, PERRL, EOMI	  Lymphatic: No lymphadenopathy , no edema  Cardiovascular: Normal S1 S2, No JVD, No murmurs , Peripheral pulses palpable 2+ bilaterally  Respiratory: Lungs clear to auscultation, normal effort 	  Gastrointestinal:  Soft, Non-tender, + BS	  Skin: No rashes, No ecchymoses, No cyanosis, warm to touch  Musculoskeletal: Normal range of motion, normal strength  Psychiatry:  Mood & affect appropriate  Ext: No edema      LABS:    CARDIAC MARKERS:                                8.5    3.16  )-----------( 74       ( 17 Jul 2021 18:16 )             28.1     07-17    136  |  99  |  16  ----------------------------<  144<H>  3.9   |  26  |  0.40<L>    Ca    8.6      17 Jul 2021 06:37      proBNP:   Lipid Profile:   HgA1c:   TSH:           TELEMETRY: 	 SR    ECG:  	  RADIOLOGY:   DIAGNOSTIC TESTING:  [ ] Echocardiogram:  [ ]  Catheterization:  [ ] Stress Test:    OTHER:

## 2021-07-17 NOTE — PROGRESS NOTE ADULT - PROBLEM SELECTOR PLAN 1
Dr. John input appreciated  will discuss RT vs further chemo with Dr. Larios  continue steroids for now  continue PPI for dyspepsia  d/w patient and family

## 2021-07-18 LAB
GLUCOSE BLDC GLUCOMTR-MCNC: 166 MG/DL — HIGH (ref 70–99)
GLUCOSE BLDC GLUCOMTR-MCNC: 254 MG/DL — HIGH (ref 70–99)
GLUCOSE BLDC GLUCOMTR-MCNC: 295 MG/DL — HIGH (ref 70–99)
GLUCOSE BLDC GLUCOMTR-MCNC: 348 MG/DL — HIGH (ref 70–99)
GLUCOSE BLDC GLUCOMTR-MCNC: 390 MG/DL — HIGH (ref 70–99)
GLUCOSE BLDC GLUCOMTR-MCNC: 400 MG/DL — HIGH (ref 70–99)
HCT VFR BLD CALC: 28 % — LOW (ref 39–50)
HCT VFR BLD CALC: 28.1 % — LOW (ref 39–50)
HGB BLD-MCNC: 8.6 G/DL — LOW (ref 13–17)
HGB BLD-MCNC: 8.7 G/DL — LOW (ref 13–17)
MCHC RBC-ENTMCNC: 30.2 PG — SIGNIFICANT CHANGE UP (ref 27–34)
MCHC RBC-ENTMCNC: 30.2 PG — SIGNIFICANT CHANGE UP (ref 27–34)
MCHC RBC-ENTMCNC: 30.6 GM/DL — LOW (ref 32–36)
MCHC RBC-ENTMCNC: 31.1 GM/DL — LOW (ref 32–36)
MCV RBC AUTO: 97.2 FL — SIGNIFICANT CHANGE UP (ref 80–100)
MCV RBC AUTO: 98.6 FL — SIGNIFICANT CHANGE UP (ref 80–100)
NRBC # BLD: 0 /100 WBCS — SIGNIFICANT CHANGE UP (ref 0–0)
NRBC # BLD: 0 /100 WBCS — SIGNIFICANT CHANGE UP (ref 0–0)
PLATELET # BLD AUTO: 56 K/UL — LOW (ref 150–400)
PLATELET # BLD AUTO: 70 K/UL — LOW (ref 150–400)
RBC # BLD: 2.85 M/UL — LOW (ref 4.2–5.8)
RBC # BLD: 2.88 M/UL — LOW (ref 4.2–5.8)
RBC # FLD: 26.2 % — HIGH (ref 10.3–14.5)
RBC # FLD: 26.8 % — HIGH (ref 10.3–14.5)
WBC # BLD: 2.35 K/UL — LOW (ref 3.8–10.5)
WBC # BLD: 3.57 K/UL — LOW (ref 3.8–10.5)
WBC # FLD AUTO: 2.35 K/UL — LOW (ref 3.8–10.5)
WBC # FLD AUTO: 3.57 K/UL — LOW (ref 3.8–10.5)

## 2021-07-18 RX ORDER — ACETAMINOPHEN 500 MG
1000 TABLET ORAL ONCE
Refills: 0 | Status: COMPLETED | OUTPATIENT
Start: 2021-07-18 | End: 2021-07-18

## 2021-07-18 RX ORDER — DEXAMETHASONE 0.5 MG/5ML
4 ELIXIR ORAL EVERY 6 HOURS
Refills: 0 | Status: DISCONTINUED | OUTPATIENT
Start: 2021-07-18 | End: 2021-07-23

## 2021-07-18 RX ADMIN — QUETIAPINE FUMARATE 25 MILLIGRAM(S): 200 TABLET, FILM COATED ORAL at 21:27

## 2021-07-18 RX ADMIN — Medication 1: at 07:37

## 2021-07-18 RX ADMIN — Medication 81 MILLIGRAM(S): at 12:10

## 2021-07-18 RX ADMIN — Medication 6 MILLIGRAM(S): at 06:27

## 2021-07-18 RX ADMIN — Medication 650 MILLIGRAM(S): at 11:25

## 2021-07-18 RX ADMIN — Medication 300 MILLIGRAM(S): at 12:10

## 2021-07-18 RX ADMIN — Medication 4 MILLIGRAM(S): at 17:04

## 2021-07-18 RX ADMIN — Medication 650 MILLIGRAM(S): at 10:55

## 2021-07-18 RX ADMIN — Medication 24 UNIT(S): at 17:04

## 2021-07-18 RX ADMIN — Medication 6 MILLIGRAM(S): at 12:10

## 2021-07-18 RX ADMIN — Medication 24 UNIT(S): at 07:37

## 2021-07-18 RX ADMIN — PANTOPRAZOLE SODIUM 40 MILLIGRAM(S): 20 TABLET, DELAYED RELEASE ORAL at 09:30

## 2021-07-18 RX ADMIN — INSULIN GLARGINE 52 UNIT(S): 100 INJECTION, SOLUTION SUBCUTANEOUS at 21:25

## 2021-07-18 RX ADMIN — Medication 1000 MILLIGRAM(S): at 06:25

## 2021-07-18 RX ADMIN — Medication 400 MILLIGRAM(S): at 21:25

## 2021-07-18 RX ADMIN — SENNA PLUS 2 TABLET(S): 8.6 TABLET ORAL at 21:27

## 2021-07-18 RX ADMIN — Medication 1000 MILLIGRAM(S): at 22:08

## 2021-07-18 RX ADMIN — Medication 650 MILLIGRAM(S): at 17:32

## 2021-07-18 RX ADMIN — Medication 3: at 12:09

## 2021-07-18 RX ADMIN — Medication 3: at 17:03

## 2021-07-18 RX ADMIN — Medication 400 MILLIGRAM(S): at 04:51

## 2021-07-18 RX ADMIN — Medication 650 MILLIGRAM(S): at 17:02

## 2021-07-18 RX ADMIN — TAMSULOSIN HYDROCHLORIDE 0.4 MILLIGRAM(S): 0.4 CAPSULE ORAL at 21:27

## 2021-07-18 RX ADMIN — FINASTERIDE 5 MILLIGRAM(S): 5 TABLET, FILM COATED ORAL at 12:10

## 2021-07-18 RX ADMIN — Medication 24 UNIT(S): at 12:09

## 2021-07-18 RX ADMIN — Medication 6: at 21:26

## 2021-07-18 NOTE — PROGRESS NOTE ADULT - SUBJECTIVE AND OBJECTIVE BOX
Date of service: 07-18-21 @ 14:58      Patient is a 71y old  Male who presents with a chief complaint of HD (18 Jul 2021 12:32)                                                               INTERVAL HPI/OVERNIGHT EVENTS:    REVIEW OF SYSTEMS:     CONSTITUTIONAL: No weakness, fevers or chills  RESPIRATORY: No cough, wheezing,  No shortness of breath  CARDIOVASCULAR: No chest pain or palpitations  GASTROINTESTINAL: No abdominal pain  . No nausea, vomiting, or hematemesis; No diarrhea or constipation. No melena or hematochezia.  GENITOURINARY: No dysuria, frequency or hematuria  NEUROLOGICAL: No numbness or weakness                                                                                                                                                                                                                                                                         Medications:  MEDICATIONS  (STANDING):  allopurinol 300 milliGRAM(s) Oral daily  aspirin  chewable 81 milliGRAM(s) Oral daily  dexAMETHasone  Injectable 4 milliGRAM(s) IV Push every 6 hours  dextrose 40% Gel 15 Gram(s) Oral once  dextrose 5%. 1000 milliLiter(s) (50 mL/Hr) IV Continuous <Continuous>  dextrose 5%. 1000 milliLiter(s) (100 mL/Hr) IV Continuous <Continuous>  dextrose 50% Injectable 25 Gram(s) IV Push once  dextrose 50% Injectable 12.5 Gram(s) IV Push once  dextrose 50% Injectable 25 Gram(s) IV Push once  finasteride 5 milliGRAM(s) Oral daily  glucagon  Injectable 1 milliGRAM(s) IntraMuscular once  insulin glargine Injectable (LANTUS) 52 Unit(s) SubCutaneous at bedtime  insulin lispro (ADMELOG) corrective regimen sliding scale   SubCutaneous three times a day before meals  insulin lispro (ADMELOG) corrective regimen sliding scale   SubCutaneous at bedtime  insulin lispro Injectable (ADMELOG) 24 Unit(s) SubCutaneous three times a day before meals  pantoprazole   Suspension 40 milliGRAM(s) Oral daily  QUEtiapine 25 milliGRAM(s) Oral at bedtime  senna 2 Tablet(s) Oral at bedtime  tamsulosin 0.4 milliGRAM(s) Oral at bedtime    MEDICATIONS  (PRN):  acetaminophen   Tablet .. 650 milliGRAM(s) Oral every 6 hours PRN Temp greater or equal to 38C (100.4F), Moderate Pain (4 - 6)  bisacodyl 5 milliGRAM(s) Oral every 12 hours PRN Constipation  polyethylene glycol 3350 17 Gram(s) Oral daily PRN Constipation       Allergies    No Known Allergies    Intolerances      Vital Signs Last 24 Hrs  T(C): 36.6 (18 Jul 2021 14:19), Max: 36.6 (17 Jul 2021 20:34)  T(F): 97.8 (18 Jul 2021 14:19), Max: 97.8 (17 Jul 2021 20:34)  HR: 71 (18 Jul 2021 14:19) (60 - 71)  BP: 131/73 (18 Jul 2021 14:19) (117/67 - 131/73)  BP(mean): --  RR: 18 (18 Jul 2021 14:19) (18 - 19)  SpO2: 99% (18 Jul 2021 14:19) (99% - 100%)  CAPILLARY BLOOD GLUCOSE      POCT Blood Glucose.: 254 mg/dL (18 Jul 2021 11:39)  POCT Blood Glucose.: 166 mg/dL (18 Jul 2021 07:28)  POCT Blood Glucose.: 207 mg/dL (17 Jul 2021 21:08)  POCT Blood Glucose.: 190 mg/dL (17 Jul 2021 16:22)      07-17 @ 07:01  -  07-18 @ 07:00  --------------------------------------------------------  IN: 950 mL / OUT: 2150 mL / NET: -1200 mL    07-18 @ 07:01  -  07-18 @ 14:58  --------------------------------------------------------  IN: 730 mL / OUT: 1200 mL / NET: -470 mL      Physical Exam:    Daily     Daily   General:  NAD   HEENT:  Nonicteric, PERRLA  CV:  RRR, S1S2   Lungs:  CTA B/L, no wheezes, rales, rhonchi  Abdomen:  Soft, non-tender, no distended, positive BS  Extremities:  no edema                                                                                                                                                                                                                                                                                       LABS:                               8.7    2.35  )-----------( 56       ( 18 Jul 2021 07:25 )             28.0                      07-17    136  |  99  |  16  ----------------------------<  144<H>  3.9   |  26  |  0.40<L>    Ca    8.6      17 Jul 2021 06:37                         RADIOLOGY & ADDITIONAL TESTS         I personally reviewed: [  ]EKG   [  ]CXR    [  ] CT      A/P:         Discussed with :     [facility] consultants' Notes   Time spent :

## 2021-07-18 NOTE — PROGRESS NOTE ADULT - ASSESSMENT
70 y/o M w/ PMHx of CVA this past August and got TPA per family member, DM, BPH with obstruction s/p escamilla catheter, s/p ERCP w Sphincterectomy recent admission to [facility] for sepsis  Hodgkin lymphoma was not offered any chemo and was placed on hospice.     now presenting with weakness and FTT.   pt denies cp / SOB / palpitations   no focal neuro complaints .. at baseline RLE weakness   no N/V   had one episode of diarrhea   no urinary sx  abdominal pain, diffuse, but worse on R side.     - Failure to thrive: cultures neg  CT chest noted   nutrition consult   encourage PO intake: dysphagia 3 based on MBS results     - lymphoma: d/w Dr. Larios: s/p immunotherapy   planned  chemo as inpt    - DM with hyperglycemia : improved now worse with steroids   fu with endo     - anemia: monitor H/H post transfusion, stable.     - Fever: doubt infectious etiology   likely due to immunotherapy/lymphoma. culture: neg   abx dced. s/p IVF, ID f/u appreciated.     - voice changes: CT neck : Asymmetric enlargement of the left palatine tonsil, suspicious for lymphomatous involvement given history. Correlate with direct visualization.  ENT had eval pt: no gross pathology on visualization   S/S eval: MBS done. speech: dysphagia 3    - Tachycardia: VA duplex neg for DVT   rate stable. noted 7 beats NSVT.  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 being expedited   cont  steroids   fu LP cytology   appreciate onc : will decreased [place]   appreciate neuro onc input   planned chemo early next week       - Afib : now in sinus : monitor   appreicate cardio input   no AC at this time and no AVNB at this time

## 2021-07-18 NOTE — PROGRESS NOTE ADULT - ASSESSMENT
advanced stage HD with osseous and BM involvement  sp Opdivo  leptomeningeal disease unlikely but does have dural based disease  fevers from HD improved, possibly from steroids

## 2021-07-18 NOTE — PROGRESS NOTE ADULT - SUBJECTIVE AND OBJECTIVE BOX
HPI:  eating better today  still with left sided pain, mild unaffected by meals      ROS:  Negative except for: dyspepsia    MEDICATIONS  (STANDING):  allopurinol 300 milliGRAM(s) Oral daily  aspirin  chewable 81 milliGRAM(s) Oral daily  dexAMETHasone  Injectable 6 milliGRAM(s) IV Push every 6 hours  dextrose 40% Gel 15 Gram(s) Oral once  dextrose 5%. 1000 milliLiter(s) (50 mL/Hr) IV Continuous <Continuous>  dextrose 5%. 1000 milliLiter(s) (100 mL/Hr) IV Continuous <Continuous>  dextrose 50% Injectable 25 Gram(s) IV Push once  dextrose 50% Injectable 12.5 Gram(s) IV Push once  dextrose 50% Injectable 25 Gram(s) IV Push once  finasteride 5 milliGRAM(s) Oral daily  glucagon  Injectable 1 milliGRAM(s) IntraMuscular once  insulin glargine Injectable (LANTUS) 52 Unit(s) SubCutaneous at bedtime  insulin lispro (ADMELOG) corrective regimen sliding scale   SubCutaneous three times a day before meals  insulin lispro (ADMELOG) corrective regimen sliding scale   SubCutaneous at bedtime  insulin lispro Injectable (ADMELOG) 24 Unit(s) SubCutaneous three times a day before meals  pantoprazole   Suspension 40 milliGRAM(s) Oral daily  QUEtiapine 25 milliGRAM(s) Oral at bedtime  senna 2 Tablet(s) Oral at bedtime  tamsulosin 0.4 milliGRAM(s) Oral at bedtime    MEDICATIONS  (PRN):  acetaminophen   Tablet .. 650 milliGRAM(s) Oral every 6 hours PRN Temp greater or equal to 38C (100.4F), Moderate Pain (4 - 6)  bisacodyl 5 milliGRAM(s) Oral every 12 hours PRN Constipation  polyethylene glycol 3350 17 Gram(s) Oral daily PRN Constipation      Allergies    No Known Allergies    Intolerances        Vital Signs Last 24 Hrs  T(C): 36.6 (18 Jul 2021 04:36), Max: 36.6 (17 Jul 2021 20:34)  T(F): 97.8 (18 Jul 2021 04:36), Max: 97.8 (17 Jul 2021 20:34)  HR: 60 (18 Jul 2021 04:36) (60 - 69)  BP: 117/67 (18 Jul 2021 04:36) (117/67 - 121/71)  BP(mean): --  RR: 18 (18 Jul 2021 04:36) (18 - 19)  SpO2: 100% (18 Jul 2021 04:36) (100% - 100%)    PHYSICAL EXAM:      Constitutional: appears more alert and comfortable today    Eyes: anicteric    ENMT:    Neck:    Lymph nodes:    Respiratory: clear    Cardiovascular: RRR    Gastrointestinal: no masses, no reproducible tenderness, no HSM    Extremities: no edema    Skin:                    LABS:                          8.7    2.35  )-----------( 56       ( 18 Jul 2021 07:25 )             28.0         Mean Cell Volume : 97.2 fl  Mean Cell Hemoglobin : 30.2 pg  Mean Cell Hemoglobin Concentration : 31.1 gm/dL  Auto Neutrophil # : x  Auto Lymphocyte # : x  Auto Monocyte # : x  Auto Eosinophil # : x  Auto Basophil # : x  Auto Neutrophil % : x  Auto Lymphocyte % : x  Auto Monocyte % : x  Auto Eosinophil % : x  Auto Basophil % : x    Serial CBC  Hematocrit 28.0  Hemoglobin 8.7  Plat 56  RBC 2.88  WBC 2.35  Serial CBC  Hematocrit 28.1  Hemoglobin 8.5  Plat 74  RBC 2.93  WBC 3.16  Serial CBC  Hematocrit 29.1  Hemoglobin 9.0  Plat 63  RBC 3.03  WBC 2.52  Serial CBC  Hematocrit 28.4  Hemoglobin 8.8  Plat 65  RBC 2.99  WBC 3.54  Serial CBC  Hematocrit 29.9  Hemoglobin 9.1  Plat 56  RBC 3.13  WBC 2.92  Serial CBC  Hematocrit 25.5  Hemoglobin 7.9  Plat 53  RBC 2.69  WBC 2.50  Serial CBC  Hematocrit 24.1  Hemoglobin 7.4  Plat 50  RBC 2.57  WBC 1.96  Serial CBC  Hematocrit 25.0  Hemoglobin 7.7  Plat 54  RBC 2.70  WBC 2.33    07-17    136  |  99  |  16  ----------------------------<  144<H>  3.9   |  26  |  0.40<L>    Ca    8.6      17 Jul 2021 06:37

## 2021-07-18 NOTE — PROGRESS NOTE ADULT - ASSESSMENT
Assessment  DMT2: 71y Male with DM T2 with hyperglycemia, A1C 8.8%, was on oral meds/Janumet at home, admitted with weakness/fatigue and hyperglycemia, now on large-dose basal bolus insulin, blood sugars fluctuating, overall better but still not at target, no hypoglycemic episodes. Patient is eating meals, remains on dexamethasone.  Lymphoma: on medications, monitored, FU Heme/Onc.  Anemia: stable, monitored.      Shahriar Kahn MD  Cell: [phone]  Office: [phone]

## 2021-07-18 NOTE — PROGRESS NOTE ADULT - SUBJECTIVE AND OBJECTIVE BOX
Subjective: Patient seen and examined. No new events except as noted.   feel tammi     REVIEW OF SYSTEMS:    CONSTITUTIONAL: +weakness, fevers or chills  EYES/ENT: No visual changes;  No vertigo or throat pain   NECK: No pain or stiffness  RESPIRATORY: No cough, wheezing, hemoptysis; No shortness of breath  CARDIOVASCULAR: No chest pain or palpitations  GASTROINTESTINAL: No abdominal or epigastric pain. No nausea, vomiting, or hematemesis; No diarrhea or constipation. No melena or hematochezia.  GENITOURINARY: No dysuria, frequency or hematuria  NEUROLOGICAL: No numbness or weakness  SKIN: No itching, burning, rashes, or lesions   All other review of systems is negative unless indicated above.    MEDICATIONS:  MEDICATIONS  (STANDING):  allopurinol 300 milliGRAM(s) Oral daily  aspirin  chewable 81 milliGRAM(s) Oral daily  dexAMETHasone  Injectable 6 milliGRAM(s) IV Push every 6 hours  dextrose 40% Gel 15 Gram(s) Oral once  dextrose 5%. 1000 milliLiter(s) (50 mL/Hr) IV Continuous <Continuous>  dextrose 5%. 1000 milliLiter(s) (100 mL/Hr) IV Continuous <Continuous>  dextrose 50% Injectable 25 Gram(s) IV Push once  dextrose 50% Injectable 12.5 Gram(s) IV Push once  dextrose 50% Injectable 25 Gram(s) IV Push once  finasteride 5 milliGRAM(s) Oral daily  glucagon  Injectable 1 milliGRAM(s) IntraMuscular once  insulin glargine Injectable (LANTUS) 52 Unit(s) SubCutaneous at bedtime  insulin lispro (ADMELOG) corrective regimen sliding scale   SubCutaneous three times a day before meals  insulin lispro (ADMELOG) corrective regimen sliding scale   SubCutaneous at bedtime  insulin lispro Injectable (ADMELOG) 24 Unit(s) SubCutaneous three times a day before meals  pantoprazole   Suspension 40 milliGRAM(s) Oral daily  QUEtiapine 25 milliGRAM(s) Oral at bedtime  senna 2 Tablet(s) Oral at bedtime  tamsulosin 0.4 milliGRAM(s) Oral at bedtime      PHYSICAL EXAM:  T(C): 36.6 (07-18-21 @ 04:36), Max: 36.7 (07-17-21 @ 11:58)  HR: 60 (07-18-21 @ 04:36) (60 - 69)  BP: 117/67 (07-18-21 @ 04:36) (117/67 - 131/57)  RR: 18 (07-18-21 @ 04:36) (18 - 19)  SpO2: 100% (07-18-21 @ 04:36) (99% - 100%)  Wt(kg): --  I&O's Summary    17 Jul 2021 07:01  -  18 Jul 2021 07:00  --------------------------------------------------------  IN: 950 mL / OUT: 2150 mL / NET: -1200 mL          Appearance: Normal	  HEENT:   Normal oral mucosa, PERRL, EOMI	  Lymphatic: No lymphadenopathy , no edema  Cardiovascular: Normal S1 S2, No JVD, No murmurs , Peripheral pulses palpable 2+ bilaterally  Respiratory: Lungs clear to auscultation, normal effort 	  Gastrointestinal:  Soft, Non-tender, + BS	  Skin: No rashes, No ecchymoses, No cyanosis, warm to touch  Musculoskeletal: Normal range of motion, normal strength  Psychiatry:  Mood & affect appropriate  Ext: No edema      LABS:    CARDIAC MARKERS:                                8.7    2.35  )-----------( 56       ( 18 Jul 2021 07:25 )             28.0     07-17    136  |  99  |  16  ----------------------------<  144<H>  3.9   |  26  |  0.40<L>    Ca    8.6      17 Jul 2021 06:37      proBNP:   Lipid Profile:   HgA1c:   TSH:           TELEMETRY: 	SR    ECG:  	  RADIOLOGY:   DIAGNOSTIC TESTING:  [ ] Echocardiogram:  [ ]  Catheterization:  [ ] Stress Test:    OTHER:

## 2021-07-18 NOTE — PROGRESS NOTE ADULT - ASSESSMENT
70 y/o M w/ PMHx of CVA this past August and got TPA per family member, DM, BPH with obstruction s/p escamilla catheter, s/p ERCP w Sphincterectomy recent admission to [facility] for sepsis  Hodgkin lymphoma was not offered any chemo and was placed on hospice.     now presenting with weakness and FTT.   pt denies cp / SOB / palpitations   no focal neuro complaints .. at baseline RLE weakness   no N/V   had one episode of diarrhea   no urinary sx  abdominal pain, diffuse, but worse on R side.   Afib RVR overnight. Was asymptomatic   no known history of this as per patient

## 2021-07-18 NOTE — PROGRESS NOTE ADULT - SUBJECTIVE AND OBJECTIVE BOX
Chief complaint    Patient is a 71y old  Male who presents with a chief complaint of HD (18 Jul 2021 12:32)   Review of systems  Patient in bed, appears comfortable.    Labs and Fingersticks  CAPILLARY BLOOD GLUCOSE      POCT Blood Glucose.: 254 mg/dL (18 Jul 2021 11:39)  POCT Blood Glucose.: 166 mg/dL (18 Jul 2021 07:28)  POCT Blood Glucose.: 207 mg/dL (17 Jul 2021 21:08)  POCT Blood Glucose.: 190 mg/dL (17 Jul 2021 16:22)      Anion Gap, Serum: 11 (07-17 @ 06:37)      Calcium, Total Serum: 8.6 (07-17 @ 06:37)          07-17    136  |  99  |  16  ----------------------------<  144<H>  3.9   |  26  |  0.40<L>    Ca    8.6      17 Jul 2021 06:37                          8.7    2.35  )-----------( 56       ( 18 Jul 2021 07:25 )             28.0     Medications  MEDICATIONS  (STANDING):  allopurinol 300 milliGRAM(s) Oral daily  aspirin  chewable 81 milliGRAM(s) Oral daily  dexAMETHasone  Injectable 4 milliGRAM(s) IV Push every 6 hours  dextrose 40% Gel 15 Gram(s) Oral once  dextrose 5%. 1000 milliLiter(s) (50 mL/Hr) IV Continuous <Continuous>  dextrose 5%. 1000 milliLiter(s) (100 mL/Hr) IV Continuous <Continuous>  dextrose 50% Injectable 25 Gram(s) IV Push once  dextrose 50% Injectable 12.5 Gram(s) IV Push once  dextrose 50% Injectable 25 Gram(s) IV Push once  finasteride 5 milliGRAM(s) Oral daily  glucagon  Injectable 1 milliGRAM(s) IntraMuscular once  insulin glargine Injectable (LANTUS) 52 Unit(s) SubCutaneous at bedtime  insulin lispro (ADMELOG) corrective regimen sliding scale   SubCutaneous three times a day before meals  insulin lispro (ADMELOG) corrective regimen sliding scale   SubCutaneous at bedtime  insulin lispro Injectable (ADMELOG) 24 Unit(s) SubCutaneous three times a day before meals  pantoprazole   Suspension 40 milliGRAM(s) Oral daily  QUEtiapine 25 milliGRAM(s) Oral at bedtime  senna 2 Tablet(s) Oral at bedtime  tamsulosin 0.4 milliGRAM(s) Oral at bedtime      Physical Exam  General: Patient comfortable in bed  Vital Signs Last 12 Hrs  T(F): 97.8 (07-18-21 @ 14:19), Max: 97.8 (07-18-21 @ 04:36)  HR: 71 (07-18-21 @ 14:19) (60 - 71)  BP: 131/73 (07-18-21 @ 14:19) (117/67 - 131/73)  BP(mean): --  RR: 18 (07-18-21 @ 14:19) (18 - 18)  SpO2: 99% (07-18-21 @ 14:19) (99% - 100%)  Neck: No palpable thyroid nodules.  CVS: S1S2, No murmurs  Respiratory: No wheezing, no crepitations  GI: Abdomen soft, bowel sounds positive  Musculoskeletal:  edema lower extremities.     Diagnostics

## 2021-07-18 NOTE — PROGRESS NOTE ADULT - PROBLEM SELECTOR PLAN 1
d/w Dr. Alas  would favor decreasing steroids, 4q8 or 6q8  Dr. Larios to decide between chemotherapy or XRT

## 2021-07-19 DIAGNOSIS — C81.90 HODGKIN LYMPHOMA, UNSPECIFIED, UNSPECIFIED SITE: ICD-10-CM

## 2021-07-19 DIAGNOSIS — R91.8 OTHER NONSPECIFIC ABNORMAL FINDING OF LUNG FIELD: ICD-10-CM

## 2021-07-19 LAB
BASOPHILS # BLD AUTO: 0 K/UL — SIGNIFICANT CHANGE UP (ref 0–0.2)
BASOPHILS NFR BLD AUTO: 0 % — SIGNIFICANT CHANGE UP (ref 0–2)
EOSINOPHIL # BLD AUTO: 0 K/UL — SIGNIFICANT CHANGE UP (ref 0–0.5)
EOSINOPHIL NFR BLD AUTO: 0 % — SIGNIFICANT CHANGE UP (ref 0–6)
GLUCOSE BLDC GLUCOMTR-MCNC: 263 MG/DL — HIGH (ref 70–99)
GLUCOSE BLDC GLUCOMTR-MCNC: 274 MG/DL — HIGH (ref 70–99)
GLUCOSE BLDC GLUCOMTR-MCNC: 299 MG/DL — HIGH (ref 70–99)
GLUCOSE BLDC GLUCOMTR-MCNC: 312 MG/DL — HIGH (ref 70–99)
GLUCOSE BLDC GLUCOMTR-MCNC: 326 MG/DL — HIGH (ref 70–99)
HCT VFR BLD CALC: 28.8 % — LOW (ref 39–50)
HGB BLD-MCNC: 8.8 G/DL — LOW (ref 13–17)
LYMPHOCYTES # BLD AUTO: 0.28 K/UL — LOW (ref 1–3.3)
LYMPHOCYTES # BLD AUTO: 9.7 % — LOW (ref 13–44)
MANUAL SMEAR VERIFICATION: SIGNIFICANT CHANGE UP
MCHC RBC-ENTMCNC: 30.6 GM/DL — LOW (ref 32–36)
MCHC RBC-ENTMCNC: 30.6 PG — SIGNIFICANT CHANGE UP (ref 27–34)
MCV RBC AUTO: 100 FL — SIGNIFICANT CHANGE UP (ref 80–100)
MONOCYTES # BLD AUTO: 0.18 K/UL — SIGNIFICANT CHANGE UP (ref 0–0.9)
MONOCYTES NFR BLD AUTO: 6.2 % — SIGNIFICANT CHANGE UP (ref 2–14)
NEUTROPHILS # BLD AUTO: 2.46 K/UL — SIGNIFICANT CHANGE UP (ref 1.8–7.4)
NEUTROPHILS NFR BLD AUTO: 82.3 % — HIGH (ref 43–77)
NEUTS BAND # BLD: 1.8 % — SIGNIFICANT CHANGE UP (ref 0–8)
NON-GYNECOLOGICAL CYTOLOGY STUDY: SIGNIFICANT CHANGE UP
NRBC # BLD: 2 /100 — HIGH (ref 0–0)
PLAT MORPH BLD: NORMAL — SIGNIFICANT CHANGE UP
PLATELET # BLD AUTO: 64 K/UL — LOW (ref 150–400)
RBC # BLD: 2.88 M/UL — LOW (ref 4.2–5.8)
RBC # FLD: 27.1 % — HIGH (ref 10.3–14.5)
RBC BLD AUTO: SIGNIFICANT CHANGE UP
WBC # BLD: 2.92 K/UL — LOW (ref 3.8–10.5)
WBC # FLD AUTO: 2.92 K/UL — LOW (ref 3.8–10.5)

## 2021-07-19 PROCEDURE — 71260 CT THORAX DX C+: CPT | Mod: 26

## 2021-07-19 PROCEDURE — 74177 CT ABD & PELVIS W/CONTRAST: CPT | Mod: 26

## 2021-07-19 RX ORDER — ACETAMINOPHEN 500 MG
1000 TABLET ORAL ONCE
Refills: 0 | Status: COMPLETED | OUTPATIENT
Start: 2021-07-19 | End: 2021-07-19

## 2021-07-19 RX ORDER — INSULIN GLARGINE 100 [IU]/ML
56 INJECTION, SOLUTION SUBCUTANEOUS AT BEDTIME
Refills: 0 | Status: DISCONTINUED | OUTPATIENT
Start: 2021-07-19 | End: 2021-07-20

## 2021-07-19 RX ORDER — LANOLIN ALCOHOL/MO/W.PET/CERES
5 CREAM (GRAM) TOPICAL AT BEDTIME
Refills: 0 | Status: DISCONTINUED | OUTPATIENT
Start: 2021-07-19 | End: 2021-08-04

## 2021-07-19 RX ORDER — INSULIN LISPRO 100/ML
28 VIAL (ML) SUBCUTANEOUS
Refills: 0 | Status: DISCONTINUED | OUTPATIENT
Start: 2021-07-19 | End: 2021-07-20

## 2021-07-19 RX ADMIN — Medication 4: at 16:43

## 2021-07-19 RX ADMIN — Medication 4 MILLIGRAM(S): at 06:33

## 2021-07-19 RX ADMIN — FINASTERIDE 5 MILLIGRAM(S): 5 TABLET, FILM COATED ORAL at 11:46

## 2021-07-19 RX ADMIN — Medication 4: at 11:45

## 2021-07-19 RX ADMIN — INSULIN GLARGINE 56 UNIT(S): 100 INJECTION, SOLUTION SUBCUTANEOUS at 21:37

## 2021-07-19 RX ADMIN — Medication 4 MILLIGRAM(S): at 18:37

## 2021-07-19 RX ADMIN — Medication 81 MILLIGRAM(S): at 11:47

## 2021-07-19 RX ADMIN — Medication 4 MILLIGRAM(S): at 11:46

## 2021-07-19 RX ADMIN — Medication 2: at 21:36

## 2021-07-19 RX ADMIN — Medication 650 MILLIGRAM(S): at 12:30

## 2021-07-19 RX ADMIN — Medication 300 MILLIGRAM(S): at 11:47

## 2021-07-19 RX ADMIN — Medication 24 UNIT(S): at 07:38

## 2021-07-19 RX ADMIN — Medication 28 UNIT(S): at 16:43

## 2021-07-19 RX ADMIN — Medication 650 MILLIGRAM(S): at 11:49

## 2021-07-19 RX ADMIN — Medication 400 MILLIGRAM(S): at 07:35

## 2021-07-19 RX ADMIN — Medication 1000 MILLIGRAM(S): at 07:35

## 2021-07-19 RX ADMIN — Medication 5 MILLIGRAM(S): at 21:44

## 2021-07-19 RX ADMIN — TAMSULOSIN HYDROCHLORIDE 0.4 MILLIGRAM(S): 0.4 CAPSULE ORAL at 21:37

## 2021-07-19 RX ADMIN — QUETIAPINE FUMARATE 25 MILLIGRAM(S): 200 TABLET, FILM COATED ORAL at 21:37

## 2021-07-19 RX ADMIN — Medication 3: at 07:38

## 2021-07-19 RX ADMIN — Medication 4 MILLIGRAM(S): at 02:00

## 2021-07-19 RX ADMIN — PANTOPRAZOLE SODIUM 40 MILLIGRAM(S): 20 TABLET, DELAYED RELEASE ORAL at 11:47

## 2021-07-19 RX ADMIN — Medication 28 UNIT(S): at 11:46

## 2021-07-19 NOTE — PROGRESS NOTE ADULT - ASSESSMENT
Assessment  DMT2: 71y Male with DM T2 with hyperglycemia, A1C 8.8%, was on oral meds/Janumet at home, admitted with weakness/fatigue and hyperglycemia, now on large-dose basal bolus insulin, blood sugars are running high and not at target in the setting of steroid management, no hypoglycemic episodes. Patient is eating meals on dysphagia diet, reports fair appetite, appears alert and comfortable, remains on dexamethasone.  Lymphoma: on medications, monitored, FU Heme/Onc.  Anemia: stable, monitored.      Shahriar Kahn MD  Cell: [phone]  Office: [phone]     Assessment  DMT2: 71y Male with DM T2 with hyperglycemia, A1C 8.8%, was on oral meds/Janumet at home, admitted with weakness/fatigue and hyperglycemia, now on large-dose basal bolus insulin, blood sugars are running high and not at target in the setting  of steroid management, no hypoglycemic episodes. Patient is eating meals on dysphagia diet, reports fair appetite, appears alert and comfortable, remains on dexamethasone.  Lymphoma: on medications, monitored, FU Heme/Onc.  Anemia: stable, monitored.      Shahriar Kahn MD  Cell: [phone]  Office: [phone]

## 2021-07-19 NOTE — CONSULT NOTE ADULT - ASSESSMENT
70 y/o M with PMH of CVA, DM, BPH with obstruction s/p Sauceda catheter, s/p ERCP w Sphincteretomy, recent admission to [facility] for sepsis, Hodgkin lymphoma dx 2020 - was not offered any chemo and was placed on hospice, DVT 8/2020. Presenting with weakness and FTT. Pt and family opted for treatment of lymphoma - s/p Opdivo 7/6. Called to consult for CT chest findings 6/28 with patchy GGO and scattered nodular opacities throughout all lobes of the lungs. Currently breathing comfortably on RA sats 98%.

## 2021-07-19 NOTE — PROGRESS NOTE ADULT - ASSESSMENT
70 y/o M w/ PMHx of CVA this past August and got TPA per family member, DM, BPH with obstruction s/p escamilla catheter, s/p ERCP w Sphincterectomy recent admission to [facility] for sepsis  Hodgkin lymphoma was not offered any chemo and was placed on hospice.     now presenting with weakness and FTT.   pt denies cp / SOB / palpitations   no focal neuro complaints .. at baseline RLE weakness   no N/V   had one episode of diarrhea   no urinary sx  abdominal pain, diffuse, but worse on R side.   Afib RVR overnight. Was asymptomatic   no known history of this as per patient

## 2021-07-19 NOTE — PROGRESS NOTE ADULT - PROBLEM SELECTOR PLAN 1
Will increase Lantus to 56u at bedtime.  Will increase Admelog to 28u before each meal and continue Admelog correction scale coverage. Will continue monitoring FS and FU, will adjust insulin dose as steroids are tapered/dc.  Discussed plan with patient and family at bedside.   for compliance with consistent low-carb diet, nutritional shakes and supplements as tolerated. FU RD recommendations.

## 2021-07-19 NOTE — CONSULT NOTE ADULT - PROBLEM SELECTOR RECOMMENDATION 2
-s/p Opdivo 7/6  -Management per heme/onc
Oncology follow up
Suggest to continue medications, monitoring, FU primary team heme/onc recommendations.

## 2021-07-19 NOTE — CONSULT NOTE ADULT - PROBLEM SELECTOR RECOMMENDATION 9
CT chest findings 6/28 with patchy GGO and scattered nodular opacities throughout all lobes of the lungs  -Possibly related to underlying lymphoma  -Noted to have elevated Funitell (256). Pt nontoxic appearing, d/w ID - plans to repeat Fungitell.   -Normoxic, no c/o dyspnea  -Plans for CT A/P for abdominal pain per primary team, f/u CT chest ordered.

## 2021-07-19 NOTE — PROGRESS NOTE ADULT - SUBJECTIVE AND OBJECTIVE BOX
CC: re consult for elevated lab value fungitell level     Patient is awake and alert, he denies cough, shortness of breath, he denies fevers, he reports that he is has lower abdominal pain      REVIEW OF SYSTEMS:  All other review of systems negative (Comprehensive ROS)    Antimicrobials Day #  :    Other Medications Reviewed    Vital Signs Last 24 Hrs  T(C): 36.7 (19 Jul 2021 11:00), Max: 36.7 (19 Jul 2021 11:00)  T(F): 98.1 (19 Jul 2021 11:00), Max: 98.1 (19 Jul 2021 11:00)  HR: 61 (19 Jul 2021 11:00) (50 - 71)  BP: 134/66 (19 Jul 2021 11:00) (131/73 - 150/94)  BP(mean): --  RR: 18 (19 Jul 2021 11:00) (18 - 19)  SpO2: 100% (19 Jul 2021 11:00) (96% - 100%)    PHYSICAL EXAM:  General: alert, no acute distress  Eyes:  anicteric, no conjunctival injection, no discharge  Oropharynx: no lesions or injection 	  Lungs: clear to auscultation  Heart: regular rate and rhythm; no murmur, rubs or gallops  Abdomen: soft, nondistended, nontender, without mass or organomegaly  Skin: no lesions  Extremities: no clubbing, cyanosis, or edema  Neurologic: alert, oriented, moves all extremities    LAB RESULTS:                          8.8    2.92  )-----------( 64       ( 19 Jul 2021 07:06 )             28.8               MICROBIOLOGY:  RECENT CULTURES:      RADIOLOGY REVIEWED:    EXAM:  MR SPINE LUMBAR WAW IC                            PROCEDURE DATE:  07/11/2021            INTERPRETATION:  Clinical indications: Lower extremity weakness    MRI of the lumbar spine was performed using sagittal T1-T2 and T2-weighted sequence fat suppression. Axial T1 and T2-weighted sequences were performed    Loss of the normal lumbar lordosis seen    Mild scoliosis is seen.    Disc desiccation is seen involving the L2-3 L3-4 L4-5 and L5-S1 levels which are secondary to degenerative changes.    There is evidence of abnormal T1 prolongation seen involving the lower thoracic lumbar sacral vertebral bodies as well as both iliac bones. This finding could be compatible with a marrow infiltrative process, possibly secondary to patient's lymphoma though other possibilities include metastasis, multiple myeloma and severe anemia must be considered.    L1-2: Hypertrophic facet joint changes are seen bilaterally. No significant, as of the spinal canal or either neural foramen.    L2-3: Bilateral hypertrophic facet joint changes seen. No significant compromise spinal canal or either neural foramen    L3-4: Disc bulge and bilateral hypertrophic facet joint change. No significant, as of the spinal canal or either neural foramen    L4-5: Disc bulge and bilateral hypertrophic facet joint changes seen. Mild to moderate narrowing of the spinal canal. Mild to moderate narrowing of both neural foramen.    L5-S1: Bilateral hypertrophic facet joint changes seen. Mild narrowing of the left neural foramen and mild to moderate right neural foramen    There is evidence of abnormal area of enhancement seen involving the dorsal aspect of the right thecal sac at the level of the L4-5 disc. This best seen on series 11 image 21. This finding measures approximately 4.7 mm. This could be related to patient's known lymphoma though the possibility of drop metastasis as well as other neoplastic etiologies cannot entirely excluded. Correlation with CSF can also be done for further evaluation. Continued close interval follow-up is recommended.    The conus appears to end at top of L2.    Bilateral renal cysts are seen.    Evaluation of the paraspinal soft tissues appear normal.    IMPRESSION: Abnormal signal seen involving the lower thoracic lumbarsacral vertebral bodies as well as both iliac bones. This is likely compatible with a marrow infiltrative process as described above.    Abnormal enhancing lesion is seen involving the dorsal aspect of the right thecal sac as described above.    Degenerative changes as described above.    --- End of Report ---                NGA MOSES MD; Attending Radiologist  This document has been electronically signed. Jul 12 2021  9:10AM            Assessment:

## 2021-07-19 NOTE — PROVIDER CONTACT NOTE (OTHER) - ASSESSMENT
Pt is A&Ox3, VSS please see flowsheet for reference, denies cp/palpitations. Pt complaining of 10/10 abdominal pain, states PO Tylenol doesn't help with the pain.

## 2021-07-19 NOTE — PROGRESS NOTE ADULT - SUBJECTIVE AND OBJECTIVE BOX
Subjective: Patient seen and examined. No new events except as noted.     REVIEW OF SYSTEMS:    CONSTITUTIONAL: + weakness, fevers or chills  EYES/ENT: No visual changes;  No vertigo or throat pain   NECK: No pain or stiffness  RESPIRATORY: No cough, wheezing, hemoptysis; No shortness of breath  CARDIOVASCULAR: No chest pain or palpitations  GASTROINTESTINAL: No abdominal or epigastric pain. No nausea, vomiting, or hematemesis; No diarrhea or constipation. No melena or hematochezia.  GENITOURINARY: No dysuria, frequency or hematuria  NEUROLOGICAL: No numbness or weakness  SKIN: No itching, burning, rashes, or lesions   All other review of systems is negative unless indicated above.    MEDICATIONS:  MEDICATIONS  (STANDING):  allopurinol 300 milliGRAM(s) Oral daily  aspirin  chewable 81 milliGRAM(s) Oral daily  dexAMETHasone  Injectable 4 milliGRAM(s) IV Push every 6 hours  dextrose 40% Gel 15 Gram(s) Oral once  dextrose 5%. 1000 milliLiter(s) (50 mL/Hr) IV Continuous <Continuous>  dextrose 5%. 1000 milliLiter(s) (100 mL/Hr) IV Continuous <Continuous>  dextrose 50% Injectable 25 Gram(s) IV Push once  dextrose 50% Injectable 12.5 Gram(s) IV Push once  dextrose 50% Injectable 25 Gram(s) IV Push once  finasteride 5 milliGRAM(s) Oral daily  glucagon  Injectable 1 milliGRAM(s) IntraMuscular once  insulin glargine Injectable (LANTUS) 52 Unit(s) SubCutaneous at bedtime  insulin lispro (ADMELOG) corrective regimen sliding scale   SubCutaneous three times a day before meals  insulin lispro (ADMELOG) corrective regimen sliding scale   SubCutaneous at bedtime  insulin lispro Injectable (ADMELOG) 24 Unit(s) SubCutaneous three times a day before meals  pantoprazole   Suspension 40 milliGRAM(s) Oral daily  QUEtiapine 25 milliGRAM(s) Oral at bedtime  senna 2 Tablet(s) Oral at bedtime  tamsulosin 0.4 milliGRAM(s) Oral at bedtime      PHYSICAL EXAM:  T(C): 36.6 (07-19-21 @ 04:26), Max: 36.6 (07-18-21 @ 14:19)  HR: 50 (07-19-21 @ 04:26) (50 - 71)  BP: 134/74 (07-19-21 @ 04:26) (131/73 - 150/94)  RR: 18 (07-19-21 @ 04:26) (18 - 19)  SpO2: 99% (07-19-21 @ 04:26) (96% - 99%)  Wt(kg): --  I&O's Summary    18 Jul 2021 07:01  -  19 Jul 2021 07:00  --------------------------------------------------------  IN: 1090 mL / OUT: 2150 mL / NET: -1060 mL          Appearance: Normal	  HEENT:   Normal oral mucosa, PERRL, EOMI	  Lymphatic: No lymphadenopathy , no edema  Cardiovascular: Normal S1 S2, No JVD, No murmurs , Peripheral pulses palpable 2+ bilaterally  Respiratory: Lungs clear to auscultation, normal effort 	  Gastrointestinal:  Soft, Non-tender, + BS	  Skin: No rashes, No ecchymoses, No cyanosis, warm to touch  Musculoskeletal: Normal range of motion, normal strength  Psychiatry:  Mood & affect appropriate  Ext: No edema      LABS:    CARDIAC MARKERS:                                8.8    2.92  )-----------( 64       ( 19 Jul 2021 07:06 )             28.8           proBNP:   Lipid Profile:   HgA1c:   TSH:           TELEMETRY: 	 SR   ECG:  	  RADIOLOGY:   DIAGNOSTIC TESTING:  [ ] Echocardiogram:  [ ]  Catheterization:  [ ] Stress Test:    OTHER:

## 2021-07-19 NOTE — PROGRESS NOTE ADULT - ASSESSMENT
71 year old male diagnosed with Hodgkin lymphoma, sent to hospice , he was sent to admitted for FTT, fever, no infection, now s/p check point inhibitor. Had fever after drug infusion and a  transfusion, no infection found.  No infection is apparent at present on previous work up as per last ID note.  Prior fever probable  B symptoms. He had prior infectious  work up and reviewed micro results.  Blood culture done on this admission reviewed and are no growth, he also had CSF fluid cx   CSF HSV PCR is negative, legionella is neg, crypto antigen is negative, fungitell sent on July 5 reported to be 256, at this point not clear the significance of number as clinically he has no fever, he has no cough, no sob, no signs of infection to correlate with elevated fungitell level. over colonization with Candida may also falsely elevated fungitell.  I do not see signs of infection , no indication for abx use.    Plan:  repeat fungitell level   Reviewed case with Dr Alas he has obtained a pulmonary consult to comment on CT chest finding from June 2021, reviewed case with pulmonary and they have ordered a repeat CT of chest to compare findings from prior CT

## 2021-07-19 NOTE — PROVIDER CONTACT NOTE (OTHER) - ASSESSMENT
Pt is A&Ox3, VSS, denies cp/sob/palpitations. Pt is due for Lantus and has sliding scale insulin ordered.

## 2021-07-19 NOTE — PROGRESS NOTE ADULT - SUBJECTIVE AND OBJECTIVE BOX
Chief complaint  Patient is a 71y old  Male who presents with a chief complaint of HD (18 Jul 2021 12:32)   Review of systems  Patient awake in bed, appears alert and comfortable, no hypoglycemic episodes. Family at bedside.    Labs and Fingersticks  CAPILLARY BLOOD GLUCOSE      POCT Blood Glucose.: 312 mg/dL (19 Jul 2021 11:21)  POCT Blood Glucose.: 299 mg/dL (19 Jul 2021 07:23)  POCT Blood Glucose.: 263 mg/dL (19 Jul 2021 03:54)  POCT Blood Glucose.: 348 mg/dL (18 Jul 2021 23:56)  POCT Blood Glucose.: 390 mg/dL (18 Jul 2021 21:21)  POCT Blood Glucose.: 400 mg/dL (18 Jul 2021 21:18)  POCT Blood Glucose.: 295 mg/dL (18 Jul 2021 16:22)      Medications  MEDICATIONS  (STANDING):  allopurinol 300 milliGRAM(s) Oral daily  aspirin  chewable 81 milliGRAM(s) Oral daily  dexAMETHasone  Injectable 4 milliGRAM(s) IV Push every 6 hours  dextrose 40% Gel 15 Gram(s) Oral once  dextrose 5%. 1000 milliLiter(s) (50 mL/Hr) IV Continuous <Continuous>  dextrose 5%. 1000 milliLiter(s) (100 mL/Hr) IV Continuous <Continuous>  dextrose 50% Injectable 25 Gram(s) IV Push once  dextrose 50% Injectable 12.5 Gram(s) IV Push once  dextrose 50% Injectable 25 Gram(s) IV Push once  finasteride 5 milliGRAM(s) Oral daily  glucagon  Injectable 1 milliGRAM(s) IntraMuscular once  insulin glargine Injectable (LANTUS) 56 Unit(s) SubCutaneous at bedtime  insulin lispro (ADMELOG) corrective regimen sliding scale   SubCutaneous three times a day before meals  insulin lispro (ADMELOG) corrective regimen sliding scale   SubCutaneous at bedtime  insulin lispro Injectable (ADMELOG) 28 Unit(s) SubCutaneous three times a day before meals  melatonin 5 milliGRAM(s) Oral at bedtime  pantoprazole   Suspension 40 milliGRAM(s) Oral daily  QUEtiapine 25 milliGRAM(s) Oral at bedtime  senna 2 Tablet(s) Oral at bedtime  tamsulosin 0.4 milliGRAM(s) Oral at bedtime      Physical Exam  General: Patient comfortable in bed  Vital Signs Last 12 Hrs  T(F): 98.1 (07-19-21 @ 11:00), Max: 98.1 (07-19-21 @ 11:00)  HR: 61 (07-19-21 @ 11:00) (50 - 61)  BP: 134/66 (07-19-21 @ 11:00) (134/66 - 134/74)  BP(mean): --  RR: 18 (07-19-21 @ 11:00) (18 - 18)  SpO2: 100% (07-19-21 @ 11:00) (99% - 100%)       Chief complaint  Patient is a 71y old  Male who presents with a chief complaint of HD (18 Jul 2021 12:32)   Review of systems  Patient awake in bed, appears alert and comfortable, no hypoglycemic episodes. Family at bedside.    Labs and Fingersticks  CAPILLARY BLOOD GLUCOSE      POCT Blood Glucose.: 312 mg/dL (19 Jul 2021 11:21)  POCT Blood Glucose.: 299 mg/dL (19 Jul 2021 07:23)  POCT Blood Glucose.: 263 mg/dL (19 Jul 2021 03:54)  POCT Blood Glucose.: 348 mg/dL (18 Jul 2021 23:56)  POCT Blood Glucose.: 390 mg/dL (18 Jul 2021 21:21)  POCT Blood Glucose.: 400 mg/dL (18 Jul 2021 21:18)  POCT Blood Glucose.: 295 mg/dL (18 Jul 2021 16:22)      Medications  MEDICATIONS  (STANDING):  allopurinol 300 milliGRAM(s) Oral daily  aspirin  chewable 81 milliGRAM(s) Oral daily  dexAMETHasone  Injectable 4 milliGRAM(s) IV Push every 6 hours  dextrose 40% Gel 15 Gram(s) Oral once  dextrose 5%. 1000 milliLiter(s) (50 mL/Hr) IV Continuous <Continuous>  dextrose 5%. 1000 milliLiter(s) (100 mL/Hr) IV Continuous <Continuous>  dextrose 50% Injectable 25 Gram(s) IV Push once  dextrose 50% Injectable 12.5 Gram(s) IV Push once  dextrose 50% Injectable 25 Gram(s) IV Push once  finasteride 5 milliGRAM(s) Oral daily  glucagon  Injectable 1 milliGRAM(s) IntraMuscular once  insulin glargine Injectable (LANTUS) 56 Unit(s) SubCutaneous at bedtime  insulin lispro (ADMELOG) corrective regimen sliding scale   SubCutaneous three times a day before meals  insulin lispro (ADMELOG) corrective regimen sliding scale   SubCutaneous at bedtime  insulin lispro Injectable (ADMELOG) 28 Unit(s) SubCutaneous three times a day before meals  melatonin 5 milliGRAM(s) Oral at bedtime  pantoprazole   Suspension 40 milliGRAM(s) Oral daily  QUEtiapine 25 milliGRAM(s) Oral at bedtime  senna 2 Tablet(s) Oral at bedtime  tamsulosin 0.4 milliGRAM(s) Oral at bedtime      Physical Exam  General: Patient comfortable in bed  Vital Signs Last 12 Hrs  T(F): 98.1 (07-19-21 @ 11:00), Max: 98.1 (07-19-21 @ 11:00)  HR: 61 (07-19-21 @ 11:00) (50 - 61)  BP: 134/66 (07-19-21 @ 11:00) (134/66 - 134/74)  BP(mean): --  RR: 18 (07-19-21 @ 11:00) (18 - 18)  SpO2: 100% (07-19-21 @ 11:00) (99% - 100%)

## 2021-07-19 NOTE — CONSULT NOTE ADULT - SUBJECTIVE AND OBJECTIVE BOX
PULMONARY CONSULT    HPI: 70 y/o M with PMH of CVA, DM, BPH with obstruction s/p Sauceda catheter, s/p ERCP w Sphincteretomy, recent admission to [facility] for sepsis, Hodgkin lymphoma dx 2020 - was not offered any chemo and was placed on hospice, DVT 8/2020. Presenting with weakness and FTT. Pt and family opted for treatment of lymphoma - s/p Opdivo 7/6. Called to consult for CT chest findings 6/28 with patchy GGO and scattered nodular opacities throughout all lobes of the lungs. Noted to have intermittent fevers throughout hospital stay, afebrile past week. Denies dyspnea, cough, CP, pleuritic CP.     PAST MEDICAL & SURGICAL HISTORY:  Cerebrovascular accident (CVA)  Diabetes mellitus  Hodgkin lymphoma  No significant past surgical history    Allergies  No Known Allergies    FAMILY HISTORY:  No pertinent family history in first degree relatives    Social history: never a smoker     Review of Systems:  CONSTITUTIONAL: Per above   EYES: No eye pain, visual disturbances, or discharge  ENMT:  No difficulty hearing, tinnitus, vertigo; No sinus or throat pain  NECK: No pain or stiffness  RESPIRATORY: Per above  CARDIOVASCULAR: No chest pain, palpitations, dizziness, or leg swelling  GASTROINTESTINAL: C/o abdominal pain   GENITOURINARY: No dysuria, frequency, hematuria, or incontinence  NEUROLOGICAL: No headaches, memory loss, loss of strength, numbness, or tremors  SKIN: No itching, burning, rashes, or lesions   MUSCULOSKELETAL: No joint pain or swelling; No muscle, back, or extremity pain  PSYCHIATRIC: No depression, anxiety, mood swings, or difficulty sleeping      Medications:  MEDICATIONS  (STANDING):  allopurinol 300 milliGRAM(s) Oral daily  aspirin  chewable 81 milliGRAM(s) Oral daily  dexAMETHasone  Injectable 4 milliGRAM(s) IV Push every 6 hours  dextrose 40% Gel 15 Gram(s) Oral once  dextrose 5%. 1000 milliLiter(s) (50 mL/Hr) IV Continuous <Continuous>  dextrose 5%. 1000 milliLiter(s) (100 mL/Hr) IV Continuous <Continuous>  dextrose 50% Injectable 25 Gram(s) IV Push once  dextrose 50% Injectable 12.5 Gram(s) IV Push once  dextrose 50% Injectable 25 Gram(s) IV Push once  finasteride 5 milliGRAM(s) Oral daily  glucagon  Injectable 1 milliGRAM(s) IntraMuscular once  insulin glargine Injectable (LANTUS) 56 Unit(s) SubCutaneous at bedtime  insulin lispro (ADMELOG) corrective regimen sliding scale   SubCutaneous three times a day before meals  insulin lispro (ADMELOG) corrective regimen sliding scale   SubCutaneous at bedtime  insulin lispro Injectable (ADMELOG) 28 Unit(s) SubCutaneous three times a day before meals  pantoprazole   Suspension 40 milliGRAM(s) Oral daily  QUEtiapine 25 milliGRAM(s) Oral at bedtime  senna 2 Tablet(s) Oral at bedtime  tamsulosin 0.4 milliGRAM(s) Oral at bedtime    MEDICATIONS  (PRN):  acetaminophen   Tablet .. 650 milliGRAM(s) Oral every 6 hours PRN Temp greater or equal to 38C (100.4F), Moderate Pain (4 - 6)  bisacodyl 5 milliGRAM(s) Oral every 12 hours PRN Constipation  polyethylene glycol 3350 17 Gram(s) Oral daily PRN Constipation            Vital Signs Last 24 Hrs  T(C): 36.7 (19 Jul 2021 11:00), Max: 36.7 (19 Jul 2021 11:00)  T(F): 98.1 (19 Jul 2021 11:00), Max: 98.1 (19 Jul 2021 11:00)  HR: 61 (19 Jul 2021 11:00) (50 - 71)  BP: 134/66 (19 Jul 2021 11:00) (131/73 - 150/94)  BP(mean): --  RR: 18 (19 Jul 2021 11:00) (18 - 19)  SpO2: 100% (19 Jul 2021 11:00) (96% - 100%)            07-18 @ 07:01  -  07-19 @ 07:00  --------------------------------------------------------  IN: 1090 mL / OUT: 2150 mL / NET: -1060 mL          LABS:                        8.8    2.92  )-----------( 64       ( 19 Jul 2021 07:06 )             28.8         CAPILLARY BLOOD GLUCOSE      POCT Blood Glucose.: 312 mg/dL (19 Jul 2021 11:21)            Physical Examination:    General: No acute distress.      HEENT: Pupils equal, reactive to light.  Symmetric.    PULM: Clear to auscultation bilaterally, no significant sputum production    CVS: S1, S2    ABD: tender to palpation     EXT: No edema, nontender    SKIN: Warm and well perfused, no rashes noted.    NEURO: Alert, oriented, interactive, nonfocal      RADIOLOGY REVIEWED    CT chest: < from: CT Chest w/ IV Cont (06.28.21 @ 19:01) >  FINDINGS: The quality of the images are degraded by respiratory motion and streak artifact.    LUNGS AND AIRWAYS: Patent central airways. Patchy groundglass opacities and scattered nodular opacities throughout all lobes of the lungs. For example:  A left upper lobe nodule measures 0.9 cm (series 3 image 33).  A right middle lobe nodule measures 2.0 x 0.8 cm (series 3 image 76).  3.5 x 2.1 cm solid opacity within the superior segment of right lower lobe (series 3 image 71).  A right lower lobe nodule at the costophrenic angle measures 1.9 x 1.1 cm (series 3 image 108).    PLEURA: No pleural effusion or pneumothorax.    MEDIASTINUM AND SARTHAK: Mildly enlarged mediastinal, hilar, and supraclavicular lymphadenopathy. A right hilar lymph node measures 1.8 x 1.4 cm (series 3 image 90). A left supraclavicular node measures 2.5 x 1.5 cm (series 3 image 19). A subcarinal node measures 2.2 x 1.3 cm (series 3 image 63).    VESSELS: Coronary artery calcifications.    HEART: Heart size is normal. No pericardial effusion.    CHEST WALL AND LOWER NECK: Within normal limits.    VISUALIZED UPPER ABDOMEN: Retroperitoneal lymphadenopathy, as seen on abdominal CT from the same date.    BONES: Degenerative changes of the spine.    IMPRESSION:    Patchy groundglass opacities and scattered nodular opacities throughout all lobes of the lungs. Findings may be related to known lymphoma or may be seen in the setting of multifocal infection.    Mildly enlarged supraclavicular, mediastinal, hilar, and retroperitoneal lymphadenopathy likely related to known lymphoma.      < end of copied text >      LE duplex: < from: VA Duplex Lower Ext Vein Scan, Bilat (07.08.21 @ 13:45) >  IMPRESSION:  No evidence of deep venous thrombosis in either lower extremity.    < end of copied text >

## 2021-07-19 NOTE — PROGRESS NOTE ADULT - ASSESSMENT
1) Hodgkin's lymphoma  - repeat CT scans, results noted  -[facility]- Family meeting held at bedside on 7/2. We discussed rx options vs comfort care. Family and patient wish to pursue rx. Ongoing conversations with family members have been taking place since our formal meeting.   - allopurinol  - pt s/p 1st rx with opdivo 7/6. next due on 8/3.  - in light of recent development, may need to move up timetable on use of systemic CTX, ABVD     2) ENT- voice change is a new finding as per patient's sister. Patient also with weak voice. ? due to leptomeningeal disease?  ENT has seen patient.  dysphagia has been under evaluation.     3)Hyperglycemia- mgmt as per med. Now likely will get worse with steroids now on board.    4)Weakness. 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  - Neurology input noted.  - cont pt.  - bed to chair - d/w RN, and floor team  - lumbar mri , results noted     5) Pancytopenia. Suspect multifactorial, due to hodgkins, and possibly with role being played by recent abx.  anemia- w/u this far unremarkable. possible aocd.  transfusional support as needed    Leukopenia- unclear etiology- possibly due to infxn or malignancy. Also could be related to zosyn. Now off of abx. monitor cbc     Thrombocytopenia- has developed while here.  Can't r/o malignancy vs. due to zosyn.   manage supportively for now.    6)Neuro-onc  MRI findings noted.  will touch base with Dr. John regarding case, CTX vs xrt    6. Fever- resolved. ID has been on board     1) Hodgkin's lymphoma  - repeat CT scans, results noted  -[facility]- Family meeting held at bedside on 7/2. We discussed rx options vs comfort care. Family and patient wish to pursue rx. Ongoing conversations with family members have been taking place since our formal meeting.   - allopurinol  - pt s/p 1st rx with opdivo 7/6. next due on 8/3.  - in light of recent development, may need to move up timetable on use of systemic CTX, ABVD     2) ENT- voice change is a new finding as per patient's sister. Patient also with weak voice. ? due to leptomeningeal disease?  ENT has seen patient.  dysphagia has been under evaluation.     3)Hyperglycemia- mgmt as per med. Now likely will get worse with steroids now on board.    4)Weakness. 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  - Neurology input noted.  - cont pt.  - bed to chair - d/w RN, and floor team  - lumbar mri , results noted     5) Pancytopenia. Suspect multifactorial, due to hodgkins, and possibly with role being played by recent abx.  anemia- w/u this far unremarkable. possible aocd.  transfusional support as needed    Leukopenia- unclear etiology- possibly due to infxn or malignancy. Also could be related to zosyn. Now off of abx. monitor cbc     Thrombocytopenia- has developed while here.  Can't r/o malignancy vs. due to zosyn.   manage supportively for now.    6)Neuro-onc  MRI findings noted.  will touch base with Dr. John regarding case, CTX vs xrt    6. Fever- resolved. ID has been on board    7. abd pain- will get ct a/p

## 2021-07-19 NOTE — PROGRESS NOTE ADULT - SUBJECTIVE AND OBJECTIVE BOX
LIZETT RIVERA  MRN-[id number]    Patient is a 71y old  Male who presents with a chief complaint of HD (18 Jul 2021 12:32)      Review of System  REVIEW OF SYSTEMS      General:	Denies fatigue, fevers, chills, sweats, decreased appetite.    Skin/Breast: denies pruritis, rash  	  Ophthalmologic: no change in vision or blurring  	  HEENT	Denies dry mouth, oral sores, dysphagia,  change in hearing.    Respiratory and Thorax:  cough, sob, wheeze, hemoptysis  	  Cardiovascular:	no cp , palp, orthopnea    Gastrointestinal:	no n/v/d constipation    Genitourinary:	no dysuria of frequency, no hematuria, no flank pain    Musculoskeletal:	no bone or joint pain. no muscle aches.     Neurological:	no change in sensory or motor function. no headache. no weakness.     Psychiatric:	no depression, no anxiety, insomnia.     Hematology/Lymphatics:	no bleeding or bruising        Current Meds  MEDICATIONS  (STANDING):  allopurinol 300 milliGRAM(s) Oral daily  aspirin  chewable 81 milliGRAM(s) Oral daily  dexAMETHasone  Injectable 4 milliGRAM(s) IV Push every 6 hours  dextrose 40% Gel 15 Gram(s) Oral once  dextrose 5%. 1000 milliLiter(s) (50 mL/Hr) IV Continuous <Continuous>  dextrose 5%. 1000 milliLiter(s) (100 mL/Hr) IV Continuous <Continuous>  dextrose 50% Injectable 25 Gram(s) IV Push once  dextrose 50% Injectable 12.5 Gram(s) IV Push once  dextrose 50% Injectable 25 Gram(s) IV Push once  finasteride 5 milliGRAM(s) Oral daily  glucagon  Injectable 1 milliGRAM(s) IntraMuscular once  insulin glargine Injectable (LANTUS) 52 Unit(s) SubCutaneous at bedtime  insulin lispro (ADMELOG) corrective regimen sliding scale   SubCutaneous three times a day before meals  insulin lispro (ADMELOG) corrective regimen sliding scale   SubCutaneous at bedtime  insulin lispro Injectable (ADMELOG) 24 Unit(s) SubCutaneous three times a day before meals  pantoprazole   Suspension 40 milliGRAM(s) Oral daily  QUEtiapine 25 milliGRAM(s) Oral at bedtime  senna 2 Tablet(s) Oral at bedtime  tamsulosin 0.4 milliGRAM(s) Oral at bedtime    MEDICATIONS  (PRN):  acetaminophen   Tablet .. 650 milliGRAM(s) Oral every 6 hours PRN Temp greater or equal to 38C (100.4F), Moderate Pain (4 - 6)  bisacodyl 5 milliGRAM(s) Oral every 12 hours PRN Constipation  polyethylene glycol 3350 17 Gram(s) Oral daily PRN Constipation      Vitals  Vital Signs Last 24 Hrs  T(C): 36.6 (19 Jul 2021 04:26), Max: 36.6 (18 Jul 2021 14:19)  T(F): 97.8 (19 Jul 2021 04:26), Max: 97.8 (18 Jul 2021 14:19)  HR: 50 (19 Jul 2021 04:26) (50 - 71)  BP: 134/74 (19 Jul 2021 04:26) (131/73 - 150/94)  BP(mean): --  RR: 18 (19 Jul 2021 04:26) (18 - 19)  SpO2: 99% (19 Jul 2021 04:26) (96% - 99%)    Physical Exam  PHYSICAL EXAM:      Constitutional: NAD    Eyes: PERRLA EOMI, anicteric sclera    Heent :No oral sores, no pharyngeal injection. moist mucosa.    Neck: supple, no jvd, no LAD    Respiratory: CTA b/l     Cardiovascular: s1s2, no m/g/r    Gastrointestinal: soft, nt, nd, + BS    Extremities: no c/c/e    Neurological:A&O x 3 moves all ext.    Skin: no rash on exposed skin    Lymph Nodes: no lymphadenopathy.              Lab  CBC Full  -  ( 19 Jul 2021 07:06 )  WBC Count : 2.92 K/uL  RBC Count : 2.88 M/uL  Hemoglobin : 8.8 g/dL  Hematocrit : 28.8 %  Platelet Count - Automated : 64 K/uL  Mean Cell Volume : 100.0 fl  Mean Cell Hemoglobin : 30.6 pg  Mean Cell Hemoglobin Concentration : 30.6 gm/dL  Auto Neutrophil # : x  Auto Lymphocyte # : x  Auto Monocyte # : x  Auto Eosinophil # : x  Auto Basophil # : x  Auto Neutrophil % : x  Auto Lymphocyte % : x  Auto Monocyte % : x  Auto Eosinophil % : x  Auto Basophil % : x              Rad:    Assessment/Plan   LIZETT RIVERA  MRN-[id number]    Patient is a 71y old  Male who presents with a chief complaint of HD (18 Jul 2021 12:32)      Review of System    Complains of worsening abdominal discomfort.  otherwise without complaints.     General:	Denies fatigue, fevers, chills, sweats, decreased appetite.    Skin/Breast: denies pruritis, rash  	  Ophthalmologic: no change in vision or blurring  	  HEENT	Denies dry mouth, oral sores, dysphagia,  change in hearing.    Respiratory and Thorax:  cough, sob, wheeze, hemoptysis  	  Cardiovascular:	no cp , palp, orthopnea    Gastrointestinal:	no n/v/d constipation    Genitourinary:	no dysuria of frequency, no hematuria, no flank pain    Musculoskeletal:	no bone or joint pain. no muscle aches.     Neurological:	no change in sensory or motor function. no headache. no weakness.     Psychiatric:	no depression, no anxiety, insomnia.     Hematology/Lymphatics:	no bleeding or bruising        Current Meds  MEDICATIONS  (STANDING):  allopurinol 300 milliGRAM(s) Oral daily  aspirin  chewable 81 milliGRAM(s) Oral daily  dexAMETHasone  Injectable 4 milliGRAM(s) IV Push every 6 hours  dextrose 40% Gel 15 Gram(s) Oral once  dextrose 5%. 1000 milliLiter(s) (50 mL/Hr) IV Continuous <Continuous>  dextrose 5%. 1000 milliLiter(s) (100 mL/Hr) IV Continuous <Continuous>  dextrose 50% Injectable 25 Gram(s) IV Push once  dextrose 50% Injectable 12.5 Gram(s) IV Push once  dextrose 50% Injectable 25 Gram(s) IV Push once  finasteride 5 milliGRAM(s) Oral daily  glucagon  Injectable 1 milliGRAM(s) IntraMuscular once  insulin glargine Injectable (LANTUS) 52 Unit(s) SubCutaneous at bedtime  insulin lispro (ADMELOG) corrective regimen sliding scale   SubCutaneous three times a day before meals  insulin lispro (ADMELOG) corrective regimen sliding scale   SubCutaneous at bedtime  insulin lispro Injectable (ADMELOG) 24 Unit(s) SubCutaneous three times a day before meals  pantoprazole   Suspension 40 milliGRAM(s) Oral daily  QUEtiapine 25 milliGRAM(s) Oral at bedtime  senna 2 Tablet(s) Oral at bedtime  tamsulosin 0.4 milliGRAM(s) Oral at bedtime    MEDICATIONS  (PRN):  acetaminophen   Tablet .. 650 milliGRAM(s) Oral every 6 hours PRN Temp greater or equal to 38C (100.4F), Moderate Pain (4 - 6)  bisacodyl 5 milliGRAM(s) Oral every 12 hours PRN Constipation  polyethylene glycol 3350 17 Gram(s) Oral daily PRN Constipation      Vitals  Vital Signs Last 24 Hrs  T(C): 36.6 (19 Jul 2021 04:26), Max: 36.6 (18 Jul 2021 14:19)  T(F): 97.8 (19 Jul 2021 04:26), Max: 97.8 (18 Jul 2021 14:19)  HR: 50 (19 Jul 2021 04:26) (50 - 71)  BP: 134/74 (19 Jul 2021 04:26) (131/73 - 150/94)  BP(mean): --  RR: 18 (19 Jul 2021 04:26) (18 - 19)  SpO2: 99% (19 Jul 2021 04:26) (96% - 99%)    Physical Exam  PHYSICAL EXAM:      Constitutional: NAD    Eyes: PERRLA EOMI, anicteric sclera    Heent :No oral sores, no pharyngeal injection. moist mucosa.    Neck: supple, no jvd, no LAD    Respiratory: CTA b/l     Cardiovascular: s1s2, no m/g/r    Gastrointestinal: soft, nt, nd, + BS    Extremities: no c/c/e    Neurological:A&O x 3 moves all ext.    Skin: no rash on exposed skin    Lymph Nodes: no lymphadenopathy.              Lab  CBC Full  -  ( 19 Jul 2021 07:06 )  WBC Count : 2.92 K/uL  RBC Count : 2.88 M/uL  Hemoglobin : 8.8 g/dL  Hematocrit : 28.8 %  Platelet Count - Automated : 64 K/uL  Mean Cell Volume : 100.0 fl  Mean Cell Hemoglobin : 30.6 pg  Mean Cell Hemoglobin Concentration : 30.6 gm/dL  Auto Neutrophil # : x  Auto Lymphocyte # : x  Auto Monocyte # : x  Auto Eosinophil # : x  Auto Basophil # : x  Auto Neutrophil % : x  Auto Lymphocyte % : x  Auto Monocyte % : x  Auto Eosinophil % : x  Auto Basophil % : x              Rad:    Assessment/Plan

## 2021-07-20 LAB
ANION GAP SERPL CALC-SCNC: 11 MMOL/L — SIGNIFICANT CHANGE UP (ref 5–17)
BUN SERPL-MCNC: 24 MG/DL — HIGH (ref 7–23)
CALCIUM SERPL-MCNC: 8.8 MG/DL — SIGNIFICANT CHANGE UP (ref 8.4–10.5)
CHLORIDE SERPL-SCNC: 101 MMOL/L — SIGNIFICANT CHANGE UP (ref 96–108)
CO2 SERPL-SCNC: 26 MMOL/L — SIGNIFICANT CHANGE UP (ref 22–31)
CREAT SERPL-MCNC: 0.55 MG/DL — SIGNIFICANT CHANGE UP (ref 0.5–1.3)
GLUCOSE BLDC GLUCOMTR-MCNC: 130 MG/DL — HIGH (ref 70–99)
GLUCOSE BLDC GLUCOMTR-MCNC: 191 MG/DL — HIGH (ref 70–99)
GLUCOSE BLDC GLUCOMTR-MCNC: 191 MG/DL — HIGH (ref 70–99)
GLUCOSE BLDC GLUCOMTR-MCNC: 249 MG/DL — HIGH (ref 70–99)
GLUCOSE SERPL-MCNC: 126 MG/DL — HIGH (ref 70–99)
HCT VFR BLD CALC: 27.9 % — LOW (ref 39–50)
HGB BLD-MCNC: 8.6 G/DL — LOW (ref 13–17)
MCHC RBC-ENTMCNC: 30.8 GM/DL — LOW (ref 32–36)
MCHC RBC-ENTMCNC: 30.9 PG — SIGNIFICANT CHANGE UP (ref 27–34)
MCV RBC AUTO: 100.4 FL — HIGH (ref 80–100)
NRBC # BLD: 0 /100 WBCS — SIGNIFICANT CHANGE UP (ref 0–0)
PLATELET # BLD AUTO: 56 K/UL — LOW (ref 150–400)
POTASSIUM SERPL-MCNC: 3.8 MMOL/L — SIGNIFICANT CHANGE UP (ref 3.5–5.3)
POTASSIUM SERPL-SCNC: 3.8 MMOL/L — SIGNIFICANT CHANGE UP (ref 3.5–5.3)
RBC # BLD: 2.78 M/UL — LOW (ref 4.2–5.8)
RBC # FLD: 26.5 % — HIGH (ref 10.3–14.5)
SODIUM SERPL-SCNC: 138 MMOL/L — SIGNIFICANT CHANGE UP (ref 135–145)
WBC # BLD: 2.11 K/UL — LOW (ref 3.8–10.5)
WBC # FLD AUTO: 2.11 K/UL — LOW (ref 3.8–10.5)

## 2021-07-20 PROCEDURE — 99231 SBSQ HOSP IP/OBS SF/LOW 25: CPT | Mod: GV

## 2021-07-20 RX ORDER — INSULIN GLARGINE 100 [IU]/ML
50 INJECTION, SOLUTION SUBCUTANEOUS AT BEDTIME
Refills: 0 | Status: DISCONTINUED | OUTPATIENT
Start: 2021-07-20 | End: 2021-07-23

## 2021-07-20 RX ORDER — MULTIVIT WITH MIN/MFOLATE/K2 340-15/3 G
1 POWDER (GRAM) ORAL ONCE
Refills: 0 | Status: COMPLETED | OUTPATIENT
Start: 2021-07-20 | End: 2021-07-20

## 2021-07-20 RX ORDER — INSULIN LISPRO 100/ML
25 VIAL (ML) SUBCUTANEOUS
Refills: 0 | Status: DISCONTINUED | OUTPATIENT
Start: 2021-07-20 | End: 2021-07-23

## 2021-07-20 RX ADMIN — Medication 5 MILLIGRAM(S): at 21:28

## 2021-07-20 RX ADMIN — Medication 1: at 16:55

## 2021-07-20 RX ADMIN — Medication 4 MILLIGRAM(S): at 16:58

## 2021-07-20 RX ADMIN — SENNA PLUS 2 TABLET(S): 8.6 TABLET ORAL at 21:28

## 2021-07-20 RX ADMIN — Medication 81 MILLIGRAM(S): at 11:45

## 2021-07-20 RX ADMIN — Medication 650 MILLIGRAM(S): at 14:47

## 2021-07-20 RX ADMIN — TAMSULOSIN HYDROCHLORIDE 0.4 MILLIGRAM(S): 0.4 CAPSULE ORAL at 21:28

## 2021-07-20 RX ADMIN — Medication 650 MILLIGRAM(S): at 15:59

## 2021-07-20 RX ADMIN — Medication 300 MILLIGRAM(S): at 11:45

## 2021-07-20 RX ADMIN — POLYETHYLENE GLYCOL 3350 17 GRAM(S): 17 POWDER, FOR SOLUTION ORAL at 11:44

## 2021-07-20 RX ADMIN — Medication 28 UNIT(S): at 11:46

## 2021-07-20 RX ADMIN — Medication 28 UNIT(S): at 08:24

## 2021-07-20 RX ADMIN — Medication 0: at 08:24

## 2021-07-20 RX ADMIN — PANTOPRAZOLE SODIUM 40 MILLIGRAM(S): 20 TABLET, DELAYED RELEASE ORAL at 11:46

## 2021-07-20 RX ADMIN — QUETIAPINE FUMARATE 25 MILLIGRAM(S): 200 TABLET, FILM COATED ORAL at 21:28

## 2021-07-20 RX ADMIN — Medication 4 MILLIGRAM(S): at 05:12

## 2021-07-20 RX ADMIN — Medication 4 MILLIGRAM(S): at 00:02

## 2021-07-20 RX ADMIN — FINASTERIDE 5 MILLIGRAM(S): 5 TABLET, FILM COATED ORAL at 11:45

## 2021-07-20 RX ADMIN — Medication 4 MILLIGRAM(S): at 11:44

## 2021-07-20 RX ADMIN — Medication 1: at 11:45

## 2021-07-20 RX ADMIN — Medication 25 UNIT(S): at 16:57

## 2021-07-20 RX ADMIN — Medication 1 BOTTLE: at 11:44

## 2021-07-20 RX ADMIN — INSULIN GLARGINE 50 UNIT(S): 100 INJECTION, SOLUTION SUBCUTANEOUS at 21:27

## 2021-07-20 NOTE — PROGRESS NOTE ADULT - ASSESSMENT
70 y/o M w/ PMHx of CVA this past August and got TPA per family member, DM, BPH with obstruction s/p escamilla catheter, s/p ERCP w Sphincterectomy recent admission to [facility] for sepsis  Hodgkin lymphoma was not offered any chemo and was placed on hospice.     now presenting with weakness and FTT.   pt denies cp / SOB / palpitations   no focal neuro complaints .. at baseline RLE weakness   no N/V   had one episode of diarrhea   no urinary sx  abdominal pain, diffuse, but worse on R side.     - Failure to thrive: cultures neg  CT chest noted   nutrition consult   encourage PO intake: dysphagia 3 based on MBS results     - lymphoma: d/w Dr. Larios: s/p immunotherapy   planned  chemo as inpt    - DM with hyperglycemia : improved now worse with steroids   fu with endo     - anemia: monitor H/H post transfusion, stable.     - Fever: doubt infectious etiology   likely due to immunotherapy/lymphoma. culture: neg   abx dced. s/p IVF, ID f/u appreciated.     - voice changes: CT neck : Asymmetric enlargement of the left palatine tonsil, suspicious for lymphomatous involvement given history. Correlate with direct visualization.  ENT had eval pt: no gross pathology on visualization   S/S eval: MBS done. speech: dysphagia 3    - Tachycardia: VA duplex neg for DVT   rate stable. noted 7 beats NSVT.  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 being expedited   cont  steroids   fu LP cytology   appreciate onc : will decreased [place]   appreciate neuro onc input   planned chemo early next week       - Afib : now in sinus : monitor   appreicate cardio input   no AC at this time and no AVNB at this time

## 2021-07-20 NOTE — PROGRESS NOTE ADULT - SUBJECTIVE AND OBJECTIVE BOX
Follow-up Pulm Progress Note    No new respiratory events overnight.  Denies SOB/CP.     Medications:  MEDICATIONS  (STANDING):  allopurinol 300 milliGRAM(s) Oral daily  aspirin  chewable 81 milliGRAM(s) Oral daily  dexAMETHasone  Injectable 4 milliGRAM(s) IV Push every 6 hours  dextrose 40% Gel 15 Gram(s) Oral once  dextrose 5%. 1000 milliLiter(s) (50 mL/Hr) IV Continuous <Continuous>  dextrose 5%. 1000 milliLiter(s) (100 mL/Hr) IV Continuous <Continuous>  dextrose 50% Injectable 25 Gram(s) IV Push once  dextrose 50% Injectable 12.5 Gram(s) IV Push once  dextrose 50% Injectable 25 Gram(s) IV Push once  finasteride 5 milliGRAM(s) Oral daily  glucagon  Injectable 1 milliGRAM(s) IntraMuscular once  insulin glargine Injectable (LANTUS) 56 Unit(s) SubCutaneous at bedtime  insulin lispro (ADMELOG) corrective regimen sliding scale   SubCutaneous three times a day before meals  insulin lispro (ADMELOG) corrective regimen sliding scale   SubCutaneous at bedtime  insulin lispro Injectable (ADMELOG) 28 Unit(s) SubCutaneous three times a day before meals  melatonin 5 milliGRAM(s) Oral at bedtime  pantoprazole   Suspension 40 milliGRAM(s) Oral daily  QUEtiapine 25 milliGRAM(s) Oral at bedtime  senna 2 Tablet(s) Oral at bedtime  tamsulosin 0.4 milliGRAM(s) Oral at bedtime    MEDICATIONS  (PRN):  acetaminophen   Tablet .. 650 milliGRAM(s) Oral every 6 hours PRN Temp greater or equal to 38C (100.4F), Moderate Pain (4 - 6)  bisacodyl 5 milliGRAM(s) Oral every 12 hours PRN Constipation  polyethylene glycol 3350 17 Gram(s) Oral daily PRN Constipation          Vital Signs Last 24 Hrs  T(C): 36.7 (20 Jul 2021 04:24), Max: 36.9 (19 Jul 2021 21:10)  T(F): 98.1 (20 Jul 2021 04:24), Max: 98.4 (19 Jul 2021 21:10)  HR: 51 (20 Jul 2021 04:24) (48 - 65)  BP: 136/63 (20 Jul 2021 04:24) (132/75 - 136/63)  BP(mean): --  RR: 18 (20 Jul 2021 04:24) (18 - 18)  SpO2: 100% (20 Jul 2021 04:24) (99% - 100%)          07-19 @ 07:01  -  07-20 @ 07:00  --------------------------------------------------------  IN: 480 mL / OUT: 4100 mL / NET: -3620 mL          LABS:                        8.6    2.11  )-----------( 56       ( 20 Jul 2021 07:21 )             27.9     07-20    138  |  101  |  24<H>  ----------------------------<  126<H>  3.8   |  26  |  0.55    Ca    8.8      20 Jul 2021 07:17            CAPILLARY BLOOD GLUCOSE      POCT Blood Glucose.: 130 mg/dL (20 Jul 2021 07:52)                Physical Examination:  PULM: Clear to auscultation bilaterally, no significant sputum production  CVS: S1, S2 heard    RADIOLOGY REVIEWED  CT chest: < from: CT Chest w/ IV Cont (07.19.21 @ 17:31) >  ******PRELIMINARY REPORT******    ******PRELIMINARY REPORT******          EXAM:  CT CHEST IC                            PROCEDURE DATE:  07/19/2021      ******PRELIMINARY REPORT******    ******PRELIMINARY REPORT******              INTERPRETATION:  No acute intra-abdominal pathology.  Interval resolution of lung opacities seen on prior CT dated 6/28/2021. Left basilar linear atelectasis.  Incompletely imaged left inguinal hernia contianing loop of colon. No bowel obstruction.    Follow up official report in the AM.    < end of copied text >     Follow-up Pulm Progress Note    No new respiratory events overnight.  Denies SOB/CP.     Medications:  MEDICATIONS  (STANDING):  allopurinol 300 milliGRAM(s) Oral daily  aspirin  chewable 81 milliGRAM(s) Oral daily  dexAMETHasone  Injectable 4 milliGRAM(s) IV Push every 6 hours  dextrose 40% Gel 15 Gram(s) Oral once  dextrose 5%. 1000 milliLiter(s) (50 mL/Hr) IV Continuous <Continuous>  dextrose 5%. 1000 milliLiter(s) (100 mL/Hr) IV Continuous <Continuous>  dextrose 50% Injectable 25 Gram(s) IV Push once  dextrose 50% Injectable 12.5 Gram(s) IV Push once  dextrose 50% Injectable 25 Gram(s) IV Push once  finasteride 5 milliGRAM(s) Oral daily  glucagon  Injectable 1 milliGRAM(s) IntraMuscular once  insulin glargine Injectable (LANTUS) 56 Unit(s) SubCutaneous at bedtime  insulin lispro (ADMELOG) corrective regimen sliding scale   SubCutaneous three times a day before meals  insulin lispro (ADMELOG) corrective regimen sliding scale   SubCutaneous at bedtime  insulin lispro Injectable (ADMELOG) 28 Unit(s) SubCutaneous three times a day before meals  melatonin 5 milliGRAM(s) Oral at bedtime  pantoprazole   Suspension 40 milliGRAM(s) Oral daily  QUEtiapine 25 milliGRAM(s) Oral at bedtime  senna 2 Tablet(s) Oral at bedtime  tamsulosin 0.4 milliGRAM(s) Oral at bedtime    MEDICATIONS  (PRN):  acetaminophen   Tablet .. 650 milliGRAM(s) Oral every 6 hours PRN Temp greater or equal to 38C (100.4F), Moderate Pain (4 - 6)  bisacodyl 5 milliGRAM(s) Oral every 12 hours PRN Constipation  polyethylene glycol 3350 17 Gram(s) Oral daily PRN Constipation          Vital Signs Last 24 Hrs  T(C): 36.7 (20 Jul 2021 04:24), Max: 36.9 (19 Jul 2021 21:10)  T(F): 98.1 (20 Jul 2021 04:24), Max: 98.4 (19 Jul 2021 21:10)  HR: 51 (20 Jul 2021 04:24) (48 - 65)  BP: 136/63 (20 Jul 2021 04:24) (132/75 - 136/63)  BP(mean): --  RR: 18 (20 Jul 2021 04:24) (18 - 18)  SpO2: 100% (20 Jul 2021 04:24) (99% - 100%)          07-19 @ 07:01  -  07-20 @ 07:00  --------------------------------------------------------  IN: 480 mL / OUT: 4100 mL / NET: -3620 mL          LABS:                        8.6    2.11  )-----------( 56       ( 20 Jul 2021 07:21 )             27.9     07-20    138  |  101  |  24<H>  ----------------------------<  126<H>  3.8   |  26  |  0.55    Ca    8.8      20 Jul 2021 07:17            CAPILLARY BLOOD GLUCOSE      POCT Blood Glucose.: 130 mg/dL (20 Jul 2021 07:52)                Physical Examination:  PULM: Clear to auscultation bilaterally, no significant sputum production  CVS: S1, S2 heard    RADIOLOGY REVIEWED  CT chest: < from: CT Chest w/ IV Cont (07.19.21 @ 17:31) >  CHEST:  LUNGS AND LARGE AIRWAYS: Patent central airways. There is a 4 mm groundglass nodule (series 4, image 171). There is an unchanged 1 cm cystic nodule (series 4, image 223). Interval decrease in right lower lobe nodule at the costophrenic angle, now measuring 1.3 x 0.8 cm (series 4, image 338). Other prior mentioned solid and groundglass nodules are resolved. Mild bilateral subsegmental atelectasis.  PLEURA: No pleural effusion.  VESSELS: Coronary artery calcifications.  HEART: Heart size is normal. No pericardial effusion.  MEDIASTINUM AND SARTHAK: No lymphadenopathy.  CHEST WALL AND LOWER NECK: Within normal limits.    < end of copied text >

## 2021-07-20 NOTE — PROGRESS NOTE ADULT - ASSESSMENT
Assessment  DMT2: 71y Male with DM T2 with hyperglycemia, A1C 8.8%, was on oral meds/Janumet at home, admitted with weakness/fatigue and hyperglycemia, now on large-dose basal bolus insulin, increased dose yesterday, blood sugars improving/trending down now within acceptable range, no hypoglycemic episodes, eating meals, appears comfortable, steroid taper in progress, remains on dexamethasone.  Lymphoma: on medications, monitored, FU Heme/Onc.  Anemia: stable, monitored.      Shahriar Kahn MD  Cell: [phone]  Office: [phone]     Assessment  DMT2: 71y Male with DM T2 with hyperglycemia, A1C 8.8%, was on oral meds/Janumet at home, admitted with weakness/fatigue and hyperglycemia, now on large-dose basal bolus insulin, increased dose yesterday, blood sugars  improving/trending down now within acceptable range, no hypoglycemic episodes, eating meals, appears comfortable, steroid taper in progress, remains on dexamethasone.  Lymphoma: on medications, monitored, FU Heme/Onc.  Anemia: stable, monitored.      Shahriar Kahn MD  Cell: [phone]  Office: [phone]

## 2021-07-20 NOTE — PROGRESS NOTE ADULT - SUBJECTIVE AND OBJECTIVE BOX
Subjective: Patient seen and examined. No new events except as noted.     REVIEW OF SYSTEMS:    CONSTITUTIONAL: No weakness, fevers or chills  EYES/ENT: No visual changes;  No vertigo or throat pain   NECK: No pain or stiffness  RESPIRATORY: No cough, wheezing, hemoptysis; No shortness of breath  CARDIOVASCULAR: No chest pain or palpitations  GASTROINTESTINAL: No abdominal or epigastric pain. No nausea, vomiting, or hematemesis; No diarrhea or constipation. No melena or hematochezia.  GENITOURINARY: No dysuria, frequency or hematuria  NEUROLOGICAL: No numbness or weakness  SKIN: No itching, burning, rashes, or lesions   All other review of systems is negative unless indicated above.    MEDICATIONS:  MEDICATIONS  (STANDING):  allopurinol 300 milliGRAM(s) Oral daily  aspirin  chewable 81 milliGRAM(s) Oral daily  dexAMETHasone  Injectable 4 milliGRAM(s) IV Push every 6 hours  dextrose 40% Gel 15 Gram(s) Oral once  dextrose 5%. 1000 milliLiter(s) (50 mL/Hr) IV Continuous <Continuous>  dextrose 5%. 1000 milliLiter(s) (100 mL/Hr) IV Continuous <Continuous>  dextrose 50% Injectable 25 Gram(s) IV Push once  dextrose 50% Injectable 12.5 Gram(s) IV Push once  dextrose 50% Injectable 25 Gram(s) IV Push once  finasteride 5 milliGRAM(s) Oral daily  glucagon  Injectable 1 milliGRAM(s) IntraMuscular once  insulin glargine Injectable (LANTUS) 56 Unit(s) SubCutaneous at bedtime  insulin lispro (ADMELOG) corrective regimen sliding scale   SubCutaneous three times a day before meals  insulin lispro (ADMELOG) corrective regimen sliding scale   SubCutaneous at bedtime  insulin lispro Injectable (ADMELOG) 28 Unit(s) SubCutaneous three times a day before meals  melatonin 5 milliGRAM(s) Oral at bedtime  pantoprazole   Suspension 40 milliGRAM(s) Oral daily  QUEtiapine 25 milliGRAM(s) Oral at bedtime  senna 2 Tablet(s) Oral at bedtime  tamsulosin 0.4 milliGRAM(s) Oral at bedtime      PHYSICAL EXAM:  T(C): 36.7 (07-20-21 @ 04:24), Max: 36.9 (07-19-21 @ 21:10)  HR: 51 (07-20-21 @ 04:24) (48 - 65)  BP: 136/63 (07-20-21 @ 04:24) (132/75 - 136/63)  RR: 18 (07-20-21 @ 04:24) (18 - 18)  SpO2: 100% (07-20-21 @ 04:24) (99% - 100%)  Wt(kg): --  I&O's Summary    19 Jul 2021 07:01  -  20 Jul 2021 07:00  --------------------------------------------------------  IN: 480 mL / OUT: 4100 mL / NET: -3620 mL          Appearance: Normal	  HEENT:   Normal oral mucosa, PERRL, EOMI	  Lymphatic: No lymphadenopathy , no edema  Cardiovascular: Normal S1 S2, No JVD, No murmurs , Peripheral pulses palpable 2+ bilaterally  Respiratory: Lungs clear to auscultation, normal effort 	  Gastrointestinal:  Soft, Non-tender, + BS	  Skin: No rashes, No ecchymoses, No cyanosis, warm to touch  Musculoskeletal: Normal range of motion, normal strength  Psychiatry:  Mood & affect appropriate  Ext: No edema      LABS:    CARDIAC MARKERS:                                8.6    2.11  )-----------( 56       ( 20 Jul 2021 07:21 )             27.9     07-20    138  |  101  |  24<H>  ----------------------------<  126<H>  3.8   |  26  |  0.55    Ca    8.8      20 Jul 2021 07:17      proBNP:   Lipid Profile:   HgA1c:   TSH:             TELEMETRY: 	  SR  ECG:  	  RADIOLOGY:   DIAGNOSTIC TESTING:  [ ] Echocardiogram:  [ ]  Catheterization:  [ ] Stress Test:    OTHER:

## 2021-07-20 NOTE — PROGRESS NOTE ADULT - SUBJECTIVE AND OBJECTIVE BOX
CC: re consult for elevated lab value fungitell level     Patient is awake and alert, he denies cough, shortness of breath, he denies fever       REVIEW OF SYSTEMS:  All other review of systems negative (Comprehensive ROS)    Antimicrobials Day #  :    Other Medications Reviewed    Vital Signs Last 24 Hrs  T(C): 36.7 (20 Jul 2021 04:24), Max: 36.9 (19 Jul 2021 21:10)  T(F): 98.1 (20 Jul 2021 04:24), Max: 98.4 (19 Jul 2021 21:10)  HR: 51 (20 Jul 2021 04:24) (48 - 65)  BP: 136/63 (20 Jul 2021 04:24) (132/75 - 136/63)  BP(mean): --  RR: 18 (20 Jul 2021 04:24) (18 - 18)  SpO2: 100% (20 Jul 2021 04:24) (99% - 100%)    PHYSICAL EXAM:  General: alert, no acute distress  Eyes:  anicteric, no conjunctival injection, no discharge  Oropharynx: no lesions or injection 	  Lungs: clear to auscultation  Heart: regular rate and rhythm; no murmur, rubs or gallops  Abdomen: soft, nondistended, nontender, without mass or organomegaly  Skin: no lesions  Extremities: no clubbing, cyanosis, or edema  Neurologic: alert, oriented, moves all extremities    LAB RESULTS:                                     8.6    2.11  )-----------( 56       ( 20 Jul 2021 07:21 )             27.9               MICROBIOLOGY:  RECENT CULTURES:      RADIOLOGY REVIEWED:    EXAM:  MR SPINE LUMBAR WAW IC                            PROCEDURE DATE:  07/11/2021            INTERPRETATION:  Clinical indications: Lower extremity weakness    MRI of the lumbar spine was performed using sagittal T1-T2 and T2-weighted sequence fat suppression. Axial T1 and T2-weighted sequences were performed    Loss of the normal lumbar lordosis seen    Mild scoliosis is seen.    Disc desiccation is seen involving the L2-3 L3-4 L4-5 and L5-S1 levels which are secondary to degenerative changes.    There is evidence of abnormal T1 prolongation seen involving the lower thoracic lumbar sacral vertebral bodies as well as both iliac bones. This finding could be compatible with a marrow infiltrative process, possibly secondary to patient's lymphoma though other possibilities include metastasis, multiple myeloma and severe anemia must be considered.    L1-2: Hypertrophic facet joint changes are seen bilaterally. No significant, as of the spinal canal or either neural foramen.    L2-3: Bilateral hypertrophic facet joint changes seen. No significant compromise spinal canal or either neural foramen    L3-4: Disc bulge and bilateral hypertrophic facet joint change. No significant, as of the spinal canal or either neural foramen    L4-5: Disc bulge and bilateral hypertrophic facet joint changes seen. Mild to moderate narrowing of the spinal canal. Mild to moderate narrowing of both neural foramen.    L5-S1: Bilateral hypertrophic facet joint changes seen. Mild narrowing of the left neural foramen and mild to moderate right neural foramen    There is evidence of abnormal area of enhancement seen involving the dorsal aspect of the right thecal sac at the level of the L4-5 disc. This best seen on series 11 image 21. This finding measures approximately 4.7 mm. This could be related to patient's known lymphoma though the possibility of drop metastasis as well as other neoplastic etiologies cannot entirely excluded. Correlation with CSF can also be done for further evaluation. Continued close interval follow-up is recommended.    The conus appears to end at top of L2.    Bilateral renal cysts are seen.    Evaluation of the paraspinal soft tissues appear normal.    IMPRESSION: Abnormal signal seen involving the lower thoracic lumbarsacral vertebral bodies as well as both iliac bones. This is likely compatible with a marrow infiltrative process as described above.    Abnormal enhancing lesion is seen involving the dorsal aspect of the right thecal sac as described above.    Degenerative changes as described above.    --- End of Report ---                NGA MOSES MD; Attending Radiologist  This document has been electronically signed. Jul 12 2021  9:10AM            Assessment:

## 2021-07-20 NOTE — PROGRESS NOTE ADULT - ASSESSMENT
71 year old male diagnosed with Hodgkin lymphoma, sent to hospice , he was sent to admitted for FTT, fever, no infection, now s/p check point inhibitor. Had fever after drug infusion and a  transfusion, no infection found.  No infection is apparent at present on previous work up as per last ID note.  Prior fever probable  B symptoms. He had prior infectious  work up and reviewed micro results.  Blood culture done on this admission reviewed and are no growth, he also had CSF fluid cx   CSF HSV PCR is negative, legionella is neg, crypto antigen is negative, fungitell sent on July 5 reported to be 256, at this point not clear the significance of number as clinically he has no fever, he has no cough, no sob, no signs of infection to correlate with elevated fungitell level. over colonization with Candida may also falsely elevated fungitell.  I do not see signs of infection , no indication for abx use. CT C/A/P report reviewed from 7/19 and read as no acute intra abdominal pathology, interval resolution of lung opacities on prior CT 6/28/21, left basilar  linear atelectasis, I have low suspicion of fungal pulmonary infection with recent reports of resolution of opacities       Plan:  repeat fungitell level ordered pending collection   continue supportive care per medicine, pulmonary, cardiology and heme onc

## 2021-07-20 NOTE — PROGRESS NOTE ADULT - ASSESSMENT
70 y/o M with PMH of CVA, DM, BPH with obstruction s/p Sauceda catheter, s/p ERCP w Sphincteretomy, recent admission to [facility] for sepsis, Hodgkin lymphoma dx 2020 - was not offered any chemo and was placed on hospice, DVT 8/2020. Presenting with weakness and FTT. Pt and family opted for treatment of lymphoma - s/p Opdivo 7/6. Called to consult for CT chest findings 6/28 with patchy GGO and scattered nodular opacities throughout all lobes of the lungs. Currently breathing comfortably on RA sats 98%.

## 2021-07-20 NOTE — PROGRESS NOTE ADULT - SUBJECTIVE AND OBJECTIVE BOX
LIZETT RIVERA  MRN-[id number]    Patient is a 71y old  Male who presents with a chief complaint of HD (18 Jul 2021 12:32)      Review of System  REVIEW OF SYSTEMS      General:	Denies fatigue, fevers, chills, sweats, decreased appetite.    Skin/Breast: denies pruritis, rash  	  Ophthalmologic: no change in vision or blurring  	  HEENT	Denies dry mouth, oral sores, dysphagia,  change in hearing.    Respiratory and Thorax:  cough, sob, wheeze, hemoptysis  	  Cardiovascular:	no cp , palp, orthopnea    Gastrointestinal:	no n/v/d constipation    Genitourinary:	no dysuria of frequency, no hematuria, no flank pain    Musculoskeletal:	no bone or joint pain. no muscle aches.     Neurological:	no change in sensory or motor function. no headache. no weakness.     Psychiatric:	no depression, no anxiety, insomnia.     Hematology/Lymphatics:	no bleeding or bruising        Current Meds  MEDICATIONS  (STANDING):  allopurinol 300 milliGRAM(s) Oral daily  aspirin  chewable 81 milliGRAM(s) Oral daily  dexAMETHasone  Injectable 4 milliGRAM(s) IV Push every 6 hours  dextrose 40% Gel 15 Gram(s) Oral once  dextrose 5%. 1000 milliLiter(s) (50 mL/Hr) IV Continuous <Continuous>  dextrose 5%. 1000 milliLiter(s) (100 mL/Hr) IV Continuous <Continuous>  dextrose 50% Injectable 25 Gram(s) IV Push once  dextrose 50% Injectable 12.5 Gram(s) IV Push once  dextrose 50% Injectable 25 Gram(s) IV Push once  finasteride 5 milliGRAM(s) Oral daily  glucagon  Injectable 1 milliGRAM(s) IntraMuscular once  insulin glargine Injectable (LANTUS) 56 Unit(s) SubCutaneous at bedtime  insulin lispro (ADMELOG) corrective regimen sliding scale   SubCutaneous three times a day before meals  insulin lispro (ADMELOG) corrective regimen sliding scale   SubCutaneous at bedtime  insulin lispro Injectable (ADMELOG) 28 Unit(s) SubCutaneous three times a day before meals  melatonin 5 milliGRAM(s) Oral at bedtime  pantoprazole   Suspension 40 milliGRAM(s) Oral daily  QUEtiapine 25 milliGRAM(s) Oral at bedtime  senna 2 Tablet(s) Oral at bedtime  tamsulosin 0.4 milliGRAM(s) Oral at bedtime    MEDICATIONS  (PRN):  acetaminophen   Tablet .. 650 milliGRAM(s) Oral every 6 hours PRN Temp greater or equal to 38C (100.4F), Moderate Pain (4 - 6)  bisacodyl 5 milliGRAM(s) Oral every 12 hours PRN Constipation  polyethylene glycol 3350 17 Gram(s) Oral daily PRN Constipation      Vitals  Vital Signs Last 24 Hrs  T(C): 36.7 (20 Jul 2021 04:24), Max: 36.9 (19 Jul 2021 21:10)  T(F): 98.1 (20 Jul 2021 04:24), Max: 98.4 (19 Jul 2021 21:10)  HR: 51 (20 Jul 2021 04:24) (48 - 65)  BP: 136/63 (20 Jul 2021 04:24) (132/75 - 136/63)  BP(mean): --  RR: 18 (20 Jul 2021 04:24) (18 - 18)  SpO2: 100% (20 Jul 2021 04:24) (99% - 100%)    Physical Exam  PHYSICAL EXAM:      Constitutional: NAD    Eyes: PERRLA EOMI, anicteric sclera    Heent :No oral sores, no pharyngeal injection. moist mucosa.    Neck: supple, no jvd, no LAD    Respiratory: CTA b/l     Cardiovascular: s1s2, no m/g/r    Gastrointestinal: soft, nt, nd, + BS    Extremities: no c/c/e    Neurological:A&O x 3 moves all ext.    Skin: no rash on exposed skin    Lymph Nodes: no lymphadenopathy.              Lab  CBC Full  -  ( 19 Jul 2021 07:06 )  WBC Count : 2.92 K/uL  RBC Count : 2.88 M/uL  Hemoglobin : 8.8 g/dL  Hematocrit : 28.8 %  Platelet Count - Automated : 64 K/uL  Mean Cell Volume : 100.0 fl  Mean Cell Hemoglobin : 30.6 pg  Mean Cell Hemoglobin Concentration : 30.6 gm/dL  Auto Neutrophil # : 2.46 K/uL  Auto Lymphocyte # : 0.28 K/uL  Auto Monocyte # : 0.18 K/uL  Auto Eosinophil # : 0.00 K/uL  Auto Basophil # : 0.00 K/uL  Auto Neutrophil % : 82.3 %  Auto Lymphocyte % : 9.7 %  Auto Monocyte % : 6.2 %  Auto Eosinophil % : 0.0 %  Auto Basophil % : 0.0 %              Rad:    Assessment/Plan   LIZETT RIVERA  MRN-[id number]    Patient is a 71y old  Male who presents with a chief complaint of HD (18 Jul 2021 12:32)      Review of System    Feels better after catheter reinserted.    General:	Denies fatigue, fevers, chills, sweats, decreased appetite.    Skin/Breast: denies pruritis, rash  	  Ophthalmologic: no change in vision or blurring  	  HEENT	Denies dry mouth, oral sores, dysphagia,  change in hearing.    Respiratory and Thorax:  cough, sob, wheeze, hemoptysis  	  Cardiovascular:	no cp , palp, orthopnea    Gastrointestinal:	no n/v/d constipation    Genitourinary:	no dysuria of frequency, no hematuria, no flank pain    Musculoskeletal:	no bone or joint pain. no muscle aches.     Neurological:	no change in sensory or motor function. no headache. no weakness.     Psychiatric:	no depression, no anxiety, insomnia.     Hematology/Lymphatics:	no bleeding or bruising      Current Meds  MEDICATIONS  (STANDING):  allopurinol 300 milliGRAM(s) Oral daily  aspirin  chewable 81 milliGRAM(s) Oral daily  dexAMETHasone  Injectable 4 milliGRAM(s) IV Push every 6 hours  dextrose 40% Gel 15 Gram(s) Oral once  dextrose 5%. 1000 milliLiter(s) (50 mL/Hr) IV Continuous <Continuous>  dextrose 5%. 1000 milliLiter(s) (100 mL/Hr) IV Continuous <Continuous>  dextrose 50% Injectable 25 Gram(s) IV Push once  dextrose 50% Injectable 12.5 Gram(s) IV Push once  dextrose 50% Injectable 25 Gram(s) IV Push once  finasteride 5 milliGRAM(s) Oral daily  glucagon  Injectable 1 milliGRAM(s) IntraMuscular once  insulin glargine Injectable (LANTUS) 56 Unit(s) SubCutaneous at bedtime  insulin lispro (ADMELOG) corrective regimen sliding scale   SubCutaneous three times a day before meals  insulin lispro (ADMELOG) corrective regimen sliding scale   SubCutaneous at bedtime  insulin lispro Injectable (ADMELOG) 28 Unit(s) SubCutaneous three times a day before meals  melatonin 5 milliGRAM(s) Oral at bedtime  pantoprazole   Suspension 40 milliGRAM(s) Oral daily  QUEtiapine 25 milliGRAM(s) Oral at bedtime  senna 2 Tablet(s) Oral at bedtime  tamsulosin 0.4 milliGRAM(s) Oral at bedtime    MEDICATIONS  (PRN):  acetaminophen   Tablet .. 650 milliGRAM(s) Oral every 6 hours PRN Temp greater or equal to 38C (100.4F), Moderate Pain (4 - 6)  bisacodyl 5 milliGRAM(s) Oral every 12 hours PRN Constipation  polyethylene glycol 3350 17 Gram(s) Oral daily PRN Constipation      Vital Signs Last 24 Hrs  T(C): 36.7 (20 Jul 2021 04:24), Max: 36.9 (19 Jul 2021 21:10)  T(F): 98.1 (20 Jul 2021 04:24), Max: 98.4 (19 Jul 2021 21:10)  HR: 51 (20 Jul 2021 04:24) (48 - 65)  BP: 136/63 (20 Jul 2021 04:24) (132/75 - 136/63)  BP(mean): --  RR: 18 (20 Jul 2021 04:24) (18 - 18)  SpO2: 100% (20 Jul 2021 04:24) (99% - 100%)    PHYSICAL EXAM:      Constitutional: NAD    Eyes: PERRLA EOMI, anicteric sclera    Heent :No oral sores, no pharyngeal injection. moist mucosa.    Neck: supple, no jvd, no LAD    Respiratory: CTA b/l     Cardiovascular: s1s2, no m/g/r    Gastrointestinal: soft, nt, nd, + BS    Extremities: no c/c/e    Neurological:A&O x 3 moves all ext.    Skin: no rash on exposed skin    Lymph Nodes: no lymphadenopathy.      Lab  CBC Full  -  ( 19 Jul 2021 07:06 )  WBC Count : 2.92 K/uL  RBC Count : 2.88 M/uL  Hemoglobin : 8.8 g/dL  Hematocrit : 28.8 %  Platelet Count - Automated : 64 K/uL  Mean Cell Volume : 100.0 fl  Mean Cell Hemoglobin : 30.6 pg  Mean Cell Hemoglobin Concentration : 30.6 gm/dL  Auto Neutrophil # : 2.46 K/uL  Auto Lymphocyte # : 0.28 K/uL  Auto Monocyte # : 0.18 K/uL  Auto Eosinophil # : 0.00 K/uL  Auto Basophil # : 0.00 K/uL  Auto Neutrophil % : 82.3 %  Auto Lymphocyte % : 9.7 %  Auto Monocyte % : 6.2 %  Auto Eosinophil % : 0.0 %  Auto Basophil % : 0.0 %              Rad:    Assessment/Plan

## 2021-07-20 NOTE — PROGRESS NOTE ADULT - ASSESSMENT
1) Hodgkin's lymphoma  - repeat CT scans, results noted  -[facility]- Family meeting held at bedside on 7/2. We discussed rx options vs comfort care. Family and patient wish to pursue rx. Ongoing conversations with family members have been taking place since our formal meeting.   - allopurinol  - pt s/p 1st rx with opdivo 7/6. next due on 8/3.  - in light of recent developments, extradural disease, may need to move up timetable on use of systemic CTX, Oziel/vinblastine/dacarbazine. Will ask pulm to eval PFT's, unsure we will be able to include bleo at this time.      2) ENT- voice change is a new finding as per patient's sister. Patient also with weak voice.  SS, ENT and neuro have seen patient.    3)Hyperglycemia- mgmt as per med. , endocrinology    4)Weakness. 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  - Neurology input noted.  - cont pt.  - bed to chair   - see neuro onc below    5) Pancytopenia. Suspect multifactorial, due to hodgkins, and possibly with role being played by recent abx.  anemia- w/u this far unremarkable. possible aocd.  transfusional support as needed    Leukopenia- unclear etiology- possibly due to infxn or malignancy. Also could be related to zosyn. Now off of abx. monitor cbc . WBC improving.     Thrombocytopenia- has developed while here.  Can't r/o malignancy vs. due to zosyn. Slowly improving, after opdivo, dex, dc of abx  manage supportively for now.    6)Neuro-onc  MRI findings noted.      6. Fever- resolved. ID has been on board    7. abd pain- f/u ct a/p

## 2021-07-20 NOTE — PROGRESS NOTE ADULT - PROBLEM SELECTOR PLAN 1
CT chest findings 6/28 with patchy GGO and scattered nodular opacities throughout all lobes of the lungs  -Possibly related to underlying lymphoma?  -Normoxic, no c/o dyspnea  -Repeat CT chest 7/19 with resolving GGO. No further w/u.   -Noted to have elevated Fungitell (256). Pt nontoxic appearing, f/u repeat fungitell. ID f/u. CT chest findings 6/28 with patchy GGO and scattered nodular opacities throughout all lobes of the lungs  -Possibly related to underlying lymphoma?  -Normoxic, no c/o dyspnea  -Repeat CT chest 7/19 with resolving GGO, only with 4 mm GGO nodule and unchanged 1 cm cystic nodule. Suggest repeat CT chest in 1 month.   -Noted to have elevated Fungitell (256). Pt nontoxic appearing, f/u repeat fungitell. ID f/u.

## 2021-07-20 NOTE — PROGRESS NOTE ADULT - SUBJECTIVE AND OBJECTIVE BOX
Date of service: 07-20-21 @ 23:36      Patient is a 71y old  Male who presents with a chief complaint of HD (18 Jul 2021 12:32)                                                               INTERVAL HPI/OVERNIGHT EVENTS:    REVIEW OF SYSTEMS:     CONSTITUTIONAL: No weakness, fevers or chills  EYES/ENT: No visual changes , no ear ache   NECK: No pain or stiffness  RESPIRATORY: No cough, wheezing,  No shortness of breath  CARDIOVASCULAR: No chest pain or palpitations  GASTROINTESTINAL: No abdominal pain  . No nausea, vomiting, or hematemesis; No diarrhea or constipation. No melena or hematochezia.  GENITOURINARY: No dysuria, frequency or hematuria  NEUROLOGICAL: No numbness or weakness  SKIN: No itching, burning, rashes, or lesions                                                                                                                                                                                                                                                                                 Medications:  MEDICATIONS  (STANDING):  allopurinol 300 milliGRAM(s) Oral daily  aspirin  chewable 81 milliGRAM(s) Oral daily  dexAMETHasone  Injectable 4 milliGRAM(s) IV Push every 6 hours  dextrose 40% Gel 15 Gram(s) Oral once  dextrose 5%. 1000 milliLiter(s) (50 mL/Hr) IV Continuous <Continuous>  dextrose 5%. 1000 milliLiter(s) (100 mL/Hr) IV Continuous <Continuous>  dextrose 50% Injectable 25 Gram(s) IV Push once  dextrose 50% Injectable 12.5 Gram(s) IV Push once  dextrose 50% Injectable 25 Gram(s) IV Push once  finasteride 5 milliGRAM(s) Oral daily  glucagon  Injectable 1 milliGRAM(s) IntraMuscular once  insulin glargine Injectable (LANTUS) 50 Unit(s) SubCutaneous at bedtime  insulin lispro (ADMELOG) corrective regimen sliding scale   SubCutaneous three times a day before meals  insulin lispro (ADMELOG) corrective regimen sliding scale   SubCutaneous at bedtime  insulin lispro Injectable (ADMELOG) 25 Unit(s) SubCutaneous three times a day before meals  melatonin 5 milliGRAM(s) Oral at bedtime  pantoprazole   Suspension 40 milliGRAM(s) Oral daily  QUEtiapine 25 milliGRAM(s) Oral at bedtime  senna 2 Tablet(s) Oral at bedtime  tamsulosin 0.4 milliGRAM(s) Oral at bedtime    MEDICATIONS  (PRN):  acetaminophen   Tablet .. 650 milliGRAM(s) Oral every 6 hours PRN Temp greater or equal to 38C (100.4F), Moderate Pain (4 - 6)  bisacodyl 5 milliGRAM(s) Oral every 12 hours PRN Constipation  polyethylene glycol 3350 17 Gram(s) Oral daily PRN Constipation       Allergies    No Known Allergies    Intolerances      Vital Signs Last 24 Hrs  T(C): 37.1 (20 Jul 2021 20:05), Max: 37.1 (20 Jul 2021 20:05)  T(F): 98.8 (20 Jul 2021 20:05), Max: 98.8 (20 Jul 2021 20:05)  HR: 73 (20 Jul 2021 20:05) (51 - 73)  BP: 123/63 (20 Jul 2021 20:05) (106/62 - 136/63)  BP(mean): --  RR: 18 (20 Jul 2021 20:05) (18 - 18)  SpO2: 99% (20 Jul 2021 20:05) (99% - 100%)  CAPILLARY BLOOD GLUCOSE      POCT Blood Glucose.: 249 mg/dL (20 Jul 2021 21:13)  POCT Blood Glucose.: 191 mg/dL (20 Jul 2021 16:32)  POCT Blood Glucose.: 191 mg/dL (20 Jul 2021 11:39)  POCT Blood Glucose.: 130 mg/dL (20 Jul 2021 07:52)      07-19 @ 07:01  -  07-20 @ 07:00  --------------------------------------------------------  IN: 480 mL / OUT: 4100 mL / NET: -3620 mL    07-20 @ 07:01  -  07-20 @ 23:36  --------------------------------------------------------  IN: 300 mL / OUT: 1000 mL / NET: -700 mL      Physical Exam:    Daily     Daily   General:  Well appearing, NAD, not cachetic  HEENT:  Nonicteric, PERRLA  CV:  RRR, S1S2   Lungs:  CTA B/L, no wheezes, rales, rhonchi  Abdomen:  Soft, non-tender, no distended, positive BS  Extremities:  2+ pulses, no c/c, no edema  Skin:  Warm and dry, no rashes  :  No escamilla  Neuro:  AAOx3, non-focal, grossly intact                                                                                                                                                                                                                                                                                                LABS:                               8.6    2.11  )-----------( 56       ( 20 Jul 2021 07:21 )             27.9                      07-20    138  |  101  |  24<H>  ----------------------------<  126<H>  3.8   |  26  |  0.55    Ca    8.8      20 Jul 2021 07:17                         RADIOLOGY & ADDITIONAL TESTS         I personally reviewed: [  ]EKG   [  ]CXR    [  ] CT      A/P:         Discussed with :     [facility] consultants' Notes   Time spent :

## 2021-07-20 NOTE — PROGRESS NOTE ADULT - ASSESSMENT
72 y/o M w/ PMHx of CVA this past August and got TPA per family member, DM, BPH with obstruction s/p escamilla catheter, s/p ERCP w Sphincterectomy recent admission to [facility] for sepsis  Hodgkin lymphoma was not offered any chemo and was placed on hospice.     now presenting with weakness and FTT.   pt denies cp / SOB / palpitations   no focal neuro complaints .. at baseline RLE weakness   no N/V   had one episode of diarrhea   no urinary sx  abdominal pain, diffuse, but worse on R side.   Afib RVR overnight. Was asymptomatic   no known history of this as per patient

## 2021-07-20 NOTE — PROGRESS NOTE ADULT - PROBLEM SELECTOR PLAN 1
Will decrease Lantus to 50u at bedtime.  Will decrease Admelog to 25u before each meal and continue Admelog correction scale coverage. Will continue monitoring FS and FU, will adjust insulin dose as steroids are tapered/dc.  Discussed plan with patient and family at bedside.   for compliance with consistent low-carb diet, nutritional shakes and supplements as tolerated. FU RD recommendations.

## 2021-07-20 NOTE — PROGRESS NOTE ADULT - SUBJECTIVE AND OBJECTIVE BOX
Chief complaint  Patient is a 71y old  Male who presents with a chief complaint of HD (18 Jul 2021 12:32)   Review of systems  Patient in bed, looks comfortable, no hypoglycemic episodes.    Labs and Fingersticks  CAPILLARY BLOOD GLUCOSE      POCT Blood Glucose.: 191 mg/dL (20 Jul 2021 11:39)  POCT Blood Glucose.: 130 mg/dL (20 Jul 2021 07:52)  POCT Blood Glucose.: 274 mg/dL (19 Jul 2021 21:19)  POCT Blood Glucose.: 326 mg/dL (19 Jul 2021 16:32)      Anion Gap, Serum: 11 (07-20 @ 07:17)      Calcium, Total Serum: 8.8 (07-20 @ 07:17)          07-20    138  |  101  |  24<H>  ----------------------------<  126<H>  3.8   |  26  |  0.55    Ca    8.8      20 Jul 2021 07:17                          8.6    2.11  )-----------( 56       ( 20 Jul 2021 07:21 )             27.9     Medications  MEDICATIONS  (STANDING):  allopurinol 300 milliGRAM(s) Oral daily  aspirin  chewable 81 milliGRAM(s) Oral daily  dexAMETHasone  Injectable 4 milliGRAM(s) IV Push every 6 hours  dextrose 40% Gel 15 Gram(s) Oral once  dextrose 5%. 1000 milliLiter(s) (50 mL/Hr) IV Continuous <Continuous>  dextrose 5%. 1000 milliLiter(s) (100 mL/Hr) IV Continuous <Continuous>  dextrose 50% Injectable 25 Gram(s) IV Push once  dextrose 50% Injectable 12.5 Gram(s) IV Push once  dextrose 50% Injectable 25 Gram(s) IV Push once  finasteride 5 milliGRAM(s) Oral daily  glucagon  Injectable 1 milliGRAM(s) IntraMuscular once  insulin glargine Injectable (LANTUS) 50 Unit(s) SubCutaneous at bedtime  insulin lispro (ADMELOG) corrective regimen sliding scale   SubCutaneous three times a day before meals  insulin lispro (ADMELOG) corrective regimen sliding scale   SubCutaneous at bedtime  insulin lispro Injectable (ADMELOG) 25 Unit(s) SubCutaneous three times a day before meals  melatonin 5 milliGRAM(s) Oral at bedtime  pantoprazole   Suspension 40 milliGRAM(s) Oral daily  QUEtiapine 25 milliGRAM(s) Oral at bedtime  senna 2 Tablet(s) Oral at bedtime  tamsulosin 0.4 milliGRAM(s) Oral at bedtime      Physical Exam  General: Patient comfortable in bed  Vital Signs Last 12 Hrs  T(F): 98.6 (07-20-21 @ 12:14), Max: 98.6 (07-20-21 @ 12:14)  HR: 51 (07-20-21 @ 04:24) (51 - 51)  BP: 106/62 (07-20-21 @ 12:14) (106/62 - 136/63)  BP(mean): --  RR: 18 (07-20-21 @ 12:14) (18 - 18)  SpO2: 100% (07-20-21 @ 12:14) (100% - 100%)  Neck: No palpable thyroid nodules.  CVS: S1S2, No murmurs  Respiratory: No wheezing, no crepitations  GI: Abdomen soft, bowel sounds positive  Musculoskeletal:  edema lower extremities.   Skin: No skin rashes, no ecchymosis    Diagnostics             Chief complaint  Patient is a 71y old  Male who presents with a chief complaint of HD (18 Jul 2021 12:32)   Review of systems  Patient in bed, looks comfortable, no hypoglycemic episodes.    Labs and Fingersticks  CAPILLARY BLOOD GLUCOSE      POCT Blood Glucose.: 191 mg/dL (20 Jul 2021 11:39)  POCT Blood Glucose.: 130 mg/dL (20 Jul 2021 07:52)  POCT Blood Glucose.: 274 mg/dL (19 Jul 2021 21:19)  POCT Blood Glucose.: 326 mg/dL (19 Jul 2021 16:32)      Anion Gap, Serum: 11 (07-20 @ 07:17)      Calcium, Total Serum: 8.8 (07-20 @ 07:17)          07-20    138  |  101  |  24<H>  ----------------------------<  126<H>  3.8   |  26  |  0.55    Ca    8.8      20 Jul 2021 07:17                          8.6    2.11  )-----------( 56       ( 20 Jul 2021 07:21 )             27.9     Medications  MEDICATIONS  (STANDING):  allopurinol 300 milliGRAM(s) Oral daily  aspirin  chewable 81 milliGRAM(s) Oral daily  dexAMETHasone  Injectable 4 milliGRAM(s) IV Push every 6 hours  dextrose 40% Gel 15 Gram(s) Oral once  dextrose 5%. 1000 milliLiter(s) (50 mL/Hr) IV Continuous <Continuous>  dextrose 5%. 1000 milliLiter(s) (100 mL/Hr) IV Continuous <Continuous>  dextrose 50% Injectable 25 Gram(s) IV Push once  dextrose 50% Injectable 12.5 Gram(s) IV Push once  dextrose 50% Injectable 25 Gram(s) IV Push once  finasteride 5 milliGRAM(s) Oral daily  glucagon  Injectable 1 milliGRAM(s) IntraMuscular once  insulin glargine Injectable (LANTUS) 50 Unit(s) SubCutaneous at bedtime  insulin lispro (ADMELOG) corrective regimen sliding scale   SubCutaneous three times a day before meals  insulin lispro (ADMELOG) corrective regimen sliding scale   SubCutaneous at bedtime  insulin lispro Injectable (ADMELOG) 25 Unit(s) SubCutaneous three times a day before meals  melatonin 5 milliGRAM(s) Oral at bedtime  pantoprazole   Suspension 40 milliGRAM(s) Oral daily  QUEtiapine 25 milliGRAM(s) Oral at bedtime  senna 2 Tablet(s) Oral at bedtime  tamsulosin 0.4 milliGRAM(s) Oral at bedtime      Physical Exam  General: Patient comfortable in bed  Vital Signs Last 12 Hrs  T(F): 98.6 (07-20-21 @ 12:14), Max: 98.6 (07-20-21 @ 12:14)  HR: 51 (07-20-21 @ 04:24) (51 - 51)  BP: 106/62 (07-20-21 @ 12:14) (106/62 - 136/63)  BP(mean): --  RR: 18 (07-20-21 @ 12:14) (18 - 18)  SpO2: 100% (07-20-21 @ 12:14) (100% - 100%)  Neck: No palpable thyroid nodules.  CVS: S1S2, No murmurs  Respiratory: No wheezing, no crepitations  GI: Abdomen soft, bowel sounds positive  Musculoskeletal:  edema lower extremities.   Skin: No skin rashes, no ecchymosis    Diagnostics

## 2021-07-21 ENCOUNTER — TRANSCRIPTION ENCOUNTER (OUTPATIENT)
Age: 71
End: 2021-07-21

## 2021-07-21 DIAGNOSIS — R33.9 RETENTION OF URINE, UNSPECIFIED: ICD-10-CM

## 2021-07-21 LAB
ANION GAP SERPL CALC-SCNC: 10 MMOL/L — SIGNIFICANT CHANGE UP (ref 5–17)
BUN SERPL-MCNC: 20 MG/DL — SIGNIFICANT CHANGE UP (ref 7–23)
CALCIUM SERPL-MCNC: 8.9 MG/DL — SIGNIFICANT CHANGE UP (ref 8.4–10.5)
CHLORIDE SERPL-SCNC: 99 MMOL/L — SIGNIFICANT CHANGE UP (ref 96–108)
CO2 SERPL-SCNC: 26 MMOL/L — SIGNIFICANT CHANGE UP (ref 22–31)
CREAT SERPL-MCNC: 0.43 MG/DL — LOW (ref 0.5–1.3)
GLUCOSE BLDC GLUCOMTR-MCNC: 170 MG/DL — HIGH (ref 70–99)
GLUCOSE BLDC GLUCOMTR-MCNC: 233 MG/DL — HIGH (ref 70–99)
GLUCOSE BLDC GLUCOMTR-MCNC: 276 MG/DL — HIGH (ref 70–99)
GLUCOSE BLDC GLUCOMTR-MCNC: 292 MG/DL — HIGH (ref 70–99)
GLUCOSE SERPL-MCNC: 178 MG/DL — HIGH (ref 70–99)
HCT VFR BLD CALC: 27.6 % — LOW (ref 39–50)
HGB BLD-MCNC: 8.4 G/DL — LOW (ref 13–17)
MCHC RBC-ENTMCNC: 30.1 PG — SIGNIFICANT CHANGE UP (ref 27–34)
MCHC RBC-ENTMCNC: 30.4 GM/DL — LOW (ref 32–36)
MCV RBC AUTO: 98.9 FL — SIGNIFICANT CHANGE UP (ref 80–100)
NRBC # BLD: 0 /100 WBCS — SIGNIFICANT CHANGE UP (ref 0–0)
PLATELET # BLD AUTO: 54 K/UL — LOW (ref 150–400)
POTASSIUM SERPL-MCNC: 4.5 MMOL/L — SIGNIFICANT CHANGE UP (ref 3.5–5.3)
POTASSIUM SERPL-SCNC: 4.5 MMOL/L — SIGNIFICANT CHANGE UP (ref 3.5–5.3)
RBC # BLD: 2.79 M/UL — LOW (ref 4.2–5.8)
RBC # FLD: 26.4 % — HIGH (ref 10.3–14.5)
SARS-COV-2 RNA SPEC QL NAA+PROBE: SIGNIFICANT CHANGE UP
SARS-COV-2 RNA SPEC QL NAA+PROBE: SIGNIFICANT CHANGE UP
SODIUM SERPL-SCNC: 135 MMOL/L — SIGNIFICANT CHANGE UP (ref 135–145)
WBC # BLD: 2.25 K/UL — LOW (ref 3.8–10.5)
WBC # FLD AUTO: 2.25 K/UL — LOW (ref 3.8–10.5)

## 2021-07-21 PROCEDURE — 99231 SBSQ HOSP IP/OBS SF/LOW 25: CPT | Mod: GV

## 2021-07-21 RX ADMIN — Medication 650 MILLIGRAM(S): at 15:42

## 2021-07-21 RX ADMIN — Medication 3: at 16:55

## 2021-07-21 RX ADMIN — Medication 1: at 07:55

## 2021-07-21 RX ADMIN — Medication 4 MILLIGRAM(S): at 00:33

## 2021-07-21 RX ADMIN — Medication 25 UNIT(S): at 16:55

## 2021-07-21 RX ADMIN — Medication 4 MILLIGRAM(S): at 05:46

## 2021-07-21 RX ADMIN — Medication 2: at 21:31

## 2021-07-21 RX ADMIN — QUETIAPINE FUMARATE 25 MILLIGRAM(S): 200 TABLET, FILM COATED ORAL at 21:32

## 2021-07-21 RX ADMIN — Medication 2: at 11:44

## 2021-07-21 RX ADMIN — Medication 25 UNIT(S): at 11:45

## 2021-07-21 RX ADMIN — PANTOPRAZOLE SODIUM 40 MILLIGRAM(S): 20 TABLET, DELAYED RELEASE ORAL at 11:20

## 2021-07-21 RX ADMIN — TAMSULOSIN HYDROCHLORIDE 0.4 MILLIGRAM(S): 0.4 CAPSULE ORAL at 21:32

## 2021-07-21 RX ADMIN — Medication 300 MILLIGRAM(S): at 11:20

## 2021-07-21 RX ADMIN — FINASTERIDE 5 MILLIGRAM(S): 5 TABLET, FILM COATED ORAL at 11:20

## 2021-07-21 RX ADMIN — Medication 5 MILLIGRAM(S): at 21:32

## 2021-07-21 RX ADMIN — Medication 4 MILLIGRAM(S): at 17:20

## 2021-07-21 RX ADMIN — Medication 4 MILLIGRAM(S): at 11:21

## 2021-07-21 RX ADMIN — Medication 25 UNIT(S): at 07:56

## 2021-07-21 RX ADMIN — Medication 81 MILLIGRAM(S): at 11:21

## 2021-07-21 RX ADMIN — INSULIN GLARGINE 50 UNIT(S): 100 INJECTION, SOLUTION SUBCUTANEOUS at 21:31

## 2021-07-21 RX ADMIN — Medication 650 MILLIGRAM(S): at 17:25

## 2021-07-21 NOTE — PROGRESS NOTE ADULT - SUBJECTIVE AND OBJECTIVE BOX
Date of service: 07-21-21 @ 16:12      Patient is a 71y old  Male who presents with a chief complaint of HD (18 Jul 2021 12:32)                                                               INTERVAL HPI/OVERNIGHT EVENTS:    REVIEW OF SYSTEMS:     CONSTITUTIONAL: No weakness, fevers or chills  RESPIRATORY: No cough, wheezing,  No shortness of breath  CARDIOVASCULAR: No chest pain or palpitations  GASTROINTESTINAL: No abdominal pain  . No nausea, vomiting, or hematemesis; No diarrhea or constipation. No melena or hematochezia.  GENITOURINARY: No dysuria, frequency or hematuria  NEUROLOGICAL: No numbness or weakness                                                                                                                                                                                                                                                                               Medications:  MEDICATIONS  (STANDING):  allopurinol 300 milliGRAM(s) Oral daily  aspirin  chewable 81 milliGRAM(s) Oral daily  dexAMETHasone  Injectable 4 milliGRAM(s) IV Push every 6 hours  dextrose 40% Gel 15 Gram(s) Oral once  dextrose 5%. 1000 milliLiter(s) (50 mL/Hr) IV Continuous <Continuous>  dextrose 5%. 1000 milliLiter(s) (100 mL/Hr) IV Continuous <Continuous>  dextrose 50% Injectable 25 Gram(s) IV Push once  dextrose 50% Injectable 12.5 Gram(s) IV Push once  dextrose 50% Injectable 25 Gram(s) IV Push once  finasteride 5 milliGRAM(s) Oral daily  glucagon  Injectable 1 milliGRAM(s) IntraMuscular once  insulin glargine Injectable (LANTUS) 50 Unit(s) SubCutaneous at bedtime  insulin lispro (ADMELOG) corrective regimen sliding scale   SubCutaneous three times a day before meals  insulin lispro (ADMELOG) corrective regimen sliding scale   SubCutaneous at bedtime  insulin lispro Injectable (ADMELOG) 25 Unit(s) SubCutaneous three times a day before meals  melatonin 5 milliGRAM(s) Oral at bedtime  pantoprazole   Suspension 40 milliGRAM(s) Oral daily  QUEtiapine 25 milliGRAM(s) Oral at bedtime  senna 2 Tablet(s) Oral at bedtime  tamsulosin 0.4 milliGRAM(s) Oral at bedtime    MEDICATIONS  (PRN):  acetaminophen   Tablet .. 650 milliGRAM(s) Oral every 6 hours PRN Temp greater or equal to 38C (100.4F), Moderate Pain (4 - 6)  bisacodyl 5 milliGRAM(s) Oral every 12 hours PRN Constipation  polyethylene glycol 3350 17 Gram(s) Oral daily PRN Constipation       Allergies    No Known Allergies    Intolerances      Vital Signs Last 24 Hrs  T(C): 36.5 (21 Jul 2021 11:43), Max: 37.1 (20 Jul 2021 20:05)  T(F): 97.7 (21 Jul 2021 11:43), Max: 98.8 (20 Jul 2021 20:05)  HR: 59 (21 Jul 2021 11:43) (47 - 73)  BP: 129/72 (21 Jul 2021 11:43) (108/63 - 130/69)  BP(mean): --  RR: 18 (21 Jul 2021 11:43) (17 - 18)  SpO2: 99% (21 Jul 2021 11:43) (97% - 100%)  CAPILLARY BLOOD GLUCOSE      POCT Blood Glucose.: 233 mg/dL (21 Jul 2021 11:27)  POCT Blood Glucose.: 170 mg/dL (21 Jul 2021 07:29)  POCT Blood Glucose.: 249 mg/dL (20 Jul 2021 21:13)  POCT Blood Glucose.: 191 mg/dL (20 Jul 2021 16:32)      07-20 @ 07:01  -  07-21 @ 07:00  --------------------------------------------------------  IN: 300 mL / OUT: 3300 mL / NET: -3000 mL    07-21 @ 07:01 - 07-21 @ 16:12  --------------------------------------------------------  IN: 480 mL / OUT: 1200 mL / NET: -720 mL      Physical Exam:    Daily     Daily   General:  NAD   HEENT:  Nonicteric, PERRLA  CV:  RRR, S1S2   Lungs:  CTA B/L, no wheezes, rales, rhonchi  Abdomen:  Soft, non-tender, no distended, positive BS  Extremities: no edema   escamilla in place   Neuro:  AAOx3, non-focal, grossly intact                                                                                                                                                                                                                                                                                                LABS:                               8.4    2.25  )-----------( 54       ( 21 Jul 2021 06:52 )             27.6                      07-21    135  |  99  |  20  ----------------------------<  178<H>  4.5   |  26  |  0.43<L>    Ca    8.9      21 Jul 2021 06:52                         RADIOLOGY & ADDITIONAL TESTS         I personally reviewed: [  ]EKG   [  ]CXR    [  ] CT      A/P:         Discussed with :     Augusto consultants' Notes   Time spent :

## 2021-07-21 NOTE — PROGRESS NOTE ADULT - SUBJECTIVE AND OBJECTIVE BOX
Patient is a 71y old  Male who presents with a chief complaint of HD (18 Jul 2021 12:32)    says that he feels okay.  No pain.  Remains weak.  Appetite good.  No fevers, chills, sweats, itch, HA, dizziness, nosebleed, CP, SOB, hemoptysis, N/V/D, dysuria, or hematuria    Medication:   acetaminophen   Tablet .. 650 milliGRAM(s) Oral every 6 hours PRN  allopurinol 300 milliGRAM(s) Oral daily  aspirin  chewable 81 milliGRAM(s) Oral daily  bisacodyl 5 milliGRAM(s) Oral every 12 hours PRN  dexAMETHasone  Injectable 4 milliGRAM(s) IV Push every 6 hours  dextrose 40% Gel 15 Gram(s) Oral once  dextrose 5%. 1000 milliLiter(s) IV Continuous <Continuous>  dextrose 5%. 1000 milliLiter(s) IV Continuous <Continuous>  dextrose 50% Injectable 25 Gram(s) IV Push once  dextrose 50% Injectable 12.5 Gram(s) IV Push once  dextrose 50% Injectable 25 Gram(s) IV Push once  finasteride 5 milliGRAM(s) Oral daily  glucagon  Injectable 1 milliGRAM(s) IntraMuscular once  insulin glargine Injectable (LANTUS) 50 Unit(s) SubCutaneous at bedtime  insulin lispro (ADMELOG) corrective regimen sliding scale   SubCutaneous three times a day before meals  insulin lispro (ADMELOG) corrective regimen sliding scale   SubCutaneous at bedtime  insulin lispro Injectable (ADMELOG) 25 Unit(s) SubCutaneous three times a day before meals  melatonin 5 milliGRAM(s) Oral at bedtime  pantoprazole   Suspension 40 milliGRAM(s) Oral daily  polyethylene glycol 3350 17 Gram(s) Oral daily PRN  QUEtiapine 25 milliGRAM(s) Oral at bedtime  senna 2 Tablet(s) Oral at bedtime  tamsulosin 0.4 milliGRAM(s) Oral at bedtime      Physical exam    T(C): 36.5 (07-21-21 @ 11:43), Max: 37.1 (07-20-21 @ 20:05)  HR: 59 (07-21-21 @ 11:43) (47 - 73)  BP: 129/72 (07-21-21 @ 11:43) (108/63 - 130/69)  RR: 18 (07-21-21 @ 11:43) (17 - 18)  SpO2: 99% (07-21-21 @ 11:43) (97% - 100%)  Wt(kg): --    alert NAD  EOMI anicteric sclera  Cv s1 S2 RRR  Lungs clear B/L anteriorly  abd soft NT ND +BS  trace LE edema no tenderness    Labs                              8.4    2.25  )-----------( 54       ( 21 Jul 2021 06:52 )             27.6       07-21    135  |  99  |  20  ----------------------------<  178<H>  4.5   |  26  |  0.43<L>    Ca    8.9      21 Jul 2021 06:52            [id number]

## 2021-07-21 NOTE — DISCHARGE NOTE PROVIDER - PROVIDER TOKENS
PROVIDER:[TOKEN:[[id number]]],PROVIDER:[TOKEN:[[id number]]],PROVIDER:[TOKEN:[[id number]]],PROVIDER:[TOKEN:[[id number]]],PROVIDER:[TOKEN:[[id number]]]

## 2021-07-21 NOTE — DISCHARGE NOTE PROVIDER - CARE PROVIDERS DIRECT ADDRESSES
,DirectAddress_Unknown,DirectAddress_Unknown,[email or web address],DirectAddress_Unknown

## 2021-07-21 NOTE — DISCHARGE NOTE PROVIDER - INSTRUCTIONS
Dysphagia 3   Consistent Carbohydrates ( no snacks)  Supplement with Ensure Enlive - 3 cans per day

## 2021-07-21 NOTE — PROGRESS NOTE ADULT - PROBLEM SELECTOR PLAN 1
CT chest findings 6/28 with patchy GGO and scattered nodular opacities throughout all lobes of the lungs  -Possibly related to underlying lymphoma?  -Normoxic, no c/o dyspnea  -Repeat CT chest 7/19 with resolving GGO, only with 4 mm GGO nodule and unchanged 1 cm cystic nodule. Suggest repeat CT chest in 1 month. D/w pt.   -Noted to have elevated Fungitell (256). Pt nontoxic appearing, f/u repeat fungitell. ID f/u.

## 2021-07-21 NOTE — DISCHARGE NOTE PROVIDER - CARE PROVIDER_API CALL
Kenji Larios  HEMATOLOGY  [address], [address]  [place] [address]  Phone: [phone]  Fax: [phone]  Follow Up Time:     GUZMAN GUERRERO  Internal Medicine  [address] [place] [address]  Phone: [phone]  Fax: [phone]  Follow Up Time:     Megan Smith  NEUROLOGY  [address] [place] [address]  Phone: [phone]  Fax: [phone]  Follow Up Time:     Justin Pereira (DO)  Neurosurgery  [address]  [place] [address]  Phone: [phone]  Fax: [phone]  Follow Up Time:     Shahriar Kahn)  EndocrinologyMetabDiabetes; Internal Medicine  [address] [place] [address]  Phone: [phone]  Fax: [phone]  Follow Up Time:

## 2021-07-21 NOTE — PROGRESS NOTE ADULT - SUBJECTIVE AND OBJECTIVE BOX
Follow-up Pulm Progress Note    No new respiratory events overnight.  Denies SOB/CP.     Medications:  MEDICATIONS  (STANDING):  allopurinol 300 milliGRAM(s) Oral daily  aspirin  chewable 81 milliGRAM(s) Oral daily  dexAMETHasone  Injectable 4 milliGRAM(s) IV Push every 6 hours  dextrose 40% Gel 15 Gram(s) Oral once  dextrose 5%. 1000 milliLiter(s) (50 mL/Hr) IV Continuous <Continuous>  dextrose 5%. 1000 milliLiter(s) (100 mL/Hr) IV Continuous <Continuous>  dextrose 50% Injectable 25 Gram(s) IV Push once  dextrose 50% Injectable 12.5 Gram(s) IV Push once  dextrose 50% Injectable 25 Gram(s) IV Push once  finasteride 5 milliGRAM(s) Oral daily  glucagon  Injectable 1 milliGRAM(s) IntraMuscular once  insulin glargine Injectable (LANTUS) 50 Unit(s) SubCutaneous at bedtime  insulin lispro (ADMELOG) corrective regimen sliding scale   SubCutaneous three times a day before meals  insulin lispro (ADMELOG) corrective regimen sliding scale   SubCutaneous at bedtime  insulin lispro Injectable (ADMELOG) 25 Unit(s) SubCutaneous three times a day before meals  melatonin 5 milliGRAM(s) Oral at bedtime  pantoprazole   Suspension 40 milliGRAM(s) Oral daily  QUEtiapine 25 milliGRAM(s) Oral at bedtime  senna 2 Tablet(s) Oral at bedtime  tamsulosin 0.4 milliGRAM(s) Oral at bedtime    MEDICATIONS  (PRN):  acetaminophen   Tablet .. 650 milliGRAM(s) Oral every 6 hours PRN Temp greater or equal to 38C (100.4F), Moderate Pain (4 - 6)  bisacodyl 5 milliGRAM(s) Oral every 12 hours PRN Constipation  polyethylene glycol 3350 17 Gram(s) Oral daily PRN Constipation          Vital Signs Last 24 Hrs  T(C): 36.5 (21 Jul 2021 08:00), Max: 37.1 (20 Jul 2021 20:05)  T(F): 97.7 (21 Jul 2021 08:00), Max: 98.8 (20 Jul 2021 20:05)  HR: 69 (21 Jul 2021 08:00) (47 - 73)  BP: 108/63 (21 Jul 2021 08:00) (106/62 - 130/69)  BP(mean): --  RR: 18 (21 Jul 2021 08:00) (17 - 18)  SpO2: 97% (21 Jul 2021 08:00) (97% - 100%)          07-20 @ 07:01  -  07-21 @ 07:00  --------------------------------------------------------  IN: 300 mL / OUT: 3300 mL / NET: -3000 mL          LABS:                        8.4    2.25  )-----------( 54       ( 21 Jul 2021 06:52 )             27.6     07-21    135  |  99  |  20  ----------------------------<  178<H>  4.5   |  26  |  0.43<L>    Ca    8.9      21 Jul 2021 06:52            CAPILLARY BLOOD GLUCOSE      POCT Blood Glucose.: 170 mg/dL (21 Jul 2021 07:29)          Physical Examination:  PULM: Clear to auscultation bilaterally, no significant sputum production  CVS: S1, S2 heard    RADIOLOGY REVIEWED  CT chest: < from: CT Chest w/ IV Cont (07.19.21 @ 17:31) >    FINDINGS:  CHEST:  LUNGS AND LARGE AIRWAYS: Patent central airways. There is a 4 mm groundglass nodule (series 4, image 171). There is an unchanged 1 cm cystic nodule (series 4, image 223). Interval decrease in right lower lobe nodule at the costophrenic angle, now measuring 1.3 x 0.8 cm (series 4, image 338). Other prior mentioned solid and groundglass nodules are resolved. Mild bilateral subsegmental atelectasis.  PLEURA: No pleural effusion.  VESSELS: Coronary artery calcifications.  HEART: Heart size is normal. No pericardial effusion.  MEDIASTINUM AND SARTHAK: No lymphadenopathy.  CHEST WALL AND LOWER NECK: Within normal limits.    ABDOMEN AND PELVIS:  LIVER: Within normal limits.  BILE DUCTS: Normal caliber.  GALLBLADDER: Contracted gallbladder, limiting evaluation.  SPLEEN: Within normal limits.  PANCREAS: Within normal limits.  ADRENALS: Within normal limits.  KIDNEYS/URETERS: Duplicated left collecting system. No hydronephrosis. Right renal cyst.    BLADDER: Sauceda catheter.  REPRODUCTIVE ORGANS: Prostate is enlarged.    BOWEL: No bowel obstruction. Appendix is not visualized. No evidence of inflammation in the pericecal region.  PERITONEUM: No ascites.  VESSELS: Within normal limits.  RETROPERITONEUM/LYMPH NODES: Periportal and retroperitoneal lymphadenopathy. A respective conglomerate periportal lymph node measures 3.8 x 3.8 cm (series 3, image 127), previously 5.1 x 4.4. A respective left periaortic lymph node  (3;190) measures 1.8 x 1.1 cm, previously 1.5 x 0.8 cm.  ABDOMINAL WALL: Left inguinal hernia containing fat and nonobstructed sigmoid colon.  BONES: Degenerative changes.    IMPRESSION:  Interval improvement in nodular lung opacities.    Mild improvement in abdominal lymphadenopathy, consistent with patient's history of lymphoma.        --- End of Report ---    < end of copied text >

## 2021-07-21 NOTE — DISCHARGE NOTE PROVIDER - HOSPITAL COURSE
72 y/o M w/ PMHx of CVA this past August and got TPA per family member, DM, BPH with obstruction s/p escamilla catheter, s/p ERCP w Sphincterectomy recent admission to [facility] for sepsis  Hodgkin lymphoma was not offered any chemo and was placed on hospice.     now presenting with weakness and FTT.   pt denies cp / SOB / palpitations   no focal neuro complaints .. at baseline RLE weakness   no N/V   had one episode of diarrhea   no urinary sx  abdominal pain, diffuse, but worse on R side.     - Failure to thrive: cultures neg  fungitell repeated Nl   CT chest repeated : improving   nutrition consult   encourage PO intake: dysphagia 3 based on MBS results. Pt feeling overall improved .. re evaluated and now on reg diet     - lymphoma: d/w Dr. Larios: s/p immunotherapy ..   overall seemed to have responded somewhat to first dose    pt s/p 1st rx with opdivo 7/6. Planning for next treatment in days ahead.       - DM with hyperglycemia : improved now worse with steroids   fu with endo     - anemia: monitor H/H post transfusion, stable.     - Fever: doubt infectious etiology   likely due to immunotherapy/lymphoma. culture: neg   abx dced. s/p IVF, ID f/u appreciated.   elevated fungitell noted, T chest improved / d/w Dr muñiz , repeat levels:NL    low suspecion for fungal infection     - voice changes: CT neck : Asymmetric enlargement of the left palatine tonsil, suspicious for lymphomatous involvement given history. Correlate with direct visualization.  ENT had eval pt: no gross pathology on visualization   S/S eval: MBS done.    - Tachycardia: VA duplex neg for DVT   rate stable. noted 7 beats NSVT.  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 Smith and MRI department :  MR brain, cervical thoracic spine : non contrast done  and now awaiting with con... being expedited   cont steroids : will cont taper  fu LP cytology   appreciate neuro onc input   planned chemo early next week     - Afib:brief    now in sinus : monitor   appreciate cardio input   no AC at this time and no AVNB at this time     - urinary retention: cont escamilla .. failed TOV   can consider repeat TOV in rehab

## 2021-07-21 NOTE — PROGRESS NOTE ADULT - ASSESSMENT
72 y/o M w/ PMHx of CVA this past August and got TPA per family member, DM, BPH with obstruction s/p escamilla catheter, s/p ERCP w Sphincterectomy recent admission to [facility] for sepsis  Hodgkin lymphoma was not offered any chemo and was placed on hospice.     now presenting with weakness and FTT.   pt denies cp / SOB / palpitations   no focal neuro complaints .. at baseline RLE weakness   no N/V   had one episode of diarrhea   no urinary sx  abdominal pain, diffuse, but worse on R side.     - Failure to thrive: cultures neg  CT chest repeated : imprving   nutrition consult   encourage PO intake: dysphagia 3 based on MBS results     - lymphoma: d/w Dr. Larios: s/p immunotherapy .. will plan second dose on aug /3   overall seemed to have responded somewhat to first dose     - DM with hyperglycemia : improved now worse with steroids   fu with endo     - anemia: monitor H/H post transfusion, stable.     - Fever: doubt infectious etiology   likely due to immunotherapy/lymphoma. culture: neg   abx dced. s/p IVF, ID f/u appreciated.   elevated fungitell noted , T chest improved / d/w Dr muñiz , repeat levels however low suspecion for fungal infection     - voice changes: CT neck : Asymmetric enlargement of the left palatine tonsil, suspicious for lymphomatous involvement given history. Correlate with direct visualization.  ENT had eval pt: no gross pathology on visualization   S/S eval: MBS done. speech: dysphagia 3    - Tachycardia: VA duplex neg for DVT   rate stable. noted 7 beats NSVT.  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 being expedited   cont  steroids : will cont tape r  fu LP cytology   appreciate neuro onc input   planned chemo early next week     - Afib : now in sinus : monitor   appreicate cardio input   no AC at this time and no AVNB at this time     - urinary retention : cont escamilla     plan to dc tmmrw

## 2021-07-21 NOTE — PROGRESS NOTE ADULT - PROBLEM SELECTOR PLAN 1
Will continue current insulin regimen for now. Will continue monitoring FS and FU, will adjust insulin dose as steroids are tapered/dc.  Discussed plan with patient and family at bedside.   for compliance with consistent low-carb diet, nutritional shakes and supplements as tolerated. FU RD recommendations.

## 2021-07-21 NOTE — DISCHARGE NOTE PROVIDER - NSDCCAREPROVSEEN_GEN_ALL_CORE_FT
Alvaro, Baltazar Larios, Kenji Renee, Floresita Freitas, Teddy Kahn, Shahriar Farrell, Vijay العراقي, Juan PAULA

## 2021-07-21 NOTE — PROGRESS NOTE ADULT - ASSESSMENT
Advanced Hodgkin's lymphoma.  Had rx #1 with Opdivo and next dose due in a couple of weeks.  per Dr Larios no further treatment here prior to rehab.      On Allopurinol and last uric acid was low.  Kidney function normal.    Pancytopenia due to malignancy.  Counts adequate and no need for transfusion or G-CSf at this time.  Trend the CBC.

## 2021-07-21 NOTE — PROGRESS NOTE ADULT - ASSESSMENT
Assessment  DMT2: 71y Male with DM T2 with hyperglycemia, A1C 8.8%, was on oral meds/Janumet at home, admitted with weakness/fatigue and hyperglycemia, now on large-dose basal bolus insulin, decreased dose yesterday, blood sugars improving/fluctuating within overall acceptable range, no hypoglycemic episodes. Patient is eating meals, appears comfortable, steroid taper in progress, remains on dexamethasone.  Lymphoma: on medications, monitored, FU Heme/Onc.  Anemia: stable, monitored.      Shahriar Kahn MD  Cell: [phone]  Office: [phone]

## 2021-07-21 NOTE — PROGRESS NOTE ADULT - ASSESSMENT
70 y/o M with PMH of CVA, DM, BPH with obstruction s/p Sauceda catheter, s/p ERCP w Sphincteretomy, recent admission to [facility] for sepsis, Hodgkin lymphoma dx 2020 - was not offered any chemo and was placed on hospice, DVT 8/2020. Presenting with weakness and FTT. Pt and family opted for treatment of lymphoma - s/p Opdivo 7/6. Called to consult for CT chest findings 6/28 with patchy GGO and scattered nodular opacities throughout all lobes of the lungs. Currently breathing comfortably on RA.

## 2021-07-21 NOTE — PROGRESS NOTE ADULT - SUBJECTIVE AND OBJECTIVE BOX
Chief complaint  Patient is a 71y old  Male who presents with a chief complaint of HD (18 Jul 2021 12:32)   Review of systems  Patient in bed, looks comfortable, no hypoglycemic episodes.    Labs and Fingersticks  CAPILLARY BLOOD GLUCOSE      POCT Blood Glucose.: 233 mg/dL (21 Jul 2021 11:27)  POCT Blood Glucose.: 170 mg/dL (21 Jul 2021 07:29)  POCT Blood Glucose.: 249 mg/dL (20 Jul 2021 21:13)  POCT Blood Glucose.: 191 mg/dL (20 Jul 2021 16:32)    Medications  MEDICATIONS  (STANDING):  allopurinol 300 milliGRAM(s) Oral daily  aspirin  chewable 81 milliGRAM(s) Oral daily  dexAMETHasone  Injectable 4 milliGRAM(s) IV Push every 6 hours  dextrose 40% Gel 15 Gram(s) Oral once  dextrose 5%. 1000 milliLiter(s) (50 mL/Hr) IV Continuous <Continuous>  dextrose 5%. 1000 milliLiter(s) (100 mL/Hr) IV Continuous <Continuous>  dextrose 50% Injectable 25 Gram(s) IV Push once  dextrose 50% Injectable 12.5 Gram(s) IV Push once  dextrose 50% Injectable 25 Gram(s) IV Push once  finasteride 5 milliGRAM(s) Oral daily  glucagon  Injectable 1 milliGRAM(s) IntraMuscular once  insulin glargine Injectable (LANTUS) 50 Unit(s) SubCutaneous at bedtime  insulin lispro (ADMELOG) corrective regimen sliding scale   SubCutaneous three times a day before meals  insulin lispro (ADMELOG) corrective regimen sliding scale   SubCutaneous at bedtime  insulin lispro Injectable (ADMELOG) 25 Unit(s) SubCutaneous three times a day before meals  melatonin 5 milliGRAM(s) Oral at bedtime  pantoprazole   Suspension 40 milliGRAM(s) Oral daily  QUEtiapine 25 milliGRAM(s) Oral at bedtime  senna 2 Tablet(s) Oral at bedtime  tamsulosin 0.4 milliGRAM(s) Oral at bedtime      Physical Exam  General: Patient comfortable in bed  Vital Signs Last 12 Hrs  T(F): 97.7 (07-21-21 @ 11:43), Max: 98 (07-21-21 @ 04:00)  HR: 59 (07-21-21 @ 11:43) (47 - 69)  BP: 129/72 (07-21-21 @ 11:43) (108/63 - 130/69)  BP(mean): --  RR: 18 (07-21-21 @ 11:43) (17 - 18)  SpO2: 99% (07-21-21 @ 11:43) (97% - 100%)

## 2021-07-21 NOTE — PROGRESS NOTE ADULT - SUBJECTIVE AND OBJECTIVE BOX
Subjective: Patient seen and examined. No new events except as noted.     REVIEW OF SYSTEMS:    CONSTITUTIONAL: + weakness, fevers or chills  EYES/ENT: No visual changes;  No vertigo or throat pain   NECK: No pain or stiffness  RESPIRATORY: No cough, wheezing, hemoptysis; No shortness of breath  CARDIOVASCULAR: No chest pain or palpitations  GASTROINTESTINAL: No abdominal or epigastric pain. No nausea, vomiting, or hematemesis; No diarrhea or constipation. No melena or hematochezia.  GENITOURINARY: No dysuria, frequency or hematuria  NEUROLOGICAL: No numbness or weakness  SKIN: No itching, burning, rashes, or lesions   All other review of systems is negative unless indicated above.    MEDICATIONS:  MEDICATIONS  (STANDING):  allopurinol 300 milliGRAM(s) Oral daily  aspirin  chewable 81 milliGRAM(s) Oral daily  dexAMETHasone  Injectable 4 milliGRAM(s) IV Push every 6 hours  dextrose 40% Gel 15 Gram(s) Oral once  dextrose 5%. 1000 milliLiter(s) (50 mL/Hr) IV Continuous <Continuous>  dextrose 5%. 1000 milliLiter(s) (100 mL/Hr) IV Continuous <Continuous>  dextrose 50% Injectable 25 Gram(s) IV Push once  dextrose 50% Injectable 12.5 Gram(s) IV Push once  dextrose 50% Injectable 25 Gram(s) IV Push once  finasteride 5 milliGRAM(s) Oral daily  glucagon  Injectable 1 milliGRAM(s) IntraMuscular once  insulin glargine Injectable (LANTUS) 50 Unit(s) SubCutaneous at bedtime  insulin lispro (ADMELOG) corrective regimen sliding scale   SubCutaneous three times a day before meals  insulin lispro (ADMELOG) corrective regimen sliding scale   SubCutaneous at bedtime  insulin lispro Injectable (ADMELOG) 25 Unit(s) SubCutaneous three times a day before meals  melatonin 5 milliGRAM(s) Oral at bedtime  pantoprazole   Suspension 40 milliGRAM(s) Oral daily  QUEtiapine 25 milliGRAM(s) Oral at bedtime  senna 2 Tablet(s) Oral at bedtime  tamsulosin 0.4 milliGRAM(s) Oral at bedtime      PHYSICAL EXAM:  T(C): 36.5 (07-21-21 @ 08:00), Max: 37.1 (07-20-21 @ 20:05)  HR: 69 (07-21-21 @ 08:00) (47 - 73)  BP: 108/63 (07-21-21 @ 08:00) (106/62 - 130/69)  RR: 18 (07-21-21 @ 08:00) (17 - 18)  SpO2: 97% (07-21-21 @ 08:00) (97% - 100%)  Wt(kg): --  I&O's Summary    20 Jul 2021 07:01  -  21 Jul 2021 07:00  --------------------------------------------------------  IN: 300 mL / OUT: 3300 mL / NET: -3000 mL          Appearance: Normal	  HEENT:   Normal oral mucosa, PERRL, EOMI	  Lymphatic: No lymphadenopathy , no edema  Cardiovascular: Normal S1 S2, No JVD, No murmurs , Peripheral pulses palpable 2+ bilaterally  Respiratory: Lungs clear to auscultation, normal effort 	  Gastrointestinal:  Soft, Non-tender, + BS	  Skin: No rashes, No ecchymoses, No cyanosis, warm to touch  Musculoskeletal: Normal range of motion, normal strength  Psychiatry:  Mood & affect appropriate  Ext: No edema      LABS:    CARDIAC MARKERS:                                8.4    2.25  )-----------( 54       ( 21 Jul 2021 06:52 )             27.6     07-21    135  |  99  |  20  ----------------------------<  178<H>  4.5   |  26  |  0.43<L>    Ca    8.9      21 Jul 2021 06:52      proBNP:   Lipid Profile:   HgA1c:   TSH:             TELEMETRY: 	SR     ECG:  	  RADIOLOGY:   DIAGNOSTIC TESTING:  [ ] Echocardiogram:  [ ]  Catheterization:  [ ] Stress Test:    OTHER:

## 2021-07-21 NOTE — PROGRESS NOTE ADULT - ASSESSMENT
70 y/o M w/ PMHx of CVA this past August and got TPA per family member, DM, BPH with obstruction s/p escamilla catheter, s/p ERCP w Sphincterectomy recent admission to [facility] for sepsis  Hodgkin lymphoma was not offered any chemo and was placed on hospice.     now presenting with weakness and FTT.   pt denies cp / SOB / palpitations   no focal neuro complaints .. at baseline RLE weakness   no N/V   had one episode of diarrhea   no urinary sx  abdominal pain, diffuse, but worse on R side.   Afib RVR overnight. Was asymptomatic   no known history of this as per patient

## 2021-07-21 NOTE — DISCHARGE NOTE PROVIDER - NSDCMRMEDTOKEN_GEN_ALL_CORE_FT
acetaminophen 325 mg oral tablet: 2 tab(s) orally every 6 hours, As needed, Temp greater or equal to 38C (100.4F), Moderate Pain (4 - 6)  aspirin 81 mg oral tablet, chewable: 1 tab(s) orally once a day  bisacodyl 5 mg oral delayed release tablet: 1 tab(s) orally every 12 hours, As needed, Constipation  finasteride 5 mg oral tablet: 1 tab(s) orally once a day  insulin glargine: 40 unit(s) subcutaneous once a day (at bedtime)  melatonin 5 mg oral tablet: 1 tab(s) orally once a day (at bedtime)  pantoprazole 40 mg oral granule, delayed release:  orally   polyethylene glycol 3350 oral powder for reconstitution: 17 gram(s) orally once a day, As needed, Constipation  QUEtiapine 25 mg oral tablet: 1 tab(s) orally once a day (at bedtime)  senna oral tablet: 2 tab(s) orally once a day (at bedtime)  simethicone 80 mg oral tablet, chewable: 1 tab(s) orally 3 times a day  tamsulosin 0.4 mg oral capsule: 1 cap(s) orally once a day (at bedtime)   acetaminophen 325 mg oral tablet: 2 tab(s) orally every 6 hours, As needed, Temp greater or equal to 38C (100.4F), Moderate Pain (4 - 6)  allopurinol 300 mg oral tablet: 1 tab(s) orally once a day  aspirin 81 mg oral tablet, chewable: 1 tab(s) orally once a day  bisacodyl 5 mg oral delayed release tablet: 1 tab(s) orally every 12 hours, As needed, Constipation  clotrimazole 10 mg oral lozenge: 1 lozenge orally 5 times a day  dexamethasone 4 mg oral tablet: 1 tab(s) orally once a day  finasteride 5 mg oral tablet: 1 tab(s) orally once a day  insulin glargine: 58 unit(s) subcutaneous once a day (at bedtime)  insulin lispro 100 units/mL injectable solution: 26 unit(s) injectable 3 times a day with  meals  melatonin 5 mg oral tablet: 1 tab(s) orally once a day (at bedtime)  pantoprazole 40 mg oral granule, delayed release:  orally   polyethylene glycol 3350 oral powder for reconstitution: 17 gram(s) orally once a day, As needed, Constipation  QUEtiapine 25 mg oral tablet: 1 tab(s) orally once a day (at bedtime)  senna oral tablet: 2 tab(s) orally once a day (at bedtime)  simethicone 80 mg oral tablet, chewable: 1 tab(s) orally 3 times a day  tamsulosin 0.4 mg oral capsule: 1 cap(s) orally once a day (at bedtime)   acetaminophen 325 mg oral tablet: 2 tab(s) orally every 6 hours, As needed, Temp greater or equal to 38C (100.4F), Moderate Pain (4 - 6)  allopurinol 300 mg oral tablet: 1 tab(s) orally once a day  aspirin 81 mg oral tablet, chewable: 1 tab(s) orally once a day  bisacodyl 5 mg oral delayed release tablet: 1 tab(s) orally every 12 hours, As needed, Constipation  clotrimazole 10 mg oral lozenge: 1 lozenge orally 5 times a day  Decadron 4 mg oral tablet: 0.5 tab(s) orally once a day for 4 days, then stop  finasteride 5 mg oral tablet: 1 tab(s) orally once a day  insulin glargine: 45 unit(s) subcutaneous once a day (at bedtime)  insulin lispro 100 units/mL injectable solution: 15 unit(s) injectable 3 times a day  melatonin 5 mg oral tablet: 1 tab(s) orally once a day (at bedtime)  pantoprazole 40 mg oral granule, delayed release:  orally   polyethylene glycol 3350 oral powder for reconstitution: 17 gram(s) orally once a day, As needed, Constipation  QUEtiapine 25 mg oral tablet: 1 tab(s) orally once a day (at bedtime)  senna oral tablet: 2 tab(s) orally once a day (at bedtime)  simethicone 80 mg oral tablet, chewable: 1 tab(s) orally 3 times a day  tamsulosin 0.4 mg oral capsule: 1 cap(s) orally once a day (at bedtime)

## 2021-07-22 LAB
ANION GAP SERPL CALC-SCNC: 14 MMOL/L — SIGNIFICANT CHANGE UP (ref 5–17)
BUN SERPL-MCNC: 18 MG/DL — SIGNIFICANT CHANGE UP (ref 7–23)
CALCIUM SERPL-MCNC: 9 MG/DL — SIGNIFICANT CHANGE UP (ref 8.4–10.5)
CHLORIDE SERPL-SCNC: 97 MMOL/L — SIGNIFICANT CHANGE UP (ref 96–108)
CO2 SERPL-SCNC: 23 MMOL/L — SIGNIFICANT CHANGE UP (ref 22–31)
CREAT SERPL-MCNC: 0.4 MG/DL — LOW (ref 0.5–1.3)
FUNGITELL: 52 PG/ML — SIGNIFICANT CHANGE UP
GLUCOSE BLDC GLUCOMTR-MCNC: 280 MG/DL — HIGH (ref 70–99)
GLUCOSE BLDC GLUCOMTR-MCNC: 287 MG/DL — HIGH (ref 70–99)
GLUCOSE BLDC GLUCOMTR-MCNC: 295 MG/DL — HIGH (ref 70–99)
GLUCOSE BLDC GLUCOMTR-MCNC: 318 MG/DL — HIGH (ref 70–99)
GLUCOSE SERPL-MCNC: 302 MG/DL — HIGH (ref 70–99)
HCT VFR BLD CALC: 28.5 % — LOW (ref 39–50)
HGB BLD-MCNC: 8.9 G/DL — LOW (ref 13–17)
MCHC RBC-ENTMCNC: 30.8 PG — SIGNIFICANT CHANGE UP (ref 27–34)
MCHC RBC-ENTMCNC: 31.2 GM/DL — LOW (ref 32–36)
MCV RBC AUTO: 98.6 FL — SIGNIFICANT CHANGE UP (ref 80–100)
NRBC # BLD: 0 /100 WBCS — SIGNIFICANT CHANGE UP (ref 0–0)
PLATELET # BLD AUTO: 58 K/UL — LOW (ref 150–400)
POTASSIUM SERPL-MCNC: 4.3 MMOL/L — SIGNIFICANT CHANGE UP (ref 3.5–5.3)
POTASSIUM SERPL-SCNC: 4.3 MMOL/L — SIGNIFICANT CHANGE UP (ref 3.5–5.3)
RBC # BLD: 2.89 M/UL — LOW (ref 4.2–5.8)
RBC # FLD: 25.7 % — HIGH (ref 10.3–14.5)
SODIUM SERPL-SCNC: 134 MMOL/L — LOW (ref 135–145)
VDRL CSF-TITR: SIGNIFICANT CHANGE UP
WBC # BLD: 2.87 K/UL — LOW (ref 3.8–10.5)
WBC # FLD AUTO: 2.87 K/UL — LOW (ref 3.8–10.5)

## 2021-07-22 RX ORDER — ASPIRIN/CALCIUM CARB/MAGNESIUM 324 MG
1 TABLET ORAL
Qty: 0 | Refills: 0 | DISCHARGE
Start: 2021-07-22

## 2021-07-22 RX ORDER — DEXAMETHASONE 0.5 MG/5ML
1 ELIXIR ORAL
Qty: 18 | Refills: 0
Start: 2021-07-22 | End: 2021-07-27

## 2021-07-22 RX ORDER — SENNA PLUS 8.6 MG/1
2 TABLET ORAL
Qty: 0 | Refills: 0 | DISCHARGE
Start: 2021-07-22

## 2021-07-22 RX ORDER — DEXAMETHASONE 0.5 MG/5ML
1 ELIXIR ORAL
Qty: 14 | Refills: 0
Start: 2021-07-22 | End: 2021-07-28

## 2021-07-22 RX ORDER — INSULIN GLARGINE 100 [IU]/ML
40 INJECTION, SOLUTION SUBCUTANEOUS
Qty: 0 | Refills: 0 | DISCHARGE
Start: 2021-07-22

## 2021-07-22 RX ORDER — SIMETHICONE 80 MG/1
80 TABLET, CHEWABLE ORAL THREE TIMES A DAY
Refills: 0 | Status: DISCONTINUED | OUTPATIENT
Start: 2021-07-22 | End: 2021-08-04

## 2021-07-22 RX ORDER — FINASTERIDE 5 MG/1
1 TABLET, FILM COATED ORAL
Qty: 0 | Refills: 0 | DISCHARGE
Start: 2021-07-22

## 2021-07-22 RX ORDER — QUETIAPINE FUMARATE 200 MG/1
1 TABLET, FILM COATED ORAL
Qty: 0 | Refills: 0 | DISCHARGE
Start: 2021-07-22

## 2021-07-22 RX ORDER — TAMSULOSIN HYDROCHLORIDE 0.4 MG/1
1 CAPSULE ORAL
Qty: 0 | Refills: 0 | DISCHARGE
Start: 2021-07-22

## 2021-07-22 RX ORDER — SIMETHICONE 80 MG/1
1 TABLET, CHEWABLE ORAL
Qty: 0 | Refills: 0 | DISCHARGE
Start: 2021-07-22

## 2021-07-22 RX ORDER — PANTOPRAZOLE SODIUM 20 MG/1
0 TABLET, DELAYED RELEASE ORAL
Qty: 0 | Refills: 0 | DISCHARGE
Start: 2021-07-22

## 2021-07-22 RX ORDER — LANOLIN ALCOHOL/MO/W.PET/CERES
1 CREAM (GRAM) TOPICAL
Qty: 0 | Refills: 0 | DISCHARGE
Start: 2021-07-22

## 2021-07-22 RX ORDER — POLYETHYLENE GLYCOL 3350 17 G/17G
17 POWDER, FOR SOLUTION ORAL
Qty: 0 | Refills: 0 | DISCHARGE
Start: 2021-07-22

## 2021-07-22 RX ORDER — ACETAMINOPHEN 500 MG
2 TABLET ORAL
Qty: 0 | Refills: 0 | DISCHARGE
Start: 2021-07-22

## 2021-07-22 RX ADMIN — Medication 650 MILLIGRAM(S): at 09:41

## 2021-07-22 RX ADMIN — QUETIAPINE FUMARATE 25 MILLIGRAM(S): 200 TABLET, FILM COATED ORAL at 21:58

## 2021-07-22 RX ADMIN — SIMETHICONE 80 MILLIGRAM(S): 80 TABLET, CHEWABLE ORAL at 13:32

## 2021-07-22 RX ADMIN — Medication 3: at 07:40

## 2021-07-22 RX ADMIN — Medication 5 MILLIGRAM(S): at 21:58

## 2021-07-22 RX ADMIN — Medication 4 MILLIGRAM(S): at 00:25

## 2021-07-22 RX ADMIN — Medication 4: at 16:52

## 2021-07-22 RX ADMIN — Medication 25 UNIT(S): at 16:53

## 2021-07-22 RX ADMIN — PANTOPRAZOLE SODIUM 40 MILLIGRAM(S): 20 TABLET, DELAYED RELEASE ORAL at 05:12

## 2021-07-22 RX ADMIN — Medication 650 MILLIGRAM(S): at 03:45

## 2021-07-22 RX ADMIN — Medication 81 MILLIGRAM(S): at 11:07

## 2021-07-22 RX ADMIN — Medication 25 UNIT(S): at 11:48

## 2021-07-22 RX ADMIN — Medication 3: at 11:48

## 2021-07-22 RX ADMIN — Medication 25 UNIT(S): at 07:41

## 2021-07-22 RX ADMIN — Medication 650 MILLIGRAM(S): at 10:45

## 2021-07-22 RX ADMIN — INSULIN GLARGINE 50 UNIT(S): 100 INJECTION, SOLUTION SUBCUTANEOUS at 21:58

## 2021-07-22 RX ADMIN — TAMSULOSIN HYDROCHLORIDE 0.4 MILLIGRAM(S): 0.4 CAPSULE ORAL at 21:58

## 2021-07-22 RX ADMIN — Medication 4 MILLIGRAM(S): at 11:07

## 2021-07-22 RX ADMIN — Medication 2: at 21:30

## 2021-07-22 RX ADMIN — Medication 4 MILLIGRAM(S): at 17:12

## 2021-07-22 RX ADMIN — Medication 650 MILLIGRAM(S): at 02:44

## 2021-07-22 RX ADMIN — FINASTERIDE 5 MILLIGRAM(S): 5 TABLET, FILM COATED ORAL at 11:08

## 2021-07-22 RX ADMIN — Medication 4 MILLIGRAM(S): at 05:12

## 2021-07-22 RX ADMIN — Medication 300 MILLIGRAM(S): at 11:08

## 2021-07-22 NOTE — PROGRESS NOTE ADULT - ASSESSMENT
72 y/o M w/ PMHx of CVA this past August and got TPA per family member, DM, BPH with obstruction s/p escamilla catheter, s/p ERCP w Sphincterectomy recent admission to [facility] for sepsis  Hodgkin lymphoma was not offered any chemo and was placed on hospice.     now presenting with weakness and FTT.   pt denies cp / SOB / palpitations   no focal neuro complaints .. at baseline RLE weakness   no N/V   had one episode of diarrhea   no urinary sx  abdominal pain, diffuse, but worse on R side.   Afib RVR overnight. Was asymptomatic   no known history of this as per patient

## 2021-07-22 NOTE — PROGRESS NOTE ADULT - ASSESSMENT
70 y/o M with PMH of CVA, DM, BPH with obstruction s/p Sauceda catheter, s/p ERCP w Sphincteretomy, recent admission to [facility] for sepsis, Hodgkin lymphoma dx 2020 - was not offered any chemo and was placed on hospice, DVT 8/2020. Presenting with weakness and FTT. Pt and family opted for treatment of lymphoma - s/p Opdivo 7/6. Called to consult for CT chest findings 6/28 with patchy GGO and scattered nodular opacities throughout all lobes of the lungs. Currently breathing comfortably on RA.

## 2021-07-22 NOTE — PROGRESS NOTE ADULT - ASSESSMENT
Hodgkin's lymphoma had Opdivo x 1.  Next dose due in a couple of weeks.    pancytopenia due to lymphoma.  Counts relatively stable and adequate at this time so just monitor for now.

## 2021-07-22 NOTE — PROGRESS NOTE ADULT - ASSESSMENT
70 y/o M w/ PMHx of CVA this past August and got TPA per family member, DM, BPH with obstruction s/p escamilla catheter, s/p ERCP w Sphincterectomy recent admission to [facility] for sepsis  Hodgkin lymphoma was not offered any chemo and was placed on hospice.     now presenting with weakness and FTT.   pt denies cp / SOB / palpitations   no focal neuro complaints .. at baseline RLE weakness   no N/V   had one episode of diarrhea   no urinary sx  abdominal pain, diffuse, but worse on R side.     - Failure to thrive: cultures neg  CT chest repeated : imprving   nutrition consult   encourage PO intake: dysphagia 3 based on MBS results     - lymphoma: d/w Dr. Larios: s/p immunotherapy .. will plan second dose on aug /3   overall seemed to have responded somewhat to first dose     - DM with hyperglycemia : improved now worse with steroids   fu with endo     - anemia: monitor H/H post transfusion, stable.     - Fever: doubt infectious etiology   likely due to immunotherapy/lymphoma. culture: neg   abx dced. s/p IVF, ID f/u appreciated.   elevated fungitell noted , T chest improved / d/w Dr muñiz , repeat levels however low suspecion for fungal infection     - voice changes: CT neck : Asymmetric enlargement of the left palatine tonsil, suspicious for lymphomatous involvement given history. Correlate with direct visualization.  ENT had eval pt: no gross pathology on visualization   S/S eval: MBS done. speech: dysphagia 3    - Tachycardia: [facility] duplex neg for DVT   rate stable. noted 7 beats NSVT.  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 being expedited   cont  steroids : will cont tape r  fu LP cytology   appreciate neuro onc input   planned chemo early next week     - Afib : now in sinus : monitor   appreicate cardio input   no AC at this time and no AVNB at this time     - urinary retention : cont escamilla     plan to dc tmmrw

## 2021-07-22 NOTE — PROGRESS NOTE ADULT - SUBJECTIVE AND OBJECTIVE BOX
CC: re consult for elevated lab value fungitell level     Patient is awake and alert, he is lying in bed       REVIEW OF SYSTEMS:  All other review of systems negative (Comprehensive ROS)    Antimicrobials Day #  :    Other Medications Reviewed    Vital Signs Last 24 Hrs  T(C): 36.7 (22 Jul 2021 11:53), Max: 36.7 (21 Jul 2021 23:53)  T(F): 98.1 (22 Jul 2021 11:53), Max: 98.1 (22 Jul 2021 11:53)  HR: 76 (22 Jul 2021 11:53) (53 - 86)  BP: 128/70 (22 Jul 2021 11:53) (121/72 - 135/70)  BP(mean): --  RR: 18 (22 Jul 2021 11:53) (18 - 18)  SpO2: 99% (22 Jul 2021 11:53) (99% - 100%)    PHYSICAL EXAM:  General: alert, no acute distress  Eyes:  anicteric, no conjunctival injection, no discharge  Oropharynx: no lesions or injection 	  Lungs: clear to auscultation  Heart: regular rate and rhythm; no murmur, rubs or gallops  Abdomen: soft, nondistended, nontender, without mass or organomegaly  Skin: no lesions  Extremities: no clubbing, cyanosis, or edema  Neurologic: alert, oriented, moves all extremities    LAB RESULTS:                                                8.9    2.87  )-----------( 58       ( 22 Jul 2021 06:58 )             28.5               MICROBIOLOGY:  RECENT CULTURES:      RADIOLOGY REVIEWED:    EXAM:  MR SPINE LUMBAR WAW IC                            PROCEDURE DATE:  07/11/2021            INTERPRETATION:  Clinical indications: Lower extremity weakness    MRI of the lumbar spine was performed using sagittal T1-T2 and T2-weighted sequence fat suppression. Axial T1 and T2-weighted sequences were performed    Loss of the normal lumbar lordosis seen    Mild scoliosis is seen.    Disc desiccation is seen involving the L2-3 L3-4 L4-5 and L5-S1 levels which are secondary to degenerative changes.    There is evidence of abnormal T1 prolongation seen involving the lower thoracic lumbar sacral vertebral bodies as well as both iliac bones. This finding could be compatible with a marrow infiltrative process, possibly secondary to patient's lymphoma though other possibilities include metastasis, multiple myeloma and severe anemia must be considered.    L1-2: Hypertrophic facet joint changes are seen bilaterally. No significant, as of the spinal canal or either neural foramen.    L2-3: Bilateral hypertrophic facet joint changes seen. No significant compromise spinal canal or either neural foramen    L3-4: Disc bulge and bilateral hypertrophic facet joint change. No significant, as of the spinal canal or either neural foramen    L4-5: Disc bulge and bilateral hypertrophic facet joint changes seen. Mild to moderate narrowing of the spinal canal. Mild to moderate narrowing of both neural foramen.    L5-S1: Bilateral hypertrophic facet joint changes seen. Mild narrowing of the left neural foramen and mild to moderate right neural foramen    There is evidence of abnormal area of enhancement seen involving the dorsal aspect of the right thecal sac at the level of the L4-5 disc. This best seen on series 11 image 21. This finding measures approximately 4.7 mm. This could be related to patient's known lymphoma though the possibility of drop metastasis as well as other neoplastic etiologies cannot entirely excluded. Correlation with CSF can also be done for further evaluation. Continued close interval follow-up is recommended.    The conus appears to end at top of L2.    Bilateral renal cysts are seen.    Evaluation of the paraspinal soft tissues appear normal.    IMPRESSION: Abnormal signal seen involving the lower thoracic lumbarsacral vertebral bodies as well as both iliac bones. This is likely compatible with a marrow infiltrative process as described above.    Abnormal enhancing lesion is seen involving the dorsal aspect of the right thecal sac as described above.    Degenerative changes as described above.    --- End of Report ---                NGA MOSES MD; Attending Radiologist  This document has been electronically signed. Jul 12 2021  9:10AM            Assessment:

## 2021-07-22 NOTE — PROGRESS NOTE ADULT - SUBJECTIVE AND OBJECTIVE BOX
Subjective: Patient seen and examined. No new events except as noted.     REVIEW OF SYSTEMS:    CONSTITUTIONAL: + weakness, fevers or chills  EYES/ENT: No visual changes;  No vertigo or throat pain   NECK: No pain or stiffness  RESPIRATORY: No cough, wheezing, hemoptysis; No shortness of breath  CARDIOVASCULAR: No chest pain or palpitations  GASTROINTESTINAL: No abdominal or epigastric pain. No nausea, vomiting, or hematemesis; No diarrhea or constipation. No melena or hematochezia.  GENITOURINARY: No dysuria, frequency or hematuria  NEUROLOGICAL: No numbness or weakness  SKIN: No itching, burning, rashes, or lesions   All other review of systems is negative unless indicated above.    MEDICATIONS:  MEDICATIONS  (STANDING):  allopurinol 300 milliGRAM(s) Oral daily  aspirin  chewable 81 milliGRAM(s) Oral daily  dexAMETHasone  Injectable 4 milliGRAM(s) IV Push every 6 hours  dextrose 40% Gel 15 Gram(s) Oral once  dextrose 5%. 1000 milliLiter(s) (50 mL/Hr) IV Continuous <Continuous>  dextrose 5%. 1000 milliLiter(s) (100 mL/Hr) IV Continuous <Continuous>  dextrose 50% Injectable 25 Gram(s) IV Push once  dextrose 50% Injectable 12.5 Gram(s) IV Push once  dextrose 50% Injectable 25 Gram(s) IV Push once  finasteride 5 milliGRAM(s) Oral daily  glucagon  Injectable 1 milliGRAM(s) IntraMuscular once  insulin glargine Injectable (LANTUS) 50 Unit(s) SubCutaneous at bedtime  insulin lispro (ADMELOG) corrective regimen sliding scale   SubCutaneous three times a day before meals  insulin lispro (ADMELOG) corrective regimen sliding scale   SubCutaneous at bedtime  insulin lispro Injectable (ADMELOG) 25 Unit(s) SubCutaneous three times a day before meals  melatonin 5 milliGRAM(s) Oral at bedtime  pantoprazole   Suspension 40 milliGRAM(s) Oral daily  QUEtiapine 25 milliGRAM(s) Oral at bedtime  senna 2 Tablet(s) Oral at bedtime  simethicone 80 milliGRAM(s) Chew three times a day  tamsulosin 0.4 milliGRAM(s) Oral at bedtime      PHYSICAL EXAM:  T(C): 36.7 (07-22-21 @ 08:32), Max: 36.7 (07-21-21 @ 23:53)  HR: 86 (07-22-21 @ 08:32) (53 - 86)  BP: 121/72 (07-22-21 @ 08:32) (121/72 - 135/70)  RR: 18 (07-22-21 @ 08:32) (18 - 18)  SpO2: 100% (07-22-21 @ 08:32) (99% - 100%)  Wt(kg): --  I&O's Summary    21 Jul 2021 07:01  -  22 Jul 2021 07:00  --------------------------------------------------------  IN: 720 mL / OUT: 2975 mL / NET: -2255 mL          Appearance: NAD  HEENT:   Dry oral mucosa, PERRL, EOMI	  Lymphatic: No lymphadenopathy , no edema  Cardiovascular: Normal S1 S2, No JVD, No murmurs , Peripheral pulses palpable 2+ bilaterally  Respiratory: Lungs clear to auscultation, normal effort 	  Gastrointestinal:  Soft, Non-tender, + BS	  Skin: No rashes, No ecchymoses, No cyanosis, warm to touch  Musculoskeletal: Normal range of motion, normal strength  Psychiatry:  Mood & affect appropriate  Ext: No edema      LABS:    CARDIAC MARKERS:                                8.9    2.87  )-----------( 58       ( 22 Jul 2021 06:58 )             28.5     07-22    134<L>  |  97  |  18  ----------------------------<  302<H>  4.3   |  23  |  0.40<L>    Ca    9.0      22 Jul 2021 06:58      proBNP:   Lipid Profile:   HgA1c:   TSH:             TELEMETRY: 	SR SB    ECG:  	  RADIOLOGY:   DIAGNOSTIC TESTING:  [ ] Echocardiogram:  [ ]  Catheterization:  [ ] Stress Test:    OTHER:

## 2021-07-22 NOTE — PROGRESS NOTE ADULT - PROBLEM SELECTOR PLAN 1
Will continue current insulin regimen for now. Will continue monitoring FS and FU, will adjust insulin dose as steroids are tapered/dc.  Patient previously on Janumet, he has large insulin requirement at this time due to high-dose steroids. Effects of steroids can linger several weeks, patient would benefit from insulin as outpatient and is agreeable.  Suggest DC home on current basal bolus insulin regimen, endo FU 2 weeks.  Discussed plan with patient and family at bedside. Counseled that blood sugars will start trending down as steroids are tapered/dc. Insulin dose will have to be adjusted based on blood sugars.   Counseled for compliance with consistent low-carb diet.

## 2021-07-22 NOTE — PROGRESS NOTE ADULT - SUBJECTIVE AND OBJECTIVE BOX
Date of service: 07-22-21 @ 22:54      Patient is a 71y old  Male who presents with a chief complaint of HD (18 Jul 2021 12:32)                                                               INTERVAL HPI/OVERNIGHT EVENTS:    REVIEW OF SYSTEMS:     CONSTITUTIONAL: No weakness, fevers or chills  EYES/ENT: No visual changes , no ear ache   NECK: No pain or stiffness  RESPIRATORY: No cough, wheezing,  No shortness of breath  CARDIOVASCULAR: No chest pain or palpitations  GASTROINTESTINAL: No abdominal pain  . No nausea, vomiting, or hematemesis; No diarrhea or constipation. No melena or hematochezia.  GENITOURINARY: No dysuria, frequency or hematuria  NEUROLOGICAL: No numbness or weakness  SKIN: No itching, burning, rashes, or lesions                                                                                                                                                                                                                                                                                 Medications:  MEDICATIONS  (STANDING):  allopurinol 300 milliGRAM(s) Oral daily  aspirin  chewable 81 milliGRAM(s) Oral daily  dexAMETHasone  Injectable 4 milliGRAM(s) IV Push every 6 hours  dextrose 40% Gel 15 Gram(s) Oral once  dextrose 5%. 1000 milliLiter(s) (50 mL/Hr) IV Continuous <Continuous>  dextrose 5%. 1000 milliLiter(s) (100 mL/Hr) IV Continuous <Continuous>  dextrose 50% Injectable 25 Gram(s) IV Push once  dextrose 50% Injectable 12.5 Gram(s) IV Push once  dextrose 50% Injectable 25 Gram(s) IV Push once  finasteride 5 milliGRAM(s) Oral daily  glucagon  Injectable 1 milliGRAM(s) IntraMuscular once  insulin glargine Injectable (LANTUS) 50 Unit(s) SubCutaneous at bedtime  insulin lispro (ADMELOG) corrective regimen sliding scale   SubCutaneous three times a day before meals  insulin lispro (ADMELOG) corrective regimen sliding scale   SubCutaneous at bedtime  insulin lispro Injectable (ADMELOG) 25 Unit(s) SubCutaneous three times a day before meals  melatonin 5 milliGRAM(s) Oral at bedtime  pantoprazole   Suspension 40 milliGRAM(s) Oral daily  QUEtiapine 25 milliGRAM(s) Oral at bedtime  senna 2 Tablet(s) Oral at bedtime  simethicone 80 milliGRAM(s) Chew three times a day  tamsulosin 0.4 milliGRAM(s) Oral at bedtime    MEDICATIONS  (PRN):  acetaminophen   Tablet .. 650 milliGRAM(s) Oral every 6 hours PRN Temp greater or equal to 38C (100.4F), Moderate Pain (4 - 6)  bisacodyl 5 milliGRAM(s) Oral every 12 hours PRN Constipation  polyethylene glycol 3350 17 Gram(s) Oral daily PRN Constipation       Allergies    No Known Allergies    Intolerances      Vital Signs Last 24 Hrs  T(C): 36.6 (22 Jul 2021 16:00), Max: 36.7 (21 Jul 2021 23:53)  T(F): 97.9 (22 Jul 2021 16:00), Max: 98.1 (22 Jul 2021 11:53)  HR: 83 (22 Jul 2021 16:00) (53 - 86)  BP: 121/73 (22 Jul 2021 16:00) (121/72 - 128/70)  BP(mean): --  RR: 18 (22 Jul 2021 16:00) (18 - 18)  SpO2: 100% (22 Jul 2021 16:00) (99% - 100%)  CAPILLARY BLOOD GLUCOSE      POCT Blood Glucose.: 295 mg/dL (22 Jul 2021 21:12)  POCT Blood Glucose.: 318 mg/dL (22 Jul 2021 16:40)  POCT Blood Glucose.: 287 mg/dL (22 Jul 2021 11:29)  POCT Blood Glucose.: 280 mg/dL (22 Jul 2021 07:15)      07-21 @ 07:01  -  07-22 @ 07:00  --------------------------------------------------------  IN: 720 mL / OUT: 2975 mL / NET: -2255 mL    07-22 @ 07:01  -  07-22 @ 22:54  --------------------------------------------------------  IN: 480 mL / OUT: 2000 mL / NET: -1520 mL      Physical Exam:    Daily     Daily   General:  Well appearing, NAD, not cachetic  HEENT:  Nonicteric, PERRLA  CV:  RRR, S1S2   Lungs:  CTA B/L, no wheezes, rales, rhonchi  Abdomen:  Soft, non-tender, no distended, positive BS  Extremities:  2+ pulses, no c/c, no edema  Skin:  Warm and dry, no rashes  [place]                                                                                                                                                                                                                                                                                            LABS:                               8.9    2.87  )-----------( 58       ( 22 Jul 2021 06:58 )             28.5                      07-22    134<L>  |  97  |  18  ----------------------------<  302<H>  4.3   |  23  |  0.40<L>    Ca    9.0      22 Jul 2021 06:58                         RADIOLOGY & ADDITIONAL TESTS         I personally reviewed: [  ]EKG   [  ]CXR    [  ] CT      A/P:         Discussed with :     [facility] consultants' Notes   Time spent :   Date of service: 07-22-21 @ 22:54      Patient is a 71y old  Male who presents with a chief complaint of HD (18 Jul 2021 12:32)                                                               INTERVAL HPI/OVERNIGHT EVENTS:    REVIEW OF SYSTEMS:     CONSTITUTIONAL: No weakness, fevers or chills  RESPIRATORY: No cough, wheezing,  No shortness of breath  CARDIOVASCULAR: No chest pain or palpitations  GASTROINTESTINAL: No abdominal pain  . No nausea, vomiting, or hematemesis; No diarrhea or constipation. No melena or hematochezia.  GENITOURINARY: No dysuria, frequency or hematuria  NEUROLOGICAL: No numbness or weakness  SKIN: No itching, burning, rashes, or lesions                                                                                                                                                                                                                                                                                 Medications:  MEDICATIONS  (STANDING):  allopurinol 300 milliGRAM(s) Oral daily  aspirin  chewable 81 milliGRAM(s) Oral daily  dexAMETHasone  Injectable 4 milliGRAM(s) IV Push every 6 hours  dextrose 40% Gel 15 Gram(s) Oral once  dextrose 5%. 1000 milliLiter(s) (50 mL/Hr) IV Continuous <Continuous>  dextrose 5%. 1000 milliLiter(s) (100 mL/Hr) IV Continuous <Continuous>  dextrose 50% Injectable 25 Gram(s) IV Push once  dextrose 50% Injectable 12.5 Gram(s) IV Push once  dextrose 50% Injectable 25 Gram(s) IV Push once  finasteride 5 milliGRAM(s) Oral daily  glucagon  Injectable 1 milliGRAM(s) IntraMuscular once  insulin glargine Injectable (LANTUS) 50 Unit(s) SubCutaneous at bedtime  insulin lispro (ADMELOG) corrective regimen sliding scale   SubCutaneous three times a day before meals  insulin lispro (ADMELOG) corrective regimen sliding scale   SubCutaneous at bedtime  insulin lispro Injectable (ADMELOG) 25 Unit(s) SubCutaneous three times a day before meals  melatonin 5 milliGRAM(s) Oral at bedtime  pantoprazole   Suspension 40 milliGRAM(s) Oral daily  QUEtiapine 25 milliGRAM(s) Oral at bedtime  senna 2 Tablet(s) Oral at bedtime  simethicone 80 milliGRAM(s) Chew three times a day  tamsulosin 0.4 milliGRAM(s) Oral at bedtime    MEDICATIONS  (PRN):  acetaminophen   Tablet .. 650 milliGRAM(s) Oral every 6 hours PRN Temp greater or equal to 38C (100.4F), Moderate Pain (4 - 6)  bisacodyl 5 milliGRAM(s) Oral every 12 hours PRN Constipation  polyethylene glycol 3350 17 Gram(s) Oral daily PRN Constipation       Allergies    No Known Allergies    Intolerances      Vital Signs Last 24 Hrs  T(C): 36.6 (22 Jul 2021 16:00), Max: 36.7 (21 Jul 2021 23:53)  T(F): 97.9 (22 Jul 2021 16:00), Max: 98.1 (22 Jul 2021 11:53)  HR: 83 (22 Jul 2021 16:00) (53 - 86)  BP: 121/73 (22 Jul 2021 16:00) (121/72 - 128/70)  BP(mean): --  RR: 18 (22 Jul 2021 16:00) (18 - 18)  SpO2: 100% (22 Jul 2021 16:00) (99% - 100%)  CAPILLARY BLOOD GLUCOSE      POCT Blood Glucose.: 295 mg/dL (22 Jul 2021 21:12)  POCT Blood Glucose.: 318 mg/dL (22 Jul 2021 16:40)  POCT Blood Glucose.: 287 mg/dL (22 Jul 2021 11:29)  POCT Blood Glucose.: 280 mg/dL (22 Jul 2021 07:15)      07-21 @ 07:01  -  07-22 @ 07:00  --------------------------------------------------------  IN: 720 mL / OUT: 2975 mL / NET: -2255 mL    07-22 @ 07:01  -  07-22 @ 22:54  --------------------------------------------------------  IN: 480 mL / OUT: 2000 mL / NET: -1520 mL      Physical Exam:    Daily     Daily   General:  Well appearing, NAD, not cachetic  HEENT:  Nonicteric, PERRLA  CV:  RRR, S1S2   Lungs:  CTA B/L, no wheezes, rales, rhonchi  Abdomen:  Soft, non-tender, no distended, positive BS  Extremities: no edema   andi                                                                                                                                                                                                                                                                                            LABS:                               8.9    2.87  )-----------( 58       ( 22 Jul 2021 06:58 )             28.5                      07-22    134<L>  |  97  |  18  ----------------------------<  302<H>  4.3   |  23  |  0.40<L>    Ca    9.0      22 Jul 2021 06:58                         RADIOLOGY & ADDITIONAL TESTS         I personally reviewed: [  ]EKG   [  ]CXR    [  ] CT      A/P:         Discussed with :     [employer] consultants' Notes   Time spent :

## 2021-07-22 NOTE — PROGRESS NOTE ADULT - ASSESSMENT
Assessment  DMT2: 71y Male with DM T2 with hyperglycemia, A1C 8.8%, was on oral meds/Janumet at home, admitted with weakness/fatigue and hyperglycemia, now on large-dose basal bolus insulin, blood sugars fluctuating/running high and not at target in the setting of steroid management, no hypoglycemic episodes. Patient is eating meals, appears alert and comfortable, remains on dexamethasone, planning DC.  Lymphoma: on medications, monitored, FU Heme/Onc.  Anemia: stable, monitored.      Shahriar Kahn MD  Cell: [phone]  Office: [phone]     Assessment  DMT2: 71y Male with DM T2 with hyperglycemia, A1C 8.8%, was on oral meds/Janumet at home, admitted with weakness/fatigue and hyperglycemia, now on large-dose basal bolus insulin, blood sugars fluctuating/running high and not at target in the setting of steroid management, no hypoglycemic episodes. Patient is eating meals, appears alert and comfortable, remains on dexamethasone, planning DC to rehab on 2 week steroid taper.  Lymphoma: on medications, monitored, FU Heme/Onc.  Anemia: stable, monitored.      Shahriar Kahn MD  Cell: [phone]  Office: [phone]     Assessment  DMT2: 71y Male with DM T2 with hyperglycemia, A1C 8.8%, was on oral meds/Janumet at home, admitted with weakness/fatigue and hyperglycemia, now on large-dose basal bolus insulin, blood sugars fluctuating/running high and not  at target in the setting of steroid management, no hypoglycemic episodes. Patient is eating meals, appears alert and comfortable, remains on dexamethasone, planning DC to rehab on 2 week steroid taper.  Lymphoma: on medications, monitored, FU Heme/Onc.  Anemia: stable, monitored.      Shahriar Kahn MD  Cell: [phone]  Office: [phone]

## 2021-07-22 NOTE — PROGRESS NOTE ADULT - SUBJECTIVE AND OBJECTIVE BOX
Patient is a 71y old  Male who presents with a chief complaint of HD (18 Jul 2021 12:32)      Medication:   acetaminophen   Tablet .. 650 milliGRAM(s) Oral every 6 hours PRN  allopurinol 300 milliGRAM(s) Oral daily  aspirin  chewable 81 milliGRAM(s) Oral daily  bisacodyl 5 milliGRAM(s) Oral every 12 hours PRN  dexAMETHasone  Injectable 4 milliGRAM(s) IV Push every 6 hours  dextrose 40% Gel 15 Gram(s) Oral once  dextrose 5%. 1000 milliLiter(s) IV Continuous <Continuous>  dextrose 5%. 1000 milliLiter(s) IV Continuous <Continuous>  dextrose 50% Injectable 25 Gram(s) IV Push once  dextrose 50% Injectable 12.5 Gram(s) IV Push once  dextrose 50% Injectable 25 Gram(s) IV Push once  finasteride 5 milliGRAM(s) Oral daily  glucagon  Injectable 1 milliGRAM(s) IntraMuscular once  insulin glargine Injectable (LANTUS) 50 Unit(s) SubCutaneous at bedtime  insulin lispro (ADMELOG) corrective regimen sliding scale   SubCutaneous three times a day before meals  insulin lispro (ADMELOG) corrective regimen sliding scale   SubCutaneous at bedtime  insulin lispro Injectable (ADMELOG) 25 Unit(s) SubCutaneous three times a day before meals  melatonin 5 milliGRAM(s) Oral at bedtime  pantoprazole   Suspension 40 milliGRAM(s) Oral daily  polyethylene glycol 3350 17 Gram(s) Oral daily PRN  QUEtiapine 25 milliGRAM(s) Oral at bedtime  senna 2 Tablet(s) Oral at bedtime  simethicone 80 milliGRAM(s) Chew three times a day  tamsulosin 0.4 milliGRAM(s) Oral at bedtime      Physical exam    T(C): 36.6 (07-22-21 @ 16:00), Max: 36.7 (07-21-21 @ 23:53)  HR: 83 (07-22-21 @ 16:00) (53 - 86)  BP: 121/73 (07-22-21 @ 16:00) (121/72 - 135/70)  RR: 18 (07-22-21 @ 16:00) (18 - 18)  SpO2: 100% (07-22-21 @ 16:00) (99% - 100%)  Wt(kg): --    resting NAD  Resp non labored    Labs                        8.9    2.87  )-----------( 58       ( 22 Jul 2021 06:58 )             28.5       07-22    134<L>  |  97  |  18  ----------------------------<  302<H>  4.3   |  23  |  0.40<L>    Ca    9.0      22 Jul 2021 06:58            [id number]

## 2021-07-22 NOTE — PROGRESS NOTE ADULT - SUBJECTIVE AND OBJECTIVE BOX
Follow-up Pulm Progress Note    No new respiratory events overnight.  Denies SOB/CP.     Medications:  MEDICATIONS  (STANDING):  allopurinol 300 milliGRAM(s) Oral daily  aspirin  chewable 81 milliGRAM(s) Oral daily  dexAMETHasone  Injectable 4 milliGRAM(s) IV Push every 6 hours  dextrose 40% Gel 15 Gram(s) Oral once  dextrose 5%. 1000 milliLiter(s) (50 mL/Hr) IV Continuous <Continuous>  dextrose 5%. 1000 milliLiter(s) (100 mL/Hr) IV Continuous <Continuous>  dextrose 50% Injectable 25 Gram(s) IV Push once  dextrose 50% Injectable 12.5 Gram(s) IV Push once  dextrose 50% Injectable 25 Gram(s) IV Push once  finasteride 5 milliGRAM(s) Oral daily  glucagon  Injectable 1 milliGRAM(s) IntraMuscular once  insulin glargine Injectable (LANTUS) 50 Unit(s) SubCutaneous at bedtime  insulin lispro (ADMELOG) corrective regimen sliding scale   SubCutaneous three times a day before meals  insulin lispro (ADMELOG) corrective regimen sliding scale   SubCutaneous at bedtime  insulin lispro Injectable (ADMELOG) 25 Unit(s) SubCutaneous three times a day before meals  melatonin 5 milliGRAM(s) Oral at bedtime  pantoprazole   Suspension 40 milliGRAM(s) Oral daily  QUEtiapine 25 milliGRAM(s) Oral at bedtime  senna 2 Tablet(s) Oral at bedtime  simethicone 80 milliGRAM(s) Chew three times a day  tamsulosin 0.4 milliGRAM(s) Oral at bedtime    MEDICATIONS  (PRN):  acetaminophen   Tablet .. 650 milliGRAM(s) Oral every 6 hours PRN Temp greater or equal to 38C (100.4F), Moderate Pain (4 - 6)  bisacodyl 5 milliGRAM(s) Oral every 12 hours PRN Constipation  polyethylene glycol 3350 17 Gram(s) Oral daily PRN Constipation          Vital Signs Last 24 Hrs  T(C): 36.7 (22 Jul 2021 08:32), Max: 36.7 (21 Jul 2021 23:53)  T(F): 98 (22 Jul 2021 08:32), Max: 98 (21 Jul 2021 23:53)  HR: 86 (22 Jul 2021 08:32) (53 - 86)  BP: 121/72 (22 Jul 2021 08:32) (121/72 - 135/70)  BP(mean): --  RR: 18 (22 Jul 2021 08:32) (18 - 18)  SpO2: 100% (22 Jul 2021 08:32) (99% - 100%)          07-21 @ 07:01  -  07-22 @ 07:00  --------------------------------------------------------  IN: 720 mL / OUT: 2975 mL / NET: -2255 mL          LABS:                        8.9    2.87  )-----------( 58       ( 22 Jul 2021 06:58 )             28.5     07-22    134<L>  |  97  |  18  ----------------------------<  302<H>  4.3   |  23  |  0.40<L>    Ca    9.0      22 Jul 2021 06:58            CAPILLARY BLOOD GLUCOSE      POCT Blood Glucose.: 280 mg/dL (22 Jul 2021 07:15)      Physical Examination:  PULM: Clear to auscultation bilaterally, no significant sputum production  CVS: S1, S2 heard    RADIOLOGY REVIEWED  CT chest: < from: CT Chest w/ IV Cont (07.19.21 @ 17:31) >    FINDINGS:  CHEST:  LUNGS AND LARGE AIRWAYS: Patent central airways. There is a 4 mm groundglass nodule (series 4, image 171). There is an unchanged 1 cm cystic nodule (series 4, image 223). Interval decrease in right lower lobe nodule at the costophrenic angle, now measuring 1.3 x 0.8 cm (series 4, image 338). Other prior mentioned solid and groundglass nodules are resolved. Mild bilateral subsegmental atelectasis.  PLEURA: No pleural effusion.  VESSELS: Coronary artery calcifications.  HEART: Heart size is normal. No pericardial effusion.  MEDIASTINUM AND SARTHAK: No lymphadenopathy.  CHEST WALL AND LOWER NECK: Within normal limits.    ABDOMEN AND PELVIS:  LIVER: Within normal limits.  BILE DUCTS: Normal caliber.  GALLBLADDER: Contracted gallbladder, limiting evaluation.  SPLEEN: Within normal limits.  PANCREAS: Within normal limits.  ADRENALS: Within normal limits.  KIDNEYS/URETERS: Duplicated left collecting system. No hydronephrosis. Right renal cyst.    BLADDER: Sauceda catheter.  REPRODUCTIVE ORGANS: Prostate is enlarged.    BOWEL: No bowel obstruction. Appendix is not visualized. No evidence of inflammation in the pericecal region.  PERITONEUM: No ascites.  VESSELS: Within normal limits.  RETROPERITONEUM/LYMPH NODES: Periportal and retroperitoneal lymphadenopathy. A respective conglomerate periportal lymph node measures 3.8 x 3.8 cm (series 3, image 127), previously 5.1 x 4.4. A respective left periaortic lymph node  (3;190) measures 1.8 x 1.1 cm, previously 1.5 x 0.8 cm.  ABDOMINAL WALL: Left inguinal hernia containing fat and nonobstructed sigmoid colon.  BONES: Degenerative changes.    IMPRESSION:  Interval improvement in nodular lung opacities.    Mild improvement in abdominal lymphadenopathy, consistent with patient's history of lymphoma.        --- End of Report ---    < end of copied text >

## 2021-07-22 NOTE — PROGRESS NOTE ADULT - SUBJECTIVE AND OBJECTIVE BOX
Chief complaint  Patient is a 71y old  Male who presents with a chief complaint of HD (18 Jul 2021 12:32)   Review of systems  Patient awake in chair, appears alert and comfortable, no hypoglycemic episodes.    Labs and Fingersticks  CAPILLARY BLOOD GLUCOSE      POCT Blood Glucose.: 287 mg/dL (22 Jul 2021 11:29)  POCT Blood Glucose.: 280 mg/dL (22 Jul 2021 07:15)  POCT Blood Glucose.: 292 mg/dL (21 Jul 2021 21:13)  POCT Blood Glucose.: 276 mg/dL (21 Jul 2021 16:51)    Medications  MEDICATIONS  (STANDING):  allopurinol 300 milliGRAM(s) Oral daily  aspirin  chewable 81 milliGRAM(s) Oral daily  dexAMETHasone  Injectable 4 milliGRAM(s) IV Push every 6 hours  dextrose 40% Gel 15 Gram(s) Oral once  dextrose 5%. 1000 milliLiter(s) (50 mL/Hr) IV Continuous <Continuous>  dextrose 5%. 1000 milliLiter(s) (100 mL/Hr) IV Continuous <Continuous>  dextrose 50% Injectable 25 Gram(s) IV Push once  dextrose 50% Injectable 12.5 Gram(s) IV Push once  dextrose 50% Injectable 25 Gram(s) IV Push once  finasteride 5 milliGRAM(s) Oral daily  glucagon  Injectable 1 milliGRAM(s) IntraMuscular once  insulin glargine Injectable (LANTUS) 50 Unit(s) SubCutaneous at bedtime  insulin lispro (ADMELOG) corrective regimen sliding scale   SubCutaneous three times a day before meals  insulin lispro (ADMELOG) corrective regimen sliding scale   SubCutaneous at bedtime  insulin lispro Injectable (ADMELOG) 25 Unit(s) SubCutaneous three times a day before meals  melatonin 5 milliGRAM(s) Oral at bedtime  pantoprazole   Suspension 40 milliGRAM(s) Oral daily  QUEtiapine 25 milliGRAM(s) Oral at bedtime  senna 2 Tablet(s) Oral at bedtime  simethicone 80 milliGRAM(s) Chew three times a day  tamsulosin 0.4 milliGRAM(s) Oral at bedtime      Physical Exam  General: Patient comfortable in bed  Vital Signs Last 12 Hrs  T(F): 98.1 (07-22-21 @ 11:53), Max: 98.1 (07-22-21 @ 11:53)  HR: 76 (07-22-21 @ 11:53) (76 - 86)  BP: 128/70 (07-22-21 @ 11:53) (121/72 - 128/70)  BP(mean): --  RR: 18 (07-22-21 @ 11:53) (18 - 18)  SpO2: 99% (07-22-21 @ 11:53) (99% - 100%)         Chief complaint  Patient is a 71y old  Male who presents with a chief complaint of HD (18 Jul 2021 12:32)   Review of systems  Patient awake in chair, appears alert and comfortable, no hypoglycemic episodes.    Labs and Fingersticks  CAPILLARY BLOOD GLUCOSE      POCT Blood Glucose.: 287 mg/dL (22 Jul 2021 11:29)  POCT Blood Glucose.: 280 mg/dL (22 Jul 2021 07:15)  POCT Blood Glucose.: 292 mg/dL (21 Jul 2021 21:13)  POCT Blood Glucose.: 276 mg/dL (21 Jul 2021 16:51)    Medications  MEDICATIONS  (STANDING):  allopurinol 300 milliGRAM(s) Oral daily  aspirin  chewable 81 milliGRAM(s) Oral daily  dexAMETHasone  Injectable 4 milliGRAM(s) IV Push every 6 hours  dextrose 40% Gel 15 Gram(s) Oral once  dextrose 5%. 1000 milliLiter(s) (50 mL/Hr) IV Continuous <Continuous>  dextrose 5%. 1000 milliLiter(s) (100 mL/Hr) IV Continuous <Continuous>  dextrose 50% Injectable 25 Gram(s) IV Push once  dextrose 50% Injectable 12.5 Gram(s) IV Push once  dextrose 50% Injectable 25 Gram(s) IV Push once  finasteride 5 milliGRAM(s) Oral daily  glucagon  Injectable 1 milliGRAM(s) IntraMuscular once  insulin glargine Injectable (LANTUS) 50 Unit(s) SubCutaneous at bedtime  insulin lispro (ADMELOG) corrective regimen sliding scale   SubCutaneous three times a day before meals  insulin lispro (ADMELOG) corrective regimen sliding scale   SubCutaneous at bedtime  insulin lispro Injectable (ADMELOG) 25 Unit(s) SubCutaneous three times a day before meals  melatonin 5 milliGRAM(s) Oral at bedtime  pantoprazole   Suspension 40 milliGRAM(s) Oral daily  QUEtiapine 25 milliGRAM(s) Oral at bedtime  senna 2 Tablet(s) Oral at bedtime  simethicone 80 milliGRAM(s) Chew three times a day  tamsulosin 0.4 milliGRAM(s) Oral at bedtime      Physical Exam  General: Patient comfortable in bed  Vital Signs Last 12 Hrs  T(F): 98.1 (07-22-21 @ 11:53), Max: 98.1 (07-22-21 @ 11:53)  HR: 76 (07-22-21 @ 11:53) (76 - 86)  BP: 128/70 (07-22-21 @ 11:53) (121/72 - 128/70)  BP(mean): --  RR: 18 (07-22-21 @ 11:53) (18 - 18)  SpO2: 99% (07-22-21 @ 11:53) (99% - 100%)

## 2021-07-23 LAB
GLUCOSE BLDC GLUCOMTR-MCNC: 209 MG/DL — HIGH (ref 70–99)
GLUCOSE BLDC GLUCOMTR-MCNC: 240 MG/DL — HIGH (ref 70–99)
GLUCOSE BLDC GLUCOMTR-MCNC: 248 MG/DL — HIGH (ref 70–99)
GLUCOSE BLDC GLUCOMTR-MCNC: 89 MG/DL — SIGNIFICANT CHANGE UP (ref 70–99)
WNV IGG CSF IA-ACNC: POSITIVE
WNV IGM CSF IA-ACNC: NEGATIVE — SIGNIFICANT CHANGE UP

## 2021-07-23 RX ORDER — INSULIN LISPRO 100/ML
20 VIAL (ML) SUBCUTANEOUS
Refills: 0 | Status: DISCONTINUED | OUTPATIENT
Start: 2021-07-23 | End: 2021-07-25

## 2021-07-23 RX ORDER — INSULIN GLARGINE 100 [IU]/ML
40 INJECTION, SOLUTION SUBCUTANEOUS AT BEDTIME
Refills: 0 | Status: DISCONTINUED | OUTPATIENT
Start: 2021-07-23 | End: 2021-07-25

## 2021-07-23 RX ORDER — BNT162B2 0.23 MG/2.25ML
0.3 INJECTION, SUSPENSION INTRAMUSCULAR ONCE
Refills: 0 | Status: COMPLETED | OUTPATIENT
Start: 2021-07-23 | End: 2021-07-23

## 2021-07-23 RX ORDER — DEXAMETHASONE 0.5 MG/5ML
4 ELIXIR ORAL EVERY 8 HOURS
Refills: 0 | Status: COMPLETED | OUTPATIENT
Start: 2021-07-23 | End: 2021-07-29

## 2021-07-23 RX ADMIN — INSULIN GLARGINE 40 UNIT(S): 100 INJECTION, SOLUTION SUBCUTANEOUS at 22:13

## 2021-07-23 RX ADMIN — Medication 5 MILLIGRAM(S): at 20:51

## 2021-07-23 RX ADMIN — Medication 20 UNIT(S): at 16:49

## 2021-07-23 RX ADMIN — Medication 2: at 16:48

## 2021-07-23 RX ADMIN — Medication 650 MILLIGRAM(S): at 20:50

## 2021-07-23 RX ADMIN — Medication 650 MILLIGRAM(S): at 21:22

## 2021-07-23 RX ADMIN — SIMETHICONE 80 MILLIGRAM(S): 80 TABLET, CHEWABLE ORAL at 05:51

## 2021-07-23 RX ADMIN — Medication 2: at 12:21

## 2021-07-23 RX ADMIN — Medication 300 MILLIGRAM(S): at 11:26

## 2021-07-23 RX ADMIN — QUETIAPINE FUMARATE 25 MILLIGRAM(S): 200 TABLET, FILM COATED ORAL at 20:51

## 2021-07-23 RX ADMIN — TAMSULOSIN HYDROCHLORIDE 0.4 MILLIGRAM(S): 0.4 CAPSULE ORAL at 20:51

## 2021-07-23 RX ADMIN — Medication 81 MILLIGRAM(S): at 11:26

## 2021-07-23 RX ADMIN — PANTOPRAZOLE SODIUM 40 MILLIGRAM(S): 20 TABLET, DELAYED RELEASE ORAL at 05:51

## 2021-07-23 RX ADMIN — BNT162B2 0.3 MILLILITER(S): 0.23 INJECTION, SUSPENSION INTRAMUSCULAR at 11:06

## 2021-07-23 RX ADMIN — Medication 4 MILLIGRAM(S): at 23:02

## 2021-07-23 RX ADMIN — Medication 4 MILLIGRAM(S): at 16:23

## 2021-07-23 RX ADMIN — Medication 25 UNIT(S): at 12:21

## 2021-07-23 RX ADMIN — SIMETHICONE 80 MILLIGRAM(S): 80 TABLET, CHEWABLE ORAL at 20:51

## 2021-07-23 RX ADMIN — Medication 4 MILLIGRAM(S): at 05:54

## 2021-07-23 RX ADMIN — FINASTERIDE 5 MILLIGRAM(S): 5 TABLET, FILM COATED ORAL at 11:27

## 2021-07-23 NOTE — PROGRESS NOTE ADULT - PROBLEM SELECTOR PLAN 1
Will decrease Lantus to 40u at bedtime.  Will decrease Admelog to 20u before each meal and continue Admelog correction scale coverage. Will continue monitoring FS and FU, will adjust insulin dose as steroids are tapered/dc.  Patient previously on Janumet, he has large insulin requirement at this time due to high-dose steroids. Effects of steroids can linger several weeks, patient would benefit from insulin as outpatient and is agreeable.  Suggest DC home on current basal bolus insulin regimen, endo FU 2 weeks.  Discussed plan with patient and family at bedside. Counseled that blood sugars will start trending down as steroids are tapered/dc. Insulin dose will have to be adjusted based on blood sugars.   Counseled for compliance with consistent low-carb diet.

## 2021-07-23 NOTE — PROGRESS NOTE ADULT - ASSESSMENT
71 year old male diagnosed with Hodgkin lymphoma, sent to hospice , he was sent to admitted for FTT, fever, no infection, now s/p check point inhibitor. Had fever after drug infusion and a  transfusion, no infection found.  No infection is apparent at present on previous work up as per last ID note.  Prior fever probable  B symptoms. He had prior infectious  work up and reviewed micro results.  Blood culture done on this admission reviewed and are no growth, he also had CSF fluid cx   CSF HSV PCR is negative, legionella is neg, crypto antigen is negative, fungitell sent on July 5 reported to be 256, at this point not clear the significance of number as clinically he has no fever, he has no cough, no sob, no signs of infection to correlate with elevated fungitell level. over colonization with Candida may also falsely elevated fungitell.  I do not see signs of infection , no indication for abx use. CT C/A/P report reviewed from 7/19 and read as no acute intra abdominal pathology, interval resolution of lung opacities on prior CT 6/28/21, left basilar  linear atelectasis, I have low suspicion of fungal pulmonary infection with recent reports of resolution of opacities   He remains afebrile and wbc wnl   reviewed repeat fungitell level was reported as normal 52, therefore no signs of fungal infection      Plan:  continue supportive care per medicine, pulmonary, cardiology and heme onc  reviewed case with Dr Alas of normal repeat fungitell   Re consult us as needed

## 2021-07-23 NOTE — CHART NOTE - NSCHARTNOTEFT_GEN_A_CORE
Spoke with Pt about COVID vaccine.  Pt agreed, consent placed in chart  Pt undecided if wants J&J or Pfizer, will decide in the AM when administered

## 2021-07-23 NOTE — PROGRESS NOTE ADULT - SUBJECTIVE AND OBJECTIVE BOX
Follow-up Pulm Progress Note    No new respiratory events overnight.  Denies SOB/CP.   100% on RA    Medications:  MEDICATIONS  (STANDING):  allopurinol 300 milliGRAM(s) Oral daily  aspirin  chewable 81 milliGRAM(s) Oral daily  coronavirus (EUA) Vaccine (PFIZER) 0.3 milliLiter(s) IntraMuscular once  dexAMETHasone     Tablet 4 milliGRAM(s) Oral every 8 hours  dextrose 40% Gel 15 Gram(s) Oral once  dextrose 5%. 1000 milliLiter(s) (50 mL/Hr) IV Continuous <Continuous>  dextrose 5%. 1000 milliLiter(s) (100 mL/Hr) IV Continuous <Continuous>  dextrose 50% Injectable 25 Gram(s) IV Push once  dextrose 50% Injectable 12.5 Gram(s) IV Push once  dextrose 50% Injectable 25 Gram(s) IV Push once  finasteride 5 milliGRAM(s) Oral daily  glucagon  Injectable 1 milliGRAM(s) IntraMuscular once  insulin glargine Injectable (LANTUS) 50 Unit(s) SubCutaneous at bedtime  insulin lispro (ADMELOG) corrective regimen sliding scale   SubCutaneous three times a day before meals  insulin lispro (ADMELOG) corrective regimen sliding scale   SubCutaneous at bedtime  insulin lispro Injectable (ADMELOG) 25 Unit(s) SubCutaneous three times a day before meals  melatonin 5 milliGRAM(s) Oral at bedtime  pantoprazole   Suspension 40 milliGRAM(s) Oral daily  QUEtiapine 25 milliGRAM(s) Oral at bedtime  senna 2 Tablet(s) Oral at bedtime  simethicone 80 milliGRAM(s) Chew three times a day  tamsulosin 0.4 milliGRAM(s) Oral at bedtime    MEDICATIONS  (PRN):  acetaminophen   Tablet .. 650 milliGRAM(s) Oral every 6 hours PRN Temp greater or equal to 38C (100.4F), Moderate Pain (4 - 6)  bisacodyl 5 milliGRAM(s) Oral every 12 hours PRN Constipation  polyethylene glycol 3350 17 Gram(s) Oral daily PRN Constipation          Vital Signs Last 24 Hrs  T(C): 36.3 (23 Jul 2021 08:20), Max: 36.7 (22 Jul 2021 11:53)  T(F): 97.3 (23 Jul 2021 08:20), Max: 98.1 (22 Jul 2021 11:53)  HR: 108 (23 Jul 2021 08:20) (58 - 108)  BP: 95/60 (23 Jul 2021 08:20) (95/60 - 128/70)  BP(mean): --  RR: 18 (23 Jul 2021 08:20) (18 - 18)  SpO2: 99% (23 Jul 2021 08:20) (99% - 100%) on RA          07-22 @ 07:01  -  07-23 @ 07:00  --------------------------------------------------------  IN: 480 mL / OUT: 3000 mL / NET: -2520 mL          LABS:                        8.9    2.87  )-----------( 58       ( 22 Jul 2021 06:58 )             28.5     07-22    134<L>  |  97  |  18  ----------------------------<  302<H>  4.3   |  23  |  0.40<L>    Ca    9.0      22 Jul 2021 06:58            CAPILLARY BLOOD GLUCOSE      POCT Blood Glucose.: 89 mg/dL (23 Jul 2021 07:22)                        CULTURES: (if applicable)  Culture Results:   No growth (07-16 @ 18:59)  Culture Results:   Culture is being performed. (07-16 @ 18:59)  Culture Results:   Testing in progress (07-16 @ 18:59)          Physical Examination:  PULM: Clear to auscultation bilaterally, no significant sputum production  CVS: S1, S2 heard    RADIOLOGY REVIEWED  CT chest: c< from: CT Chest w/ IV Cont (07.19.21 @ 17:31) >  FINDINGS:  CHEST:  LUNGS AND LARGE AIRWAYS: Patent central airways. There is a 4 mm groundglass nodule (series 4, image 171). There is an unchanged 1 cm cystic nodule (series 4, image 223). Interval decrease in right lower lobe nodule at the costophrenic angle, now measuring 1.3 x 0.8 cm (series 4, image 338). Other prior mentioned solid and groundglass nodules are resolved. Mild bilateral subsegmental atelectasis.  PLEURA: No pleural effusion.  VESSELS: Coronary artery calcifications.  HEART: Heart size is normal. No pericardial effusion.  MEDIASTINUM AND SARTHAK: No lymphadenopathy.  CHEST WALL AND LOWER NECK: Within normal limits.    < end of copied text >

## 2021-07-23 NOTE — PROGRESS NOTE ADULT - SUBJECTIVE AND OBJECTIVE BOX
Chief complaint  Patient is a 71y old  Male who presents with a chief complaint of HD (18 Jul 2021 12:32)   Review of systems  Patient in bed, looks comfortable, no hypoglycemic episodes.    Labs and Fingersticks  CAPILLARY BLOOD GLUCOSE      POCT Blood Glucose.: 209 mg/dL (23 Jul 2021 11:41)  POCT Blood Glucose.: 89 mg/dL (23 Jul 2021 07:22)  POCT Blood Glucose.: 295 mg/dL (22 Jul 2021 21:12)  POCT Blood Glucose.: 318 mg/dL (22 Jul 2021 16:40)      Anion Gap, Serum: 14 (07-22 @ 06:58)      Calcium, Total Serum: 9.0 (07-22 @ 06:58)          07-22    134<L>  |  97  |  18  ----------------------------<  302<H>  4.3   |  23  |  0.40<L>    Ca    9.0      22 Jul 2021 06:58                          8.9    2.87  )-----------( 58       ( 22 Jul 2021 06:58 )             28.5     Medications  MEDICATIONS  (STANDING):  allopurinol 300 milliGRAM(s) Oral daily  aspirin  chewable 81 milliGRAM(s) Oral daily  dexAMETHasone     Tablet 4 milliGRAM(s) Oral every 8 hours  dextrose 40% Gel 15 Gram(s) Oral once  dextrose 5%. 1000 milliLiter(s) (50 mL/Hr) IV Continuous <Continuous>  dextrose 5%. 1000 milliLiter(s) (100 mL/Hr) IV Continuous <Continuous>  dextrose 50% Injectable 25 Gram(s) IV Push once  dextrose 50% Injectable 12.5 Gram(s) IV Push once  dextrose 50% Injectable 25 Gram(s) IV Push once  finasteride 5 milliGRAM(s) Oral daily  glucagon  Injectable 1 milliGRAM(s) IntraMuscular once  insulin glargine Injectable (LANTUS) 40 Unit(s) SubCutaneous at bedtime  insulin lispro (ADMELOG) corrective regimen sliding scale   SubCutaneous three times a day before meals  insulin lispro (ADMELOG) corrective regimen sliding scale   SubCutaneous at bedtime  insulin lispro Injectable (ADMELOG) 20 Unit(s) SubCutaneous three times a day before meals  melatonin 5 milliGRAM(s) Oral at bedtime  pantoprazole   Suspension 40 milliGRAM(s) Oral daily  QUEtiapine 25 milliGRAM(s) Oral at bedtime  senna 2 Tablet(s) Oral at bedtime  simethicone 80 milliGRAM(s) Chew three times a day  tamsulosin 0.4 milliGRAM(s) Oral at bedtime      Physical Exam  General: Patient comfortable in bed  Vital Signs Last 12 Hrs  T(F): 97.6 (07-23-21 @ 12:03), Max: 97.8 (07-23-21 @ 11:23)  HR: 66 (07-23-21 @ 12:03) (66 - 108)  BP: 102/61 (07-23-21 @ 12:03) (95/60 - 102/61)  BP(mean): --  RR: 18 (07-23-21 @ 12:03) (18 - 18)  SpO2: 100% (07-23-21 @ 12:03) (98% - 100%)  Neck: No palpable thyroid nodules.  CVS: S1S2, No murmurs  Respiratory: No wheezing, no crepitations  GI: Abdomen soft, bowel sounds positive  Musculoskeletal:  edema lower extremities.   Skin: No skin rashes, no ecchymosis    Diagnostics             Chief complaint  Patient is a 71y old  Male who presents with a chief complaint of HD (18 Jul 2021 12:32)   Review of systems  Patient in bed, looks comfortable, no hypoglycemic episodes.    Labs and Fingersticks  CAPILLARY BLOOD GLUCOSE      POCT Blood Glucose.: 209 mg/dL (23 Jul 2021 11:41)  POCT Blood Glucose.: 89 mg/dL (23 Jul 2021 07:22)  POCT Blood Glucose.: 295 mg/dL (22 Jul 2021 21:12)  POCT Blood Glucose.: 318 mg/dL (22 Jul 2021 16:40)      Anion Gap, Serum: 14 (07-22 @ 06:58)      Calcium, Total Serum: 9.0 (07-22 @ 06:58)          07-22    134<L>  |  97  |  18  ----------------------------<  302<H>  4.3   |  23  |  0.40<L>    Ca    9.0      22 Jul 2021 06:58                          8.9    2.87  )-----------( 58       ( 22 Jul 2021 06:58 )             28.5     Medications  MEDICATIONS  (STANDING):  allopurinol 300 milliGRAM(s) Oral daily  aspirin  chewable 81 milliGRAM(s) Oral daily  dexAMETHasone     Tablet 4 milliGRAM(s) Oral every 8 hours  dextrose 40% Gel 15 Gram(s) Oral once  dextrose 5%. 1000 milliLiter(s) (50 mL/Hr) IV Continuous <Continuous>  dextrose 5%. 1000 milliLiter(s) (100 mL/Hr) IV Continuous <Continuous>  dextrose 50% Injectable 25 Gram(s) IV Push once  dextrose 50% Injectable 12.5 Gram(s) IV Push once  dextrose 50% Injectable 25 Gram(s) IV Push once  finasteride 5 milliGRAM(s) Oral daily  glucagon  Injectable 1 milliGRAM(s) IntraMuscular once  insulin glargine Injectable (LANTUS) 40 Unit(s) SubCutaneous at bedtime  insulin lispro (ADMELOG) corrective regimen sliding scale   SubCutaneous three times a day before meals  insulin lispro (ADMELOG) corrective regimen sliding scale   SubCutaneous at bedtime  insulin lispro Injectable (ADMELOG) 20 Unit(s) SubCutaneous three times a day before meals  melatonin 5 milliGRAM(s) Oral at bedtime  pantoprazole   Suspension 40 milliGRAM(s) Oral daily  QUEtiapine 25 milliGRAM(s) Oral at bedtime  senna 2 Tablet(s) Oral at bedtime  simethicone 80 milliGRAM(s) Chew three times a day  tamsulosin 0.4 milliGRAM(s) Oral at bedtime      Physical Exam  General: Patient comfortable in bed  Vital Signs Last 12 Hrs  T(F): 97.6 (07-23-21 @ 12:03), Max: 97.8 (07-23-21 @ 11:23)  HR: 66 (07-23-21 @ 12:03) (66 - 108)  BP: 102/61 (07-23-21 @ 12:03) (95/60 - 102/61)  BP(mean): --  RR: 18 (07-23-21 @ 12:03) (18 - 18)  SpO2: 100% (07-23-21 @ 12:03) (98% - 100%)  Neck: No palpable thyroid nodules.  CVS: S1S2, No murmurs  Respiratory: No wheezing, no crepitations  GI: Abdomen soft, bowel sounds positive  Musculoskeletal:  edema lower extremities.   Skin: No skin rashes, no ecchymosis    Diagnostics

## 2021-07-23 NOTE — PROGRESS NOTE ADULT - PROBLEM SELECTOR PLAN 1
CT chest findings 6/28 with patchy GGO and scattered nodular opacities throughout all lobes of the lungs  -Possibly related to underlying lymphoma?  -Normoxic, no c/o dyspnea  -Repeat CT chest 7/19 with resolving GGO, only with 4 mm GGO nodule and unchanged 1 cm cystic nodule. Suggest repeat CT chest in 1 month. D/w pt.   -Repeat fungitell normal

## 2021-07-23 NOTE — PROGRESS NOTE ADULT - SUBJECTIVE AND OBJECTIVE BOX
Date of service: 07-23-21 @ 10:57      Patient is a 71y old  Male who presents with a chief complaint of HD (18 Jul 2021 12:32)                                                               INTERVAL HPI/OVERNIGHT EVENTS:    REVIEW OF SYSTEMS:     CONSTITUTIONAL: No weakness, fevers or chills  EYES/ENT: No visual changes , no ear ache   NECK: No pain or stiffness  RESPIRATORY: No cough, wheezing,  No shortness of breath  CARDIOVASCULAR: No chest pain or palpitations  GASTROINTESTINAL: No abdominal pain  . No nausea, vomiting, or hematemesis; No diarrhea or constipation. No melena or hematochezia.  GENITOURINARY: No dysuria, frequency or hematuria  NEUROLOGICAL: No numbness or weakness    MEDICATIONS  (STANDING):  allopurinol 300 milliGRAM(s) Oral daily  aspirin  chewable 81 milliGRAM(s) Oral daily  coronavirus (EUA) Vaccine (PFIZER) 0.3 milliLiter(s) IntraMuscular once  dexAMETHasone     Tablet 4 milliGRAM(s) Oral every 8 hours  dextrose 40% Gel 15 Gram(s) Oral once  dextrose 5%. 1000 milliLiter(s) (50 mL/Hr) IV Continuous <Continuous>  dextrose 5%. 1000 milliLiter(s) (100 mL/Hr) IV Continuous <Continuous>  dextrose 50% Injectable 25 Gram(s) IV Push once  dextrose 50% Injectable 12.5 Gram(s) IV Push once  dextrose 50% Injectable 25 Gram(s) IV Push once  finasteride 5 milliGRAM(s) Oral daily  glucagon  Injectable 1 milliGRAM(s) IntraMuscular once  insulin glargine Injectable (LANTUS) 50 Unit(s) SubCutaneous at bedtime  insulin lispro (ADMELOG) corrective regimen sliding scale   SubCutaneous three times a day before meals  insulin lispro (ADMELOG) corrective regimen sliding scale   SubCutaneous at bedtime  insulin lispro Injectable (ADMELOG) 25 Unit(s) SubCutaneous three times a day before meals  melatonin 5 milliGRAM(s) Oral at bedtime  pantoprazole   Suspension 40 milliGRAM(s) Oral daily  QUEtiapine 25 milliGRAM(s) Oral at bedtime  senna 2 Tablet(s) Oral at bedtime  simethicone 80 milliGRAM(s) Chew three times a day  tamsulosin 0.4 milliGRAM(s) Oral at bedtime    MEDICATIONS  (PRN):  acetaminophen   Tablet .. 650 milliGRAM(s) Oral every 6 hours PRN Temp greater or equal to 38C (100.4F), Moderate Pain (4 - 6)  bisacodyl 5 milliGRAM(s) Oral every 12 hours PRN Constipation  polyethylene glycol 3350 17 Gram(s) Oral daily PRN Constipation       Allergies    No Known Allergies    Intolerances      Vital Signs Last 24 Hrs  T(C): 36.3 (23 Jul 2021 08:20), Max: 36.7 (22 Jul 2021 11:53)  T(F): 97.3 (23 Jul 2021 08:20), Max: 98.1 (22 Jul 2021 11:53)  HR: 108 (23 Jul 2021 08:20) (58 - 108)  BP: 95/60 (23 Jul 2021 08:20) (95/60 - 128/70)  BP(mean): --  RR: 18 (23 Jul 2021 08:20) (18 - 18)  SpO2: 99% (23 Jul 2021 08:20) (99% - 100%)  CAPILLARY BLOOD GLUCOSE      POCT Blood Glucose.: 89 mg/dL (23 Jul 2021 07:22)  POCT Blood Glucose.: 295 mg/dL (22 Jul 2021 21:12)  POCT Blood Glucose.: 318 mg/dL (22 Jul 2021 16:40)  POCT Blood Glucose.: 287 mg/dL (22 Jul 2021 11:29)      07-22 @ 07:01  -  07-23 @ 07:00  --------------------------------------------------------  IN: 480 mL / OUT: 3000 mL / NET: -2520 mL      Physical Exam:    Daily     Daily   General:  Well appearing, NAD, not cachetic  HEENT:  Nonicteric, PERRLA  CV:  RRR, S1S2   Lungs:  CTA B/L, no wheezes, rales, rhonchi  Abdomen:  Soft, non-tender, no distended, positive BS  Extremities:  2+ pulses, no c/c, no edema  Skin:  Warm and dry, no rashes  :  No escamilla  Neuro:  AAOx3, non-focal, grossly intact                                                                                                                                                                                                                                                                                                LABS:                               8.9    2.87  )-----------( 58       ( 22 Jul 2021 06:58 )             28.5                      07-22    134<L>  |  97  |  18  ----------------------------<  302<H>  4.3   |  23  |  0.40<L>    Ca    9.0      22 Jul 2021 06:58                         RADIOLOGY & ADDITIONAL TESTS         I personally reviewed: [  ]EKG   [  ]CXR    [  ] CT      A/P:         Discussed with :     August[facility] consultants' Notes   Time spent :

## 2021-07-23 NOTE — PROGRESS NOTE ADULT - ASSESSMENT
Assessment  DMT2: 71y Male with DM T2 with hyperglycemia, A1C 8.8%, was on oral meds/Janumet at home, admitted with weakness/fatigue and hyperglycemia, now on large-dose basal bolus insulin, bolus dose held this AM 2/2 blood sugars trending down, FS fluctuating, no hypoglycemias. Patient is eating meals, appears comfortable, dexamethasone taper in progress, planning DC to rehab on 2 week steroid taper.  Lymphoma: on medications, monitored, FU Heme/Onc.  Anemia: stable, monitored.      Shahriar Kahn MD  Cell: [phone]  Office: [phone]     Assessment  DMT2: 71y Male with DM T2 with hyperglycemia, A1C 8.8%, was on oral meds/Janumet at home, admitted with weakness/fatigue and hyperglycemia, now on large-dose basal bolus insulin, bolus dose held this AM 2/2 blood sugars trending down, FS fluctuating,  no hypoglycemias. Patient is eating meals, appears comfortable, dexamethasone taper in progress, planning DC to rehab on 2 week steroid taper.  Lymphoma: on medications, monitored, FU Heme/Onc.  Anemia: stable, monitored.      Shahriar Kahn MD  Cell: [phone]  Office: [phone]

## 2021-07-23 NOTE — PROGRESS NOTE ADULT - ASSESSMENT
72 y/o M with PMH of CVA, DM, BPH with obstruction s/p Sauceda catheter, s/p ERCP w Sphincteretomy, recent admission to [facility] for sepsis, Hodgkin lymphoma dx 2020 - was not offered any chemo and was placed on hospice, DVT 8/2020. Presenting with weakness and FTT. Pt and family opted for treatment of lymphoma - s/p Opdivo 7/6. Called to consult for CT chest findings 6/28 with patchy GGO and scattered nodular opacities throughout all lobes of the lungs. Currently breathing comfortably on RA.

## 2021-07-23 NOTE — PROGRESS NOTE ADULT - SUBJECTIVE AND OBJECTIVE BOX
CC: re consult for elevated lab value fungitell level     Patient is awake and alert, he is lying in bed       REVIEW OF SYSTEMS:  All other review of systems negative (Comprehensive ROS)    Antimicrobials Day #  :    Other Medications Reviewed    Vital Signs Last 24 Hrs  T(C): 36.3 (23 Jul 2021 08:20), Max: 36.7 (22 Jul 2021 11:53)  T(F): 97.3 (23 Jul 2021 08:20), Max: 98.1 (22 Jul 2021 11:53)  HR: 108 (23 Jul 2021 08:20) (58 - 108)  BP: 95/60 (23 Jul 2021 08:20) (95/60 - 128/70)  BP(mean): --  RR: 18 (23 Jul 2021 08:20) (18 - 18)  SpO2: 99% (23 Jul 2021 08:20) (99% - 100%)%)    PHYSICAL EXAM:  General: alert, no acute distress  Eyes:  anicteric, no conjunctival injection, no discharge  Oropharynx: no lesions or injection 	  Lungs: clear to auscultation  Heart: regular rate and rhythm; no murmur, rubs or gallops  Abdomen: soft, nondistended, nontender, without mass or organomegaly  Skin: no lesions  Extremities: no clubbing, cyanosis, or edema  Neurologic: alert, oriented, moves all extremities    LAB RESULTS:                                                           8.9    2.87  )-----------( 58       ( 22 Jul 2021 06:58 )             28.5               MICROBIOLOGY:  RECENT CULTURES:      RADIOLOGY REVIEWED:    EXAM:  MR SPINE LUMBAR WAW IC                            PROCEDURE DATE:  07/11/2021            INTERPRETATION:  Clinical indications: Lower extremity weakness    MRI of the lumbar spine was performed using sagittal T1-T2 and T2-weighted sequence fat suppression. Axial T1 and T2-weighted sequences were performed    Loss of the normal lumbar lordosis seen    Mild scoliosis is seen.    Disc desiccation is seen involving the L2-3 L3-4 L4-5 and L5-S1 levels which are secondary to degenerative changes.    There is evidence of abnormal T1 prolongation seen involving the lower thoracic lumbar sacral vertebral bodies as well as both iliac bones. This finding could be compatible with a marrow infiltrative process, possibly secondary to patient's lymphoma though other possibilities include metastasis, multiple myeloma and severe anemia must be considered.    L1-2: Hypertrophic facet joint changes are seen bilaterally. No significant, as of the spinal canal or either neural foramen.    L2-3: Bilateral hypertrophic facet joint changes seen. No significant compromise spinal canal or either neural foramen    L3-4: Disc bulge and bilateral hypertrophic facet joint change. No significant, as of the spinal canal or either neural foramen    L4-5: Disc bulge and bilateral hypertrophic facet joint changes seen. Mild to moderate narrowing of the spinal canal. Mild to moderate narrowing of both neural foramen.    L5-S1: Bilateral hypertrophic facet joint changes seen. Mild narrowing of the left neural foramen and mild to moderate right neural foramen    There is evidence of abnormal area of enhancement seen involving the dorsal aspect of the right thecal sac at the level of the L4-5 disc. This best seen on series 11 image 21. This finding measures approximately 4.7 mm. This could be related to patient's known lymphoma though the possibility of drop metastasis as well as other neoplastic etiologies cannot entirely excluded. Correlation with CSF can also be done for further evaluation. Continued close interval follow-up is recommended.    The conus appears to end at top of L2.    Bilateral renal cysts are seen.    Evaluation of the paraspinal soft tissues appear normal.    IMPRESSION: Abnormal signal seen involving the lower thoracic lumbarsacral vertebral bodies as well as both iliac bones. This is likely compatible with a marrow infiltrative process as described above.    Abnormal enhancing lesion is seen involving the dorsal aspect of the right thecal sac as described above.    Degenerative changes as described above.    --- End of Report ---                NGA MOSES MD; Attending Radiologist  This document has been electronically signed. Jul 12 2021  9:10AM            Assessment:

## 2021-07-23 NOTE — CHART NOTE - NSCHARTNOTEFT_GEN_A_CORE
Nutrition Follow Up Note  Patient seen for: malnutrition follow-up    Chart reviewed, events noted.  Per chart: Pt is 71y M w/PMH of CVA this past August and got TPA per family member, DM, BPH with obstruction S/P escamilla catheter, S/P ERCP w Sphincterectomy recent admission to [facility] for sepsis  Hodgkin lymphoma was not offered any chemo and was placed on hospice. Pt presented to [facility] with weakness and FTT.     Events since previous assessment:  (7/20) Pt now with mets to spine - plan for chemo in next few days  (7/23) Per oncology note, Pt S/P immunotherapy 1st rx with opdivo on 7/6; plan for second dose on 8.3. "Overall seemed to have responded somewhat to first dose."    Source: [x] Patient       [x] EMR        [] RN        [] Family at bedside       [] Other:    -If unable to interview patient: [] Trach/Vent/BiPAP  [] Disoriented/confused/inappropriate to interview    Diet Order:   Diet, Dysphagia 3 Soft-Nectar Consistency Fluid:   Consistent Carbohydrate {No Snacks} (CSTCHO)  Supplement Feeding Modality:  Oral  Ensure Enlive Servings Per Day:  3       Frequency:  Daily (07-10-21)    Pt visited at bedside who reports good appetite/PO intake, tolerating current diet consistency; endorses completing >/= 75% of meals and x3 Ensure per day. Pt without any food preference requests or nutritionally related questions. Pt made aware RD remains available.     GI: Pt without complaints of nausea or vomiting. Pt noted incontinent per chart. Last BM x7 on 7/22 per nursing flowsheets.   Bowel Regimen? [x] Yes   [] No    Current dosing weight: 125 Kg; no new weights per chart, will continue to monitor.     Nutritionally Pertinent MEDICATIONS  (STANDING):  allopurinol  dexAMETHasone     Tablet  dextrose 40% Gel  dextrose 5%.  dextrose 5%.  dextrose 50% Injectable  dextrose 50% Injectable  dextrose 50% Injectable  finasteride  glucagon  Injectable  insulin glargine Injectable (LANTUS)  insulin lispro (ADMELOG) corrective regimen sliding scale  insulin lispro (ADMELOG) corrective regimen sliding scale  insulin lispro Injectable (ADMELOG)  pantoprazole   Suspension  senna  simethicone  tamsulosin    Pertinent Labs: POCT Blood Glucose x24 hrs:  mg/dL   A1C with Estimated Average Glucose Result: 7.7 % (07-01-21 @ 12:40)    Skin: no pressure-related injuries per nursing flowsheets   Edema: 1+ generalized edema per nursing flowsheets     Estimated Needs:   [x] no change since previous assessment  [] recalculated:     Previous Nutrition Diagnosis: Severe Malnutrition, Chronic.  Nutrition Diagnosis is: [x] ongoing --- being addressed with nutritional oral supplementation, monitoring PO intake and weight trends     New Nutrition Diagnosis: [x] Not applicable    Education Provided:       [] Yes:  [x] No:     Recommendations/Interventions:  1. Continue diet; consistency per SLP recommendations  2. Continue Ensure Enlive x3/day (provides 350cal, 20Gm protein per 8oz serving) as ordered   3. Adjustments to bowel regimen as appropriate   4. Obtain food preferences, honor as able   5. Continue to monitor weight trends, PO intake, GI function, electrolytes, and skin integrity     RD remains available upon request and will follow up per protocol.     Maria Victoria Walter RD CDN (Pager [phone]).

## 2021-07-23 NOTE — PROGRESS NOTE ADULT - SUBJECTIVE AND OBJECTIVE BOX
Subjective: Patient seen and examined. No new events except as noted.     REVIEW OF SYSTEMS:    CONSTITUTIONAL: +weakness, fevers or chills  EYES/ENT: No visual changes;  No vertigo or throat pain   NECK: No pain or stiffness  RESPIRATORY: No cough, wheezing, hemoptysis; No shortness of breath  CARDIOVASCULAR: No chest pain or palpitations  GASTROINTESTINAL: No abdominal or epigastric pain. No nausea, vomiting, or hematemesis; No diarrhea or constipation. No melena or hematochezia.  GENITOURINARY: No dysuria, frequency or hematuria  NEUROLOGICAL: No numbness or weakness  SKIN: No itching, burning, rashes, or lesions   All other review of systems is negative unless indicated above.    MEDICATIONS:  MEDICATIONS  (STANDING):  allopurinol 300 milliGRAM(s) Oral daily  aspirin  chewable 81 milliGRAM(s) Oral daily  coronavirus (EUA) Vaccine (PFIZER) 0.3 milliLiter(s) IntraMuscular once  dexAMETHasone     Tablet 4 milliGRAM(s) Oral every 8 hours  dextrose 40% Gel 15 Gram(s) Oral once  dextrose 5%. 1000 milliLiter(s) (50 mL/Hr) IV Continuous <Continuous>  dextrose 5%. 1000 milliLiter(s) (100 mL/Hr) IV Continuous <Continuous>  dextrose 50% Injectable 25 Gram(s) IV Push once  dextrose 50% Injectable 12.5 Gram(s) IV Push once  dextrose 50% Injectable 25 Gram(s) IV Push once  finasteride 5 milliGRAM(s) Oral daily  glucagon  Injectable 1 milliGRAM(s) IntraMuscular once  insulin glargine Injectable (LANTUS) 50 Unit(s) SubCutaneous at bedtime  insulin lispro (ADMELOG) corrective regimen sliding scale   SubCutaneous three times a day before meals  insulin lispro (ADMELOG) corrective regimen sliding scale   SubCutaneous at bedtime  insulin lispro Injectable (ADMELOG) 25 Unit(s) SubCutaneous three times a day before meals  melatonin 5 milliGRAM(s) Oral at bedtime  pantoprazole   Suspension 40 milliGRAM(s) Oral daily  QUEtiapine 25 milliGRAM(s) Oral at bedtime  senna 2 Tablet(s) Oral at bedtime  simethicone 80 milliGRAM(s) Chew three times a day  tamsulosin 0.4 milliGRAM(s) Oral at bedtime      PHYSICAL EXAM:  T(C): 36.3 (07-23-21 @ 08:20), Max: 36.7 (07-22-21 @ 08:32)  HR: 108 (07-23-21 @ 08:20) (58 - 108)  BP: 95/60 (07-23-21 @ 08:20) (95/60 - 128/70)  RR: 18 (07-23-21 @ 08:20) (18 - 18)  SpO2: 99% (07-23-21 @ 08:20) (99% - 100%)  Wt(kg): --  I&O's Summary    22 Jul 2021 07:01  -  23 Jul 2021 07:00  --------------------------------------------------------  IN: 480 mL / OUT: 3000 mL / NET: -2520 mL          Appearance: NAD  HEENT:   Dry  oral mucosa, PERRL, EOMI	  Lymphatic: No lymphadenopathy , no edema  Cardiovascular: Normal S1 S2, No JVD, No murmurs , Peripheral pulses palpable 2+ bilaterally  Respiratory: Lungs clear to auscultation, normal effort 	  Gastrointestinal:  Soft, Non-tender, + BS	  Skin: No rashes, No ecchymoses, No cyanosis, warm to touch  Musculoskeletal: Normal range of motion, normal strength  Psychiatry:  Mood & affect appropriate  Ext: No edema      LABS:    CARDIAC MARKERS:                                8.9    2.87  )-----------( 58       ( 22 Jul 2021 06:58 )             28.5     07-22    134<L>  |  97  |  18  ----------------------------<  302<H>  4.3   |  23  |  0.40<L>    Ca    9.0      22 Jul 2021 06:58      proBNP:   Lipid Profile:   HgA1c:   TSH:             TELEMETRY: 	    ECG:  	  RADIOLOGY:   DIAGNOSTIC TESTING:  [ ] Echocardiogram:  [ ]  Catheterization:  [ ] Stress Test:    OTHER:

## 2021-07-23 NOTE — PROGRESS NOTE ADULT - ASSESSMENT
70 y/o M w/ PMHx of CVA this past August and got TPA per family member, DM, BPH with obstruction s/p escamilla catheter, s/p ERCP w Sphincterectomy recent admission to [facility] for sepsis  Hodgkin lymphoma was not offered any chemo and was placed on hospice.     now presenting with weakness and FTT.   pt denies cp / SOB / palpitations   no focal neuro complaints .. at baseline RLE weakness   no N/V   had one episode of diarrhea   no urinary sx  abdominal pain, diffuse, but worse on R side.   Afib RVR overnight. Was asymptomatic   no known history of this as per patient

## 2021-07-23 NOTE — PROGRESS NOTE ADULT - ASSESSMENT
72 y/o M w/ PMHx of CVA this past August and got TPA per family member, DM, BPH with obstruction s/p escamilla catheter, s/p ERCP w Sphincterectomy recent admission to [facility] for sepsis  Hodgkin lymphoma was not offered any chemo and was placed on hospice.     now presenting with weakness and FTT.   pt denies cp / SOB / palpitations   no focal neuro complaints .. at baseline RLE weakness   no N/V   had one episode of diarrhea   no urinary sx  abdominal pain, diffuse, but worse on R side.     - Failure to thrive: cultures neg  fungitell repeated Nl   CT chest repeated : improving   nutrition consult   encourage PO intake: dysphagia 3 based on MBS results..pt feeling overall improved : will consider re-eval     - lymphoma: d/w Dr. Larios: s/p immunotherapy .. will plan second dose on aug /3   overall seemed to have responded somewhat to first dose     - DM with hyperglycemia : improved now worse with steroids   fu with endo     - anemia: monitor H/H post transfusion, stable.     - Fever: doubt infectious etiology   likely due to immunotherapy/lymphoma. culture: neg   abx dced. s/p IVF, ID f/u appreciated.   elevated fungitell noted , T chest improved / d/w Dr muñiz , repeat levels however low suspecion for fungal infection     - voice changes: CT neck : Asymmetric enlargement of the left palatine tonsil, suspicious for lymphomatous involvement given history. Correlate with direct visualization.  ENT had eval pt: no gross pathology on visualization   S/S eval: MBS done. speech: dysphagia 3    - Tachycardia: VA duplex neg for DVT   rate stable. noted 7 beats NSVT.  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 being expedited   cont  steroids : will cont tape r  fu LP cytology   appreciate neuro onc input   planned chemo early next week     - Afib : now in sinus : monitor   appreicate cardio input   no AC at this time and no AVNB at this time     - urinary retention : cont escamilla     plan to dc [facility]

## 2021-07-24 LAB
ANION GAP SERPL CALC-SCNC: 13 MMOL/L — SIGNIFICANT CHANGE UP (ref 5–17)
BUN SERPL-MCNC: 23 MG/DL — SIGNIFICANT CHANGE UP (ref 7–23)
CALCIUM SERPL-MCNC: 9 MG/DL — SIGNIFICANT CHANGE UP (ref 8.4–10.5)
CHLORIDE SERPL-SCNC: 97 MMOL/L — SIGNIFICANT CHANGE UP (ref 96–108)
CO2 SERPL-SCNC: 23 MMOL/L — SIGNIFICANT CHANGE UP (ref 22–31)
CREAT SERPL-MCNC: 0.4 MG/DL — LOW (ref 0.5–1.3)
GLUCOSE BLDC GLUCOMTR-MCNC: 269 MG/DL — HIGH (ref 70–99)
GLUCOSE BLDC GLUCOMTR-MCNC: 292 MG/DL — HIGH (ref 70–99)
GLUCOSE BLDC GLUCOMTR-MCNC: 299 MG/DL — HIGH (ref 70–99)
GLUCOSE BLDC GLUCOMTR-MCNC: 304 MG/DL — HIGH (ref 70–99)
GLUCOSE SERPL-MCNC: 269 MG/DL — HIGH (ref 70–99)
HCT VFR BLD CALC: 31.3 % — LOW (ref 39–50)
HGB BLD-MCNC: 9.8 G/DL — LOW (ref 13–17)
MCHC RBC-ENTMCNC: 31.3 GM/DL — LOW (ref 32–36)
MCHC RBC-ENTMCNC: 31.3 PG — SIGNIFICANT CHANGE UP (ref 27–34)
MCV RBC AUTO: 100 FL — SIGNIFICANT CHANGE UP (ref 80–100)
NRBC # BLD: 0 /100 WBCS — SIGNIFICANT CHANGE UP (ref 0–0)
PLATELET # BLD AUTO: 88 K/UL — LOW (ref 150–400)
POTASSIUM SERPL-MCNC: 4.2 MMOL/L — SIGNIFICANT CHANGE UP (ref 3.5–5.3)
POTASSIUM SERPL-SCNC: 4.2 MMOL/L — SIGNIFICANT CHANGE UP (ref 3.5–5.3)
RBC # BLD: 3.13 M/UL — LOW (ref 4.2–5.8)
RBC # FLD: 26.5 % — HIGH (ref 10.3–14.5)
SODIUM SERPL-SCNC: 133 MMOL/L — LOW (ref 135–145)
WBC # BLD: 4.59 K/UL — SIGNIFICANT CHANGE UP (ref 3.8–10.5)
WBC # FLD AUTO: 4.59 K/UL — SIGNIFICANT CHANGE UP (ref 3.8–10.5)

## 2021-07-24 RX ADMIN — Medication 650 MILLIGRAM(S): at 14:12

## 2021-07-24 RX ADMIN — Medication 4 MILLIGRAM(S): at 05:48

## 2021-07-24 RX ADMIN — Medication 4 MILLIGRAM(S): at 21:26

## 2021-07-24 RX ADMIN — Medication 5 MILLIGRAM(S): at 21:26

## 2021-07-24 RX ADMIN — INSULIN GLARGINE 40 UNIT(S): 100 INJECTION, SOLUTION SUBCUTANEOUS at 21:25

## 2021-07-24 RX ADMIN — Medication 4: at 21:25

## 2021-07-24 RX ADMIN — Medication 4 MILLIGRAM(S): at 14:12

## 2021-07-24 RX ADMIN — Medication 650 MILLIGRAM(S): at 21:26

## 2021-07-24 RX ADMIN — SIMETHICONE 80 MILLIGRAM(S): 80 TABLET, CHEWABLE ORAL at 05:48

## 2021-07-24 RX ADMIN — Medication 3: at 12:03

## 2021-07-24 RX ADMIN — Medication 300 MILLIGRAM(S): at 12:05

## 2021-07-24 RX ADMIN — Medication 20 UNIT(S): at 07:42

## 2021-07-24 RX ADMIN — Medication 3: at 07:41

## 2021-07-24 RX ADMIN — PANTOPRAZOLE SODIUM 40 MILLIGRAM(S): 20 TABLET, DELAYED RELEASE ORAL at 05:48

## 2021-07-24 RX ADMIN — QUETIAPINE FUMARATE 25 MILLIGRAM(S): 200 TABLET, FILM COATED ORAL at 21:25

## 2021-07-24 RX ADMIN — Medication 81 MILLIGRAM(S): at 12:04

## 2021-07-24 RX ADMIN — FINASTERIDE 5 MILLIGRAM(S): 5 TABLET, FILM COATED ORAL at 12:04

## 2021-07-24 RX ADMIN — SIMETHICONE 80 MILLIGRAM(S): 80 TABLET, CHEWABLE ORAL at 21:26

## 2021-07-24 RX ADMIN — Medication 3: at 16:27

## 2021-07-24 RX ADMIN — TAMSULOSIN HYDROCHLORIDE 0.4 MILLIGRAM(S): 0.4 CAPSULE ORAL at 21:26

## 2021-07-24 RX ADMIN — Medication 20 UNIT(S): at 12:04

## 2021-07-24 RX ADMIN — Medication 20 UNIT(S): at 16:28

## 2021-07-24 RX ADMIN — Medication 650 MILLIGRAM(S): at 22:45

## 2021-07-24 RX ADMIN — Medication 650 MILLIGRAM(S): at 15:12

## 2021-07-24 NOTE — PROGRESS NOTE ADULT - SUBJECTIVE AND OBJECTIVE BOX
SUBJECTIVE / OVERNIGHT EVENTS:  --- Coverage for Dr. Alas ---   Pt seen and examined at bedside.   No overnight event.  Feeling better.  no cp, no sob, no n/v/d.   "Can you give me [employer]?"           --------------------------------------------------------------------------------------------  LABS:                        9.8    4.59  )-----------( 88       ( 24 Jul 2021 06:49 )             31.3     07-24    133<L>  |  97  |  23  ----------------------------<  269<H>  4.2   |  23  |  0.40<L>    Ca    9.0      24 Jul 2021 06:49        CAPILLARY BLOOD GLUCOSE      POCT Blood Glucose.: 299 mg/dL (24 Jul 2021 11:27)  POCT Blood Glucose.: 292 mg/dL (24 Jul 2021 07:35)  POCT Blood Glucose.: 240 mg/dL (23 Jul 2021 21:19)  POCT Blood Glucose.: 248 mg/dL (23 Jul 2021 16:25)            RADIOLOGY & ADDITIONAL TESTS:    Imaging Personally Reviewed:  [x] YES  [ ] NO    Consultant(s) Notes Reviewed:  [x] YES  [ ] NO    MEDICATIONS  (STANDING):  allopurinol 300 milliGRAM(s) Oral daily  aspirin  chewable 81 milliGRAM(s) Oral daily  dexAMETHasone     Tablet 4 milliGRAM(s) Oral every 8 hours  dextrose 40% Gel 15 Gram(s) Oral once  dextrose 5%. 1000 milliLiter(s) (50 mL/Hr) IV Continuous <Continuous>  dextrose 5%. 1000 milliLiter(s) (100 mL/Hr) IV Continuous <Continuous>  dextrose 50% Injectable 25 Gram(s) IV Push once  dextrose 50% Injectable 12.5 Gram(s) IV Push once  dextrose 50% Injectable 25 Gram(s) IV Push once  finasteride 5 milliGRAM(s) Oral daily  glucagon  Injectable 1 milliGRAM(s) IntraMuscular once  insulin glargine Injectable (LANTUS) 40 Unit(s) SubCutaneous at bedtime  insulin lispro (ADMELOG) corrective regimen sliding scale   SubCutaneous three times a day before meals  insulin lispro (ADMELOG) corrective regimen sliding scale   SubCutaneous at bedtime  insulin lispro Injectable (ADMELOG) 20 Unit(s) SubCutaneous three times a day before meals  melatonin 5 milliGRAM(s) Oral at bedtime  pantoprazole   Suspension 40 milliGRAM(s) Oral daily  QUEtiapine 25 milliGRAM(s) Oral at bedtime  senna 2 Tablet(s) Oral at bedtime  simethicone 80 milliGRAM(s) Chew three times a day  tamsulosin 0.4 milliGRAM(s) Oral at bedtime    MEDICATIONS  (PRN):  acetaminophen   Tablet .. 650 milliGRAM(s) Oral every 6 hours PRN Temp greater or equal to 38C (100.4F), Moderate Pain (4 - 6)  bisacodyl 5 milliGRAM(s) Oral every 12 hours PRN Constipation  polyethylene glycol 3350 17 Gram(s) Oral daily PRN Constipation      Care Discussed with Consultants/Other Providers [x] YES  [ ] NO    Vital Signs Last 24 Hrs  T(C): 36.5 (24 Jul 2021 11:19), Max: 36.8 (24 Jul 2021 00:15)  T(F): 97.7 (24 Jul 2021 11:19), Max: 98.3 (24 Jul 2021 00:15)  HR: 91 (24 Jul 2021 11:19) (73 - 91)  BP: 124/73 (24 Jul 2021 11:19) (106/69 - 137/75)  BP(mean): --  RR: 18 (24 Jul 2021 11:19) (18 - 18)  SpO2: 98% (24 Jul 2021 11:19) (98% - 100%)  I&O's Summary    23 Jul 2021 07:01  -  24 Jul 2021 07:00  --------------------------------------------------------  IN: 240 mL / OUT: 1700 mL / NET: -1460 mL    PHYSICAL EXAM:  GENERAL: NAD, well-developed, comfortable on room air   HEAD:  Atraumatic, Normocephalic  EYES: EOMI, PERRLA, conjunctiva and sclera clear  NECK: Supple, No JVD  CHEST/LUNG: mild decrease breath sounds bilaterally; No wheeze   HEART: Regular rate and rhythm; No murmurs, rubs, or gallops  ABDOMEN: Soft, Nontender, Nondistended; Bowel sounds present  : Sauceda in place, adina color urine, neg CVAT   Neuro: AAOx2-3, no focal weakness, mild right sided weakness baseline  EXTREMITIES:  2+ Peripheral Pulses, No clubbing, cyanosis, or edema  SKIN: No rashes or lesions

## 2021-07-24 NOTE — PROGRESS NOTE ADULT - REASON FOR ADMISSION
FTT
Hodgkin's disease; FTT
fever, FTT
lymphoma
HD
Hodgkin's disease
failure to thrive
fever
fever, ftt
hodgkins lymphoma
fever and failure to thrive
failure to thrive and fever
fever, FTT
ftt
ftt; hodgkin's

## 2021-07-24 NOTE — PROGRESS NOTE ADULT - ASSESSMENT
improving pancytopenia due for immunotherapy 1 week  unclear plans for rehab since patient would not be able to receive therapy for his HD in rehab

## 2021-07-24 NOTE — PROGRESS NOTE ADULT - SUBJECTIVE AND OBJECTIVE BOX
Chief complaint    Patient is a 71y old  Male who presents with a chief complaint of hodgkins lymphoma (24 Jul 2021 10:41)   Review of systems  Patient in bed, appears comfortable.    Labs and Fingersticks  CAPILLARY BLOOD GLUCOSE      POCT Blood Glucose.: 299 mg/dL (24 Jul 2021 11:27)  POCT Blood Glucose.: 292 mg/dL (24 Jul 2021 07:35)  POCT Blood Glucose.: 240 mg/dL (23 Jul 2021 21:19)  POCT Blood Glucose.: 248 mg/dL (23 Jul 2021 16:25)      Anion Gap, Serum: 13 (07-24 @ 06:49)      Calcium, Total Serum: 9.0 (07-24 @ 06:49)          07-24    133<L>  |  97  |  23  ----------------------------<  269<H>  4.2   |  23  |  0.40<L>    Ca    9.0      24 Jul 2021 06:49                          9.8    4.59  )-----------( 88       ( 24 Jul 2021 06:49 )             31.3     Medications  MEDICATIONS  (STANDING):  allopurinol 300 milliGRAM(s) Oral daily  aspirin  chewable 81 milliGRAM(s) Oral daily  dexAMETHasone     Tablet 4 milliGRAM(s) Oral every 8 hours  dextrose 40% Gel 15 Gram(s) Oral once  dextrose 5%. 1000 milliLiter(s) (50 mL/Hr) IV Continuous <Continuous>  dextrose 5%. 1000 milliLiter(s) (100 mL/Hr) IV Continuous <Continuous>  dextrose 50% Injectable 25 Gram(s) IV Push once  dextrose 50% Injectable 12.5 Gram(s) IV Push once  dextrose 50% Injectable 25 Gram(s) IV Push once  finasteride 5 milliGRAM(s) Oral daily  glucagon  Injectable 1 milliGRAM(s) IntraMuscular once  insulin glargine Injectable (LANTUS) 40 Unit(s) SubCutaneous at bedtime  insulin lispro (ADMELOG) corrective regimen sliding scale   SubCutaneous three times a day before meals  insulin lispro (ADMELOG) corrective regimen sliding scale   SubCutaneous at bedtime  insulin lispro Injectable (ADMELOG) 20 Unit(s) SubCutaneous three times a day before meals  melatonin 5 milliGRAM(s) Oral at bedtime  pantoprazole   Suspension 40 milliGRAM(s) Oral daily  QUEtiapine 25 milliGRAM(s) Oral at bedtime  senna 2 Tablet(s) Oral at bedtime  simethicone 80 milliGRAM(s) Chew three times a day  tamsulosin 0.4 milliGRAM(s) Oral at bedtime      Physical Exam  General: Patient comfortable in bed  Vital Signs Last 12 Hrs  T(F): 97.7 (07-24-21 @ 11:19), Max: 98 (07-24-21 @ 05:12)  HR: 91 (07-24-21 @ 11:19) (73 - 91)  BP: 124/73 (07-24-21 @ 11:19) (123/73 - 124/73)  BP(mean): --  RR: 18 (07-24-21 @ 11:19) (18 - 18)  SpO2: 98% (07-24-21 @ 11:19) (98% - 100%)  Neck: No palpable thyroid nodules.  CVS: S1S2, No murmurs  Respiratory: No wheezing, no crepitations  GI: Abdomen soft, bowel sounds positive  Musculoskeletal:  edema lower extremities.     Diagnostics

## 2021-07-24 NOTE — PROGRESS NOTE ADULT - SUBJECTIVE AND OBJECTIVE BOX
hpi  72 yo man with Hodgkins lymphoma abd nodes was on hospice now being treated for HD with Opdivo tolerating well, feeling better less abd pain  pmh sh fh unchanged 7 pt ros negative  physical  comfortable  vs  t98.3 86 131/75 18 100 sat  lungs clear  cor s1s2  abd soft distended ?ascites  ext no edema    data  wbc 4590 hgb 9.8 plt 71549 cr 0.4

## 2021-07-24 NOTE — PROGRESS NOTE ADULT - ASSESSMENT
70 y/o M w/ PMHx of CVA this past August and got TPA per family member, DM, BPH with obstruction s/p escamilla catheter, s/p ERCP w Sphincterectomy recent admission to [facility] for sepsis  Hodgkin lymphoma was not offered any chemo and was placed on hospice.     now presenting with weakness and FTT.   pt denies cp / SOB / palpitations   no focal neuro complaints .. at baseline RLE weakness   no N/V   had one episode of diarrhea   no urinary sx  abdominal pain, diffuse, but worse on R side.   Afib RVR overnight. Was asymptomatic   no known history of this as per patient

## 2021-07-24 NOTE — PROGRESS NOTE ADULT - SUBJECTIVE AND OBJECTIVE BOX
Subjective: Patient seen and examined. No new events except as noted.     REVIEW OF SYSTEMS:    CONSTITUTIONAL: No weakness, fevers or chills  EYES/ENT: No visual changes;  No vertigo or throat pain   NECK: No pain or stiffness  RESPIRATORY: No cough, wheezing, hemoptysis; No shortness of breath  CARDIOVASCULAR: No chest pain or palpitations  GASTROINTESTINAL: No abdominal or epigastric pain. No nausea, vomiting, or hematemesis; No diarrhea or constipation. No melena or hematochezia.  GENITOURINARY: No dysuria, frequency or hematuria  NEUROLOGICAL: No numbness or weakness  SKIN: No itching, burning, rashes, or lesions   All other review of systems is negative unless indicated above.    MEDICATIONS:  MEDICATIONS  (STANDING):  allopurinol 300 milliGRAM(s) Oral daily  aspirin  chewable 81 milliGRAM(s) Oral daily  dexAMETHasone     Tablet 4 milliGRAM(s) Oral every 8 hours  dextrose 40% Gel 15 Gram(s) Oral once  dextrose 5%. 1000 milliLiter(s) (50 mL/Hr) IV Continuous <Continuous>  dextrose 5%. 1000 milliLiter(s) (100 mL/Hr) IV Continuous <Continuous>  dextrose 50% Injectable 25 Gram(s) IV Push once  dextrose 50% Injectable 12.5 Gram(s) IV Push once  dextrose 50% Injectable 25 Gram(s) IV Push once  finasteride 5 milliGRAM(s) Oral daily  glucagon  Injectable 1 milliGRAM(s) IntraMuscular once  insulin glargine Injectable (LANTUS) 40 Unit(s) SubCutaneous at bedtime  insulin lispro (ADMELOG) corrective regimen sliding scale   SubCutaneous three times a day before meals  insulin lispro (ADMELOG) corrective regimen sliding scale   SubCutaneous at bedtime  insulin lispro Injectable (ADMELOG) 20 Unit(s) SubCutaneous three times a day before meals  melatonin 5 milliGRAM(s) Oral at bedtime  pantoprazole   Suspension 40 milliGRAM(s) Oral daily  QUEtiapine 25 milliGRAM(s) Oral at bedtime  senna 2 Tablet(s) Oral at bedtime  simethicone 80 milliGRAM(s) Chew three times a day  tamsulosin 0.4 milliGRAM(s) Oral at bedtime      PHYSICAL EXAM:  T(C): 36.7 (07-24-21 @ 20:31), Max: 36.8 (07-24-21 @ 00:15)  HR: 99 (07-24-21 @ 20:31) (73 - 99)  BP: 124/71 (07-24-21 @ 20:31) (123/73 - 137/75)  RR: 18 (07-24-21 @ 20:31) (18 - 18)  SpO2: 97% (07-24-21 @ 20:31) (97% - 100%)  Wt(kg): --  I&O's Summary    23 Jul 2021 07:01  -  24 Jul 2021 07:00  --------------------------------------------------------  IN: 240 mL / OUT: 1700 mL / NET: -1460 mL    24 Jul 2021 07:01  -  24 Jul 2021 22:11  --------------------------------------------------------  IN: 1000 mL / OUT: 1650 mL / NET: -650 mL          Appearance: Normal	  HEENT:   Normal oral mucosa, PERRL, EOMI	  Lymphatic: No lymphadenopathy , no edema  Cardiovascular: Normal S1 S2, No JVD, No murmurs , Peripheral pulses palpable 2+ bilaterally  Respiratory: Lungs clear to auscultation, normal effort 	  Gastrointestinal:  Soft, Non-tender, + BS	  Skin: No rashes, No ecchymoses, No cyanosis, warm to touch  Musculoskeletal: Normal range of motion, normal strength  Psychiatry:  Mood & affect appropriate  Ext: No edema      LABS:    CARDIAC MARKERS:                                9.8    4.59  )-----------( 88       ( 24 Jul 2021 06:49 )             31.3     07-24    133<L>  |  97  |  23  ----------------------------<  269<H>  4.2   |  23  |  0.40<L>    Ca    9.0      24 Jul 2021 06:49      proBNP:   Lipid Profile:   HgA1c:   TSH:             TELEMETRY: 	 SR  ECG:  	  RADIOLOGY:   DIAGNOSTIC TESTING:  [ ] Echocardiogram:  [ ]  Catheterization:  [ ] Stress Test:    OTHER:

## 2021-07-24 NOTE — PROGRESS NOTE ADULT - ASSESSMENT
72 y/o M w/ PMHx of CVA this past August and got TPA per family member, DM, BPH with obstruction s/p escamilla catheter, s/p ERCP w Sphincterectomy recent admission to [facility] for sepsis  Hodgkin lymphoma was not offered any chemo and was placed on hospice.     now presenting with weakness and FTT.   pt denies cp / SOB / palpitations   no focal neuro complaints .. at baseline RLE weakness   no N/V   had one episode of diarrhea   no urinary sx  abdominal pain, diffuse, but worse on R side.     - Failure to thrive: cultures neg  fungitell repeated Nl   CT chest repeated : improving   nutrition consult   encourage PO intake: dysphagia 3 based on MBS results. Pt feeling overall improved : will consider re-eval   (asking for better food to be given)    - lymphoma: d/w Dr. Larios: s/p immunotherapy .. will plan second dose on Aug 3th.   overall seemed to have responded somewhat to first dose     - DM with hyperglycemia : improved now worse with steroids   fu with endo     - anemia: monitor H/H post transfusion, stable.     - Fever: doubt infectious etiology   likely due to immunotherapy/lymphoma. culture: neg   abx dced. s/p IVF, ID f/u appreciated.   elevated fungitell noted, T chest improved / d/w Dr muñiz , repeat levels however low suspecion for fungal infection     - voice changes: CT neck : Asymmetric enlargement of the left palatine tonsil, suspicious for lymphomatous involvement given history. Correlate with direct visualization.  ENT had eval pt: no gross pathology on visualization   S/S eval: MBS done. speech: dysphagia 3    - Tachycardia: VA duplex neg for DVT   rate stable. noted 7 beats NSVT.  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 Smith and MRI department :  MR brain, cervical thoracic spine : non contrast done  and now awaiting with con... being expedited   cont steroids : will cont taper  fu LP cytology   appreciate neuro onc input   planned chemo early next week     - Afib: now in sinus : monitor   appreciate cardio input   no AC at this time and no AVNB at this time     - urinary retention: cont escamilla     dc planning awaiting rehab acceptance. (? the need for pt to wait until Aug 1st for insurance to consider authorization to rehab?)  CM/SW follow up.    DVT ppx

## 2021-07-24 NOTE — PROGRESS NOTE ADULT - ASSESSMENT
Assessment  DMT2: 71y Male with DM T2 with hyperglycemia, A1C 8.8%, was on oral meds/Janumet at home, admitted with weakness/fatigue and hyperglycemia, now on large-dose basal bolus insulin, FS fluctuating,  no hypoglycemias. Patient is eating meals, appears comfortable, dexamethasone taper in progress, planning DC to rehab on 2 week steroid taper.  Lymphoma: on medications, monitored, FU Heme/Onc.  Anemia: stable, monitored.      Shahriar Kahn MD  Cell: [phone]  Office: [phone]

## 2021-07-24 NOTE — PROGRESS NOTE ADULT - PROBLEM SELECTOR PLAN 1
Will continue current insulin regimen for now. Will continue monitoring  blood sugars, will Follow up.  Patient previously on Janumet, he has large insulin requirement at this time due to high-dose steroids. Effects of steroids can linger several weeks, patient would benefit from insulin as outpatient and is agreeable.  Suggest DC home on current basal bolus insulin regimen, endo FU 2 weeks.  Discussed plan with patient and family at bedside. Counseled that blood sugars will start trending down as steroids are tapered/dc. Insulin dose will have to be adjusted based on blood sugars.   Counseled for compliance with consistent low-carb diet.

## 2021-07-25 LAB
ANION GAP SERPL CALC-SCNC: 13 MMOL/L — SIGNIFICANT CHANGE UP (ref 5–17)
BUN SERPL-MCNC: 18 MG/DL — SIGNIFICANT CHANGE UP (ref 7–23)
CALCIUM SERPL-MCNC: 9 MG/DL — SIGNIFICANT CHANGE UP (ref 8.4–10.5)
CHLORIDE SERPL-SCNC: 97 MMOL/L — SIGNIFICANT CHANGE UP (ref 96–108)
CO2 SERPL-SCNC: 24 MMOL/L — SIGNIFICANT CHANGE UP (ref 22–31)
CREAT SERPL-MCNC: 0.42 MG/DL — LOW (ref 0.5–1.3)
GLUCOSE BLDC GLUCOMTR-MCNC: 251 MG/DL — HIGH (ref 70–99)
GLUCOSE BLDC GLUCOMTR-MCNC: 286 MG/DL — HIGH (ref 70–99)
GLUCOSE BLDC GLUCOMTR-MCNC: 322 MG/DL — HIGH (ref 70–99)
GLUCOSE BLDC GLUCOMTR-MCNC: 377 MG/DL — HIGH (ref 70–99)
GLUCOSE SERPL-MCNC: 264 MG/DL — HIGH (ref 70–99)
HCT VFR BLD CALC: 30.6 % — LOW (ref 39–50)
HGB BLD-MCNC: 9.8 G/DL — LOW (ref 13–17)
MCHC RBC-ENTMCNC: 31.9 PG — SIGNIFICANT CHANGE UP (ref 27–34)
MCHC RBC-ENTMCNC: 32 GM/DL — SIGNIFICANT CHANGE UP (ref 32–36)
MCV RBC AUTO: 99.7 FL — SIGNIFICANT CHANGE UP (ref 80–100)
NRBC # BLD: 0 /100 WBCS — SIGNIFICANT CHANGE UP (ref 0–0)
PLATELET # BLD AUTO: 93 K/UL — LOW (ref 150–400)
POTASSIUM SERPL-MCNC: 4.4 MMOL/L — SIGNIFICANT CHANGE UP (ref 3.5–5.3)
POTASSIUM SERPL-SCNC: 4.4 MMOL/L — SIGNIFICANT CHANGE UP (ref 3.5–5.3)
RBC # BLD: 3.07 M/UL — LOW (ref 4.2–5.8)
RBC # FLD: 25.9 % — HIGH (ref 10.3–14.5)
SODIUM SERPL-SCNC: 134 MMOL/L — LOW (ref 135–145)
WBC # BLD: 4.75 K/UL — SIGNIFICANT CHANGE UP (ref 3.8–10.5)
WBC # FLD AUTO: 4.75 K/UL — SIGNIFICANT CHANGE UP (ref 3.8–10.5)

## 2021-07-25 RX ORDER — INSULIN GLARGINE 100 [IU]/ML
45 INJECTION, SOLUTION SUBCUTANEOUS AT BEDTIME
Refills: 0 | Status: DISCONTINUED | OUTPATIENT
Start: 2021-07-25 | End: 2021-07-26

## 2021-07-25 RX ORDER — INSULIN LISPRO 100/ML
22 VIAL (ML) SUBCUTANEOUS
Refills: 0 | Status: DISCONTINUED | OUTPATIENT
Start: 2021-07-25 | End: 2021-07-26

## 2021-07-25 RX ORDER — HYDROCORTISONE 1 %
1 OINTMENT (GRAM) TOPICAL ONCE
Refills: 0 | Status: COMPLETED | OUTPATIENT
Start: 2021-07-25 | End: 2021-07-25

## 2021-07-25 RX ADMIN — Medication 4: at 11:46

## 2021-07-25 RX ADMIN — TAMSULOSIN HYDROCHLORIDE 0.4 MILLIGRAM(S): 0.4 CAPSULE ORAL at 21:34

## 2021-07-25 RX ADMIN — INSULIN GLARGINE 45 UNIT(S): 100 INJECTION, SOLUTION SUBCUTANEOUS at 21:33

## 2021-07-25 RX ADMIN — Medication 5 MILLIGRAM(S): at 21:34

## 2021-07-25 RX ADMIN — SIMETHICONE 80 MILLIGRAM(S): 80 TABLET, CHEWABLE ORAL at 14:40

## 2021-07-25 RX ADMIN — Medication 81 MILLIGRAM(S): at 11:00

## 2021-07-25 RX ADMIN — SIMETHICONE 80 MILLIGRAM(S): 80 TABLET, CHEWABLE ORAL at 21:34

## 2021-07-25 RX ADMIN — Medication 3: at 07:30

## 2021-07-25 RX ADMIN — Medication 22 UNIT(S): at 16:35

## 2021-07-25 RX ADMIN — Medication 1 SUPPOSITORY(S): at 23:48

## 2021-07-25 RX ADMIN — PANTOPRAZOLE SODIUM 40 MILLIGRAM(S): 20 TABLET, DELAYED RELEASE ORAL at 05:25

## 2021-07-25 RX ADMIN — Medication 3: at 16:35

## 2021-07-25 RX ADMIN — SIMETHICONE 80 MILLIGRAM(S): 80 TABLET, CHEWABLE ORAL at 05:25

## 2021-07-25 RX ADMIN — Medication 20 UNIT(S): at 11:46

## 2021-07-25 RX ADMIN — Medication 6: at 21:33

## 2021-07-25 RX ADMIN — Medication 4 MILLIGRAM(S): at 14:40

## 2021-07-25 RX ADMIN — FINASTERIDE 5 MILLIGRAM(S): 5 TABLET, FILM COATED ORAL at 11:01

## 2021-07-25 RX ADMIN — QUETIAPINE FUMARATE 25 MILLIGRAM(S): 200 TABLET, FILM COATED ORAL at 21:34

## 2021-07-25 RX ADMIN — Medication 4 MILLIGRAM(S): at 21:34

## 2021-07-25 RX ADMIN — Medication 300 MILLIGRAM(S): at 11:01

## 2021-07-25 RX ADMIN — Medication 20 UNIT(S): at 07:30

## 2021-07-25 RX ADMIN — Medication 4 MILLIGRAM(S): at 05:25

## 2021-07-25 NOTE — PROGRESS NOTE ADULT - SUBJECTIVE AND OBJECTIVE BOX
Subjective: Patient seen and examined. No new events except as noted.     REVIEW OF SYSTEMS:    CONSTITUTIONAL: No weakness, fevers or chills  EYES/ENT: No visual changes;  No vertigo or throat pain   NECK: No pain or stiffness  RESPIRATORY: No cough, wheezing, hemoptysis; No shortness of breath  CARDIOVASCULAR: No chest pain or palpitations  GASTROINTESTINAL: No abdominal or epigastric pain. No nausea, vomiting, or hematemesis; No diarrhea or constipation. No melena or hematochezia.  GENITOURINARY: No dysuria, frequency or hematuria  NEUROLOGICAL: No numbness or weakness  SKIN: No itching, burning, rashes, or lesions   All other review of systems is negative unless indicated above.    MEDICATIONS:  MEDICATIONS  (STANDING):  allopurinol 300 milliGRAM(s) Oral daily  aspirin  chewable 81 milliGRAM(s) Oral daily  dexAMETHasone     Tablet 4 milliGRAM(s) Oral every 8 hours  dextrose 40% Gel 15 Gram(s) Oral once  dextrose 5%. 1000 milliLiter(s) (50 mL/Hr) IV Continuous <Continuous>  dextrose 5%. 1000 milliLiter(s) (100 mL/Hr) IV Continuous <Continuous>  dextrose 50% Injectable 25 Gram(s) IV Push once  dextrose 50% Injectable 12.5 Gram(s) IV Push once  dextrose 50% Injectable 25 Gram(s) IV Push once  finasteride 5 milliGRAM(s) Oral daily  glucagon  Injectable 1 milliGRAM(s) IntraMuscular once  insulin glargine Injectable (LANTUS) 40 Unit(s) SubCutaneous at bedtime  insulin lispro (ADMELOG) corrective regimen sliding scale   SubCutaneous three times a day before meals  insulin lispro (ADMELOG) corrective regimen sliding scale   SubCutaneous at bedtime  insulin lispro Injectable (ADMELOG) 20 Unit(s) SubCutaneous three times a day before meals  melatonin 5 milliGRAM(s) Oral at bedtime  pantoprazole   Suspension 40 milliGRAM(s) Oral daily  QUEtiapine 25 milliGRAM(s) Oral at bedtime  senna 2 Tablet(s) Oral at bedtime  simethicone 80 milliGRAM(s) Chew three times a day  tamsulosin 0.4 milliGRAM(s) Oral at bedtime      PHYSICAL EXAM:  T(C): 36.6 (07-25-21 @ 04:13), Max: 36.7 (07-24-21 @ 15:48)  HR: 68 (07-25-21 @ 04:13) (68 - 105)  BP: 118/70 (07-25-21 @ 04:13) (118/70 - 155/76)  RR: 18 (07-25-21 @ 04:13) (18 - 18)  SpO2: 99% (07-25-21 @ 04:13) (97% - 99%)  Wt(kg): --  I&O's Summary    24 Jul 2021 07:01  -  25 Jul 2021 07:00  --------------------------------------------------------  IN: 1000 mL / OUT: 2950 mL / NET: -1950 mL          Appearance: NAD	  HEENT:   Dry oral mucosa, PERRL, EOMI	  Lymphatic: No lymphadenopathy , no edema  Cardiovascular: Normal S1 S2, No JVD, No murmurs , Peripheral pulses palpable 2+ bilaterally  Respiratory: Lungs clear to auscultation, normal effort 	  Gastrointestinal:  Soft, Non-tender, + BS	  Skin: No rashes, No ecchymoses, No cyanosis, warm to touch  Musculoskeletal: Normal range of motion, normal strength  Psychiatry:  Mood & affect appropriate  Ext: No edema      LABS:    CARDIAC MARKERS:                                9.8    4.75  )-----------( 93       ( 25 Jul 2021 06:41 )             30.6     07-25    134<L>  |  97  |  18  ----------------------------<  264<H>  4.4   |  24  |  0.42<L>    Ca    9.0      25 Jul 2021 06:41      proBNP:   Lipid Profile:   HgA1c:   TSH:             TELEMETRY: 	 SR   ECG:  	  RADIOLOGY:   DIAGNOSTIC TESTING:  [ ] Echocardiogram:  [ ]  Catheterization:  [ ] Stress Test:    OTHER:

## 2021-07-25 NOTE — PROGRESS NOTE ADULT - ASSESSMENT
Assessment  DMT2: 71y Male with DM T2 with hyperglycemia, A1C 8.8%, was on oral meds/Janumet at home, admitted with weakness/fatigue and hyperglycemia, now on large-dose basal bolus insulin, blood sugars fluctuating/running high and not at target in the setting of steroid management, no hypoglycemias. Patient is eating meals, appears comfortable, remains on dexamethasone.  Lymphoma: on medications, monitored, FU Heme/Onc.  Anemia: stable, monitored.      Shahriar Kahn MD  Cell: [phone]  Office: [phone]     Assessment  DMT2: 71y Male with DM T2 with hyperglycemia, A1C 8.8%, was on oral meds/Janumet at home, admitted with weakness/fatigue and hyperglycemia,  now on large-dose basal bolus insulin, blood sugars fluctuating/running high and not at target in the setting of steroid management, no hypoglycemias. Patient is eating meals, appears comfortable, remains on dexamethasone.  Lymphoma: on medications, monitored, FU Heme/Onc.  Anemia: stable, monitored.      Shahriar Kahn MD  Cell: [phone]  Office: [phone]

## 2021-07-25 NOTE — PROGRESS NOTE ADULT - ASSESSMENT
70 y/o M w/ PMHx of CVA this past August and got TPA per family member, DM, BPH with obstruction s/p escamilla catheter, s/p ERCP w Sphincterectomy recent admission to [facility] for sepsis  Hodgkin lymphoma was not offered any chemo and was placed on hospice.     now presenting with weakness and FTT.   pt denies cp / SOB / palpitations   no focal neuro complaints .. at baseline RLE weakness   no N/V   had one episode of diarrhea   no urinary sx  abdominal pain, diffuse, but worse on R side.     - Failure to thrive: cultures neg  fungitell repeated Nl   CT chest repeated : improving   nutrition consult   encourage PO intake: dysphagia 3 based on MBS results. Pt feeling overall improved : will consider re-eval   (asking for better food to be given)    - lymphoma: d/w Dr. Larios: s/p immunotherapy .. will plan second dose on Aug 3th.   overall seemed to have responded somewhat to first dose     - DM with hyperglycemia : improved now worse with steroids   fu with endo     - anemia: monitor H/H post transfusion, stable.     - Fever: doubt infectious etiology   likely due to immunotherapy/lymphoma. culture: neg   abx dced. s/p IVF, ID f/u appreciated.   elevated fungitell noted, T chest improved / d/w Dr muñiz , repeat levels however low suspecion for fungal infection     - voice changes: CT neck : Asymmetric enlargement of the left palatine tonsil, suspicious for lymphomatous involvement given history. Correlate with direct visualization.  ENT had eval pt: no gross pathology on visualization   S/S eval: MBS done. speech: dysphagia 3    - Tachycardia: VA duplex neg for DVT   rate stable. noted 7 beats NSVT.  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 Smith and MRI department :  MR brain, cervical thoracic spine : non contrast done  and now awaiting with con... being expedited   cont steroids : will cont taper  fu LP cytology   appreciate neuro onc input   planned chemo early next week     - Afib: now in sinus : monitor   appreciate cardio input   no AC at this time and no AVNB at this time     - urinary retention: cont escamilla     dc planning awaiting rehab acceptance. (? the need for pt to wait until Aug 1st for insurance to consider authorization to rehab?)  CM/SW follow up.    DVT ppx

## 2021-07-25 NOTE — PROGRESS NOTE ADULT - NSPROGADDITIONALINFOA_GEN_ALL_CORE
d/w pt and NP.    - Dr. KEAGAN Renee ([facility])  - [phone]
d/w pt and NP.    - Dr. KEAGAN Renee ([facility])  - [phone]
d/w pt and NP.    - Dr. KEAGAN Renee ([facility])  - [phone]
discussed with Dr. Freitas.
- GI/DVT ppx  - Counseling on diet, exercise, and medication adherence was done  - Counseling on smoking cessation and alcohol consumption offered when appropriate.  - Pain assessed and judicious use of narcotics when appropriate was discussed.    - Stroke education given when appropriate.  - Importance of fall prevention discussed.   - Differential diagnosis and plan of care discussed with patient and/or family and primary team  - Thank you for allowing me to participate in the care of this patient. Call with questions.
d/w pt and NP.    - Dr. KEAGAN Renee ([facility])  - [phone]
Sugar Meo MD  [facility]  [phone]
Sugar Moe MD  [facility]  [phone]

## 2021-07-25 NOTE — PROGRESS NOTE ADULT - PROBLEM SELECTOR PLAN 1
Will increase Lantus to 45u at bedtime.  Will increase Admelog to 22u before each meal and continue Admelog correction scale coverage. Will continue monitoring FS and FU, will adjust insulin dose as steroids are tapered/dc.  Patient previously on Janumet, he has large insulin requirement at this time due to high-dose steroids. Effects of steroids can linger several weeks, patient would benefit from insulin as outpatient and is agreeable.  Suggest DC home on current basal bolus insulin regimen, endo FU 2 weeks.  Discussed plan with patient and family at bedside. Counseled that blood sugars will start trending down as steroids are tapered/dc. Insulin dose will have to be adjusted based on blood sugars.   Counseled for compliance with consistent low-carb diet.

## 2021-07-25 NOTE — PROGRESS NOTE ADULT - SUBJECTIVE AND OBJECTIVE BOX
SUBJECTIVE / OVERNIGHT EVENTS:  --- Coverage for Dr. Alas ---   Pt seen and examined at bedside.   No overnight event.  Feeling better.  no cp, no sob, no n/v/d.   the wife and sister in law at bedside.  tele no events today           --------------------------------------------------------------------------------------------  LABS:                        9.8    4.75  )-----------( 93       ( 25 Jul 2021 06:41 )             30.6     07-25    134<L>  |  97  |  18  ----------------------------<  264<H>  4.4   |  24  |  0.42<L>    Ca    9.0      25 Jul 2021 06:41        CAPILLARY BLOOD GLUCOSE      POCT Blood Glucose.: 286 mg/dL (25 Jul 2021 16:11)  POCT Blood Glucose.: 322 mg/dL (25 Jul 2021 11:33)  POCT Blood Glucose.: 251 mg/dL (25 Jul 2021 07:27)  POCT Blood Glucose.: 304 mg/dL (24 Jul 2021 21:03)            RADIOLOGY & ADDITIONAL TESTS:    Imaging Personally Reviewed:  [x] YES  [ ] NO    Consultant(s) Notes Reviewed:  [x] YES  [ ] NO    MEDICATIONS  (STANDING):  allopurinol 300 milliGRAM(s) Oral daily  aspirin  chewable 81 milliGRAM(s) Oral daily  dexAMETHasone     Tablet 4 milliGRAM(s) Oral every 8 hours  dextrose 40% Gel 15 Gram(s) Oral once  dextrose 5%. 1000 milliLiter(s) (50 mL/Hr) IV Continuous <Continuous>  dextrose 5%. 1000 milliLiter(s) (100 mL/Hr) IV Continuous <Continuous>  dextrose 50% Injectable 25 Gram(s) IV Push once  dextrose 50% Injectable 12.5 Gram(s) IV Push once  dextrose 50% Injectable 25 Gram(s) IV Push once  finasteride 5 milliGRAM(s) Oral daily  glucagon  Injectable 1 milliGRAM(s) IntraMuscular once  insulin glargine Injectable (LANTUS) 45 Unit(s) SubCutaneous at bedtime  insulin lispro (ADMELOG) corrective regimen sliding scale   SubCutaneous three times a day before meals  insulin lispro (ADMELOG) corrective regimen sliding scale   SubCutaneous at bedtime  insulin lispro Injectable (ADMELOG) 22 Unit(s) SubCutaneous three times a day before meals  melatonin 5 milliGRAM(s) Oral at bedtime  pantoprazole   Suspension 40 milliGRAM(s) Oral daily  QUEtiapine 25 milliGRAM(s) Oral at bedtime  senna 2 Tablet(s) Oral at bedtime  simethicone 80 milliGRAM(s) Chew three times a day  tamsulosin 0.4 milliGRAM(s) Oral at bedtime    MEDICATIONS  (PRN):  acetaminophen   Tablet .. 650 milliGRAM(s) Oral every 6 hours PRN Temp greater or equal to 38C (100.4F), Moderate Pain (4 - 6)  bisacodyl 5 milliGRAM(s) Oral every 12 hours PRN Constipation  polyethylene glycol 3350 17 Gram(s) Oral daily PRN Constipation      Care Discussed with Consultants/Other Providers [x] YES  [ ] NO    Vital Signs Last 24 Hrs  T(C): 36.5 (25 Jul 2021 11:17), Max: 36.7 (24 Jul 2021 20:31)  T(F): 97.7 (25 Jul 2021 11:17), Max: 98 (24 Jul 2021 20:31)  HR: 74 (25 Jul 2021 11:17) (68 - 105)  BP: 124/68 (25 Jul 2021 11:17) (118/70 - 155/76)  BP(mean): --  RR: 18 (25 Jul 2021 11:17) (18 - 18)  SpO2: 99% (25 Jul 2021 11:17) (97% - 99%)  I&O's Summary    24 Jul 2021 07:01  -  25 Jul 2021 07:00  --------------------------------------------------------  IN: 1000 mL / OUT: 2950 mL / NET: -1950 mL    25 Jul 2021 07:01  -  25 Jul 2021 17:07  --------------------------------------------------------  IN: 720 mL / OUT: 1100 mL / NET: -380 mL      PHYSICAL EXAM:  GENERAL: NAD, well-developed, comfortable on room air   HEAD:  Atraumatic, Normocephalic  EYES: EOMI, PERRLA, conjunctiva and sclera clear  NECK: Supple, No JVD  CHEST/LUNG: mild decrease breath sounds bilaterally; No wheeze   HEART: Regular rate and rhythm; No murmurs, rubs, or gallops  ABDOMEN: Soft, Nontender, Nondistended; Bowel sounds present  : Sauceda in place, adina color urine, neg CVAT   Neuro: AAOx2-3, no focal weakness, mild right sided weakness baseline  EXTREMITIES:  2+ Peripheral Pulses, No clubbing, cyanosis, trace b/l edema  SKIN: No rashes or lesions

## 2021-07-25 NOTE — PROGRESS NOTE ADULT - ASSESSMENT
72 y/o M w/ PMHx of CVA this past August and got TPA per family member, DM, BPH with obstruction s/p escamilla catheter, s/p ERCP w Sphincterectomy recent admission to [facility] for sepsis  Hodgkin lymphoma was not offered any chemo and was placed on hospice.     now presenting with weakness and FTT.   pt denies cp / SOB / palpitations   no focal neuro complaints .. at baseline RLE weakness   no N/V   had one episode of diarrhea   no urinary sx  abdominal pain, diffuse, but worse on R side.   Afib RVR overnight. Was asymptomatic   no known history of this as per patient

## 2021-07-25 NOTE — PROGRESS NOTE ADULT - SUBJECTIVE AND OBJECTIVE BOX
Chief complaint  Patient is a 71y old  Male who presents with a chief complaint of hodgkins lymphoma (24 Jul 2021 10:41)   Review of systems  Patient in bed, looks comfortable, no hypoglycemic episodes.    Labs and Fingersticks  CAPILLARY BLOOD GLUCOSE      POCT Blood Glucose.: 322 mg/dL (25 Jul 2021 11:33)  POCT Blood Glucose.: 251 mg/dL (25 Jul 2021 07:27)  POCT Blood Glucose.: 304 mg/dL (24 Jul 2021 21:03)  POCT Blood Glucose.: 269 mg/dL (24 Jul 2021 16:14)      Anion Gap, Serum: 13 (07-25 @ 06:41)  Anion Gap, Serum: 13 (07-24 @ 06:49)      Calcium, Total Serum: 9.0 (07-25 @ 06:41)  Calcium, Total Serum: 9.0 (07-24 @ 06:49)          07-25    134<L>  |  97  |  18  ----------------------------<  264<H>  4.4   |  24  |  0.42<L>    Ca    9.0      25 Jul 2021 06:41                          9.8    4.75  )-----------( 93       ( 25 Jul 2021 06:41 )             30.6     Medications  MEDICATIONS  (STANDING):  allopurinol 300 milliGRAM(s) Oral daily  aspirin  chewable 81 milliGRAM(s) Oral daily  dexAMETHasone     Tablet 4 milliGRAM(s) Oral every 8 hours  dextrose 40% Gel 15 Gram(s) Oral once  dextrose 5%. 1000 milliLiter(s) (50 mL/Hr) IV Continuous <Continuous>  dextrose 5%. 1000 milliLiter(s) (100 mL/Hr) IV Continuous <Continuous>  dextrose 50% Injectable 25 Gram(s) IV Push once  dextrose 50% Injectable 12.5 Gram(s) IV Push once  dextrose 50% Injectable 25 Gram(s) IV Push once  finasteride 5 milliGRAM(s) Oral daily  glucagon  Injectable 1 milliGRAM(s) IntraMuscular once  insulin glargine Injectable (LANTUS) 45 Unit(s) SubCutaneous at bedtime  insulin lispro (ADMELOG) corrective regimen sliding scale   SubCutaneous three times a day before meals  insulin lispro (ADMELOG) corrective regimen sliding scale   SubCutaneous at bedtime  insulin lispro Injectable (ADMELOG) 22 Unit(s) SubCutaneous three times a day before meals  melatonin 5 milliGRAM(s) Oral at bedtime  pantoprazole   Suspension 40 milliGRAM(s) Oral daily  QUEtiapine 25 milliGRAM(s) Oral at bedtime  senna 2 Tablet(s) Oral at bedtime  simethicone 80 milliGRAM(s) Chew three times a day  tamsulosin 0.4 milliGRAM(s) Oral at bedtime      Physical Exam  General: Patient comfortable in bed  Vital Signs Last 12 Hrs  T(F): 97.7 (07-25-21 @ 11:17), Max: 97.9 (07-25-21 @ 04:13)  HR: 74 (07-25-21 @ 11:17) (68 - 74)  BP: 124/68 (07-25-21 @ 11:17) (118/70 - 124/68)  BP(mean): --  RR: 18 (07-25-21 @ 11:17) (18 - 18)  SpO2: 99% (07-25-21 @ 11:17) (99% - 99%)  Neck: No palpable thyroid nodules.  CVS: S1S2, No murmurs  Respiratory: No wheezing, no crepitations  GI: Abdomen soft, bowel sounds positive  Musculoskeletal:  edema lower extremities.   Skin: No skin rashes, no ecchymosis    Diagnostics                 Chief complaint  Patient is a 71y old  Male who presents with a chief complaint of hodgkins lymphoma (24 Jul 2021 10:41)   Review of systems  Patient in bed, looks comfortable, no hypoglycemic episodes.    Labs and Fingersticks  CAPILLARY BLOOD GLUCOSE      POCT Blood Glucose.: 322 mg/dL (25 Jul 2021 11:33)  POCT Blood Glucose.: 251 mg/dL (25 Jul 2021 07:27)  POCT Blood Glucose.: 304 mg/dL (24 Jul 2021 21:03)  POCT Blood Glucose.: 269 mg/dL (24 Jul 2021 16:14)      Anion Gap, Serum: 13 (07-25 @ 06:41)  Anion Gap, Serum: 13 (07-24 @ 06:49)      Calcium, Total Serum: 9.0 (07-25 @ 06:41)  Calcium, Total Serum: 9.0 (07-24 @ 06:49)          07-25    134<L>  |  97  |  18  ----------------------------<  264<H>  4.4   |  24  |  0.42<L>    Ca    9.0      25 Jul 2021 06:41                          9.8    4.75  )-----------( 93       ( 25 Jul 2021 06:41 )             30.6     Medications  MEDICATIONS  (STANDING):  allopurinol 300 milliGRAM(s) Oral daily  aspirin  chewable 81 milliGRAM(s) Oral daily  dexAMETHasone     Tablet 4 milliGRAM(s) Oral every 8 hours  dextrose 40% Gel 15 Gram(s) Oral once  dextrose 5%. 1000 milliLiter(s) (50 mL/Hr) IV Continuous <Continuous>  dextrose 5%. 1000 milliLiter(s) (100 mL/Hr) IV Continuous <Continuous>  dextrose 50% Injectable 25 Gram(s) IV Push once  dextrose 50% Injectable 12.5 Gram(s) IV Push once  dextrose 50% Injectable 25 Gram(s) IV Push once  finasteride 5 milliGRAM(s) Oral daily  glucagon  Injectable 1 milliGRAM(s) IntraMuscular once  insulin glargine Injectable (LANTUS) 45 Unit(s) SubCutaneous at bedtime  insulin lispro (ADMELOG) corrective regimen sliding scale   SubCutaneous three times a day before meals  insulin lispro (ADMELOG) corrective regimen sliding scale   SubCutaneous at bedtime  insulin lispro Injectable (ADMELOG) 22 Unit(s) SubCutaneous three times a day before meals  melatonin 5 milliGRAM(s) Oral at bedtime  pantoprazole   Suspension 40 milliGRAM(s) Oral daily  QUEtiapine 25 milliGRAM(s) Oral at bedtime  senna 2 Tablet(s) Oral at bedtime  simethicone 80 milliGRAM(s) Chew three times a day  tamsulosin 0.4 milliGRAM(s) Oral at bedtime      Physical Exam  General: Patient comfortable in bed  Vital Signs Last 12 Hrs  T(F): 97.7 (07-25-21 @ 11:17), Max: 97.9 (07-25-21 @ 04:13)  HR: 74 (07-25-21 @ 11:17) (68 - 74)  BP: 124/68 (07-25-21 @ 11:17) (118/70 - 124/68)  BP(mean): --  RR: 18 (07-25-21 @ 11:17) (18 - 18)  SpO2: 99% (07-25-21 @ 11:17) (99% - 99%)  Neck: No palpable thyroid nodules.  CVS: S1S2, No murmurs  Respiratory: No wheezing, no crepitations  GI: Abdomen soft, bowel sounds positive  Musculoskeletal:  edema lower extremities.   Skin: No skin rashes, no ecchymosis    Diagnostics

## 2021-07-26 LAB
ANION GAP SERPL CALC-SCNC: 15 MMOL/L — SIGNIFICANT CHANGE UP (ref 5–17)
BUN SERPL-MCNC: 22 MG/DL — SIGNIFICANT CHANGE UP (ref 7–23)
CALCIUM SERPL-MCNC: 8.9 MG/DL — SIGNIFICANT CHANGE UP (ref 8.4–10.5)
CHLORIDE SERPL-SCNC: 96 MMOL/L — SIGNIFICANT CHANGE UP (ref 96–108)
CO2 SERPL-SCNC: 23 MMOL/L — SIGNIFICANT CHANGE UP (ref 22–31)
CREAT SERPL-MCNC: 0.37 MG/DL — LOW (ref 0.5–1.3)
GLUCOSE BLDC GLUCOMTR-MCNC: 253 MG/DL — HIGH (ref 70–99)
GLUCOSE BLDC GLUCOMTR-MCNC: 292 MG/DL — HIGH (ref 70–99)
GLUCOSE BLDC GLUCOMTR-MCNC: 300 MG/DL — HIGH (ref 70–99)
GLUCOSE BLDC GLUCOMTR-MCNC: 321 MG/DL — HIGH (ref 70–99)
GLUCOSE SERPL-MCNC: 316 MG/DL — HIGH (ref 70–99)
HCT VFR BLD CALC: 31.9 % — LOW (ref 39–50)
HGB BLD-MCNC: 10.1 G/DL — LOW (ref 13–17)
MCHC RBC-ENTMCNC: 31.7 GM/DL — LOW (ref 32–36)
MCHC RBC-ENTMCNC: 31.8 PG — SIGNIFICANT CHANGE UP (ref 27–34)
MCV RBC AUTO: 100.3 FL — HIGH (ref 80–100)
NRBC # BLD: 0 /100 WBCS — SIGNIFICANT CHANGE UP (ref 0–0)
PLATELET # BLD AUTO: 112 K/UL — LOW (ref 150–400)
POTASSIUM SERPL-MCNC: 4.5 MMOL/L — SIGNIFICANT CHANGE UP (ref 3.5–5.3)
POTASSIUM SERPL-SCNC: 4.5 MMOL/L — SIGNIFICANT CHANGE UP (ref 3.5–5.3)
RBC # BLD: 3.18 M/UL — LOW (ref 4.2–5.8)
RBC # FLD: 25.8 % — HIGH (ref 10.3–14.5)
SODIUM SERPL-SCNC: 134 MMOL/L — LOW (ref 135–145)
WBC # BLD: 4.78 K/UL — SIGNIFICANT CHANGE UP (ref 3.8–10.5)
WBC # FLD AUTO: 4.78 K/UL — SIGNIFICANT CHANGE UP (ref 3.8–10.5)

## 2021-07-26 PROCEDURE — 74230 X-RAY XM SWLNG FUNCJ C+: CPT | Mod: 26

## 2021-07-26 RX ORDER — INSULIN LISPRO 100/ML
27 VIAL (ML) SUBCUTANEOUS
Refills: 0 | Status: DISCONTINUED | OUTPATIENT
Start: 2021-07-26 | End: 2021-07-29

## 2021-07-26 RX ORDER — INSULIN GLARGINE 100 [IU]/ML
52 INJECTION, SOLUTION SUBCUTANEOUS AT BEDTIME
Refills: 0 | Status: DISCONTINUED | OUTPATIENT
Start: 2021-07-26 | End: 2021-07-27

## 2021-07-26 RX ADMIN — Medication 4: at 11:20

## 2021-07-26 RX ADMIN — Medication 4 MILLIGRAM(S): at 15:27

## 2021-07-26 RX ADMIN — INSULIN GLARGINE 52 UNIT(S): 100 INJECTION, SOLUTION SUBCUTANEOUS at 21:29

## 2021-07-26 RX ADMIN — Medication 2: at 21:33

## 2021-07-26 RX ADMIN — Medication 27 UNIT(S): at 16:45

## 2021-07-26 RX ADMIN — Medication 300 MILLIGRAM(S): at 11:18

## 2021-07-26 RX ADMIN — Medication 4 MILLIGRAM(S): at 05:05

## 2021-07-26 RX ADMIN — Medication 10 MILLIGRAM(S): at 15:09

## 2021-07-26 RX ADMIN — TAMSULOSIN HYDROCHLORIDE 0.4 MILLIGRAM(S): 0.4 CAPSULE ORAL at 21:29

## 2021-07-26 RX ADMIN — SENNA PLUS 2 TABLET(S): 8.6 TABLET ORAL at 21:29

## 2021-07-26 RX ADMIN — Medication 81 MILLIGRAM(S): at 11:18

## 2021-07-26 RX ADMIN — Medication 5 MILLIGRAM(S): at 21:29

## 2021-07-26 RX ADMIN — FINASTERIDE 5 MILLIGRAM(S): 5 TABLET, FILM COATED ORAL at 11:18

## 2021-07-26 RX ADMIN — SIMETHICONE 80 MILLIGRAM(S): 80 TABLET, CHEWABLE ORAL at 05:06

## 2021-07-26 RX ADMIN — Medication 3: at 07:35

## 2021-07-26 RX ADMIN — Medication 27 UNIT(S): at 11:20

## 2021-07-26 RX ADMIN — Medication 4 MILLIGRAM(S): at 21:29

## 2021-07-26 RX ADMIN — SIMETHICONE 80 MILLIGRAM(S): 80 TABLET, CHEWABLE ORAL at 15:27

## 2021-07-26 RX ADMIN — PANTOPRAZOLE SODIUM 40 MILLIGRAM(S): 20 TABLET, DELAYED RELEASE ORAL at 05:06

## 2021-07-26 RX ADMIN — SIMETHICONE 80 MILLIGRAM(S): 80 TABLET, CHEWABLE ORAL at 21:29

## 2021-07-26 RX ADMIN — QUETIAPINE FUMARATE 25 MILLIGRAM(S): 200 TABLET, FILM COATED ORAL at 21:28

## 2021-07-26 RX ADMIN — Medication 3: at 16:45

## 2021-07-26 RX ADMIN — Medication 22 UNIT(S): at 07:35

## 2021-07-26 NOTE — SWALLOW VFSS/MBS ASSESSMENT ADULT - ORAL PREP COMMENTS
Adequate ability to strip the bolus from the tsp and complete labia seal for cup drinking. Pt not able to bite through the cookie however was able to modify appropriately; broke in halves.

## 2021-07-26 NOTE — SWALLOW VFSS/MBS ASSESSMENT ADULT - SPECIFY REASON(S)
Order received/appreciated. Evaluation ordered to determine current swallow function and least restrictive diet.
to objectively assess the swallow mechanism; r/o dysphagia.

## 2021-07-26 NOTE — SWALLOW VFSS/MBS ASSESSMENT ADULT - SLP PERTINENT HISTORY OF CURRENT PROBLEM
70 y/o M w/ pmhx of CVA this past August and got TPA per family member, DM , BPH with obstruction s/p escamilla catheter , s/p ERCP w Sphincteretomy recent admission to [facility] for sepsis  hodgkin lympoma was not offered any chemo and was placed on hospice. now presenting with weakness and FTT. Cultures sent. CT chest ordered . Heme/onc consulted -> pending work up can consider systemic treatment vs hospice. ENT consulted for hoarseness -> left vocal cord weakness (poor conditioning) and a small glottic gap found bedside indirect laryngoscopy. Pt on modified diet. Diet per speech.
This is a 70 y/o M w/ pmhx of CVA this past August and got TPA per family member, DM , BPH with obstruction s/p escamilla catheter , s/p ERCP w Sphincteretomy recent admission to [facility] for sepsis  hodgkin lympoma was not offered any chemo and was placed on hospice. now presenting with weakness and FTT. Cultures sent. CT chest ordered . Heme/onc consulted -> pending work up can consider systemic treatment vs hospice. ENT consulted for hoarseness -> left vocal cord weakness (poor conditioning) and a small glottic gap found bedside indirect laryngoscopy.

## 2021-07-26 NOTE — SWALLOW VFSS/MBS ASSESSMENT ADULT - ANTICIPATED DISCHARGE DISPOSITION
as per MD plan for AR; would benefit from continued support at next level of care to preserve swallow function

## 2021-07-26 NOTE — PROGRESS NOTE ADULT - ASSESSMENT
72 y/o M with PMH of CVA, DM, BPH with obstruction s/p Sauceda catheter, s/p ERCP w Sphincteretomy, recent admission to [facility] for sepsis, Hodgkin lymphoma dx 2020 - was not offered any chemo and was placed on hospice, DVT 8/2020. Presenting with weakness and FTT. Pt and family opted for treatment of lymphoma - s/p Opdivo 7/6. Called to consult for CT chest findings 6/28 with patchy GGO and scattered nodular opacities throughout all lobes of the lungs. Currently breathing comfortably on RA.

## 2021-07-26 NOTE — SWALLOW VFSS/MBS ASSESSMENT ADULT - ORAL PHASE COMMENTS
Oral phase c/b adequate orientation/reception. No anterior leakage. Reduced control of bolus leading to premature spillover in the oropharynx; premature spillage to the level of the valleculae with inconsistent spillover to the pyriform sinuses. Oral phase c/b adequate orientation/reception. No anterior leakage. Reduced control of bolus leading to premature spillover in the oropharynx; premature spillage to the level of the valleculae with inconsistent spillover to the pyriform sinuses. Prolonged mastication, manipulation which was eventually effective with solid textures.

## 2021-07-26 NOTE — SWALLOW VFSS/MBS ASSESSMENT ADULT - RECOMMENDED CONSISTENCY
>Liberalize to regular solids/ thin liquids; as pt able to choose items that are softer and able to manage.   >Single sips and small bites   >No straw   >Chin tuck position with liquids   >Encourage dry, double swallows intermittently during meals   >Medication in applesauce, NOT with water   >Would recommend  mixed consistencies (e.g. chicken noodle soup; separate liquids and solids) if wish to consume.   >Maintain aspiration precautions with ALL po intake, including medication   >Stringent oral care   >Feed when awake/alert including medication >Liberalize to regular solids/ thin liquids; as pt able to choose items that are softer and able to manage.   Please see below for additional recommendations/safe swallowing practices    >Single sips and small bites   >No straw   >Chin tuck position with liquids   >Encourage dry, double swallows intermittently during meals   >Medication in applesauce, NOT with water   >Would recommend  mixed consistencies (e.g. chicken noodle soup; separate liquids and solids) if wish to consume.   >Maintain aspiration precautions with ALL po intake, including medication   >Stringent oral care   >Feed when awake/alert including medication

## 2021-07-26 NOTE — PROGRESS NOTE ADULT - SUBJECTIVE AND OBJECTIVE BOX
Date of service: 07-26-21 @ 21:24      Patient is a 71y old  Male who presents with a chief complaint of hodgkins lymphoma (24 Jul 2021 10:41)                                                               INTERVAL HPI/OVERNIGHT EVENTS:    REVIEW OF SYSTEMS:     CONSTITUTIONAL: No weakness, fevers or chills  EYES/ENT: No visual changes , no ear ache   NECK: No pain or stiffness  RESPIRATORY: No cough, wheezing,  No shortness of breath  CARDIOVASCULAR: No chest pain or palpitations  GASTROINTESTINAL: No abdominal pain  . No nausea, vomiting, or hematemesis; No diarrhea or constipation. No melena or hematochezia.  GENITOURINARY: No dysuria, frequency or hematuria  NEUROLOGICAL: No numbness or weakness  SKIN: No itching, burning, rashes, or lesions                                                                                                                                                                                                                                                                                 Medications:  MEDICATIONS  (STANDING):  allopurinol 300 milliGRAM(s) Oral daily  aspirin  chewable 81 milliGRAM(s) Oral daily  dexAMETHasone     Tablet 4 milliGRAM(s) Oral every 8 hours  dextrose 40% Gel 15 Gram(s) Oral once  dextrose 5%. 1000 milliLiter(s) (50 mL/Hr) IV Continuous <Continuous>  dextrose 5%. 1000 milliLiter(s) (100 mL/Hr) IV Continuous <Continuous>  dextrose 50% Injectable 25 Gram(s) IV Push once  dextrose 50% Injectable 12.5 Gram(s) IV Push once  dextrose 50% Injectable 25 Gram(s) IV Push once  finasteride 5 milliGRAM(s) Oral daily  glucagon  Injectable 1 milliGRAM(s) IntraMuscular once  insulin glargine Injectable (LANTUS) 52 Unit(s) SubCutaneous at bedtime  insulin lispro (ADMELOG) corrective regimen sliding scale   SubCutaneous three times a day before meals  insulin lispro (ADMELOG) corrective regimen sliding scale   SubCutaneous at bedtime  insulin lispro Injectable (ADMELOG) 27 Unit(s) SubCutaneous three times a day before meals  melatonin 5 milliGRAM(s) Oral at bedtime  pantoprazole   Suspension 40 milliGRAM(s) Oral daily  QUEtiapine 25 milliGRAM(s) Oral at bedtime  senna 2 Tablet(s) Oral at bedtime  simethicone 80 milliGRAM(s) Chew three times a day  tamsulosin 0.4 milliGRAM(s) Oral at bedtime    MEDICATIONS  (PRN):  acetaminophen   Tablet .. 650 milliGRAM(s) Oral every 6 hours PRN Temp greater or equal to 38C (100.4F), Moderate Pain (4 - 6)  bisacodyl 5 milliGRAM(s) Oral every 12 hours PRN Constipation  polyethylene glycol 3350 17 Gram(s) Oral daily PRN Constipation       Allergies    No Known Allergies    Intolerances      Vital Signs Last 24 Hrs  T(C): 36.8 (26 Jul 2021 20:34), Max: 36.9 (26 Jul 2021 11:26)  T(F): 98.3 (26 Jul 2021 20:34), Max: 98.4 (26 Jul 2021 11:26)  HR: 105 (26 Jul 2021 20:34) (68 - 107)  BP: 116/72 (26 Jul 2021 20:34) (114/80 - 142/78)  BP(mean): --  RR: 18 (26 Jul 2021 20:34) (18 - 18)  SpO2: 99% (26 Jul 2021 20:34) (98% - 100%)  CAPILLARY BLOOD GLUCOSE      POCT Blood Glucose.: 292 mg/dL (26 Jul 2021 21:08)  POCT Blood Glucose.: 253 mg/dL (26 Jul 2021 16:25)  POCT Blood Glucose.: 321 mg/dL (26 Jul 2021 11:07)  POCT Blood Glucose.: 300 mg/dL (26 Jul 2021 07:22)      07-25 @ 07:01 - 07-26 @ 07:00  --------------------------------------------------------  IN: 1000 mL / OUT: 3000 mL / NET: -2000 mL    07-26 @ 07:01 - 07-26 @ 21:24  --------------------------------------------------------  IN: 1180 mL / OUT: 1100 mL / NET: 80 mL      Physical Exam:    Daily     Daily   General:  Well appearing, NAD, not cachetic  HEENT:  Nonicteric, PERRLA  CV:  RRR, S1S2   Lungs:  CTA B/L, no wheezes, rales, rhonchi  Abdomen:  Soft, nt   Extremities:  2+ pulses, no c/c, no edema  Skin:  Warm and dry, no rashes  :   andi  Neuro:  AAOx3, non-focal, grossly intact                                                                                                                                                                                                                                                                                                LABS:                               10.1   4.78  )-----------( 112      ( 26 Jul 2021 06:20 )             31.9                      07-26    134<L>  |  96  |  22  ----------------------------<  316<H>  4.5   |  23  |  0.37<L>    Ca    8.9      26 Jul 2021 06:19                         RADIOLOGY & ADDITIONAL TESTS         I personally reviewed: [  ]EKG   [  ]CXR    [  ] CT      A/P:         Discussed with :     [facility] consultants' Notes   Time spent :

## 2021-07-26 NOTE — PROGRESS NOTE ADULT - SUBJECTIVE AND OBJECTIVE BOX
Follow-up Pulm Progress Note    No new respiratory events overnight.  Denies SOB/CP.     Medications:  MEDICATIONS  (STANDING):  allopurinol 300 milliGRAM(s) Oral daily  aspirin  chewable 81 milliGRAM(s) Oral daily  dexAMETHasone     Tablet 4 milliGRAM(s) Oral every 8 hours  dextrose 40% Gel 15 Gram(s) Oral once  dextrose 5%. 1000 milliLiter(s) (50 mL/Hr) IV Continuous <Continuous>  dextrose 5%. 1000 milliLiter(s) (100 mL/Hr) IV Continuous <Continuous>  dextrose 50% Injectable 25 Gram(s) IV Push once  dextrose 50% Injectable 12.5 Gram(s) IV Push once  dextrose 50% Injectable 25 Gram(s) IV Push once  finasteride 5 milliGRAM(s) Oral daily  glucagon  Injectable 1 milliGRAM(s) IntraMuscular once  insulin glargine Injectable (LANTUS) 52 Unit(s) SubCutaneous at bedtime  insulin lispro (ADMELOG) corrective regimen sliding scale   SubCutaneous three times a day before meals  insulin lispro (ADMELOG) corrective regimen sliding scale   SubCutaneous at bedtime  insulin lispro Injectable (ADMELOG) 27 Unit(s) SubCutaneous three times a day before meals  melatonin 5 milliGRAM(s) Oral at bedtime  pantoprazole   Suspension 40 milliGRAM(s) Oral daily  QUEtiapine 25 milliGRAM(s) Oral at bedtime  senna 2 Tablet(s) Oral at bedtime  simethicone 80 milliGRAM(s) Chew three times a day  tamsulosin 0.4 milliGRAM(s) Oral at bedtime    MEDICATIONS  (PRN):  acetaminophen   Tablet .. 650 milliGRAM(s) Oral every 6 hours PRN Temp greater or equal to 38C (100.4F), Moderate Pain (4 - 6)  bisacodyl 5 milliGRAM(s) Oral every 12 hours PRN Constipation  polyethylene glycol 3350 17 Gram(s) Oral daily PRN Constipation          Vital Signs Last 24 Hrs  T(C): 36.9 (26 Jul 2021 11:26), Max: 36.9 (25 Jul 2021 20:00)  T(F): 98.4 (26 Jul 2021 11:26), Max: 98.5 (25 Jul 2021 20:00)  HR: 94 (26 Jul 2021 11:26) (68 - 101)  BP: 114/80 (26 Jul 2021 11:26) (114/80 - 138/74)  BP(mean): --  RR: 18 (26 Jul 2021 11:26) (18 - 18)  SpO2: 100% (26 Jul 2021 11:26) (98% - 100%)          07-25 @ 07:01  -  07-26 @ 07:00  --------------------------------------------------------  IN: 1000 mL / OUT: 3000 mL / NET: -2000 mL          LABS:                        10.1   4.78  )-----------( 112      ( 26 Jul 2021 06:20 )             31.9     07-26    134<L>  |  96  |  22  ----------------------------<  316<H>  4.5   |  23  |  0.37<L>    Ca    8.9      26 Jul 2021 06:19          CAPILLARY BLOOD GLUCOSE      POCT Blood Glucose.: 321 mg/dL (26 Jul 2021 11:07)        Physical Examination:  PULM: Clear to auscultation bilaterally, no significant sputum production  CVS: S1, S2 heard    RADIOLOGY REVIEWED  CT chest: < from: CT Chest w/ IV Cont (07.19.21 @ 17:31) >  FINDINGS:  CHEST:  LUNGS AND LARGE AIRWAYS: Patent central airways. There is a 4 mm groundglass nodule (series 4, image 171). There is an unchanged 1 cm cystic nodule (series 4, image 223). Interval decrease in right lower lobe nodule at the costophrenic angle, now measuring 1.3 x 0.8 cm (series 4, image 338). Other prior mentioned solid and groundglass nodules are resolved. Mild bilateral subsegmental atelectasis.  PLEURA: No pleural effusion.  VESSELS: Coronary artery calcifications.  HEART: Heart size is normal. No pericardial effusion.  MEDIASTINUM AND SARTHAK: No lymphadenopathy.  CHEST WALL AND LOWER NECK: Within normal limits.    < end of copied text >

## 2021-07-26 NOTE — PROGRESS NOTE ADULT - SUBJECTIVE AND OBJECTIVE BOX
Patient is a 71y old  Male who presents with a chief complaint of hodgkins lymphoma (24 Jul 2021 10:41)    No pain.  Breathing okay. No bleeding.  Appetite good and no N/V    Medication:   acetaminophen   Tablet .. 650 milliGRAM(s) Oral every 6 hours PRN  allopurinol 300 milliGRAM(s) Oral daily  aspirin  chewable 81 milliGRAM(s) Oral daily  bisacodyl 5 milliGRAM(s) Oral every 12 hours PRN  dexAMETHasone     Tablet 4 milliGRAM(s) Oral every 8 hours  dextrose 40% Gel 15 Gram(s) Oral once  dextrose 5%. 1000 milliLiter(s) IV Continuous <Continuous>  dextrose 5%. 1000 milliLiter(s) IV Continuous <Continuous>  dextrose 50% Injectable 25 Gram(s) IV Push once  dextrose 50% Injectable 12.5 Gram(s) IV Push once  dextrose 50% Injectable 25 Gram(s) IV Push once  finasteride 5 milliGRAM(s) Oral daily  glucagon  Injectable 1 milliGRAM(s) IntraMuscular once  insulin glargine Injectable (LANTUS) 52 Unit(s) SubCutaneous at bedtime  insulin lispro (ADMELOG) corrective regimen sliding scale   SubCutaneous three times a day before meals  insulin lispro (ADMELOG) corrective regimen sliding scale   SubCutaneous at bedtime  insulin lispro Injectable (ADMELOG) 27 Unit(s) SubCutaneous three times a day before meals  melatonin 5 milliGRAM(s) Oral at bedtime  pantoprazole   Suspension 40 milliGRAM(s) Oral daily  polyethylene glycol 3350 17 Gram(s) Oral daily PRN  QUEtiapine 25 milliGRAM(s) Oral at bedtime  senna 2 Tablet(s) Oral at bedtime  simethicone 80 milliGRAM(s) Chew three times a day  tamsulosin 0.4 milliGRAM(s) Oral at bedtime      Physical exam    T(C): 36.8 (07-26-21 @ 16:12), Max: 36.9 (07-25-21 @ 20:00)  HR: 107 (07-26-21 @ 16:12) (68 - 107)  BP: 142/78 (07-26-21 @ 16:12) (114/80 - 142/78)  RR: 18 (07-26-21 @ 16:12) (18 - 18)  SpO2: 99% (07-26-21 @ 16:12) (98% - 100%)  Wt(kg): --    alert NAD  EOMI anicteric sclera  Cv s1 S2 RRR  Lungs clear B/L anteriorly  abd soft NT    Labs                        10.1   4.78  )-----------( 112      ( 26 Jul 2021 06:20 )             31.9       07-26    134<L>  |  96  |  22  ----------------------------<  316<H>  4.5   |  23  |  0.37<L>    Ca    8.9      26 Jul 2021 06:19            [id number]

## 2021-07-26 NOTE — SWALLOW VFSS/MBS ASSESSMENT ADULT - ROSENBEK'S PENETRATION ASPIRATION SCALE
1= solids 2= thin liquids and nectar thick liquids neutral position 1- thin liquids and nectar thick liquids chin tuck

## 2021-07-26 NOTE — SWALLOW VFSS/MBS ASSESSMENT ADULT - SLP GENERAL OBSERVATIONS
Pt is AA+Ox4; hypophonic; follows multi-step directives. Pt is AA+Ox4; follows multi-step directives.

## 2021-07-26 NOTE — SWALLOW VFSS/MBS ASSESSMENT ADULT - PHARYNGEAL PHASE COMMENTS
Pharyngeal phase c/b latency in the swallow trigger to the level of the valleculae with inconsistent spillover to the pyriform sinuses with nectar thick liquids via cup (neutral head position) and thin liquids via tsp and cup (neutral head and with chin tuck). Base of tongue/BoT retraction and pharyngeal constriction were reduced and resulted in mildly reduced pharyngeal bolus propulsion. Mild-moderate vallecular residue present with liquids. Of note, moderate residue in the valleculae and in the pyriform sinuses with solids which was cleared upon repeat dry swallows.  Hyolaryngeal elevation and excursion were mildly reduced. Inconsistent full epiglottic inversion, with epiglottis observed to abut the posterior-pharyngeal wall.  Adequate relaxation of UES. Pharyngeal phase c/b latency in the swallow trigger to the level of the valleculae with inconsistent spillover to the pyriform sinuses with nectar thick liquids via cup (neutral head position) and thin liquids via tsp and cup (neutral head and with chin tuck); premature spillage to the level of the valleculae with solid textures. Base of tongue/BoT retraction and pharyngeal constriction were reduced and resulted in mildly reduced pharyngeal bolus propulsion. Mild-moderate vallecular residue present with liquids. Of note, moderate residue in the valleculae and in the pyriform sinuses with solids which was cleared upon repeat dry swallows.  Hyolaryngeal elevation and excursion were mildly reduced. Inconsistent full epiglottic inversion, with epiglottis observed to abut the posterior-pharyngeal wall, at times.  Adequate relaxation of UES.  Consistent transient penetration with nectar thick liquids via cup-neutral head position. Transient penetration captured with thin liquids via tsp-head neutral position and with thin liquids via cup sips in neutral head posture however with increase in deepness and amount vs tsp presentation.   No penetration captured with nectar thick liquids via cup- chin tuck posture, thin liquids via tsp with chin tuck posture, and thin liquids via cup with chin tuck posture (x2). No penetration captured w/ solid textures.   No aspiration captured during this study, however given current swallow function pt is at risk for aspiration.

## 2021-07-26 NOTE — PROGRESS NOTE ADULT - ASSESSMENT
70 y/o M w/ PMHx of CVA this past August and got TPA per family member, DM, BPH with obstruction s/p escamilla catheter, s/p ERCP w Sphincterectomy recent [facility] for sepsis  Hodgkin lymphoma was not offered any chemo and was placed on hospice.     now presenting with weakness and FTT.   pt denies cp / SOB / palpitations   no focal neuro complaints .. at baseline RLE weakness   no N/V   had one episode of diarrhea   no urinary sx  abdominal pain, diffuse, but worse on R side.   Afib RVR overnight. Was asymptomatic   no known history of this as per patient

## 2021-07-26 NOTE — PROGRESS NOTE ADULT - SUBJECTIVE AND OBJECTIVE BOX
Chief complaint  Patient is a 71y old  Male who presents with a chief complaint of hodgkins lymphoma (24 Jul 2021 10:41)   Review of systems  Patient in bed, looks comfortable, no hypoglycemic episodes.    Labs and Fingersticks  CAPILLARY BLOOD GLUCOSE      POCT Blood Glucose.: 321 mg/dL (26 Jul 2021 11:07)  POCT Blood Glucose.: 300 mg/dL (26 Jul 2021 07:22)  POCT Blood Glucose.: 377 mg/dL (25 Jul 2021 21:13)  POCT Blood Glucose.: 286 mg/dL (25 Jul 2021 16:11)      Anion Gap, Serum: 15 (07-26 @ 06:19)  Anion Gap, Serum: 13 (07-25 @ 06:41)      Calcium, Total Serum: 8.9 (07-26 @ 06:19)  Calcium, Total Serum: 9.0 (07-25 @ 06:41)          07-26    134<L>  |  96  |  22  ----------------------------<  316<H>  4.5   |  23  |  0.37<L>    Ca    8.9      26 Jul 2021 06:19                          10.1   4.78  )-----------( 112      ( 26 Jul 2021 06:20 )             31.9     Medications  MEDICATIONS  (STANDING):  allopurinol 300 milliGRAM(s) Oral daily  aspirin  chewable 81 milliGRAM(s) Oral daily  dexAMETHasone     Tablet 4 milliGRAM(s) Oral every 8 hours  dextrose 40% Gel 15 Gram(s) Oral once  dextrose 5%. 1000 milliLiter(s) (50 mL/Hr) IV Continuous <Continuous>  dextrose 5%. 1000 milliLiter(s) (100 mL/Hr) IV Continuous <Continuous>  dextrose 50% Injectable 25 Gram(s) IV Push once  dextrose 50% Injectable 12.5 Gram(s) IV Push once  dextrose 50% Injectable 25 Gram(s) IV Push once  finasteride 5 milliGRAM(s) Oral daily  glucagon  Injectable 1 milliGRAM(s) IntraMuscular once  insulin glargine Injectable (LANTUS) 52 Unit(s) SubCutaneous at bedtime  insulin lispro (ADMELOG) corrective regimen sliding scale   SubCutaneous three times a day before meals  insulin lispro (ADMELOG) corrective regimen sliding scale   SubCutaneous at bedtime  insulin lispro Injectable (ADMELOG) 27 Unit(s) SubCutaneous three times a day before meals  melatonin 5 milliGRAM(s) Oral at bedtime  pantoprazole   Suspension 40 milliGRAM(s) Oral daily  QUEtiapine 25 milliGRAM(s) Oral at bedtime  senna 2 Tablet(s) Oral at bedtime  simethicone 80 milliGRAM(s) Chew three times a day  tamsulosin 0.4 milliGRAM(s) Oral at bedtime      Physical Exam  General: Patient comfortable in bed  Vital Signs Last 12 Hrs  T(F): 98.4 (07-26-21 @ 11:26), Max: 98.4 (07-26-21 @ 11:26)  HR: 94 (07-26-21 @ 11:26) (68 - 94)  BP: 114/80 (07-26-21 @ 11:26) (114/80 - 115/74)  BP(mean): --  RR: 18 (07-26-21 @ 11:26) (18 - 18)  SpO2: 100% (07-26-21 @ 11:26) (98% - 100%)  Neck: No palpable thyroid nodules.  CVS: S1S2, No murmurs  Respiratory: No wheezing, no crepitations  GI: Abdomen soft, bowel sounds positive  Musculoskeletal:  edema lower extremities.   Skin: No skin rashes, no ecchymosis    Diagnostics                 Chief complaint  Patient is a 71y old  Male who presents with a chief complaint of hodgkins lymphoma (24 Jul 2021 10:41)   Review of systems  Patient in bed, looks comfortable, no hypoglycemic episodes.    Labs and Fingersticks  CAPILLARY BLOOD GLUCOSE      POCT Blood Glucose.: 321 mg/dL (26 Jul 2021 11:07)  POCT Blood Glucose.: 300 mg/dL (26 Jul 2021 07:22)  POCT Blood Glucose.: 377 mg/dL (25 Jul 2021 21:13)  POCT Blood Glucose.: 286 mg/dL (25 Jul 2021 16:11)      Anion Gap, Serum: 15 (07-26 @ 06:19)  Anion Gap, Serum: 13 (07-25 @ 06:41)      Calcium, Total Serum: 8.9 (07-26 @ 06:19)  Calcium, Total Serum: 9.0 (07-25 @ 06:41)          07-26    134<L>  |  96  |  22  ----------------------------<  316<H>  4.5   |  23  |  0.37<L>    Ca    8.9      26 Jul 2021 06:19                          10.1   4.78  )-----------( 112      ( 26 Jul 2021 06:20 )             31.9     Medications  MEDICATIONS  (STANDING):  allopurinol 300 milliGRAM(s) Oral daily  aspirin  chewable 81 milliGRAM(s) Oral daily  dexAMETHasone     Tablet 4 milliGRAM(s) Oral every 8 hours  dextrose 40% Gel 15 Gram(s) Oral once  dextrose 5%. 1000 milliLiter(s) (50 mL/Hr) IV Continuous <Continuous>  dextrose 5%. 1000 milliLiter(s) (100 mL/Hr) IV Continuous <Continuous>  dextrose 50% Injectable 25 Gram(s) IV Push once  dextrose 50% Injectable 12.5 Gram(s) IV Push once  dextrose 50% Injectable 25 Gram(s) IV Push once  finasteride 5 milliGRAM(s) Oral daily  glucagon  Injectable 1 milliGRAM(s) IntraMuscular once  insulin glargine Injectable (LANTUS) 52 Unit(s) SubCutaneous at bedtime  insulin lispro (ADMELOG) corrective regimen sliding scale   SubCutaneous three times a day before meals  insulin lispro (ADMELOG) corrective regimen sliding scale   SubCutaneous at bedtime  insulin lispro Injectable (ADMELOG) 27 Unit(s) SubCutaneous three times a day before meals  melatonin 5 milliGRAM(s) Oral at bedtime  pantoprazole   Suspension 40 milliGRAM(s) Oral daily  QUEtiapine 25 milliGRAM(s) Oral at bedtime  senna 2 Tablet(s) Oral at bedtime  simethicone 80 milliGRAM(s) Chew three times a day  tamsulosin 0.4 milliGRAM(s) Oral at bedtime      Physical Exam  General: Patient comfortable in bed  Vital Signs Last 12 Hrs  T(F): 98.4 (07-26-21 @ 11:26), Max: 98.4 (07-26-21 @ 11:26)  HR: 94 (07-26-21 @ 11:26) (68 - 94)  BP: 114/80 (07-26-21 @ 11:26) (114/80 - 115/74)  BP(mean): --  RR: 18 (07-26-21 @ 11:26) (18 - 18)  SpO2: 100% (07-26-21 @ 11:26) (98% - 100%)  Neck: No palpable thyroid nodules.  CVS: S1S2, No murmurs  Respiratory: No wheezing, no crepitations  GI: Abdomen soft, bowel sounds positive  Musculoskeletal:  edema lower extremities.   Skin: No skin rashes, no ecchymosis    Diagnostics

## 2021-07-26 NOTE — PROGRESS NOTE ADULT - SUBJECTIVE AND OBJECTIVE BOX
Subjective: Patient seen and examined. No new events except as noted.   feels ok     REVIEW OF SYSTEMS:    CONSTITUTIONAL: +weakness, fevers or chills  EYES/ENT: No visual changes;  No vertigo or throat pain   NECK: No pain or stiffness  RESPIRATORY: No cough, wheezing, hemoptysis; No shortness of breath  CARDIOVASCULAR: No chest pain or palpitations  GASTROINTESTINAL: No abdominal or epigastric pain. No nausea, vomiting, or hematemesis; No diarrhea or constipation. No melena or hematochezia.  GENITOURINARY: No dysuria, frequency or hematuria  NEUROLOGICAL: No numbness or weakness  SKIN: No itching, burning, rashes, or lesions   All other review of systems is negative unless indicated above.    MEDICATIONS:  MEDICATIONS  (STANDING):  allopurinol 300 milliGRAM(s) Oral daily  aspirin  chewable 81 milliGRAM(s) Oral daily  dexAMETHasone     Tablet 4 milliGRAM(s) Oral every 8 hours  dextrose 40% Gel 15 Gram(s) Oral once  dextrose 5%. 1000 milliLiter(s) (50 mL/Hr) IV Continuous <Continuous>  dextrose 5%. 1000 milliLiter(s) (100 mL/Hr) IV Continuous <Continuous>  dextrose 50% Injectable 25 Gram(s) IV Push once  dextrose 50% Injectable 12.5 Gram(s) IV Push once  dextrose 50% Injectable 25 Gram(s) IV Push once  finasteride 5 milliGRAM(s) Oral daily  glucagon  Injectable 1 milliGRAM(s) IntraMuscular once  insulin glargine Injectable (LANTUS) 52 Unit(s) SubCutaneous at bedtime  insulin lispro (ADMELOG) corrective regimen sliding scale   SubCutaneous three times a day before meals  insulin lispro (ADMELOG) corrective regimen sliding scale   SubCutaneous at bedtime  insulin lispro Injectable (ADMELOG) 27 Unit(s) SubCutaneous three times a day before meals  melatonin 5 milliGRAM(s) Oral at bedtime  pantoprazole   Suspension 40 milliGRAM(s) Oral daily  QUEtiapine 25 milliGRAM(s) Oral at bedtime  senna 2 Tablet(s) Oral at bedtime  simethicone 80 milliGRAM(s) Chew three times a day  tamsulosin 0.4 milliGRAM(s) Oral at bedtime      PHYSICAL EXAM:  T(C): 36.4 (07-26-21 @ 04:13), Max: 36.9 (07-25-21 @ 20:00)  HR: 68 (07-26-21 @ 04:13) (68 - 101)  BP: 115/74 (07-26-21 @ 04:13) (115/74 - 138/74)  RR: 18 (07-26-21 @ 04:13) (18 - 18)  SpO2: 98% (07-26-21 @ 04:13) (98% - 100%)  Wt(kg): --  I&O's Summary    25 Jul 2021 07:01  -  26 Jul 2021 07:00  --------------------------------------------------------  IN: 1000 mL / OUT: 3000 mL / NET: -2000 mL    26 Jul 2021 07:01  -  26 Jul 2021 09:37  --------------------------------------------------------  IN: 340 mL / OUT: 0 mL / NET: 340 mL          Appearance: Normal	  HEENT:   Normal oral mucosa, PERRL, EOMI	  Lymphatic: No lymphadenopathy , no edema  Cardiovascular: Normal S1 S2, No JVD, No murmurs , Peripheral pulses palpable 2+ bilaterally  Respiratory: Lungs clear to auscultation, normal effort 	  Gastrointestinal:  Soft, Non-tender, + BS	  Skin: No rashes, No ecchymoses, No cyanosis, warm to touch  Musculoskeletal: Normal range of motion, normal strength  Psychiatry:  Mood & affect appropriate  Ext: No edema      LABS:    CARDIAC MARKERS:                                10.1   4.78  )-----------( 112      ( 26 Jul 2021 06:20 )             31.9     07-26    134<L>  |  96  |  22  ----------------------------<  316<H>  4.5   |  23  |  0.37<L>    Ca    8.9      26 Jul 2021 06:19      proBNP:   Lipid Profile:   HgA1c:   TSH:             TELEMETRY: 	  SR  ECG:  	  RADIOLOGY:   DIAGNOSTIC TESTING:  [ ] Echocardiogram:  [ ]  Catheterization:  [ ] Stress Test:    OTHER:

## 2021-07-26 NOTE — SWALLOW VFSS/MBS ASSESSMENT ADULT - ADDITIONAL INFORMATION
Pt received in DAISY Chair in radiology on RA and in NAD. A&0x4. Calm. Cooperative and pleasant throughout. Denied pain. Able to follow all instructions well. Vocal quality was hoarse, however adequate volume. Of note, improvement as compared to previous intervention dates as per pt subjective report and per documentation from this service. Lateral views are taken. Radiologist and SLP present. Offered Varibar thin liquids via tsp and cup in neutral and chin tuck position, nectar thick liquids via cup in neutral and chin tuck position, as well as small and larger pieces of solids. It should be noted that be indicated he was unable to 'bite through' a hard solid 2/2 dental state (mandibular dentures not placed as he does not eat with them 2/2 pain) however he would break them into smaller pieces or be able to bite through a solid that was softer such as a [place] cookie. Pt able to self present all textures via differing modalities.

## 2021-07-26 NOTE — PROGRESS NOTE ADULT - ASSESSMENT
HL - s/p Opdivo.  Next dose due in 1-2 weeks.    Anemia and thrombocytopenia due to malignancy.  Hgb and platelets rising and adequate and can monitor

## 2021-07-26 NOTE — PROGRESS NOTE ADULT - ASSESSMENT
72 y/o M w/ PMHx of CVA this past August and got TPA per family member, DM, BPH with obstruction s/p escamilla catheter, s/p ERCP w Sphincterectomy recent admission to [facility] for sepsis  Hodgkin lymphoma was not offered any chemo and was placed on hospice.     now presenting with weakness and FTT.   pt denies cp / SOB / palpitations   no focal neuro complaints .. at baseline RLE weakness   no N/V   had one episode of diarrhea   no urinary sx  abdominal pain, diffuse, but worse on R side.     - Failure to thrive: cultures neg  fungitell repeated Nl   CT chest repeated : improving   nutrition consult   encourage PO intake: dysphagia 3 based on MBS results. Pt feeling overall improved : will consider re-eval   (asking for better food to be given)    - lymphoma: d/w Dr. Larios: s/p immunotherapy .. will plan second dose on Aug 3th.   overall seemed to have responded somewhat to first dose     - DM with hyperglycemia : improved now worse with steroids   fu with endo     - anemia: monitor H/H post transfusion, stable.     - Fever: doubt infectious etiology   likely due to immunotherapy/lymphoma. culture: neg   abx dced. s/p IVF, ID f/u appreciated.   elevated fungitell noted, T chest improved / d/w Dr muñiz , repeat levels however low suspecion for fungal infection     - voice changes: CT neck : Asymmetric enlargement of the left palatine tonsil, suspicious for lymphomatous involvement given history. Correlate with direct visualization.  ENT had eval pt: no gross pathology on visualization   S/S eval: MBS done. speech: dysphagia 3    - Tachycardia: VA duplex neg for DVT   rate stable. noted 7 beats NSVT.  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 Smith and MRI department :  MR brain, cervical thoracic spine : non contrast done  and now awaiting with con... being expedited   cont steroids : will cont taper  fu LP cytology   appreciate neuro onc input   planned chemo early next week     - Afib: now in sinus : monitor   appreciate cardio input   no AC at this time and no AVNB at this time     - urinary retention: cont escamilla     discussed with Dr. Larios : will plan inpt immunotherapy while awaiting insurance approval for rehab.    DVT ppx

## 2021-07-26 NOTE — SWALLOW VFSS/MBS ASSESSMENT ADULT - DIAGNOSTIC IMPRESSIONS
This is a 70 yo M admitted with weakness and FTT in setting of Hodgkins lymphoma. Patient presents on MBS with an oropharyngeal dysphagia c/b prolonged mastication, reduced control of bolus leading to premature spillage behind the BoT, latency in the swallow response leading to premature spillage to the level of the valleculae with intermittent spillover to the pyriform sinuses, in addition reduced airway protect (in setting of known left vocal cord paresis) resulting in transient penetration with thin liquids and nectar thick liquids. Insensate to penetrated materials. Penetrated material is cleared on spontaneous repeat, dry swallows. To note, chin tuck position was beneficial in improved airway protection. Did not trial left head turn, as was not beneficial on previous MBS.  No aspiration was identified however pt at risk for aspiration given current swallow function. This is a 72 yo M admitted with weakness and FTT in setting of Hodgkins lymphoma. Patient presents on MBS with an oropharyngeal dysphagia c/b prolonged mastication, reduced control of bolus leading to premature spillage behind the BoT, latency in the swallow response leading to premature spillage to the level of the valleculae with intermittent spillover to the pyriform sinuses, in addition reduced airway protect (in setting of known left vocal cord paresis) resulting in transient penetration with thin liquids and nectar thick liquids. Insensate to penetrated materials. Penetrated material is cleared on spontaneous repeat, dry swallows. To note, chin tuck position was beneficial in improved airway protection. Did not trial left head turn, as was not beneficial on previous MBS.  No aspiration was identified however pt at risk for aspiration given current swallow function. Would strongly recommend the below listed recommendations/safe swallowing practices.

## 2021-07-26 NOTE — SWALLOW VFSS/MBS ASSESSMENT ADULT - NS SWALLOW VFSS REC ASPIR MON
Monitor for s/s aspiration/laryngeal penetration. If noted:  D/C p.o. intake, provide non-oral nutrition/hydration/meds, and contact this service @ [phone]/change of breathing pattern/position upright (90Y)/cough/gurgly voice/fever/pneumonia/throat clearing/upper respiratory infection
change of breathing pattern/oral hygiene/position upright (90Y)/upper respiratory infection

## 2021-07-26 NOTE — SWALLOW VFSS/MBS ASSESSMENT ADULT - ORAL PHASE
Delayed oral transit time/Uncontrolled bolus / spillover in freya-pharynx/Uncontrolled bolus / spillover in hypopharynx

## 2021-07-26 NOTE — PROGRESS NOTE ADULT - ASSESSMENT
Assessment  DMT2: 71y Male with DM T2 with hyperglycemia, A1C 8.8%, was on oral meds/Janumet at home, admitted with weakness/fatigue and hyperglycemia, now on large-dose basal bolus insulin, increased dose yesterday, blood sugars still running high and not at target in the setting of steroid management, no hypoglycemic episodes. Patient is s/p speech & swallow eval, diet advanced, remains on dexamethasone.  Lymphoma: on medications, monitored, FU Heme/Onc.  Anemia: stable, monitored.      Shahriar Kahn MD  Cell: [phone]  Office: [phone]     Assessment  DMT2: 71y Male with DM T2 with hyperglycemia, A1C 8.8%, was on oral meds/Janumet at home, admitted with weakness/fatigue  and hyperglycemia, now on large-dose basal bolus insulin, increased dose yesterday, blood sugars still running high and not at target in the setting of steroid management, no hypoglycemic episodes. Patient is s/p speech & swallow eval, diet advanced, remains on dexamethasone.  Lymphoma: on medications, monitored, FU Heme/Onc.  Anemia: stable, monitored.      Shahriar Kahn MD  Cell: [phone]  Office: [phone]

## 2021-07-26 NOTE — PROGRESS NOTE ADULT - PROBLEM SELECTOR PLAN 1
Will increase Lantus to 52u at bedtime.  Will increase Admelog to 27u before each meal and continue Admelog correction scale coverage. Will continue monitoring FS and FU, will adjust insulin dose as steroids are tapered/dc.  Patient previously on Janumet, he has large insulin requirement at this time due to high-dose steroids. Effects of steroids can linger several weeks, patient would benefit from insulin as outpatient and is agreeable.  Suggest DC home on current basal bolus insulin regimen, endo FU 2 weeks.  Discussed plan with patient and family at bedside. Counseled that blood sugars will start trending down as steroids are tapered/dc. Insulin dose will have to be adjusted based on blood sugars.   Counseled for compliance with consistent low-carb diet.

## 2021-07-26 NOTE — SWALLOW VFSS/MBS ASSESSMENT ADULT - COMMENTS
Continued:   Of note, as per documentation Pt and family opted for treatment of lymphoma - s/p Opdivo 7/6. Pulm alled to consult for CT chest findings 6/28 with patchy GGO and scattered nodular opacities throughout all lobes of the lungs. Noted to have intermittent fevers throughout hospital stay, afebrile past week. Fever: doubt infectious etiology likely due to immunotherapy/lymphoma. culture: neg  (7/20) Pt now with mets to spine - plan for chemo in next few days  (7/23) Per oncology note, Pt S/P immunotherapy 1st rx with opdivo on 7/6; plan for second dose on 8.3. "Overall seemed to have responded somewhat to first dose."    Speech/Swallow history: Pt known to this service from this admission. BSE completed 6/30/21: Continue dysphagia 1 with nectar thick liquids  Attempted MBS 7/7/21: "Pt planned for MBS this morning however noted with RRT overnight for fever and tachycardia, now pending transfusion."  MBS 7/10/21: "72 yo M admitted with failure to thrive in setting of Hodgkins lymphoma. Patient presents on MBS with an oropharyngeal dysphagia. Reduced mastication on hard solids with bolus expelled. Oral residue with reduced sensation noted for soft solids however able to clear volitionally given min cueing for lingual sweep. Premature spillage of liquids and reduced airway closure (in setting of known left vocal cord paresis) result in silent aspiration of thin liquids. Cued cough is not effective in clearing aspirated material. Left head rotation is not effective in improving airway protection. Improved airway protection is achieved for thin liquids with use of teaspoon administration in chin tuck position; min cueing for timely/effective use of strategy. Laryngeal penetration is noted on cup sips of nectar thickened liquids. Penetrated material is cleared on spontaneous repeat swallow. There is no evidence of laryngeal penetration or aspiration on soft solids. Rec: Dysphagia 3, nectar thickened liquids."     MD requesting repeat MBSS for possible advancement.

## 2021-07-27 LAB
ANION GAP SERPL CALC-SCNC: 14 MMOL/L — SIGNIFICANT CHANGE UP (ref 5–17)
BUN SERPL-MCNC: 28 MG/DL — HIGH (ref 7–23)
CALCIUM SERPL-MCNC: 9 MG/DL — SIGNIFICANT CHANGE UP (ref 8.4–10.5)
CHLORIDE SERPL-SCNC: 96 MMOL/L — SIGNIFICANT CHANGE UP (ref 96–108)
CO2 SERPL-SCNC: 22 MMOL/L — SIGNIFICANT CHANGE UP (ref 22–31)
CREAT SERPL-MCNC: 0.39 MG/DL — LOW (ref 0.5–1.3)
GLUCOSE BLDC GLUCOMTR-MCNC: 248 MG/DL — HIGH (ref 70–99)
GLUCOSE BLDC GLUCOMTR-MCNC: 277 MG/DL — HIGH (ref 70–99)
GLUCOSE BLDC GLUCOMTR-MCNC: 306 MG/DL — HIGH (ref 70–99)
GLUCOSE BLDC GLUCOMTR-MCNC: 307 MG/DL — HIGH (ref 70–99)
GLUCOSE SERPL-MCNC: 281 MG/DL — HIGH (ref 70–99)
HCT VFR BLD CALC: 32.6 % — LOW (ref 39–50)
HGB BLD-MCNC: 10.4 G/DL — LOW (ref 13–17)
MCHC RBC-ENTMCNC: 31.9 GM/DL — LOW (ref 32–36)
MCHC RBC-ENTMCNC: 32.4 PG — SIGNIFICANT CHANGE UP (ref 27–34)
MCV RBC AUTO: 101.6 FL — HIGH (ref 80–100)
NRBC # BLD: 0 /100 WBCS — SIGNIFICANT CHANGE UP (ref 0–0)
PLATELET # BLD AUTO: 127 K/UL — LOW (ref 150–400)
POTASSIUM SERPL-MCNC: 4.5 MMOL/L — SIGNIFICANT CHANGE UP (ref 3.5–5.3)
POTASSIUM SERPL-SCNC: 4.5 MMOL/L — SIGNIFICANT CHANGE UP (ref 3.5–5.3)
RBC # BLD: 3.21 M/UL — LOW (ref 4.2–5.8)
RBC # FLD: 25.4 % — HIGH (ref 10.3–14.5)
SODIUM SERPL-SCNC: 132 MMOL/L — LOW (ref 135–145)
WBC # BLD: 3.98 K/UL — SIGNIFICANT CHANGE UP (ref 3.8–10.5)
WBC # FLD AUTO: 3.98 K/UL — SIGNIFICANT CHANGE UP (ref 3.8–10.5)

## 2021-07-27 PROCEDURE — 99222 1ST HOSP IP/OBS MODERATE 55: CPT

## 2021-07-27 RX ORDER — PHENYLEPHRINE-SHARK LIVER OIL-MINERAL OIL-PETROLATUM RECTAL OINTMENT
1 OINTMENT (GRAM) RECTAL
Refills: 0 | Status: COMPLETED | OUTPATIENT
Start: 2021-07-27 | End: 2021-08-03

## 2021-07-27 RX ORDER — INSULIN GLARGINE 100 [IU]/ML
60 INJECTION, SOLUTION SUBCUTANEOUS AT BEDTIME
Refills: 0 | Status: DISCONTINUED | OUTPATIENT
Start: 2021-07-27 | End: 2021-07-28

## 2021-07-27 RX ADMIN — Medication 4: at 17:36

## 2021-07-27 RX ADMIN — Medication 2: at 12:08

## 2021-07-27 RX ADMIN — SENNA PLUS 2 TABLET(S): 8.6 TABLET ORAL at 21:48

## 2021-07-27 RX ADMIN — Medication 4 MILLIGRAM(S): at 05:11

## 2021-07-27 RX ADMIN — QUETIAPINE FUMARATE 25 MILLIGRAM(S): 200 TABLET, FILM COATED ORAL at 21:49

## 2021-07-27 RX ADMIN — Medication 27 UNIT(S): at 12:07

## 2021-07-27 RX ADMIN — Medication 4: at 07:51

## 2021-07-27 RX ADMIN — Medication 650 MILLIGRAM(S): at 20:48

## 2021-07-27 RX ADMIN — Medication 27 UNIT(S): at 17:35

## 2021-07-27 RX ADMIN — INSULIN GLARGINE 60 UNIT(S): 100 INJECTION, SOLUTION SUBCUTANEOUS at 21:47

## 2021-07-27 RX ADMIN — Medication 2: at 21:48

## 2021-07-27 RX ADMIN — PANTOPRAZOLE SODIUM 40 MILLIGRAM(S): 20 TABLET, DELAYED RELEASE ORAL at 05:11

## 2021-07-27 RX ADMIN — Medication 4 MILLIGRAM(S): at 21:49

## 2021-07-27 RX ADMIN — Medication 5 MILLIGRAM(S): at 21:49

## 2021-07-27 RX ADMIN — FINASTERIDE 5 MILLIGRAM(S): 5 TABLET, FILM COATED ORAL at 21:49

## 2021-07-27 RX ADMIN — Medication 4 MILLIGRAM(S): at 12:09

## 2021-07-27 RX ADMIN — Medication 300 MILLIGRAM(S): at 12:09

## 2021-07-27 RX ADMIN — Medication 27 UNIT(S): at 07:51

## 2021-07-27 RX ADMIN — TAMSULOSIN HYDROCHLORIDE 0.4 MILLIGRAM(S): 0.4 CAPSULE ORAL at 21:49

## 2021-07-27 RX ADMIN — SIMETHICONE 80 MILLIGRAM(S): 80 TABLET, CHEWABLE ORAL at 12:08

## 2021-07-27 RX ADMIN — Medication 81 MILLIGRAM(S): at 12:09

## 2021-07-27 RX ADMIN — PHENYLEPHRINE-SHARK LIVER OIL-MINERAL OIL-PETROLATUM RECTAL OINTMENT 1 APPLICATION(S): at 17:37

## 2021-07-27 RX ADMIN — Medication 650 MILLIGRAM(S): at 19:51

## 2021-07-27 RX ADMIN — SIMETHICONE 80 MILLIGRAM(S): 80 TABLET, CHEWABLE ORAL at 21:48

## 2021-07-27 RX ADMIN — SIMETHICONE 80 MILLIGRAM(S): 80 TABLET, CHEWABLE ORAL at 05:11

## 2021-07-27 NOTE — PROVIDER CONTACT NOTE (CRITICAL VALUE NOTIFICATION) - ASSESSMENT
Patient asymptomatic, receiving PRBC.
Pt. stable, free from s/s of bleeding
Patient alert and oriented x 3-4. VSS. Resting in bed. CSF fluid obtained from 11/16/21 + IGG and - IGM
Pt asymptomatic.
Pt asymptomatic. No s/s of bleeding noted.

## 2021-07-27 NOTE — PROVIDER CONTACT NOTE (CRITICAL VALUE NOTIFICATION) - ACTION/TREATMENT ORDERED:
PA notified. PA will be following up with day team.
Provider made aware. Will continue to monitor.
Provider made aware. Will continue to monitor.
Follow Heparin normogram for APtt >200
Continue Current med management and treatment.

## 2021-07-27 NOTE — PROGRESS NOTE ADULT - SUBJECTIVE AND OBJECTIVE BOX
Chief complaint  Patient is a 71y old  Male who presents with a chief complaint of hodgkins lymphoma (24 Jul 2021 10:41)   Review of systems  Patient in bed, looks comfortable, no hypoglycemic episodes.    Labs and Fingersticks  CAPILLARY BLOOD GLUCOSE      POCT Blood Glucose.: 248 mg/dL (27 Jul 2021 11:26)  POCT Blood Glucose.: 307 mg/dL (27 Jul 2021 07:21)  POCT Blood Glucose.: 292 mg/dL (26 Jul 2021 21:08)  POCT Blood Glucose.: 253 mg/dL (26 Jul 2021 16:25)      Anion Gap, Serum: 14 (07-27 @ 06:35)  Anion Gap, Serum: 15 (07-26 @ 06:19)      Calcium, Total Serum: 9.0 (07-27 @ 06:35)  Calcium, Total Serum: 8.9 (07-26 @ 06:19)          07-27    132<L>  |  96  |  28<H>  ----------------------------<  281<H>  4.5   |  22  |  0.39<L>    Ca    9.0      27 Jul 2021 06:35                          10.4   3.98  )-----------( 127      ( 27 Jul 2021 06:36 )             32.6     Medications  MEDICATIONS  (STANDING):  allopurinol 300 milliGRAM(s) Oral daily  aspirin  chewable 81 milliGRAM(s) Oral daily  dexAMETHasone     Tablet 4 milliGRAM(s) Oral every 8 hours  dextrose 40% Gel 15 Gram(s) Oral once  dextrose 5%. 1000 milliLiter(s) (50 mL/Hr) IV Continuous <Continuous>  dextrose 5%. 1000 milliLiter(s) (100 mL/Hr) IV Continuous <Continuous>  dextrose 50% Injectable 25 Gram(s) IV Push once  dextrose 50% Injectable 12.5 Gram(s) IV Push once  dextrose 50% Injectable 25 Gram(s) IV Push once  finasteride 5 milliGRAM(s) Oral daily  glucagon  Injectable 1 milliGRAM(s) IntraMuscular once  hemorrhoidal Ointment 1 Application(s) Rectal two times a day  insulin glargine Injectable (LANTUS) 60 Unit(s) SubCutaneous at bedtime  insulin lispro (ADMELOG) corrective regimen sliding scale   SubCutaneous three times a day before meals  insulin lispro (ADMELOG) corrective regimen sliding scale   SubCutaneous at bedtime  insulin lispro Injectable (ADMELOG) 27 Unit(s) SubCutaneous three times a day before meals  melatonin 5 milliGRAM(s) Oral at bedtime  pantoprazole   Suspension 40 milliGRAM(s) Oral daily  QUEtiapine 25 milliGRAM(s) Oral at bedtime  senna 2 Tablet(s) Oral at bedtime  simethicone 80 milliGRAM(s) Chew three times a day  tamsulosin 0.4 milliGRAM(s) Oral at bedtime      Physical Exam  General: Patient comfortable in bed  Vital Signs Last 12 Hrs  T(F): 98.1 (07-27-21 @ 10:45), Max: 98.6 (07-27-21 @ 04:44)  HR: 92 (07-27-21 @ 10:45) (72 - 92)  BP: 122/74 (07-27-21 @ 10:45) (113/67 - 125/81)  BP(mean): --  RR: 18 (07-27-21 @ 10:45) (18 - 18)  SpO2: 99% (07-27-21 @ 10:45) (99% - 99%)  Neck: No palpable thyroid nodules.  CVS: S1S2, No murmurs  Respiratory: No wheezing, no crepitations  GI: Abdomen soft, bowel sounds positive  Musculoskeletal:  edema lower extremities.   Skin: No skin rashes, no ecchymosis    Diagnostics             Chief complaint  Patient is a 71y old  Male who presents with a chief complaint of hodgkins lymphoma (24 Jul 2021 10:41)   Review of systems  Patient in bed, looks comfortable, no hypoglycemic episodes.    Labs and Fingersticks  CAPILLARY BLOOD GLUCOSE      POCT Blood Glucose.: 248 mg/dL (27 Jul 2021 11:26)  POCT Blood Glucose.: 307 mg/dL (27 Jul 2021 07:21)  POCT Blood Glucose.: 292 mg/dL (26 Jul 2021 21:08)  POCT Blood Glucose.: 253 mg/dL (26 Jul 2021 16:25)      Anion Gap, Serum: 14 (07-27 @ 06:35)  Anion Gap, Serum: 15 (07-26 @ 06:19)      Calcium, Total Serum: 9.0 (07-27 @ 06:35)  Calcium, Total Serum: 8.9 (07-26 @ 06:19)          07-27    132<L>  |  96  |  28<H>  ----------------------------<  281<H>  4.5   |  22  |  0.39<L>    Ca    9.0      27 Jul 2021 06:35                          10.4   3.98  )-----------( 127      ( 27 Jul 2021 06:36 )             32.6     Medications  MEDICATIONS  (STANDING):  allopurinol 300 milliGRAM(s) Oral daily  aspirin  chewable 81 milliGRAM(s) Oral daily  dexAMETHasone     Tablet 4 milliGRAM(s) Oral every 8 hours  dextrose 40% Gel 15 Gram(s) Oral once  dextrose 5%. 1000 milliLiter(s) (50 mL/Hr) IV Continuous <Continuous>  dextrose 5%. 1000 milliLiter(s) (100 mL/Hr) IV Continuous <Continuous>  dextrose 50% Injectable 25 Gram(s) IV Push once  dextrose 50% Injectable 12.5 Gram(s) IV Push once  dextrose 50% Injectable 25 Gram(s) IV Push once  finasteride 5 milliGRAM(s) Oral daily  glucagon  Injectable 1 milliGRAM(s) IntraMuscular once  hemorrhoidal Ointment 1 Application(s) Rectal two times a day  insulin glargine Injectable (LANTUS) 60 Unit(s) SubCutaneous at bedtime  insulin lispro (ADMELOG) corrective regimen sliding scale   SubCutaneous three times a day before meals  insulin lispro (ADMELOG) corrective regimen sliding scale   SubCutaneous at bedtime  insulin lispro Injectable (ADMELOG) 27 Unit(s) SubCutaneous three times a day before meals  melatonin 5 milliGRAM(s) Oral at bedtime  pantoprazole   Suspension 40 milliGRAM(s) Oral daily  QUEtiapine 25 milliGRAM(s) Oral at bedtime  senna 2 Tablet(s) Oral at bedtime  simethicone 80 milliGRAM(s) Chew three times a day  tamsulosin 0.4 milliGRAM(s) Oral at bedtime      Physical Exam  General: Patient comfortable in bed  Vital Signs Last 12 Hrs  T(F): 98.1 (07-27-21 @ 10:45), Max: 98.6 (07-27-21 @ 04:44)  HR: 92 (07-27-21 @ 10:45) (72 - 92)  BP: 122/74 (07-27-21 @ 10:45) (113/67 - 125/81)  BP(mean): --  RR: 18 (07-27-21 @ 10:45) (18 - 18)  SpO2: 99% (07-27-21 @ 10:45) (99% - 99%)  Neck: No palpable thyroid nodules.  CVS: S1S2, No murmurs  Respiratory: No wheezing, no crepitations  GI: Abdomen soft, bowel sounds positive  Musculoskeletal:  edema lower extremities.   Skin: No skin rashes, no ecchymosis    Diagnostics

## 2021-07-27 NOTE — PROGRESS NOTE ADULT - ASSESSMENT
72 y/o M w/ PMHx of CVA this past August and got TPA per family member, DM, BPH with obstruction s/p escamilla catheter, s/p ERCP w Sphincterectomy recent admission to [facility] for sepsis  Hodgkin lymphoma was not offered any chemo and was placed on hospice.     now presenting with weakness and FTT.   pt denies cp / SOB / palpitations   no focal neuro complaints .. at baseline RLE weakness   no N/V   had one episode of diarrhea   no urinary sx  abdominal pain, diffuse, but worse on R side.   Afib RVR overnight. Was asymptomatic   no known history of this as per patient

## 2021-07-27 NOTE — PROGRESS NOTE ADULT - ASSESSMENT
1) Hodgkin's lymphoma  - repeat CT scans, results noted  -[facility]- Family meeting held at bedside on 7/2. We discussed rx options vs comfort care. Family and patient wish to pursue rx. Ongoing conversations with family members have been taking place since our formal meeting.   - allopurinol  - pt s/p 1st rx with opdivo 7/6. Planning for next treatment in days ahead.     2) ENT- voice change is a new finding as per patient's sister. Patient also with weak voice.  SS, ENT and neuro have seen patient.    3)Hyperglycemia- mgmt as per med. , endocrinology    4)Weakness. 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  - Neurology input noted.  - cont pt.  - bed to chair   - dex taper    5) Pancytopenia. Suspect multifactorial, due to hodgkins, and possibly with role being played by recent abx.  anemia- w/u this far unremarkable. possible aocd.  transfusional support as needed    Counts are improving.     6)Neuro-onc  MRI findings noted.

## 2021-07-27 NOTE — CONSULT NOTE ADULT - CONSULT REQUESTED DATE/TIME
13-Jul-2021 08:35
29-Jun-2021 14:19
12-Jul-2021 11:42
01-Jul-2021 17:26
27-Jul-2021 13:15
29-Jun-2021 07:30
30-Jun-2021 11:11
14-Jul-2021 11:04
29-Jun-2021 14:20
19-Jul-2021 13:19

## 2021-07-27 NOTE — PROGRESS NOTE ADULT - PROBLEM SELECTOR PLAN 1
Will increase Lantus to 60u at bedtime.  Will continue Admelog 27u before each meal as well as Admelog correction scale coverage. Will continue monitoring FS and FU, will adjust insulin dose as steroids are tapered/dc.  Patient previously on Janumet, he has large insulin requirement at this time due to high-dose steroids. Effects of steroids can linger several weeks, patient would benefit from insulin as outpatient and is agreeable.  Suggest DC home on current basal bolus insulin regimen, endo FU 2 weeks.  Discussed plan with patient and family at bedside. Counseled that blood sugars will start trending down as steroids are tapered/dc. Insulin dose will have to be adjusted based on blood sugars.   Counseled for compliance with consistent low-carb diet.

## 2021-07-27 NOTE — PROGRESS NOTE ADULT - SUBJECTIVE AND OBJECTIVE BOX
Date of service: 07-27-21 @ 23:26      Patient is a 71y old  Male who presents with a chief complaint of hodgkins lymphoma (24 Jul 2021 10:41)                                                               INTERVAL HPI/OVERNIGHT EVENTS:    REVIEW OF SYSTEMS:     CONSTITUTIONAL: No weakness, fevers or chills  EYES/ENT: No visual changes , no ear ache   NECK: No pain or stiffness  RESPIRATORY: No cough, wheezing,  No shortness of breath  CARDIOVASCULAR: No chest pain or palpitations  GASTROINTESTINAL: No abdominal pain  . No nausea, vomiting, or hematemesis; No diarrhea or constipation. No melena or hematochezia.  GENITOURINARY: No dysuria, frequency or hematuria  NEUROLOGICAL: No numbness or weakness  SKIN: No itching, burning, rashes, or lesions                                                                                                                                                                                                                                                                                 Medications:  MEDICATIONS  (STANDING):  allopurinol 300 milliGRAM(s) Oral daily  aspirin  chewable 81 milliGRAM(s) Oral daily  dexAMETHasone     Tablet 4 milliGRAM(s) Oral every 8 hours  dextrose 40% Gel 15 Gram(s) Oral once  dextrose 5%. 1000 milliLiter(s) (50 mL/Hr) IV Continuous <Continuous>  dextrose 5%. 1000 milliLiter(s) (100 mL/Hr) IV Continuous <Continuous>  dextrose 50% Injectable 25 Gram(s) IV Push once  dextrose 50% Injectable 12.5 Gram(s) IV Push once  dextrose 50% Injectable 25 Gram(s) IV Push once  finasteride 5 milliGRAM(s) Oral daily  glucagon  Injectable 1 milliGRAM(s) IntraMuscular once  hemorrhoidal Ointment 1 Application(s) Rectal two times a day  insulin glargine Injectable (LANTUS) 60 Unit(s) SubCutaneous at bedtime  insulin lispro (ADMELOG) corrective regimen sliding scale   SubCutaneous three times a day before meals  insulin lispro (ADMELOG) corrective regimen sliding scale   SubCutaneous at bedtime  insulin lispro Injectable (ADMELOG) 27 Unit(s) SubCutaneous three times a day before meals  melatonin 5 milliGRAM(s) Oral at bedtime  pantoprazole   Suspension 40 milliGRAM(s) Oral daily  QUEtiapine 25 milliGRAM(s) Oral at bedtime  senna 2 Tablet(s) Oral at bedtime  simethicone 80 milliGRAM(s) Chew three times a day  tamsulosin 0.4 milliGRAM(s) Oral at bedtime    MEDICATIONS  (PRN):  acetaminophen   Tablet .. 650 milliGRAM(s) Oral every 6 hours PRN Temp greater or equal to 38C (100.4F), Moderate Pain (4 - 6)  bisacodyl 5 milliGRAM(s) Oral every 12 hours PRN Constipation  polyethylene glycol 3350 17 Gram(s) Oral daily PRN Constipation       Allergies    No Known Allergies    Intolerances      Vital Signs Last 24 Hrs  T(C): 36.5 (27 Jul 2021 20:36), Max: 37 (27 Jul 2021 04:44)  T(F): 97.7 (27 Jul 2021 20:36), Max: 98.6 (27 Jul 2021 04:44)  HR: 84 (27 Jul 2021 20:36) (72 - 92)  BP: 144/77 (27 Jul 2021 20:36) (113/67 - 144/77)  BP(mean): --  RR: 19 (27 Jul 2021 20:36) (18 - 19)  SpO2: 99% (27 Jul 2021 20:36) (99% - 99%)  CAPILLARY BLOOD GLUCOSE      POCT Blood Glucose.: 277 mg/dL (27 Jul 2021 21:28)  POCT Blood Glucose.: 306 mg/dL (27 Jul 2021 17:30)  POCT Blood Glucose.: 248 mg/dL (27 Jul 2021 11:26)  POCT Blood Glucose.: 307 mg/dL (27 Jul 2021 07:21)      07-26 @ 07:01 - 07-27 @ 07:00  --------------------------------------------------------  IN: 1180 mL / OUT: 1600 mL / NET: -420 mL    07-27 @ 07:01 - 07-27 @ 23:26  --------------------------------------------------------  IN: 1200 mL / OUT: 2000 mL / NET: -800 mL      Physical Exam:    Daily     Daily   General:  Well appearing, NAD, not cachetic  HEENT:  Nonicteric, PERRLA  CV:  RRR, S1S2   Lungs:  CTA B/L, no wheezes, rales, rhonchi  Abdomen:  Soft, non-tender, no distended, positive BS  Extremities: no edema   escamilla           LABS:                               10.4   3.98  )-----------( 127      ( 27 Jul 2021 06:36 )             32.6                      07-27    132<L>  |  96  |  28<H>  ----------------------------<  281<H>  4.5   |  22  |  0.39<L>    Ca    9.0      27 Jul 2021 06:35                         RADIOLOGY & ADDITIONAL TESTS         I personally reviewed: [  ]EKG   [  ]CXR    [  ] CT      A/P:         Discussed with :     [facility] consultants' Notes   Time spent :

## 2021-07-27 NOTE — PROGRESS NOTE ADULT - SUBJECTIVE AND OBJECTIVE BOX
Subjective: Patient seen and examined. No new events except as noted.     REVIEW OF SYSTEMS:    CONSTITUTIONAL:+ weakness, fevers or chills  EYES/ENT: No visual changes;  No vertigo or throat pain   NECK: No pain or stiffness  RESPIRATORY: No cough, wheezing, hemoptysis; No shortness of breath  CARDIOVASCULAR: No chest pain or palpitations  GASTROINTESTINAL: No abdominal or epigastric pain. No nausea, vomiting, or hematemesis; No diarrhea or constipation. No melena or hematochezia.  GENITOURINARY: No dysuria, frequency or hematuria  NEUROLOGICAL: No numbness or weakness  SKIN: No itching, burning, rashes, or lesions   All other review of systems is negative unless indicated above.    MEDICATIONS:  MEDICATIONS  (STANDING):  allopurinol 300 milliGRAM(s) Oral daily  aspirin  chewable 81 milliGRAM(s) Oral daily  dexAMETHasone     Tablet 4 milliGRAM(s) Oral every 8 hours  dextrose 40% Gel 15 Gram(s) Oral once  dextrose 5%. 1000 milliLiter(s) (50 mL/Hr) IV Continuous <Continuous>  dextrose 5%. 1000 milliLiter(s) (100 mL/Hr) IV Continuous <Continuous>  dextrose 50% Injectable 25 Gram(s) IV Push once  dextrose 50% Injectable 12.5 Gram(s) IV Push once  dextrose 50% Injectable 25 Gram(s) IV Push once  finasteride 5 milliGRAM(s) Oral daily  glucagon  Injectable 1 milliGRAM(s) IntraMuscular once  insulin glargine Injectable (LANTUS) 52 Unit(s) SubCutaneous at bedtime  insulin lispro (ADMELOG) corrective regimen sliding scale   SubCutaneous three times a day before meals  insulin lispro (ADMELOG) corrective regimen sliding scale   SubCutaneous at bedtime  insulin lispro Injectable (ADMELOG) 27 Unit(s) SubCutaneous three times a day before meals  melatonin 5 milliGRAM(s) Oral at bedtime  pantoprazole   Suspension 40 milliGRAM(s) Oral daily  QUEtiapine 25 milliGRAM(s) Oral at bedtime  senna 2 Tablet(s) Oral at bedtime  simethicone 80 milliGRAM(s) Chew three times a day  tamsulosin 0.4 milliGRAM(s) Oral at bedtime      PHYSICAL EXAM:  T(C): 36.7 (07-27-21 @ 10:45), Max: 37 (07-27-21 @ 04:44)  HR: 92 (07-27-21 @ 10:45) (72 - 107)  BP: 122/74 (07-27-21 @ 10:45) (113/67 - 142/78)  RR: 18 (07-27-21 @ 10:45) (18 - 18)  SpO2: 99% (07-27-21 @ 10:45) (99% - 99%)  Wt(kg): --  I&O's Summary    26 Jul 2021 07:01  -  27 Jul 2021 07:00  --------------------------------------------------------  IN: 1180 mL / OUT: 1600 mL / NET: -420 mL          Appearance: Normal	  HEENT:   Normal oral mucosa, PERRL, EOMI	  Lymphatic: No lymphadenopathy , no edema  Cardiovascular: Normal S1 S2, No JVD, No murmurs , Peripheral pulses palpable 2+ bilaterally  Respiratory: Lungs clear to auscultation, normal effort 	  Gastrointestinal:  Soft, Non-tender, + BS	  Skin: No rashes, No ecchymoses, No cyanosis, warm to touch  Musculoskeletal: Normal range of motion, normal strength  Psychiatry:  Mood & affect appropriate  Ext: No edema      LABS:    CARDIAC MARKERS:                                10.4   3.98  )-----------( 127      ( 27 Jul 2021 06:36 )             32.6     07-27    132<L>  |  96  |  28<H>  ----------------------------<  281<H>  4.5   |  22  |  0.39<L>    Ca    9.0      27 Jul 2021 06:35      proBNP:   Lipid Profile:   HgA1c:   TSH:             TELEMETRY: 	SR    ECG:  	  RADIOLOGY:   DIAGNOSTIC TESTING:  [ ] Echocardiogram:  [ ]  Catheterization:  [ ] Stress Test:    OTHER:

## 2021-07-27 NOTE — PROVIDER CONTACT NOTE (CRITICAL VALUE NOTIFICATION) - BACKGROUND
Heparin drip for ACS
Pt is admitted with  weakness           - FTT           - Hodgkins lymphoma- CT w/ lymphadenopathy, MUGA scan w/ EF 50%, normal RV/LV wall motion
Pt admitted with Weakness, FTT, Hodgkins lymphoma- CT w/ lymphadenopathy s/p 1st rx with opdivo 7/6 next due on 8/3, Urinary retention (7/19) s/p Sauceda, normocytic anemia likely 2/2 lymphoma, no JES, s/p 3 units PRBC total 6/30

## 2021-07-27 NOTE — PROGRESS NOTE ADULT - ASSESSMENT
70 y/o M w/ PMHx of CVA this past August and got TPA per family member, DM, BPH with obstruction s/p escamilla catheter, s/p ERCP w Sphincterectomy recent admission to [facility] for sepsis  Hodgkin lymphoma was not offered any chemo and was placed on hospice.     now presenting with weakness and FTT.   pt denies cp / SOB / palpitations   no focal neuro complaints .. at baseline RLE weakness   no N/V   had one episode of diarrhea   no urinary sx  abdominal pain, diffuse, but worse on R side.     - Failure to thrive: cultures neg  fungitell repeated Nl   CT chest repeated : improving   nutrition consult   encourage PO intake: dysphagia 3 based on MBS results. Pt feeling overall improved : will consider re-eval   (asking for better food to be given)    - lymphoma: d/w Dr. Larios: s/p immunotherapy .. will plan second dose on Aug 3th.   overall seemed to have responded somewhat to first dose     - DM with hyperglycemia : improved now worse with steroids   fu with endo     - anemia: monitor H/H post transfusion, stable.     - Fever: doubt infectious etiology   likely due to immunotherapy/lymphoma. culture: neg   abx dced. s/p IVF, ID f/u appreciated.   elevated fungitell noted, T chest improved / d/w Dr muñiz , repeat levels however low suspecion for fungal infection     - voice changes: CT neck : Asymmetric enlargement of the left palatine tonsil, suspicious for lymphomatous involvement given history. Correlate with direct visualization.  ENT had eval pt: no gross pathology on visualization   S/S eval: MBS done. speech: dysphagia 3    - Tachycardia: VA duplex neg for DVT   rate stable. noted 7 beats NSVT.  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 Smith and MRI department :  MR brain, cervical thoracic spine : non contrast done  and now awaiting with con... being expedited   cont steroids : will cont taper  fu LP cytology   appreciate neuro onc input   planned chemo early next week     - Afib: now in sinus : monitor   appreciate cardio input   no AC at this time and no AVNB at this time     - urinary retention: cont escamilla     discussed with Dr. Larios : will plan inpt immunotherapy while awaiting insurance approval for rehab.    DVT ppx

## 2021-07-27 NOTE — CONSULT NOTE ADULT - CONSULT REASON
Neuro oncologic evaluation
Abnormal CT chest
Lower extremity weakness
Hodgkins lymphoma
Rehabilitation consult
weakness
fever
Afib
hoarseness
High Blood Sugars/DMT2

## 2021-07-27 NOTE — PROGRESS NOTE ADULT - ASSESSMENT
70 y/o M with PMH of CVA, DM, BPH with obstruction s/p Sauceda catheter, s/p ERCP w Sphincteretomy, recent admission to [facility] for sepsis, Hodgkin lymphoma dx 2020 - was not offered any chemo and was placed on hospice, DVT 8/2020. Presenting with weakness and FTT. Pt and family opted for treatment of lymphoma - s/p Opdivo 7/6. Called to consult for CT chest findings 6/28 with patchy GGO and scattered nodular opacities throughout all lobes of the lungs. Currently breathing comfortably on RA.

## 2021-07-27 NOTE — CONSULT NOTE ADULT - PROVIDER SPECIALTY LIST ADULT
Heme/Onc
Neurology
Neurology
Neurosurgery
Rehab Medicine
Infectious Disease
ENT
Pulmonology
Cardiology
Endocrinology

## 2021-07-27 NOTE — PROGRESS NOTE ADULT - ASSESSMENT
Assessment  DMT2: 71y Male with DM T2 with hyperglycemia, A1C 8.8%, was on oral meds/Janumet at home, admitted with weakness/fatigue and hyperglycemia, now on large-dose basal bolus insulin, increased dose yesterday, blood sugars are running high and not at target this AM and improving postprandially, no hypoglycemic episodes. Patient is eating meals, appears comfortable, remains on dexamethasone, family at bedside.  Lymphoma: on medications, monitored, FU Heme/Onc.  Anemia: stable, monitored.      Shahriar Kahn MD  Cell: [phone]  Office: [phone]     Assessment  DMT2: 71y Male with DM T2 with hyperglycemia, A1C 8.8%, was on oral meds/Janumet at home, admitted with weakness/fatigue and hyperglycemia, now on large-dose basal bolus insulin, increased dose yesterday, blood sugars are running high  and not at target this AM and improving postprandially, no hypoglycemic episodes. Patient is eating meals, appears comfortable, remains on dexamethasone, family at bedside.  Lymphoma: on medications, monitored, FU Heme/Onc.  Anemia: stable, monitored.      Shahriar Kahn MD  Cell: [phone]  Office: [phone]

## 2021-07-27 NOTE — PROGRESS NOTE ADULT - SUBJECTIVE AND OBJECTIVE BOX
Follow-up Pulm Progress Note    No new respiratory events overnight.  Denies SOB/CP.     Medications:  MEDICATIONS  (STANDING):  allopurinol 300 milliGRAM(s) Oral daily  aspirin  chewable 81 milliGRAM(s) Oral daily  dexAMETHasone     Tablet 4 milliGRAM(s) Oral every 8 hours  dextrose 40% Gel 15 Gram(s) Oral once  dextrose 5%. 1000 milliLiter(s) (50 mL/Hr) IV Continuous <Continuous>  dextrose 5%. 1000 milliLiter(s) (100 mL/Hr) IV Continuous <Continuous>  dextrose 50% Injectable 12.5 Gram(s) IV Push once  dextrose 50% Injectable 25 Gram(s) IV Push once  dextrose 50% Injectable 25 Gram(s) IV Push once  finasteride 5 milliGRAM(s) Oral daily  glucagon  Injectable 1 milliGRAM(s) IntraMuscular once  insulin glargine Injectable (LANTUS) 52 Unit(s) SubCutaneous at bedtime  insulin lispro (ADMELOG) corrective regimen sliding scale   SubCutaneous at bedtime  insulin lispro (ADMELOG) corrective regimen sliding scale   SubCutaneous three times a day before meals  insulin lispro Injectable (ADMELOG) 27 Unit(s) SubCutaneous three times a day before meals  melatonin 5 milliGRAM(s) Oral at bedtime  pantoprazole   Suspension 40 milliGRAM(s) Oral daily  QUEtiapine 25 milliGRAM(s) Oral at bedtime  senna 2 Tablet(s) Oral at bedtime  simethicone 80 milliGRAM(s) Chew three times a day  tamsulosin 0.4 milliGRAM(s) Oral at bedtime    MEDICATIONS  (PRN):  acetaminophen   Tablet .. 650 milliGRAM(s) Oral every 6 hours PRN Temp greater or equal to 38C (100.4F), Moderate Pain (4 - 6)  bisacodyl 5 milliGRAM(s) Oral every 12 hours PRN Constipation  polyethylene glycol 3350 17 Gram(s) Oral daily PRN Constipation          Vital Signs Last 24 Hrs  T(C): 36.8 (27 Jul 2021 08:05), Max: 37 (27 Jul 2021 04:44)  T(F): 98.2 (27 Jul 2021 08:05), Max: 98.6 (27 Jul 2021 04:44)  HR: 92 (27 Jul 2021 08:05) (72 - 107)  BP: 125/81 (27 Jul 2021 08:05) (113/67 - 142/78)  BP(mean): --  RR: 18 (27 Jul 2021 08:05) (18 - 18)  SpO2: 99% (27 Jul 2021 08:05) (99% - 100%)          07-26 @ 07:01  -  07-27 @ 07:00  --------------------------------------------------------  IN: 1180 mL / OUT: 1600 mL / NET: -420 mL          LABS:                        10.4   3.98  )-----------( 127      ( 27 Jul 2021 06:36 )             32.6     07-27    132<L>  |  96  |  28<H>  ----------------------------<  281<H>  4.5   |  22  |  0.39<L>    Ca    9.0      27 Jul 2021 06:35            CAPILLARY BLOOD GLUCOSE      POCT Blood Glucose.: 307 mg/dL (27 Jul 2021 07:21)            Physical Examination:  PULM: Clear to auscultation bilaterally, no significant sputum production  CVS: S1, S2 heard    RADIOLOGY REVIEWED  CT chest: < from: CT Chest w/ IV Cont (07.19.21 @ 17:31) >  FINDINGS:  CHEST:  LUNGS AND LARGE AIRWAYS: Patent central airways. There is a 4 mm groundglass nodule (series 4, image 171). There is an unchanged 1 cm cystic nodule (series 4, image 223). Interval decrease in right lower lobe nodule at the costophrenic angle, now measuring 1.3 x 0.8 cm (series 4, image 338). Other prior mentioned solid and groundglass nodules are resolved. Mild bilateral subsegmental atelectasis.  PLEURA: No pleural effusion.  VESSELS: Coronary artery calcifications.  HEART: Heart size is normal. No pericardial effusion.  MEDIASTINUM AND SARTHAK: No lymphadenopathy.  CHEST WALL AND LOWER NECK: Within normal limits.    < end of copied text >

## 2021-07-27 NOTE — CONSULT NOTE ADULT - SUBJECTIVE AND OBJECTIVE BOX
HPI:  70 y/o M w/ pmhx of CVA this past August and got TPA per family member, DM , BPH with obstruction s/p escamilla catheter , s/p ERCP w Sphincteretomy recent admission to [facility] for sepsis  hodgkin lympoma was not offered any chemo and was placed on hospice.   npw presenting with weakness and FTT.   pt denies cp / SOB / plapiatiaons   no focal neuro complaints .. at baseline RLE weakness   no N/V   had one episode of diarrah   no urinary s x   abdominal pain, diffuse, but worse on R side.   	 (28 Jun 2021 22:08)    Patient was admitted on 6/28, noted to have right LE weakness, hypophonic, started immunotherapy treatment for lymphoma, found to have leptomeningeal involvement on MRI, started on steroids. CT chest improved, H/H stable, diet advanced to regular/thin after MBS 7/26. Patient seen today, family at bedside. Reports general weakness, sat in chair x 1-2 hours, participating with therapy.       REVIEW OF SYSTEMS  Constitutional - No fever, No weight loss, No fatigue  HEENT - No eye pain, No visual disturbances, No difficulty hearing, No tinnitus, No vertigo, No neck pain  Respiratory - No cough, No wheezing, No shortness of breath  Cardiovascular - No chest pain, No palpitations  Gastrointestinal - No abdominal pain, No nausea, No vomiting, No diarrhea, No constipation  Genitourinary - No dysuria, No frequency, No hematuria, No incontinence  Neurological - No headaches, No memory loss, + loss of strength, No numbness, No tremors  Psychiatric - No depression, No anxiety    VITALS  T(C): 36.7 (07-27-21 @ 10:45), Max: 37 (07-27-21 @ 04:44)  HR: 92 (07-27-21 @ 10:45) (72 - 107)  BP: 122/74 (07-27-21 @ 10:45) (113/67 - 142/78)  RR: 18 (07-27-21 @ 10:45) (18 - 18)  SpO2: 99% (07-27-21 @ 10:45) (99% - 99%)  Wt(kg): --    PAST MEDICAL & SURGICAL HISTORY  Cerebrovascular accident (CVA)    Diabetes mellitus    Hodgkin lymphoma    No significant past surgical history        SOCIAL HISTORY  Smoking - Denied  EtOH - Denied   Drugs - Denied    FUNCTIONAL HISTORY  Lives with family, [address], one flight inside  Independent Amb/ADLs PTA, family reports was mostly in bed prior to admission     CURRENT FUNCTIONAL STATUS  7/26 SLP/MBS  oropharyngeal dysphagia  regular solids/liquids    7/22 PT  bed mobility mod assist  transfers max assist x2  follows all commands  further activity deferred, limited by fatigue       FAMILY HISTORY   No pertinent family history in first degree relatives        RECENT LABS/IMAGING  CBC Full  -  ( 27 Jul 2021 06:36 )  WBC Count : 3.98 K/uL  RBC Count : 3.21 M/uL  Hemoglobin : 10.4 g/dL  Hematocrit : 32.6 %  Platelet Count - Automated : 127 K/uL  Mean Cell Volume : 101.6 fl  Mean Cell Hemoglobin : 32.4 pg  Mean Cell Hemoglobin Concentration : 31.9 gm/dL  Auto Neutrophil # : x  Auto Lymphocyte # : x  Auto Monocyte # : x  Auto Eosinophil # : x  Auto Basophil # : x  Auto Neutrophil % : x  Auto Lymphocyte % : x  Auto Monocyte % : x  Auto Eosinophil % : x  Auto Basophil % : x    07-27    132<L>  |  96  |  28<H>  ----------------------------<  281<H>  4.5   |  22  |  0.39<L>    Ca    9.0      27 Jul 2021 06:35    < from: MR Head w/ IV Cont (07.16.21 @ 11:51) >    IMPRESSION:    There is no pathologic enhancement intracranially to suggest parenchymal or meningeal involvement.    There is heterogeneous enhancement throughout the cervical and thoracic spinal cord which corresponded to diffuse T1 hypointensity in the precontrast images, may represent lymphomatous involvement however, posttreatment changes cannot be entirely excluded. Attention on follow-up MRI suggested.      < end of copied text >    < from: CT Abdomen and Pelvis w/ Oral Cont and w/ IV Cont (07.19.21 @ 17:32) >    IMPRESSION:  Interval improvement in nodular lung opacities.    Mild improvement in abdominal lymphadenopathy, consistent with patient's history of lymphoma.      < end of copied text >      ALLERGIES    No Known Allergies      MEDICATIONS   acetaminophen   Tablet .. 650 milliGRAM(s) Oral every 6 hours PRN  allopurinol 300 milliGRAM(s) Oral daily  aspirin  chewable 81 milliGRAM(s) Oral daily  bisacodyl 5 milliGRAM(s) Oral every 12 hours PRN  dexAMETHasone     Tablet 4 milliGRAM(s) Oral every 8 hours  dextrose 40% Gel 15 Gram(s) Oral once  dextrose 5%. 1000 milliLiter(s) IV Continuous <Continuous>  dextrose 5%. 1000 milliLiter(s) IV Continuous <Continuous>  dextrose 50% Injectable 25 Gram(s) IV Push once  dextrose 50% Injectable 12.5 Gram(s) IV Push once  dextrose 50% Injectable 25 Gram(s) IV Push once  finasteride 5 milliGRAM(s) Oral daily  glucagon  Injectable 1 milliGRAM(s) IntraMuscular once  hemorrhoidal Ointment 1 Application(s) Rectal two times a day  insulin glargine Injectable (LANTUS) 52 Unit(s) SubCutaneous at bedtime  insulin lispro (ADMELOG) corrective regimen sliding scale   SubCutaneous three times a day before meals  insulin lispro (ADMELOG) corrective regimen sliding scale   SubCutaneous at bedtime  insulin lispro Injectable (ADMELOG) 27 Unit(s) SubCutaneous three times a day before meals  melatonin 5 milliGRAM(s) Oral at bedtime  pantoprazole   Suspension 40 milliGRAM(s) Oral daily  polyethylene glycol 3350 17 Gram(s) Oral daily PRN  QUEtiapine 25 milliGRAM(s) Oral at bedtime  senna 2 Tablet(s) Oral at bedtime  simethicone 80 milliGRAM(s) Chew three times a day  tamsulosin 0.4 milliGRAM(s) Oral at bedtime      ----------------------------------------------------------------------------------------  PHYSICAL EXAM  Constitutional - NAD, Comfortable, in bed   HEENT - NCAT, EOMI  Neck - Supple, No limited ROM  Chest - Breathing comfortably, No wheezing  Cardiovascular - S1S2   Abdomen - Soft   Extremities - No C/C/E, No calf tenderness   Neurologic Exam -     some confusion noted, looks to family for responses               Cognitive - Awake, Alert, AAO to self, place, date, year, situation     Communication - Fluent, No dysarthria, hypophonic         Motor -                     LEFT    UE - ShAB 5/5, EF 5/5, EE 5/5, WE 5/5,  5/5                    RIGHT UE - ShAB 5/5, EF 5/5, EE 5/5, WE 5/5,  5/5                    LEFT    LE - HF 4/5, KE 5/5, DF 5/5, PF 5/5                    RIGHT LE - HF 4/5, KE 4/5, DF 5/5, PF 5/5        Sensory - Intact to LT     Psychiatric - Mood stable, Affect WNL  ----------------------------------------------------------------------------------------  ASSESSMENT/PLAN  71yMale h/o CVA, TPA, DM, BPH with functional deficits due to Hodgkin Lymphoma, failure to thrive  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   continue bedside PT, add OT  out of bed to chair daily   patient may need continued therapy/treatment, would not be able to leave acute rehab for treatment  requires max assist x 2 for transfers, limited by fatigue, would not be able to tolerate three hours of therapy daily     Recommend [facility], patient DOES NOT meet acute inpatient rehabilitation criteria

## 2021-07-28 LAB
ANION GAP SERPL CALC-SCNC: 17 MMOL/L — SIGNIFICANT CHANGE UP (ref 5–17)
BUN SERPL-MCNC: 21 MG/DL — SIGNIFICANT CHANGE UP (ref 7–23)
CALCIUM SERPL-MCNC: 9 MG/DL — SIGNIFICANT CHANGE UP (ref 8.4–10.5)
CHLORIDE SERPL-SCNC: 98 MMOL/L — SIGNIFICANT CHANGE UP (ref 96–108)
CO2 SERPL-SCNC: 21 MMOL/L — LOW (ref 22–31)
CREAT SERPL-MCNC: 0.42 MG/DL — LOW (ref 0.5–1.3)
GLUCOSE BLDC GLUCOMTR-MCNC: 229 MG/DL — HIGH (ref 70–99)
GLUCOSE BLDC GLUCOMTR-MCNC: 274 MG/DL — HIGH (ref 70–99)
GLUCOSE BLDC GLUCOMTR-MCNC: 329 MG/DL — HIGH (ref 70–99)
GLUCOSE BLDC GLUCOMTR-MCNC: 345 MG/DL — HIGH (ref 70–99)
GLUCOSE SERPL-MCNC: 265 MG/DL — HIGH (ref 70–99)
HCT VFR BLD CALC: 32.6 % — LOW (ref 39–50)
HGB BLD-MCNC: 10.6 G/DL — LOW (ref 13–17)
MCHC RBC-ENTMCNC: 32.5 GM/DL — SIGNIFICANT CHANGE UP (ref 32–36)
MCHC RBC-ENTMCNC: 32.8 PG — SIGNIFICANT CHANGE UP (ref 27–34)
MCV RBC AUTO: 100.9 FL — HIGH (ref 80–100)
NRBC # BLD: 0 /100 WBCS — SIGNIFICANT CHANGE UP (ref 0–0)
PLATELET # BLD AUTO: 139 K/UL — LOW (ref 150–400)
POTASSIUM SERPL-MCNC: 4.2 MMOL/L — SIGNIFICANT CHANGE UP (ref 3.5–5.3)
POTASSIUM SERPL-SCNC: 4.2 MMOL/L — SIGNIFICANT CHANGE UP (ref 3.5–5.3)
RBC # BLD: 3.23 M/UL — LOW (ref 4.2–5.8)
RBC # FLD: SIGNIFICANT CHANGE UP (ref 10.3–14.5)
SODIUM SERPL-SCNC: 136 MMOL/L — SIGNIFICANT CHANGE UP (ref 135–145)
WBC # BLD: 3.92 K/UL — SIGNIFICANT CHANGE UP (ref 3.8–10.5)
WBC # FLD AUTO: 3.92 K/UL — SIGNIFICANT CHANGE UP (ref 3.8–10.5)

## 2021-07-28 RX ORDER — INSULIN GLARGINE 100 [IU]/ML
68 INJECTION, SOLUTION SUBCUTANEOUS AT BEDTIME
Refills: 0 | Status: DISCONTINUED | OUTPATIENT
Start: 2021-07-28 | End: 2021-07-29

## 2021-07-28 RX ADMIN — Medication 300 MILLIGRAM(S): at 11:32

## 2021-07-28 RX ADMIN — Medication 4 MILLIGRAM(S): at 05:06

## 2021-07-28 RX ADMIN — SIMETHICONE 80 MILLIGRAM(S): 80 TABLET, CHEWABLE ORAL at 11:32

## 2021-07-28 RX ADMIN — SIMETHICONE 80 MILLIGRAM(S): 80 TABLET, CHEWABLE ORAL at 05:06

## 2021-07-28 RX ADMIN — TAMSULOSIN HYDROCHLORIDE 0.4 MILLIGRAM(S): 0.4 CAPSULE ORAL at 21:51

## 2021-07-28 RX ADMIN — Medication 2: at 11:32

## 2021-07-28 RX ADMIN — Medication 27 UNIT(S): at 11:31

## 2021-07-28 RX ADMIN — Medication 4: at 21:49

## 2021-07-28 RX ADMIN — Medication 81 MILLIGRAM(S): at 11:33

## 2021-07-28 RX ADMIN — Medication 5 MILLIGRAM(S): at 21:52

## 2021-07-28 RX ADMIN — Medication 4 MILLIGRAM(S): at 13:13

## 2021-07-28 RX ADMIN — Medication 650 MILLIGRAM(S): at 16:56

## 2021-07-28 RX ADMIN — Medication 4 MILLIGRAM(S): at 21:49

## 2021-07-28 RX ADMIN — FINASTERIDE 5 MILLIGRAM(S): 5 TABLET, FILM COATED ORAL at 21:49

## 2021-07-28 RX ADMIN — SIMETHICONE 80 MILLIGRAM(S): 80 TABLET, CHEWABLE ORAL at 21:50

## 2021-07-28 RX ADMIN — Medication 27 UNIT(S): at 07:43

## 2021-07-28 RX ADMIN — PANTOPRAZOLE SODIUM 40 MILLIGRAM(S): 20 TABLET, DELAYED RELEASE ORAL at 05:07

## 2021-07-28 RX ADMIN — Medication 650 MILLIGRAM(S): at 16:26

## 2021-07-28 RX ADMIN — Medication 4: at 16:32

## 2021-07-28 RX ADMIN — QUETIAPINE FUMARATE 25 MILLIGRAM(S): 200 TABLET, FILM COATED ORAL at 21:52

## 2021-07-28 RX ADMIN — Medication 27 UNIT(S): at 16:32

## 2021-07-28 RX ADMIN — INSULIN GLARGINE 68 UNIT(S): 100 INJECTION, SOLUTION SUBCUTANEOUS at 21:53

## 2021-07-28 RX ADMIN — Medication 3: at 07:44

## 2021-07-28 RX ADMIN — PHENYLEPHRINE-SHARK LIVER OIL-MINERAL OIL-PETROLATUM RECTAL OINTMENT 1 APPLICATION(S): at 05:07

## 2021-07-28 RX ADMIN — PHENYLEPHRINE-SHARK LIVER OIL-MINERAL OIL-PETROLATUM RECTAL OINTMENT 1 APPLICATION(S): at 16:29

## 2021-07-28 NOTE — PROGRESS NOTE ADULT - SUBJECTIVE AND OBJECTIVE BOX
Follow-up Pulm Progress Note    No new respiratory events overnight.  Denies SOB/CP.     Medications:  MEDICATIONS  (STANDING):  allopurinol 300 milliGRAM(s) Oral daily  aspirin  chewable 81 milliGRAM(s) Oral daily  dexAMETHasone     Tablet 4 milliGRAM(s) Oral every 8 hours  dextrose 40% Gel 15 Gram(s) Oral once  dextrose 5%. 1000 milliLiter(s) (50 mL/Hr) IV Continuous <Continuous>  dextrose 5%. 1000 milliLiter(s) (100 mL/Hr) IV Continuous <Continuous>  dextrose 50% Injectable 25 Gram(s) IV Push once  dextrose 50% Injectable 12.5 Gram(s) IV Push once  dextrose 50% Injectable 25 Gram(s) IV Push once  finasteride 5 milliGRAM(s) Oral daily  glucagon  Injectable 1 milliGRAM(s) IntraMuscular once  hemorrhoidal Ointment 1 Application(s) Rectal two times a day  insulin glargine Injectable (LANTUS) 60 Unit(s) SubCutaneous at bedtime  insulin lispro (ADMELOG) corrective regimen sliding scale   SubCutaneous three times a day before meals  insulin lispro (ADMELOG) corrective regimen sliding scale   SubCutaneous at bedtime  insulin lispro Injectable (ADMELOG) 27 Unit(s) SubCutaneous three times a day before meals  melatonin 5 milliGRAM(s) Oral at bedtime  pantoprazole   Suspension 40 milliGRAM(s) Oral daily  QUEtiapine 25 milliGRAM(s) Oral at bedtime  senna 2 Tablet(s) Oral at bedtime  simethicone 80 milliGRAM(s) Chew three times a day  tamsulosin 0.4 milliGRAM(s) Oral at bedtime    MEDICATIONS  (PRN):  acetaminophen   Tablet .. 650 milliGRAM(s) Oral every 6 hours PRN Temp greater or equal to 38C (100.4F), Moderate Pain (4 - 6)  bisacodyl 5 milliGRAM(s) Oral every 12 hours PRN Constipation  polyethylene glycol 3350 17 Gram(s) Oral daily PRN Constipation          Vital Signs Last 24 Hrs  T(C): 36.6 (28 Jul 2021 11:43), Max: 37.2 (27 Jul 2021 23:55)  T(F): 97.9 (28 Jul 2021 11:43), Max: 98.9 (27 Jul 2021 23:55)  HR: 84 (28 Jul 2021 11:43) (78 - 96)  BP: 116/72 (28 Jul 2021 11:43) (116/72 - 148/84)  BP(mean): --  RR: 18 (28 Jul 2021 11:43) (18 - 19)  SpO2: 99% (28 Jul 2021 11:43) (97% - 99%)          07-27 @ 07:01  -  07-28 @ 07:00  --------------------------------------------------------  IN: 1200 mL / OUT: 2800 mL / NET: -1600 mL          LABS:                        10.6   3.92  )-----------( 139      ( 28 Jul 2021 06:41 )             32.6     07-28    136  |  98  |  21  ----------------------------<  265<H>  4.2   |  21<L>  |  0.42<L>    Ca    9.0      28 Jul 2021 06:41            CAPILLARY BLOOD GLUCOSE      POCT Blood Glucose.: 229 mg/dL (28 Jul 2021 11:21)        Physical Examination:  PULM: Clear to auscultation bilaterally, no significant sputum production  CVS: S1, S2 heard    RADIOLOGY REVIEWED  CT chest: < from: CT Chest w/ IV Cont (07.19.21 @ 17:31) >  FINDINGS:  CHEST:  LUNGS AND LARGE AIRWAYS: Patent central airways. There is a 4 mm groundglass nodule (series 4, image 171). There is an unchanged 1 cm cystic nodule (series 4, image 223). Interval decrease in right lower lobe nodule at the costophrenic angle, now measuring 1.3 x 0.8 cm (series 4, image 338). Other prior mentioned solid and groundglass nodules are resolved. Mild bilateral subsegmental atelectasis.  PLEURA: No pleural effusion.  VESSELS: Coronary artery calcifications.  HEART: Heart size is normal. No pericardial effusion.  MEDIASTINUM AND SARTHAK: No lymphadenopathy.  CHEST WALL AND LOWER NECK: Within normal limits.    < end of copied text >

## 2021-07-28 NOTE — PROGRESS NOTE ADULT - ASSESSMENT
72 y/o M w/ PMHx of CVA this past August and got TPA per family member, DM, BPH with obstruction s/p escamilla catheter, s/p ERCP w Sphincterectomy recent admission to [facility] for sepsis  Hodgkin lymphoma was not offered any chemo and was placed on hospice.     now presenting with weakness and FTT.   pt denies cp / SOB / palpitations   no focal neuro complaints .. at baseline RLE weakness   no N/V   had one episode of diarrhea   no urinary sx  abdominal pain, diffuse, but worse on R side.     - Failure to thrive: cultures neg  fungitell repeated Nl   CT chest repeated : improving   nutrition consult   encourage PO intake: dysphagia 3 based on MBS results. Pt feeling overall improved : will consider re-eval   (asking for better food to be given)    - lymphoma: d/w Dr. Larios: s/p immunotherapy .. will plan second dose on Aug 3th.   overall seemed to have responded somewhat to first dose     - DM with hyperglycemia : improved now worse with steroids   fu with endo     - anemia: monitor H/H post transfusion, stable.     - Fever: doubt infectious etiology   likely due to immunotherapy/lymphoma. culture: neg   abx dced. s/p IVF, ID f/u appreciated.   elevated fungitell noted, T chest improved / d/w Dr muñiz , repeat levels however low suspecion for fungal infection     - voice changes: CT neck : Asymmetric enlargement of the left palatine tonsil, suspicious for lymphomatous involvement given history. Correlate with direct visualization.  ENT had eval pt: no gross pathology on visualization   S/S eval: MBS done. speech: dysphagia 3    - Tachycardia: VA duplex neg for DVT   rate stable. noted 7 beats NSVT.  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 Smith and MRI department :  MR brain, cervical thoracic spine : non contrast done  and now awaiting with con... being expedited   cont steroids : will cont taper  fu LP cytology   appreciate neuro onc input   planned chemo early next week     - Afib: now in sinus : monitor   appreciate cardio input   no AC at this time and no AVNB at this time     - urinary retention: cont escamilla     discussed with Dr. Larios : will plan inpt immunotherapy while awaiting insurance approval for rehab.    DVT ppx

## 2021-07-28 NOTE — PROGRESS NOTE ADULT - PROBLEM SELECTOR PLAN 1
Will increase Lantus to 68u at bedtime.  Will continue Admelog 27u before each meal as well as Admelog correction scale coverage. Will continue monitoring FS and FU, will adjust insulin dose as steroids are tapered/dc.  Patient previously on Janumet, he has large insulin requirement at this time due to high-dose steroids. Effects of steroids can linger several weeks, patient would benefit from insulin as outpatient and is agreeable.  Suggest DC home on current basal bolus insulin regimen, endo FU 2 weeks.  Discussed plan with patient and family at bedside. Counseled that blood sugars will start trending down as steroids are tapered/dc. Insulin dose will have to be adjusted based on blood sugars.   Counseled for compliance with consistent low-carb diet.

## 2021-07-28 NOTE — PROGRESS NOTE ADULT - SUBJECTIVE AND OBJECTIVE BOX
LIZETT RIVERA  MRN-[id number]    Patient is a 71y old  Male who presents with a chief complaint of hodgkins lymphoma (24 Jul 2021 10:41)      Review of System    Comfortable.  No new events    Current Meds  MEDICATIONS  (STANDING):  allopurinol 300 milliGRAM(s) Oral daily  aspirin  chewable 81 milliGRAM(s) Oral daily  dexAMETHasone     Tablet 4 milliGRAM(s) Oral every 8 hours  dextrose 40% Gel 15 Gram(s) Oral once  dextrose 5%. 1000 milliLiter(s) (50 mL/Hr) IV Continuous <Continuous>  dextrose 5%. 1000 milliLiter(s) (100 mL/Hr) IV Continuous <Continuous>  dextrose 50% Injectable 25 Gram(s) IV Push once  dextrose 50% Injectable 12.5 Gram(s) IV Push once  dextrose 50% Injectable 25 Gram(s) IV Push once  finasteride 5 milliGRAM(s) Oral daily  glucagon  Injectable 1 milliGRAM(s) IntraMuscular once  hemorrhoidal Ointment 1 Application(s) Rectal two times a day  insulin glargine Injectable (LANTUS) 68 Unit(s) SubCutaneous at bedtime  insulin lispro (ADMELOG) corrective regimen sliding scale   SubCutaneous three times a day before meals  insulin lispro (ADMELOG) corrective regimen sliding scale   SubCutaneous at bedtime  insulin lispro Injectable (ADMELOG) 27 Unit(s) SubCutaneous three times a day before meals  melatonin 5 milliGRAM(s) Oral at bedtime  pantoprazole   Suspension 40 milliGRAM(s) Oral daily  QUEtiapine 25 milliGRAM(s) Oral at bedtime  senna 2 Tablet(s) Oral at bedtime  simethicone 80 milliGRAM(s) Chew three times a day  tamsulosin 0.4 milliGRAM(s) Oral at bedtime    MEDICATIONS  (PRN):  acetaminophen   Tablet .. 650 milliGRAM(s) Oral every 6 hours PRN Temp greater or equal to 38C (100.4F), Moderate Pain (4 - 6)  bisacodyl 5 milliGRAM(s) Oral every 12 hours PRN Constipation  polyethylene glycol 3350 17 Gram(s) Oral daily PRN Constipation      Vitals  Vital Signs Last 24 Hrs  T(C): 36.6 (28 Jul 2021 11:43), Max: 37.2 (27 Jul 2021 23:55)  T(F): 97.9 (28 Jul 2021 11:43), Max: 98.9 (27 Jul 2021 23:55)  HR: 84 (28 Jul 2021 11:43) (78 - 96)  BP: 116/72 (28 Jul 2021 11:43) (116/72 - 148/84)  BP(mean): --  RR: 18 (28 Jul 2021 11:43) (18 - 19)  SpO2: 99% (28 Jul 2021 11:43) (97% - 99%)    Physical Exam     NAD      Lab  CBC Full  -  ( 28 Jul 2021 06:41 )  WBC Count : 3.92 K/uL  RBC Count : 3.23 M/uL  Hemoglobin : 10.6 g/dL  Hematocrit : 32.6 %  Platelet Count - Automated : 139 K/uL  Mean Cell Volume : 100.9 fl  Mean Cell Hemoglobin : 32.8 pg  Mean Cell Hemoglobin Concentration : 32.5 gm/dL  Auto Neutrophil # : x  Auto Lymphocyte # : x  Auto Monocyte # : x  Auto Eosinophil # : x  Auto Basophil # : x  Auto Neutrophil % : x  Auto Lymphocyte % : x  Auto Monocyte % : x  Auto Eosinophil % : x  Auto Basophil % : x    07-28    136  |  98  |  21  ----------------------------<  265<H>  4.2   |  21<L>  |  0.42<L>    Ca    9.0      28 Jul 2021 06:41          Rad:    Assessment/Plan

## 2021-07-28 NOTE — PROGRESS NOTE ADULT - ASSESSMENT
72 y/o M with PMH of CVA, DM, BPH with obstruction s/p Sauceda catheter, s/p ERCP w Sphincteretomy, recent admission to [facility] for sepsis, Hodgkin lymphoma dx 2020 - was not offered any chemo and was placed on hospice, DVT 8/2020. Presenting with weakness and FTT. Pt and family opted for treatment of lymphoma - s/p Opdivo 7/6. Called to consult for CT chest findings 6/28 with patchy GGO and scattered nodular opacities throughout all lobes of the lungs. Currently breathing comfortably on RA.

## 2021-07-28 NOTE — PROGRESS NOTE ADULT - SUBJECTIVE AND OBJECTIVE BOX
Date of service: 07-28-21 @ 22:37      Patient is a 71y old  Male who presents with a chief complaint of hodgkins lymphoma (24 Jul 2021 10:41)                                                               INTERVAL HPI/OVERNIGHT EVENTS:    REVIEW OF SYSTEMS:     CONSTITUTIONAL: No weakness, fevers or chills  EYES/ENT: No visual changes , no ear ache   NECK: No pain or stiffness  RESPIRATORY: No cough, wheezing,  No shortness of breath  CARDIOVASCULAR: No chest pain or palpitations  GASTROINTESTINAL: No abdominal pain  . No nausea, vomiting, or hematemesis; No diarrhea or constipation. No melena or hematochezia.  GENITOURINARY: No dysuria, frequency or hematuria  NEUROLOGICAL: No numbness or weakness  SKIN: No itching, burning, rashes, or lesions                                                                                                                                                                                                                                                                                 Medications:  MEDICATIONS  (STANDING):  allopurinol 300 milliGRAM(s) Oral daily  aspirin  chewable 81 milliGRAM(s) Oral daily  dexAMETHasone     Tablet 4 milliGRAM(s) Oral every 8 hours  dextrose 40% Gel 15 Gram(s) Oral once  dextrose 5%. 1000 milliLiter(s) (50 mL/Hr) IV Continuous <Continuous>  dextrose 5%. 1000 milliLiter(s) (100 mL/Hr) IV Continuous <Continuous>  dextrose 50% Injectable 25 Gram(s) IV Push once  dextrose 50% Injectable 12.5 Gram(s) IV Push once  dextrose 50% Injectable 25 Gram(s) IV Push once  finasteride 5 milliGRAM(s) Oral daily  glucagon  Injectable 1 milliGRAM(s) IntraMuscular once  hemorrhoidal Ointment 1 Application(s) Rectal two times a day  insulin glargine Injectable (LANTUS) 68 Unit(s) SubCutaneous at bedtime  insulin lispro (ADMELOG) corrective regimen sliding scale   SubCutaneous at bedtime  insulin lispro (ADMELOG) corrective regimen sliding scale   SubCutaneous three times a day before meals  insulin lispro Injectable (ADMELOG) 27 Unit(s) SubCutaneous three times a day before meals  melatonin 5 milliGRAM(s) Oral at bedtime  pantoprazole   Suspension 40 milliGRAM(s) Oral daily  QUEtiapine 25 milliGRAM(s) Oral at bedtime  senna 2 Tablet(s) Oral at bedtime  simethicone 80 milliGRAM(s) Chew three times a day  tamsulosin 0.4 milliGRAM(s) Oral at bedtime    MEDICATIONS  (PRN):  acetaminophen   Tablet .. 650 milliGRAM(s) Oral every 6 hours PRN Temp greater or equal to 38C (100.4F), Moderate Pain (4 - 6)  bisacodyl 5 milliGRAM(s) Oral every 12 hours PRN Constipation  polyethylene glycol 3350 17 Gram(s) Oral daily PRN Constipation       Allergies    No Known Allergies    Intolerances      Vital Signs Last 24 Hrs  T(C): 36.7 (28 Jul 2021 20:14), Max: 37.2 (27 Jul 2021 23:55)  T(F): 98 (28 Jul 2021 20:14), Max: 98.9 (27 Jul 2021 23:55)  HR: 101 (28 Jul 2021 20:14) (78 - 101)  BP: 149/80 (28 Jul 2021 20:14) (116/72 - 149/80)  BP(mean): --  RR: 17 (28 Jul 2021 20:14) (17 - 19)  SpO2: 99% (28 Jul 2021 20:14) (97% - 99%)  CAPILLARY BLOOD GLUCOSE      POCT Blood Glucose.: 329 mg/dL (28 Jul 2021 21:08)  POCT Blood Glucose.: 345 mg/dL (28 Jul 2021 16:23)  POCT Blood Glucose.: 229 mg/dL (28 Jul 2021 11:21)  POCT Blood Glucose.: 274 mg/dL (28 Jul 2021 07:27)      07-27 @ 07:01 - 07-28 @ 07:00  --------------------------------------------------------  IN: 1200 mL / OUT: 2800 mL / NET: -1600 mL    07-28 @ 07:01 - 07-28 @ 22:37  --------------------------------------------------------  IN: 1090 mL / OUT: 400 mL / NET: 690 mL      Physical Exam:    Daily     Daily   General:  Well appearing, NAD, not cachetic  HEENT:  Nonicteric, PERRLA  CV:  RRR, S1S2   Lungs:  CTA B/L, no wheezes, rales, rhonchi  Abdomen:  Soft, non-tender, no distended, positive BS  Extremities:  no edema     Neuro:  AAOx3, non-focal, grossly intact                                                                                                                                                                                                                                                                                                LABS:                               10.6   3.92  )-----------( 139      ( 28 Jul 2021 06:41 )             32.6                      07-28    136  |  98  |  21  ----------------------------<  265<H>  4.2   |  21<L>  |  0.42<L>    Ca    9.0      28 Jul 2021 06:41                         RADIOLOGY & ADDITIONAL TESTS         I personally reviewed: [  ]EKG   [  ]CXR    [  ] CT      A/P:         Discussed with :     Augusto consultants' Notes   Time spent :

## 2021-07-28 NOTE — PROGRESS NOTE ADULT - PROBLEM SELECTOR PLAN 1
CT chest findings 6/28 with patchy GGO and scattered nodular opacities throughout all lobes of the lungs  -Possibly related to underlying lymphoma?  -Normoxic, no c/o dyspnea  -Repeat CT chest 7/19 with resolving GGO, only with 4 mm GGO nodule and unchanged 1 cm cystic nodule. Suggest repeat CT chest in 1 month. D/w pt.   -Repeat fungitell normal  -D/c planning per primary team

## 2021-07-28 NOTE — PROGRESS NOTE ADULT - SUBJECTIVE AND OBJECTIVE BOX
Chief complaint  Patient is a 71y old  Male who presents with a chief complaint of hodgkins lymphoma (24 Jul 2021 10:41)   Review of systems  Patient in bed, looks comfortable, no hypoglycemic episodes.    Labs and Fingersticks  CAPILLARY BLOOD GLUCOSE      POCT Blood Glucose.: 229 mg/dL (28 Jul 2021 11:21)  POCT Blood Glucose.: 274 mg/dL (28 Jul 2021 07:27)  POCT Blood Glucose.: 277 mg/dL (27 Jul 2021 21:28)  POCT Blood Glucose.: 306 mg/dL (27 Jul 2021 17:30)      Anion Gap, Serum: 17 (07-28 @ 06:41)  Anion Gap, Serum: 14 (07-27 @ 06:35)      Calcium, Total Serum: 9.0 (07-28 @ 06:41)  Calcium, Total Serum: 9.0 (07-27 @ 06:35)          07-28    136  |  98  |  21  ----------------------------<  265<H>  4.2   |  21<L>  |  0.42<L>    Ca    9.0      28 Jul 2021 06:41                          10.6   3.92  )-----------( 139      ( 28 Jul 2021 06:41 )             32.6     Medications  MEDICATIONS  (STANDING):  allopurinol 300 milliGRAM(s) Oral daily  aspirin  chewable 81 milliGRAM(s) Oral daily  dexAMETHasone     Tablet 4 milliGRAM(s) Oral every 8 hours  dextrose 40% Gel 15 Gram(s) Oral once  dextrose 5%. 1000 milliLiter(s) (50 mL/Hr) IV Continuous <Continuous>  dextrose 5%. 1000 milliLiter(s) (100 mL/Hr) IV Continuous <Continuous>  dextrose 50% Injectable 25 Gram(s) IV Push once  dextrose 50% Injectable 12.5 Gram(s) IV Push once  dextrose 50% Injectable 25 Gram(s) IV Push once  finasteride 5 milliGRAM(s) Oral daily  glucagon  Injectable 1 milliGRAM(s) IntraMuscular once  hemorrhoidal Ointment 1 Application(s) Rectal two times a day  insulin glargine Injectable (LANTUS) 68 Unit(s) SubCutaneous at bedtime  insulin lispro (ADMELOG) corrective regimen sliding scale   SubCutaneous three times a day before meals  insulin lispro (ADMELOG) corrective regimen sliding scale   SubCutaneous at bedtime  insulin lispro Injectable (ADMELOG) 27 Unit(s) SubCutaneous three times a day before meals  melatonin 5 milliGRAM(s) Oral at bedtime  pantoprazole   Suspension 40 milliGRAM(s) Oral daily  QUEtiapine 25 milliGRAM(s) Oral at bedtime  senna 2 Tablet(s) Oral at bedtime  simethicone 80 milliGRAM(s) Chew three times a day  tamsulosin 0.4 milliGRAM(s) Oral at bedtime      Physical Exam  General: Patient comfortable in bed  Vital Signs Last 12 Hrs  T(F): 97.9 (07-28-21 @ 11:43), Max: 98.4 (07-28-21 @ 04:18)  HR: 84 (07-28-21 @ 11:43) (78 - 96)  BP: 116/72 (07-28-21 @ 11:43) (116/72 - 148/84)  BP(mean): --  RR: 18 (07-28-21 @ 11:43) (18 - 18)  SpO2: 99% (07-28-21 @ 11:43) (97% - 99%)  Neck: No palpable thyroid nodules.  CVS: S1S2, No murmurs

## 2021-07-28 NOTE — PROGRESS NOTE ADULT - SUBJECTIVE AND OBJECTIVE BOX
Subjective: Patient seen and examined. No new events except as noted.     REVIEW OF SYSTEMS:    CONSTITUTIONAL: No weakness, fevers or chills  EYES/ENT: No visual changes;  No vertigo or throat pain   NECK: No pain or stiffness  RESPIRATORY: No cough, wheezing, hemoptysis; No shortness of breath  CARDIOVASCULAR: No chest pain or palpitations  GASTROINTESTINAL: No abdominal or epigastric pain. No nausea, vomiting, or hematemesis; No diarrhea or constipation. No melena or hematochezia.  GENITOURINARY: No dysuria, frequency or hematuria  NEUROLOGICAL: No numbness or weakness  SKIN: No itching, burning, rashes, or lesions   All other review of systems is negative unless indicated above.    MEDICATIONS:  MEDICATIONS  (STANDING):  allopurinol 300 milliGRAM(s) Oral daily  aspirin  chewable 81 milliGRAM(s) Oral daily  dexAMETHasone     Tablet 4 milliGRAM(s) Oral every 8 hours  dextrose 40% Gel 15 Gram(s) Oral once  dextrose 5%. 1000 milliLiter(s) (50 mL/Hr) IV Continuous <Continuous>  dextrose 5%. 1000 milliLiter(s) (100 mL/Hr) IV Continuous <Continuous>  dextrose 50% Injectable 25 Gram(s) IV Push once  dextrose 50% Injectable 12.5 Gram(s) IV Push once  dextrose 50% Injectable 25 Gram(s) IV Push once  finasteride 5 milliGRAM(s) Oral daily  glucagon  Injectable 1 milliGRAM(s) IntraMuscular once  hemorrhoidal Ointment 1 Application(s) Rectal two times a day  insulin glargine Injectable (LANTUS) 60 Unit(s) SubCutaneous at bedtime  insulin lispro (ADMELOG) corrective regimen sliding scale   SubCutaneous three times a day before meals  insulin lispro (ADMELOG) corrective regimen sliding scale   SubCutaneous at bedtime  insulin lispro Injectable (ADMELOG) 27 Unit(s) SubCutaneous three times a day before meals  melatonin 5 milliGRAM(s) Oral at bedtime  pantoprazole   Suspension 40 milliGRAM(s) Oral daily  QUEtiapine 25 milliGRAM(s) Oral at bedtime  senna 2 Tablet(s) Oral at bedtime  simethicone 80 milliGRAM(s) Chew three times a day  tamsulosin 0.4 milliGRAM(s) Oral at bedtime      PHYSICAL EXAM:  T(C): 36.9 (07-28-21 @ 04:18), Max: 37.2 (07-27-21 @ 23:55)  HR: 78 (07-28-21 @ 04:18) (78 - 93)  BP: 148/84 (07-28-21 @ 04:18) (120/72 - 148/84)  RR: 18 (07-28-21 @ 04:18) (18 - 19)  SpO2: 98% (07-28-21 @ 04:18) (98% - 99%)  Wt(kg): --  I&O's Summary    27 Jul 2021 07:01  -  28 Jul 2021 07:00  --------------------------------------------------------  IN: 1200 mL / OUT: 2800 mL / NET: -1600 mL          Appearance: Normal	  HEENT:   Normal oral mucosa, PERRL, EOMI	  Lymphatic: No lymphadenopathy , no edema  Cardiovascular: Normal S1 S2, No JVD, No murmurs , Peripheral pulses palpable 2+ bilaterally  Respiratory: Lungs clear to auscultation, normal effort 	  Gastrointestinal:  Soft, Non-tender, + BS	  Skin: No rashes, No ecchymoses, No cyanosis, warm to touch  Musculoskeletal: Normal range of motion, normal strength  Psychiatry:  Mood & affect appropriate  Ext: No edema      LABS:    CARDIAC MARKERS:                                10.6   3.92  )-----------( 139      ( 28 Jul 2021 06:41 )             32.6     07-28    136  |  98  |  21  ----------------------------<  265<H>  4.2   |  21<L>  |  0.42<L>    Ca    9.0      28 Jul 2021 06:41      proBNP:   Lipid Profile:   HgA1c:   TSH:             TELEMETRY: 	    ECG:  	  RADIOLOGY:   DIAGNOSTIC TESTING:  [ ] Echocardiogram:  [ ]  Catheterization:  [ ] Stress Test:    OTHER:

## 2021-07-28 NOTE — PROGRESS NOTE ADULT - ASSESSMENT
Assessment  DMT2: 71y Male with DM T2 with hyperglycemia, A1C 8.8%, was on oral meds/Janumet at home, admitted with weakness/fatigue and hyperglycemia, now on large-dose basal bolus insulin, increased dose yesterday, blood sugars are improving though still not at target, no hypoglycemic episodes, eating meals, remains on dexamethasone.  Lymphoma: on medications, monitored, FU Heme/Onc.  Anemia: stable, monitored.      Shahriar Kahn MD  Cell: [phone]  Office: [phone]

## 2021-07-28 NOTE — PROGRESS NOTE ADULT - ASSESSMENT
1) Hodgkin's lymphoma  - repeat CT scans, results noted  -[facility]- Family meeting held at bedside on 7/2. We discussed rx options vs comfort care. Family and patient wish to pursue rx. Ongoing conversations with family members have been taking place since our formal meeting.   - allopurinol  - pt s/p 1st rx with opdivo 7/6. Planning for next treatment in days ahead.     2) ENT- voice change is a new finding as per patient's sister. Patient also with weak voice.  SS, ENT and neuro have seen patient.    3)Hyperglycemia- mgmt as per med. , endocrinology    4)Weakness. 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  - Neurology input noted.  - cont pt.  - bed to chair   - dex taper    5) Pancytopenia. Suspect multifactorial, due to hodgkins, and possibly with role being played by recent abx.  anemia- w/u this far unremarkable. possible aocd.  transfusional support as needed    Counts are improving.     6)Neuro-onc  MRI findings noted.

## 2021-07-28 NOTE — PROGRESS NOTE ADULT - ASSESSMENT
70 y/o M w/ PMHx of CVA this past August and got TPA per family member, DM, BPH with obstruction s/p escamilla catheter, s/p ERCP w Sphincterectomy recent admission to [facility] for sepsis  Hodgkin lymphoma was not offered any chemo and was placed on hospice.     now presenting with weakness and FTT.   pt denies cp / SOB / palpitations   no focal neuro complaints .. at baseline RLE weakness   no N/V   had one episode of diarrhea   no urinary sx  abdominal pain, diffuse, but worse on R side.   Afib RVR overnight. Was asymptomatic   no known history of this as per patient

## 2021-07-29 LAB
ANION GAP SERPL CALC-SCNC: 14 MMOL/L — SIGNIFICANT CHANGE UP (ref 5–17)
ANION GAP SERPL CALC-SCNC: 16 MMOL/L — SIGNIFICANT CHANGE UP (ref 5–17)
BUN SERPL-MCNC: 20 MG/DL — SIGNIFICANT CHANGE UP (ref 7–23)
BUN SERPL-MCNC: 25 MG/DL — HIGH (ref 7–23)
CALCIUM SERPL-MCNC: 9.2 MG/DL — SIGNIFICANT CHANGE UP (ref 8.4–10.5)
CALCIUM SERPL-MCNC: 9.5 MG/DL — SIGNIFICANT CHANGE UP (ref 8.4–10.5)
CHLORIDE SERPL-SCNC: 95 MMOL/L — LOW (ref 96–108)
CHLORIDE SERPL-SCNC: 95 MMOL/L — LOW (ref 96–108)
CO2 SERPL-SCNC: 19 MMOL/L — LOW (ref 22–31)
CO2 SERPL-SCNC: 21 MMOL/L — LOW (ref 22–31)
CREAT SERPL-MCNC: 0.36 MG/DL — LOW (ref 0.5–1.3)
CREAT SERPL-MCNC: 0.59 MG/DL — SIGNIFICANT CHANGE UP (ref 0.5–1.3)
GLUCOSE BLDC GLUCOMTR-MCNC: 235 MG/DL — HIGH (ref 70–99)
GLUCOSE BLDC GLUCOMTR-MCNC: 237 MG/DL — HIGH (ref 70–99)
GLUCOSE BLDC GLUCOMTR-MCNC: 278 MG/DL — HIGH (ref 70–99)
GLUCOSE BLDC GLUCOMTR-MCNC: 371 MG/DL — HIGH (ref 70–99)
GLUCOSE SERPL-MCNC: 264 MG/DL — HIGH (ref 70–99)
GLUCOSE SERPL-MCNC: 276 MG/DL — HIGH (ref 70–99)
HCT VFR BLD CALC: 34.1 % — LOW (ref 39–50)
HGB BLD-MCNC: 10.8 G/DL — LOW (ref 13–17)
MCHC RBC-ENTMCNC: 31.7 GM/DL — LOW (ref 32–36)
MCHC RBC-ENTMCNC: 32.3 PG — SIGNIFICANT CHANGE UP (ref 27–34)
MCV RBC AUTO: 102.1 FL — HIGH (ref 80–100)
NRBC # BLD: 0 /100 WBCS — SIGNIFICANT CHANGE UP (ref 0–0)
PLATELET # BLD AUTO: 141 K/UL — LOW (ref 150–400)
POTASSIUM SERPL-MCNC: 4.4 MMOL/L — SIGNIFICANT CHANGE UP (ref 3.5–5.3)
POTASSIUM SERPL-MCNC: 4.5 MMOL/L — SIGNIFICANT CHANGE UP (ref 3.5–5.3)
POTASSIUM SERPL-SCNC: 4.4 MMOL/L — SIGNIFICANT CHANGE UP (ref 3.5–5.3)
POTASSIUM SERPL-SCNC: 4.5 MMOL/L — SIGNIFICANT CHANGE UP (ref 3.5–5.3)
RBC # BLD: 3.34 M/UL — LOW (ref 4.2–5.8)
RBC # FLD: SIGNIFICANT CHANGE UP (ref 10.3–14.5)
SODIUM SERPL-SCNC: 130 MMOL/L — LOW (ref 135–145)
SODIUM SERPL-SCNC: 130 MMOL/L — LOW (ref 135–145)
WBC # BLD: 4.27 K/UL — SIGNIFICANT CHANGE UP (ref 3.8–10.5)
WBC # FLD AUTO: 4.27 K/UL — SIGNIFICANT CHANGE UP (ref 3.8–10.5)

## 2021-07-29 RX ORDER — INSULIN LISPRO 100/ML
30 VIAL (ML) SUBCUTANEOUS
Refills: 0 | Status: DISCONTINUED | OUTPATIENT
Start: 2021-07-29 | End: 2021-07-30

## 2021-07-29 RX ORDER — INSULIN GLARGINE 100 [IU]/ML
72 INJECTION, SOLUTION SUBCUTANEOUS AT BEDTIME
Refills: 0 | Status: DISCONTINUED | OUTPATIENT
Start: 2021-07-29 | End: 2021-07-30

## 2021-07-29 RX ADMIN — SIMETHICONE 80 MILLIGRAM(S): 80 TABLET, CHEWABLE ORAL at 21:51

## 2021-07-29 RX ADMIN — PANTOPRAZOLE SODIUM 40 MILLIGRAM(S): 20 TABLET, DELAYED RELEASE ORAL at 05:28

## 2021-07-29 RX ADMIN — Medication 30 UNIT(S): at 16:44

## 2021-07-29 RX ADMIN — Medication 650 MILLIGRAM(S): at 23:37

## 2021-07-29 RX ADMIN — Medication 300 MILLIGRAM(S): at 11:19

## 2021-07-29 RX ADMIN — QUETIAPINE FUMARATE 25 MILLIGRAM(S): 200 TABLET, FILM COATED ORAL at 21:52

## 2021-07-29 RX ADMIN — Medication 2: at 16:45

## 2021-07-29 RX ADMIN — Medication 81 MILLIGRAM(S): at 11:19

## 2021-07-29 RX ADMIN — Medication 5 MILLIGRAM(S): at 21:51

## 2021-07-29 RX ADMIN — Medication 4 MILLIGRAM(S): at 13:01

## 2021-07-29 RX ADMIN — Medication 650 MILLIGRAM(S): at 23:07

## 2021-07-29 RX ADMIN — TAMSULOSIN HYDROCHLORIDE 0.4 MILLIGRAM(S): 0.4 CAPSULE ORAL at 21:52

## 2021-07-29 RX ADMIN — FINASTERIDE 5 MILLIGRAM(S): 5 TABLET, FILM COATED ORAL at 21:52

## 2021-07-29 RX ADMIN — Medication 4 MILLIGRAM(S): at 05:28

## 2021-07-29 RX ADMIN — Medication 27 UNIT(S): at 08:00

## 2021-07-29 RX ADMIN — Medication 2: at 21:52

## 2021-07-29 RX ADMIN — Medication 5: at 11:59

## 2021-07-29 RX ADMIN — Medication 2: at 07:53

## 2021-07-29 RX ADMIN — SIMETHICONE 80 MILLIGRAM(S): 80 TABLET, CHEWABLE ORAL at 13:02

## 2021-07-29 RX ADMIN — SIMETHICONE 80 MILLIGRAM(S): 80 TABLET, CHEWABLE ORAL at 05:28

## 2021-07-29 RX ADMIN — Medication 27 UNIT(S): at 12:00

## 2021-07-29 RX ADMIN — PHENYLEPHRINE-SHARK LIVER OIL-MINERAL OIL-PETROLATUM RECTAL OINTMENT 1 APPLICATION(S): at 05:28

## 2021-07-29 RX ADMIN — PHENYLEPHRINE-SHARK LIVER OIL-MINERAL OIL-PETROLATUM RECTAL OINTMENT 1 APPLICATION(S): at 17:29

## 2021-07-29 RX ADMIN — Medication 4 MILLIGRAM(S): at 21:51

## 2021-07-29 RX ADMIN — INSULIN GLARGINE 72 UNIT(S): 100 INJECTION, SOLUTION SUBCUTANEOUS at 21:52

## 2021-07-29 NOTE — PROGRESS NOTE ADULT - ASSESSMENT
72 y/o M w/ PMHx of CVA this past August and got TPA per family member, DM, BPH with obstruction s/p escamilla catheter, s/p ERCP w Sphincterectomy recent admission to [facility] for sepsis  Hodgkin lymphoma was not offered any chemo and was placed on hospice.     now presenting with weakness and FTT.   pt denies cp / SOB / palpitations   no focal neuro complaints .. at baseline RLE weakness   no N/V   had one episode of diarrhea   no urinary sx  abdominal pain, diffuse, but worse on R side.     - Failure to thrive: cultures neg  fungitell repeated Nl   CT chest repeated : improving   nutrition consult   encourage PO intake: dysphagia 3 based on MBS results. Pt feeling overall improved : will consider re-eval   (asking for better food to be given)    - lymphoma: d/w Dr. Larios: s/p immunotherapy .. will plan second dose on Aug 3th.   overall seemed to have responded somewhat to first dose     - DM with hyperglycemia : improved now worse with steroids   fu with endo     - anemia: monitor H/H post transfusion, stable.     - Fever: doubt infectious etiology   likely due to immunotherapy/lymphoma. culture: neg   abx dced. s/p IVF, ID f/u appreciated.   elevated fungitell noted, T chest improved / d/w Dr muñiz , repeat levels however low suspecion for fungal infection     - voice changes: CT neck : Asymmetric enlargement of the left palatine tonsil, suspicious for lymphomatous involvement given history. Correlate with direct visualization.  ENT had eval pt: no gross pathology on visualization   S/S eval: MBS done. speech: dysphagia 3    - Tachycardia: VA duplex neg for DVT   rate stable. noted 7 beats NSVT.  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 Smith and MRI department :  MR brain, cervical thoracic spine : non contrast done  and now awaiting with con... being expedited   cont steroids : will cont taper  fu LP cytology   appreciate neuro onc input   planned chemo early next week     - Afib: now in sinus : monitor   appreciate cardio input   no AC at this time and no AVNB at this time     - urinary retention: cont escamilla     discussed with Dr. Larios : will plan inpt immunotherapy while awaiting insurance approval for rehab.    DVT ppx

## 2021-07-29 NOTE — PROGRESS NOTE ADULT - SUBJECTIVE AND OBJECTIVE BOX
Chief complaint  Patient is a 71y old  Male who presents with a chief complaint of hodgkins lymphoma (24 Jul 2021 10:41)   Review of systems  Patient in bed, looks comfortable, no hypoglycemic episodes.    Labs and Fingersticks  CAPILLARY BLOOD GLUCOSE      POCT Blood Glucose.: 371 mg/dL (29 Jul 2021 11:30)  POCT Blood Glucose.: 237 mg/dL (29 Jul 2021 07:40)  POCT Blood Glucose.: 329 mg/dL (28 Jul 2021 21:08)  POCT Blood Glucose.: 345 mg/dL (28 Jul 2021 16:23)      Anion Gap, Serum: 16 (07-29 @ 13:02)  Anion Gap, Serum: 14 (07-29 @ 05:54)  Anion Gap, Serum: 17 (07-28 @ 06:41)      Calcium, Total Serum: 9.2 (07-29 @ 13:02)  Calcium, Total Serum: 9.5 (07-29 @ 05:54)  Calcium, Total Serum: 9.0 (07-28 @ 06:41)          07-29    130<L>  |  95<L>  |  25<H>  ----------------------------<  276<H>  4.4   |  19<L>  |  0.59    Ca    9.2      29 Jul 2021 13:02                          10.8   4.27  )-----------( 141      ( 29 Jul 2021 05:54 )             34.1     Medications  MEDICATIONS  (STANDING):  allopurinol 300 milliGRAM(s) Oral daily  aspirin  chewable 81 milliGRAM(s) Oral daily  dexAMETHasone     Tablet 4 milliGRAM(s) Oral every 8 hours  dextrose 40% Gel 15 Gram(s) Oral once  dextrose 5%. 1000 milliLiter(s) (50 mL/Hr) IV Continuous <Continuous>  dextrose 5%. 1000 milliLiter(s) (100 mL/Hr) IV Continuous <Continuous>  dextrose 50% Injectable 25 Gram(s) IV Push once  dextrose 50% Injectable 12.5 Gram(s) IV Push once  dextrose 50% Injectable 25 Gram(s) IV Push once  finasteride 5 milliGRAM(s) Oral daily  glucagon  Injectable 1 milliGRAM(s) IntraMuscular once  hemorrhoidal Ointment 1 Application(s) Rectal two times a day  insulin glargine Injectable (LANTUS) 72 Unit(s) SubCutaneous at bedtime  insulin lispro (ADMELOG) corrective regimen sliding scale   SubCutaneous three times a day before meals  insulin lispro (ADMELOG) corrective regimen sliding scale   SubCutaneous at bedtime  insulin lispro Injectable (ADMELOG) 30 Unit(s) SubCutaneous three times a day before meals  melatonin 5 milliGRAM(s) Oral at bedtime  pantoprazole   Suspension 40 milliGRAM(s) Oral daily  QUEtiapine 25 milliGRAM(s) Oral at bedtime  senna 2 Tablet(s) Oral at bedtime  simethicone 80 milliGRAM(s) Chew three times a day  tamsulosin 0.4 milliGRAM(s) Oral at bedtime      Physical Exam  General: Patient comfortable in bed  Vital Signs Last 12 Hrs  T(F): 97.9 (07-29-21 @ 11:18), Max: 98 (07-29-21 @ 04:00)  HR: 85 (07-29-21 @ 11:18) (79 - 85)  BP: 125/78 (07-29-21 @ 11:18) (121/77 - 128/83)  BP(mean): --  RR: 18 (07-29-21 @ 11:18) (18 - 18)  SpO2: 100% (07-29-21 @ 11:18) (100% - 100%)  Neck: No palpable thyroid nodules.  CVS: S1S2, No murmurs  Respiratory: No wheezing, no crepitations  GI: Abdomen soft, bowel sounds positive  Musculoskeletal:  edema lower extremities.   Skin: No skin rashes, no ecchymosis    Diagnostics           Chief complaint  Patient is a 71y old  Male who presents with a chief complaint of hodgkins lymphoma (24 Jul 2021 10:41)   Review of systems  Patient in bed, looks comfortable, no hypoglycemic episodes.    Labs and Fingersticks  CAPILLARY BLOOD GLUCOSE      POCT Blood Glucose.: 371 mg/dL (29 Jul 2021 11:30)  POCT Blood Glucose.: 237 mg/dL (29 Jul 2021 07:40)  POCT Blood Glucose.: 329 mg/dL (28 Jul 2021 21:08)  POCT Blood Glucose.: 345 mg/dL (28 Jul 2021 16:23)      Anion Gap, Serum: 16 (07-29 @ 13:02)  Anion Gap, Serum: 14 (07-29 @ 05:54)  Anion Gap, Serum: 17 (07-28 @ 06:41)      Calcium, Total Serum: 9.2 (07-29 @ 13:02)  Calcium, Total Serum: 9.5 (07-29 @ 05:54)  Calcium, Total Serum: 9.0 (07-28 @ 06:41)          07-29    130<L>  |  95<L>  |  25<H>  ----------------------------<  276<H>  4.4   |  19<L>  |  0.59    Ca    9.2      29 Jul 2021 13:02                          10.8   4.27  )-----------( 141      ( 29 Jul 2021 05:54 )             34.1     Medications  MEDICATIONS  (STANDING):  allopurinol 300 milliGRAM(s) Oral daily  aspirin  chewable 81 milliGRAM(s) Oral daily  dexAMETHasone     Tablet 4 milliGRAM(s) Oral every 8 hours  dextrose 40% Gel 15 Gram(s) Oral once  dextrose 5%. 1000 milliLiter(s) (50 mL/Hr) IV Continuous <Continuous>  dextrose 5%. 1000 milliLiter(s) (100 mL/Hr) IV Continuous <Continuous>  dextrose 50% Injectable 25 Gram(s) IV Push once  dextrose 50% Injectable 12.5 Gram(s) IV Push once  dextrose 50% Injectable 25 Gram(s) IV Push once  finasteride 5 milliGRAM(s) Oral daily  glucagon  Injectable 1 milliGRAM(s) IntraMuscular once  hemorrhoidal Ointment 1 Application(s) Rectal two times a day  insulin glargine Injectable (LANTUS) 72 Unit(s) SubCutaneous at bedtime  insulin lispro (ADMELOG) corrective regimen sliding scale   SubCutaneous three times a day before meals  insulin lispro (ADMELOG) corrective regimen sliding scale   SubCutaneous at bedtime  insulin lispro Injectable (ADMELOG) 30 Unit(s) SubCutaneous three times a day before meals  melatonin 5 milliGRAM(s) Oral at bedtime  pantoprazole   Suspension 40 milliGRAM(s) Oral daily  QUEtiapine 25 milliGRAM(s) Oral at bedtime  senna 2 Tablet(s) Oral at bedtime  simethicone 80 milliGRAM(s) Chew three times a day  tamsulosin 0.4 milliGRAM(s) Oral at bedtime      Physical Exam  General: Patient comfortable in bed  Vital Signs Last 12 Hrs  T(F): 97.9 (07-29-21 @ 11:18), Max: 98 (07-29-21 @ 04:00)  HR: 85 (07-29-21 @ 11:18) (79 - 85)  BP: 125/78 (07-29-21 @ 11:18) (121/77 - 128/83)  BP(mean): --  RR: 18 (07-29-21 @ 11:18) (18 - 18)  SpO2: 100% (07-29-21 @ 11:18) (100% - 100%)  Neck: No palpable thyroid nodules.  CVS: S1S2, No murmurs  Respiratory: No wheezing, no crepitations  GI: Abdomen soft, bowel sounds positive  Musculoskeletal:  edema lower extremities.   Skin: No skin rashes, no ecchymosis    Diagnostics

## 2021-07-29 NOTE — PROGRESS NOTE ADULT - SUBJECTIVE AND OBJECTIVE BOX
Subjective: Patient seen and examined. No new events except as noted.     REVIEW OF SYSTEMS:    CONSTITUTIONAL: No weakness, fevers or chills  EYES/ENT: No visual changes;  No vertigo or throat pain   NECK: No pain or stiffness  RESPIRATORY: No cough, wheezing, hemoptysis; No shortness of breath  CARDIOVASCULAR: No chest pain or palpitations  GASTROINTESTINAL: No abdominal or epigastric pain. No nausea, vomiting, or hematemesis; No diarrhea or constipation. No melena or hematochezia.  GENITOURINARY: No dysuria, frequency or hematuria  NEUROLOGICAL: No numbness or weakness  SKIN: No itching, burning, rashes, or lesions   All other review of systems is negative unless indicated above.    MEDICATIONS:  MEDICATIONS  (STANDING):  allopurinol 300 milliGRAM(s) Oral daily  aspirin  chewable 81 milliGRAM(s) Oral daily  dexAMETHasone     Tablet 4 milliGRAM(s) Oral every 8 hours  dextrose 40% Gel 15 Gram(s) Oral once  dextrose 5%. 1000 milliLiter(s) (50 mL/Hr) IV Continuous <Continuous>  dextrose 5%. 1000 milliLiter(s) (100 mL/Hr) IV Continuous <Continuous>  dextrose 50% Injectable 25 Gram(s) IV Push once  dextrose 50% Injectable 12.5 Gram(s) IV Push once  dextrose 50% Injectable 25 Gram(s) IV Push once  finasteride 5 milliGRAM(s) Oral daily  glucagon  Injectable 1 milliGRAM(s) IntraMuscular once  hemorrhoidal Ointment 1 Application(s) Rectal two times a day  insulin glargine Injectable (LANTUS) 68 Unit(s) SubCutaneous at bedtime  insulin lispro (ADMELOG) corrective regimen sliding scale   SubCutaneous three times a day before meals  insulin lispro (ADMELOG) corrective regimen sliding scale   SubCutaneous at bedtime  insulin lispro Injectable (ADMELOG) 27 Unit(s) SubCutaneous three times a day before meals  melatonin 5 milliGRAM(s) Oral at bedtime  pantoprazole   Suspension 40 milliGRAM(s) Oral daily  QUEtiapine 25 milliGRAM(s) Oral at bedtime  senna 2 Tablet(s) Oral at bedtime  simethicone 80 milliGRAM(s) Chew three times a day  tamsulosin 0.4 milliGRAM(s) Oral at bedtime      PHYSICAL EXAM:  T(C): 36.6 (07-29-21 @ 11:18), Max: 36.7 (07-28-21 @ 20:14)  HR: 85 (07-29-21 @ 11:18) (79 - 101)  BP: 125/78 (07-29-21 @ 11:18) (121/77 - 149/80)  RR: 18 (07-29-21 @ 11:18) (17 - 18)  SpO2: 100% (07-29-21 @ 11:18) (99% - 100%)  Wt(kg): --  I&O's Summary    28 Jul 2021 07:01  -  29 Jul 2021 07:00  --------------------------------------------------------  IN: 1090 mL / OUT: 1700 mL / NET: -610 mL    29 Jul 2021 07:01  -  29 Jul 2021 12:19  --------------------------------------------------------  IN: 310 mL / OUT: 0 mL / NET: 310 mL          Appearance: Normal	  HEENT:   Normal oral mucosa, PERRL, EOMI	  Lymphatic: No lymphadenopathy , no edema  Cardiovascular: Normal S1 S2, No JVD, No murmurs , Peripheral pulses palpable 2+ bilaterally  Respiratory: Lungs clear to auscultation, normal effort 	  Gastrointestinal:  Soft, Non-tender, + BS	  Skin: No rashes, No ecchymoses, No cyanosis, warm to touch  Musculoskeletal: Normal range of motion, normal strength  Psychiatry:  Mood & affect appropriate  Ext: No edema      LABS:    CARDIAC MARKERS:                                10.8   4.27  )-----------( 141      ( 29 Jul 2021 05:54 )             34.1     07-29    130<L>  |  95<L>  |  20  ----------------------------<  264<H>  4.5   |  21<L>  |  0.36<L>    Ca    9.5      29 Jul 2021 05:54      proBNP:   Lipid Profile:   HgA1c:   TSH:             TELEMETRY: 	    ECG:  	  RADIOLOGY:   DIAGNOSTIC TESTING:  [ ] Echocardiogram:  [ ]  Catheterization:  [ ] Stress Test:    OTHER:

## 2021-07-29 NOTE — PROGRESS NOTE ADULT - ASSESSMENT
1) Hodgkin's lymphoma  - repeat CT scans, results noted  -[facility]- Family meeting held at bedside on 7/2. We discussed rx options vs comfort care. Family and patient wish to pursue rx. Ongoing conversations with family members have been taking place since our formal meeting.   - allopurinol  - pt s/p 1st rx with opdivo 7/6. Planning for next treatment in days ahead.     2) ENT- voice change is a new finding as per patient's sister. Patient also with weak voice.  SS, ENT and neuro have seen patient.    3)Hyperglycemia- mgmt as per med. , endocrinology    4)Weakness. 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  - Neurology input noted.  - cont pt.  - bed to chair   - dex taper    5) Pancytopenia. Suspect multifactorial, due to hodgkins, and possibly with role being played by recent abx.  anemia- w/u this far unremarkable. possible aocd.  transfusional support as needed    Counts are improving.     6)Neuro-onc  MRI findings noted.  Case d/w Neuro onc. Will change dex to 4 mg q12 on friday, x 3 days, and then to 4 mg qd

## 2021-07-29 NOTE — PROGRESS NOTE ADULT - SUBJECTIVE AND OBJECTIVE BOX
Date of service: 07-29-21 @ 22:56      Patient is a 71y old  Male who presents with a chief complaint of hodgkins lymphoma (24 Jul 2021 10:41)                                                               INTERVAL HPI/OVERNIGHT EVENTS:    REVIEW OF SYSTEMS:     CONSTITUTIONAL: No weakness, fevers or chills  EYES/ENT: No visual changes , no ear ache   NECK: No pain or stiffness  RESPIRATORY: No cough, wheezing,  No shortness of breath  CARDIOVASCULAR: No chest pain or palpitations  GASTROINTESTINAL: No abdominal pain  . No nausea, vomiting, or hematemesis; No diarrhea or constipation. No melena or hematochezia.  GENITOURINARY: No dysuria, frequency or hematuria  NEUROLOGICAL: No numbness or weakness  SKIN: No itching, burning, rashes, or lesions                                                                                                                                                                                                                                                                                 Medications:  MEDICATIONS  (STANDING):  allopurinol 300 milliGRAM(s) Oral daily  aspirin  chewable 81 milliGRAM(s) Oral daily  dextrose 40% Gel 15 Gram(s) Oral once  dextrose 5%. 1000 milliLiter(s) (50 mL/Hr) IV Continuous <Continuous>  dextrose 5%. 1000 milliLiter(s) (100 mL/Hr) IV Continuous <Continuous>  dextrose 50% Injectable 25 Gram(s) IV Push once  dextrose 50% Injectable 12.5 Gram(s) IV Push once  dextrose 50% Injectable 25 Gram(s) IV Push once  finasteride 5 milliGRAM(s) Oral daily  glucagon  Injectable 1 milliGRAM(s) IntraMuscular once  hemorrhoidal Ointment 1 Application(s) Rectal two times a day  insulin glargine Injectable (LANTUS) 72 Unit(s) SubCutaneous at bedtime  insulin lispro (ADMELOG) corrective regimen sliding scale   SubCutaneous three times a day before meals  insulin lispro (ADMELOG) corrective regimen sliding scale   SubCutaneous at bedtime  insulin lispro Injectable (ADMELOG) 30 Unit(s) SubCutaneous three times a day before meals  melatonin 5 milliGRAM(s) Oral at bedtime  pantoprazole   Suspension 40 milliGRAM(s) Oral daily  QUEtiapine 25 milliGRAM(s) Oral at bedtime  senna 2 Tablet(s) Oral at bedtime  simethicone 80 milliGRAM(s) Chew three times a day  tamsulosin 0.4 milliGRAM(s) Oral at bedtime    MEDICATIONS  (PRN):  acetaminophen   Tablet .. 650 milliGRAM(s) Oral every 6 hours PRN Temp greater or equal to 38C (100.4F), Moderate Pain (4 - 6)  bisacodyl 5 milliGRAM(s) Oral every 12 hours PRN Constipation  polyethylene glycol 3350 17 Gram(s) Oral daily PRN Constipation       Allergies    No Known Allergies    Intolerances      Vital Signs Last 24 Hrs  T(C): 36.4 (29 Jul 2021 20:08), Max: 36.7 (29 Jul 2021 00:01)  T(F): 97.5 (29 Jul 2021 20:08), Max: 98.1 (29 Jul 2021 00:01)  HR: 89 (29 Jul 2021 20:08) (79 - 89)  BP: 135/76 (29 Jul 2021 20:08) (121/77 - 135/76)  BP(mean): --  RR: 18 (29 Jul 2021 20:08) (18 - 18)  SpO2: 99% (29 Jul 2021 20:08) (99% - 100%)  CAPILLARY BLOOD GLUCOSE      POCT Blood Glucose.: 278 mg/dL (29 Jul 2021 21:13)  POCT Blood Glucose.: 235 mg/dL (29 Jul 2021 16:27)  POCT Blood Glucose.: 371 mg/dL (29 Jul 2021 11:30)  POCT Blood Glucose.: 237 mg/dL (29 Jul 2021 07:40)      07-28 @ 07:01 - 07-29 @ 07:00  --------------------------------------------------------  IN: 1090 mL / OUT: 1700 mL / NET: -610 mL    07-29 @ 07:01  -  07-29 @ 22:56  --------------------------------------------------------  IN: 630 mL / OUT: 1300 mL / NET: -670 mL      Physical Exam:    Daily     Daily   General:  Well appearing, NAD, not cachetic  HEENT:  Nonicteric, PERRLA  CV:  RRR, S1S2   Lungs:  CTA B/L, no wheezes, rales, rhonchi  Abdomen:  Soft, non-tender, no distended, positive BS  Extremities:  2+ pulses, no c/c, no edema  Skin:  Warm and dry, no rashes  :  No escamilla  Neuro:  AAOx3, non-focal, grossly intact                                                                                                                                                                                                                                                                                                LABS:                               10.8   4.27  )-----------( 141      ( 29 Jul 2021 05:54 )             34.1                      07-29    130<L>  |  95<L>  |  25<H>  ----------------------------<  276<H>  4.4   |  19<L>  |  0.59    Ca    9.2      29 Jul 2021 13:02                         RADIOLOGY & ADDITIONAL TESTS         I personally reviewed: [  ]EKG   [  ]CXR    [  ] CT      A/P:         Discussed with :     [facility] consultants' Notes   Time spent :

## 2021-07-29 NOTE — PROGRESS NOTE ADULT - ASSESSMENT
72 y/o M w/ PMHx of CVA this past August and got TPA per family member, DM, BPH with obstruction s/p escamilla catheter, s/p ERCP w Sphincterectomy recent admission to [facility] for sepsis  Hodgkin lymphoma was not offered any chemo and was placed on hospice.     now presenting with weakness and FTT.   pt denies cp / SOB / palpitations   no focal neuro complaints .. at baseline RLE weakness   no N/V   had one episode of diarrhea   no urinary sx  abdominal pain, diffuse, but worse on R side.   Afib RVR overnight. Was asymptomatic   no known history of this as per patient

## 2021-07-29 NOTE — PROGRESS NOTE ADULT - ASSESSMENT
72 y/o M with PMH of CVA, DM, BPH with obstruction s/p Sauceda catheter, s/p ERCP w Sphincteretomy, recent admission to [facility] for sepsis, Hodgkin lymphoma dx 2020 - was not offered any chemo and was placed on hospice, DVT 8/2020. Presenting with weakness and FTT. Pt and family opted for treatment of lymphoma - s/p Opdivo 7/6. Called to consult for CT chest findings 6/28 with patchy GGO and scattered nodular opacities throughout all lobes of the lungs. Currently breathing comfortably on RA.

## 2021-07-29 NOTE — PROGRESS NOTE ADULT - ASSESSMENT
Assessment  DMT2: 71y Male with DM T2 with hyperglycemia, A1C 8.8%, was on oral meds/Janumet at home, admitted with weakness/fatigue and hyperglycemia, now on large-dose basal bolus insulin, increased Lantus dose yesterday, blood sugars are still running high and not at target in the setting of high-dose steroids, no hypoglycemic episodes, eating meals, remains on dexamethasone.  Lymphoma: on medications, monitored, FU Heme/Onc.  Anemia: stable, monitored.      Shahriar Kahn MD  Cell: [phone]  Office: [phone]     Assessment  DMT2: 71y Male with DM T2 with hyperglycemia, A1C 8.8%, was on oral meds/Janumet at home, admitted with weakness/fatigue and hyperglycemia, now on large-dose basal bolus insulin, increased Lantus dose yesterday, blood sugars are still running high and not at target  in the setting of high-dose steroids, no hypoglycemic episodes, eating meals, remains on dexamethasone.  Lymphoma: on medications, monitored, FU Heme/Onc.  Anemia: stable, monitored.      Shahriar Kahn MD  Cell: [phone]  Office: [phone]

## 2021-07-29 NOTE — PROGRESS NOTE ADULT - SUBJECTIVE AND OBJECTIVE BOX
Follow-up Pulm Progress Note    No new respiratory events overnight.  Denies SOB/CP.     Medications:  MEDICATIONS  (STANDING):  allopurinol 300 milliGRAM(s) Oral daily  aspirin  chewable 81 milliGRAM(s) Oral daily  dexAMETHasone     Tablet 4 milliGRAM(s) Oral every 8 hours  dextrose 40% Gel 15 Gram(s) Oral once  dextrose 5%. 1000 milliLiter(s) (50 mL/Hr) IV Continuous <Continuous>  dextrose 5%. 1000 milliLiter(s) (100 mL/Hr) IV Continuous <Continuous>  dextrose 50% Injectable 25 Gram(s) IV Push once  dextrose 50% Injectable 12.5 Gram(s) IV Push once  dextrose 50% Injectable 25 Gram(s) IV Push once  finasteride 5 milliGRAM(s) Oral daily  glucagon  Injectable 1 milliGRAM(s) IntraMuscular once  hemorrhoidal Ointment 1 Application(s) Rectal two times a day  insulin glargine Injectable (LANTUS) 68 Unit(s) SubCutaneous at bedtime  insulin lispro (ADMELOG) corrective regimen sliding scale   SubCutaneous three times a day before meals  insulin lispro (ADMELOG) corrective regimen sliding scale   SubCutaneous at bedtime  insulin lispro Injectable (ADMELOG) 27 Unit(s) SubCutaneous three times a day before meals  melatonin 5 milliGRAM(s) Oral at bedtime  pantoprazole   Suspension 40 milliGRAM(s) Oral daily  QUEtiapine 25 milliGRAM(s) Oral at bedtime  senna 2 Tablet(s) Oral at bedtime  simethicone 80 milliGRAM(s) Chew three times a day  tamsulosin 0.4 milliGRAM(s) Oral at bedtime    MEDICATIONS  (PRN):  acetaminophen   Tablet .. 650 milliGRAM(s) Oral every 6 hours PRN Temp greater or equal to 38C (100.4F), Moderate Pain (4 - 6)  bisacodyl 5 milliGRAM(s) Oral every 12 hours PRN Constipation  polyethylene glycol 3350 17 Gram(s) Oral daily PRN Constipation          Vital Signs Last 24 Hrs  T(C): 36.6 (29 Jul 2021 11:18), Max: 36.7 (28 Jul 2021 20:14)  T(F): 97.9 (29 Jul 2021 11:18), Max: 98.1 (29 Jul 2021 00:01)  HR: 85 (29 Jul 2021 11:18) (79 - 101)  BP: 125/78 (29 Jul 2021 11:18) (121/77 - 149/80)  BP(mean): --  RR: 18 (29 Jul 2021 11:18) (17 - 18)  SpO2: 100% (29 Jul 2021 11:18) (99% - 100%)          07-28 @ 07:01  -  07-29 @ 07:00  --------------------------------------------------------  IN: 1090 mL / OUT: 1700 mL / NET: -610 mL          LABS:                        10.8   4.27  )-----------( 141      ( 29 Jul 2021 05:54 )             34.1     07-29    130<L>  |  95<L>  |  20  ----------------------------<  264<H>  4.5   |  21<L>  |  0.36<L>    Ca    9.5      29 Jul 2021 05:54            CAPILLARY BLOOD GLUCOSE      POCT Blood Glucose.: 371 mg/dL (29 Jul 2021 11:30)            Physical Examination:  PULM: Clear to auscultation bilaterally, no significant sputum production  CVS: S1, S2 heard    RADIOLOGY REVIEWED  CT chest: < from: CT Chest w/ IV Cont (07.19.21 @ 17:31) >  FINDINGS:  CHEST:  LUNGS AND LARGE AIRWAYS: Patent central airways. There is a 4 mm groundglass nodule (series 4, image 171). There is an unchanged 1 cm cystic nodule (series 4, image 223). Interval decrease in right lower lobe nodule at the costophrenic angle, now measuring 1.3 x 0.8 cm (series 4, image 338). Other prior mentioned solid and groundglass nodules are resolved. Mild bilateral subsegmental atelectasis.  PLEURA: No pleural effusion.  VESSELS: Coronary artery calcifications.  HEART: Heart size is normal. No pericardial effusion.  MEDIASTINUM AND SARTHAK: No lymphadenopathy.  CHEST WALL AND LOWER NECK: Within normal limits.    < end of copied text >

## 2021-07-29 NOTE — PROGRESS NOTE ADULT - SUBJECTIVE AND OBJECTIVE BOX
LIZETT RIVERA  MRN-[id number]    Patient is a 71y old  Male who presents with a chief complaint of hodgkins lymphoma (24 Jul 2021 10:41)      Review of System    Comfortable  No new events    Current Meds  MEDICATIONS  (STANDING):  allopurinol 300 milliGRAM(s) Oral daily  aspirin  chewable 81 milliGRAM(s) Oral daily  dexAMETHasone     Tablet 4 milliGRAM(s) Oral every 8 hours  dextrose 40% Gel 15 Gram(s) Oral once  dextrose 5%. 1000 milliLiter(s) (50 mL/Hr) IV Continuous <Continuous>  dextrose 5%. 1000 milliLiter(s) (100 mL/Hr) IV Continuous <Continuous>  dextrose 50% Injectable 25 Gram(s) IV Push once  dextrose 50% Injectable 12.5 Gram(s) IV Push once  dextrose 50% Injectable 25 Gram(s) IV Push once  finasteride 5 milliGRAM(s) Oral daily  glucagon  Injectable 1 milliGRAM(s) IntraMuscular once  hemorrhoidal Ointment 1 Application(s) Rectal two times a day  insulin glargine Injectable (LANTUS) 72 Unit(s) SubCutaneous at bedtime  insulin lispro (ADMELOG) corrective regimen sliding scale   SubCutaneous three times a day before meals  insulin lispro (ADMELOG) corrective regimen sliding scale   SubCutaneous at bedtime  insulin lispro Injectable (ADMELOG) 30 Unit(s) SubCutaneous three times a day before meals  melatonin 5 milliGRAM(s) Oral at bedtime  pantoprazole   Suspension 40 milliGRAM(s) Oral daily  QUEtiapine 25 milliGRAM(s) Oral at bedtime  senna 2 Tablet(s) Oral at bedtime  simethicone 80 milliGRAM(s) Chew three times a day  tamsulosin 0.4 milliGRAM(s) Oral at bedtime    MEDICATIONS  (PRN):  acetaminophen   Tablet .. 650 milliGRAM(s) Oral every 6 hours PRN Temp greater or equal to 38C (100.4F), Moderate Pain (4 - 6)  bisacodyl 5 milliGRAM(s) Oral every 12 hours PRN Constipation  polyethylene glycol 3350 17 Gram(s) Oral daily PRN Constipation    Vital Signs Last 24 Hrs  T(C): 36.4 (29 Jul 2021 20:08), Max: 36.7 (29 Jul 2021 00:01)  T(F): 97.5 (29 Jul 2021 20:08), Max: 98.1 (29 Jul 2021 00:01)  HR: 89 (29 Jul 2021 20:08) (79 - 89)  BP: 135/76 (29 Jul 2021 20:08) (121/77 - 135/76)  BP(mean): --  RR: 18 (29 Jul 2021 20:08) (18 - 18)  SpO2: 99% (29 Jul 2021 20:08) (99% - 100%)      PHYSICAL EXAM:       NAD    Lab  CBC Full  -  ( 29 Jul 2021 05:54 )  WBC Count : 4.27 K/uL  RBC Count : 3.34 M/uL  Hemoglobin : 10.8 g/dL  Hematocrit : 34.1 %  Platelet Count - Automated : 141 K/uL  Mean Cell Volume : 102.1 fl  Mean Cell Hemoglobin : 32.3 pg  Mean Cell Hemoglobin Concentration : 31.7 gm/dL  Auto Neutrophil # : x  Auto Lymphocyte # : x  Auto Monocyte # : x  Auto Eosinophil # : x  Auto Basophil # : x  Auto Neutrophil % : x  Auto Lymphocyte % : x  Auto Monocyte % : x  Auto Eosinophil % : x  Auto Basophil % : x    07-29    130<L>  |  95<L>  |  25<H>  ----------------------------<  276<H>  4.4   |  19<L>  |  0.59    Ca    9.2      29 Jul 2021 13:02          Rad:    Assessment/Plan

## 2021-07-29 NOTE — PROGRESS NOTE ADULT - PROBLEM SELECTOR PLAN 1
Will increase Lantus to 72u at bedtime.  Will increase Admelog to 30u before each meal and continue Admelog correction scale coverage. Will continue monitoring FS and FU, will adjust insulin dose as steroids are tapered/dc.  Patient previously on Janumet, he has large insulin requirement at this time due to high-dose steroids. Effects of steroids can linger several weeks, patient would benefit from insulin as outpatient and is agreeable.  Suggest DC home on current basal bolus insulin regimen, endo FU 2 weeks.  Discussed plan with patient and family at bedside. Counseled that blood sugars will start trending down as steroids are tapered/dc. Insulin dose will have to be adjusted based on blood sugars.   Counseled for compliance with consistent low-carb diet.

## 2021-07-30 LAB
CULTURE RESULTS: SIGNIFICANT CHANGE UP
GLUCOSE BLDC GLUCOMTR-MCNC: 225 MG/DL — HIGH (ref 70–99)
GLUCOSE BLDC GLUCOMTR-MCNC: 248 MG/DL — HIGH (ref 70–99)
GLUCOSE BLDC GLUCOMTR-MCNC: 285 MG/DL — HIGH (ref 70–99)
GLUCOSE BLDC GLUCOMTR-MCNC: 378 MG/DL — HIGH (ref 70–99)
GLUCOSE BLDC GLUCOMTR-MCNC: 416 MG/DL — HIGH (ref 70–99)
SPECIMEN SOURCE: SIGNIFICANT CHANGE UP

## 2021-07-30 RX ORDER — DEXAMETHASONE 0.5 MG/5ML
4 ELIXIR ORAL
Refills: 0 | Status: COMPLETED | OUTPATIENT
Start: 2021-07-30 | End: 2021-08-02

## 2021-07-30 RX ORDER — CLOTRIMAZOLE 10 MG
1 TROCHE MUCOUS MEMBRANE
Refills: 0 | Status: DISCONTINUED | OUTPATIENT
Start: 2021-07-30 | End: 2021-08-04

## 2021-07-30 RX ORDER — DEXAMETHASONE 0.5 MG/5ML
4 ELIXIR ORAL DAILY
Refills: 0 | Status: DISCONTINUED | OUTPATIENT
Start: 2021-08-02 | End: 2021-08-04

## 2021-07-30 RX ORDER — INSULIN GLARGINE 100 [IU]/ML
60 INJECTION, SOLUTION SUBCUTANEOUS AT BEDTIME
Refills: 0 | Status: DISCONTINUED | OUTPATIENT
Start: 2021-07-30 | End: 2021-07-31

## 2021-07-30 RX ORDER — INSULIN LISPRO 100/ML
26 VIAL (ML) SUBCUTANEOUS
Refills: 0 | Status: DISCONTINUED | OUTPATIENT
Start: 2021-07-30 | End: 2021-07-31

## 2021-07-30 RX ADMIN — Medication 300 MILLIGRAM(S): at 12:31

## 2021-07-30 RX ADMIN — TAMSULOSIN HYDROCHLORIDE 0.4 MILLIGRAM(S): 0.4 CAPSULE ORAL at 21:30

## 2021-07-30 RX ADMIN — Medication 2: at 12:31

## 2021-07-30 RX ADMIN — SIMETHICONE 80 MILLIGRAM(S): 80 TABLET, CHEWABLE ORAL at 05:22

## 2021-07-30 RX ADMIN — Medication 5 MILLIGRAM(S): at 21:30

## 2021-07-30 RX ADMIN — Medication 4 MILLIGRAM(S): at 12:45

## 2021-07-30 RX ADMIN — Medication 1 LOZENGE: at 17:23

## 2021-07-30 RX ADMIN — Medication 6: at 21:37

## 2021-07-30 RX ADMIN — Medication 81 MILLIGRAM(S): at 12:31

## 2021-07-30 RX ADMIN — Medication 26 UNIT(S): at 16:39

## 2021-07-30 RX ADMIN — SIMETHICONE 80 MILLIGRAM(S): 80 TABLET, CHEWABLE ORAL at 13:40

## 2021-07-30 RX ADMIN — Medication 30 UNIT(S): at 07:54

## 2021-07-30 RX ADMIN — SIMETHICONE 80 MILLIGRAM(S): 80 TABLET, CHEWABLE ORAL at 21:30

## 2021-07-30 RX ADMIN — FINASTERIDE 5 MILLIGRAM(S): 5 TABLET, FILM COATED ORAL at 21:31

## 2021-07-30 RX ADMIN — Medication 650 MILLIGRAM(S): at 22:42

## 2021-07-30 RX ADMIN — PHENYLEPHRINE-SHARK LIVER OIL-MINERAL OIL-PETROLATUM RECTAL OINTMENT 1 APPLICATION(S): at 05:22

## 2021-07-30 RX ADMIN — QUETIAPINE FUMARATE 25 MILLIGRAM(S): 200 TABLET, FILM COATED ORAL at 21:30

## 2021-07-30 RX ADMIN — INSULIN GLARGINE 60 UNIT(S): 100 INJECTION, SOLUTION SUBCUTANEOUS at 21:37

## 2021-07-30 RX ADMIN — PANTOPRAZOLE SODIUM 40 MILLIGRAM(S): 20 TABLET, DELAYED RELEASE ORAL at 05:22

## 2021-07-30 RX ADMIN — Medication 2: at 16:39

## 2021-07-30 RX ADMIN — Medication 26 UNIT(S): at 12:31

## 2021-07-30 RX ADMIN — Medication 650 MILLIGRAM(S): at 21:38

## 2021-07-30 RX ADMIN — Medication 1 LOZENGE: at 21:27

## 2021-07-30 RX ADMIN — PHENYLEPHRINE-SHARK LIVER OIL-MINERAL OIL-PETROLATUM RECTAL OINTMENT 1 APPLICATION(S): at 17:23

## 2021-07-30 RX ADMIN — Medication 3: at 07:54

## 2021-07-30 NOTE — PROGRESS NOTE ADULT - ASSESSMENT
Assessment  DMT2: 71y Male with DM T2 with hyperglycemia, A1C 8.8%, was on oral meds/Janumet at home, admitted with weakness/fatigue and hyperglycemia, now on large-dose basal bolus insulin, increased dose yesterday, blood sugars are improving, no hypoglycemic episodes, eating meals, started steroid taper.  Lymphoma: on medications, monitored, FU Heme/Onc.  Anemia: stable, monitored.      Shahriar Kahn MD  Cell: [phone]  Office: [phone]     Assessment  DMT2: 71y Male with DM T2 with hyperglycemia, A1C 8.8%, was on oral meds/Janumet at home, admitted with weakness/fatigue  and hyperglycemia, now on large-dose basal bolus insulin, increased dose yesterday, blood sugars are improving, no hypoglycemic episodes, eating meals, started steroid taper.  Lymphoma: on medications, monitored, FU Heme/Onc.  Anemia: stable, monitored.      Shahriar Kahn MD  Cell: [phone]  Office: [phone]     Assessment  DMT2: 71y Male with DM T2 with hyperglycemia, A1C 8.8%, was on oral meds/Janumet at home, admitted with weakness/fatigue  and hyperglycemia, now on large-dose  basal bolus insulin, increased dose yesterday, blood sugars are improving, no hypoglycemic episodes, eating meals, started steroid taper.  Lymphoma: on medications, monitored, FU Heme/Onc.  Anemia: stable, monitored.      Shahriar Kahn MD  Cell: [phone]  Office: [phone]

## 2021-07-30 NOTE — PROGRESS NOTE ADULT - ASSESSMENT
70 y/o M with PMH of CVA, DM, BPH with obstruction s/p Sauceda catheter, s/p ERCP w Sphincteretomy, recent admission to [facility] for sepsis, Hodgkin lymphoma dx 2020 - was not offered any chemo and was placed on hospice, DVT 8/2020. Presenting with weakness and FTT. Pt and family opted for treatment of lymphoma - s/p Opdivo 7/6. Called to consult for CT chest findings 6/28 with patchy GGO and scattered nodular opacities throughout all lobes of the lungs. Currently breathing comfortably on RA.

## 2021-07-30 NOTE — PROGRESS NOTE ADULT - ASSESSMENT
72 y/o M w/ PMHx of CVA this past August and got TPA per family member, DM, BPH with obstruction s/p escamilla catheter, s/p ERCP w Sphincterectomy recent admission to [facility] for sepsis  Hodgkin lymphoma was not offered any chemo and was placed on hospice.     now presenting with weakness and FTT.   pt denies cp / SOB / palpitations   no focal neuro complaints .. at baseline RLE weakness   no N/V   had one episode of diarrhea   no urinary sx  abdominal pain, diffuse, but worse on R side.   Afib RVR overnight. Was asymptomatic   no known history of this as per patient

## 2021-07-30 NOTE — PROGRESS NOTE ADULT - SUBJECTIVE AND OBJECTIVE BOX
Subjective: Patient seen and examined. No new events except as noted.     REVIEW OF SYSTEMS:    CONSTITUTIONAL: No weakness, fevers or chills  EYES/ENT: No visual changes;  No vertigo or throat pain   NECK: No pain or stiffness  RESPIRATORY: No cough, wheezing, hemoptysis; No shortness of breath  CARDIOVASCULAR: No chest pain or palpitations  GASTROINTESTINAL: No abdominal or epigastric pain. No nausea, vomiting, or hematemesis; No diarrhea or constipation. No melena or hematochezia.  GENITOURINARY: No dysuria, frequency or hematuria  NEUROLOGICAL: No numbness or weakness  SKIN: No itching, burning, rashes, or lesions   All other review of systems is negative unless indicated above.    MEDICATIONS:  MEDICATIONS  (STANDING):  allopurinol 300 milliGRAM(s) Oral daily  aspirin  chewable 81 milliGRAM(s) Oral daily  dextrose 40% Gel 15 Gram(s) Oral once  dextrose 5%. 1000 milliLiter(s) (50 mL/Hr) IV Continuous <Continuous>  dextrose 5%. 1000 milliLiter(s) (100 mL/Hr) IV Continuous <Continuous>  dextrose 50% Injectable 25 Gram(s) IV Push once  dextrose 50% Injectable 12.5 Gram(s) IV Push once  dextrose 50% Injectable 25 Gram(s) IV Push once  finasteride 5 milliGRAM(s) Oral daily  glucagon  Injectable 1 milliGRAM(s) IntraMuscular once  hemorrhoidal Ointment 1 Application(s) Rectal two times a day  insulin glargine Injectable (LANTUS) 72 Unit(s) SubCutaneous at bedtime  insulin lispro (ADMELOG) corrective regimen sliding scale   SubCutaneous three times a day before meals  insulin lispro (ADMELOG) corrective regimen sliding scale   SubCutaneous at bedtime  insulin lispro Injectable (ADMELOG) 30 Unit(s) SubCutaneous three times a day before meals  melatonin 5 milliGRAM(s) Oral at bedtime  pantoprazole   Suspension 40 milliGRAM(s) Oral daily  QUEtiapine 25 milliGRAM(s) Oral at bedtime  senna 2 Tablet(s) Oral at bedtime  simethicone 80 milliGRAM(s) Chew three times a day  tamsulosin 0.4 milliGRAM(s) Oral at bedtime      PHYSICAL EXAM:  T(C): 36.6 (07-30-21 @ 08:30), Max: 36.6 (07-29-21 @ 11:18)  HR: 93 (07-30-21 @ 08:30) (73 - 93)  BP: 117/72 (07-30-21 @ 08:30) (117/72 - 159/83)  RR: 18 (07-30-21 @ 08:30) (18 - 18)  SpO2: 99% (07-30-21 @ 08:30) (98% - 100%)  Wt(kg): --  I&O's Summary    29 Jul 2021 07:01  -  30 Jul 2021 07:00  --------------------------------------------------------  IN: 630 mL / OUT: 1400 mL / NET: -770 mL    30 Jul 2021 07:01  -  30 Jul 2021 11:04  --------------------------------------------------------  IN: 310 mL / OUT: 0 mL / NET: 310 mL          Appearance: Normal	  HEENT:   Normal oral mucosa, PERRL, EOMI	  Lymphatic: No lymphadenopathy , no edema  Cardiovascular: Normal S1 S2, No JVD, No murmurs , Peripheral pulses palpable 2+ bilaterally  Respiratory: Lungs clear to auscultation, normal effort 	  Gastrointestinal:  Soft, Non-tender, + BS	  Skin: No rashes, No ecchymoses, No cyanosis, warm to touch  Musculoskeletal: Normal range of motion, normal strength  Psychiatry:  Mood & affect appropriate  Ext: No edema      LABS:    CARDIAC MARKERS:                                10.8   4.27  )-----------( 141      ( 29 Jul 2021 05:54 )             34.1     07-29    130<L>  |  95<L>  |  25<H>  ----------------------------<  276<H>  4.4   |  19<L>  |  0.59    Ca    9.2      29 Jul 2021 13:02      proBNP:   Lipid Profile:   HgA1c:   TSH:             TELEMETRY: 	SR    ECG:  	  RADIOLOGY:   DIAGNOSTIC TESTING:  [ ] Echocardiogram:  [ ]  Catheterization:  [ ] Stress Test:    OTHER:

## 2021-07-30 NOTE — PROGRESS NOTE ADULT - PROBLEM SELECTOR PLAN 1
Expect blood sugars to start trending down 2/2 steroid taper.  Will decrease Lantus to 60u at bedtime.  Will decrease Admelog to 26u before each meal and continue Admelog correction scale coverage. Will continue monitoring FS and FU, will adjust insulin dose as steroids are tapered/dc.  Patient previously on Janumet, he has large insulin requirement at this time due to high-dose steroids. Effects of steroids can linger several weeks, patient would benefit from insulin as outpatient and is agreeable.  Suggest DC home on current basal bolus insulin regimen, endo FU 2 weeks.  Discussed plan with patient and family at bedside. Counseled that blood sugars will start trending down as steroids are tapered/dc. Insulin dose will have to be adjusted based on blood sugars.   Counseled for compliance with consistent low-carb diet.

## 2021-07-30 NOTE — PROGRESS NOTE ADULT - ASSESSMENT
70 y/o M w/ PMHx of CVA this past August and got TPA per family member, DM, BPH with obstruction s/p escamilla catheter, s/p ERCP w Sphincterectomy recent admission to [facility] for sepsis  Hodgkin lymphoma was not offered any chemo and was placed on hospice.     now presenting with weakness and FTT.   pt denies cp / SOB / palpitations   no focal neuro complaints .. at baseline RLE weakness   no N/V   had one episode of diarrhea   no urinary sx  abdominal pain, diffuse, but worse on R side.     - Failure to thrive: cultures neg  fungitell repeated Nl   CT chest repeated : improving   nutrition consult   encourage PO intake: dysphagia 3 based on MBS results. Pt feeling overall improved : will consider re-eval   (asking for better food to be given)    - lymphoma: d/w Dr. Larios: s/p immunotherapy .. will plan second dose on Aug 3th.   overall seemed to have responded somewhat to first dose     - DM with hyperglycemia : improved now worse with steroids   fu with endo     - anemia: monitor H/H post transfusion, stable.     - Fever: doubt infectious etiology   likely due to immunotherapy/lymphoma. culture: neg   abx dced. s/p IVF, ID f/u appreciated.   elevated fungitell noted, T chest improved / d/w Dr muñiz , repeat levels however low suspecion for fungal infection     - voice changes: CT neck : Asymmetric enlargement of the left palatine tonsil, suspicious for lymphomatous involvement given history. Correlate with direct visualization.  ENT had eval pt: no gross pathology on visualization   S/S eval: MBS done. speech: dysphagia 3    - Tachycardia: VA duplex neg for DVT   rate stable. noted 7 beats NSVT.  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 Smith and MRI department :  MR brain, cervical thoracic spine : non contrast done  and now awaiting with con... being expedited   cont steroids : will cont taper  fu LP cytology   appreciate neuro onc input   planned chemo early next week     - Afib: now in sinus : monitor   appreciate cardio input   no AC at this time and no AVNB at this time     - urinary retention: cont escamilla     discussed with Dr. Larios : will plan inpt immunotherapy while awaiting insurance approval for rehab.    DVT ppx

## 2021-07-30 NOTE — PROGRESS NOTE ADULT - SUBJECTIVE AND OBJECTIVE BOX
Date of service: 07-30-21 @ 16:45      Patient is a 71y old  Male who presents with a chief complaint of hodgkins lymphoma (24 Jul 2021 10:41)                                                               INTERVAL HPI/OVERNIGHT EVENTS:    REVIEW OF SYSTEMS:     CONSTITUTIONAL: No weakness, fevers or chills  EYES/ENT: No visual changes , no ear ache   NECK: No pain or stiffness  RESPIRATORY: No cough, wheezing,  No shortness of breath  CARDIOVASCULAR: No chest pain or palpitations  GASTROINTESTINAL: No abdominal pain  . No nausea, vomiting, or hematemesis; No diarrhea or constipation. No melena or hematochezia.  GENITOURINARY: No dysuria, frequency or hematuria  NEUROLOGICAL: No numbness or weakness  SKIN: No itching, burning, rashes, or lesions                                                                                                                                                                                                                                                                                 Medications:  MEDICATIONS  (STANDING):  allopurinol 300 milliGRAM(s) Oral daily  aspirin  chewable 81 milliGRAM(s) Oral daily  clotrimazole Lozenge 1 Lozenge Oral five times a day  dexAMETHasone     Tablet 4 milliGRAM(s) Oral two times a day  dextrose 40% Gel 15 Gram(s) Oral once  dextrose 5%. 1000 milliLiter(s) (50 mL/Hr) IV Continuous <Continuous>  dextrose 5%. 1000 milliLiter(s) (100 mL/Hr) IV Continuous <Continuous>  dextrose 50% Injectable 25 Gram(s) IV Push once  dextrose 50% Injectable 12.5 Gram(s) IV Push once  dextrose 50% Injectable 25 Gram(s) IV Push once  finasteride 5 milliGRAM(s) Oral daily  glucagon  Injectable 1 milliGRAM(s) IntraMuscular once  hemorrhoidal Ointment 1 Application(s) Rectal two times a day  insulin glargine Injectable (LANTUS) 60 Unit(s) SubCutaneous at bedtime  insulin lispro (ADMELOG) corrective regimen sliding scale   SubCutaneous three times a day before meals  insulin lispro (ADMELOG) corrective regimen sliding scale   SubCutaneous at bedtime  insulin lispro Injectable (ADMELOG) 26 Unit(s) SubCutaneous three times a day before meals  melatonin 5 milliGRAM(s) Oral at bedtime  pantoprazole   Suspension 40 milliGRAM(s) Oral daily  QUEtiapine 25 milliGRAM(s) Oral at bedtime  senna 2 Tablet(s) Oral at bedtime  simethicone 80 milliGRAM(s) Chew three times a day  tamsulosin 0.4 milliGRAM(s) Oral at bedtime    MEDICATIONS  (PRN):  acetaminophen   Tablet .. 650 milliGRAM(s) Oral every 6 hours PRN Temp greater or equal to 38C (100.4F), Moderate Pain (4 - 6)  bisacodyl 5 milliGRAM(s) Oral every 12 hours PRN Constipation  polyethylene glycol 3350 17 Gram(s) Oral daily PRN Constipation       Allergies    No Known Allergies    Intolerances      Vital Signs Last 24 Hrs  T(C): 36.7 (30 Jul 2021 11:25), Max: 36.7 (30 Jul 2021 11:25)  T(F): 98 (30 Jul 2021 11:25), Max: 98 (30 Jul 2021 11:25)  HR: 94 (30 Jul 2021 11:25) (73 - 94)  BP: 119/74 (30 Jul 2021 11:25) (117/72 - 159/83)  BP(mean): --  RR: 18 (30 Jul 2021 11:25) (18 - 18)  SpO2: 98% (30 Jul 2021 11:25) (98% - 99%)  CAPILLARY BLOOD GLUCOSE      POCT Blood Glucose.: 248 mg/dL (30 Jul 2021 16:12)  POCT Blood Glucose.: 225 mg/dL (30 Jul 2021 12:00)  POCT Blood Glucose.: 285 mg/dL (30 Jul 2021 07:43)  POCT Blood Glucose.: 278 mg/dL (29 Jul 2021 21:13)      07-29 @ 07:01 - 07-30 @ 07:00  --------------------------------------------------------  IN: 630 mL / OUT: 1400 mL / NET: -770 mL    07-30 @ 07:01  -  07-30 @ 16:45  --------------------------------------------------------  IN: 610 mL / OUT: 1100 mL / NET: -490 mL      Physical Exam:    Daily     Daily   General:  Well appearing, NAD, not cachetic  HEENT:  Nonicteric, PERRLA  CV:  RRR, S1S2   Lungs:  CTA B/L, no wheezes, rales, rhonchi  Abdomen:  Soft, non-tender, no distended, positive BS  Extremities:  2+ pulses, no c/c, no edema  Skin:  Warm and dry, no rashes  :   escamilla  Neuro:  AAOx3, non-focal, grossly intact                                                                                                                                                                                                                                                                                                LABS:                               10.8   4.27  )-----------( 141      ( 29 Jul 2021 05:54 )             34.1                      07-29    130<L>  |  95<L>  |  25<H>  ----------------------------<  276<H>  4.4   |  19<L>  |  0.59    Ca    9.2      29 Jul 2021 13:02                         RADIOLOGY & ADDITIONAL TESTS         I personally reviewed: [  ]EKG   [  ]CXR    [  ] CT      A/P:         Discussed with :     [facility] consultants' Notes   Time spent :

## 2021-07-30 NOTE — PROGRESS NOTE ADULT - SUBJECTIVE AND OBJECTIVE BOX
Follow-up Pulm Progress Note    No new respiratory events overnight.  Denies SOB/CP.     Medications:  MEDICATIONS  (STANDING):  allopurinol 300 milliGRAM(s) Oral daily  aspirin  chewable 81 milliGRAM(s) Oral daily  dextrose 40% Gel 15 Gram(s) Oral once  dextrose 5%. 1000 milliLiter(s) (50 mL/Hr) IV Continuous <Continuous>  dextrose 5%. 1000 milliLiter(s) (100 mL/Hr) IV Continuous <Continuous>  dextrose 50% Injectable 25 Gram(s) IV Push once  dextrose 50% Injectable 12.5 Gram(s) IV Push once  dextrose 50% Injectable 25 Gram(s) IV Push once  finasteride 5 milliGRAM(s) Oral daily  glucagon  Injectable 1 milliGRAM(s) IntraMuscular once  hemorrhoidal Ointment 1 Application(s) Rectal two times a day  insulin glargine Injectable (LANTUS) 72 Unit(s) SubCutaneous at bedtime  insulin lispro (ADMELOG) corrective regimen sliding scale   SubCutaneous three times a day before meals  insulin lispro (ADMELOG) corrective regimen sliding scale   SubCutaneous at bedtime  insulin lispro Injectable (ADMELOG) 30 Unit(s) SubCutaneous three times a day before meals  melatonin 5 milliGRAM(s) Oral at bedtime  pantoprazole   Suspension 40 milliGRAM(s) Oral daily  QUEtiapine 25 milliGRAM(s) Oral at bedtime  senna 2 Tablet(s) Oral at bedtime  simethicone 80 milliGRAM(s) Chew three times a day  tamsulosin 0.4 milliGRAM(s) Oral at bedtime    MEDICATIONS  (PRN):  acetaminophen   Tablet .. 650 milliGRAM(s) Oral every 6 hours PRN Temp greater or equal to 38C (100.4F), Moderate Pain (4 - 6)  bisacodyl 5 milliGRAM(s) Oral every 12 hours PRN Constipation  polyethylene glycol 3350 17 Gram(s) Oral daily PRN Constipation          Vital Signs Last 24 Hrs  T(C): 36.6 (30 Jul 2021 08:30), Max: 36.6 (29 Jul 2021 11:18)  T(F): 97.9 (30 Jul 2021 08:30), Max: 97.9 (29 Jul 2021 11:18)  HR: 93 (30 Jul 2021 08:30) (73 - 93)  BP: 117/72 (30 Jul 2021 08:30) (117/72 - 159/83)  BP(mean): --  RR: 18 (30 Jul 2021 08:30) (18 - 18)  SpO2: 99% (30 Jul 2021 08:30) (98% - 100%)          07-29 @ 07:01  -  07-30 @ 07:00  --------------------------------------------------------  IN: 630 mL / OUT: 1400 mL / NET: -770 mL          LABS:                        10.8   4.27  )-----------( 141      ( 29 Jul 2021 05:54 )             34.1     07-29    130<L>  |  95<L>  |  25<H>  ----------------------------<  276<H>  4.4   |  19<L>  |  0.59    Ca    9.2      29 Jul 2021 13:02            CAPILLARY BLOOD GLUCOSE      POCT Blood Glucose.: 285 mg/dL (30 Jul 2021 07:43)        Physical Examination:  PULM: Clear to auscultation bilaterally, no significant sputum production  CVS: S1, S2 heard    RADIOLOGY REVIEWED  CT chest: < from: CT Chest w/ IV Cont (07.19.21 @ 17:31) >  FINDINGS:  CHEST:  LUNGS AND LARGE AIRWAYS: Patent central airways. There is a 4 mm groundglass nodule (series 4, image 171). There is an unchanged 1 cm cystic nodule (series 4, image 223). Interval decrease in right lower lobe nodule at the costophrenic angle, now measuring 1.3 x 0.8 cm (series 4, image 338). Other prior mentioned solid and groundglass nodules are resolved. Mild bilateral subsegmental atelectasis.  PLEURA: No pleural effusion.  VESSELS: Coronary artery calcifications.  HEART: Heart size is normal. No pericardial effusion.  MEDIASTINUM AND SARTHAK: No lymphadenopathy.  CHEST WALL AND LOWER NECK: Within normal limits.    < end of copied text >

## 2021-07-30 NOTE — PROGRESS NOTE ADULT - SUBJECTIVE AND OBJECTIVE BOX
LIZETT RIVERA  MRN-[id number]    Patient is a 71y old  Male who presents with a chief complaint of hodgkins lymphoma (24 Jul 2021 10:41)      Review of System    Without complaints    General:	Denies fatigue, fevers, chills, sweats, decreased appetite.    Skin/Breast: denies pruritis, rash  	  Ophthalmologic: no change in vision or blurring  	  HEENT	Denies dry mouth, oral sores, dysphagia,  change in hearing.    Respiratory and Thorax:  no cough, sob, wheeze, hemoptysis  	  Cardiovascular:	no cp , palp, orthopnea    Gastrointestinal:	no n/v/d constipation    Genitourinary:	no dysuria of frequency, no hematuria, no flank pain    Musculoskeletal:	no bone or joint pain. no muscle aches.     Neurological:	no change in sensory or motor function. no headache. no weakness.     Psychiatric:	no depression, no anxiety, insomnia.     Hematology/Lymphatics:	no bleeding or bruising        Current Meds  MEDICATIONS  (STANDING):  allopurinol 300 milliGRAM(s) Oral daily  aspirin  chewable 81 milliGRAM(s) Oral daily  clotrimazole Lozenge 1 Lozenge Oral five times a day  dexAMETHasone     Tablet 4 milliGRAM(s) Oral two times a day  dextrose 40% Gel 15 Gram(s) Oral once  dextrose 5%. 1000 milliLiter(s) (50 mL/Hr) IV Continuous <Continuous>  dextrose 5%. 1000 milliLiter(s) (100 mL/Hr) IV Continuous <Continuous>  dextrose 50% Injectable 25 Gram(s) IV Push once  dextrose 50% Injectable 12.5 Gram(s) IV Push once  dextrose 50% Injectable 25 Gram(s) IV Push once  finasteride 5 milliGRAM(s) Oral daily  glucagon  Injectable 1 milliGRAM(s) IntraMuscular once  hemorrhoidal Ointment 1 Application(s) Rectal two times a day  insulin glargine Injectable (LANTUS) 60 Unit(s) SubCutaneous at bedtime  insulin lispro (ADMELOG) corrective regimen sliding scale   SubCutaneous three times a day before meals  insulin lispro (ADMELOG) corrective regimen sliding scale   SubCutaneous at bedtime  insulin lispro Injectable (ADMELOG) 26 Unit(s) SubCutaneous three times a day before meals  melatonin 5 milliGRAM(s) Oral at bedtime  pantoprazole   Suspension 40 milliGRAM(s) Oral daily  QUEtiapine 25 milliGRAM(s) Oral at bedtime  senna 2 Tablet(s) Oral at bedtime  simethicone 80 milliGRAM(s) Chew three times a day  tamsulosin 0.4 milliGRAM(s) Oral at bedtime    MEDICATIONS  (PRN):  acetaminophen   Tablet .. 650 milliGRAM(s) Oral every 6 hours PRN Temp greater or equal to 38C (100.4F), Moderate Pain (4 - 6)  bisacodyl 5 milliGRAM(s) Oral every 12 hours PRN Constipation  polyethylene glycol 3350 17 Gram(s) Oral daily PRN Constipation      Vitals  Vital Signs Last 24 Hrs  T(C): 36.7 (30 Jul 2021 11:25), Max: 36.7 (30 Jul 2021 11:25)  T(F): 98 (30 Jul 2021 11:25), Max: 98 (30 Jul 2021 11:25)  HR: 94 (30 Jul 2021 11:25) (73 - 94)  BP: 119/74 (30 Jul 2021 11:25) (117/72 - 159/83)  BP(mean): --  RR: 18 (30 Jul 2021 11:25) (18 - 18)  SpO2: 98% (30 Jul 2021 11:25) (98% - 99%)    Physical Exam    Constitutional: NAD    Eyes: PERRLA EOMI, anicteric sclera    Heent :No oral sores, no pharyngeal injection. moist mucosa. + thrush    Neck: supple, no jvd, no LAD    Respiratory: CTA b/l     Cardiovascular: s1s2, no m/g/r    Gastrointestinal: soft, nt, nd, + BS    Extremities: no c/c/e    Neurological:A&O x 3 moves all ext.    Skin: no rash on exposed skin    Lymph Nodes: no lymphadenopathy.      Lab  CBC Full  -  ( 29 Jul 2021 05:54 )  WBC Count : 4.27 K/uL  RBC Count : 3.34 M/uL  Hemoglobin : 10.8 g/dL  Hematocrit : 34.1 %  Platelet Count - Automated : 141 K/uL  Mean Cell Volume : 102.1 fl  Mean Cell Hemoglobin : 32.3 pg  Mean Cell Hemoglobin Concentration : 31.7 gm/dL  Auto Neutrophil # : x  Auto Lymphocyte # : x  Auto Monocyte # : x  Auto Eosinophil # : x  Auto Basophil # : x  Auto Neutrophil % : x  Auto Lymphocyte % : x  Auto Monocyte % : x  Auto Eosinophil % : x  Auto Basophil % : x    07-29    130<L>  |  95<L>  |  25<H>  ----------------------------<  276<H>  4.4   |  19<L>  |  0.59    Ca    9.2      29 Jul 2021 13:02          Rad:    Assessment/Plan

## 2021-07-30 NOTE — PROGRESS NOTE ADULT - ASSESSMENT
1) Hodgkin's lymphoma  - repeat CT scans, results noted  -GOC- Family meeting held at bedside on 7/2. We discussed rx options vs comfort care. Family and patient wish to pursue rx. Ongoing conversations with family members have been taking place since our formal meeting.   - allopurinol  - pt s/p 1st rx with opdivo 7/6. Planning for next treatment in days ahead.   I have reached out to Dr. Falk in Dept of Heme/onc and Dr. Downing, to obtain approval to treat patient with Brentuximab and Opdivo for this next treatment     2) ENT- voice change is a new finding as per patient's sister. Patient also with weak voice.  SS, ENT and neuro have seen patient.    3)Hyperglycemia- mgmt as per med. , endocrinology    4)Weakness. 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  - Neurology input noted.  - cont pt.  - bed to chair   - dex taper    5) Pancytopenia. Suspect multifactorial, due to hodgkins, and possibly with role being played by recent abx.  anemia- w/u this far unremarkable. possible aocd.  transfusional support as needed    Counts are improving.     6)Neuro-onc  MRI findings noted.  Case d/w Neuro onc. Will change dex to 4 mg q12 on friday, x 3 days, and then to 4 mg qd    7) thrush- to start mycelex.

## 2021-07-30 NOTE — PROGRESS NOTE ADULT - SUBJECTIVE AND OBJECTIVE BOX
Chief complaint  Patient is a 71y old  Male who presents with a chief complaint of hodgkins lymphoma (24 Jul 2021 10:41)   Review of systems  Patient in bed, looks comfortable, no hypoglycemic episodes.    Labs and Fingersticks  CAPILLARY BLOOD GLUCOSE      POCT Blood Glucose.: 225 mg/dL (30 Jul 2021 12:00)  POCT Blood Glucose.: 285 mg/dL (30 Jul 2021 07:43)  POCT Blood Glucose.: 278 mg/dL (29 Jul 2021 21:13)  POCT Blood Glucose.: 235 mg/dL (29 Jul 2021 16:27)      Anion Gap, Serum: 16 (07-29 @ 13:02)  Anion Gap, Serum: 14 (07-29 @ 05:54)      Calcium, Total Serum: 9.2 (07-29 @ 13:02)  Calcium, Total Serum: 9.5 (07-29 @ 05:54)          07-29    130<L>  |  95<L>  |  25<H>  ----------------------------<  276<H>  4.4   |  19<L>  |  0.59    Ca    9.2      29 Jul 2021 13:02                          10.8   4.27  )-----------( 141      ( 29 Jul 2021 05:54 )             34.1     Medications  MEDICATIONS  (STANDING):  allopurinol 300 milliGRAM(s) Oral daily  aspirin  chewable 81 milliGRAM(s) Oral daily  clotrimazole Lozenge 1 Lozenge Oral five times a day  dexAMETHasone     Tablet 4 milliGRAM(s) Oral two times a day  dextrose 40% Gel 15 Gram(s) Oral once  dextrose 5%. 1000 milliLiter(s) (50 mL/Hr) IV Continuous <Continuous>  dextrose 5%. 1000 milliLiter(s) (100 mL/Hr) IV Continuous <Continuous>  dextrose 50% Injectable 25 Gram(s) IV Push once  dextrose 50% Injectable 12.5 Gram(s) IV Push once  dextrose 50% Injectable 25 Gram(s) IV Push once  finasteride 5 milliGRAM(s) Oral daily  glucagon  Injectable 1 milliGRAM(s) IntraMuscular once  hemorrhoidal Ointment 1 Application(s) Rectal two times a day  insulin glargine Injectable (LANTUS) 60 Unit(s) SubCutaneous at bedtime  insulin lispro (ADMELOG) corrective regimen sliding scale   SubCutaneous three times a day before meals  insulin lispro (ADMELOG) corrective regimen sliding scale   SubCutaneous at bedtime  insulin lispro Injectable (ADMELOG) 26 Unit(s) SubCutaneous three times a day before meals  melatonin 5 milliGRAM(s) Oral at bedtime  pantoprazole   Suspension 40 milliGRAM(s) Oral daily  QUEtiapine 25 milliGRAM(s) Oral at bedtime  senna 2 Tablet(s) Oral at bedtime  simethicone 80 milliGRAM(s) Chew three times a day  tamsulosin 0.4 milliGRAM(s) Oral at bedtime      Physical Exam  General: Patient comfortable in bed  Vital Signs Last 12 Hrs  T(F): 98 (07-30-21 @ 11:25), Max: 98 (07-30-21 @ 11:25)  HR: 94 (07-30-21 @ 11:25) (73 - 94)  BP: 119/74 (07-30-21 @ 11:25) (117/72 - 137/75)  BP(mean): --  RR: 18 (07-30-21 @ 11:25) (18 - 18)  SpO2: 98% (07-30-21 @ 11:25) (98% - 99%)  Neck: No palpable thyroid nodules.  CVS: S1S2, No murmurs  Respiratory: No wheezing, no crepitations  GI: Abdomen soft, bowel sounds positive  Musculoskeletal:  edema lower extremities.   Skin: No skin rashes, no ecchymosis    Diagnostics           Chief complaint  Patient is a 71y old  Male who presents with a chief complaint of hodgkins lymphoma (24 Jul 2021 10:41)   Review of systems  Patient in bed, looks comfortable, no hypoglycemic episodes.    Labs and Fingersticks  CAPILLARY BLOOD GLUCOSE      POCT Blood Glucose.: 225 mg/dL (30 Jul 2021 12:00)  POCT Blood Glucose.: 285 mg/dL (30 Jul 2021 07:43)  POCT Blood Glucose.: 278 mg/dL (29 Jul 2021 21:13)  POCT Blood Glucose.: 235 mg/dL (29 Jul 2021 16:27)      Anion Gap, Serum: 16 (07-29 @ 13:02)  Anion Gap, Serum: 14 (07-29 @ 05:54)      Calcium, Total Serum: 9.2 (07-29 @ 13:02)  Calcium, Total Serum: 9.5 (07-29 @ 05:54)          07-29    130<L>  |  95<L>  |  25<H>  ----------------------------<  276<H>  4.4   |  19<L>  |  0.59    Ca    9.2      29 Jul 2021 13:02                          10.8   4.27  )-----------( 141      ( 29 Jul 2021 05:54 )             34.1     Medications  MEDICATIONS  (STANDING):  allopurinol 300 milliGRAM(s) Oral daily  aspirin  chewable 81 milliGRAM(s) Oral daily  clotrimazole Lozenge 1 Lozenge Oral five times a day  dexAMETHasone     Tablet 4 milliGRAM(s) Oral two times a day  dextrose 40% Gel 15 Gram(s) Oral once  dextrose 5%. 1000 milliLiter(s) (50 mL/Hr) IV Continuous <Continuous>  dextrose 5%. 1000 milliLiter(s) (100 mL/Hr) IV Continuous <Continuous>  dextrose 50% Injectable 25 Gram(s) IV Push once  dextrose 50% Injectable 12.5 Gram(s) IV Push once  dextrose 50% Injectable 25 Gram(s) IV Push once  finasteride 5 milliGRAM(s) Oral daily  glucagon  Injectable 1 milliGRAM(s) IntraMuscular once  hemorrhoidal Ointment 1 Application(s) Rectal two times a day  insulin glargine Injectable (LANTUS) 60 Unit(s) SubCutaneous at bedtime  insulin lispro (ADMELOG) corrective regimen sliding scale   SubCutaneous three times a day before meals  insulin lispro (ADMELOG) corrective regimen sliding scale   SubCutaneous at bedtime  insulin lispro Injectable (ADMELOG) 26 Unit(s) SubCutaneous three times a day before meals  melatonin 5 milliGRAM(s) Oral at bedtime  pantoprazole   Suspension 40 milliGRAM(s) Oral daily  QUEtiapine 25 milliGRAM(s) Oral at bedtime  senna 2 Tablet(s) Oral at bedtime  simethicone 80 milliGRAM(s) Chew three times a day  tamsulosin 0.4 milliGRAM(s) Oral at bedtime      Physical Exam  General: Patient comfortable in bed  Vital Signs Last 12 Hrs  T(F): 98 (07-30-21 @ 11:25), Max: 98 (07-30-21 @ 11:25)  HR: 94 (07-30-21 @ 11:25) (73 - 94)  BP: 119/74 (07-30-21 @ 11:25) (117/72 - 137/75)  BP(mean): --  RR: 18 (07-30-21 @ 11:25) (18 - 18)  SpO2: 98% (07-30-21 @ 11:25) (98% - 99%)  Neck: No palpable thyroid nodules.  CVS: S1S2, No murmurs  Respiratory: No wheezing, no crepitations  GI: Abdomen soft, bowel sounds positive  Musculoskeletal:  edema lower extremities.   Skin: No skin rashes, no ecchymosis    Diagnostics           Chief complaint  Patient is a 71y old  Male who presents with a chief complaint of hodgkins lymphoma (24 Jul 2021 10:41)   Review of systems  Patient in bed, looks comfortable, no hypoglycemic episodes.    Labs and Fingersticks  CAPILLARY BLOOD GLUCOSE      POCT Blood Glucose.: 225 mg/dL (30 Jul 2021 12:00)  POCT Blood Glucose.: 285 mg/dL (30 Jul 2021 07:43)  POCT Blood Glucose.: 278 mg/dL (29 Jul 2021 21:13)  POCT Blood Glucose.: 235 mg/dL (29 Jul 2021 16:27)        Anion Gap, Serum: 16 (07-29 @ 13:02)  Anion Gap, Serum: 14 (07-29 @ 05:54)      Calcium, Total Serum: 9.2 (07-29 @ 13:02)  Calcium, Total Serum: 9.5 (07-29 @ 05:54)          07-29    130<L>  |  95<L>  |  25<H>  ----------------------------<  276<H>  4.4   |  19<L>  |  0.59    Ca    9.2      29 Jul 2021 13:02                          10.8   4.27  )-----------( 141      ( 29 Jul 2021 05:54 )             34.1     Medications  MEDICATIONS  (STANDING):  allopurinol 300 milliGRAM(s) Oral daily  aspirin  chewable 81 milliGRAM(s) Oral daily  clotrimazole Lozenge 1 Lozenge Oral five times a day  dexAMETHasone     Tablet 4 milliGRAM(s) Oral two times a day  dextrose 40% Gel 15 Gram(s) Oral once  dextrose 5%. 1000 milliLiter(s) (50 mL/Hr) IV Continuous <Continuous>  dextrose 5%. 1000 milliLiter(s) (100 mL/Hr) IV Continuous <Continuous>  dextrose 50% Injectable 25 Gram(s) IV Push once  dextrose 50% Injectable 12.5 Gram(s) IV Push once  dextrose 50% Injectable 25 Gram(s) IV Push once  finasteride 5 milliGRAM(s) Oral daily  glucagon  Injectable 1 milliGRAM(s) IntraMuscular once  hemorrhoidal Ointment 1 Application(s) Rectal two times a day  insulin glargine Injectable (LANTUS) 60 Unit(s) SubCutaneous at bedtime  insulin lispro (ADMELOG) corrective regimen sliding scale   SubCutaneous three times a day before meals  insulin lispro (ADMELOG) corrective regimen sliding scale   SubCutaneous at bedtime  insulin lispro Injectable (ADMELOG) 26 Unit(s) SubCutaneous three times a day before meals  melatonin 5 milliGRAM(s) Oral at bedtime  pantoprazole   Suspension 40 milliGRAM(s) Oral daily  QUEtiapine 25 milliGRAM(s) Oral at bedtime  senna 2 Tablet(s) Oral at bedtime  simethicone 80 milliGRAM(s) Chew three times a day  tamsulosin 0.4 milliGRAM(s) Oral at bedtime      Physical Exam  General: Patient comfortable in bed  Vital Signs Last 12 Hrs  T(F): 98 (07-30-21 @ 11:25), Max: 98 (07-30-21 @ 11:25)  HR: 94 (07-30-21 @ 11:25) (73 - 94)  BP: 119/74 (07-30-21 @ 11:25) (117/72 - 137/75)  BP(mean): --  RR: 18 (07-30-21 @ 11:25) (18 - 18)  SpO2: 98% (07-30-21 @ 11:25) (98% - 99%)  Neck: No palpable thyroid nodules.  CVS: S1S2, No murmurs  Respiratory: No wheezing, no crepitations  GI: Abdomen soft, bowel sounds positive  Musculoskeletal:  edema lower extremities.   Skin: No skin rashes, no ecchymosis    Diagnostics

## 2021-07-31 LAB
ANION GAP SERPL CALC-SCNC: 14 MMOL/L — SIGNIFICANT CHANGE UP (ref 5–17)
BUN SERPL-MCNC: 27 MG/DL — HIGH (ref 7–23)
CALCIUM SERPL-MCNC: 9.1 MG/DL — SIGNIFICANT CHANGE UP (ref 8.4–10.5)
CHLORIDE SERPL-SCNC: 100 MMOL/L — SIGNIFICANT CHANGE UP (ref 96–108)
CO2 SERPL-SCNC: 24 MMOL/L — SIGNIFICANT CHANGE UP (ref 22–31)
CREAT SERPL-MCNC: 0.42 MG/DL — LOW (ref 0.5–1.3)
GLUCOSE BLDC GLUCOMTR-MCNC: 283 MG/DL — HIGH (ref 70–99)
GLUCOSE BLDC GLUCOMTR-MCNC: 287 MG/DL — HIGH (ref 70–99)
GLUCOSE BLDC GLUCOMTR-MCNC: 291 MG/DL — HIGH (ref 70–99)
GLUCOSE BLDC GLUCOMTR-MCNC: 344 MG/DL — HIGH (ref 70–99)
GLUCOSE SERPL-MCNC: 243 MG/DL — HIGH (ref 70–99)
HCT VFR BLD CALC: 36.2 % — LOW (ref 39–50)
HGB BLD-MCNC: 11.5 G/DL — LOW (ref 13–17)
MCHC RBC-ENTMCNC: 31.8 GM/DL — LOW (ref 32–36)
MCHC RBC-ENTMCNC: 32.5 PG — SIGNIFICANT CHANGE UP (ref 27–34)
MCV RBC AUTO: 102.3 FL — HIGH (ref 80–100)
NRBC # BLD: 0 /100 WBCS — SIGNIFICANT CHANGE UP (ref 0–0)
PLATELET # BLD AUTO: 164 K/UL — SIGNIFICANT CHANGE UP (ref 150–400)
POTASSIUM SERPL-MCNC: 4.1 MMOL/L — SIGNIFICANT CHANGE UP (ref 3.5–5.3)
POTASSIUM SERPL-SCNC: 4.1 MMOL/L — SIGNIFICANT CHANGE UP (ref 3.5–5.3)
RBC # BLD: 3.54 M/UL — LOW (ref 4.2–5.8)
RBC # FLD: 23.9 % — HIGH (ref 10.3–14.5)
SODIUM SERPL-SCNC: 138 MMOL/L — SIGNIFICANT CHANGE UP (ref 135–145)
WBC # BLD: 3.44 K/UL — LOW (ref 3.8–10.5)
WBC # FLD AUTO: 3.44 K/UL — LOW (ref 3.8–10.5)

## 2021-07-31 RX ORDER — INSULIN LISPRO 100/ML
30 VIAL (ML) SUBCUTANEOUS
Refills: 0 | Status: DISCONTINUED | OUTPATIENT
Start: 2021-07-31 | End: 2021-08-02

## 2021-07-31 RX ORDER — INSULIN GLARGINE 100 [IU]/ML
66 INJECTION, SOLUTION SUBCUTANEOUS AT BEDTIME
Refills: 0 | Status: DISCONTINUED | OUTPATIENT
Start: 2021-07-31 | End: 2021-08-02

## 2021-07-31 RX ADMIN — Medication 3: at 16:44

## 2021-07-31 RX ADMIN — Medication 300 MILLIGRAM(S): at 11:35

## 2021-07-31 RX ADMIN — Medication 4 MILLIGRAM(S): at 17:14

## 2021-07-31 RX ADMIN — PANTOPRAZOLE SODIUM 40 MILLIGRAM(S): 20 TABLET, DELAYED RELEASE ORAL at 05:57

## 2021-07-31 RX ADMIN — Medication 3: at 11:52

## 2021-07-31 RX ADMIN — Medication 650 MILLIGRAM(S): at 16:24

## 2021-07-31 RX ADMIN — PHENYLEPHRINE-SHARK LIVER OIL-MINERAL OIL-PETROLATUM RECTAL OINTMENT 1 APPLICATION(S): at 05:57

## 2021-07-31 RX ADMIN — Medication 1 LOZENGE: at 16:23

## 2021-07-31 RX ADMIN — SIMETHICONE 80 MILLIGRAM(S): 80 TABLET, CHEWABLE ORAL at 21:23

## 2021-07-31 RX ADMIN — Medication 81 MILLIGRAM(S): at 11:35

## 2021-07-31 RX ADMIN — Medication 26 UNIT(S): at 11:52

## 2021-07-31 RX ADMIN — Medication 1 LOZENGE: at 21:23

## 2021-07-31 RX ADMIN — SIMETHICONE 80 MILLIGRAM(S): 80 TABLET, CHEWABLE ORAL at 05:57

## 2021-07-31 RX ADMIN — Medication 4: at 21:17

## 2021-07-31 RX ADMIN — Medication 3: at 07:50

## 2021-07-31 RX ADMIN — QUETIAPINE FUMARATE 25 MILLIGRAM(S): 200 TABLET, FILM COATED ORAL at 21:17

## 2021-07-31 RX ADMIN — TAMSULOSIN HYDROCHLORIDE 0.4 MILLIGRAM(S): 0.4 CAPSULE ORAL at 21:17

## 2021-07-31 RX ADMIN — Medication 1 LOZENGE: at 07:51

## 2021-07-31 RX ADMIN — Medication 1 LOZENGE: at 11:36

## 2021-07-31 RX ADMIN — Medication 26 UNIT(S): at 16:43

## 2021-07-31 RX ADMIN — PHENYLEPHRINE-SHARK LIVER OIL-MINERAL OIL-PETROLATUM RECTAL OINTMENT 1 APPLICATION(S): at 17:14

## 2021-07-31 RX ADMIN — FINASTERIDE 5 MILLIGRAM(S): 5 TABLET, FILM COATED ORAL at 21:17

## 2021-07-31 RX ADMIN — Medication 4 MILLIGRAM(S): at 05:57

## 2021-07-31 RX ADMIN — SIMETHICONE 80 MILLIGRAM(S): 80 TABLET, CHEWABLE ORAL at 13:30

## 2021-07-31 RX ADMIN — INSULIN GLARGINE 66 UNIT(S): 100 INJECTION, SOLUTION SUBCUTANEOUS at 21:17

## 2021-07-31 RX ADMIN — Medication 650 MILLIGRAM(S): at 16:54

## 2021-07-31 RX ADMIN — Medication 5 MILLIGRAM(S): at 21:16

## 2021-07-31 RX ADMIN — Medication 1 LOZENGE: at 00:42

## 2021-07-31 RX ADMIN — Medication 26 UNIT(S): at 07:50

## 2021-07-31 NOTE — PROGRESS NOTE ADULT - SUBJECTIVE AND OBJECTIVE BOX
Subjective: Patient seen and examined. No new events except as noted.     REVIEW OF SYSTEMS:    CONSTITUTIONAL: No weakness, fevers or chills  EYES/ENT: No visual changes;  No vertigo or throat pain   NECK: No pain or stiffness  RESPIRATORY: No cough, wheezing, hemoptysis; No shortness of breath  CARDIOVASCULAR: No chest pain or palpitations  GASTROINTESTINAL: No abdominal or epigastric pain. No nausea, vomiting, or hematemesis; No diarrhea or constipation. No melena or hematochezia.  GENITOURINARY: No dysuria, frequency or hematuria  NEUROLOGICAL: No numbness or weakness  SKIN: No itching, burning, rashes, or lesions   All other review of systems is negative unless indicated above.    MEDICATIONS:  MEDICATIONS  (STANDING):  allopurinol 300 milliGRAM(s) Oral daily  aspirin  chewable 81 milliGRAM(s) Oral daily  clotrimazole Lozenge 1 Lozenge Oral five times a day  dexAMETHasone     Tablet 4 milliGRAM(s) Oral two times a day  dextrose 40% Gel 15 Gram(s) Oral once  dextrose 5%. 1000 milliLiter(s) (50 mL/Hr) IV Continuous <Continuous>  dextrose 5%. 1000 milliLiter(s) (100 mL/Hr) IV Continuous <Continuous>  dextrose 50% Injectable 25 Gram(s) IV Push once  dextrose 50% Injectable 12.5 Gram(s) IV Push once  dextrose 50% Injectable 25 Gram(s) IV Push once  finasteride 5 milliGRAM(s) Oral daily  glucagon  Injectable 1 milliGRAM(s) IntraMuscular once  hemorrhoidal Ointment 1 Application(s) Rectal two times a day  insulin glargine Injectable (LANTUS) 66 Unit(s) SubCutaneous at bedtime  insulin lispro (ADMELOG) corrective regimen sliding scale   SubCutaneous three times a day before meals  insulin lispro (ADMELOG) corrective regimen sliding scale   SubCutaneous at bedtime  insulin lispro Injectable (ADMELOG) 30 Unit(s) SubCutaneous three times a day before meals  melatonin 5 milliGRAM(s) Oral at bedtime  pantoprazole   Suspension 40 milliGRAM(s) Oral daily  QUEtiapine 25 milliGRAM(s) Oral at bedtime  senna 2 Tablet(s) Oral at bedtime  simethicone 80 milliGRAM(s) Chew three times a day  tamsulosin 0.4 milliGRAM(s) Oral at bedtime      PHYSICAL EXAM:  T(C): 36.7 (07-31-21 @ 20:15), Max: 36.7 (07-31-21 @ 20:15)  HR: 100 (07-31-21 @ 20:15) (84 - 100)  BP: 139/75 (07-31-21 @ 20:15) (123/76 - 144/83)  RR: 18 (07-31-21 @ 20:15) (18 - 18)  SpO2: 95% (07-31-21 @ 20:15) (95% - 99%)  Wt(kg): --  I&O's Summary    30 Jul 2021 07:01  -  31 Jul 2021 07:00  --------------------------------------------------------  IN: 610 mL / OUT: 1100 mL / NET: -490 mL    31 Jul 2021 07:01  -  31 Jul 2021 22:44  --------------------------------------------------------  IN: 840 mL / OUT: 1740 mL / NET: -900 mL          Appearance: NAD  HEENT:   Normal oral mucosa, PERRL, EOMI	  Lymphatic: No lymphadenopathy , no edema  Cardiovascular: Normal S1 S2, No JVD, No murmurs , Peripheral pulses palpable 2+ bilaterally  Respiratory: Lungs clear to auscultation, normal effort 	  Gastrointestinal:  Soft, Non-tender, + BS	  Skin: No rashes, No ecchymoses, No cyanosis, warm to touch  Musculoskeletal: Normal range of motion, normal strength  Psychiatry:  Mood & affect appropriate  Ext: No edema      LABS:    CARDIAC MARKERS:                                11.5   3.44  )-----------( 164      ( 31 Jul 2021 06:18 )             36.2     07-31    138  |  100  |  27<H>  ----------------------------<  243<H>  4.1   |  24  |  0.42<L>    Ca    9.1      31 Jul 2021 06:17      proBNP:   Lipid Profile:   HgA1c:   TSH:             TELEMETRY: 	    ECG:  	  RADIOLOGY:   DIAGNOSTIC TESTING:  [ ] Echocardiogram:  [ ]  Catheterization:  [ ] Stress Test:    OTHER:

## 2021-07-31 NOTE — PROGRESS NOTE ADULT - ASSESSMENT
Assessment  DMT2: 71y Male with DM T2 with hyperglycemia, A1C 8.8%, was on oral meds/Janumet at home, admitted with weakness/fatigue  and hyperglycemia, now on large-dose  basal bolus insulin, on high dose steroids, blood sugars still running high, no hypoglycemic episodes, eating meals.  Lymphoma: on medications, monitored, FU Heme/Onc.  Anemia: stable, monitored.      Shahriar Kahn MD  Cell: [phone]  Office: [phone]

## 2021-07-31 NOTE — PROGRESS NOTE ADULT - PROBLEM SELECTOR PLAN 1
Expect blood sugars to start trending down 2/2 steroid taper.  Will increase Lantus to 65u at bedtime.  Will increase Admelog to 30u before each meal and continue Admelog correction scale coverage. Will continue monitoring FS and FU, will adjust insulin dose as steroids are tapered/dc.  Patient previously on Janumet, he has large insulin requirement at this time due to high-dose steroids. Effects of steroids can linger several weeks, patient would benefit from insulin as outpatient and is agreeable.  Suggest DC home on current basal bolus insulin regimen, endo FU 2 weeks.  Discussed plan with patient and family at bedside. Counseled that blood sugars will start trending down as steroids are tapered/dc. Insulin dose will have to be adjusted based on blood sugars.   Counseled for compliance with consistent low-carb diet.

## 2021-07-31 NOTE — PROGRESS NOTE ADULT - ASSESSMENT
72 y/o M w/ PMHx of CVA this past August and got TPA per family member, DM, BPH with obstruction s/p escamilla catheter, s/p ERCP w Sphincterectomy recent admission to [facility] for sepsis  Hodgkin lymphoma was not offered any chemo and was placed on hospice.     now presenting with weakness and FTT.   pt denies cp / SOB / palpitations   no focal neuro complaints .. at baseline RLE weakness   no N/V   had one episode of diarrhea   no urinary sx  abdominal pain, diffuse, but worse on R side.     - Failure to thrive: cultures neg  fungitell repeated Nl   CT chest repeated : improving   nutrition consult   encourage PO intake: dysphagia 3 based on MBS results. Pt feeling overall improved : will consider re-eval   (asking for better food to be given)    - lymphoma: d/w Dr. Larios: s/p immunotherapy .. will plan second dose on Aug 3th.   overall seemed to have responded somewhat to first dose     - DM with hyperglycemia : improved now worse with steroids   fu with endo     - anemia: monitor H/H post transfusion, stable.     - Fever: doubt infectious etiology   likely due to immunotherapy/lymphoma. culture: neg   abx dced. s/p IVF, ID f/u appreciated.   elevated fungitell noted, T chest improved / d/w Dr muñiz , repeat levels however low suspecion for fungal infection     - voice changes: CT neck : Asymmetric enlargement of the left palatine tonsil, suspicious for lymphomatous involvement given history. Correlate with direct visualization.  ENT had eval pt: no gross pathology on visualization   S/S eval: MBS done. speech: dysphagia 3    - Tachycardia: [facility] duplex neg for DVT   rate stable. noted 7 beats NSVT.  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 Smith and MRI department :  MR brain, cervical thoracic spine : non contrast done  and now awaiting with con... being expedited   cont steroids : will cont taper  fu LP cytology   appreciate neuro onc input   planned chemo early next week     - Afib: now in sinus : monitor   appreciate cardio input   no AC at this time and no AVNB at this time     - urinary retention: cont escamilla     discussed with Dr. Larios : will plan inpt immunotherapy while awaiting insurance approval for rehab.    DVT ppx

## 2021-07-31 NOTE — PROGRESS NOTE ADULT - SUBJECTIVE AND OBJECTIVE BOX
Chief complaint    Patient is a 71y old  Male who presents with a chief complaint of hodgkins lymphoma (24 Jul 2021 10:41)   Review of systems  Patient in bed, appears comfortable.    Labs and Fingersticks  CAPILLARY BLOOD GLUCOSE      POCT Blood Glucose.: 287 mg/dL (31 Jul 2021 16:21)  POCT Blood Glucose.: 283 mg/dL (31 Jul 2021 11:43)  POCT Blood Glucose.: 291 mg/dL (31 Jul 2021 07:38)  POCT Blood Glucose.: 378 mg/dL (30 Jul 2021 21:34)  POCT Blood Glucose.: 416 mg/dL (30 Jul 2021 21:32)      Anion Gap, Serum: 14 (07-31 @ 06:17)      Calcium, Total Serum: 9.1 (07-31 @ 06:17)          07-31    138  |  100  |  27<H>  ----------------------------<  243<H>  4.1   |  24  |  0.42<L>    Ca    9.1      31 Jul 2021 06:17                          11.5   3.44  )-----------( 164      ( 31 Jul 2021 06:18 )             36.2     Medications  MEDICATIONS  (STANDING):  allopurinol 300 milliGRAM(s) Oral daily  aspirin  chewable 81 milliGRAM(s) Oral daily  clotrimazole Lozenge 1 Lozenge Oral five times a day  dexAMETHasone     Tablet 4 milliGRAM(s) Oral two times a day  dextrose 40% Gel 15 Gram(s) Oral once  dextrose 5%. 1000 milliLiter(s) (50 mL/Hr) IV Continuous <Continuous>  dextrose 5%. 1000 milliLiter(s) (100 mL/Hr) IV Continuous <Continuous>  dextrose 50% Injectable 25 Gram(s) IV Push once  dextrose 50% Injectable 12.5 Gram(s) IV Push once  dextrose 50% Injectable 25 Gram(s) IV Push once  finasteride 5 milliGRAM(s) Oral daily  glucagon  Injectable 1 milliGRAM(s) IntraMuscular once  hemorrhoidal Ointment 1 Application(s) Rectal two times a day  insulin glargine Injectable (LANTUS) 66 Unit(s) SubCutaneous at bedtime  insulin lispro (ADMELOG) corrective regimen sliding scale   SubCutaneous three times a day before meals  insulin lispro (ADMELOG) corrective regimen sliding scale   SubCutaneous at bedtime  insulin lispro Injectable (ADMELOG) 30 Unit(s) SubCutaneous three times a day before meals  melatonin 5 milliGRAM(s) Oral at bedtime  pantoprazole   Suspension 40 milliGRAM(s) Oral daily  QUEtiapine 25 milliGRAM(s) Oral at bedtime  senna 2 Tablet(s) Oral at bedtime  simethicone 80 milliGRAM(s) Chew three times a day  tamsulosin 0.4 milliGRAM(s) Oral at bedtime      Physical Exam  General: Patient comfortable in bed  Vital Signs Last 12 Hrs  T(F): 97.7 (07-31-21 @ 11:30), Max: 97.7 (07-31-21 @ 11:30)  HR: 100 (07-31-21 @ 11:30) (100 - 100)  BP: 123/76 (07-31-21 @ 11:30) (123/76 - 123/76)  BP(mean): --  RR: 18 (07-31-21 @ 11:30) (18 - 18)  SpO2: 98% (07-31-21 @ 11:30) (98% - 98%)  Neck: No palpable thyroid nodules.  CVS: S1S2, No murmurs  Respiratory: No wheezing, no crepitations  GI: Abdomen soft, bowel sounds positive  Musculoskeletal:  edema lower extremities.     Diagnostics

## 2021-07-31 NOTE — PROGRESS NOTE ADULT - ASSESSMENT
72 y/o M w/ PMHx of CVA this past August and got TPA per family member, DM, BPH with obstruction s/p escamilla catheter, s/p ERCP w Sphincterectomy recent admission to [facility] for sepsis  Hodgkin lymphoma was not offered any chemo and was placed on hospice.     now presenting with weakness and FTT.   pt denies cp / SOB / palpitations   no focal neuro complaints .. at baseline RLE weakness   no N/V   had one episode of diarrhea   no urinary sx  abdominal pain, diffuse, but worse on R side.   Afib RVR overnight. Was asymptomatic   no known history of this as per patient

## 2021-07-31 NOTE — PROGRESS NOTE ADULT - SUBJECTIVE AND OBJECTIVE BOX
Date of service: 07-31-21 @ 23:55      Patient is a 71y old  Male who presents with a chief complaint of hodgkins lymphoma (24 Jul 2021 10:41)                                                               INTERVAL HPI/OVERNIGHT EVENTS:    REVIEW OF SYSTEMS:     CONSTITUTIONAL: No weakness, fevers or chills  EYES/ENT: No visual changes , no ear ache   NECK: No pain or stiffness  RESPIRATORY: No cough, wheezing,  No shortness of breath  CARDIOVASCULAR: No chest pain or palpitations  GASTROINTESTINAL: No abdominal pain  . No nausea, vomiting, or hematemesis; No diarrhea or constipation. No melena or hematochezia.  GENITOURINARY: No dysuria, frequency or hematuria  NEUROLOGICAL: No numbness or weakness  SKIN: No itching, burning, rashes, or lesions                                                                                                                                                                                                                                                                                 Medications:  MEDICATIONS  (STANDING):  allopurinol 300 milliGRAM(s) Oral daily  aspirin  chewable 81 milliGRAM(s) Oral daily  clotrimazole Lozenge 1 Lozenge Oral five times a day  dexAMETHasone     Tablet 4 milliGRAM(s) Oral two times a day  dextrose 40% Gel 15 Gram(s) Oral once  dextrose 5%. 1000 milliLiter(s) (50 mL/Hr) IV Continuous <Continuous>  dextrose 5%. 1000 milliLiter(s) (100 mL/Hr) IV Continuous <Continuous>  dextrose 50% Injectable 25 Gram(s) IV Push once  dextrose 50% Injectable 12.5 Gram(s) IV Push once  dextrose 50% Injectable 25 Gram(s) IV Push once  finasteride 5 milliGRAM(s) Oral daily  glucagon  Injectable 1 milliGRAM(s) IntraMuscular once  hemorrhoidal Ointment 1 Application(s) Rectal two times a day  insulin glargine Injectable (LANTUS) 66 Unit(s) SubCutaneous at bedtime  insulin lispro (ADMELOG) corrective regimen sliding scale   SubCutaneous three times a day before meals  insulin lispro (ADMELOG) corrective regimen sliding scale   SubCutaneous at bedtime  insulin lispro Injectable (ADMELOG) 30 Unit(s) SubCutaneous three times a day before meals  melatonin 5 milliGRAM(s) Oral at bedtime  pantoprazole   Suspension 40 milliGRAM(s) Oral daily  QUEtiapine 25 milliGRAM(s) Oral at bedtime  senna 2 Tablet(s) Oral at bedtime  simethicone 80 milliGRAM(s) Chew three times a day  tamsulosin 0.4 milliGRAM(s) Oral at bedtime    MEDICATIONS  (PRN):  acetaminophen   Tablet .. 650 milliGRAM(s) Oral every 6 hours PRN Temp greater or equal to 38C (100.4F), Moderate Pain (4 - 6)  bisacodyl 5 milliGRAM(s) Oral every 12 hours PRN Constipation  polyethylene glycol 3350 17 Gram(s) Oral daily PRN Constipation       Allergies    No Known Allergies    Intolerances      Vital Signs Last 24 Hrs  T(C): 36.7 (31 Jul 2021 20:15), Max: 36.7 (31 Jul 2021 20:15)  T(F): 98.1 (31 Jul 2021 20:15), Max: 98.1 (31 Jul 2021 20:15)  HR: 100 (31 Jul 2021 20:15) (84 - 100)  BP: 139/75 (31 Jul 2021 20:15) (123/76 - 144/83)  BP(mean): --  RR: 18 (31 Jul 2021 20:15) (18 - 18)  SpO2: 95% (31 Jul 2021 20:15) (95% - 99%)  CAPILLARY BLOOD GLUCOSE      POCT Blood Glucose.: 344 mg/dL (31 Jul 2021 21:12)  POCT Blood Glucose.: 287 mg/dL (31 Jul 2021 16:21)  POCT Blood Glucose.: 283 mg/dL (31 Jul 2021 11:43)  POCT Blood Glucose.: 291 mg/dL (31 Jul 2021 07:38)      07-30 @ 07:01 - 07-31 @ 07:00  --------------------------------------------------------  IN: 610 mL / OUT: 1100 mL / NET: -490 mL    07-31 @ 07:01 - 07-31 @ 23:55  --------------------------------------------------------  IN: 840 mL / OUT: 1740 mL / NET: -900 mL      Physical Exam:    Daily     Daily   General:  Well appearing, NAD, not cachetic  HEENT:  Nonicteric, PERRLA  CV:  RRR, S1S2   Lungs:  CTA B/L, no wheezes, rales, rhonchi  Abdomen:  Soft, non-tender, no distended, positive BS  Extremities:  2+ pulses, no c/c, no edema  Skin:  Warm and dry, no rashes  :  No escamilla  Neuro:  AAOx3, non-focal, grossly intact                                                                                                                                                                                                                                                                                                LABS:                               11.5   3.44  )-----------( 164      ( 31 Jul 2021 06:18 )             36.2                      07-31    138  |  100  |  27<H>  ----------------------------<  243<H>  4.1   |  24  |  0.42<L>    Ca    9.1      31 Jul 2021 06:17                         RADIOLOGY & ADDITIONAL TESTS         I personally reviewed: [  ]EKG   [  ]CXR    [  ] CT      A/P:         Discussed with :     Augusto consultants' Notes   Time spent :

## 2021-08-01 LAB
GLUCOSE BLDC GLUCOMTR-MCNC: 160 MG/DL — HIGH (ref 70–99)
GLUCOSE BLDC GLUCOMTR-MCNC: 240 MG/DL — HIGH (ref 70–99)
GLUCOSE BLDC GLUCOMTR-MCNC: 294 MG/DL — HIGH (ref 70–99)
GLUCOSE BLDC GLUCOMTR-MCNC: 336 MG/DL — HIGH (ref 70–99)
GLUCOSE BLDC GLUCOMTR-MCNC: 408 MG/DL — HIGH (ref 70–99)

## 2021-08-01 RX ADMIN — Medication 650 MILLIGRAM(S): at 21:05

## 2021-08-01 RX ADMIN — QUETIAPINE FUMARATE 25 MILLIGRAM(S): 200 TABLET, FILM COATED ORAL at 20:35

## 2021-08-01 RX ADMIN — Medication 1 LOZENGE: at 20:38

## 2021-08-01 RX ADMIN — TAMSULOSIN HYDROCHLORIDE 0.4 MILLIGRAM(S): 0.4 CAPSULE ORAL at 20:35

## 2021-08-01 RX ADMIN — PHENYLEPHRINE-SHARK LIVER OIL-MINERAL OIL-PETROLATUM RECTAL OINTMENT 1 APPLICATION(S): at 17:21

## 2021-08-01 RX ADMIN — Medication 30 UNIT(S): at 12:23

## 2021-08-01 RX ADMIN — Medication 4: at 07:34

## 2021-08-01 RX ADMIN — Medication 1: at 16:51

## 2021-08-01 RX ADMIN — SIMETHICONE 80 MILLIGRAM(S): 80 TABLET, CHEWABLE ORAL at 20:35

## 2021-08-01 RX ADMIN — SIMETHICONE 80 MILLIGRAM(S): 80 TABLET, CHEWABLE ORAL at 05:41

## 2021-08-01 RX ADMIN — Medication 4 MILLIGRAM(S): at 17:21

## 2021-08-01 RX ADMIN — PANTOPRAZOLE SODIUM 40 MILLIGRAM(S): 20 TABLET, DELAYED RELEASE ORAL at 05:41

## 2021-08-01 RX ADMIN — Medication 1 LOZENGE: at 11:23

## 2021-08-01 RX ADMIN — Medication 5 MILLIGRAM(S): at 20:35

## 2021-08-01 RX ADMIN — Medication 81 MILLIGRAM(S): at 11:18

## 2021-08-01 RX ADMIN — SIMETHICONE 80 MILLIGRAM(S): 80 TABLET, CHEWABLE ORAL at 13:31

## 2021-08-01 RX ADMIN — INSULIN GLARGINE 66 UNIT(S): 100 INJECTION, SOLUTION SUBCUTANEOUS at 21:38

## 2021-08-01 RX ADMIN — Medication 1 LOZENGE: at 07:35

## 2021-08-01 RX ADMIN — Medication 30 UNIT(S): at 16:50

## 2021-08-01 RX ADMIN — Medication 4 MILLIGRAM(S): at 05:42

## 2021-08-01 RX ADMIN — Medication 650 MILLIGRAM(S): at 20:35

## 2021-08-01 RX ADMIN — FINASTERIDE 5 MILLIGRAM(S): 5 TABLET, FILM COATED ORAL at 20:35

## 2021-08-01 RX ADMIN — Medication 1 LOZENGE: at 16:13

## 2021-08-01 RX ADMIN — Medication 300 MILLIGRAM(S): at 11:23

## 2021-08-01 RX ADMIN — Medication 30 UNIT(S): at 07:34

## 2021-08-01 RX ADMIN — Medication 3: at 12:24

## 2021-08-01 NOTE — PROVIDER CONTACT NOTE (OTHER) - REASON
BS stick before lunch is 401mg/dl
Pt has fever with temperature 100.1 degree F.
5 Beats Wide complex tachycardia
Hyperglycemia _ FSBS- 408. Repeat FS BS check- 294
PT noted with events on tele
Lactate 4.7
Pt complaint 10/10 abdominal pain
Ectopy: WCT on tele (7, 5, 6 beats)
HR down to 36 on tele.
PT noted with elevated temp
Bedtime 
Pt complaint 5/10 abdominal pain

## 2021-08-01 NOTE — PROGRESS NOTE ADULT - ASSESSMENT
Assessment  DMT2: 71y Male with DM T2 with hyperglycemia, A1C 8.8%, was on oral meds/Janumet at home, admitted with weakness/fatigue and hyperglycemia, now on large-dose basal bolus insulin, increased insulin dose yesterday, blood sugars improving though still not at target 2/2 high dose steroids, no hypoglycemic episodes. Patient is eating meals, appears comfortable, steroid taper in progress, next decreased dose scheduled for tomorrow.  Lymphoma: on medications, monitored, FU Heme/Onc.  Anemia: stable, monitored.      Shahriar Kahn MD  Cell: [phone]  Office: [phone]     Assessment  DMT2: 71y Male with DM T2 with hyperglycemia, A1C 8.8%, was on oral meds/Janumet at home, admitted with weakness/fatigue and hyperglycemia, now on large-dose basal bolus insulin, increased insulin dose yesterday, blood sugars improving though still not  at target 2/2 high dose steroids, no hypoglycemic episodes. Patient is eating meals, appears comfortable, steroid taper in progress, next decreased dose scheduled for tomorrow.  Lymphoma: on medications, monitored, FU Heme/Onc.  Anemia: stable, monitored.      Shahriar Kahn MD  Cell: [phone]  Office: [phone]

## 2021-08-01 NOTE — PROGRESS NOTE ADULT - ASSESSMENT
70 y/o M w/ PMHx of CVA this past August and got TPA per family member, DM, BPH with obstruction s/p escamilla catheter, s/p ERCP w Sphincterectomy recent admission to [facility] for sepsis  Hodgkin lymphoma was not offered any chemo and was placed on hospice.     now presenting with weakness and FTT.   pt denies cp / SOB / palpitations   no focal neuro complaints .. at baseline RLE weakness   no N/V   had one episode of diarrhea   no urinary sx  abdominal pain, diffuse, but worse on R side.   Afib RVR overnight. Was asymptomatic   no known history of this as per patient

## 2021-08-01 NOTE — PROGRESS NOTE ADULT - ASSESSMENT
70 y/o M w/ PMHx of CVA this past August and got TPA per family member, DM, BPH with obstruction s/p escamilla catheter, s/p ERCP w Sphincterectomy recent admission to [facility] for sepsis  Hodgkin lymphoma was not offered any chemo and was placed on hospice.     now presenting with weakness and FTT.   pt denies cp / SOB / palpitations   no focal neuro complaints .. at baseline RLE weakness   no N/V   had one episode of diarrhea   no urinary sx  abdominal pain, diffuse, but worse on R side.     - Failure to thrive: cultures neg  fungitell repeated Nl   CT chest repeated : improving   nutrition consult   encourage PO intake: dysphagia 3 based on MBS results. Pt feeling overall improved : will consider re-eval   (asking for better food to be given)    - lymphoma: d/w Dr. Larios: s/p immunotherapy .. will plan second dose on Aug 3th.   overall seemed to have responded somewhat to first dose     - DM with hyperglycemia : improved now worse with steroids   fu with endo     - anemia: monitor H/H post transfusion, stable.     - Fever: doubt infectious etiology   likely due to immunotherapy/lymphoma. culture: neg   abx dced. s/p IVF, ID f/u appreciated.   elevated fungitell noted, T chest improved / d/w Dr muñiz , repeat levels however low suspecion for fungal infection     - voice changes: CT neck : Asymmetric enlargement of the left palatine tonsil, suspicious for lymphomatous involvement given history. Correlate with direct visualization.  ENT had eval pt: no gross pathology on visualization   S/S eval: MBS done. speech: dysphagia 3    - Tachycardia: VA duplex neg for DVT   rate stable. noted 7 beats NSVT.  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 Smith and MRI department :  MR brain, cervical thoracic spine : non contrast done  and now awaiting with con... being expedited   cont steroids : will cont taper  fu LP cytology   appreciate neuro onc input   planned chemo early next week     - Afib: now in sinus : monitor   appreciate cardio input   no AC at this time and no AVNB at this time     - urinary retention: cont escamilla     discussed with Dr. Larios : will plan inpt immunotherapy while awaiting insurance approval for rehab.    DVT ppx

## 2021-08-01 NOTE — PROVIDER CONTACT NOTE (OTHER) - DATE AND TIME:
15-Jul-2021 03:00
15-Jul-2021 16:30
19-Jul-2021 06:34
28-Jun-2021 21:28
11-Jul-2021 11:34
13-Jul-2021 12:56
13-Jul-2021 20:00
29-Jun-2021 06:00
05-Jul-2021 00:50
07-Jul-2021 02:15
13-Jul-2021 22:47
30-Jun-2021 16:45
01-Aug-2021 12:44
02-Jul-2021 04:20
18-Jul-2021 21:00

## 2021-08-01 NOTE — PROGRESS NOTE ADULT - SUBJECTIVE AND OBJECTIVE BOX
Subjective: Patient seen and examined. No new events except as noted.     REVIEW OF SYSTEMS:    CONSTITUTIONAL: No weakness, fevers or chills  EYES/ENT: No visual changes;  No vertigo or throat pain   NECK: No pain or stiffness  RESPIRATORY: No cough, wheezing, hemoptysis; No shortness of breath  CARDIOVASCULAR: No chest pain or palpitations  GASTROINTESTINAL: No abdominal or epigastric pain. No nausea, vomiting, or hematemesis; No diarrhea or constipation. No melena or hematochezia.  GENITOURINARY: No dysuria, frequency or hematuria  NEUROLOGICAL: No numbness or weakness  SKIN: No itching, burning, rashes, or lesions   All other review of systems is negative unless indicated above.    MEDICATIONS:  MEDICATIONS  (STANDING):  allopurinol 300 milliGRAM(s) Oral daily  aspirin  chewable 81 milliGRAM(s) Oral daily  clotrimazole Lozenge 1 Lozenge Oral five times a day  dexAMETHasone     Tablet 4 milliGRAM(s) Oral two times a day  dextrose 40% Gel 15 Gram(s) Oral once  dextrose 5%. 1000 milliLiter(s) (50 mL/Hr) IV Continuous <Continuous>  dextrose 5%. 1000 milliLiter(s) (100 mL/Hr) IV Continuous <Continuous>  dextrose 50% Injectable 25 Gram(s) IV Push once  dextrose 50% Injectable 12.5 Gram(s) IV Push once  dextrose 50% Injectable 25 Gram(s) IV Push once  finasteride 5 milliGRAM(s) Oral daily  glucagon  Injectable 1 milliGRAM(s) IntraMuscular once  hemorrhoidal Ointment 1 Application(s) Rectal two times a day  insulin glargine Injectable (LANTUS) 66 Unit(s) SubCutaneous at bedtime  insulin lispro (ADMELOG) corrective regimen sliding scale   SubCutaneous three times a day before meals  insulin lispro (ADMELOG) corrective regimen sliding scale   SubCutaneous at bedtime  insulin lispro Injectable (ADMELOG) 30 Unit(s) SubCutaneous three times a day before meals  melatonin 5 milliGRAM(s) Oral at bedtime  pantoprazole   Suspension 40 milliGRAM(s) Oral daily  QUEtiapine 25 milliGRAM(s) Oral at bedtime  senna 2 Tablet(s) Oral at bedtime  simethicone 80 milliGRAM(s) Chew three times a day  tamsulosin 0.4 milliGRAM(s) Oral at bedtime      PHYSICAL EXAM:  T(C): 36.8 (08-01-21 @ 05:06), Max: 36.8 (08-01-21 @ 00:15)  HR: 72 (08-01-21 @ 05:06) (72 - 100)  BP: 122/79 (08-01-21 @ 05:06) (122/79 - 139/75)  RR: 18 (08-01-21 @ 05:06) (18 - 18)  SpO2: 99% (08-01-21 @ 05:06) (95% - 99%)  Wt(kg): --  I&O's Summary    31 Jul 2021 07:01  -  01 Aug 2021 07:00  --------------------------------------------------------  IN: 840 mL / OUT: 1740 mL / NET: -900 mL          Appearance: Normal	  HEENT:   Normal oral mucosa, PERRL, EOMI	  Lymphatic: No lymphadenopathy , no edema  Cardiovascular: Normal S1 S2, No JVD, No murmurs , Peripheral pulses palpable 2+ bilaterally  Respiratory: Lungs clear to auscultation, normal effort 	  Gastrointestinal:  Soft, Non-tender, + BS	  Skin: No rashes, No ecchymoses, No cyanosis, warm to touch  Musculoskeletal: Normal range of motion, normal strength  Psychiatry:  Mood & affect appropriate  Ext: No edema      LABS:    CARDIAC MARKERS:                                11.5   3.44  )-----------( 164      ( 31 Jul 2021 06:18 )             36.2     07-31    138  |  100  |  27<H>  ----------------------------<  243<H>  4.1   |  24  |  0.42<L>    Ca    9.1      31 Jul 2021 06:17      proBNP:   Lipid Profile:   HgA1c:   TSH:             TELEMETRY: 	    ECG:  	  RADIOLOGY:   DIAGNOSTIC TESTING:  [ ] Echocardiogram:  [ ]  Catheterization:  [ ] Stress Test:    OTHER:

## 2021-08-01 NOTE — PROGRESS NOTE ADULT - SUBJECTIVE AND OBJECTIVE BOX
Chief complaint  Patient is a 71y old  Male who presents with a chief complaint of hodgkins lymphoma (24 Jul 2021 10:41)   Review of systems  Patient in bed, looks comfortable, no hypoglycemic episodes.    Labs and Fingersticks  CAPILLARY BLOOD GLUCOSE      POCT Blood Glucose.: 294 mg/dL (01 Aug 2021 12:21)  POCT Blood Glucose.: 408 mg/dL (01 Aug 2021 11:54)  POCT Blood Glucose.: 336 mg/dL (01 Aug 2021 07:26)  POCT Blood Glucose.: 344 mg/dL (31 Jul 2021 21:12)  POCT Blood Glucose.: 287 mg/dL (31 Jul 2021 16:21)      Anion Gap, Serum: 14 (07-31 @ 06:17)      Calcium, Total Serum: 9.1 (07-31 @ 06:17)          07-31    138  |  100  |  27<H>  ----------------------------<  243<H>  4.1   |  24  |  0.42<L>    Ca    9.1      31 Jul 2021 06:17                          11.5   3.44  )-----------( 164      ( 31 Jul 2021 06:18 )             36.2     Medications  MEDICATIONS  (STANDING):  allopurinol 300 milliGRAM(s) Oral daily  aspirin  chewable 81 milliGRAM(s) Oral daily  clotrimazole Lozenge 1 Lozenge Oral five times a day  dexAMETHasone     Tablet 4 milliGRAM(s) Oral two times a day  dextrose 40% Gel 15 Gram(s) Oral once  dextrose 5%. 1000 milliLiter(s) (50 mL/Hr) IV Continuous <Continuous>  dextrose 5%. 1000 milliLiter(s) (100 mL/Hr) IV Continuous <Continuous>  dextrose 50% Injectable 25 Gram(s) IV Push once  dextrose 50% Injectable 12.5 Gram(s) IV Push once  dextrose 50% Injectable 25 Gram(s) IV Push once  finasteride 5 milliGRAM(s) Oral daily  glucagon  Injectable 1 milliGRAM(s) IntraMuscular once  hemorrhoidal Ointment 1 Application(s) Rectal two times a day  insulin glargine Injectable (LANTUS) 66 Unit(s) SubCutaneous at bedtime  insulin lispro (ADMELOG) corrective regimen sliding scale   SubCutaneous three times a day before meals  insulin lispro (ADMELOG) corrective regimen sliding scale   SubCutaneous at bedtime  insulin lispro Injectable (ADMELOG) 30 Unit(s) SubCutaneous three times a day before meals  melatonin 5 milliGRAM(s) Oral at bedtime  pantoprazole   Suspension 40 milliGRAM(s) Oral daily  QUEtiapine 25 milliGRAM(s) Oral at bedtime  senna 2 Tablet(s) Oral at bedtime  simethicone 80 milliGRAM(s) Chew three times a day  tamsulosin 0.4 milliGRAM(s) Oral at bedtime      Physical Exam  General: Patient comfortable in bed  Vital Signs Last 12 Hrs  T(F): 98.6 (08-01-21 @ 11:13), Max: 98.6 (08-01-21 @ 11:13)  HR: 99 (08-01-21 @ 11:13) (72 - 99)  BP: 122/74 (08-01-21 @ 11:13) (122/74 - 122/79)  BP(mean): --  RR: 18 (08-01-21 @ 11:13) (18 - 18)  SpO2: 97% (08-01-21 @ 11:13) (97% - 99%)  Neck: No palpable thyroid nodules.  CVS: S1S2, No murmurs  Respiratory: No wheezing, no crepitations  GI: Abdomen soft, bowel sounds positive  Musculoskeletal:  edema lower extremities.   Skin: No skin rashes, no ecchymosis    Diagnostics             Chief complaint  Patient is a 71y old  Male who presents with a chief complaint of hodgkins lymphoma (24 Jul 2021 10:41)   Review of systems  Patient in bed, looks comfortable, no hypoglycemic episodes.    Labs and Fingersticks  CAPILLARY BLOOD GLUCOSE      POCT Blood Glucose.: 294 mg/dL (01 Aug 2021 12:21)  POCT Blood Glucose.: 408 mg/dL (01 Aug 2021 11:54)  POCT Blood Glucose.: 336 mg/dL (01 Aug 2021 07:26)  POCT Blood Glucose.: 344 mg/dL (31 Jul 2021 21:12)  POCT Blood Glucose.: 287 mg/dL (31 Jul 2021 16:21)      Anion Gap, Serum: 14 (07-31 @ 06:17)      Calcium, Total Serum: 9.1 (07-31 @ 06:17)          07-31    138  |  100  |  27<H>  ----------------------------<  243<H>  4.1   |  24  |  0.42<L>    Ca    9.1      31 Jul 2021 06:17                          11.5   3.44  )-----------( 164      ( 31 Jul 2021 06:18 )             36.2     Medications  MEDICATIONS  (STANDING):  allopurinol 300 milliGRAM(s) Oral daily  aspirin  chewable 81 milliGRAM(s) Oral daily  clotrimazole Lozenge 1 Lozenge Oral five times a day  dexAMETHasone     Tablet 4 milliGRAM(s) Oral two times a day  dextrose 40% Gel 15 Gram(s) Oral once  dextrose 5%. 1000 milliLiter(s) (50 mL/Hr) IV Continuous <Continuous>  dextrose 5%. 1000 milliLiter(s) (100 mL/Hr) IV Continuous <Continuous>  dextrose 50% Injectable 25 Gram(s) IV Push once  dextrose 50% Injectable 12.5 Gram(s) IV Push once  dextrose 50% Injectable 25 Gram(s) IV Push once  finasteride 5 milliGRAM(s) Oral daily  glucagon  Injectable 1 milliGRAM(s) IntraMuscular once  hemorrhoidal Ointment 1 Application(s) Rectal two times a day  insulin glargine Injectable (LANTUS) 66 Unit(s) SubCutaneous at bedtime  insulin lispro (ADMELOG) corrective regimen sliding scale   SubCutaneous three times a day before meals  insulin lispro (ADMELOG) corrective regimen sliding scale   SubCutaneous at bedtime  insulin lispro Injectable (ADMELOG) 30 Unit(s) SubCutaneous three times a day before meals  melatonin 5 milliGRAM(s) Oral at bedtime  pantoprazole   Suspension 40 milliGRAM(s) Oral daily  QUEtiapine 25 milliGRAM(s) Oral at bedtime  senna 2 Tablet(s) Oral at bedtime  simethicone 80 milliGRAM(s) Chew three times a day  tamsulosin 0.4 milliGRAM(s) Oral at bedtime      Physical Exam  General: Patient comfortable in bed  Vital Signs Last 12 Hrs  T(F): 98.6 (08-01-21 @ 11:13), Max: 98.6 (08-01-21 @ 11:13)  HR: 99 (08-01-21 @ 11:13) (72 - 99)  BP: 122/74 (08-01-21 @ 11:13) (122/74 - 122/79)  BP(mean): --  RR: 18 (08-01-21 @ 11:13) (18 - 18)  SpO2: 97% (08-01-21 @ 11:13) (97% - 99%)  Neck: No palpable thyroid nodules.  CVS: S1S2, No murmurs  Respiratory: No wheezing, no crepitations  GI: Abdomen soft, bowel sounds positive  Musculoskeletal:  edema lower extremities.   Skin: No skin rashes, no ecchymosis    Diagnostics

## 2021-08-01 NOTE — PROVIDER CONTACT NOTE (OTHER) - RECOMMENDATIONS
ACP notified.
PA notified.
Notify PA
Provider made aware.
PA notified.
PA made aware
PA notified.
will continue to monitor the pt.
Cover with insulin and lantus as ordered
Notify PA
Provider aware.
WOO Brumfield notified.
Notify PA.

## 2021-08-01 NOTE — PROGRESS NOTE ADULT - PROBLEM SELECTOR PLAN 1
Expect blood sugars to trend down 2/2 steroid taper.  Will continue current insulin regimen for now. Will continue monitoring FS and FU, will adjust insulin dose as steroids are tapered/dc.  Patient previously on Janumet, he has large insulin requirement at this time due to high-dose steroids. Effects of steroids can linger several weeks, patient would benefit from insulin as outpatient and is agreeable.  Suggest DC home on current basal bolus insulin regimen, endo FU 2 weeks.  Discussed plan with patient and family at bedside. Counseled that blood sugars will start trending down as steroids are tapered/dc. Insulin dose will have to be adjusted based on blood sugars.   Counseled for compliance with consistent low-carb diet.

## 2021-08-01 NOTE — PROVIDER CONTACT NOTE (OTHER) - NAME OF MD/NP/PA/DO NOTIFIED:
Jian BERKOWITZ
WOO Bautista
CONNIE Hoover
WOO Bhatti
Mami Jeff
Uche BERKOWITZ
Uche BERKOWITZ
WOO Escalona
Lesli Pierce, PA
WOO Brumfield
ranjit BERKOWITZ
Keli Srivastava, NP
Selena CHANDRA
WOO Thompson.
Selena BERKOWITZ

## 2021-08-01 NOTE — PROVIDER CONTACT NOTE (OTHER) - ACTION/TREATMENT ORDERED:
Orders pending
give insulin as ordered
Administer 650 mg PO Tylenol, continue to monitor.
Will order stat dose of 5 units of Ademelog, Repeat FS at 11pm to give night coverage as ordered with lantus.
advised to give slididng scale , WILL continue to monitor
Administer insulin as ordered, recheck FS at 0200. Continue to monitor.
PA aware. EKG, & labs ordered. Will continue to monitor.
PA aware. Administer ordered Lantus dose tonight, & follow sliding scale. Recheck FS at 0200. Will continue to monitor.
Repeat temp 99.7 Orally,  blood transfusion in place and tolerating well.
WOO Bautista made aware, AM labs ordered. Will continue to monitor.
ACP notified.
No new orders at this time.  Will continue to monitor.
No new orders now. Will continue to monitor the pt.
PA aware. R2 beds placed at bedside. Will continue to monitor.
PA notified. Ordered STAT blood culturesx2 sets and cold compress. will be rechecking temperature after cold compress. will continue to monitor.

## 2021-08-01 NOTE — PROVIDER CONTACT NOTE (OTHER) - BACKGROUND
Admitted for weakness/ failure to thrive.  Patient on medrol 4 mg twice daily.
PT admitted with DX- Weakness, FTT HX- DM, CVA.
Pt is a 72 y/o M w/ pmhx of DM T2, baseline RLE weakness, DM , BPH with obstruction s/p escamilla catheter, P/w presenting with weakness and FTT.
PT admitted with DX- FTT, Weakness HX- Lymphoma, DM.
Pt admitted for weakness, fatigue, FTT. Hx of lymphoma
Pt is a 70 y/o M w/ pmhx of DM T2, baseline RLE weakness, DM , BPH with obstruction s/p escamilla catheter, P/w presenting with weakness and FTT.
Pt is a 70 yo M Dx: weakness
Pt is a 72 y/o M w/ pmhx of DM T2, baseline RLE weakness, DM , BPH with obstruction, P/w presenting with weakness and FTT.
Pt is admitted with - weakness/FTT/Hodgkins lymphoma- CT w/ lymphadenopathy, MUGA scan w/ EF 50%, normal RV/LV wall motion
Pt admitted for weakness.
Failure to thrive, Hodgkin's Lymphoma, weakness,
Pt is a 70 yo M Dx: Weakness PMH: MALDONADO, DM.
Pt is a 72 yo M Dx: weakness
pt admitted for sepsis , hodgkins lymphoma, on IV steroids

## 2021-08-01 NOTE — PROVIDER CONTACT NOTE (OTHER) - ASSESSMENT
Patient A & O x4. Asymptomatic. Denies any lethargy, Dizziness, pain.  Pre meals admelog insulin  30 units and Correctional scale 3 units given as per order.

## 2021-08-01 NOTE — PROGRESS NOTE ADULT - SUBJECTIVE AND OBJECTIVE BOX
Date of service: 08-01-21 @ 21:40      Patient is a 71y old  Male who presents with a chief complaint of hodgkins lymphoma (24 Jul 2021 10:41)                                                               INTERVAL HPI/OVERNIGHT EVENTS:    REVIEW OF SYSTEMS:     CONSTITUTIONAL: No weakness, fevers or chills  EYES/ENT: No visual changes , no ear ache   NECK: No pain or stiffness  RESPIRATORY: No cough, wheezing,  No shortness of breath  CARDIOVASCULAR: No chest pain or palpitations  GASTROINTESTINAL: No abdominal pain  . No nausea, vomiting, or hematemesis; No diarrhea or constipation. No melena or hematochezia.  GENITOURINARY: No dysuria, frequency or hematuria  NEUROLOGICAL: No numbness or weakness  SKIN: No itching, burning, rashes, or lesions                                                                                                                                                                                                                                                                                 Medications:  MEDICATIONS  (STANDING):  allopurinol 300 milliGRAM(s) Oral daily  aspirin  chewable 81 milliGRAM(s) Oral daily  clotrimazole Lozenge 1 Lozenge Oral five times a day  dexAMETHasone     Tablet 4 milliGRAM(s) Oral two times a day  dextrose 40% Gel 15 Gram(s) Oral once  dextrose 5%. 1000 milliLiter(s) (50 mL/Hr) IV Continuous <Continuous>  dextrose 5%. 1000 milliLiter(s) (100 mL/Hr) IV Continuous <Continuous>  dextrose 50% Injectable 25 Gram(s) IV Push once  dextrose 50% Injectable 12.5 Gram(s) IV Push once  dextrose 50% Injectable 25 Gram(s) IV Push once  finasteride 5 milliGRAM(s) Oral daily  glucagon  Injectable 1 milliGRAM(s) IntraMuscular once  hemorrhoidal Ointment 1 Application(s) Rectal two times a day  insulin glargine Injectable (LANTUS) 66 Unit(s) SubCutaneous at bedtime  insulin lispro (ADMELOG) corrective regimen sliding scale   SubCutaneous three times a day before meals  insulin lispro (ADMELOG) corrective regimen sliding scale   SubCutaneous at bedtime  insulin lispro Injectable (ADMELOG) 30 Unit(s) SubCutaneous three times a day before meals  melatonin 5 milliGRAM(s) Oral at bedtime  pantoprazole   Suspension 40 milliGRAM(s) Oral daily  QUEtiapine 25 milliGRAM(s) Oral at bedtime  senna 2 Tablet(s) Oral at bedtime  simethicone 80 milliGRAM(s) Chew three times a day  tamsulosin 0.4 milliGRAM(s) Oral at bedtime    MEDICATIONS  (PRN):  acetaminophen   Tablet .. 650 milliGRAM(s) Oral every 6 hours PRN Temp greater or equal to 38C (100.4F), Moderate Pain (4 - 6)  bisacodyl 5 milliGRAM(s) Oral every 12 hours PRN Constipation  polyethylene glycol 3350 17 Gram(s) Oral daily PRN Constipation       Allergies    No Known Allergies    Intolerances      Vital Signs Last 24 Hrs  T(C): 36.8 (01 Aug 2021 20:30), Max: 37 (01 Aug 2021 11:13)  T(F): 98.2 (01 Aug 2021 20:30), Max: 98.6 (01 Aug 2021 11:13)  HR: 90 (01 Aug 2021 20:30) (72 - 99)  BP: 125/77 (01 Aug 2021 20:30) (122/74 - 133/83)  BP(mean): --  RR: 18 (01 Aug 2021 20:30) (18 - 18)  SpO2: 99% (01 Aug 2021 20:30) (96% - 99%)  CAPILLARY BLOOD GLUCOSE      POCT Blood Glucose.: 240 mg/dL (01 Aug 2021 21:34)  POCT Blood Glucose.: 160 mg/dL (01 Aug 2021 16:27)  POCT Blood Glucose.: 294 mg/dL (01 Aug 2021 12:21)  POCT Blood Glucose.: 408 mg/dL (01 Aug 2021 11:54)  POCT Blood Glucose.: 336 mg/dL (01 Aug 2021 07:26)      07-31 @ 07:01 - 08-01 @ 07:00  --------------------------------------------------------  IN: 840 mL / OUT: 1740 mL / NET: -900 mL    08-01 @ 07:01 - 08-01 @ 21:40  --------------------------------------------------------  IN: 480 mL / OUT: 2100 mL / NET: -1620 mL      Physical Exam:    Daily     Daily   General:  Well appearing, NAD, not cachetic  HEENT:  Nonicteric, PERRLA  CV:  RRR, S1S2   Lungs:  CTA B/L, no wheezes, rales, rhonchi  Abdomen:  Soft, non-tender, no distended, positive BS  Extremities:  2+ pulses, no c/c, no edema  Skin:  Warm and dry, no rashes  :  No escamilla  Neuro:  AAOx3, non-focal, grossly intact                                                                                                                                                                                                                                                                                                LABS:                               11.5   3.44  )-----------( 164      ( 31 Jul 2021 06:18 )             36.2                      07-31    138  |  100  |  27<H>  ----------------------------<  243<H>  4.1   |  24  |  0.42<L>    Ca    9.1      31 Jul 2021 06:17                         RADIOLOGY & ADDITIONAL TESTS         I personally reviewed: [  ]EKG   [  ]CXR    [  ] CT      A/P:         Discussed with :     [facility] consultants' Notes   Time spent :

## 2021-08-02 LAB
GLUCOSE BLDC GLUCOMTR-MCNC: 157 MG/DL — HIGH (ref 70–99)
GLUCOSE BLDC GLUCOMTR-MCNC: 192 MG/DL — HIGH (ref 70–99)
GLUCOSE BLDC GLUCOMTR-MCNC: 228 MG/DL — HIGH (ref 70–99)
GLUCOSE BLDC GLUCOMTR-MCNC: 355 MG/DL — HIGH (ref 70–99)

## 2021-08-02 PROCEDURE — 99233 SBSQ HOSP IP/OBS HIGH 50: CPT

## 2021-08-02 RX ORDER — INSULIN GLARGINE 100 [IU]/ML
58 INJECTION, SOLUTION SUBCUTANEOUS AT BEDTIME
Refills: 0 | Status: DISCONTINUED | OUTPATIENT
Start: 2021-08-02 | End: 2021-08-04

## 2021-08-02 RX ORDER — INSULIN LISPRO 100/ML
26 VIAL (ML) SUBCUTANEOUS
Refills: 0 | Status: DISCONTINUED | OUTPATIENT
Start: 2021-08-02 | End: 2021-08-04

## 2021-08-02 RX ADMIN — PANTOPRAZOLE SODIUM 40 MILLIGRAM(S): 20 TABLET, DELAYED RELEASE ORAL at 05:22

## 2021-08-02 RX ADMIN — TAMSULOSIN HYDROCHLORIDE 0.4 MILLIGRAM(S): 0.4 CAPSULE ORAL at 21:29

## 2021-08-02 RX ADMIN — Medication 30 UNIT(S): at 11:31

## 2021-08-02 RX ADMIN — Medication 1: at 07:40

## 2021-08-02 RX ADMIN — Medication 650 MILLIGRAM(S): at 20:18

## 2021-08-02 RX ADMIN — Medication 26 UNIT(S): at 16:20

## 2021-08-02 RX ADMIN — Medication 81 MILLIGRAM(S): at 11:32

## 2021-08-02 RX ADMIN — Medication 2: at 11:31

## 2021-08-02 RX ADMIN — Medication 4 MILLIGRAM(S): at 05:21

## 2021-08-02 RX ADMIN — INSULIN GLARGINE 58 UNIT(S): 100 INJECTION, SOLUTION SUBCUTANEOUS at 21:29

## 2021-08-02 RX ADMIN — Medication 650 MILLIGRAM(S): at 05:52

## 2021-08-02 RX ADMIN — SIMETHICONE 80 MILLIGRAM(S): 80 TABLET, CHEWABLE ORAL at 21:30

## 2021-08-02 RX ADMIN — Medication 300 MILLIGRAM(S): at 11:33

## 2021-08-02 RX ADMIN — Medication 5 MILLIGRAM(S): at 21:29

## 2021-08-02 RX ADMIN — Medication 1: at 16:21

## 2021-08-02 RX ADMIN — FINASTERIDE 5 MILLIGRAM(S): 5 TABLET, FILM COATED ORAL at 21:29

## 2021-08-02 RX ADMIN — Medication 30 UNIT(S): at 07:40

## 2021-08-02 RX ADMIN — SIMETHICONE 80 MILLIGRAM(S): 80 TABLET, CHEWABLE ORAL at 05:21

## 2021-08-02 RX ADMIN — Medication 6: at 21:32

## 2021-08-02 RX ADMIN — Medication 650 MILLIGRAM(S): at 05:21

## 2021-08-02 RX ADMIN — SIMETHICONE 80 MILLIGRAM(S): 80 TABLET, CHEWABLE ORAL at 10:33

## 2021-08-02 RX ADMIN — Medication 650 MILLIGRAM(S): at 21:30

## 2021-08-02 RX ADMIN — QUETIAPINE FUMARATE 25 MILLIGRAM(S): 200 TABLET, FILM COATED ORAL at 21:29

## 2021-08-02 RX ADMIN — Medication 1 LOZENGE: at 11:32

## 2021-08-02 NOTE — PROGRESS NOTE ADULT - ASSESSMENT
1) Hodgkin's lymphoma  - repeat CT scans, results noted  -GOC- Family meeting held at bedside on 7/2. We discussed rx options vs comfort care. Family and patient wish to pursue rx. Ongoing conversations with family members have been taking place since our formal meeting.   - allopurinol  - pt s/p 1st rx with opdivo 7/6. Planning for next treatment in days ahead.   I have been in touch with administration both with department of medicine and heme-onc. Working on drug approval.  Dr. John to see patient later today, after which we hope to make a decision on treatment opdivo alone or opdivo/brentuximab    2)  Pancytopenia. Suspect multifactorial, due to hodgkins, and possibly with role being played by recent abx.  anemia- w/u this far unremarkable. possible aocd.  transfusional support as needed    3 thrush- on mycelex.

## 2021-08-02 NOTE — PROGRESS NOTE ADULT - ASSESSMENT
72 y/o M w/ PMHx of CVA this past August and got TPA per family member, DM, BPH with obstruction s/p escamilla catheter, s/p ERCP w Sphincterectomy recent admission to [facility] for sepsis  Hodgkin lymphoma was not offered any chemo and was placed on hospice.     now presenting with weakness and FTT.   pt denies cp / SOB / palpitations   no focal neuro complaints .. at baseline RLE weakness   no N/V   had one episode of diarrhea   no urinary sx  abdominal pain, diffuse, but worse on R side.     - Failure to thrive: cultures neg  fungitell repeated Nl   CT chest repeated : improving   nutrition consult   encourage PO intake: dysphagia 3 based on MBS results. Pt feeling overall improved : will consider re-eval   (asking for better food to be given)    - lymphoma: d/w Dr. Larios: s/p immunotherapy .. will plan second dose on Aug 3th.   overall seemed to have responded somewhat to first dose     - DM with hyperglycemia : improved now worse with steroids   fu with endo     - anemia: monitor H/H post transfusion, stable.     - Fever: doubt infectious etiology   likely due to immunotherapy/lymphoma. culture: neg   abx dced. s/p IVF, ID f/u appreciated.   elevated fungitell noted, T chest improved / d/w Dr muñiz , repeat levels however low suspecion for fungal infection     - voice changes: CT neck : Asymmetric enlargement of the left palatine tonsil, suspicious for lymphomatous involvement given history. Correlate with direct visualization.  ENT had eval pt: no gross pathology on visualization   S/S eval: MBS done. speech: dysphagia 3    - Tachycardia: VA duplex neg for DVT   rate stable. noted 7 beats NSVT.  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 Smith and MRI department :  MR brain, cervical thoracic spine : non contrast done  and now awaiting with con... being expedited   cont steroids : will cont taper  fu LP cytology   appreciate neuro onc input   planned chemo early next week     - Afib: now in sinus : monitor   appreciate cardio input   no AC at this time and no AVNB at this time     - urinary retention: cont escamilla     discussed with Dr. Larios : will plan inpt immunotherapy while awaiting insurance approval for rehab.    DVT ppx

## 2021-08-02 NOTE — PROGRESS NOTE ADULT - SUBJECTIVE AND OBJECTIVE BOX
Date of service: 08-02-21 @ 23:22      Patient is a 71y old  Male who presents with a chief complaint of hodgkins lymphoma (24 Jul 2021 10:41)                                                               INTERVAL HPI/OVERNIGHT EVENTS:    REVIEW OF SYSTEMS:     CONSTITUTIONAL: No weakness, fevers or chills  EYES/ENT: No visual changes , no ear ache   NECK: No pain or stiffness  RESPIRATORY: No cough, wheezing,  No shortness of breath  CARDIOVASCULAR: No chest pain or palpitations  GASTROINTESTINAL: No abdominal pain  . No nausea, vomiting, or hematemesis; No diarrhea or constipation. No melena or hematochezia.  GENITOURINARY: No dysuria, frequency or hematuria  NEUROLOGICAL: No numbness or weakness  SKIN: No itching, burning, rashes, or lesions                                                                                                                                                                                                                                                                                 Medications:  MEDICATIONS  (STANDING):  allopurinol 300 milliGRAM(s) Oral daily  aspirin  chewable 81 milliGRAM(s) Oral daily  clotrimazole Lozenge 1 Lozenge Oral five times a day  dexAMETHasone     Tablet 4 milliGRAM(s) Oral daily  dextrose 40% Gel 15 Gram(s) Oral once  dextrose 5%. 1000 milliLiter(s) (50 mL/Hr) IV Continuous <Continuous>  dextrose 5%. 1000 milliLiter(s) (100 mL/Hr) IV Continuous <Continuous>  dextrose 50% Injectable 25 Gram(s) IV Push once  dextrose 50% Injectable 12.5 Gram(s) IV Push once  dextrose 50% Injectable 25 Gram(s) IV Push once  finasteride 5 milliGRAM(s) Oral daily  glucagon  Injectable 1 milliGRAM(s) IntraMuscular once  hemorrhoidal Ointment 1 Application(s) Rectal two times a day  insulin glargine Injectable (LANTUS) 58 Unit(s) SubCutaneous at bedtime  insulin lispro (ADMELOG) corrective regimen sliding scale   SubCutaneous three times a day before meals  insulin lispro (ADMELOG) corrective regimen sliding scale   SubCutaneous at bedtime  insulin lispro Injectable (ADMELOG) 26 Unit(s) SubCutaneous three times a day before meals  melatonin 5 milliGRAM(s) Oral at bedtime  pantoprazole   Suspension 40 milliGRAM(s) Oral daily  QUEtiapine 25 milliGRAM(s) Oral at bedtime  senna 2 Tablet(s) Oral at bedtime  simethicone 80 milliGRAM(s) Chew three times a day  tamsulosin 0.4 milliGRAM(s) Oral at bedtime    MEDICATIONS  (PRN):  acetaminophen   Tablet .. 650 milliGRAM(s) Oral every 6 hours PRN Temp greater or equal to 38C (100.4F), Moderate Pain (4 - 6)  bisacodyl 5 milliGRAM(s) Oral every 12 hours PRN Constipation  polyethylene glycol 3350 17 Gram(s) Oral daily PRN Constipation       Allergies    No Known Allergies    Intolerances      Vital Signs Last 24 Hrs  T(C): 36.8 (02 Aug 2021 20:45), Max: 37.1 (02 Aug 2021 04:57)  T(F): 98.3 (02 Aug 2021 20:45), Max: 98.7 (02 Aug 2021 04:57)  HR: 84 (02 Aug 2021 20:45) (75 - 85)  BP: 134/88 (02 Aug 2021 20:45) (126/73 - 146/83)  BP(mean): --  RR: 18 (02 Aug 2021 20:45) (18 - 18)  SpO2: 99% (02 Aug 2021 20:45) (98% - 99%)  CAPILLARY BLOOD GLUCOSE      POCT Blood Glucose.: 355 mg/dL (02 Aug 2021 21:19)  POCT Blood Glucose.: 192 mg/dL (02 Aug 2021 16:09)  POCT Blood Glucose.: 228 mg/dL (02 Aug 2021 11:23)  POCT Blood Glucose.: 157 mg/dL (02 Aug 2021 07:21)      08-01 @ 07:01 - 08-02 @ 07:00  --------------------------------------------------------  IN: 680 mL / OUT: 4180 mL / NET: -3500 mL    08-02 @ 07:01  -  08-02 @ 23:22  --------------------------------------------------------  IN: 1045 mL / OUT: 550 mL / NET: 495 mL      Physical Exam:    Daily     Daily   General:  Well appearing, NAD, not cachetic  HEENT:  Nonicteric, PERRLA  CV:  RRR, S1S2   Lungs:  CTA B/L, no wheezes, rales, rhonchi  Abdomen:  Soft, non-tender, no distended, positive BS  Extremities: no edema  escamilla in place                                                                                                                                                                                                                                                                                           LABS:

## 2021-08-02 NOTE — PROGRESS NOTE ADULT - SUBJECTIVE AND OBJECTIVE BOX
Subjective: Patient seen and examined. No new events except as noted.     REVIEW OF SYSTEMS:    CONSTITUTIONAL: No weakness, fevers or chills  EYES/ENT: No visual changes;  No vertigo or throat pain   NECK: No pain or stiffness  RESPIRATORY: No cough, wheezing, hemoptysis; No shortness of breath  CARDIOVASCULAR: No chest pain or palpitations  GASTROINTESTINAL: No abdominal or epigastric pain. No nausea, vomiting, or hematemesis; No diarrhea or constipation. No melena or hematochezia.  GENITOURINARY: No dysuria, frequency or hematuria  NEUROLOGICAL: No numbness or weakness  SKIN: No itching, burning, rashes, or lesions   All other review of systems is negative unless indicated above.    MEDICATIONS:  MEDICATIONS  (STANDING):  allopurinol 300 milliGRAM(s) Oral daily  aspirin  chewable 81 milliGRAM(s) Oral daily  clotrimazole Lozenge 1 Lozenge Oral five times a day  dexAMETHasone     Tablet 4 milliGRAM(s) Oral daily  dextrose 40% Gel 15 Gram(s) Oral once  dextrose 5%. 1000 milliLiter(s) (50 mL/Hr) IV Continuous <Continuous>  dextrose 5%. 1000 milliLiter(s) (100 mL/Hr) IV Continuous <Continuous>  dextrose 50% Injectable 25 Gram(s) IV Push once  dextrose 50% Injectable 12.5 Gram(s) IV Push once  dextrose 50% Injectable 25 Gram(s) IV Push once  finasteride 5 milliGRAM(s) Oral daily  glucagon  Injectable 1 milliGRAM(s) IntraMuscular once  hemorrhoidal Ointment 1 Application(s) Rectal two times a day  insulin glargine Injectable (LANTUS) 66 Unit(s) SubCutaneous at bedtime  insulin lispro (ADMELOG) corrective regimen sliding scale   SubCutaneous three times a day before meals  insulin lispro (ADMELOG) corrective regimen sliding scale   SubCutaneous at bedtime  insulin lispro Injectable (ADMELOG) 30 Unit(s) SubCutaneous three times a day before meals  melatonin 5 milliGRAM(s) Oral at bedtime  pantoprazole   Suspension 40 milliGRAM(s) Oral daily  QUEtiapine 25 milliGRAM(s) Oral at bedtime  senna 2 Tablet(s) Oral at bedtime  simethicone 80 milliGRAM(s) Chew three times a day  tamsulosin 0.4 milliGRAM(s) Oral at bedtime      PHYSICAL EXAM:  T(C): 36.6 (08-02-21 @ 11:00), Max: 37.1 (08-02-21 @ 04:57)  HR: 75 (08-02-21 @ 11:00) (75 - 90)  BP: 126/73 (08-02-21 @ 11:00) (125/77 - 146/83)  RR: 18 (08-02-21 @ 11:00) (18 - 18)  SpO2: 98% (08-02-21 @ 11:00) (98% - 99%)  Wt(kg): --  I&O's Summary    01 Aug 2021 07:01  -  02 Aug 2021 07:00  --------------------------------------------------------  IN: 680 mL / OUT: 4180 mL / NET: -3500 mL          Appearance: NAD	  HEENT:   Dry oral mucosa, PERRL, EOMI	  Lymphatic: No lymphadenopathy , no edema  Cardiovascular: Normal S1 S2, No JVD, No murmurs , Peripheral pulses palpable 2+ bilaterally  Respiratory: Lungs clear to auscultation, normal effort 	  Gastrointestinal:  Soft, Non-tender, + BS	  Skin: No rashes, No ecchymoses, No cyanosis, warm to touch  Musculoskeletal: Normal range of motion, normal strength  Psychiatry:  Mood & affect appropriate  Ext: No edema      LABS:    CARDIAC MARKERS:                  proBNP:   Lipid Profile:   HgA1c:   TSH:             TELEMETRY: 	    ECG:  	  RADIOLOGY:   DIAGNOSTIC TESTING:  [ ] Echocardiogram:  [ ]  Catheterization:  [ ] Stress Test:    OTHER:

## 2021-08-02 NOTE — PROGRESS NOTE ADULT - ASSESSMENT
70 y/o M w/ PMHx of CVA this past August and got TPA per family member, DM, BPH with obstruction s/p escaimlla catheter, s/p ERCP w Sphincterectomy recent admission to [facility] for sepsis  Hodgkin lymphoma was not offered any chemo and was placed on hospice.     now presenting with weakness and FTT.   pt denies cp / SOB / palpitations   no focal neuro complaints .. at baseline RLE weakness   no N/V   had one episode of diarrhea   no urinary sx  abdominal pain, diffuse, but worse on R side.   Afib RVR overnight. Was asymptomatic   no known history of this as per patient

## 2021-08-02 NOTE — PROGRESS NOTE ADULT - SUBJECTIVE AND OBJECTIVE BOX
LIZETT RIVERA  MRN-[id number]    Patient is a 71y old  Male who presents with a chief complaint of hodgkins lymphoma (24 Jul 2021 10:41)      Review of System  REVIEW OF SYSTEMS      General:	Denies fatigue, fevers, chills, sweats, decreased appetite.    Skin/Breast: denies pruritis, rash  	  Ophthalmologic: no change in vision or blurring  	  HEENT	Denies dry mouth, oral sores, dysphagia,  change in hearing.    Respiratory and Thorax:  cough, sob, wheeze, hemoptysis  	  Cardiovascular:	no cp , palp, orthopnea    Gastrointestinal:	no n/v/d constipation    Genitourinary:	no dysuria of frequency, no hematuria, no flank pain    Musculoskeletal:	no bone or joint pain. no muscle aches.     Neurological:	no change in sensory or motor function. no headache. no weakness.     Psychiatric:	no depression, no anxiety, insomnia.     Hematology/Lymphatics:	no bleeding or bruising        Current Meds  MEDICATIONS  (STANDING):  allopurinol 300 milliGRAM(s) Oral daily  aspirin  chewable 81 milliGRAM(s) Oral daily  clotrimazole Lozenge 1 Lozenge Oral five times a day  dexAMETHasone     Tablet 4 milliGRAM(s) Oral daily  dextrose 40% Gel 15 Gram(s) Oral once  dextrose 5%. 1000 milliLiter(s) (50 mL/Hr) IV Continuous <Continuous>  dextrose 5%. 1000 milliLiter(s) (100 mL/Hr) IV Continuous <Continuous>  dextrose 50% Injectable 25 Gram(s) IV Push once  dextrose 50% Injectable 12.5 Gram(s) IV Push once  dextrose 50% Injectable 25 Gram(s) IV Push once  finasteride 5 milliGRAM(s) Oral daily  glucagon  Injectable 1 milliGRAM(s) IntraMuscular once  hemorrhoidal Ointment 1 Application(s) Rectal two times a day  insulin glargine Injectable (LANTUS) 66 Unit(s) SubCutaneous at bedtime  insulin lispro (ADMELOG) corrective regimen sliding scale   SubCutaneous three times a day before meals  insulin lispro (ADMELOG) corrective regimen sliding scale   SubCutaneous at bedtime  insulin lispro Injectable (ADMELOG) 30 Unit(s) SubCutaneous three times a day before meals  melatonin 5 milliGRAM(s) Oral at bedtime  pantoprazole   Suspension 40 milliGRAM(s) Oral daily  QUEtiapine 25 milliGRAM(s) Oral at bedtime  senna 2 Tablet(s) Oral at bedtime  simethicone 80 milliGRAM(s) Chew three times a day  tamsulosin 0.4 milliGRAM(s) Oral at bedtime    MEDICATIONS  (PRN):  acetaminophen   Tablet .. 650 milliGRAM(s) Oral every 6 hours PRN Temp greater or equal to 38C (100.4F), Moderate Pain (4 - 6)  bisacodyl 5 milliGRAM(s) Oral every 12 hours PRN Constipation  polyethylene glycol 3350 17 Gram(s) Oral daily PRN Constipation      Vitals  Vital Signs Last 24 Hrs  T(C): 37.1 (02 Aug 2021 04:57), Max: 37.1 (02 Aug 2021 04:57)  T(F): 98.7 (02 Aug 2021 04:57), Max: 98.7 (02 Aug 2021 04:57)  HR: 85 (02 Aug 2021 04:57) (85 - 99)  BP: 146/83 (02 Aug 2021 04:57) (122/74 - 146/83)  BP(mean): --  RR: 18 (02 Aug 2021 04:57) (18 - 18)  SpO2: 99% (02 Aug 2021 04:57) (97% - 99%)    Physical Exam  PHYSICAL EXAM:      Constitutional: NAD    Eyes: PERRLA EOMI, anicteric sclera    Heent :No oral sores, no pharyngeal injection. moist mucosa.    Neck: supple, no jvd, no LAD    Respiratory: CTA b/l     Cardiovascular: s1s2, no m/g/r    Gastrointestinal: soft, nt, nd, + BS    Extremities: no c/c/e    Neurological:A&O x 3 moves all ext.    Skin: no rash on exposed skin    Lymph Nodes: no lymphadenopathy.              Lab              Rad:    Assessment/Plan   LIZETT RIVERA  MRN-[id number]    Patient is a 71y old  Male who presents with a chief complaint of hodgkins lymphoma (24 Jul 2021 10:41)      Review of System      Patient feels well.  Eager to get treatment and to be able to go to rehab.  Without complaints.     General:	Denies fatigue, fevers, chills, sweats, decreased appetite.    Skin/Breast: denies pruritis, rash  	  Ophthalmologic: no change in vision or blurring  	  HEENT	Denies dry mouth, oral sores, dysphagia,  change in hearing.    Respiratory and Thorax:  cough, sob, wheeze, hemoptysis  	  Cardiovascular:	no cp , palp, orthopnea    Gastrointestinal:	no n/v/d constipation    Genitourinary:	no dysuria of frequency, no hematuria, no flank pain    Musculoskeletal:	no bone or joint pain. no muscle aches.     Neurological:	no change in sensory or motor function. no headache. no weakness.     Psychiatric:	no depression, no anxiety, insomnia.     Hematology/Lymphatics:	no bleeding or bruising        Current Meds  MEDICATIONS  (STANDING):  allopurinol 300 milliGRAM(s) Oral daily  aspirin  chewable 81 milliGRAM(s) Oral daily  clotrimazole Lozenge 1 Lozenge Oral five times a day  dexAMETHasone     Tablet 4 milliGRAM(s) Oral daily  dextrose 40% Gel 15 Gram(s) Oral once  dextrose 5%. 1000 milliLiter(s) (50 mL/Hr) IV Continuous <Continuous>  dextrose 5%. 1000 milliLiter(s) (100 mL/Hr) IV Continuous <Continuous>  dextrose 50% Injectable 25 Gram(s) IV Push once  dextrose 50% Injectable 12.5 Gram(s) IV Push once  dextrose 50% Injectable 25 Gram(s) IV Push once  finasteride 5 milliGRAM(s) Oral daily  glucagon  Injectable 1 milliGRAM(s) IntraMuscular once  hemorrhoidal Ointment 1 Application(s) Rectal two times a day  insulin glargine Injectable (LANTUS) 66 Unit(s) SubCutaneous at bedtime  insulin lispro (ADMELOG) corrective regimen sliding scale   SubCutaneous three times a day before meals  insulin lispro (ADMELOG) corrective regimen sliding scale   SubCutaneous at bedtime  insulin lispro Injectable (ADMELOG) 30 Unit(s) SubCutaneous three times a day before meals  melatonin 5 milliGRAM(s) Oral at bedtime  pantoprazole   Suspension 40 milliGRAM(s) Oral daily  QUEtiapine 25 milliGRAM(s) Oral at bedtime  senna 2 Tablet(s) Oral at bedtime  simethicone 80 milliGRAM(s) Chew three times a day  tamsulosin 0.4 milliGRAM(s) Oral at bedtime    MEDICATIONS  (PRN):  acetaminophen   Tablet .. 650 milliGRAM(s) Oral every 6 hours PRN Temp greater or equal to 38C (100.4F), Moderate Pain (4 - 6)  bisacodyl 5 milliGRAM(s) Oral every 12 hours PRN Constipation  polyethylene glycol 3350 17 Gram(s) Oral daily PRN Constipation      Vitals  Vital Signs Last 24 Hrs  T(C): 37.1 (02 Aug 2021 04:57), Max: 37.1 (02 Aug 2021 04:57)  T(F): 98.7 (02 Aug 2021 04:57), Max: 98.7 (02 Aug 2021 04:57)  HR: 85 (02 Aug 2021 04:57) (85 - 99)  BP: 146/83 (02 Aug 2021 04:57) (122/74 - 146/83)  BP(mean): --  RR: 18 (02 Aug 2021 04:57) (18 - 18)  SpO2: 99% (02 Aug 2021 04:57) (97% - 99%)      PHYSICAL EXAM:    Constitutional: NAD    Eyes: PERRLA EOMI, anicteric sclera    Heent :No oral sores, no pharyngeal injection. moist mucosa.    Neck: supple, no jvd, no LAD    Respiratory: CTA b/l     Cardiovascular: s1s2, no m/g/r    Gastrointestinal: soft, nt, nd, + BS    Extremities: no c/c/e    Neurological:A&O x 3 moves all ext.    Skin: no rash on exposed skin    Lymph Nodes: no lymphadenopathy.              Lab              Rad:    Assessment/Plan

## 2021-08-02 NOTE — PROGRESS NOTE ADULT - SUBJECTIVE AND OBJECTIVE BOX
Chief complaint  Patient is a 71y old  Male who presents with a chief complaint of hodgkins lymphoma (24 Jul 2021 10:41)   Review of systems  Patient in bed, looks comfortable, no hypoglycemic episodes.    Labs and Fingersticks  CAPILLARY BLOOD GLUCOSE      POCT Blood Glucose.: 228 mg/dL (02 Aug 2021 11:23)  POCT Blood Glucose.: 157 mg/dL (02 Aug 2021 07:21)  POCT Blood Glucose.: 240 mg/dL (01 Aug 2021 21:34)  POCT Blood Glucose.: 160 mg/dL (01 Aug 2021 16:27)      Medications  MEDICATIONS  (STANDING):  allopurinol 300 milliGRAM(s) Oral daily  aspirin  chewable 81 milliGRAM(s) Oral daily  clotrimazole Lozenge 1 Lozenge Oral five times a day  dexAMETHasone     Tablet 4 milliGRAM(s) Oral daily  dextrose 40% Gel 15 Gram(s) Oral once  dextrose 5%. 1000 milliLiter(s) (50 mL/Hr) IV Continuous <Continuous>  dextrose 5%. 1000 milliLiter(s) (100 mL/Hr) IV Continuous <Continuous>  dextrose 50% Injectable 25 Gram(s) IV Push once  dextrose 50% Injectable 12.5 Gram(s) IV Push once  dextrose 50% Injectable 25 Gram(s) IV Push once  finasteride 5 milliGRAM(s) Oral daily  glucagon  Injectable 1 milliGRAM(s) IntraMuscular once  hemorrhoidal Ointment 1 Application(s) Rectal two times a day  insulin glargine Injectable (LANTUS) 58 Unit(s) SubCutaneous at bedtime  insulin lispro (ADMELOG) corrective regimen sliding scale   SubCutaneous three times a day before meals  insulin lispro (ADMELOG) corrective regimen sliding scale   SubCutaneous at bedtime  insulin lispro Injectable (ADMELOG) 26 Unit(s) SubCutaneous three times a day before meals  melatonin 5 milliGRAM(s) Oral at bedtime  pantoprazole   Suspension 40 milliGRAM(s) Oral daily  QUEtiapine 25 milliGRAM(s) Oral at bedtime  senna 2 Tablet(s) Oral at bedtime  simethicone 80 milliGRAM(s) Chew three times a day  tamsulosin 0.4 milliGRAM(s) Oral at bedtime      Physical Exam  General: Patient comfortable in bed  Vital Signs Last 12 Hrs  T(F): 97.8 (08-02-21 @ 11:00), Max: 98.7 (08-02-21 @ 04:57)  HR: 75 (08-02-21 @ 11:00) (75 - 85)  BP: 126/73 (08-02-21 @ 11:00) (126/73 - 146/83)  BP(mean): --  RR: 18 (08-02-21 @ 11:00) (18 - 18)  SpO2: 98% (08-02-21 @ 11:00) (98% - 99%)         Chief complaint  Patient is a 71y old  Male who presents with a chief complaint of hodgkins lymphoma (24 Jul 2021 10:41)   Review of systems  Patient in bed, looks comfortable, no hypoglycemic episodes.    Labs and Fingersticks  CAPILLARY BLOOD GLUCOSE      POCT Blood Glucose.: 228 mg/dL (02 Aug 2021 11:23)  POCT Blood Glucose.: 157 mg/dL (02 Aug 2021 07:21)  POCT Blood Glucose.: 240 mg/dL (01 Aug 2021 21:34)  POCT Blood Glucose.: 160 mg/dL (01 Aug 2021 16:27)      Medications  MEDICATIONS  (STANDING):  allopurinol 300 milliGRAM(s) Oral daily  aspirin  chewable 81 milliGRAM(s) Oral daily  clotrimazole Lozenge 1 Lozenge Oral five times a day  dexAMETHasone     Tablet 4 milliGRAM(s) Oral daily  dextrose 40% Gel 15 Gram(s) Oral once  dextrose 5%. 1000 milliLiter(s) (50 mL/Hr) IV Continuous <Continuous>  dextrose 5%. 1000 milliLiter(s) (100 mL/Hr) IV Continuous <Continuous>  dextrose 50% Injectable 25 Gram(s) IV Push once  dextrose 50% Injectable 12.5 Gram(s) IV Push once  dextrose 50% Injectable 25 Gram(s) IV Push once  finasteride 5 milliGRAM(s) Oral daily  glucagon  Injectable 1 milliGRAM(s) IntraMuscular once  hemorrhoidal Ointment 1 Application(s) Rectal two times a day  insulin glargine Injectable (LANTUS) 58 Unit(s) SubCutaneous at bedtime  insulin lispro (ADMELOG) corrective regimen sliding scale   SubCutaneous three times a day before meals  insulin lispro (ADMELOG) corrective regimen sliding scale   SubCutaneous at bedtime  insulin lispro Injectable (ADMELOG) 26 Unit(s) SubCutaneous three times a day before meals  melatonin 5 milliGRAM(s) Oral at bedtime  pantoprazole   Suspension 40 milliGRAM(s) Oral daily  QUEtiapine 25 milliGRAM(s) Oral at bedtime  senna 2 Tablet(s) Oral at bedtime  simethicone 80 milliGRAM(s) Chew three times a day  tamsulosin 0.4 milliGRAM(s) Oral at bedtime      Physical Exam  General: Patient comfortable in bed  Vital Signs Last 12 Hrs  T(F): 97.8 (08-02-21 @ 11:00), Max: 98.7 (08-02-21 @ 04:57)  HR: 75 (08-02-21 @ 11:00) (75 - 85)  BP: 126/73 (08-02-21 @ 11:00) (126/73 - 146/83)  BP(mean): --  RR: 18 (08-02-21 @ 11:00) (18 - 18)  SpO2: 98% (08-02-21 @ 11:00) (98% - 99%)

## 2021-08-02 NOTE — CHART NOTE - NSCHARTNOTEFT_GEN_A_CORE
MOTOR	Muscle tone: no evidence of rigidity or resistance. No drift. Muscle strength: arms and legs, proximal and distal, flexors and extensors are normal. No atrophy or fasciculations.  SENSATION	Normal in arms, legs, and trunk.  COORDINATION 	Rapid alternating movements are normal in the upper and lower extremities. Finger/nose & heel/knee/shin are normal bilaterally.  REFLEXES	All present and normal at biceps, triceps, and brachioradialis bilaterally. Knees and ankle jerks are normal bilaterally as well. Toes are downgoing.  CARDIOVASCULAR	No peripheral edema. NEURO-ONCOLOGY ATTENDING:  FA[facility] [phone]  	  CC: ABNORMAL MRI, HODGKIN’S LYMPHOMA, PARAPARESIS, FAILURE TO THRIVE    PATIENT SEEN AND EXAMINED 8/2/21  SUNRISE RECORDS REVIEWED   ALLSCRIPTS RECORDS REVIEWED  LABS REVIEWED  IMAGING REVIEWED  DISCUSSED WITH YVONNE SOLIMAN)    72 yo RH man with PMHx CVA (AUGUST 2020, s/p TPA), DM, HTN, BPH, Hodgkin’s lymphoma, now with enhancing nerve roots and paraparesis.    He was admitted to [facility] on 6/28/2021. Admission notes indicate he presented with weakness (since AUGUST 2020) and failure to thrive. He had RLE weakness, denied FALLS, SOB, N/V, and fever. He had abdominal pain which was mostly diffuse, but somewhat worse on the right side and an episode of diarrhea. A family member provided history that the patient suffers from BPH, had an obstruction requiring Sauceda catheter, was s/p ERCP with sphicterectomy during a recent admission to [facility] for sepsis and then was discharged from that institution to home hospice.    During this hospitalization, enhancing disease was found involving his lumbar spine meninges. He has no weakness, numbness, nor incontinence.    ONCOLOGIC HISTORY  6/29/2021 – [facility] ([facility]) evaluated patient and noted patient had been independent for ADLs until FEBRUARY 2021. There was new hoarseness of his voice. Albumin was 2.8, Total Bili was 1.4, Alk Phos=378. Given the new weakness, neurology consult requested.  6/30/2021 – NEUROLOGY ([facility]) evaluation revealed a hypophonic voice, myelosuppression (HgB=7.1, platelets=106) and DTRs were present. Weakness felt to be related to general medical condition, not to a CVA or focal neurologic deficits.  7/6/2021 – INFECTIOUS DISEASES ([facility]) evaluation at 17:35 without major infections identified, fever thought to represent B symptoms. Dcgg=660.4 (20:57). A rapid response for fever and chills was called on the same day (22:23).  7/7/2021 – ID re-evaluation – “Yesterday had episode of fever with tachycardia which I still suspect are b symptoms but await latest cultures. He continues to oxygenate fine, renal function is normal, mentation is normal, exam unrevealing, clear cxr. High alk p present since admission and likely is from his underlying disease. Recent ct shows no liver or gb lesions.”  7/9/2021 – [facility] notes indicate patient is s/p Opdivo cycle #1 on 7/6/21, with next dose planned for 8/3/2021. A GO meeting (with family) was held on 7/2/2021.  7/12/2021 – NEUROSURGERY ([facility]) evaluation for enhancing mass lesion in LS spine at L45. No neurosurgical intervention required.  7/13/2021 – NEURO-ONCOLOGY EVALUATION ([facility]) reports, “PMH (obtained through chart review, as well as discussions with is oncologist Dr. Larios and his family members Kevin and his wife - his family insists that in August he was highly functioning, walking, driving, and caring for his autistic son. His wife reports that one day in August, he complained of a headaches - she have him Advil and about half hour later he was noted to be very confused - they called 911 and he was brought to [facility] -   She denies that he had weakness at that time. He was able to be discharged home and was again fully functional until late February, early March when he seemed to be sleeping more and was feeling generally weak. He was brought to an outside hospital - he was having urinary retention and was seen by Urology who felt this was related to an enlarged prostate -he still has a catheter in place. He states he is aware of when he needs to have a bowel movement and has control over this.” MRI results noted and completion of EOD recommended, with imaging of remainder of spine.  7/16/2021 – [facility] considering use of systemic chemotherapy (ABVD). LP performed (opening pressure not reported, 12cc of CSF sent for studies).    EXAMINATION  ALERT, oriented x3. full speech, provides full history, knows presidents.  No cranial nerve deficits.  MOTOR	Muscle tone: no evidence of rigidity or resistance. No drift. Muscle strength: arms and legs, proximal and distal, flexors and extensors are normal. No atrophy or fasciculations.  5/5 strength in iliopsoas, hams, quads, gastrocs, and anterior tibialis as well as in arms.  SENSATION	Normal in arms, legs, and trunk.  COORDINATION 	Rapid alternating movements are normal in the upper and lower extremities. Finger/nose & heel/knee/shin are normal bilaterally.  REFLEXES	All present and normal at biceps, triceps, and brachioradialis bilaterally. Knees and ankle jerks are normal bilaterally as well. Toes are downgoing.  CARDIOVASCULAR	No peripheral edema.    IMAGING  I personally and independently reviewed available MR and CT imaging, for the following interpretation: The NEUROimaging reviewed is dated 7/16/21 (brain)	7/14/21 (spine)	7/16/21 (spine)	  In reviewing these images, I find no intracranial parenchymal HYPERINTENSITY on the T2 weighted images, with greater than usual atrophic changes.   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  DWI imaging shows no acute CVA.    LABS  7/16/21 CSF PROTEIN=158; GLUCOSE=135; WBC<1 RBC=0  6/29/21 VIT Q37=9131 IRON-IPDT=799; IRIN (TOTAL)=55; %IRON SAT=33  6/28/21 URINALYSIS SHOWED “MANY BUDDING YEAST” and 1000 glucose  7/7/21 URINALYSIS SHOWED MANY BUDDING YEAST, NO GLUCOSE AND TRACE PROTEIN  7/5/21 FUNGITELL WAS ELEVATED TO 256L HEP B CORE AND SURFACE AB REACTIVE.    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 I suspect this asymptomatic disease will respond to systemic therapy, as the dura is outside the BBB and chemotherapies effective elsewhere are expected to work here as well.    LEPTOMENINGEAL DISEASE – CSF is not positive, as there are no WBC, doubt will find CD30+ lymphocytes by flow cytometry in the CSF.  I do not think Ommaya placement and addition of intrathecal methotrexate chemotherapy twice weekly for four weeks is needed. The enhancing disease is dural, not leptomeningeal. There is NO URGENCY to delivering RT to LS spine as his LE strength is normal to my exam today    DISPO – As this condition continues to pose significant immediate risk for morbidity and mortality, I will coordinate further, continued outpatient care with me. No objection to d/c planning.    TOTAL TIME SPENT WAS 36 MINUTES, OF WHICH HALF IN COUNSELLING AND COORDINATION OF CARE.

## 2021-08-02 NOTE — PROGRESS NOTE ADULT - ASSESSMENT
72 y/o M with PMH of CVA, DM, BPH with obstruction s/p Sauceda catheter, s/p ERCP w Sphincteretomy, recent admission to [facility] for sepsis, Hodgkin lymphoma dx 2020 - was not offered any chemo and was placed on hospice, DVT 8/2020. Presenting with weakness and FTT. Pt and family opted for treatment of lymphoma - s/p Opdivo 7/6. Called to consult for CT chest findings 6/28 with patchy GGO and scattered nodular opacities throughout all lobes of the lungs. Currently breathing comfortably on RA.

## 2021-08-02 NOTE — PROGRESS NOTE ADULT - SUBJECTIVE AND OBJECTIVE BOX
Follow-up Pulm Progress Note    No new respiratory events overnight.  Denies SOB/CP.     Medications:  MEDICATIONS  (STANDING):  allopurinol 300 milliGRAM(s) Oral daily  aspirin  chewable 81 milliGRAM(s) Oral daily  clotrimazole Lozenge 1 Lozenge Oral five times a day  dexAMETHasone     Tablet 4 milliGRAM(s) Oral daily  dextrose 40% Gel 15 Gram(s) Oral once  dextrose 5%. 1000 milliLiter(s) (50 mL/Hr) IV Continuous <Continuous>  dextrose 5%. 1000 milliLiter(s) (100 mL/Hr) IV Continuous <Continuous>  dextrose 50% Injectable 25 Gram(s) IV Push once  dextrose 50% Injectable 12.5 Gram(s) IV Push once  dextrose 50% Injectable 25 Gram(s) IV Push once  finasteride 5 milliGRAM(s) Oral daily  glucagon  Injectable 1 milliGRAM(s) IntraMuscular once  hemorrhoidal Ointment 1 Application(s) Rectal two times a day  insulin glargine Injectable (LANTUS) 66 Unit(s) SubCutaneous at bedtime  insulin lispro (ADMELOG) corrective regimen sliding scale   SubCutaneous three times a day before meals  insulin lispro (ADMELOG) corrective regimen sliding scale   SubCutaneous at bedtime  insulin lispro Injectable (ADMELOG) 30 Unit(s) SubCutaneous three times a day before meals  melatonin 5 milliGRAM(s) Oral at bedtime  pantoprazole   Suspension 40 milliGRAM(s) Oral daily  QUEtiapine 25 milliGRAM(s) Oral at bedtime  senna 2 Tablet(s) Oral at bedtime  simethicone 80 milliGRAM(s) Chew three times a day  tamsulosin 0.4 milliGRAM(s) Oral at bedtime    MEDICATIONS  (PRN):  acetaminophen   Tablet .. 650 milliGRAM(s) Oral every 6 hours PRN Temp greater or equal to 38C (100.4F), Moderate Pain (4 - 6)  bisacodyl 5 milliGRAM(s) Oral every 12 hours PRN Constipation  polyethylene glycol 3350 17 Gram(s) Oral daily PRN Constipation          Vital Signs Last 24 Hrs  T(C): 37.1 (02 Aug 2021 04:57), Max: 37.1 (02 Aug 2021 04:57)  T(F): 98.7 (02 Aug 2021 04:57), Max: 98.7 (02 Aug 2021 04:57)  HR: 85 (02 Aug 2021 04:57) (85 - 99)  BP: 146/83 (02 Aug 2021 04:57) (122/74 - 146/83)  BP(mean): --  RR: 18 (02 Aug 2021 04:57) (18 - 18)  SpO2: 99% (02 Aug 2021 04:57) (97% - 99%)          08-01 @ 07:01  -  08-02 @ 07:00  --------------------------------------------------------  IN: 680 mL / OUT: 4180 mL / NET: -3500 mL              CAPILLARY BLOOD GLUCOSE      POCT Blood Glucose.: 157 mg/dL (02 Aug 2021 07:21)            Physical Examination:  PULM: Clear to auscultation bilaterally, no significant sputum production  CVS: S1, S2 heard    RADIOLOGY REVIEWED  CT chest: < from: CT Chest w/ IV Cont (07.19.21 @ 17:31) >  FINDINGS:  CHEST:  LUNGS AND LARGE AIRWAYS: Patent central airways. There is a 4 mm groundglass nodule (series 4, image 171). There is an unchanged 1 cm cystic nodule (series 4, image 223). Interval decrease in right lower lobe nodule at the costophrenic angle, now measuring 1.3 x 0.8 cm (series 4, image 338). Other prior mentioned solid and groundglass nodules are resolved. Mild bilateral subsegmental atelectasis.  PLEURA: No pleural effusion.  VESSELS: Coronary artery calcifications.  HEART: Heart size is normal. No pericardial effusion.  MEDIASTINUM AND SARTHAK: No lymphadenopathy.  CHEST WALL AND LOWER NECK: Within normal limits.    < end of copied text >

## 2021-08-02 NOTE — PROGRESS NOTE ADULT - PROBLEM SELECTOR PLAN 1
Expect blood sugars to continue trending down 2/2 steroid taper.  Will decrease Lantus to 58u at bedtime.  Will decrease Admelog to 26u before each meal and continue Admelog correction scale coverage. Will continue monitoring FS and FU, will adjust insulin dose as steroids are tapered/dc.  Patient previously on Janumet, he has large insulin requirement at this time due to high-dose steroids. Effects of steroids can linger several weeks, patient would benefit from insulin as outpatient and is agreeable.  Suggest DC home on current basal bolus insulin regimen, endo FU 2 weeks.  Discussed plan with patient and family at bedside. Counseled that blood sugars will start trending down as steroids are tapered/dc. Insulin dose will have to be adjusted based on blood sugars.   Counseled for compliance with consistent low-carb diet.

## 2021-08-02 NOTE — PROGRESS NOTE ADULT - ASSESSMENT
Assessment  DMT2: 71y Male with DM T2 with hyperglycemia, A1C 8.8%, was on oral meds/Janumet at home, admitted with weakness/fatigue and hyperglycemia, now on large-dose basal bolus insulin, blood sugars improving and trending down, no hypoglycemic episodes, eating meals, dexamethasone dose decreased.  Lymphoma: on medications, monitored, FU Heme/Onc.  Anemia: stable, monitored.      Shahriar Kahn MD  Cell: [phone]  Office: [phone]     Assessment  DMT2: 71y Male with DM T2 with hyperglycemia, A1C 8.8%, was on oral meds/Janumet at home, admitted with weakness/fatigue and hyperglycemia, now on large-dose basal bolus insulin, blood sugars improving and trending down, no hypoglycemic episodes,  eating meals, dexamethasone dose decreased.  Lymphoma: on medications, monitored, FU Heme/Onc.  Anemia: stable, monitored.      Shahriar Kahn MD  Cell: [phone]  Office: [phone]

## 2021-08-03 LAB
ANION GAP SERPL CALC-SCNC: 14 MMOL/L — SIGNIFICANT CHANGE UP (ref 5–17)
BUN SERPL-MCNC: 34 MG/DL — HIGH (ref 7–23)
CALCIUM SERPL-MCNC: 9.5 MG/DL — SIGNIFICANT CHANGE UP (ref 8.4–10.5)
CHLORIDE SERPL-SCNC: 94 MMOL/L — LOW (ref 96–108)
CO2 SERPL-SCNC: 21 MMOL/L — LOW (ref 22–31)
CREAT SERPL-MCNC: 0.55 MG/DL — SIGNIFICANT CHANGE UP (ref 0.5–1.3)
GLUCOSE BLDC GLUCOMTR-MCNC: 185 MG/DL — HIGH (ref 70–99)
GLUCOSE BLDC GLUCOMTR-MCNC: 195 MG/DL — HIGH (ref 70–99)
GLUCOSE BLDC GLUCOMTR-MCNC: 214 MG/DL — HIGH (ref 70–99)
GLUCOSE BLDC GLUCOMTR-MCNC: 257 MG/DL — HIGH (ref 70–99)
GLUCOSE BLDC GLUCOMTR-MCNC: 279 MG/DL — HIGH (ref 70–99)
GLUCOSE BLDC GLUCOMTR-MCNC: 482 MG/DL — CRITICAL HIGH (ref 70–99)
GLUCOSE SERPL-MCNC: 204 MG/DL — HIGH (ref 70–99)
HCT VFR BLD CALC: 38.2 % — LOW (ref 39–50)
HGB BLD-MCNC: 12.5 G/DL — LOW (ref 13–17)
MCHC RBC-ENTMCNC: 32.7 GM/DL — SIGNIFICANT CHANGE UP (ref 32–36)
MCHC RBC-ENTMCNC: 33.2 PG — SIGNIFICANT CHANGE UP (ref 27–34)
MCV RBC AUTO: 101.6 FL — HIGH (ref 80–100)
NRBC # BLD: 0 /100 WBCS — SIGNIFICANT CHANGE UP (ref 0–0)
PLATELET # BLD AUTO: 181 K/UL — SIGNIFICANT CHANGE UP (ref 150–400)
POTASSIUM SERPL-MCNC: 4.2 MMOL/L — SIGNIFICANT CHANGE UP (ref 3.5–5.3)
POTASSIUM SERPL-SCNC: 4.2 MMOL/L — SIGNIFICANT CHANGE UP (ref 3.5–5.3)
RBC # BLD: 3.76 M/UL — LOW (ref 4.2–5.8)
RBC # FLD: 23.4 % — HIGH (ref 10.3–14.5)
SARS-COV-2 RNA SPEC QL NAA+PROBE: SIGNIFICANT CHANGE UP
SODIUM SERPL-SCNC: 129 MMOL/L — LOW (ref 135–145)
WBC # BLD: 2.75 K/UL — LOW (ref 3.8–10.5)
WBC # FLD AUTO: 2.75 K/UL — LOW (ref 3.8–10.5)

## 2021-08-03 RX ORDER — NIVOLUMAB 10 MG/ML
480 INJECTION INTRAVENOUS ONCE
Refills: 0 | Status: COMPLETED | OUTPATIENT
Start: 2021-08-03 | End: 2021-08-03

## 2021-08-03 RX ADMIN — NIVOLUMAB 196 MILLIGRAM(S): 10 INJECTION INTRAVENOUS at 15:48

## 2021-08-03 RX ADMIN — Medication 26 UNIT(S): at 16:33

## 2021-08-03 RX ADMIN — Medication 650 MILLIGRAM(S): at 17:45

## 2021-08-03 RX ADMIN — Medication 2: at 11:32

## 2021-08-03 RX ADMIN — Medication 1 LOZENGE: at 00:49

## 2021-08-03 RX ADMIN — Medication 1 LOZENGE: at 21:52

## 2021-08-03 RX ADMIN — PHENYLEPHRINE-SHARK LIVER OIL-MINERAL OIL-PETROLATUM RECTAL OINTMENT 1 APPLICATION(S): at 05:39

## 2021-08-03 RX ADMIN — TAMSULOSIN HYDROCHLORIDE 0.4 MILLIGRAM(S): 0.4 CAPSULE ORAL at 21:53

## 2021-08-03 RX ADMIN — FINASTERIDE 5 MILLIGRAM(S): 5 TABLET, FILM COATED ORAL at 21:53

## 2021-08-03 RX ADMIN — Medication 81 MILLIGRAM(S): at 11:32

## 2021-08-03 RX ADMIN — Medication 1: at 07:32

## 2021-08-03 RX ADMIN — SIMETHICONE 80 MILLIGRAM(S): 80 TABLET, CHEWABLE ORAL at 05:39

## 2021-08-03 RX ADMIN — SIMETHICONE 80 MILLIGRAM(S): 80 TABLET, CHEWABLE ORAL at 21:53

## 2021-08-03 RX ADMIN — Medication 26 UNIT(S): at 07:32

## 2021-08-03 RX ADMIN — Medication 5 MILLIGRAM(S): at 21:53

## 2021-08-03 RX ADMIN — QUETIAPINE FUMARATE 25 MILLIGRAM(S): 200 TABLET, FILM COATED ORAL at 21:53

## 2021-08-03 RX ADMIN — Medication 3: at 16:33

## 2021-08-03 RX ADMIN — Medication 650 MILLIGRAM(S): at 20:07

## 2021-08-03 RX ADMIN — Medication 300 MILLIGRAM(S): at 11:33

## 2021-08-03 RX ADMIN — Medication 26 UNIT(S): at 11:31

## 2021-08-03 RX ADMIN — Medication 4 MILLIGRAM(S): at 05:39

## 2021-08-03 RX ADMIN — INSULIN GLARGINE 58 UNIT(S): 100 INJECTION, SOLUTION SUBCUTANEOUS at 21:53

## 2021-08-03 RX ADMIN — SIMETHICONE 80 MILLIGRAM(S): 80 TABLET, CHEWABLE ORAL at 12:30

## 2021-08-03 RX ADMIN — Medication 2: at 21:55

## 2021-08-03 RX ADMIN — PANTOPRAZOLE SODIUM 40 MILLIGRAM(S): 20 TABLET, DELAYED RELEASE ORAL at 05:39

## 2021-08-03 NOTE — PROGRESS NOTE ADULT - PROBLEM SELECTOR PLAN 1
Expect blood sugars to continue trending down 2/2 steroid taper.  Will continue current insulin regimen for now. Will continue monitoring FS and FU, will adjust insulin dose as steroids are tapered/dc.  Patient previously on Janumet, he has large insulin requirement at this time due to high-dose steroids. Effects of steroids can linger several weeks, patient would benefit from insulin as outpatient and is agreeable.  Suggest DC home on current basal bolus insulin regimen, endo FU 2 weeks.  Discussed plan with patient and family at bedside. Counseled that blood sugars will start trending down as steroids are tapered/dc. Insulin dose will have to be adjusted based on blood sugars.   Counseled for compliance with consistent low-carb diet.

## 2021-08-03 NOTE — PROGRESS NOTE ADULT - SUBJECTIVE AND OBJECTIVE BOX
Follow-up Pulm Progress Note    No new respiratory events overnight.  Denies SOB/CP.     Medications:  MEDICATIONS  (STANDING):  allopurinol 300 milliGRAM(s) Oral daily  aspirin  chewable 81 milliGRAM(s) Oral daily  clotrimazole Lozenge 1 Lozenge Oral five times a day  dexAMETHasone     Tablet 4 milliGRAM(s) Oral daily  dextrose 40% Gel 15 Gram(s) Oral once  dextrose 5%. 1000 milliLiter(s) (50 mL/Hr) IV Continuous <Continuous>  dextrose 5%. 1000 milliLiter(s) (100 mL/Hr) IV Continuous <Continuous>  dextrose 50% Injectable 25 Gram(s) IV Push once  dextrose 50% Injectable 12.5 Gram(s) IV Push once  dextrose 50% Injectable 25 Gram(s) IV Push once  finasteride 5 milliGRAM(s) Oral daily  glucagon  Injectable 1 milliGRAM(s) IntraMuscular once  insulin glargine Injectable (LANTUS) 58 Unit(s) SubCutaneous at bedtime  insulin lispro (ADMELOG) corrective regimen sliding scale   SubCutaneous three times a day before meals  insulin lispro (ADMELOG) corrective regimen sliding scale   SubCutaneous at bedtime  insulin lispro Injectable (ADMELOG) 26 Unit(s) SubCutaneous three times a day before meals  melatonin 5 milliGRAM(s) Oral at bedtime  pantoprazole   Suspension 40 milliGRAM(s) Oral daily  QUEtiapine 25 milliGRAM(s) Oral at bedtime  senna 2 Tablet(s) Oral at bedtime  simethicone 80 milliGRAM(s) Chew three times a day  tamsulosin 0.4 milliGRAM(s) Oral at bedtime    MEDICATIONS  (PRN):  acetaminophen   Tablet .. 650 milliGRAM(s) Oral every 6 hours PRN Temp greater or equal to 38C (100.4F), Moderate Pain (4 - 6)  bisacodyl 5 milliGRAM(s) Oral every 12 hours PRN Constipation  polyethylene glycol 3350 17 Gram(s) Oral daily PRN Constipation          Vital Signs Last 24 Hrs  T(C): 36.6 (03 Aug 2021 04:14), Max: 36.8 (02 Aug 2021 20:45)  T(F): 97.8 (03 Aug 2021 04:14), Max: 98.3 (02 Aug 2021 20:45)  HR: 73 (03 Aug 2021 04:14) (73 - 91)  BP: 117/76 (03 Aug 2021 04:14) (108/70 - 134/88)  BP(mean): --  RR: 18 (03 Aug 2021 04:14) (18 - 18)  SpO2: 99% (03 Aug 2021 04:14) (98% - 100%)          08-02 @ 07:01  -  08-03 @ 07:00  --------------------------------------------------------  IN: 1195 mL / OUT: 1350 mL / NET: -155 mL          LABS:                        12.5   2.75  )-----------( 181      ( 03 Aug 2021 06:09 )             38.2     08-03    129<L>  |  94<L>  |  34<H>  ----------------------------<  204<H>  4.2   |  21<L>  |  0.55    Ca    9.5      03 Aug 2021 06:09            CAPILLARY BLOOD GLUCOSE      POCT Blood Glucose.: 185 mg/dL (03 Aug 2021 07:24)            Physical Examination:  PULM: Clear to auscultation bilaterally, no significant sputum production  CVS: S1, S2 heard    RADIOLOGY REVIEWED  CT chest: < from: CT Chest w/ IV Cont (07.19.21 @ 17:31) >  FINDINGS:  CHEST:  LUNGS AND LARGE AIRWAYS: Patent central airways. There is a 4 mm groundglass nodule (series 4, image 171). There is an unchanged 1 cm cystic nodule (series 4, image 223). Interval decrease in right lower lobe nodule at the costophrenic angle, now measuring 1.3 x 0.8 cm (series 4, image 338). Other prior mentioned solid and groundglass nodules are resolved. Mild bilateral subsegmental atelectasis.  PLEURA: No pleural effusion.  VESSELS: Coronary artery calcifications.  HEART: Heart size is normal. No pericardial effusion.  MEDIASTINUM AND SARTHAK: No lymphadenopathy.  CHEST WALL AND LOWER NECK: Within normal limits.    < end of copied text >

## 2021-08-03 NOTE — PROGRESS NOTE ADULT - SUBJECTIVE AND OBJECTIVE BOX
Subjective: Patient seen and examined. No new events except as noted.   no cp or sob      REVIEW OF SYSTEMS:    CONSTITUTIONAL:+ weakness, fevers or chills  EYES/ENT: No visual changes;  No vertigo or throat pain   NECK: No pain or stiffness  RESPIRATORY: No cough, wheezing, hemoptysis; No shortness of breath  CARDIOVASCULAR: No chest pain or palpitations  GASTROINTESTINAL: No abdominal or epigastric pain. No nausea, vomiting, or hematemesis; No diarrhea or constipation. No melena or hematochezia.  GENITOURINARY: No dysuria, frequency or hematuria  NEUROLOGICAL: No numbness or weakness  SKIN: No itching, burning, rashes, or lesions   All other review of systems is negative unless indicated above.    MEDICATIONS:  MEDICATIONS  (STANDING):  allopurinol 300 milliGRAM(s) Oral daily  aspirin  chewable 81 milliGRAM(s) Oral daily  clotrimazole Lozenge 1 Lozenge Oral five times a day  dexAMETHasone     Tablet 4 milliGRAM(s) Oral daily  dextrose 40% Gel 15 Gram(s) Oral once  dextrose 5%. 1000 milliLiter(s) (50 mL/Hr) IV Continuous <Continuous>  dextrose 5%. 1000 milliLiter(s) (100 mL/Hr) IV Continuous <Continuous>  dextrose 50% Injectable 25 Gram(s) IV Push once  dextrose 50% Injectable 12.5 Gram(s) IV Push once  dextrose 50% Injectable 25 Gram(s) IV Push once  finasteride 5 milliGRAM(s) Oral daily  glucagon  Injectable 1 milliGRAM(s) IntraMuscular once  insulin glargine Injectable (LANTUS) 58 Unit(s) SubCutaneous at bedtime  insulin lispro (ADMELOG) corrective regimen sliding scale   SubCutaneous three times a day before meals  insulin lispro (ADMELOG) corrective regimen sliding scale   SubCutaneous at bedtime  insulin lispro Injectable (ADMELOG) 26 Unit(s) SubCutaneous three times a day before meals  melatonin 5 milliGRAM(s) Oral at bedtime  pantoprazole   Suspension 40 milliGRAM(s) Oral daily  QUEtiapine 25 milliGRAM(s) Oral at bedtime  senna 2 Tablet(s) Oral at bedtime  simethicone 80 milliGRAM(s) Chew three times a day  tamsulosin 0.4 milliGRAM(s) Oral at bedtime      PHYSICAL EXAM:  T(C): 36.6 (08-03-21 @ 04:14), Max: 36.8 (08-02-21 @ 20:45)  HR: 73 (08-03-21 @ 04:14) (73 - 91)  BP: 117/76 (08-03-21 @ 04:14) (108/70 - 134/88)  RR: 18 (08-03-21 @ 04:14) (18 - 18)  SpO2: 99% (08-03-21 @ 04:14) (98% - 100%)  Wt(kg): --  I&O's Summary    02 Aug 2021 07:01  -  03 Aug 2021 07:00  --------------------------------------------------------  IN: 1195 mL / OUT: 1350 mL / NET: -155 mL    03 Aug 2021 07:01  -  03 Aug 2021 09:37  --------------------------------------------------------  IN: 240 mL / OUT: 0 mL / NET: 240 mL      Height (cm): 193 (08-03 @ 08:52)  Weight (kg): 91.9 (08-03 @ 08:52)  BMI (kg/m2): 24.7 (08-03 @ 08:52)  BSA (m2): 2.23 (08-03 @ 08:52)    Appearance: Normal	  HEENT:   Normal oral mucosa, PERRL, EOMI	  Lymphatic: No lymphadenopathy , no edema  Cardiovascular: Normal S1 S2, No JVD, No murmurs , Peripheral pulses palpable 2+ bilaterally  Respiratory: Lungs clear to auscultation, normal effort 	  Gastrointestinal:  Soft, Non-tender, + BS	  Skin: No rashes, No ecchymoses, No cyanosis, warm to touch  Musculoskeletal: Normal range of motion, normal strength  Psychiatry:  Mood & affect appropriate  Ext: No edema      LABS:    CARDIAC MARKERS:                                12.5   2.75  )-----------( 181      ( 03 Aug 2021 06:09 )             38.2     08-03    129<L>  |  94<L>  |  34<H>  ----------------------------<  204<H>  4.2   |  21<L>  |  0.55    Ca    9.5      03 Aug 2021 06:09      proBNP:   Lipid Profile:   HgA1c:   TSH:             TELEMETRY: 	 SR   ECG:  	  RADIOLOGY:   DIAGNOSTIC TESTING:  [ ] Echocardiogram:  [ ]  Catheterization:  [ ] Stress Test:    OTHER:

## 2021-08-03 NOTE — PROGRESS NOTE ADULT - ASSESSMENT
72 y/o M w/ PMHx of CVA this past August and got TPA per family member, DM, BPH with obstruction s/p escamilla catheter, s/p ERCP w Sphincterectomy recent admission to [facility] for sepsis  Hodgkin lymphoma was not offered any chemo and was placed on hospice.     now presenting with weakness and FTT.   pt denies cp / SOB / palpitations   no focal neuro complaints .. at baseline RLE weakness   no N/V   had one episode of diarrhea   no urinary sx  abdominal pain, diffuse, but worse on R side.   Afib RVR overnight. Was asymptomatic   no known history of this as per patient

## 2021-08-03 NOTE — PROGRESS NOTE ADULT - SUBJECTIVE AND OBJECTIVE BOX
Date of service: 08-03-21 @ 23:54      Patient is a 71y old  Male who presents with a chief complaint of hodgkins lymphoma (24 Jul 2021 10:41)                                                               INTERVAL HPI/OVERNIGHT EVENTS:    REVIEW OF SYSTEMS:     CONSTITUTIONAL: No weakness, fevers or chills  RESPIRATORY: No cough, wheezing,  No shortness of breath  CARDIOVASCULAR: No chest pain or palpitations  GASTROINTESTINAL: No abdominal pain  . No nausea, vomiting, or hematemesis; No diarrhea or constipation. No melena or hematochezia.  GENITOURINARY: No dysuria, frequency or hematuria  NEUROLOGICAL: No numbness or weakness                                                                                                                                                                                                                                                                                Medications:  MEDICATIONS  (STANDING):  allopurinol 300 milliGRAM(s) Oral daily  aspirin  chewable 81 milliGRAM(s) Oral daily  clotrimazole Lozenge 1 Lozenge Oral five times a day  dexAMETHasone     Tablet 4 milliGRAM(s) Oral daily  dextrose 40% Gel 15 Gram(s) Oral once  dextrose 5%. 1000 milliLiter(s) (50 mL/Hr) IV Continuous <Continuous>  dextrose 5%. 1000 milliLiter(s) (100 mL/Hr) IV Continuous <Continuous>  dextrose 50% Injectable 25 Gram(s) IV Push once  dextrose 50% Injectable 12.5 Gram(s) IV Push once  dextrose 50% Injectable 25 Gram(s) IV Push once  finasteride 5 milliGRAM(s) Oral daily  glucagon  Injectable 1 milliGRAM(s) IntraMuscular once  insulin glargine Injectable (LANTUS) 58 Unit(s) SubCutaneous at bedtime  insulin lispro (ADMELOG) corrective regimen sliding scale   SubCutaneous three times a day before meals  insulin lispro (ADMELOG) corrective regimen sliding scale   SubCutaneous at bedtime  insulin lispro Injectable (ADMELOG) 26 Unit(s) SubCutaneous three times a day before meals  melatonin 5 milliGRAM(s) Oral at bedtime  pantoprazole   Suspension 40 milliGRAM(s) Oral daily  QUEtiapine 25 milliGRAM(s) Oral at bedtime  senna 2 Tablet(s) Oral at bedtime  simethicone 80 milliGRAM(s) Chew three times a day  tamsulosin 0.4 milliGRAM(s) Oral at bedtime    MEDICATIONS  (PRN):  acetaminophen   Tablet .. 650 milliGRAM(s) Oral every 6 hours PRN Temp greater or equal to 38C (100.4F), Moderate Pain (4 - 6)  bisacodyl 5 milliGRAM(s) Oral every 12 hours PRN Constipation  polyethylene glycol 3350 17 Gram(s) Oral daily PRN Constipation       Allergies    No Known Allergies    Intolerances      Vital Signs Last 24 Hrs  T(C): 36.7 (03 Aug 2021 19:41), Max: 36.8 (03 Aug 2021 00:07)  T(F): 98.1 (03 Aug 2021 19:41), Max: 98.2 (03 Aug 2021 00:07)  HR: 118 (03 Aug 2021 19:41) (73 - 118)  BP: 122/77 (03 Aug 2021 19:41) (108/70 - 152/95)  BP(mean): --  RR: 18 (03 Aug 2021 19:41) (18 - 18)  SpO2: 99% (03 Aug 2021 19:41) (98% - 100%)  CAPILLARY BLOOD GLUCOSE      POCT Blood Glucose.: 257 mg/dL (03 Aug 2021 21:12)  POCT Blood Glucose.: 279 mg/dL (03 Aug 2021 16:25)  POCT Blood Glucose.: 214 mg/dL (03 Aug 2021 11:25)  POCT Blood Glucose.: 195 mg/dL (03 Aug 2021 11:20)  POCT Blood Glucose.: 482 mg/dL (03 Aug 2021 11:18)  POCT Blood Glucose.: 185 mg/dL (03 Aug 2021 07:24)      08-02 @ 07:01 - 08-03 @ 07:00  --------------------------------------------------------  IN: 1195 mL / OUT: 1350 mL / NET: -155 mL    08-03 @ 07:01  -  08-03 @ 23:54  --------------------------------------------------------  IN: 818 mL / OUT: 2350 mL / NET: -1532 mL      Physical Exam:    Daily Height in cm: 193.04 (03 Aug 2021 08:52)    Daily   General:  Well appearing, NAD, not cachetic  HEENT:  Nonicteric, PERRLA  CV:  RRR, S1S2   Lungs:  CTA B/L, no wheezes, rales, rhonchi  Abdomen:  Soft, non-tender, no distended, positive BS  Extremities:  2+ pulses, no c/c, no edema  Skin:  Warm and dry, no rashes  :  No escamilla  Neuro:  AAOx3, non-focal, grossly intact                                                                                                                                                                                                                                                                                                LABS:                               12.5   2.75  )-----------( 181      ( 03 Aug 2021 06:09 )             38.2                      08-03    129<L>  |  94<L>  |  34<H>  ----------------------------<  204<H>  4.2   |  21<L>  |  0.55    Ca    9.5      03 Aug 2021 06:09                         RADIOLOGY & ADDITIONAL TESTS         I personally reviewed: [  ]EKG   [  ]CXR    [  ] CT      A/P:         Discussed with :     [facility] consultants' Notes   Time spent :

## 2021-08-03 NOTE — PROGRESS NOTE ADULT - ASSESSMENT
70 y/o M w/ PMHx of CVA this past August and got TPA per family member, DM, BPH with obstruction s/p escamilla catheter, s/p ERCP w Sphincterectomy recent admission to [facility] for sepsis  Hodgkin lymphoma was not offered any chemo and was placed on hospice.     now presenting with weakness and FTT.   pt denies cp / SOB / palpitations   no focal neuro complaints .. at baseline RLE weakness   no N/V   had one episode of diarrhea   no urinary sx  abdominal pain, diffuse, but worse on R side.     - Failure to thrive: cultures neg  fungitell repeated Nl   CT chest repeated : improving   nutrition consult   encourage PO intake: dysphagia 3 based on MBS results. Pt feeling overall improved .. re evaluated and now on reg diet     - lymphoma: d/w Dr. Larios: s/p immunotherapy ..   overall seemed to have responded somewhat to first dose    pt s/p 1st rx with opdivo 7/6. Planning for next treatment in days ahead.       - DM with hyperglycemia : improved now worse with steroids   fu with endo     - anemia: monitor H/H post transfusion, stable.     - Fever: doubt infectious etiology   likely due to immunotherapy/lymphoma. culture: neg   abx dced. s/p IVF, ID f/u appreciated.   elevated fungitell noted, T chest improved / d/w Dr muñiz , repeat levels:NL    low suspecion for fungal infection     - voice changes: CT neck : Asymmetric enlargement of the left palatine tonsil, suspicious for lymphomatous involvement given history. Correlate with direct visualization.  ENT had eval pt: no gross pathology on visualization   S/S eval: MBS done.    - Tachycardia: [facility] duplex neg for DVT   rate stable. noted 7 beats NSVT.  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 Smith and MRI department :  MR brain, cervical thoracic spine : non contrast done  and now awaiting with con... being expedited   cont steroids : will cont taper  fu LP cytology   appreciate neuro onc input   planned chemo early next week     - Afib:brief    now in sinus : monitor   appreciate cardio input   no AC at this time and no AVNB at this time     - urinary retention: cont escamilla .. failed TOV   can consider repeat TOV in rehab       DVT ppx

## 2021-08-03 NOTE — PROGRESS NOTE ADULT - NUTRITIONAL ASSESSMENT
This patient has been assessed with a concern for Malnutrition and has been determined to have a diagnosis/diagnoses of Severe protein-calorie malnutrition.    This patient is being managed with:   Diet Dysphagia 3 Soft-Nectar Consistency Fluid-  Consistent Carbohydrate {No Snacks} (CSTCHO)  Supplement Feeding Modality:  Oral  Ensure Enlive Servings Per Day:  3       Frequency:  Daily  Entered: Jul 10 2021  4:41PM    
This patient has been assessed with a concern for Malnutrition and has been determined to have a diagnosis/diagnoses of Severe protein-calorie malnutrition.    This patient is being managed with:   Diet Dysphagia 3 Soft-Nectar Consistency Fluid-  Consistent Carbohydrate {No Snacks} (CSTCHO)  Supplement Feeding Modality:  Oral  Ensure Enlive Servings Per Day:  3       Frequency:  Daily  Entered: Jul 10 2021  4:41PM    
This patient has been assessed with a concern for Malnutrition and has been determined to have a diagnosis/diagnoses of Severe protein-calorie malnutrition.    This patient is being managed with:   Diet Dysphagia 1 Pureed-Nectar Consistency Fluid-  Consistent Carbohydrate {No Snacks} (CSTCHO)  Supplement Feeding Modality:  Oral  Ensure Enlive Servings Per Day:  3       Frequency:  Daily  Entered: Jul 1 2021 12:09PM    
This patient has been assessed with a concern for Malnutrition and has been determined to have a diagnosis/diagnoses of Severe protein-calorie malnutrition.    This patient is being managed with:   Diet Regular-  Entered: Jul 26 2021 12:08PM    
This patient has been assessed with a concern for Malnutrition and has been determined to have a diagnosis/diagnoses of Severe protein-calorie malnutrition.    This patient is being managed with:   Diet Dysphagia 1 Pureed-Nectar Consistency Fluid-  Consistent Carbohydrate {No Snacks} (CSTCHO)  Supplement Feeding Modality:  Oral  Ensure Enlive Servings Per Day:  3       Frequency:  Daily  Entered: Jul 1 2021 12:09PM    
This patient has been assessed with a concern for Malnutrition and has been determined to have a diagnosis/diagnoses of Severe protein-calorie malnutrition.    This patient is being managed with:   Diet Dysphagia 3 Soft-Nectar Consistency Fluid-  Consistent Carbohydrate {No Snacks} (CSTCHO)  Supplement Feeding Modality:  Oral  Ensure Enlive Servings Per Day:  3       Frequency:  Daily  Entered: Jul 10 2021  4:41PM    
This patient has been assessed with a concern for Malnutrition and has been determined to have a diagnosis/diagnoses of Severe protein-calorie malnutrition.    This patient is being managed with:   Diet Regular-  Supplement Feeding Modality:  Oral  Ensure Enlive Cans or Servings Per Day:  1       Frequency:  Daily  Entered: Jul 27 2021 10:33AM    
This patient has been assessed with a concern for Malnutrition and has been determined to have a diagnosis/diagnoses of Severe protein-calorie malnutrition.    This patient is being managed with:   Diet Regular-  Supplement Feeding Modality:  Oral  Ensure Enlive Cans or Servings Per Day:  1       Frequency:  Daily  Entered: Jul 27 2021 10:33AM    
This patient has been assessed with a concern for Malnutrition and has been determined to have a diagnosis/diagnoses of Severe protein-calorie malnutrition.    This patient is being managed with:   Diet Dysphagia 3 Soft-Nectar Consistency Fluid-  Consistent Carbohydrate {No Snacks} (CSTCHO)  Supplement Feeding Modality:  Oral  Ensure Enlive Servings Per Day:  3       Frequency:  Daily  Entered: Jul 10 2021  4:41PM    
This patient has been assessed with a concern for Malnutrition and has been determined to have a diagnosis/diagnoses of Severe protein-calorie malnutrition.    This patient is being managed with:   Diet Dysphagia 3 Soft-Nectar Consistency Fluid-  Consistent Carbohydrate {No Snacks} (CSTCHO)  Supplement Feeding Modality:  Oral  Ensure Enlive Servings Per Day:  3       Frequency:  Daily  Entered: Jul 10 2021  4:41PM    
This patient has been assessed with a concern for Malnutrition and has been determined to have a diagnosis/diagnoses of Severe protein-calorie malnutrition.    This patient is being managed with:   Diet Regular-  Supplement Feeding Modality:  Oral  Ensure Enlive Cans or Servings Per Day:  1       Frequency:  Daily  Entered: Jul 27 2021 10:33AM    
This patient has been assessed with a concern for Malnutrition and has been determined to have a diagnosis/diagnoses of Severe protein-calorie malnutrition.    This patient is being managed with:   Diet Dysphagia 1 Pureed-Nectar Consistency Fluid-  Consistent Carbohydrate {No Snacks} (CSTCHO)  Supplement Feeding Modality:  Oral  Ensure Enlive Servings Per Day:  3       Frequency:  Daily  Entered: Jul 1 2021 12:09PM    
This patient has been assessed with a concern for Malnutrition and has been determined to have a diagnosis/diagnoses of Severe protein-calorie malnutrition.    This patient is being managed with:   Diet Dysphagia 3 Soft-Nectar Consistency Fluid-  Consistent Carbohydrate {No Snacks} (CSTCHO)  Supplement Feeding Modality:  Oral  Ensure Enlive Servings Per Day:  3       Frequency:  Daily  Entered: Jul 10 2021  4:41PM    
This patient has been assessed with a concern for Malnutrition and has been determined to have a diagnosis/diagnoses of Severe protein-calorie malnutrition.    This patient is being managed with:   Diet Regular-  Supplement Feeding Modality:  Oral  Ensure Enlive Cans or Servings Per Day:  1       Frequency:  Daily  Entered: Jul 27 2021 10:33AM    
This patient has been assessed with a concern for Malnutrition and has been determined to have a diagnosis/diagnoses of Severe protein-calorie malnutrition.    This patient is being managed with:   Diet Dysphagia 1 Pureed-Nectar Consistency Fluid-  Consistent Carbohydrate {No Snacks} (CSTCHO)  Supplement Feeding Modality:  Oral  Ensure Enlive Servings Per Day:  3       Frequency:  Daily  Entered: Jul 1 2021 12:09PM    
This patient has been assessed with a concern for Malnutrition and has been determined to have a diagnosis/diagnoses of Severe protein-calorie malnutrition.    This patient is being managed with:   Diet Dysphagia 3 Soft-Nectar Consistency Fluid-  Consistent Carbohydrate {No Snacks} (CSTCHO)  Supplement Feeding Modality:  Oral  Ensure Enlive Servings Per Day:  3       Frequency:  Daily  Entered: Jul 10 2021  4:41PM    
This patient has been assessed with a concern for Malnutrition and has been determined to have a diagnosis/diagnoses of Severe protein-calorie malnutrition.    This patient is being managed with:   Diet Regular-  Supplement Feeding Modality:  Oral  Ensure Enlive Cans or Servings Per Day:  1       Frequency:  Daily  Entered: Jul 27 2021 10:33AM

## 2021-08-03 NOTE — ADVANCED PRACTICE NURSE CONSULT - ASSESSMENT
received pt in bed awake alert able to express his needs  Cycle #2 day 1/1. Lab results as per Md buzz aware of same. Vital signs stable prior to the start of chemotherapy, see sunrise.  Pt educated on the importance of saving urine, verbalize understanding of same.Pt education done re chemo regimen, drug effects and potential side effects, written material provided, pt states understanding. Pt reports the he has tolerated previous chemotherapy without side effects.Pt with piv #20 on rt arm  site free from signs and symptoms of infection, positive blood return obtained. Pt given novolumab 480mg iv x1 intiated at 1550 infuse over 30 min    received pt in bed awake alert able to express his needs  Cycle #2 day 1/1. Lab results as per Md buzz aware of same. Vital signs stable prior to the start of chemotherapy, see sunrise.  Pt educated on the importance of saving urine, verbalize understanding of same.Pt education done re chemo regimen, drug effects and potential side effects, written material provided, pt states understanding. Pt reports the he has tolerated previous chemotherapy without side effects.Pt with piv #20 on rt arm  site free from signs and symptoms of infection, positive blood return obtained. Pt given nivolumab 480mg iv x1 intiated at 1550 infuse over 30 min completed infusion at 1625 with no adverse effects pt resting in bed sister at the bedside post v/s stable report given to the area nurse

## 2021-08-03 NOTE — PROGRESS NOTE ADULT - PROBLEM SELECTOR PLAN 1
CT chest findings 6/28 with patchy GGO and scattered nodular opacities throughout all lobes of the lungs  -Possibly related to underlying lymphoma?  -Normoxic, no c/o dyspnea  -Repeat CT chest 7/19 with resolving GGO, only with 4 mm GGO nodule and unchanged 1 cm cystic nodule. Suggest repeat CT chest 1 month from prior CT. D/w pt.   -Repeat fungitell normal

## 2021-08-03 NOTE — PROGRESS NOTE ADULT - SUBJECTIVE AND OBJECTIVE BOX
LIZETT RIVERA  MRN-[id number]    Patient is a 71y old  Male who presents with a chief complaint of hodgkins lymphoma (24 Jul 2021 10:41)      REVIEW OF SYSTEMS    Patient feels well    General:	Denies fatigue, fevers, chills, sweats, decreased appetite.    Skin/Breast: denies pruritis, rash  	  Ophthalmologic: no change in vision or blurring  	  HEENT	Denies dry mouth, oral sores, dysphagia,  change in hearing.    Respiratory and Thorax:  cough, sob, wheeze, hemoptysis  	  Cardiovascular:	no cp , palp, orthopnea    Gastrointestinal:	no n/v/d constipation    Genitourinary:	no dysuria of frequency, no hematuria, no flank pain    Musculoskeletal:	no bone or joint pain. no muscle aches.     Neurological:	no change in sensory or motor function. no headache. no weakness.     Psychiatric:	no depression, no anxiety, insomnia.     Hematology/Lymphatics:	no bleeding or bruising        Current Meds  MEDICATIONS  (STANDING):  allopurinol 300 milliGRAM(s) Oral daily  aspirin  chewable 81 milliGRAM(s) Oral daily  clotrimazole Lozenge 1 Lozenge Oral five times a day  dexAMETHasone     Tablet 4 milliGRAM(s) Oral daily  dextrose 40% Gel 15 Gram(s) Oral once  dextrose 5%. 1000 milliLiter(s) (50 mL/Hr) IV Continuous <Continuous>  dextrose 5%. 1000 milliLiter(s) (100 mL/Hr) IV Continuous <Continuous>  dextrose 50% Injectable 25 Gram(s) IV Push once  dextrose 50% Injectable 12.5 Gram(s) IV Push once  dextrose 50% Injectable 25 Gram(s) IV Push once  finasteride 5 milliGRAM(s) Oral daily  glucagon  Injectable 1 milliGRAM(s) IntraMuscular once  insulin glargine Injectable (LANTUS) 58 Unit(s) SubCutaneous at bedtime  insulin lispro (ADMELOG) corrective regimen sliding scale   SubCutaneous three times a day before meals  insulin lispro (ADMELOG) corrective regimen sliding scale   SubCutaneous at bedtime  insulin lispro Injectable (ADMELOG) 26 Unit(s) SubCutaneous three times a day before meals  melatonin 5 milliGRAM(s) Oral at bedtime  pantoprazole   Suspension 40 milliGRAM(s) Oral daily  QUEtiapine 25 milliGRAM(s) Oral at bedtime  senna 2 Tablet(s) Oral at bedtime  simethicone 80 milliGRAM(s) Chew three times a day  tamsulosin 0.4 milliGRAM(s) Oral at bedtime    MEDICATIONS  (PRN):  acetaminophen   Tablet .. 650 milliGRAM(s) Oral every 6 hours PRN Temp greater or equal to 38C (100.4F), Moderate Pain (4 - 6)  bisacodyl 5 milliGRAM(s) Oral every 12 hours PRN Constipation  polyethylene glycol 3350 17 Gram(s) Oral daily PRN Constipation        Vital Signs Last 24 Hrs  T(C): 36.6 (03 Aug 2021 04:14), Max: 36.8 (02 Aug 2021 20:45)  T(F): 97.8 (03 Aug 2021 04:14), Max: 98.3 (02 Aug 2021 20:45)  HR: 73 (03 Aug 2021 04:14) (73 - 91)  BP: 117/76 (03 Aug 2021 04:14) (108/70 - 134/88)  BP(mean): --  RR: 18 (03 Aug 2021 04:14) (18 - 18)  SpO2: 99% (03 Aug 2021 04:14) (98% - 100%)      PHYSICAL EXAM:    Constitutional: NAD    Eyes: PERRLA EOMI, anicteric sclera    Heent :No oral sores, no pharyngeal injection. moist mucosa.    Neck: supple, no jvd, no LAD    Respiratory: CTA b/l     Cardiovascular: s1s2, no m/g/r    Gastrointestinal: soft, nt, nd, + BS    Extremities: no c/c/e    Neurological:A&O x 3 moves all ext.    Skin: no rash on exposed skin    Lymph Nodes: no lymphadenopathy.      Lab  CBC Full  -  ( 03 Aug 2021 06:09 )  WBC Count : 2.75 K/uL  RBC Count : 3.76 M/uL  Hemoglobin : 12.5 g/dL  Hematocrit : 38.2 %  Platelet Count - Automated : 181 K/uL  Mean Cell Volume : 101.6 fl  Mean Cell Hemoglobin : 33.2 pg  Mean Cell Hemoglobin Concentration : 32.7 gm/dL  Auto Neutrophil # : x  Auto Lymphocyte # : x  Auto Monocyte # : x  Auto Eosinophil # : x  Auto Basophil # : x  Auto Neutrophil % : x  Auto Lymphocyte % : x  Auto Monocyte % : x  Auto Eosinophil % : x  Auto Basophil % : x    08-03    129<L>  |  94<L>  |  34<H>  ----------------------------<  204<H>  4.2   |  21<L>  |  0.55    Ca    9.5      03 Aug 2021 06:09          Rad:    Assessment/Plan

## 2021-08-03 NOTE — PROGRESS NOTE ADULT - SUBJECTIVE AND OBJECTIVE BOX
Chief complaint  Patient is a 71y old  Male who presents with a chief complaint of hodgkins lymphoma (24 Jul 2021 10:41)   Review of systems  Patient in bed, looks comfortable, no hypoglycemic episodes.    Labs and Fingersticks  CAPILLARY BLOOD GLUCOSE      POCT Blood Glucose.: 214 mg/dL (03 Aug 2021 11:25)  POCT Blood Glucose.: 195 mg/dL (03 Aug 2021 11:20)  POCT Blood Glucose.: 482 mg/dL (03 Aug 2021 11:18)  POCT Blood Glucose.: 185 mg/dL (03 Aug 2021 07:24)  POCT Blood Glucose.: 355 mg/dL (02 Aug 2021 21:19)  POCT Blood Glucose.: 192 mg/dL (02 Aug 2021 16:09)      Anion Gap, Serum: 14 (08-03 @ 06:09)      Calcium, Total Serum: 9.5 (08-03 @ 06:09)          08-03    129<L>  |  94<L>  |  34<H>  ----------------------------<  204<H>  4.2   |  21<L>  |  0.55    Ca    9.5      03 Aug 2021 06:09                          12.5   2.75  )-----------( 181      ( 03 Aug 2021 06:09 )             38.2     Medications  MEDICATIONS  (STANDING):  allopurinol 300 milliGRAM(s) Oral daily  aspirin  chewable 81 milliGRAM(s) Oral daily  clotrimazole Lozenge 1 Lozenge Oral five times a day  dexAMETHasone     Tablet 4 milliGRAM(s) Oral daily  dextrose 40% Gel 15 Gram(s) Oral once  dextrose 5%. 1000 milliLiter(s) (50 mL/Hr) IV Continuous <Continuous>  dextrose 5%. 1000 milliLiter(s) (100 mL/Hr) IV Continuous <Continuous>  dextrose 50% Injectable 25 Gram(s) IV Push once  dextrose 50% Injectable 12.5 Gram(s) IV Push once  dextrose 50% Injectable 25 Gram(s) IV Push once  finasteride 5 milliGRAM(s) Oral daily  glucagon  Injectable 1 milliGRAM(s) IntraMuscular once  insulin glargine Injectable (LANTUS) 58 Unit(s) SubCutaneous at bedtime  insulin lispro (ADMELOG) corrective regimen sliding scale   SubCutaneous three times a day before meals  insulin lispro (ADMELOG) corrective regimen sliding scale   SubCutaneous at bedtime  insulin lispro Injectable (ADMELOG) 26 Unit(s) SubCutaneous three times a day before meals  melatonin 5 milliGRAM(s) Oral at bedtime  nivolumab IVPB (eMAR) 480 milliGRAM(s) IV Intermittent once  pantoprazole   Suspension 40 milliGRAM(s) Oral daily  QUEtiapine 25 milliGRAM(s) Oral at bedtime  senna 2 Tablet(s) Oral at bedtime  simethicone 80 milliGRAM(s) Chew three times a day  tamsulosin 0.4 milliGRAM(s) Oral at bedtime      Physical Exam  General: Patient comfortable in bed  Vital Signs Last 12 Hrs  T(F): 97.7 (08-03-21 @ 11:05), Max: 97.8 (08-03-21 @ 04:14)  HR: 82 (08-03-21 @ 11:05) (73 - 82)  BP: 152/95 (08-03-21 @ 11:05) (117/76 - 152/95)  BP(mean): --  RR: 18 (08-03-21 @ 11:05) (18 - 18)  SpO2: 98% (08-03-21 @ 11:05) (98% - 99%)  Neck: No palpable thyroid nodules.         Chief complaint  Patient is a 71y old  Male who presents with a chief complaint of hodgkins lymphoma (24 Jul 2021 10:41)   Review of systems  Patient in bed, looks comfortable, no hypoglycemic episodes.    Labs and Fingersticks  CAPILLARY BLOOD GLUCOSE      POCT Blood Glucose.: 214 mg/dL (03 Aug 2021 11:25)  POCT Blood Glucose.: 195 mg/dL (03 Aug 2021 11:20)  POCT Blood Glucose.: 482 mg/dL (03 Aug 2021 11:18)  POCT Blood Glucose.: 185 mg/dL (03 Aug 2021 07:24)  POCT Blood Glucose.: 355 mg/dL (02 Aug 2021 21:19)  POCT Blood Glucose.: 192 mg/dL (02 Aug 2021 16:09)      Anion Gap, Serum: 14 (08-03 @ 06:09)      Calcium, Total Serum: 9.5 (08-03 @ 06:09)          08-03    129<L>  |  94<L>  |  34<H>  ----------------------------<  204<H>  4.2   |  21<L>  |  0.55    Ca    9.5      03 Aug 2021 06:09                          12.5   2.75  )-----------( 181      ( 03 Aug 2021 06:09 )             38.2     Medications  MEDICATIONS  (STANDING):  allopurinol 300 milliGRAM(s) Oral daily  aspirin  chewable 81 milliGRAM(s) Oral daily  clotrimazole Lozenge 1 Lozenge Oral five times a day  dexAMETHasone     Tablet 4 milliGRAM(s) Oral daily  dextrose 40% Gel 15 Gram(s) Oral once  dextrose 5%. 1000 milliLiter(s) (50 mL/Hr) IV Continuous <Continuous>  dextrose 5%. 1000 milliLiter(s) (100 mL/Hr) IV Continuous <Continuous>  dextrose 50% Injectable 25 Gram(s) IV Push once  dextrose 50% Injectable 12.5 Gram(s) IV Push once  dextrose 50% Injectable 25 Gram(s) IV Push once  finasteride 5 milliGRAM(s) Oral daily  glucagon  Injectable 1 milliGRAM(s) IntraMuscular once  insulin glargine Injectable (LANTUS) 58 Unit(s) SubCutaneous at bedtime  insulin lispro (ADMELOG) corrective regimen sliding scale   SubCutaneous three times a day before meals  insulin lispro (ADMELOG) corrective regimen sliding scale   SubCutaneous at bedtime  insulin lispro Injectable (ADMELOG) 26 Unit(s) SubCutaneous three times a day before meals  melatonin 5 milliGRAM(s) Oral at bedtime  nivolumab IVPB (eMAR) 480 milliGRAM(s) IV Intermittent once  pantoprazole   Suspension 40 milliGRAM(s) Oral daily  QUEtiapine 25 milliGRAM(s) Oral at bedtime  senna 2 Tablet(s) Oral at bedtime  simethicone 80 milliGRAM(s) Chew three times a day  tamsulosin 0.4 milliGRAM(s) Oral at bedtime      Physical Exam  General: Patient comfortable in bed  Vital Signs Last 12 Hrs  T(F): 97.7 (08-03-21 @ 11:05), Max: 97.8 (08-03-21 @ 04:14)  HR: 82 (08-03-21 @ 11:05) (73 - 82)  BP: 152/95 (08-03-21 @ 11:05) (117/76 - 152/95)  BP(mean): --  RR: 18 (08-03-21 @ 11:05) (18 - 18)  SpO2: 98% (08-03-21 @ 11:05) (98% - 99%)  Neck: No palpable thyroid nodules.

## 2021-08-03 NOTE — PROGRESS NOTE ADULT - ASSESSMENT
1) Hodgkin's lymphoma  - repeat CT scans, results noted  -GOC- Family meeting held at bedside on 7/2. We discussed rx options vs comfort care. Family and patient wish to pursue rx. Ongoing conversations with family members have been taking place since our formal meeting.   - allopurinol  - pt s/p 1st rx with opdivo 7/6. Planning for next treatment in days ahead.   Case d/w administration.  Case also reviewed with Dr. John.  Case d/w Family.  Opdivo today, then hope to discharge to rehab    2)  Pancytopenia. Suspect multifactorial, due to hodgkins. Improving.   anemia- w/u this far unremarkable. possible aocd.  transfusional support as needed    3 thrush- on mycelex.

## 2021-08-03 NOTE — PROGRESS NOTE ADULT - ASSESSMENT
Assessment  DMT2: 71y Male with DM T2 with hyperglycemia, A1C 8.8%, was on oral meds/Janumet at home, admitted with weakness/fatigue and hyperglycemia, now on large-dose basal bolus insulin, decreased dose yesterday, blood sugars are fluctuating though overall trending down 2/2 steroid taper, no hypoglycemic episodes. Patient is eating meals, appears comfortable, dexamethasone taper in progress.  Lymphoma: on medications, monitored, FU Heme/Onc.  Anemia: stable, monitored.      Shahriar Kahn MD  Cell: [phone]  Office: [phone]     Assessment  DMT2: 71y Male with DM T2 with hyperglycemia, A1C 8.8%, was on oral meds/Janumet at home, admitted with weakness/fatigue and hyperglycemia, now on large-dose basal bolus insulin, decreased dose yesterday, blood  sugars are fluctuating though overall trending down 2/2 steroid taper, no hypoglycemic episodes. Patient is eating meals, appears comfortable, dexamethasone taper in progress.  Lymphoma: on medications, monitored, FU Heme/Onc.  Anemia: stable, monitored.      Shahriar Kahn MD  Cell: [phone]  Office: [phone]

## 2021-08-03 NOTE — PROGRESS NOTE ADULT - ASSESSMENT
70 y/o M with PMH of CVA, DM, BPH with obstruction s/p Sauceda catheter, s/p ERCP w Sphincteretomy, recent admission to [facility] for sepsis, Hodgkin lymphoma dx 2020 - was not offered any chemo and was placed on hospice, DVT 8/2020. Presenting with weakness and FTT. Pt and family opted for treatment of lymphoma - s/p Opdivo 7/6. Called to consult for CT chest findings 6/28 with patchy GGO and scattered nodular opacities throughout all lobes of the lungs. Currently breathing comfortably on RA.

## 2021-08-04 VITALS
OXYGEN SATURATION: 98 % | TEMPERATURE: 98 F | DIASTOLIC BLOOD PRESSURE: 82 MMHG | HEART RATE: 86 BPM | RESPIRATION RATE: 18 BRPM | SYSTOLIC BLOOD PRESSURE: 121 MMHG

## 2021-08-04 LAB
ANION GAP SERPL CALC-SCNC: 14 MMOL/L — SIGNIFICANT CHANGE UP (ref 5–17)
BUN SERPL-MCNC: 29 MG/DL — HIGH (ref 7–23)
CALCIUM SERPL-MCNC: 9.7 MG/DL — SIGNIFICANT CHANGE UP (ref 8.4–10.5)
CHLORIDE SERPL-SCNC: 96 MMOL/L — SIGNIFICANT CHANGE UP (ref 96–108)
CO2 SERPL-SCNC: 21 MMOL/L — LOW (ref 22–31)
CREAT SERPL-MCNC: 0.43 MG/DL — LOW (ref 0.5–1.3)
GLUCOSE BLDC GLUCOMTR-MCNC: 157 MG/DL — HIGH (ref 70–99)
GLUCOSE BLDC GLUCOMTR-MCNC: 270 MG/DL — HIGH (ref 70–99)
GLUCOSE BLDC GLUCOMTR-MCNC: 327 MG/DL — HIGH (ref 70–99)
GLUCOSE BLDC GLUCOMTR-MCNC: >600 MG/DL — CRITICAL HIGH (ref 70–99)
GLUCOSE SERPL-MCNC: 149 MG/DL — HIGH (ref 70–99)
HCT VFR BLD CALC: 38.9 % — LOW (ref 39–50)
HGB BLD-MCNC: 12.4 G/DL — LOW (ref 13–17)
MCHC RBC-ENTMCNC: 31.9 GM/DL — LOW (ref 32–36)
MCHC RBC-ENTMCNC: 32.9 PG — SIGNIFICANT CHANGE UP (ref 27–34)
MCV RBC AUTO: 103.2 FL — HIGH (ref 80–100)
NRBC # BLD: 0 /100 WBCS — SIGNIFICANT CHANGE UP (ref 0–0)
PLATELET # BLD AUTO: 183 K/UL — SIGNIFICANT CHANGE UP (ref 150–400)
POTASSIUM SERPL-MCNC: 4.1 MMOL/L — SIGNIFICANT CHANGE UP (ref 3.5–5.3)
POTASSIUM SERPL-SCNC: 4.1 MMOL/L — SIGNIFICANT CHANGE UP (ref 3.5–5.3)
RBC # BLD: 3.77 M/UL — LOW (ref 4.2–5.8)
RBC # FLD: 23.1 % — HIGH (ref 10.3–14.5)
SODIUM SERPL-SCNC: 131 MMOL/L — LOW (ref 135–145)
WBC # BLD: 2.9 K/UL — LOW (ref 3.8–10.5)
WBC # FLD AUTO: 2.9 K/UL — LOW (ref 3.8–10.5)

## 2021-08-04 PROCEDURE — 78472 GATED HEART PLANAR SINGLE: CPT

## 2021-08-04 PROCEDURE — A9585: CPT

## 2021-08-04 PROCEDURE — P9040: CPT

## 2021-08-04 PROCEDURE — 72158 MRI LUMBAR SPINE W/O & W/DYE: CPT

## 2021-08-04 PROCEDURE — 86803 HEPATITIS C AB TEST: CPT

## 2021-08-04 PROCEDURE — 85610 PROTHROMBIN TIME: CPT

## 2021-08-04 PROCEDURE — 70551 MRI BRAIN STEM W/O DYE: CPT

## 2021-08-04 PROCEDURE — 86850 RBC ANTIBODY SCREEN: CPT

## 2021-08-04 PROCEDURE — 36430 TRANSFUSION BLD/BLD COMPNT: CPT

## 2021-08-04 PROCEDURE — 70460 CT HEAD/BRAIN W/DYE: CPT

## 2021-08-04 PROCEDURE — 92611 MOTION FLUOROSCOPY/SWALLOW: CPT

## 2021-08-04 PROCEDURE — 84155 ASSAY OF PROTEIN SERUM: CPT

## 2021-08-04 PROCEDURE — 84443 ASSAY THYROID STIM HORMONE: CPT

## 2021-08-04 PROCEDURE — 80061 LIPID PANEL: CPT

## 2021-08-04 PROCEDURE — 86788 WEST NILE VIRUS AB IGM: CPT

## 2021-08-04 PROCEDURE — 70552 MRI BRAIN STEM W/DYE: CPT

## 2021-08-04 PROCEDURE — 89051 BODY FLUID CELL COUNT: CPT

## 2021-08-04 PROCEDURE — 87070 CULTURE OTHR SPECIMN AEROBIC: CPT

## 2021-08-04 PROCEDURE — 83036 HEMOGLOBIN GLYCOSYLATED A1C: CPT

## 2021-08-04 PROCEDURE — 82945 GLUCOSE OTHER FLUID: CPT

## 2021-08-04 PROCEDURE — 82553 CREATINE MB FRACTION: CPT

## 2021-08-04 PROCEDURE — 87483 CNS DNA AMP PROBE TYPE 12-25: CPT

## 2021-08-04 PROCEDURE — 86769 SARS-COV-2 COVID-19 ANTIBODY: CPT

## 2021-08-04 PROCEDURE — 81001 URINALYSIS AUTO W/SCOPE: CPT

## 2021-08-04 PROCEDURE — 82728 ASSAY OF FERRITIN: CPT

## 2021-08-04 PROCEDURE — 74177 CT ABD & PELVIS W/CONTRAST: CPT

## 2021-08-04 PROCEDURE — 86706 HEP B SURFACE ANTIBODY: CPT

## 2021-08-04 PROCEDURE — 87449 NOS EACH ORGANISM AG IA: CPT

## 2021-08-04 PROCEDURE — 88108 CYTOPATH CONCENTRATE TECH: CPT

## 2021-08-04 PROCEDURE — 82550 ASSAY OF CK (CPK): CPT

## 2021-08-04 PROCEDURE — 86403 PARTICLE AGGLUT ANTBDY SCRN: CPT

## 2021-08-04 PROCEDURE — 84145 PROCALCITONIN (PCT): CPT

## 2021-08-04 PROCEDURE — 86704 HEP B CORE ANTIBODY TOTAL: CPT

## 2021-08-04 PROCEDURE — 62328 DX LMBR SPI PNXR W/FLUOR/CT: CPT

## 2021-08-04 PROCEDURE — 84132 ASSAY OF SERUM POTASSIUM: CPT

## 2021-08-04 PROCEDURE — 87040 BLOOD CULTURE FOR BACTERIA: CPT

## 2021-08-04 PROCEDURE — 74230 X-RAY XM SWLNG FUNCJ C+: CPT

## 2021-08-04 PROCEDURE — 80074 ACUTE HEPATITIS PANEL: CPT

## 2021-08-04 PROCEDURE — 71045 X-RAY EXAM CHEST 1 VIEW: CPT

## 2021-08-04 PROCEDURE — 82607 VITAMIN B-12: CPT

## 2021-08-04 PROCEDURE — 96374 THER/PROPH/DIAG INJ IV PUSH: CPT

## 2021-08-04 PROCEDURE — A9560: CPT

## 2021-08-04 PROCEDURE — 82330 ASSAY OF CALCIUM: CPT

## 2021-08-04 PROCEDURE — 84484 ASSAY OF TROPONIN QUANT: CPT

## 2021-08-04 PROCEDURE — 81003 URINALYSIS AUTO W/O SCOPE: CPT

## 2021-08-04 PROCEDURE — 86682 HELMINTH ANTIBODY: CPT

## 2021-08-04 PROCEDURE — 87086 URINE CULTURE/COLONY COUNT: CPT

## 2021-08-04 PROCEDURE — 85018 HEMOGLOBIN: CPT

## 2021-08-04 PROCEDURE — 76700 US EXAM ABDOM COMPLETE: CPT

## 2021-08-04 PROCEDURE — 97110 THERAPEUTIC EXERCISES: CPT

## 2021-08-04 PROCEDURE — 83010 ASSAY OF HAPTOGLOBIN QUANT: CPT

## 2021-08-04 PROCEDURE — 83605 ASSAY OF LACTIC ACID: CPT

## 2021-08-04 PROCEDURE — 97530 THERAPEUTIC ACTIVITIES: CPT

## 2021-08-04 PROCEDURE — 87305 ASPERGILLUS AG IA: CPT

## 2021-08-04 PROCEDURE — 72142 MRI NECK SPINE W/DYE: CPT

## 2021-08-04 PROCEDURE — 93970 EXTREMITY STUDY: CPT

## 2021-08-04 PROCEDURE — 87640 STAPH A DNA AMP PROBE: CPT

## 2021-08-04 PROCEDURE — 85730 THROMBOPLASTIN TIME PARTIAL: CPT

## 2021-08-04 PROCEDURE — 86140 C-REACTIVE PROTEIN: CPT

## 2021-08-04 PROCEDURE — 93005 ELECTROCARDIOGRAM TRACING: CPT

## 2021-08-04 PROCEDURE — 83735 ASSAY OF MAGNESIUM: CPT

## 2021-08-04 PROCEDURE — 97161 PT EVAL LOW COMPLEX 20 MIN: CPT

## 2021-08-04 PROCEDURE — 92610 EVALUATE SWALLOWING FUNCTION: CPT

## 2021-08-04 PROCEDURE — 87517 HEPATITIS B DNA QUANT: CPT

## 2021-08-04 PROCEDURE — 86480 TB TEST CELL IMMUN MEASURE: CPT

## 2021-08-04 PROCEDURE — 86901 BLOOD TYPING SEROLOGIC RH(D): CPT

## 2021-08-04 PROCEDURE — 82435 ASSAY OF BLOOD CHLORIDE: CPT

## 2021-08-04 PROCEDURE — 86900 BLOOD TYPING SEROLOGIC ABO: CPT

## 2021-08-04 PROCEDURE — 86923 COMPATIBILITY TEST ELECTRIC: CPT

## 2021-08-04 PROCEDURE — 83550 IRON BINDING TEST: CPT

## 2021-08-04 PROCEDURE — 99285 EMERGENCY DEPT VISIT HI MDM: CPT | Mod: 25

## 2021-08-04 PROCEDURE — 92526 ORAL FUNCTION THERAPY: CPT

## 2021-08-04 PROCEDURE — 80053 COMPREHEN METABOLIC PANEL: CPT

## 2021-08-04 PROCEDURE — 97112 NEUROMUSCULAR REEDUCATION: CPT

## 2021-08-04 PROCEDURE — 86334 IMMUNOFIX E-PHORESIS SERUM: CPT

## 2021-08-04 PROCEDURE — 84100 ASSAY OF PHOSPHORUS: CPT

## 2021-08-04 PROCEDURE — 72141 MRI NECK SPINE W/O DYE: CPT

## 2021-08-04 PROCEDURE — 86592 SYPHILIS TEST NON-TREP QUAL: CPT

## 2021-08-04 PROCEDURE — 70491 CT SOFT TISSUE NECK W/DYE: CPT

## 2021-08-04 PROCEDURE — 71260 CT THORAX DX C+: CPT

## 2021-08-04 PROCEDURE — 85025 COMPLETE CBC W/AUTO DIFF WBC: CPT

## 2021-08-04 PROCEDURE — 72147 MRI CHEST SPINE W/DYE: CPT

## 2021-08-04 PROCEDURE — 93306 TTE W/DOPPLER COMPLETE: CPT

## 2021-08-04 PROCEDURE — 85014 HEMATOCRIT: CPT

## 2021-08-04 PROCEDURE — 84295 ASSAY OF SERUM SODIUM: CPT

## 2021-08-04 PROCEDURE — 83615 LACTATE (LD) (LDH) ENZYME: CPT

## 2021-08-04 PROCEDURE — 80048 BASIC METABOLIC PNL TOTAL CA: CPT

## 2021-08-04 PROCEDURE — U0003: CPT

## 2021-08-04 PROCEDURE — 0225U NFCT DS DNA&RNA 21 SARSCOV2: CPT

## 2021-08-04 PROCEDURE — 82272 OCCULT BLD FECES 1-3 TESTS: CPT

## 2021-08-04 PROCEDURE — 86789 WEST NILE VIRUS ANTIBODY: CPT

## 2021-08-04 PROCEDURE — 82746 ASSAY OF FOLIC ACID SERUM: CPT

## 2021-08-04 PROCEDURE — 87205 SMEAR GRAM STAIN: CPT

## 2021-08-04 PROCEDURE — 87116 MYCOBACTERIA CULTURE: CPT

## 2021-08-04 PROCEDURE — 84165 PROTEIN E-PHORESIS SERUM: CPT

## 2021-08-04 PROCEDURE — 84550 ASSAY OF BLOOD/URIC ACID: CPT

## 2021-08-04 PROCEDURE — 72146 MRI CHEST SPINE W/O DYE: CPT

## 2021-08-04 PROCEDURE — 87102 FUNGUS ISOLATION CULTURE: CPT

## 2021-08-04 PROCEDURE — 87641 MR-STAPH DNA AMP PROBE: CPT

## 2021-08-04 PROCEDURE — 85027 COMPLETE CBC AUTOMATED: CPT

## 2021-08-04 PROCEDURE — 84439 ASSAY OF FREE THYROXINE: CPT

## 2021-08-04 PROCEDURE — 83540 ASSAY OF IRON: CPT

## 2021-08-04 PROCEDURE — 82803 BLOOD GASES ANY COMBINATION: CPT

## 2021-08-04 PROCEDURE — 85652 RBC SED RATE AUTOMATED: CPT

## 2021-08-04 PROCEDURE — 82947 ASSAY GLUCOSE BLOOD QUANT: CPT

## 2021-08-04 PROCEDURE — U0005: CPT

## 2021-08-04 PROCEDURE — 84157 ASSAY OF PROTEIN OTHER: CPT

## 2021-08-04 PROCEDURE — 87529 HSV DNA AMP PROBE: CPT

## 2021-08-04 PROCEDURE — 82962 GLUCOSE BLOOD TEST: CPT

## 2021-08-04 RX ORDER — INSULIN GLARGINE 100 [IU]/ML
58 INJECTION, SOLUTION SUBCUTANEOUS
Qty: 0 | Refills: 0 | DISCHARGE
Start: 2021-08-04

## 2021-08-04 RX ORDER — INSULIN LISPRO 100/ML
20 VIAL (ML) SUBCUTANEOUS
Refills: 0 | Status: DISCONTINUED | OUTPATIENT
Start: 2021-08-04 | End: 2021-08-04

## 2021-08-04 RX ORDER — INSULIN GLARGINE 100 [IU]/ML
45 INJECTION, SOLUTION SUBCUTANEOUS
Qty: 0 | Refills: 0 | DISCHARGE
Start: 2021-08-04

## 2021-08-04 RX ORDER — INSULIN LISPRO 100/ML
15 VIAL (ML) SUBCUTANEOUS
Qty: 0 | Refills: 0 | DISCHARGE
Start: 2021-08-04

## 2021-08-04 RX ORDER — ALLOPURINOL 300 MG
1 TABLET ORAL
Qty: 0 | Refills: 0 | DISCHARGE
Start: 2021-08-04

## 2021-08-04 RX ORDER — CLOTRIMAZOLE 10 MG
1 TROCHE MUCOUS MEMBRANE
Qty: 0 | Refills: 0 | DISCHARGE
Start: 2021-08-04

## 2021-08-04 RX ORDER — INSULIN GLARGINE 100 [IU]/ML
50 INJECTION, SOLUTION SUBCUTANEOUS AT BEDTIME
Refills: 0 | Status: DISCONTINUED | OUTPATIENT
Start: 2021-08-04 | End: 2021-08-04

## 2021-08-04 RX ORDER — DEXAMETHASONE 0.5 MG/5ML
1 ELIXIR ORAL
Qty: 0 | Refills: 0 | DISCHARGE
Start: 2021-08-04

## 2021-08-04 RX ORDER — INSULIN LISPRO 100/ML
26 VIAL (ML) SUBCUTANEOUS
Qty: 0 | Refills: 0 | DISCHARGE
Start: 2021-08-04

## 2021-08-04 RX ADMIN — Medication 300 MILLIGRAM(S): at 11:29

## 2021-08-04 RX ADMIN — Medication 26 UNIT(S): at 07:34

## 2021-08-04 RX ADMIN — Medication 26 UNIT(S): at 11:50

## 2021-08-04 RX ADMIN — Medication 1: at 07:34

## 2021-08-04 RX ADMIN — PANTOPRAZOLE SODIUM 40 MILLIGRAM(S): 20 TABLET, DELAYED RELEASE ORAL at 05:33

## 2021-08-04 RX ADMIN — SIMETHICONE 80 MILLIGRAM(S): 80 TABLET, CHEWABLE ORAL at 05:33

## 2021-08-04 RX ADMIN — Medication 4: at 11:50

## 2021-08-04 RX ADMIN — Medication 4 MILLIGRAM(S): at 05:33

## 2021-08-04 RX ADMIN — Medication 3: at 16:26

## 2021-08-04 RX ADMIN — Medication 81 MILLIGRAM(S): at 11:28

## 2021-08-04 RX ADMIN — SIMETHICONE 80 MILLIGRAM(S): 80 TABLET, CHEWABLE ORAL at 10:29

## 2021-08-04 RX ADMIN — Medication 20 UNIT(S): at 16:25

## 2021-08-04 NOTE — PROGRESS NOTE ADULT - NSICDXPILOT_GEN_ALL_CORE
[place]
Bill[place]
D[place]
M[place]
Adrian
[place]
B[place]
Prov[place]
Br[place]
F[place]
S[place]
Moric[place]
Sh[place]
Zeig[place]
Na[place]

## 2021-08-04 NOTE — PROGRESS NOTE ADULT - SUBJECTIVE AND OBJECTIVE BOX
Follow-up Pulm Progress Note    No new respiratory events overnight.  Denies SOB/CP.     Medications:  MEDICATIONS  (STANDING):  allopurinol 300 milliGRAM(s) Oral daily  aspirin  chewable 81 milliGRAM(s) Oral daily  clotrimazole Lozenge 1 Lozenge Oral five times a day  dexAMETHasone     Tablet 4 milliGRAM(s) Oral daily  dextrose 40% Gel 15 Gram(s) Oral once  dextrose 5%. 1000 milliLiter(s) (50 mL/Hr) IV Continuous <Continuous>  dextrose 5%. 1000 milliLiter(s) (100 mL/Hr) IV Continuous <Continuous>  dextrose 50% Injectable 25 Gram(s) IV Push once  dextrose 50% Injectable 12.5 Gram(s) IV Push once  dextrose 50% Injectable 25 Gram(s) IV Push once  finasteride 5 milliGRAM(s) Oral daily  glucagon  Injectable 1 milliGRAM(s) IntraMuscular once  insulin glargine Injectable (LANTUS) 58 Unit(s) SubCutaneous at bedtime  insulin lispro (ADMELOG) corrective regimen sliding scale   SubCutaneous three times a day before meals  insulin lispro (ADMELOG) corrective regimen sliding scale   SubCutaneous at bedtime  insulin lispro Injectable (ADMELOG) 26 Unit(s) SubCutaneous three times a day before meals  melatonin 5 milliGRAM(s) Oral at bedtime  pantoprazole   Suspension 40 milliGRAM(s) Oral daily  QUEtiapine 25 milliGRAM(s) Oral at bedtime  senna 2 Tablet(s) Oral at bedtime  simethicone 80 milliGRAM(s) Chew three times a day  tamsulosin 0.4 milliGRAM(s) Oral at bedtime    MEDICATIONS  (PRN):  acetaminophen   Tablet .. 650 milliGRAM(s) Oral every 6 hours PRN Temp greater or equal to 38C (100.4F), Moderate Pain (4 - 6)  bisacodyl 5 milliGRAM(s) Oral every 12 hours PRN Constipation  polyethylene glycol 3350 17 Gram(s) Oral daily PRN Constipation          Vital Signs Last 24 Hrs  T(C): 36.6 (04 Aug 2021 10:53), Max: 36.8 (04 Aug 2021 04:13)  T(F): 97.8 (04 Aug 2021 10:53), Max: 98.3 (04 Aug 2021 04:13)  HR: 88 (04 Aug 2021 10:53) (84 - 118)  BP: 110/67 (04 Aug 2021 10:53) (110/67 - 149/94)  BP(mean): --  RR: 18 (04 Aug 2021 10:53) (18 - 18)  SpO2: 98% (04 Aug 2021 10:53) (98% - 100%)          08-03 @ 07:01  -  08-04 @ 07:00  --------------------------------------------------------  IN: 1118 mL / OUT: 3150 mL / NET: -2032 mL          LABS:                        12.4   2.90  )-----------( 183      ( 04 Aug 2021 06:04 )             38.9     08-04    131<L>  |  96  |  29<H>  ----------------------------<  149<H>  4.1   |  21<L>  |  0.43<L>    Ca    9.7      04 Aug 2021 06:04            CAPILLARY BLOOD GLUCOSE      POCT Blood Glucose.: 157 mg/dL (04 Aug 2021 07:12)          Physical Examination:  PULM: Clear to auscultation bilaterally, no significant sputum production  CVS: S1, S2 heard    RADIOLOGY REVIEWED  CT chest: < from: CT Chest w/ IV Cont (07.19.21 @ 17:31) >  FINDINGS:  CHEST:  LUNGS AND LARGE AIRWAYS: Patent central airways. There is a 4 mm groundglass nodule (series 4, image 171). There is an unchanged 1 cm cystic nodule (series 4, image 223). Interval decrease in right lower lobe nodule at the costophrenic angle, now measuring 1.3 x 0.8 cm (series 4, image 338). Other prior mentioned solid and groundglass nodules are resolved. Mild bilateral subsegmental atelectasis.  PLEURA: No pleural effusion.  VESSELS: Coronary artery calcifications.  HEART: Heart size is normal. No pericardial effusion.  MEDIASTINUM AND SARTHAK: No lymphadenopathy.  CHEST WALL AND LOWER NECK: Within normal limits.    < end of copied text >

## 2021-08-04 NOTE — PROGRESS NOTE ADULT - PROBLEM SELECTOR PLAN 2
- as per pt improved  - likely related to prior stroke  - Swallow study noted
Continue management per primary team heme/onc recommendations.
Oncology follow up.
-s/p Opdivo 7/6  -Rx per heme/onc.
Continue management per primary team heme/onc recommendations.
Oncology follow up.
-s/p Opdivo 7/6  -Management per heme/onc.
-s/p Opdivo 7/6 & 8/3  -Rx per heme/onc.
Continue management per primary team heme/onc recommendations.
Oncology follow up.
-s/p Opdivo 7/6  -Management per heme/onc.
-s/p Opdivo 7/6  -Rx per heme/onc.  -Plans for dose of Opdivo today
Continue management per primary team heme/onc recommendations.
Oncology follow up.
Oncology follow up.
-s/p Opdivo 7/6  -Management per heme/onc.
Continue management per primary team heme/onc recommendations.
Oncology follow up.
Continue management per primary team heme/onc recommendations.
Oncology follow up.
Oncology follow up.
-s/p Opdivo 7/6  -Management per heme/onc.
Continue management per primary team heme/onc recommendations.
Oncology follow up.
Continue management per primary team heme/onc recommendations.
Oncology follow up.
Continue management per primary team heme/onc recommendations.
Oncology follow up.
Oncology follow up.
continue steroids; if cytology/flow from CSF negative would favor tapering steroids
- as per pt improved  - awaiting swallow test to see if pt can get a regular diet
Continue management per primary team heme/onc recommendations.
Continue management per primary team heme/onc recommendations.
Oncology follow up.
Oncology follow up.

## 2021-08-04 NOTE — PROGRESS NOTE ADULT - PROBLEM SELECTOR PLAN 3
Continue monitoring H&H, transfuse prn, FU primary team.
Paul Saravia advised and verbalized understanding.
RISS.
Stop the medication   Keep the appointment with them next week  To er if worse
Continue monitoring H&H, transfuse prn, FU primary team.
RISS.
Continue monitoring H&H, transfuse prn, FU primary team.
RISS.
Continue monitoring H&H, transfuse prn, FU primary team.
RISS.
Continue monitoring H&H, transfuse prn, FU primary team.
RISS.
- PT notes he feels better despite counts being lower
Continue monitoring H&H, transfuse prn, FU primary team.
RISS.
transfuse prn
Continue monitoring H&H, transfuse prn, FU primary team.
RISS.
RISS.
Continue monitoring H&H, transfuse prn, FU primary team.
RISS.
Continue monitoring H&H, transfuse prn, FU primary team.
RISS.
RISS.
- as per pt, feels slightly better than yesterday
RISS.
RISS.

## 2021-08-04 NOTE — PROGRESS NOTE ADULT - SUBJECTIVE AND OBJECTIVE BOX
Chief complaint  Patient is a 71y old  Male who presents with a chief complaint of hodgkins lymphoma (24 Jul 2021 10:41)   Review of systems  Patient in bed, looks comfortable, no hypoglycemic episodes.    Labs and Fingersticks  CAPILLARY BLOOD GLUCOSE      POCT Blood Glucose.: 327 mg/dL (04 Aug 2021 11:29)  POCT Blood Glucose.: >600 mg/dL (04 Aug 2021 11:28)  POCT Blood Glucose.: 157 mg/dL (04 Aug 2021 07:12)  POCT Blood Glucose.: 257 mg/dL (03 Aug 2021 21:12)  POCT Blood Glucose.: 279 mg/dL (03 Aug 2021 16:25)      Anion Gap, Serum: 14 (08-04 @ 06:04)  Anion Gap, Serum: 14 (08-03 @ 06:09)      Calcium, Total Serum: 9.7 (08-04 @ 06:04)  Calcium, Total Serum: 9.5 (08-03 @ 06:09)          08-04    131<L>  |  96  |  29<H>  ----------------------------<  149<H>  4.1   |  21<L>  |  0.43<L>    Ca    9.7      04 Aug 2021 06:04                          12.4   2.90  )-----------( 183      ( 04 Aug 2021 06:04 )             38.9     Medications  MEDICATIONS  (STANDING):  allopurinol 300 milliGRAM(s) Oral daily  aspirin  chewable 81 milliGRAM(s) Oral daily  clotrimazole Lozenge 1 Lozenge Oral five times a day  dexAMETHasone     Tablet 4 milliGRAM(s) Oral daily  dextrose 40% Gel 15 Gram(s) Oral once  dextrose 5%. 1000 milliLiter(s) (50 mL/Hr) IV Continuous <Continuous>  dextrose 5%. 1000 milliLiter(s) (100 mL/Hr) IV Continuous <Continuous>  dextrose 50% Injectable 25 Gram(s) IV Push once  dextrose 50% Injectable 12.5 Gram(s) IV Push once  dextrose 50% Injectable 25 Gram(s) IV Push once  finasteride 5 milliGRAM(s) Oral daily  glucagon  Injectable 1 milliGRAM(s) IntraMuscular once  insulin glargine Injectable (LANTUS) 50 Unit(s) SubCutaneous at bedtime  insulin lispro (ADMELOG) corrective regimen sliding scale   SubCutaneous three times a day before meals  insulin lispro (ADMELOG) corrective regimen sliding scale   SubCutaneous at bedtime  insulin lispro Injectable (ADMELOG) 20 Unit(s) SubCutaneous three times a day before meals  melatonin 5 milliGRAM(s) Oral at bedtime  pantoprazole   Suspension 40 milliGRAM(s) Oral daily  QUEtiapine 25 milliGRAM(s) Oral at bedtime  senna 2 Tablet(s) Oral at bedtime  simethicone 80 milliGRAM(s) Chew three times a day  tamsulosin 0.4 milliGRAM(s) Oral at bedtime      Physical Exam  General: Patient comfortable in bed  Vital Signs Last 12 Hrs  T(F): 97.8 (08-04-21 @ 10:53), Max: 98.3 (08-04-21 @ 04:13)  HR: 88 (08-04-21 @ 10:53) (88 - 91)  BP: 110/67 (08-04-21 @ 10:53) (110/67 - 117/80)  BP(mean): --  RR: 18 (08-04-21 @ 10:53) (18 - 18)  SpO2: 98% (08-04-21 @ 10:53) (98% - 100%)  Neck: No palpable thyroid nodules.  CVS: S1S2, No murmurs  Respiratory: No wheezing, no crepitations  GI: Abdomen soft, bowel sounds positive  Musculoskeletal:  edema lower extremities.   Skin: No skin rashes, no ecchymosis    Diagnostics             Chief complaint  Patient is a 71y old  Male who presents with a chief complaint of hodgkins lymphoma (24 Jul 2021 10:41)   Review of systems  Patient in bed, looks comfortable, no hypoglycemic episodes.    Labs and Fingersticks  CAPILLARY BLOOD GLUCOSE    POCT Blood Glucose.: 327 mg/dL (04 Aug 2021 11:29)  POCT Blood Glucose.: >600 mg/dL (04 Aug 2021 11:28)  POCT Blood Glucose.: 157 mg/dL (04 Aug 2021 07:12)  POCT Blood Glucose.: 257 mg/dL (03 Aug 2021 21:12)  POCT Blood Glucose.: 279 mg/dL (03 Aug 2021 16:25)      Anion Gap, Serum: 14 (08-04 @ 06:04)  Anion Gap, Serum: 14 (08-03 @ 06:09)      Calcium, Total Serum: 9.7 (08-04 @ 06:04)  Calcium, Total Serum: 9.5 (08-03 @ 06:09)          08-04    131<L>  |  96  |  29<H>  ----------------------------<  149<H>  4.1   |  21<L>  |  0.43<L>    Ca    9.7      04 Aug 2021 06:04                          12.4   2.90  )-----------( 183      ( 04 Aug 2021 06:04 )             38.9     Medications  MEDICATIONS  (STANDING):  allopurinol 300 milliGRAM(s) Oral daily  aspirin  chewable 81 milliGRAM(s) Oral daily  clotrimazole Lozenge 1 Lozenge Oral five times a day  dexAMETHasone     Tablet 4 milliGRAM(s) Oral daily  dextrose 40% Gel 15 Gram(s) Oral once  dextrose 5%. 1000 milliLiter(s) (50 mL/Hr) IV Continuous <Continuous>  dextrose 5%. 1000 milliLiter(s) (100 mL/Hr) IV Continuous <Continuous>  dextrose 50% Injectable 25 Gram(s) IV Push once  dextrose 50% Injectable 12.5 Gram(s) IV Push once  dextrose 50% Injectable 25 Gram(s) IV Push once  finasteride 5 milliGRAM(s) Oral daily  glucagon  Injectable 1 milliGRAM(s) IntraMuscular once  insulin glargine Injectable (LANTUS) 50 Unit(s) SubCutaneous at bedtime  insulin lispro (ADMELOG) corrective regimen sliding scale   SubCutaneous three times a day before meals  insulin lispro (ADMELOG) corrective regimen sliding scale   SubCutaneous at bedtime  insulin lispro Injectable (ADMELOG) 20 Unit(s) SubCutaneous three times a day before meals  melatonin 5 milliGRAM(s) Oral at bedtime  pantoprazole   Suspension 40 milliGRAM(s) Oral daily  QUEtiapine 25 milliGRAM(s) Oral at bedtime  senna 2 Tablet(s) Oral at bedtime  simethicone 80 milliGRAM(s) Chew three times a day  tamsulosin 0.4 milliGRAM(s) Oral at bedtime      Physical Exam  General: Patient comfortable in bed  Vital Signs Last 12 Hrs  T(F): 97.8 (08-04-21 @ 10:53), Max: 98.3 (08-04-21 @ 04:13)  HR: 88 (08-04-21 @ 10:53) (88 - 91)  BP: 110/67 (08-04-21 @ 10:53) (110/67 - 117/80)  BP(mean): --  RR: 18 (08-04-21 @ 10:53) (18 - 18)  SpO2: 98% (08-04-21 @ 10:53) (98% - 100%)  Neck: No palpable thyroid nodules.  CVS: S1S2, No murmurs  Respiratory: No wheezing, no crepitations  GI: Abdomen soft, bowel sounds positive  Musculoskeletal:  edema lower extremities.   Skin: No skin rashes, no ecchymosis    Diagnostics

## 2021-08-04 NOTE — PROGRESS NOTE ADULT - PROVIDER SPECIALTY LIST ADULT
Endocrinology
Heme/Onc
Infectious Disease
Infectious Disease
Internal Medicine
Endocrinology
Endocrinology
Heme/Onc
Infectious Disease
Internal Medicine
Neurology
Endocrinology
Heme/Onc
Infectious Disease
Internal Medicine
Neurology
Neurology
Pulmonology
Cardiology
Cardiology
Endocrinology
Heme/Onc
Heme/Onc
Infectious Disease
Infectious Disease
Internal Medicine
Neurology
Neurology
Pulmonology
Endocrinology
Infectious Disease
Internal Medicine
Pulmonology
Cardiology
Endocrinology
Heme/Onc
Internal Medicine
Cardiology
Endocrinology
Endocrinology
Internal Medicine
Pulmonology
Cardiology
Endocrinology
Heme/Onc
Pulmonology
Endocrinology
Endocrinology
Heme/Onc
Pulmonology
Cardiology
Endocrinology
Pulmonology
Pulmonology
Cardiology
Endocrinology
Cardiology
Cardiology
Endocrinology
Cardiology
Endocrinology
Cardiology

## 2021-08-04 NOTE — PROGRESS NOTE ADULT - PROBLEM SELECTOR PROBLEM 1
Afib
Diabetes mellitus
Afib
Afib
Diabetes mellitus
Hodgkin lymphoma of lymph nodes of multiple regions, unspecified Hodgkin lymphoma type
Abnormal CT scan of lung
Diabetes mellitus
Abnormal CT scan of lung
Afib
Afib
Diabetes mellitus
Diabetes mellitus
Abnormal CT scan of lung
Afib
Diabetes mellitus
Hodgkin lymphoma of lymph nodes of multiple regions, unspecified Hodgkin lymphoma type
Hodgkin lymphoma of lymph nodes of multiple regions, unspecified Hodgkin lymphoma type
Abnormal CT scan of lung
Diabetes mellitus
Abnormal CT scan of lung
Afib
Afib
Diabetes mellitus
Abnormal CT scan of lung
Abnormal CT scan of lung
Afib
Diabetes mellitus
Diabetes mellitus
Afib
Diabetes mellitus
Hodgkin lymphoma of lymph nodes of multiple regions, unspecified Hodgkin lymphoma type
Diabetes mellitus
Afib

## 2021-08-04 NOTE — PROGRESS NOTE ADULT - PROBLEM SELECTOR PROBLEM 3
Anemia
Diabetes mellitus
Anemia
Anemia
Diabetes mellitus
Diabetes mellitus
Anemia
Diabetes mellitus
Weakness
Anemia
Anemia
Diabetes mellitus
Anemia
Diabetes mellitus
Anemia
Diabetes mellitus
Diabetes mellitus
Anemia
Diabetes mellitus
Diabetes mellitus
Weakness
Anemia
Diabetes mellitus
Diabetes mellitus
Anemia
Diabetes mellitus
Anemia
Diabetes mellitus
Diabetes mellitus
Anemia due to other cause
Diabetes mellitus

## 2021-08-04 NOTE — PROGRESS NOTE ADULT - PROBLEM SELECTOR PLAN 1
CT chest findings 6/28 with patchy GGO and scattered nodular opacities throughout all lobes of the lungs  -Possibly related to underlying lymphoma?  -Normoxic, no c/o dyspnea  -Repeat CT chest 7/19 with resolving GGO, only with 4 mm GGO nodule and unchanged 1 cm cystic nodule. Suggest repeat CT chest 1 month from prior CT. D/w pt.   -Repeat fungitell normal  -D/c planning per primary team

## 2021-08-04 NOTE — DISCHARGE NOTE NURSING/CASE MANAGEMENT/SOCIAL WORK - NSDCVIVACCINE_GEN_ALL_CORE_FT
COVID-19, mRNA, LNP-S, PF, 30 mcg/0.3 mL dose (Pfizer); 23-Jul-2021 11:06; Conrad Woodard (RN); Pf[employer] Inc; [id number] (Exp. Date: 31-Aug-2021); IntraMuscular; Deltoid Left.; 0.3 milliLiter(s);

## 2021-08-04 NOTE — CHART NOTE - NSCHARTNOTEFT_GEN_A_CORE
Pt without complaints, medically cleared for discharge home as per Dr. Alas. Medications reviewed with Dr. Larios, including daily decadron 4mg. Pt to start 2mg decadron and continue x 4 days then stop. Discussed plan with Dr. Randy manjarrez, who recommends decreasing doses of insulin to Lantus 45units and Premeal 15 units. Pt to follow up with Dr. Larios and Dr. Garsia.

## 2021-08-04 NOTE — PROGRESS NOTE ADULT - PROBLEM SELECTOR PLAN 1
Expect blood sugars to continue trending down 2/2 steroid taper.  Will decrease Lantus to 50u at bedtime.  Will decrease Admelog to 20u before each meal and continue Admelog correction scale coverage. Will continue monitoring FS and FU.  Patient previously on Janumet, he has large insulin requirement at this time due to high-dose steroids. Effects of steroids can linger several weeks, patient would benefit from insulin as outpatient and is agreeable.  Suggest DC home on current basal bolus insulin regimen, endo FU 2 weeks.  Discussed plan with patient and family at bedside. Counseled that blood sugars will start trending down as steroids are tapered/dc. Insulin dose will have to be adjusted based on blood sugars.   Counseled for compliance with consistent low-carb diet.

## 2021-08-04 NOTE — PROGRESS NOTE ADULT - TIME BILLING
Advanced care planning was discussed with patient and family.  Advanced care planning forms were reviewed and discussed as appropriate.  Differential diagnosis and plan of care discussed with patient after the evaluation.   Pain assessed and judicious use of narcotics when appropriate was discussed.  Importance of Fall prevention discussed.  Counseling on Smoking and Alcohol cessation was offered when appropriate.  Counseling on Diet, exercise, and medication compliance was done.
as above  d/w pt , cm   wife and np
pt , np , family, onc
pt , sister   np and onc
as abov
pt , np , onc
as above
pt  , n p , onc and cm
pt, np
Advanced care planning was discussed with patient and family.  Advanced care planning forms were reviewed and discussed as appropriate.  Differential diagnosis and plan of care discussed with patient after the evaluation.   Pain assessed and judicious use of narcotics when appropriate was discussed.  Importance of Fall prevention discussed.  Counseling on Smoking and Alcohol cessation was offered when appropriate.  Counseling on Diet, exercise, and medication compliance was done.
pt, family   NP , nurse and Onc /neuro onc
Advanced care planning was discussed with patient and family.  Advanced care planning forms were reviewed and discussed as appropriate.  Differential diagnosis and plan of care discussed with patient after the evaluation.   Pain assessed and judicious use of narcotics when appropriate was discussed.  Importance of Fall prevention discussed.  Counseling on Smoking and Alcohol cessation was offered when appropriate.  Counseling on Diet, exercise, and medication compliance was done.

## 2021-08-04 NOTE — PROGRESS NOTE ADULT - SUBJECTIVE AND OBJECTIVE BOX
LIZETT RIVERA  MRN-[id number]    Patient is a 71y old  Male who presents with a chief complaint of hodgkins lymphoma (24 Jul 2021 10:41)      Review of System  REVIEW OF SYSTEMS      General:	Denies fatigue, fevers, chills, sweats, decreased appetite.    Skin/Breast: denies pruritis, rash  	  Ophthalmologic: no change in vision or blurring  	  HEENT	Denies dry mouth, oral sores, dysphagia,  change in hearing.    Respiratory and Thorax:  cough, sob, wheeze, hemoptysis  	  Cardiovascular:	no cp , palp, orthopnea    Gastrointestinal:	no n/v/d constipation    Genitourinary:	no dysuria of frequency, no hematuria, no flank pain    Musculoskeletal:	no bone or joint pain. no muscle aches.     Neurological:	no change in sensory or motor function. no headache. no weakness.     Psychiatric:	no depression, no anxiety, insomnia.     Hematology/Lymphatics:	no bleeding or bruising        Current Meds  MEDICATIONS  (STANDING):  allopurinol 300 milliGRAM(s) Oral daily  aspirin  chewable 81 milliGRAM(s) Oral daily  clotrimazole Lozenge 1 Lozenge Oral five times a day  dexAMETHasone     Tablet 4 milliGRAM(s) Oral daily  dextrose 40% Gel 15 Gram(s) Oral once  dextrose 5%. 1000 milliLiter(s) (50 mL/Hr) IV Continuous <Continuous>  dextrose 5%. 1000 milliLiter(s) (100 mL/Hr) IV Continuous <Continuous>  dextrose 50% Injectable 25 Gram(s) IV Push once  dextrose 50% Injectable 12.5 Gram(s) IV Push once  dextrose 50% Injectable 25 Gram(s) IV Push once  finasteride 5 milliGRAM(s) Oral daily  glucagon  Injectable 1 milliGRAM(s) IntraMuscular once  insulin glargine Injectable (LANTUS) 58 Unit(s) SubCutaneous at bedtime  insulin lispro (ADMELOG) corrective regimen sliding scale   SubCutaneous three times a day before meals  insulin lispro (ADMELOG) corrective regimen sliding scale   SubCutaneous at bedtime  insulin lispro Injectable (ADMELOG) 26 Unit(s) SubCutaneous three times a day before meals  melatonin 5 milliGRAM(s) Oral at bedtime  pantoprazole   Suspension 40 milliGRAM(s) Oral daily  QUEtiapine 25 milliGRAM(s) Oral at bedtime  senna 2 Tablet(s) Oral at bedtime  simethicone 80 milliGRAM(s) Chew three times a day  tamsulosin 0.4 milliGRAM(s) Oral at bedtime    MEDICATIONS  (PRN):  acetaminophen   Tablet .. 650 milliGRAM(s) Oral every 6 hours PRN Temp greater or equal to 38C (100.4F), Moderate Pain (4 - 6)  bisacodyl 5 milliGRAM(s) Oral every 12 hours PRN Constipation  polyethylene glycol 3350 17 Gram(s) Oral daily PRN Constipation      Vitals  Vital Signs Last 24 Hrs  T(C): 36.6 (04 Aug 2021 10:53), Max: 36.8 (04 Aug 2021 04:13)  T(F): 97.8 (04 Aug 2021 10:53), Max: 98.3 (04 Aug 2021 04:13)  HR: 88 (04 Aug 2021 10:53) (84 - 118)  BP: 110/67 (04 Aug 2021 10:53) (110/67 - 149/94)  BP(mean): --  RR: 18 (04 Aug 2021 10:53) (18 - 18)  SpO2: 98% (04 Aug 2021 10:53) (98% - 100%)    Physical Exam  PHYSICAL EXAM:      Constitutional: NAD    Eyes: PERRLA EOMI, anicteric sclera    Heent :No oral sores, no pharyngeal injection. moist mucosa.    Neck: supple, no jvd, no LAD    Respiratory: CTA b/l     Cardiovascular: s1s2, no m/g/r    Gastrointestinal: soft, nt, nd, + BS    Extremities: no c/c/e    Neurological:A&O x 3 moves all ext.    Skin: no rash on exposed skin    Lymph Nodes: no lymphadenopathy.              Lab  CBC Full  -  ( 04 Aug 2021 06:04 )  WBC Count : 2.90 K/uL  RBC Count : 3.77 M/uL  Hemoglobin : 12.4 g/dL  Hematocrit : 38.9 %  Platelet Count - Automated : 183 K/uL  Mean Cell Volume : 103.2 fl  Mean Cell Hemoglobin : 32.9 pg  Mean Cell Hemoglobin Concentration : 31.9 gm/dL  Auto Neutrophil # : x  Auto Lymphocyte # : x  Auto Monocyte # : x  Auto Eosinophil # : x  Auto Basophil # : x  Auto Neutrophil % : x  Auto Lymphocyte % : x  Auto Monocyte % : x  Auto Eosinophil % : x  Auto Basophil % : x    08-04    131<L>  |  96  |  29<H>  ----------------------------<  149<H>  4.1   |  21<L>  |  0.43<L>    Ca    9.7      04 Aug 2021 06:04          Rad:    Assessment/Plan   LIZETT RIVERA  MRN-[id number]    Patient is a 71y old  Male who presents with a chief complaint of hodgkins lymphoma (24 Jul 2021 10:41)      Review of System    No new events    Current Meds  MEDICATIONS  (STANDING):  allopurinol 300 milliGRAM(s) Oral daily  aspirin  chewable 81 milliGRAM(s) Oral daily  clotrimazole Lozenge 1 Lozenge Oral five times a day  dexAMETHasone     Tablet 4 milliGRAM(s) Oral daily  dextrose 40% Gel 15 Gram(s) Oral once  dextrose 5%. 1000 milliLiter(s) (50 mL/Hr) IV Continuous <Continuous>  dextrose 5%. 1000 milliLiter(s) (100 mL/Hr) IV Continuous <Continuous>  dextrose 50% Injectable 25 Gram(s) IV Push once  dextrose 50% Injectable 12.5 Gram(s) IV Push once  dextrose 50% Injectable 25 Gram(s) IV Push once  finasteride 5 milliGRAM(s) Oral daily  glucagon  Injectable 1 milliGRAM(s) IntraMuscular once  insulin glargine Injectable (LANTUS) 58 Unit(s) SubCutaneous at bedtime  insulin lispro (ADMELOG) corrective regimen sliding scale   SubCutaneous three times a day before meals  insulin lispro (ADMELOG) corrective regimen sliding scale   SubCutaneous at bedtime  insulin lispro Injectable (ADMELOG) 26 Unit(s) SubCutaneous three times a day before meals  melatonin 5 milliGRAM(s) Oral at bedtime  pantoprazole   Suspension 40 milliGRAM(s) Oral daily  QUEtiapine 25 milliGRAM(s) Oral at bedtime  senna 2 Tablet(s) Oral at bedtime  simethicone 80 milliGRAM(s) Chew three times a day  tamsulosin 0.4 milliGRAM(s) Oral at bedtime    MEDICATIONS  (PRN):  acetaminophen   Tablet .. 650 milliGRAM(s) Oral every 6 hours PRN Temp greater or equal to 38C (100.4F), Moderate Pain (4 - 6)  bisacodyl 5 milliGRAM(s) Oral every 12 hours PRN Constipation  polyethylene glycol 3350 17 Gram(s) Oral daily PRN Constipation      Vitals  Vital Signs Last 24 Hrs  T(C): 36.6 (04 Aug 2021 10:53), Max: 36.8 (04 Aug 2021 04:13)  T(F): 97.8 (04 Aug 2021 10:53), Max: 98.3 (04 Aug 2021 04:13)  HR: 88 (04 Aug 2021 10:53) (84 - 118)  BP: 110/67 (04 Aug 2021 10:53) (110/67 - 149/94)  BP(mean): --  RR: 18 (04 Aug 2021 10:53) (18 - 18)  SpO2: 98% (04 Aug 2021 10:53) (98% - 100%)      PHYSICAL EXAM:       NAD      Lab  CBC Full  -  ( 04 Aug 2021 06:04 )  WBC Count : 2.90 K/uL  RBC Count : 3.77 M/uL  Hemoglobin : 12.4 g/dL  Hematocrit : 38.9 %  Platelet Count - Automated : 183 K/uL  Mean Cell Volume : 103.2 fl  Mean Cell Hemoglobin : 32.9 pg  Mean Cell Hemoglobin Concentration : 31.9 gm/dL  Auto Neutrophil # : x  Auto Lymphocyte # : x  Auto Monocyte # : x  Auto Eosinophil # : x  Auto Basophil # : x  Auto Neutrophil % : x  Auto Lymphocyte % : x  Auto Monocyte % : x  Auto Eosinophil % : x  Auto Basophil % : x    08-04    131<L>  |  96  |  29<H>  ----------------------------<  149<H>  4.1   |  21<L>  |  0.43<L>    Ca    9.7      04 Aug 2021 06:04          Rad:    Assessment/Plan

## 2021-08-04 NOTE — DISCHARGE NOTE NURSING/CASE MANAGEMENT/SOCIAL WORK - PATIENT PORTAL LINK FT
You can access the FollowMyHealth Patient Portal offered by [facility] by registering at the following website: [email or web address]. By joining [employer]’s FollowMyHealth portal, you will also be able to view your health information using other applications (apps) compatible with our system.

## 2021-08-04 NOTE — PROGRESS NOTE ADULT - PROBLEM SELECTOR PROBLEM 2
Hodgkin lymphoma of lymph nodes of multiple regions, unspecified Hodgkin lymphoma type
Lymphoma
Hodgkin lymphoma
Hodgkin lymphoma
Hodgkin lymphoma of lymph nodes of multiple regions, unspecified Hodgkin lymphoma type
Hodgkin lymphoma of lymph nodes of multiple regions, unspecified Hodgkin lymphoma type
Lymphoma
Hodgkin lymphoma
Hodgkin lymphoma of lymph nodes of multiple regions, unspecified Hodgkin lymphoma type
Lymphoma
Hodgkin lymphoma
Hodgkin lymphoma of lymph nodes of multiple regions, unspecified Hodgkin lymphoma type
Hodgkin lymphoma
Lymphoma
Hodgkin lymphoma of lymph nodes of multiple regions, unspecified Hodgkin lymphoma type
Lymphoma
Hoarseness
Hodgkin lymphoma
Hodgkin lymphoma
Lymphoma
Hoarseness
Hodgkin lymphoma
Hodgkin lymphoma
Hodgkin lymphoma of lymph nodes of multiple regions, unspecified Hodgkin lymphoma type
Hodgkin lymphoma of lymph nodes of multiple regions, unspecified Hodgkin lymphoma type
Lymphoma
Hodgkin lymphoma
Hodgkin lymphoma of lymph nodes of multiple regions, unspecified Hodgkin lymphoma type
Lymphoma
Weakness
Hodgkin lymphoma of lymph nodes of multiple regions, unspecified Hodgkin lymphoma type
Hodgkin lymphoma of lymph nodes of multiple regions, unspecified Hodgkin lymphoma type
Lymphoma
Hodgkin lymphoma of lymph nodes of multiple regions, unspecified Hodgkin lymphoma type
Hodgkin lymphoma of lymph nodes of multiple regions, unspecified Hodgkin lymphoma type
Lymphoma
Hodgkin lymphoma of lymph nodes of multiple regions, unspecified Hodgkin lymphoma type
Lymphoma
Hodgkin lymphoma of lymph nodes of multiple regions, unspecified Hodgkin lymphoma type

## 2021-08-04 NOTE — PROGRESS NOTE ADULT - ASSESSMENT
1) Hodgkin's lymphoma  - repeat CT scans, results noted  -GOC- Family meeting held at bedside on 7/2. We discussed rx options vs comfort care. Family and patient wish to pursue rx. Ongoing conversations with family members have been taking place since our formal meeting.   - allopurinol  - next opdivo in 4 weeks    2)  Pancytopenia. Suspect multifactorial, due to hodgkins. Improving.   anemia- w/u this far unremarkable. possible aocd.  transfusional support as needed    3 thrush- on mycelex.

## 2021-08-04 NOTE — PROGRESS NOTE ADULT - ASSESSMENT
70 y/o M w/ PMHx of CVA this past August and got TPA per family member, DM, BPH with obstruction s/p escamilla catheter, s/p ERCP w Sphincterectomy recent admission to [facility] for sepsis  Hodgkin lymphoma was not offered any chemo and was placed on hospice.     now presenting with weakness and FTT.   pt denies cp / SOB / palpitations   no focal neuro complaints .. at baseline RLE weakness   no N/V   had one episode of diarrhea   no urinary sx  abdominal pain, diffuse, but worse on R side.   Afib RVR overnight. Was asymptomatic   no known history of this as per patient

## 2021-08-04 NOTE — PROGRESS NOTE ADULT - ASSESSMENT
Assessment  DMT2: 71y Male with DM T2 with hyperglycemia, A1C 8.8%, was on oral meds/Janumet at home, admitted with weakness/fatigue and hyperglycemia, now on large-dose basal bolus insulin, decreased dose yesterday, blood sugars are fluctuating though overall trending down 2/2 steroid taper, no hypoglycemic episodes. Patient is eating meals, appears comfortable, planning DC on dexamethasone taper.  Lymphoma: on medications, monitored, FU Heme/Onc.  Anemia: stable, monitored.      Shahriar Kahn MD  Cell: [phone]  Office: [phone]     Assessment  DMT2: 71y Male with DM T2 with hyperglycemia, A1C 8.8%, was on oral meds/Janumet at home, admitted with weakness/fatigue and hyperglycemia, now on large-dose basal bolus insulin, decreased dose yesterday, blood sugars  are fluctuating though overall trending down 2/2 steroid taper, no hypoglycemic episodes. Patient is eating meals, appears comfortable, planning DC on dexamethasone taper.  Lymphoma: on medications, monitored, FU Heme/Onc.  Anemia: stable, monitored.      Shahriar Kahn MD  Cell: [phone]  Office: [phone]

## 2021-08-04 NOTE — PROGRESS NOTE ADULT - SUBJECTIVE AND OBJECTIVE BOX
Subjective: Patient seen and examined. No new events except as noted.     REVIEW OF SYSTEMS:    CONSTITUTIONAL: + weakness, fevers or chills  EYES/ENT: No visual changes;  No vertigo or throat pain   NECK: No pain or stiffness  RESPIRATORY: No cough, wheezing, hemoptysis; No shortness of breath  CARDIOVASCULAR: No chest pain or palpitations  GASTROINTESTINAL: No abdominal or epigastric pain. No nausea, vomiting, or hematemesis; No diarrhea or constipation. No melena or hematochezia.  GENITOURINARY: No dysuria, frequency or hematuria  NEUROLOGICAL: No numbness or weakness  SKIN: No itching, burning, rashes, or lesions   All other review of systems is negative unless indicated above.    MEDICATIONS:  MEDICATIONS  (STANDING):  allopurinol 300 milliGRAM(s) Oral daily  aspirin  chewable 81 milliGRAM(s) Oral daily  clotrimazole Lozenge 1 Lozenge Oral five times a day  dexAMETHasone     Tablet 4 milliGRAM(s) Oral daily  dextrose 40% Gel 15 Gram(s) Oral once  dextrose 5%. 1000 milliLiter(s) (50 mL/Hr) IV Continuous <Continuous>  dextrose 5%. 1000 milliLiter(s) (100 mL/Hr) IV Continuous <Continuous>  dextrose 50% Injectable 25 Gram(s) IV Push once  dextrose 50% Injectable 12.5 Gram(s) IV Push once  dextrose 50% Injectable 25 Gram(s) IV Push once  finasteride 5 milliGRAM(s) Oral daily  glucagon  Injectable 1 milliGRAM(s) IntraMuscular once  insulin glargine Injectable (LANTUS) 58 Unit(s) SubCutaneous at bedtime  insulin lispro (ADMELOG) corrective regimen sliding scale   SubCutaneous three times a day before meals  insulin lispro (ADMELOG) corrective regimen sliding scale   SubCutaneous at bedtime  insulin lispro Injectable (ADMELOG) 26 Unit(s) SubCutaneous three times a day before meals  melatonin 5 milliGRAM(s) Oral at bedtime  pantoprazole   Suspension 40 milliGRAM(s) Oral daily  QUEtiapine 25 milliGRAM(s) Oral at bedtime  senna 2 Tablet(s) Oral at bedtime  simethicone 80 milliGRAM(s) Chew three times a day  tamsulosin 0.4 milliGRAM(s) Oral at bedtime      PHYSICAL EXAM:  T(C): 36.8 (08-04-21 @ 04:13), Max: 36.8 (08-04-21 @ 04:13)  HR: 91 (08-04-21 @ 04:13) (82 - 118)  BP: 117/80 (08-04-21 @ 04:13) (117/80 - 152/95)  RR: 18 (08-04-21 @ 04:13) (18 - 18)  SpO2: 100% (08-04-21 @ 04:13) (98% - 100%)  Wt(kg): --  I&O's Summary    03 Aug 2021 07:01  -  04 Aug 2021 07:00  --------------------------------------------------------  IN: 1118 mL / OUT: 3150 mL / NET: -2032 mL      Height (cm): 193 (08-03 @ 08:52)  Weight (kg): 91.9 (08-03 @ 08:52)  BMI (kg/m2): 24.7 (08-03 @ 08:52)  BSA (m2): 2.23 (08-03 @ 08:52)    Appearance: Normal	  HEENT:   Normal oral mucosa, PERRL, EOMI	  Lymphatic: No lymphadenopathy , no edema  Cardiovascular: Normal S1 S2, No JVD, No murmurs , Peripheral pulses palpable 2+ bilaterally  Respiratory: Lungs clear to auscultation, normal effort 	  Gastrointestinal:  Soft, Non-tender, + BS	  Skin: No rashes, No ecchymoses, No cyanosis, warm to touch  Musculoskeletal: Normal range of motion, normal strength  Psychiatry:  Mood & affect appropriate  Ext: No edema      LABS:    CARDIAC MARKERS:                                12.4   2.90  )-----------( 183      ( 04 Aug 2021 06:04 )             38.9     08-04    131<L>  |  96  |  29<H>  ----------------------------<  149<H>  4.1   |  21<L>  |  0.43<L>    Ca    9.7      04 Aug 2021 06:04      proBNP:   Lipid Profile:   HgA1c:   TSH:             TELEMETRY: 	SR    ECG:  	  RADIOLOGY:   DIAGNOSTIC TESTING:  [ ] Echocardiogram:  [ ]  Catheterization:  [ ] Stress Test:    OTHER:

## 2021-08-04 NOTE — PROGRESS NOTE ADULT - ASSESSMENT
70 y/o M with PMH of CVA, DM, BPH with obstruction s/p Sauceda catheter, s/p ERCP w Sphincteretomy, recent admission to [facility] for sepsis, Hodgkin lymphoma dx 2020 - was not offered any chemo and was placed on hospice, DVT 8/2020. Presenting with weakness and FTT. Pt and family opted for treatment of lymphoma - s/p Opdivo 7/6. Called to consult for CT chest findings 6/28 with patchy GGO and scattered nodular opacities throughout all lobes of the lungs. Currently breathing comfortably on RA.

## 2021-08-13 PROBLEM — Z00.00 ENCOUNTER FOR PREVENTIVE HEALTH EXAMINATION: Status: ACTIVE | Noted: 2021-08-13

## 2021-08-14 LAB
CULTURE RESULTS: SIGNIFICANT CHANGE UP
SPECIMEN SOURCE: SIGNIFICANT CHANGE UP

## 2021-08-30 ENCOUNTER — INPATIENT (INPATIENT)
Facility: HOSPITAL | Age: 71
LOS: 5 days | Discharge: NURSING, REHAB OR LONG-TERM CARE | DRG: 847 | End: 2021-09-05
Attending: GENERAL ACUTE CARE HOSPITAL | Admitting: GENERAL ACUTE CARE HOSPITAL
Payer: MEDICARE

## 2021-08-30 VITALS
HEIGHT: 76 IN | TEMPERATURE: 98 F | DIASTOLIC BLOOD PRESSURE: 81 MMHG | HEART RATE: 120 BPM | SYSTOLIC BLOOD PRESSURE: 123 MMHG | WEIGHT: 210.1 LBS | OXYGEN SATURATION: 98 % | RESPIRATION RATE: 16 BRPM

## 2021-08-30 DIAGNOSIS — N30.00 ACUTE CYSTITIS WITHOUT HEMATURIA: ICD-10-CM

## 2021-08-30 PROBLEM — I63.9 CEREBRAL INFARCTION, UNSPECIFIED: Chronic | Status: ACTIVE | Noted: 2021-06-28

## 2021-08-30 PROBLEM — E11.9 TYPE 2 DIABETES MELLITUS WITHOUT COMPLICATIONS: Chronic | Status: ACTIVE | Noted: 2021-06-28

## 2021-08-30 PROBLEM — C81.90 HODGKIN LYMPHOMA, UNSPECIFIED, UNSPECIFIED SITE: Chronic | Status: ACTIVE | Noted: 2021-07-01

## 2021-08-30 LAB
ALBUMIN SERPL ELPH-MCNC: 3.6 G/DL — SIGNIFICANT CHANGE UP (ref 3.3–5)
ALP SERPL-CCNC: 236 U/L — HIGH (ref 40–120)
ALT FLD-CCNC: 22 U/L — SIGNIFICANT CHANGE UP (ref 10–45)
ANION GAP SERPL CALC-SCNC: 12 MMOL/L — SIGNIFICANT CHANGE UP (ref 5–17)
ANISOCYTOSIS BLD QL: SLIGHT — SIGNIFICANT CHANGE UP
APPEARANCE UR: ABNORMAL
AST SERPL-CCNC: 24 U/L — SIGNIFICANT CHANGE UP (ref 10–40)
B PERT IGG+IGM PNL SER: ABNORMAL
BACTERIA # UR AUTO: ABNORMAL
BASOPHILS # BLD AUTO: 0 K/UL — SIGNIFICANT CHANGE UP (ref 0–0.2)
BASOPHILS NFR BLD AUTO: 0 % — SIGNIFICANT CHANGE UP (ref 0–2)
BILIRUB SERPL-MCNC: 0.5 MG/DL — SIGNIFICANT CHANGE UP (ref 0.2–1.2)
BILIRUB UR-MCNC: NEGATIVE — SIGNIFICANT CHANGE UP
BUN SERPL-MCNC: 12 MG/DL — SIGNIFICANT CHANGE UP (ref 7–23)
CALCIUM SERPL-MCNC: 9.7 MG/DL — SIGNIFICANT CHANGE UP (ref 8.4–10.5)
CHLORIDE SERPL-SCNC: 99 MMOL/L — SIGNIFICANT CHANGE UP (ref 96–108)
CO2 SERPL-SCNC: 23 MMOL/L — SIGNIFICANT CHANGE UP (ref 22–31)
COLOR FLD: SIGNIFICANT CHANGE UP
COLOR SPEC: YELLOW — SIGNIFICANT CHANGE UP
CREAT SERPL-MCNC: 0.61 MG/DL — SIGNIFICANT CHANGE UP (ref 0.5–1.3)
CRP SERPL-MCNC: 5 MG/L — HIGH (ref 0–4)
DACRYOCYTES BLD QL SMEAR: SLIGHT — SIGNIFICANT CHANGE UP
DIFF PNL FLD: NEGATIVE — SIGNIFICANT CHANGE UP
EOSINOPHIL # BLD AUTO: 0 K/UL — SIGNIFICANT CHANGE UP (ref 0–0.5)
EOSINOPHIL NFR BLD AUTO: 0 % — SIGNIFICANT CHANGE UP (ref 0–6)
EPI CELLS # UR: 0 /HPF — SIGNIFICANT CHANGE UP
FLUID INTAKE SUBSTANCE CLASS: SIGNIFICANT CHANGE UP
FLUID SEGMENTED GRANULOCYTES: 40 % — SIGNIFICANT CHANGE UP
GLUCOSE BLDC GLUCOMTR-MCNC: 104 MG/DL — HIGH (ref 70–99)
GLUCOSE BLDC GLUCOMTR-MCNC: 269 MG/DL — HIGH (ref 70–99)
GLUCOSE SERPL-MCNC: 243 MG/DL — HIGH (ref 70–99)
GLUCOSE UR QL: NEGATIVE — SIGNIFICANT CHANGE UP
GRAM STN FLD: SIGNIFICANT CHANGE UP
HCT VFR BLD CALC: 40.4 % — SIGNIFICANT CHANGE UP (ref 39–50)
HGB BLD-MCNC: 13.5 G/DL — SIGNIFICANT CHANGE UP (ref 13–17)
HYALINE CASTS # UR AUTO: 4 /LPF — HIGH (ref 0–2)
KETONES UR-MCNC: NEGATIVE — SIGNIFICANT CHANGE UP
LEUKOCYTE ESTERASE UR-ACNC: ABNORMAL
LYMPHOCYTES # BLD AUTO: 0.59 K/UL — LOW (ref 1–3.3)
LYMPHOCYTES # BLD AUTO: 18.1 % — SIGNIFICANT CHANGE UP (ref 13–44)
LYMPHOCYTES # FLD: 18 % — SIGNIFICANT CHANGE UP
MANUAL SMEAR VERIFICATION: SIGNIFICANT CHANGE UP
MCHC RBC-ENTMCNC: 32.4 PG — SIGNIFICANT CHANGE UP (ref 27–34)
MCHC RBC-ENTMCNC: 33.4 GM/DL — SIGNIFICANT CHANGE UP (ref 32–36)
MCV RBC AUTO: 96.9 FL — SIGNIFICANT CHANGE UP (ref 80–100)
MICROCYTES BLD QL: SLIGHT — SIGNIFICANT CHANGE UP
MONOCYTES # BLD AUTO: 0.48 K/UL — SIGNIFICANT CHANGE UP (ref 0–0.9)
MONOCYTES NFR BLD AUTO: 14.7 % — HIGH (ref 2–14)
MONOS+MACROS # FLD: 42 % — SIGNIFICANT CHANGE UP
NEUTROPHILS # BLD AUTO: 2.2 K/UL — SIGNIFICANT CHANGE UP (ref 1.8–7.4)
NEUTROPHILS NFR BLD AUTO: 67.2 % — SIGNIFICANT CHANGE UP (ref 43–77)
NITRITE UR-MCNC: NEGATIVE — SIGNIFICANT CHANGE UP
OVALOCYTES BLD QL SMEAR: SLIGHT — SIGNIFICANT CHANGE UP
PH UR: 6 — SIGNIFICANT CHANGE UP (ref 5–8)
PLAT MORPH BLD: NORMAL — SIGNIFICANT CHANGE UP
PLATELET # BLD AUTO: 303 K/UL — SIGNIFICANT CHANGE UP (ref 150–400)
POIKILOCYTOSIS BLD QL AUTO: SLIGHT — SIGNIFICANT CHANGE UP
POLYCHROMASIA BLD QL SMEAR: SLIGHT — SIGNIFICANT CHANGE UP
POTASSIUM SERPL-MCNC: 3.9 MMOL/L — SIGNIFICANT CHANGE UP (ref 3.5–5.3)
POTASSIUM SERPL-SCNC: 3.9 MMOL/L — SIGNIFICANT CHANGE UP (ref 3.5–5.3)
PROT SERPL-MCNC: 7.1 G/DL — SIGNIFICANT CHANGE UP (ref 6–8.3)
PROT UR-MCNC: SIGNIFICANT CHANGE UP
RBC # BLD: 4.17 M/UL — LOW (ref 4.2–5.8)
RBC # FLD: 16.6 % — HIGH (ref 10.3–14.5)
RBC BLD AUTO: ABNORMAL
RBC CASTS # UR COMP ASSIST: 0 /HPF — SIGNIFICANT CHANGE UP (ref 0–4)
RCV VOL RI: HIGH /UL (ref 0–0)
SARS-COV-2 RNA SPEC QL NAA+PROBE: SIGNIFICANT CHANGE UP
SODIUM SERPL-SCNC: 134 MMOL/L — LOW (ref 135–145)
SP GR SPEC: 1.02 — SIGNIFICANT CHANGE UP (ref 1.01–1.02)
SPECIMEN SOURCE: SIGNIFICANT CHANGE UP
SYNOVIAL CRYSTALS CLARITY: ABNORMAL
SYNOVIAL CRYSTALS COLOR: ABNORMAL
SYNOVIAL CRYSTALS ID: SIGNIFICANT CHANGE UP
SYNOVIAL CRYSTALS TUBE: SIGNIFICANT CHANGE UP
TOTAL NUCLEATED CELL COUNT, BODY FLUID: 333 /UL — SIGNIFICANT CHANGE UP
TUBE TYPE: SIGNIFICANT CHANGE UP
UROBILINOGEN FLD QL: ABNORMAL
WBC # BLD: 3.28 K/UL — LOW (ref 3.8–10.5)
WBC # FLD AUTO: 3.28 K/UL — LOW (ref 3.8–10.5)
WBC UR QL: 84 /HPF — HIGH (ref 0–5)

## 2021-08-30 PROCEDURE — 93010 ELECTROCARDIOGRAM REPORT: CPT

## 2021-08-30 PROCEDURE — 99285 EMERGENCY DEPT VISIT HI MDM: CPT | Mod: 25

## 2021-08-30 PROCEDURE — 73560 X-RAY EXAM OF KNEE 1 OR 2: CPT | Mod: 26,LT

## 2021-08-30 RX ORDER — INSULIN LISPRO 100/ML
VIAL (ML) SUBCUTANEOUS AT BEDTIME
Refills: 0 | Status: DISCONTINUED | OUTPATIENT
Start: 2021-08-30 | End: 2021-09-05

## 2021-08-30 RX ORDER — PANTOPRAZOLE SODIUM 20 MG/1
40 TABLET, DELAYED RELEASE ORAL DAILY
Refills: 0 | Status: DISCONTINUED | OUTPATIENT
Start: 2021-08-30 | End: 2021-09-05

## 2021-08-30 RX ORDER — ASPIRIN/CALCIUM CARB/MAGNESIUM 324 MG
81 TABLET ORAL DAILY
Refills: 0 | Status: DISCONTINUED | OUTPATIENT
Start: 2021-08-30 | End: 2021-09-05

## 2021-08-30 RX ORDER — CEFTRIAXONE 500 MG/1
1000 INJECTION, POWDER, FOR SOLUTION INTRAMUSCULAR; INTRAVENOUS ONCE
Refills: 0 | Status: COMPLETED | OUTPATIENT
Start: 2021-08-30 | End: 2021-08-30

## 2021-08-30 RX ORDER — INSULIN LISPRO 100/ML
VIAL (ML) SUBCUTANEOUS
Refills: 0 | Status: DISCONTINUED | OUTPATIENT
Start: 2021-08-30 | End: 2021-09-05

## 2021-08-30 RX ORDER — LANOLIN ALCOHOL/MO/W.PET/CERES
5 CREAM (GRAM) TOPICAL AT BEDTIME
Refills: 0 | Status: DISCONTINUED | OUTPATIENT
Start: 2021-08-30 | End: 2021-09-05

## 2021-08-30 RX ORDER — DEXTROSE 50 % IN WATER 50 %
12.5 SYRINGE (ML) INTRAVENOUS ONCE
Refills: 0 | Status: DISCONTINUED | OUTPATIENT
Start: 2021-08-30 | End: 2021-09-05

## 2021-08-30 RX ORDER — DEXTROSE 50 % IN WATER 50 %
15 SYRINGE (ML) INTRAVENOUS ONCE
Refills: 0 | Status: DISCONTINUED | OUTPATIENT
Start: 2021-08-30 | End: 2021-09-05

## 2021-08-30 RX ORDER — TAMSULOSIN HYDROCHLORIDE 0.4 MG/1
0.4 CAPSULE ORAL AT BEDTIME
Refills: 0 | Status: DISCONTINUED | OUTPATIENT
Start: 2021-08-30 | End: 2021-09-05

## 2021-08-30 RX ORDER — DEXTROSE 50 % IN WATER 50 %
25 SYRINGE (ML) INTRAVENOUS ONCE
Refills: 0 | Status: DISCONTINUED | OUTPATIENT
Start: 2021-08-30 | End: 2021-09-05

## 2021-08-30 RX ORDER — FINASTERIDE 5 MG/1
5 TABLET, FILM COATED ORAL DAILY
Refills: 0 | Status: DISCONTINUED | OUTPATIENT
Start: 2021-08-30 | End: 2021-09-05

## 2021-08-30 RX ORDER — SODIUM CHLORIDE 9 MG/ML
1000 INJECTION, SOLUTION INTRAVENOUS
Refills: 0 | Status: DISCONTINUED | OUTPATIENT
Start: 2021-08-30 | End: 2021-09-05

## 2021-08-30 RX ORDER — SODIUM CHLORIDE 9 MG/ML
1000 INJECTION INTRAMUSCULAR; INTRAVENOUS; SUBCUTANEOUS ONCE
Refills: 0 | Status: COMPLETED | OUTPATIENT
Start: 2021-08-30 | End: 2021-08-30

## 2021-08-30 RX ORDER — ACETAMINOPHEN 500 MG
650 TABLET ORAL EVERY 6 HOURS
Refills: 0 | Status: DISCONTINUED | OUTPATIENT
Start: 2021-08-30 | End: 2021-09-05

## 2021-08-30 RX ORDER — SODIUM CHLORIDE 9 MG/ML
1000 INJECTION INTRAMUSCULAR; INTRAVENOUS; SUBCUTANEOUS
Refills: 0 | Status: DISCONTINUED | OUTPATIENT
Start: 2021-08-30 | End: 2021-09-05

## 2021-08-30 RX ORDER — INSULIN LISPRO 100/ML
15 VIAL (ML) SUBCUTANEOUS
Refills: 0 | Status: DISCONTINUED | OUTPATIENT
Start: 2021-08-30 | End: 2021-09-05

## 2021-08-30 RX ORDER — ALLOPURINOL 300 MG
300 TABLET ORAL DAILY
Refills: 0 | Status: DISCONTINUED | OUTPATIENT
Start: 2021-08-30 | End: 2021-09-05

## 2021-08-30 RX ORDER — QUETIAPINE FUMARATE 200 MG/1
25 TABLET, FILM COATED ORAL AT BEDTIME
Refills: 0 | Status: DISCONTINUED | OUTPATIENT
Start: 2021-08-30 | End: 2021-09-05

## 2021-08-30 RX ORDER — INSULIN GLARGINE 100 [IU]/ML
45 INJECTION, SOLUTION SUBCUTANEOUS AT BEDTIME
Refills: 0 | Status: DISCONTINUED | OUTPATIENT
Start: 2021-08-30 | End: 2021-09-05

## 2021-08-30 RX ORDER — POLYETHYLENE GLYCOL 3350 17 G/17G
17 POWDER, FOR SOLUTION ORAL DAILY
Refills: 0 | Status: DISCONTINUED | OUTPATIENT
Start: 2021-08-30 | End: 2021-09-05

## 2021-08-30 RX ORDER — GLUCAGON INJECTION, SOLUTION 0.5 MG/.1ML
1 INJECTION, SOLUTION SUBCUTANEOUS ONCE
Refills: 0 | Status: DISCONTINUED | OUTPATIENT
Start: 2021-08-30 | End: 2021-09-05

## 2021-08-30 RX ORDER — SENNA PLUS 8.6 MG/1
2 TABLET ORAL AT BEDTIME
Refills: 0 | Status: DISCONTINUED | OUTPATIENT
Start: 2021-08-30 | End: 2021-09-05

## 2021-08-30 RX ADMIN — INSULIN GLARGINE 45 UNIT(S): 100 INJECTION, SOLUTION SUBCUTANEOUS at 22:38

## 2021-08-30 RX ADMIN — Medication 6: at 19:19

## 2021-08-30 RX ADMIN — TAMSULOSIN HYDROCHLORIDE 0.4 MILLIGRAM(S): 0.4 CAPSULE ORAL at 22:18

## 2021-08-30 RX ADMIN — Medication 5 MILLIGRAM(S): at 22:18

## 2021-08-30 RX ADMIN — SODIUM CHLORIDE 60 MILLILITER(S): 9 INJECTION INTRAMUSCULAR; INTRAVENOUS; SUBCUTANEOUS at 17:03

## 2021-08-30 RX ADMIN — Medication 15 UNIT(S): at 19:19

## 2021-08-30 RX ADMIN — SODIUM CHLORIDE 1000 MILLILITER(S): 9 INJECTION INTRAMUSCULAR; INTRAVENOUS; SUBCUTANEOUS at 08:01

## 2021-08-30 RX ADMIN — QUETIAPINE FUMARATE 25 MILLIGRAM(S): 200 TABLET, FILM COATED ORAL at 22:18

## 2021-08-30 RX ADMIN — CEFTRIAXONE 100 MILLIGRAM(S): 500 INJECTION, POWDER, FOR SOLUTION INTRAMUSCULAR; INTRAVENOUS at 09:57

## 2021-08-30 NOTE — CONSULT NOTE ADULT - ASSESSMENT
1.  Classic Hodgkin's disease.  Will likely give 1 more course of treatment with Opdivo, we will hopefully be able to switch the patient over to regular chemotherapy with subsequent cycles.  -He will need pulmonary function test before this is possible     2.  Neuro-oncology.  Extradural disease.  While in house, I will arrange for the patient to be seen by neuro oncology     3.  Knee pain.  Left.  Will consider orthopedic evaluation during hospital stay     4.  Indwelling Sauceda catheter.  Consider trial of voiding during hospital stay       1.  Classic Hodgkin's disease.  Will likely give 1 more course of treatment with Opdivo, we will hopefully be able to switch the patient over to regular chemotherapy with subsequent cycles.  -He will need pulmonary function test before this is possible     2.  Neuro-oncology.  Extradural disease.  While in house, I will arrange for the patient to be seen by neuro oncology     3.  Knee pain.  Left.  ortho on board     4.  Indwelling Sauceda catheter.  Consider trial of voiding during hospital stay

## 2021-08-30 NOTE — CHART NOTE - NSCHARTNOTEFT_GEN_A_CORE
Orthopedics    Fluid Analysis reviewed cell count 333, not consistent with septic arthritis. Orthopedically stable for discharge. Will continue to follow for culture. Discussed with attending.

## 2021-08-30 NOTE — ED PROVIDER NOTE - ATTENDING CONTRIBUTION TO CARE
70 y/o M with pmhx DM type 2, Hodgkin lymphoma, CVA, presents from [facility] rehab for infusion of chemo. patient states he also has some swelling in his right knee and MD requests eval for his knee swelling. no fever or chills. denies any other complaints. no trauma.   Gen.    HEENT:    Lungs:    CVS: S1S2   Abd;    Ext:   Neuro:  MSK: 72 y/o M with pmhx DM type 2, Hodgkin lymphoma, BPH, CVA, presents from [facility] rehab for infusion of chemo. patient states he also has some swelling in his right knee and MD requests eval for his knee swelling. no fever or chills. denies any other complaints. no trauma. on arrival patient noted to be tachycardic but denies any symptoms. No chest pain no heart palp.   Gen.  no acute distress  HEENT: perrl eomi   Lungs:  b/l bs  CVS: S1S2   Abd; soft non tender no distention    Ext: no pitting edema or erythema  Neuro: aaox3  MSK: moving all ext spon, left knee with mild edema no erythema. no ttp. full range of motion. No calf tenderness

## 2021-08-30 NOTE — ED ADULT NURSE NOTE - OBJECTIVE STATEMENT
72 y/o male with PMH CVA, DM, BPH with obstruction indwelling escamilla catheter in  place, per pt, last changed earlier this month, Hodgkin's lymphoma BIBEMS presenting to ED for chemotherapy. Per EMS, [facility] to chemotherapy. Pt's MD wanted pt at hospital prior to 7am for scheduled chemotherapy but transport would not arrive until after 8am, so rehab called EMS. Pt only complaint at this time is chronic left knee pain, pt denies recent trauma. Upon exam pt A&Ox3 gross neuro intact,  no difficulty speaking in complete sentences, s1s2 heart sounds heard, pulses x 4, kelley x4, abdomen soft nontender nondistended, skin intact, indwelling escamilla catheter draining cloudy yellow urine at this time. pt denies chest pain, sob, ha, n/v/d, abdominal pain, f/c, urinary symptoms, hematuria.  Pt placed on cardiac monitor, sinus tachy 120s, orally afebrile. 20 gauge IV placed to left a/c. Labs drawn & sent. Pt Safety and comfort measures maintained. Call bell provided, pt oriented to call bell system. Pt sister called per pt request, updated on status and plan of care.

## 2021-08-30 NOTE — ED PROVIDER NOTE - PHYSICAL EXAMINATION
G: NAD, cooperative with exam   H: NCAT  E: EOMI, no conjunctival pallor   M: Mucous membranes moist   R: CTABL, nWOB  C: Nl S1/S2, no mrg  A: Soft, NT/ND, no rebound/guarding   MSK: no calf tenderness, no LE edema, LROM of L knee secondary to pain, swelling to L knee, no erythema or TTP. No bony deformities.

## 2021-08-30 NOTE — ED ADULT NURSE NOTE - NSIMPLEMENTINTERV_GEN_ALL_ED
Implemented All Fall Risk Interventions:  [place] to call system. Call bell, personal items and telephone within reach. Instruct patient to call for assistance. Room bathroom lighting operational. Non-slip footwear when patient is off stretcher. Physically safe environment: no spills, clutter or unnecessary equipment. Stretcher in lowest position, wheels locked, appropriate side rails in place. Provide visual cue, wrist band, yellow gown, etc. Monitor gait and stability. Monitor for mental status changes and reorient to person, place, and time. Review medications for side effects contributing to fall risk. Reinforce activity limits and safety measures with patient and family.

## 2021-08-30 NOTE — ED PROVIDER NOTE - OBJECTIVE STATEMENT
70 y/o M w/ PMHx of CVA this past August and got TPA per family member, DM, BPH with obstruction s/p escamilla catheter, s/p ERCP w Sphincterectomy recent admission to [facility] for sepsis  Hodgkin lymphoma, on allopurinol and on opdivo (last given08/03) presents for immunotherapy. Per nursing staff at rehab facility: Dr. Alas's instructions to present to hospital-under his care for immunotherapy. Patient has been stable at rehab facility and only here for therapy. They were instructed to transfer him before 7am to ED per Dr. Alas. No fevers, chills, nightsweats. No N/V/D. No chest pain, SOB, palpitations. Swelling to the L knee, no falls or trauma. Pt states he is able to ambulate independently albeit with limitations s/p rehab. Patient reports feeling in his baseline state of health.

## 2021-08-30 NOTE — ED ADULT NURSE NOTE - NS ED NURSE DISCH DISPOSITION
If patient fails to consume adequate nutrient intake via oral route (with resultant, sub-optimal weight gain), may need to consider provision of non-oral, supplemental means of nutrient delivery.
Admitted

## 2021-08-30 NOTE — H&P ADULT - ASSESSMENT
- lymphoma: d/w Dr. Larios: s/p immunotherapy  and first dose    pt s/p 1st rx with opdivo 7/6. Planning for sec dose     - L knee swelling : check Xray   Called ortho for artherocentesis prior to chemo     - DM : claus FS     - anemia: monitor H/H post transfusion, stable.     - voice changes: CT neck : Asymmetric enlargement of the left palatine tonsil, suspicious for lymphomatous involvement given history. Correlate with direct visualization.  ENT had eval pt: no gross pathology on visualization   S/S eval: MBS done.    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

## 2021-08-30 NOTE — H&P ADULT - HISTORY OF PRESENT ILLNESS
70 y/o M w/ PMHx of CVA this past August and got TPA per family member, DM, BPH with obstruction s/p escamilla catheter, s/p ERCP w Sphincterectomy recent admission to [facility] for sepsis,  Hodgkin lymphoma received opdivo last admission and sent to rehab   now returning with L knee pain and swelling and for planned chemo  overall has been doing well   no truama to L knee   no fever or chills   no N/V/D   no cp /sob

## 2021-08-30 NOTE — ED PROVIDER NOTE - NS ED ROS FT
Gen: No F/C/NS  Head: No falls/head trauma  Eyes: No changes in vision   Resp: No cough or trouble breathing  Cardiovascular: No chest pain or palpitation  Gastroenteric: No N/V/D  :  No change in urine output   MS: L knee pain   Neuro: No headache   Skin: No new rash

## 2021-08-30 NOTE — CONSULT NOTE ADULT - SUBJECTIVE AND OBJECTIVE BOX
LIZETT RIVERA  MRN-[id number]    Patient is a 71y old  Male who presents with a chief complaint of     HPI  HPI:  71-year-old man with history of diabetes, enlarged prostate, who had a stroke during the summer 2020.  Patient reportedly recovered well from his CVA.  He was treated with TPA at that time.  I met the patient during the summer 2021.  During the spring 2021 patient was hospitalized in [facility] with urosepsis.  He was found to have abdominal adenopathy.  A biopsy revealed evidence of classic Hodgkin's lymphoma.  Patient was discharged home on home hospice.  Family contacted me during the late spring and I arranged for patient to be admitted and evaluated at [facility].  Patient with chronic indwelling Escamilla catheter.  During initial hospitalization patient also had difficulty with transient atrial fibrillation.  He had also history of ERCP and sphincterotomy, during his hospital stay at [facility].  Patient also had difficulty with fever of unknown origin, likely tumor related, improved with treatment.  While hospitalized patient had significant difficulty with generalized weakness.  Patient underwent a neuro oncology evaluation.  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  A spinal tap did not show evidence of CSF disease.  Decision made to treat patient with immunotherapy, he received 2 courses of Opdivo, last in early August 2021.  Patient then discharged to rehab.  Patient has seen a dramatic improvement in his function.  He has improved ambulation.  No fevers, chills or sweats.  He is having some difficulty with knee pain.  He is otherwise without complaints.  70 y/o M w/ PMHx of CVA this past August and got TPA per family member, DM, BPH with obstruction s/p escamilla catheter, s/p ERCP w Sphincterectomy recent admission to [facility] for sepsis,  Hodgkin lymphoma received opdivo last admission and sent to rehab   now returning with L knee pain and swelling and for planned chemo  overall has been doing well   no truama to L knee   no fever or chills   no N/V/D   no cp /sob    (30 Aug 2021 14:42)      Past Medical History  PAST MEDICAL & SURGICAL HISTORY:  Cerebrovascular accident (CVA)    Diabetes mellitus    Hodgkin lymphoma    No significant past surgical history        Current Meds  MEDICATIONS  (STANDING):  allopurinol 300 milliGRAM(s) Oral daily  aspirin  chewable 81 milliGRAM(s) Oral daily  dextrose 40% Gel 15 Gram(s) Oral once  dextrose 5%. 1000 milliLiter(s) (50 mL/Hr) IV Continuous <Continuous>  dextrose 5%. 1000 milliLiter(s) (100 mL/Hr) IV Continuous <Continuous>  dextrose 50% Injectable 25 Gram(s) IV Push once  dextrose 50% Injectable 12.5 Gram(s) IV Push once  dextrose 50% Injectable 25 Gram(s) IV Push once  finasteride 5 milliGRAM(s) Oral daily  glucagon  Injectable 1 milliGRAM(s) IntraMuscular once  insulin glargine Injectable (LANTUS) 45 Unit(s) SubCutaneous at bedtime  insulin lispro (ADMELOG) corrective regimen sliding scale   SubCutaneous three times a day before meals  insulin lispro (ADMELOG) corrective regimen sliding scale   SubCutaneous at bedtime  insulin lispro Injectable (ADMELOG) 15 Unit(s) SubCutaneous three times a day before meals  melatonin 5 milliGRAM(s) Oral at bedtime  pantoprazole   Suspension 40 milliGRAM(s) Oral daily  QUEtiapine 25 milliGRAM(s) Oral at bedtime  senna 2 Tablet(s) Oral at bedtime  sodium chloride 0.9%. 1000 milliLiter(s) (60 mL/Hr) IV Continuous <Continuous>  tamsulosin 0.4 milliGRAM(s) Oral at bedtime    MEDICATIONS  (PRN):  acetaminophen   Tablet .. 650 milliGRAM(s) Oral every 6 hours PRN Mild Pain (1 - 3), Moderate Pain (4 - 6)  bisacodyl 5 milliGRAM(s) Oral every 12 hours PRN Constipation  polyethylene glycol 3350 17 Gram(s) Oral daily PRN Constipation      Allergies  Allergies    No Known Allergies    Intolerances        Social History  , Retired. No tob.  No etoh    Family History  FAMILY HISTORY:  No pertinent family history in first degree relatives        Review of System  REVIEW OF SYSTEMS      General:	Denies fatigue, fevers, chills, sweats, decreased appetite.    Skin/Breast: denies pruritis, rash  	  Ophthalmologic: no change in vision or blurring  	  HEENT	Denies dry mouth, oral sores, dysphagia,  change in hearing.    Respiratory and Thorax:  cough, sob, wheeze, hemoptysis  	  Cardiovascular:	no cp , palp, orthopnea    Gastrointestinal:	no n/v/d constipation    Genitourinary:	no dysuria of frequency, no hematuria, no flank pain    Musculoskeletal:	no bone or joint pain. no muscle aches.     Neurological:	no change in sensory or motor function. no headache. no weakness.     Psychiatric:	no depression, no anxiety, insomnia.     Hematology/Lymphatics:	no bleeding or bruising        Vitals  Vital Signs Last 24 Hrs  T(C): 36.9 (30 Aug 2021 14:46), Max: 36.9 (30 Aug 2021 14:46)  T(F): 98.4 (30 Aug 2021 14:46), Max: 98.4 (30 Aug 2021 14:46)  HR: 100 (30 Aug 2021 14:46) (92 - 120)  BP: 127/85 (30 Aug 2021 14:46) (117/90 - 135/98)  BP(mean): --  RR: 15 (30 Aug 2021 14:46) (15 - 18)  SpO2: 100% (30 Aug 2021 14:46) (98% - 100%)    Physical Exam  PHYSICAL EXAM:      Constitutional: NAD    Eyes: PERRLA EOMI, anicteric sclera    Heent :No oral sores, no pharyngeal injection. moist mucosa.    Neck: supple, no jvd, no LAD    Respiratory: CTA b/l     Cardiovascular: s1s2, no m/g/r    Gastrointestinal: soft, nt, nd, + BS    Extremities: no c/c/e    Neurological:A&O x 3 moves all ext.    Skin: no rash on exposed skin    Lymph Nodes: no lymphadenopathy.              Lab  CBC Full  -  ( 30 Aug 2021 08:28 )  WBC Count : 3.28 K/uL  RBC Count : 4.17 M/uL  Hemoglobin : 13.5 g/dL  Hematocrit : 40.4 %  Platelet Count - Automated : 303 K/uL  Mean Cell Volume : 96.9 fl  Mean Cell Hemoglobin : 32.4 pg  Mean Cell Hemoglobin Concentration : 33.4 gm/dL  Auto Neutrophil # : 2.20 K/uL  Auto Lymphocyte # : 0.59 K/uL  Auto Monocyte # : 0.48 K/uL  Auto Eosinophil # : 0.00 K/uL  Auto Basophil # : 0.00 K/uL  Auto Neutrophil % : 67.2 %  Auto Lymphocyte % : 18.1 %  Auto Monocyte % : 14.7 %  Auto Eosinophil % : 0.0 %  Auto Basophil % : 0.0 %    08-30    134<L>  |  99  |  12  ----------------------------<  243<H>  3.9   |  23  |  0.61    Ca    9.7      30 Aug 2021 08:28    TPro  7.1  /  Alb  3.6  /  TBili  0.5  /  DBili  x   /  AST  24  /  ALT  22  /  AlkPhos  236<H>  08-30        Rad:    Assessment/Plan         LIZETT RIVERA  MRN-[id number]    Patient is a 71y old  Male who presents with a chief complaint of       HPI:  71-year-old man with history of diabetes, enlarged prostate, who had a stroke during the summer 2020.  Patient reportedly recovered well from his CVA.  He was treated with TPA at that time.  I met the patient during the summer 2021.  During the spring 2021 patient was hospitalized in [facility] with urosepsis.  He was found to have abdominal adenopathy.  A biopsy revealed evidence of classic Hodgkin's lymphoma.  Patient was discharged home on home hospice.  Family contacted me during the late spring and I arranged for patient to be admitted and evaluated at [facility].  Patient with chronic indwelling Sauceda catheter.  During initial hospitalization patient also had difficulty with transient atrial fibrillation.  He had also history of ERCP and sphincterotomy, during his hospital stay at [facility].  Patient also had difficulty with fever of unknown origin, likely tumor related, improved with treatment.  While hospitalized patient had significant difficulty with generalized weakness.  Patient underwent a neuro oncology evaluation.  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  A spinal tap did not show evidence of CSF disease.  Decision made to treat patient with immunotherapy, he received 2 courses of Opdivo, last in early August 2021.  Patient then discharged to rehab.  Patient has seen a dramatic improvement in his function.  He has improved ambulation.  No fevers, chills or sweats.  He is having some difficulty with knee pain.  He is otherwise without complaints.      PAST MEDICAL & SURGICAL HISTORY:  Cerebrovascular accident (CVA)  Diabetes mellitus  Hodgkin lymphoma  No significant past surgical history    Current Meds  MEDICATIONS  (STANDING):  allopurinol 300 milliGRAM(s) Oral daily  aspirin  chewable 81 milliGRAM(s) Oral daily  dextrose 40% Gel 15 Gram(s) Oral once  dextrose 5%. 1000 milliLiter(s) (50 mL/Hr) IV Continuous <Continuous>  dextrose 5%. 1000 milliLiter(s) (100 mL/Hr) IV Continuous <Continuous>  dextrose 50% Injectable 25 Gram(s) IV Push once  dextrose 50% Injectable 12.5 Gram(s) IV Push once  dextrose 50% Injectable 25 Gram(s) IV Push once  finasteride 5 milliGRAM(s) Oral daily  glucagon  Injectable 1 milliGRAM(s) IntraMuscular once  insulin glargine Injectable (LANTUS) 45 Unit(s) SubCutaneous at bedtime  insulin lispro (ADMELOG) corrective regimen sliding scale   SubCutaneous three times a day before meals  insulin lispro (ADMELOG) corrective regimen sliding scale   SubCutaneous at bedtime  insulin lispro Injectable (ADMELOG) 15 Unit(s) SubCutaneous three times a day before meals  melatonin 5 milliGRAM(s) Oral at bedtime  pantoprazole   Suspension 40 milliGRAM(s) Oral daily  QUEtiapine 25 milliGRAM(s) Oral at bedtime  senna 2 Tablet(s) Oral at bedtime  sodium chloride 0.9%. 1000 milliLiter(s) (60 mL/Hr) IV Continuous <Continuous>  tamsulosin 0.4 milliGRAM(s) Oral at bedtime    MEDICATIONS  (PRN):  acetaminophen   Tablet .. 650 milliGRAM(s) Oral every 6 hours PRN Mild Pain (1 - 3), Moderate Pain (4 - 6)  bisacodyl 5 milliGRAM(s) Oral every 12 hours PRN Constipation  polyethylene glycol 3350 17 Gram(s) Oral daily PRN Constipation      Allergies    No Known Allergies    Social History  , Retired. No tob.  No etoh      No pertinent family history in first degree relatives      REVIEW OF SYSTEMS    General:	Denies fatigue, fevers, chills, sweats, decreased appetite.    Skin/Breast: denies pruritis, rash  	  Ophthalmologic: no change in vision or blurring  	  HEENT	Denies dry mouth, oral sores, dysphagia,  change in hearing.    Respiratory and Thorax:  cough, sob, wheeze, hemoptysis  	  Cardiovascular:	no cp , palp, orthopnea    Gastrointestinal:	no n/v/d constipation    Genitourinary:	no dysuria of frequency, no hematuria, no flank pain    Musculoskeletal:	no bone or joint pain. no muscle aches.     Neurological:	no change in sensory or motor function. no headache. no weakness.     Psychiatric:	no depression, no anxiety, insomnia.     Hematology/Lymphatics:	no bleeding or bruising        Vitals  Vital Signs Last 24 Hrs  T(C): 36.9 (30 Aug 2021 14:46), Max: 36.9 (30 Aug 2021 14:46)  T(F): 98.4 (30 Aug 2021 14:46), Max: 98.4 (30 Aug 2021 14:46)  HR: 100 (30 Aug 2021 14:46) (92 - 120)  BP: 127/85 (30 Aug 2021 14:46) (117/90 - 135/98)  BP(mean): --  RR: 15 (30 Aug 2021 14:46) (15 - 18)  SpO2: 100% (30 Aug 2021 14:46) (98% - 100%)    Physical Exam  PHYSICAL EXAM:      Constitutional: NAD    Eyes: PERRLA EOMI, anicteric sclera    Heent :No oral sores, no pharyngeal injection. moist mucosa.    Neck: supple, no jvd, no LAD    Respiratory: CTA b/l     Cardiovascular: s1s2, no m/g/r    Gastrointestinal: soft, nt, nd, + BS    Extremities: no c/c/e    Neurological:A&O x 3 moves all ext.    Skin: no rash on exposed skin    Lymph Nodes: no lymphadenopathy.              Lab  CBC Full  -  ( 30 Aug 2021 08:28 )  WBC Count : 3.28 K/uL  RBC Count : 4.17 M/uL  Hemoglobin : 13.5 g/dL  Hematocrit : 40.4 %  Platelet Count - Automated : 303 K/uL  Mean Cell Volume : 96.9 fl  Mean Cell Hemoglobin : 32.4 pg  Mean Cell Hemoglobin Concentration : 33.4 gm/dL  Auto Neutrophil # : 2.20 K/uL  Auto Lymphocyte # : 0.59 K/uL  Auto Monocyte # : 0.48 K/uL  Auto Eosinophil # : 0.00 K/uL  Auto Basophil # : 0.00 K/uL  Auto Neutrophil % : 67.2 %  Auto Lymphocyte % : 18.1 %  Auto Monocyte % : 14.7 %  Auto Eosinophil % : 0.0 %  Auto Basophil % : 0.0 %    08-30    134<L>  |  99  |  12  ----------------------------<  243<H>  3.9   |  23  |  0.61    Ca    9.7      30 Aug 2021 08:28    TPro  7.1  /  Alb  3.6  /  TBili  0.5  /  DBili  x   /  AST  24  /  ALT  22  /  AlkPhos  236<H>  08-30        Rad:    Assessment/Plan

## 2021-08-30 NOTE — ED PROVIDER NOTE - PROGRESS NOTE DETAILS
Resident: Genna Lay (PGY2) – Pt with UTI, tachycardia, improved to HR 92 s/p 1L IVF, will tx with ceftriaxone. VSS, feeling better overall. We discussed the results of ED workup with the patient including the need for hospitalization for further management. Time was taken to answer any questions that the patient/family had.

## 2021-08-30 NOTE — CONSULT NOTE ADULT - SUBJECTIVE AND OBJECTIVE BOX
71yMale, history of lymphoma, gout, now admitted for chemotherapy admin, presents with 1 week of atraumatic mild left knee swelling and pain. Patient denies any inciting injuries, states he woke up one morning about a week ago with mild swelling and has had some difficulty ambulating. Denies fevers/chills at home. Takes allopurinol for gout, well controlled. Patient denies numbness or tingling in the LLE. No other orthopedic concerns.    PMH:  No pertinent past medical history    Cerebrovascular accident (CVA)    Diabetes mellitus    Hodgkin lymphoma      PSH:  No significant past surgical history      AH:    Meds: See med rec    T(C): 36.9 (08-30-21 @ 14:46)  HR: 100 (08-30-21 @ 14:46)  BP: 127/85 (08-30-21 @ 14:46)  RR: 15 (08-30-21 @ 14:46)  SpO2: 100% (08-30-21 @ 14:46)  Wt(kg): --    PE LLE:  Skin intact  No erythema/warmth compared c/l  Mild swelling  Joint Line TTP  Tolerates PROM 0-90  No pain with micromotion  Able to SLR  SILT L2-S1  +EHL/FHL/TA/Gastroc,  DP+, soft compartments, no calf ttp.    Secondary:  No TTP over bony landmarks, SILT BL, ROM intact BL, distal pulses palpable.    Imaging:  Procedure:  The benefits and risks of joint aspiration were explained to the patient, whom agreed to proceed with aspiration. Under aspetic conditions, ___ cc of <DESCRIPTION OF FLUID> fluid was removed from the affected joint. This fluid was distributed to a sterile urine cup for gram stain and cell culture, a lavender top laboratory tube for cell count, and a red top laboratory tube for crystal analysis. The patient tolerated the procedure well and there were no complications. Patient was neurovascularly intact after the procedure. Knee: Effusion    A/P: 71 Male with 1 week of atraumatic L Knee Swelling  - Fluid sent for analysis  - Would Keep NPO until cell count returns, hold DVT chemoprophylaxis   - WBAT  - Multimodal analgesia  - Plan pending fluid analysis, if I&D required would likely go tomorrow  - Discussed with attending    71yMale, history of lymphoma, gout, now admitted for chemotherapy admin, presents with 1 week of atraumatic mild left knee swelling and pain. Patient denies any inciting injuries, states he woke up one morning about a week ago with mild swelling and has had some difficulty ambulating. Denies fevers/chills at home. Takes allopurinol for gout, well controlled. Patient denies numbness or tingling in the LLE. No other orthopedic concerns.    PMH:  No pertinent past medical history    Cerebrovascular accident (CVA)    Diabetes mellitus    Hodgkin lymphoma      PSH:  No significant past surgical history      AH:    Meds: See med rec    T(C): 36.9 (08-30-21 @ 14:46)  HR: 100 (08-30-21 @ 14:46)  BP: 127/85 (08-30-21 @ 14:46)  RR: 15 (08-30-21 @ 14:46)  SpO2: 100% (08-30-21 @ 14:46)  Wt(kg): --    PE LLE:  Skin intact  No erythema/warmth compared c/l  Mild swelling  Joint Line TTP  Tolerates PROM 0-90  No pain with micromotion  Able to SLR  SILT L2-S1  +EHL/FHL/TA/Gastroc,  DP+, soft compartments, no calf ttp.    Secondary:  No TTP over bony landmarks, SILT BL, ROM intact BL, distal pulses palpable.    Imaging:  Procedure:  The benefits and risks of joint aspiration were explained to the patient, whom agreed to proceed with aspiration. Under aspetic conditions, 10 cc of SS fluid was removed from the affected joint. This fluid was distributed to a sterile urine cup for gram stain and cell culture, a lavender top laboratory tube for cell count, and a red top laboratory tube for crystal analysis. The patient tolerated the procedure well and there were no complications. Patient was neurovascularly intact after the procedure. Knee: Effusion    A/P: 71 Male with 1 week of atraumatic L Knee Swelling  - Low clinical suspicion for septic joint based on examination and aspirate appearance  - Fluid sent for analysis  - WBAT  - Multimodal analgesia  - Plan pending fluid analysis, if I&D required would likely go tomorrow  - Will update as needed  - Discussed with attending

## 2021-08-30 NOTE — ED PROVIDER NOTE - CLINICAL SUMMARY MEDICAL DECISION MAKING FREE TEXT BOX
70 y/o M w/ PMHx of CVA this past August and got TPA per family member, DM, BPH with obstruction s/p escamilla catheter, s/p ERCP w Sphincterectomy recent admission to [facility] for sepsis  Hodgkin lymphoma, on allopurinol and on opdivo (last given08/03) presents for immunotherapy. Dr. Alas was called, [facility] with number for call back. Dr. Kenji Mcmillan [phone] notified. Nursing staff at rehab facility called to gather more information re reason for patient's arrival in ED-clarified it was strictly for immunotherapy and patient does not have any other acute concerns ([phone]). 70 y/o M w/ PMHx of CVA this past August and got TPA per family member, DM, BPH with obstruction s/p escamilla catheter, s/p ERCP w Sphincterectomy recent admission to [facility] for sepsis  Hodgkin lymphoma, on allopurinol and on opdivo (last given08/03) presents for immunotherapy. Dr. Aals was called, [facility] with number for call back. Dr. Kenji Mcmillan [phone] notified. Nursing staff at rehab facility called to gather more information re reason for patient's arrival in ED-clarified it was strictly for immunotherapy and patient does not have any other acute concerns ([phone]).  ATTG: : sent for chemo infusion, here with noted tachycardia as well, concern for infection / dehydration, less likely, will check labs, check ekg,cultures, re eval for dispo

## 2021-08-31 LAB
ALBUMIN SERPL ELPH-MCNC: 3.3 G/DL — SIGNIFICANT CHANGE UP (ref 3.3–5)
ALP SERPL-CCNC: 195 U/L — HIGH (ref 40–120)
ALT FLD-CCNC: 17 U/L — SIGNIFICANT CHANGE UP (ref 10–45)
ANION GAP SERPL CALC-SCNC: 14 MMOL/L — SIGNIFICANT CHANGE UP (ref 5–17)
AST SERPL-CCNC: 20 U/L — SIGNIFICANT CHANGE UP (ref 10–40)
BASOPHILS # BLD AUTO: 0.03 K/UL — SIGNIFICANT CHANGE UP (ref 0–0.2)
BASOPHILS NFR BLD AUTO: 0.8 % — SIGNIFICANT CHANGE UP (ref 0–2)
BILIRUB SERPL-MCNC: 0.4 MG/DL — SIGNIFICANT CHANGE UP (ref 0.2–1.2)
BUN SERPL-MCNC: 10 MG/DL — SIGNIFICANT CHANGE UP (ref 7–23)
CALCIUM SERPL-MCNC: 9.5 MG/DL — SIGNIFICANT CHANGE UP (ref 8.4–10.5)
CHLORIDE SERPL-SCNC: 100 MMOL/L — SIGNIFICANT CHANGE UP (ref 96–108)
CO2 SERPL-SCNC: 23 MMOL/L — SIGNIFICANT CHANGE UP (ref 22–31)
COVID-19 SPIKE DOMAIN AB INTERP: POSITIVE
COVID-19 SPIKE DOMAIN ANTIBODY RESULT: 10.2 U/ML — HIGH
CREAT SERPL-MCNC: 0.62 MG/DL — SIGNIFICANT CHANGE UP (ref 0.5–1.3)
EOSINOPHIL # BLD AUTO: 0.07 K/UL — SIGNIFICANT CHANGE UP (ref 0–0.5)
EOSINOPHIL NFR BLD AUTO: 1.8 % — SIGNIFICANT CHANGE UP (ref 0–6)
ERYTHROCYTE [SEDIMENTATION RATE] IN BLOOD: 77 MM/HR — HIGH (ref 0–20)
GLUCOSE BLDC GLUCOMTR-MCNC: 119 MG/DL — HIGH (ref 70–99)
GLUCOSE BLDC GLUCOMTR-MCNC: 119 MG/DL — HIGH (ref 70–99)
GLUCOSE BLDC GLUCOMTR-MCNC: 140 MG/DL — HIGH (ref 70–99)
GLUCOSE BLDC GLUCOMTR-MCNC: 145 MG/DL — HIGH (ref 70–99)
GLUCOSE SERPL-MCNC: 104 MG/DL — HIGH (ref 70–99)
HCT VFR BLD CALC: 37.5 % — LOW (ref 39–50)
HGB BLD-MCNC: 12.5 G/DL — LOW (ref 13–17)
IMM GRANULOCYTES NFR BLD AUTO: 0.3 % — SIGNIFICANT CHANGE UP (ref 0–1.5)
LYMPHOCYTES # BLD AUTO: 0.83 K/UL — LOW (ref 1–3.3)
LYMPHOCYTES # BLD AUTO: 20.8 % — SIGNIFICANT CHANGE UP (ref 13–44)
MCHC RBC-ENTMCNC: 32.2 PG — SIGNIFICANT CHANGE UP (ref 27–34)
MCHC RBC-ENTMCNC: 33.3 GM/DL — SIGNIFICANT CHANGE UP (ref 32–36)
MCV RBC AUTO: 96.6 FL — SIGNIFICANT CHANGE UP (ref 80–100)
MONOCYTES # BLD AUTO: 0.52 K/UL — SIGNIFICANT CHANGE UP (ref 0–0.9)
MONOCYTES NFR BLD AUTO: 13 % — SIGNIFICANT CHANGE UP (ref 2–14)
NEUTROPHILS # BLD AUTO: 2.53 K/UL — SIGNIFICANT CHANGE UP (ref 1.8–7.4)
NEUTROPHILS NFR BLD AUTO: 63.3 % — SIGNIFICANT CHANGE UP (ref 43–77)
NRBC # BLD: 0 /100 WBCS — SIGNIFICANT CHANGE UP (ref 0–0)
PLATELET # BLD AUTO: 280 K/UL — SIGNIFICANT CHANGE UP (ref 150–400)
POTASSIUM SERPL-MCNC: 3.6 MMOL/L — SIGNIFICANT CHANGE UP (ref 3.5–5.3)
POTASSIUM SERPL-SCNC: 3.6 MMOL/L — SIGNIFICANT CHANGE UP (ref 3.5–5.3)
PROT SERPL-MCNC: 6.6 G/DL — SIGNIFICANT CHANGE UP (ref 6–8.3)
RBC # BLD: 3.88 M/UL — LOW (ref 4.2–5.8)
RBC # FLD: 16.8 % — HIGH (ref 10.3–14.5)
SARS-COV-2 IGG+IGM SERPL QL IA: 10.2 U/ML — HIGH
SARS-COV-2 IGG+IGM SERPL QL IA: POSITIVE
SODIUM SERPL-SCNC: 137 MMOL/L — SIGNIFICANT CHANGE UP (ref 135–145)
URATE SERPL-MCNC: 4.5 MG/DL — SIGNIFICANT CHANGE UP (ref 3.4–8.8)
WBC # BLD: 3.99 K/UL — SIGNIFICANT CHANGE UP (ref 3.8–10.5)
WBC # FLD AUTO: 3.99 K/UL — SIGNIFICANT CHANGE UP (ref 3.8–10.5)

## 2021-08-31 RX ORDER — OMEPRAZOLE 10 MG/1
2 CAPSULE, DELAYED RELEASE ORAL
Qty: 0 | Refills: 0 | DISCHARGE

## 2021-08-31 RX ORDER — LACTOBACILLUS ACIDOPHILUS 100MM CELL
1 CAPSULE ORAL
Qty: 0 | Refills: 0 | DISCHARGE

## 2021-08-31 RX ORDER — INSULIN GLARGINE 100 [IU]/ML
0 INJECTION, SOLUTION SUBCUTANEOUS
Qty: 0 | Refills: 0 | DISCHARGE

## 2021-08-31 RX ORDER — DEXAMETHASONE 0.5 MG/5ML
0.5 ELIXIR ORAL
Qty: 0 | Refills: 0 | DISCHARGE

## 2021-08-31 RX ADMIN — Medication 650 MILLIGRAM(S): at 18:30

## 2021-08-31 RX ADMIN — PANTOPRAZOLE SODIUM 40 MILLIGRAM(S): 20 TABLET, DELAYED RELEASE ORAL at 12:46

## 2021-08-31 RX ADMIN — Medication 650 MILLIGRAM(S): at 09:50

## 2021-08-31 RX ADMIN — QUETIAPINE FUMARATE 25 MILLIGRAM(S): 200 TABLET, FILM COATED ORAL at 21:55

## 2021-08-31 RX ADMIN — Medication 5 MILLIGRAM(S): at 21:54

## 2021-08-31 RX ADMIN — FINASTERIDE 5 MILLIGRAM(S): 5 TABLET, FILM COATED ORAL at 12:46

## 2021-08-31 RX ADMIN — INSULIN GLARGINE 45 UNIT(S): 100 INJECTION, SOLUTION SUBCUTANEOUS at 21:55

## 2021-08-31 RX ADMIN — SODIUM CHLORIDE 60 MILLILITER(S): 9 INJECTION INTRAMUSCULAR; INTRAVENOUS; SUBCUTANEOUS at 09:21

## 2021-08-31 RX ADMIN — Medication 15 UNIT(S): at 13:36

## 2021-08-31 RX ADMIN — Medication 15 UNIT(S): at 09:20

## 2021-08-31 RX ADMIN — Medication 650 MILLIGRAM(S): at 17:57

## 2021-08-31 RX ADMIN — Medication 81 MILLIGRAM(S): at 12:46

## 2021-08-31 RX ADMIN — Medication 300 MILLIGRAM(S): at 12:46

## 2021-08-31 RX ADMIN — Medication 650 MILLIGRAM(S): at 09:20

## 2021-08-31 RX ADMIN — SODIUM CHLORIDE 60 MILLILITER(S): 9 INJECTION INTRAMUSCULAR; INTRAVENOUS; SUBCUTANEOUS at 21:54

## 2021-08-31 RX ADMIN — TAMSULOSIN HYDROCHLORIDE 0.4 MILLIGRAM(S): 0.4 CAPSULE ORAL at 21:55

## 2021-09-01 LAB
CULTURE RESULTS: SIGNIFICANT CHANGE UP
GLUCOSE BLDC GLUCOMTR-MCNC: 113 MG/DL — HIGH (ref 70–99)
GLUCOSE BLDC GLUCOMTR-MCNC: 141 MG/DL — HIGH (ref 70–99)
GLUCOSE BLDC GLUCOMTR-MCNC: 145 MG/DL — HIGH (ref 70–99)
GLUCOSE BLDC GLUCOMTR-MCNC: 94 MG/DL — SIGNIFICANT CHANGE UP (ref 70–99)
GLUCOSE BLDC GLUCOMTR-MCNC: 98 MG/DL — SIGNIFICANT CHANGE UP (ref 70–99)
SPECIMEN SOURCE: SIGNIFICANT CHANGE UP

## 2021-09-01 RX ORDER — CEFTRIAXONE 500 MG/1
1000 INJECTION, POWDER, FOR SOLUTION INTRAMUSCULAR; INTRAVENOUS EVERY 24 HOURS
Refills: 0 | Status: DISCONTINUED | OUTPATIENT
Start: 2021-09-01 | End: 2021-09-02

## 2021-09-01 RX ORDER — INSULIN LISPRO 100/ML
10 VIAL (ML) SUBCUTANEOUS ONCE
Refills: 0 | Status: COMPLETED | OUTPATIENT
Start: 2021-09-01 | End: 2021-09-01

## 2021-09-01 RX ORDER — NIVOLUMAB 10 MG/ML
480 INJECTION INTRAVENOUS ONCE
Refills: 0 | Status: COMPLETED | OUTPATIENT
Start: 2021-09-01 | End: 2021-09-01

## 2021-09-01 RX ADMIN — INSULIN GLARGINE 45 UNIT(S): 100 INJECTION, SOLUTION SUBCUTANEOUS at 22:42

## 2021-09-01 RX ADMIN — Medication 10 UNIT(S): at 18:06

## 2021-09-01 RX ADMIN — CEFTRIAXONE 100 MILLIGRAM(S): 500 INJECTION, POWDER, FOR SOLUTION INTRAMUSCULAR; INTRAVENOUS at 11:46

## 2021-09-01 RX ADMIN — TAMSULOSIN HYDROCHLORIDE 0.4 MILLIGRAM(S): 0.4 CAPSULE ORAL at 22:06

## 2021-09-01 RX ADMIN — Medication 650 MILLIGRAM(S): at 19:24

## 2021-09-01 RX ADMIN — Medication 15 UNIT(S): at 09:07

## 2021-09-01 RX ADMIN — NIVOLUMAB 196 MILLIGRAM(S): 10 INJECTION INTRAVENOUS at 15:29

## 2021-09-01 RX ADMIN — Medication 15 UNIT(S): at 12:52

## 2021-09-01 RX ADMIN — Medication 650 MILLIGRAM(S): at 19:55

## 2021-09-01 RX ADMIN — QUETIAPINE FUMARATE 25 MILLIGRAM(S): 200 TABLET, FILM COATED ORAL at 22:06

## 2021-09-01 RX ADMIN — Medication 81 MILLIGRAM(S): at 11:46

## 2021-09-01 RX ADMIN — PANTOPRAZOLE SODIUM 40 MILLIGRAM(S): 20 TABLET, DELAYED RELEASE ORAL at 11:46

## 2021-09-01 RX ADMIN — Medication 5 MILLIGRAM(S): at 22:05

## 2021-09-01 RX ADMIN — SODIUM CHLORIDE 60 MILLILITER(S): 9 INJECTION INTRAMUSCULAR; INTRAVENOUS; SUBCUTANEOUS at 19:25

## 2021-09-01 RX ADMIN — Medication 300 MILLIGRAM(S): at 11:45

## 2021-09-01 RX ADMIN — FINASTERIDE 5 MILLIGRAM(S): 5 TABLET, FILM COATED ORAL at 11:46
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
## 2021-09-01 NOTE — CONSULT NOTE ADULT - ASSESSMENT
Impression:    Sacral/bilateral deep tissue injury present on admission  Right lateral malleolus deep tissue injury present on admission  Left lateral malleolus deep tissue injury present on admission  Incontinence of bowel and bladder  Incontinence Dermatitis    Recommend:  1.) topical therapy: Sacral/bilateral buttocks injury - cleanse with incontinence cleanser, pat dry, apply cavilon advanced to entire bilateral buttocks skin, cover sacral area with an allevyn foam dressing daily  Right and left malleolus injuries - cleanse with NS, pat dry, apply allveyn foam dressings daily  2.) Incontinence Management - incontinence cleanser, pads, pericare BID, continue female external urinary catheter  3.) Maintain on an alternating air with low air loss surface  4.) Turn and reposition Q 2 hours  5.) Nutrition optimization  6.) Offload heels/feet with complete cair air fluidized boots; ensure that the soles of the feet are not resting on the foot board of the bed.  7.) Ankle wounds referred to wound care time Podiatrists, Ernesto Fairchild/Say/Nancy.    Care as per medicine. Will not actively follow but will remain available. Please recall for new issues or deterioration.  Upon discharge f/u as outpatient at Wound Center [address] [place] [address] [phone]  Seen and discussed with clinical nurse  Thank you for this consult  Callie Stout, CONNIE-C, CWOCN [id number]

## 2021-09-02 LAB
ALBUMIN SERPL ELPH-MCNC: 3 G/DL — LOW (ref 3.3–5)
ALP SERPL-CCNC: 228 U/L — HIGH (ref 40–120)
ALT FLD-CCNC: 28 U/L — SIGNIFICANT CHANGE UP (ref 10–45)
ANION GAP SERPL CALC-SCNC: 16 MMOL/L — SIGNIFICANT CHANGE UP (ref 5–17)
AST SERPL-CCNC: 44 U/L — HIGH (ref 10–40)
BASOPHILS # BLD AUTO: 0.03 K/UL — SIGNIFICANT CHANGE UP (ref 0–0.2)
BASOPHILS NFR BLD AUTO: 0.5 % — SIGNIFICANT CHANGE UP (ref 0–2)
BILIRUB SERPL-MCNC: 0.3 MG/DL — SIGNIFICANT CHANGE UP (ref 0.2–1.2)
BUN SERPL-MCNC: 12 MG/DL — SIGNIFICANT CHANGE UP (ref 7–23)
CALCIUM SERPL-MCNC: 9 MG/DL — SIGNIFICANT CHANGE UP (ref 8.4–10.5)
CHLORIDE SERPL-SCNC: 103 MMOL/L — SIGNIFICANT CHANGE UP (ref 96–108)
CO2 SERPL-SCNC: 19 MMOL/L — LOW (ref 22–31)
CREAT SERPL-MCNC: 0.53 MG/DL — SIGNIFICANT CHANGE UP (ref 0.5–1.3)
CULTURE RESULTS: SIGNIFICANT CHANGE UP
EOSINOPHIL # BLD AUTO: 0.07 K/UL — SIGNIFICANT CHANGE UP (ref 0–0.5)
EOSINOPHIL NFR BLD AUTO: 1.1 % — SIGNIFICANT CHANGE UP (ref 0–6)
GLUCOSE BLDC GLUCOMTR-MCNC: 100 MG/DL — HIGH (ref 70–99)
GLUCOSE BLDC GLUCOMTR-MCNC: 119 MG/DL — HIGH (ref 70–99)
GLUCOSE BLDC GLUCOMTR-MCNC: 128 MG/DL — HIGH (ref 70–99)
GLUCOSE BLDC GLUCOMTR-MCNC: 138 MG/DL — HIGH (ref 70–99)
GLUCOSE SERPL-MCNC: 121 MG/DL — HIGH (ref 70–99)
HCT VFR BLD CALC: 37.1 % — LOW (ref 39–50)
HGB BLD-MCNC: 12.5 G/DL — LOW (ref 13–17)
IMM GRANULOCYTES NFR BLD AUTO: 0.3 % — SIGNIFICANT CHANGE UP (ref 0–1.5)
LDH SERPL L TO P-CCNC: 186 U/L — SIGNIFICANT CHANGE UP (ref 50–242)
LDH SERPL L TO P-CCNC: 198 U/L — SIGNIFICANT CHANGE UP (ref 50–242)
LYMPHOCYTES # BLD AUTO: 0.86 K/UL — LOW (ref 1–3.3)
LYMPHOCYTES # BLD AUTO: 13.7 % — SIGNIFICANT CHANGE UP (ref 13–44)
MCHC RBC-ENTMCNC: 32 PG — SIGNIFICANT CHANGE UP (ref 27–34)
MCHC RBC-ENTMCNC: 33.7 GM/DL — SIGNIFICANT CHANGE UP (ref 32–36)
MCV RBC AUTO: 94.9 FL — SIGNIFICANT CHANGE UP (ref 80–100)
MONOCYTES # BLD AUTO: 0.6 K/UL — SIGNIFICANT CHANGE UP (ref 0–0.9)
MONOCYTES NFR BLD AUTO: 9.5 % — SIGNIFICANT CHANGE UP (ref 2–14)
NEUTROPHILS # BLD AUTO: 4.72 K/UL — SIGNIFICANT CHANGE UP (ref 1.8–7.4)
NEUTROPHILS NFR BLD AUTO: 74.9 % — SIGNIFICANT CHANGE UP (ref 43–77)
NRBC # BLD: 0 /100 WBCS — SIGNIFICANT CHANGE UP (ref 0–0)
PLATELET # BLD AUTO: 275 K/UL — SIGNIFICANT CHANGE UP (ref 150–400)
POTASSIUM SERPL-MCNC: 3.6 MMOL/L — SIGNIFICANT CHANGE UP (ref 3.5–5.3)
POTASSIUM SERPL-SCNC: 3.6 MMOL/L — SIGNIFICANT CHANGE UP (ref 3.5–5.3)
PROT SERPL-MCNC: 6.4 G/DL — SIGNIFICANT CHANGE UP (ref 6–8.3)
RBC # BLD: 3.91 M/UL — LOW (ref 4.2–5.8)
RBC # FLD: 16.5 % — HIGH (ref 10.3–14.5)
SODIUM SERPL-SCNC: 138 MMOL/L — SIGNIFICANT CHANGE UP (ref 135–145)
SPECIMEN SOURCE: SIGNIFICANT CHANGE UP
URATE SERPL-MCNC: 3.8 MG/DL — SIGNIFICANT CHANGE UP (ref 3.4–8.8)
URATE SERPL-MCNC: 3.9 MG/DL — SIGNIFICANT CHANGE UP (ref 3.4–8.8)
WBC # BLD: 6.3 K/UL — SIGNIFICANT CHANGE UP (ref 3.8–10.5)
WBC # FLD AUTO: 6.3 K/UL — SIGNIFICANT CHANGE UP (ref 3.8–10.5)

## 2021-09-02 RX ORDER — ACETAMINOPHEN 500 MG
1000 TABLET ORAL ONCE
Refills: 0 | Status: COMPLETED | OUTPATIENT
Start: 2021-09-02 | End: 2021-09-02

## 2021-09-02 RX ADMIN — Medication 1000 MILLIGRAM(S): at 13:48

## 2021-09-02 RX ADMIN — SODIUM CHLORIDE 60 MILLILITER(S): 9 INJECTION INTRAMUSCULAR; INTRAVENOUS; SUBCUTANEOUS at 21:39

## 2021-09-02 RX ADMIN — Medication 15 UNIT(S): at 17:35

## 2021-09-02 RX ADMIN — Medication 81 MILLIGRAM(S): at 12:35

## 2021-09-02 RX ADMIN — Medication 15 UNIT(S): at 12:36

## 2021-09-02 RX ADMIN — Medication 300 MILLIGRAM(S): at 12:35

## 2021-09-02 RX ADMIN — INSULIN GLARGINE 45 UNIT(S): 100 INJECTION, SOLUTION SUBCUTANEOUS at 21:59

## 2021-09-02 RX ADMIN — Medication 5 MILLIGRAM(S): at 21:58

## 2021-09-02 RX ADMIN — CEFTRIAXONE 100 MILLIGRAM(S): 500 INJECTION, POWDER, FOR SOLUTION INTRAMUSCULAR; INTRAVENOUS at 12:34

## 2021-09-02 RX ADMIN — PANTOPRAZOLE SODIUM 40 MILLIGRAM(S): 20 TABLET, DELAYED RELEASE ORAL at 12:35

## 2021-09-02 RX ADMIN — QUETIAPINE FUMARATE 25 MILLIGRAM(S): 200 TABLET, FILM COATED ORAL at 21:58

## 2021-09-02 RX ADMIN — Medication 30 MILLIGRAM(S): at 17:34

## 2021-09-02 RX ADMIN — Medication 15 UNIT(S): at 09:03

## 2021-09-02 RX ADMIN — SODIUM CHLORIDE 60 MILLILITER(S): 9 INJECTION INTRAMUSCULAR; INTRAVENOUS; SUBCUTANEOUS at 10:40

## 2021-09-02 RX ADMIN — TAMSULOSIN HYDROCHLORIDE 0.4 MILLIGRAM(S): 0.4 CAPSULE ORAL at 21:59

## 2021-09-02 RX ADMIN — Medication 400 MILLIGRAM(S): at 12:31

## 2021-09-02 RX ADMIN — FINASTERIDE 5 MILLIGRAM(S): 5 TABLET, FILM COATED ORAL at 12:35

## 2021-09-02 NOTE — DIETITIAN INITIAL EVALUATION ADULT. - ORAL INTAKE PTA/DIET HISTORY
Pt reports good appetite and PO intake PTA; states his family was bringing him food to rehab. Confirms NKFA. Reports not taking any vitamins/minerals PTA; states drinking Ensure Original 1xday. Pt reports not following any type of diet or restriction at home. Reports monitoring BG 3xday with ranges between 125 - 180 mg/dl and states taking Janumet at home; HbA1c (07/01) 7.7% - indicates fair BG control.

## 2021-09-02 NOTE — DIETITIAN INITIAL EVALUATION ADULT. - PHYSCIAL ASSESSMENT
well nourished Skin: pressure ulcers in ade. buttocks stage 2 and sacrum, ade. buttocks, and ade. malleolus suspected deep tissue injury as per documentation.  No visual signs of muscle/fat loss noted.

## 2021-09-02 NOTE — DIETITIAN INITIAL EVALUATION ADULT. - REASON INDICATOR FOR ASSESSMENT
Nutrition consult received for pressure ulcer >2.  Information obtained from: medical record, previous RD note, and pt.

## 2021-09-02 NOTE — DIETITIAN INITIAL EVALUATION ADULT. - REASON FOR ADMISSION
Pt 72 y/o M with PMH as per chart: CVA this past August and got TPA per family member, DM, BPH with obstruction S/P escamilla catheter, S/P ERCP w Sphincterectomy, recent admission to [facility] for sepsis, Hodgkin lymphoma, received Opdivo last admission, sent to rehab, now returning with left knee pain and swelling and for planned chemo.

## 2021-09-02 NOTE — PHYSICAL THERAPY INITIAL EVALUATION ADULT - ADDITIONAL COMMENTS
Pt from [facility], previously lives in  with spouse 5 [address] plus 7 steps inside and was I with all ADLs without use of AD but owns RW Pt from rehab, previously lives in  with spouse 5 [address] plus 7 steps inside and was I with all ADLs without use of AD but owns RW

## 2021-09-02 NOTE — DIETITIAN INITIAL EVALUATION ADULT. - DIET TYPE
1. Recommend Consistent Carbohydrate with snack diet. Will continue to monitor as able and adjust as needed. 2. Recommend Glucerna Shake 240mls 2x daily (440kcals, 20g protein) to optimize kcal and protein intake. Spoke to NP.

## 2021-09-02 NOTE — DIETITIAN INITIAL EVALUATION ADULT. - ADD RECOMMEND
1. Will continue to monitor PO intake, weight, labs, skin, GI status, diet. 2. Encourage PO intake and obtain food preferences as able (obtained at this time). 3. Consider Multivitamin and Vitamin C ONLY if medically feasible to optimize nutrient intake and further aid with pressure ulcer healing. 4. Discussed the importance of adequate kcal and protein intake for chemo and to help with skin healing - made aware RD remains available.

## 2021-09-02 NOTE — DIETITIAN INITIAL EVALUATION ADULT. - OTHER INFO
Pt reports continues with good appetite and PO intake in house. Requests Glucerna. Denies difficulty chewing/swallowing. Pt denies nausea, vomiting, diarrhea, or constipation, reports last BM yesterday (09/01).     Pt reports approximately 50 pounds weight gain x 3 months PTA due to increased PO intake, from 160 to 210 pounds. Weight as per previous RD note (07/01/2021) 193.3 pounds. Weight as per flow sheets (08/31) 205 pounds.     Discussed the importance of adequate kcal and protein intake for chemo and to help with skin healing; reviewed foods with protein and menu order procedures. Pt denies having further questions/concerns about diet and nutrition - made aware RD remains available.

## 2021-09-02 NOTE — PHYSICAL THERAPY INITIAL EVALUATION ADULT - PERTINENT HX OF CURRENT PROBLEM, REHAB EVAL
72 y/o M w/ PMHx of CVA this past August and got TPA per family member, DM, BPH with obstruction s/p escamilla catheter, s/p ERCP w Sphincterectomy recent admission to [facility] for sepsis,  Hodgkin lymphoma received opdivo last admission and sent to rehab now returning due to L knee pain

## 2021-09-02 NOTE — PHYSICAL THERAPY INITIAL EVALUATION ADULT - ACTIVE RANGE OF MOTION EXAMINATION, REHAB EVAL
ade. upper extremity Active ROM was WNL (within normal limits)/RLE Active ROM was WNL (within normal limits)

## 2021-09-03 LAB
ALBUMIN SERPL ELPH-MCNC: 3.2 G/DL — LOW (ref 3.3–5)
ALP SERPL-CCNC: 223 U/L — HIGH (ref 40–120)
ALT FLD-CCNC: 32 U/L — SIGNIFICANT CHANGE UP (ref 10–45)
ANION GAP SERPL CALC-SCNC: 16 MMOL/L — SIGNIFICANT CHANGE UP (ref 5–17)
AST SERPL-CCNC: 46 U/L — HIGH (ref 10–40)
BILIRUB SERPL-MCNC: 0.3 MG/DL — SIGNIFICANT CHANGE UP (ref 0.2–1.2)
BUN SERPL-MCNC: 14 MG/DL — SIGNIFICANT CHANGE UP (ref 7–23)
CALCIUM SERPL-MCNC: 9.6 MG/DL — SIGNIFICANT CHANGE UP (ref 8.4–10.5)
CHLORIDE SERPL-SCNC: 103 MMOL/L — SIGNIFICANT CHANGE UP (ref 96–108)
CO2 SERPL-SCNC: 20 MMOL/L — LOW (ref 22–31)
CREAT SERPL-MCNC: 0.63 MG/DL — SIGNIFICANT CHANGE UP (ref 0.5–1.3)
GLUCOSE BLDC GLUCOMTR-MCNC: 165 MG/DL — HIGH (ref 70–99)
GLUCOSE BLDC GLUCOMTR-MCNC: 244 MG/DL — HIGH (ref 70–99)
GLUCOSE BLDC GLUCOMTR-MCNC: 247 MG/DL — HIGH (ref 70–99)
GLUCOSE BLDC GLUCOMTR-MCNC: 249 MG/DL — HIGH (ref 70–99)
GLUCOSE SERPL-MCNC: 162 MG/DL — HIGH (ref 70–99)
HCT VFR BLD CALC: 36.5 % — LOW (ref 39–50)
HGB BLD-MCNC: 12.1 G/DL — LOW (ref 13–17)
LDH SERPL L TO P-CCNC: 180 U/L — SIGNIFICANT CHANGE UP (ref 50–242)
MCHC RBC-ENTMCNC: 31.5 PG — SIGNIFICANT CHANGE UP (ref 27–34)
MCHC RBC-ENTMCNC: 33.2 GM/DL — SIGNIFICANT CHANGE UP (ref 32–36)
MCV RBC AUTO: 95.1 FL — SIGNIFICANT CHANGE UP (ref 80–100)
NRBC # BLD: 0 /100 WBCS — SIGNIFICANT CHANGE UP (ref 0–0)
PLATELET # BLD AUTO: 285 K/UL — SIGNIFICANT CHANGE UP (ref 150–400)
POTASSIUM SERPL-MCNC: 3.8 MMOL/L — SIGNIFICANT CHANGE UP (ref 3.5–5.3)
POTASSIUM SERPL-SCNC: 3.8 MMOL/L — SIGNIFICANT CHANGE UP (ref 3.5–5.3)
PROT SERPL-MCNC: 6.4 G/DL — SIGNIFICANT CHANGE UP (ref 6–8.3)
RBC # BLD: 3.84 M/UL — LOW (ref 4.2–5.8)
RBC # FLD: 16.2 % — HIGH (ref 10.3–14.5)
SODIUM SERPL-SCNC: 139 MMOL/L — SIGNIFICANT CHANGE UP (ref 135–145)
URATE SERPL-MCNC: 4.2 MG/DL — SIGNIFICANT CHANGE UP (ref 3.4–8.8)
WBC # BLD: 6.01 K/UL — SIGNIFICANT CHANGE UP (ref 3.8–10.5)
WBC # FLD AUTO: 6.01 K/UL — SIGNIFICANT CHANGE UP (ref 3.8–10.5)

## 2021-09-03 RX ADMIN — Medication 15 UNIT(S): at 13:16

## 2021-09-03 RX ADMIN — SODIUM CHLORIDE 60 MILLILITER(S): 9 INJECTION INTRAMUSCULAR; INTRAVENOUS; SUBCUTANEOUS at 06:43

## 2021-09-03 RX ADMIN — TAMSULOSIN HYDROCHLORIDE 0.4 MILLIGRAM(S): 0.4 CAPSULE ORAL at 21:43

## 2021-09-03 RX ADMIN — Medication 40 MILLIGRAM(S): at 06:09

## 2021-09-03 RX ADMIN — PANTOPRAZOLE SODIUM 40 MILLIGRAM(S): 20 TABLET, DELAYED RELEASE ORAL at 13:15

## 2021-09-03 RX ADMIN — Medication 15 UNIT(S): at 18:08

## 2021-09-03 RX ADMIN — FINASTERIDE 5 MILLIGRAM(S): 5 TABLET, FILM COATED ORAL at 13:14

## 2021-09-03 RX ADMIN — INSULIN GLARGINE 45 UNIT(S): 100 INJECTION, SOLUTION SUBCUTANEOUS at 22:17

## 2021-09-03 RX ADMIN — Medication 5 MILLIGRAM(S): at 21:43

## 2021-09-03 RX ADMIN — Medication 4: at 18:07

## 2021-09-03 RX ADMIN — SODIUM CHLORIDE 60 MILLILITER(S): 9 INJECTION INTRAMUSCULAR; INTRAVENOUS; SUBCUTANEOUS at 21:45

## 2021-09-03 RX ADMIN — QUETIAPINE FUMARATE 25 MILLIGRAM(S): 200 TABLET, FILM COATED ORAL at 21:43

## 2021-09-03 RX ADMIN — Medication 2: at 09:05

## 2021-09-03 RX ADMIN — Medication 15 UNIT(S): at 09:06

## 2021-09-03 RX ADMIN — Medication 81 MILLIGRAM(S): at 13:14

## 2021-09-03 RX ADMIN — Medication 300 MILLIGRAM(S): at 13:15

## 2021-09-03 RX ADMIN — SODIUM CHLORIDE 60 MILLILITER(S): 9 INJECTION INTRAMUSCULAR; INTRAVENOUS; SUBCUTANEOUS at 22:18

## 2021-09-03 RX ADMIN — Medication 4: at 13:15

## 2021-09-03 RX ADMIN — SODIUM CHLORIDE 60 MILLILITER(S): 9 INJECTION INTRAMUSCULAR; INTRAVENOUS; SUBCUTANEOUS at 09:07

## 2021-09-04 LAB
ANION GAP SERPL CALC-SCNC: 12 MMOL/L — SIGNIFICANT CHANGE UP (ref 5–17)
BUN SERPL-MCNC: 19 MG/DL — SIGNIFICANT CHANGE UP (ref 7–23)
CALCIUM SERPL-MCNC: 9.2 MG/DL — SIGNIFICANT CHANGE UP (ref 8.4–10.5)
CHLORIDE SERPL-SCNC: 106 MMOL/L — SIGNIFICANT CHANGE UP (ref 96–108)
CO2 SERPL-SCNC: 20 MMOL/L — LOW (ref 22–31)
CREAT SERPL-MCNC: 0.65 MG/DL — SIGNIFICANT CHANGE UP (ref 0.5–1.3)
CULTURE RESULTS: SIGNIFICANT CHANGE UP
GLUCOSE BLDC GLUCOMTR-MCNC: 155 MG/DL — HIGH (ref 70–99)
GLUCOSE BLDC GLUCOMTR-MCNC: 284 MG/DL — HIGH (ref 70–99)
GLUCOSE BLDC GLUCOMTR-MCNC: 294 MG/DL — HIGH (ref 70–99)
GLUCOSE BLDC GLUCOMTR-MCNC: 330 MG/DL — HIGH (ref 70–99)
GLUCOSE SERPL-MCNC: 186 MG/DL — HIGH (ref 70–99)
HCT VFR BLD CALC: 34.7 % — LOW (ref 39–50)
HGB BLD-MCNC: 11.5 G/DL — LOW (ref 13–17)
MCHC RBC-ENTMCNC: 31.9 PG — SIGNIFICANT CHANGE UP (ref 27–34)
MCHC RBC-ENTMCNC: 33.1 GM/DL — SIGNIFICANT CHANGE UP (ref 32–36)
MCV RBC AUTO: 96.1 FL — SIGNIFICANT CHANGE UP (ref 80–100)
NRBC # BLD: 0 /100 WBCS — SIGNIFICANT CHANGE UP (ref 0–0)
PLATELET # BLD AUTO: 281 K/UL — SIGNIFICANT CHANGE UP (ref 150–400)
POTASSIUM SERPL-MCNC: 3.3 MMOL/L — LOW (ref 3.5–5.3)
POTASSIUM SERPL-SCNC: 3.3 MMOL/L — LOW (ref 3.5–5.3)
RBC # BLD: 3.61 M/UL — LOW (ref 4.2–5.8)
RBC # FLD: 16.5 % — HIGH (ref 10.3–14.5)
SODIUM SERPL-SCNC: 138 MMOL/L — SIGNIFICANT CHANGE UP (ref 135–145)
SPECIMEN SOURCE: SIGNIFICANT CHANGE UP
WBC # BLD: 5.43 K/UL — SIGNIFICANT CHANGE UP (ref 3.8–10.5)
WBC # FLD AUTO: 5.43 K/UL — SIGNIFICANT CHANGE UP (ref 3.8–10.5)

## 2021-09-04 RX ORDER — POTASSIUM CHLORIDE 20 MEQ
20 PACKET (EA) ORAL ONCE
Refills: 0 | Status: COMPLETED | OUTPATIENT
Start: 2021-09-04 | End: 2021-09-04

## 2021-09-04 RX ADMIN — Medication 2: at 22:17

## 2021-09-04 RX ADMIN — Medication 15 UNIT(S): at 07:49

## 2021-09-04 RX ADMIN — Medication 15 UNIT(S): at 18:51

## 2021-09-04 RX ADMIN — Medication 300 MILLIGRAM(S): at 12:54

## 2021-09-04 RX ADMIN — PANTOPRAZOLE SODIUM 40 MILLIGRAM(S): 20 TABLET, DELAYED RELEASE ORAL at 13:32

## 2021-09-04 RX ADMIN — INSULIN GLARGINE 45 UNIT(S): 100 INJECTION, SOLUTION SUBCUTANEOUS at 22:17

## 2021-09-04 RX ADMIN — Medication 15 UNIT(S): at 13:31

## 2021-09-04 RX ADMIN — TAMSULOSIN HYDROCHLORIDE 0.4 MILLIGRAM(S): 0.4 CAPSULE ORAL at 22:13

## 2021-09-04 RX ADMIN — Medication 8: at 13:25

## 2021-09-04 RX ADMIN — Medication 81 MILLIGRAM(S): at 13:31

## 2021-09-04 RX ADMIN — FINASTERIDE 5 MILLIGRAM(S): 5 TABLET, FILM COATED ORAL at 12:54

## 2021-09-04 RX ADMIN — Medication 5 MILLIGRAM(S): at 22:14

## 2021-09-04 RX ADMIN — QUETIAPINE FUMARATE 25 MILLIGRAM(S): 200 TABLET, FILM COATED ORAL at 22:14

## 2021-09-04 RX ADMIN — Medication 6: at 17:45

## 2021-09-04 RX ADMIN — Medication 20 MILLIEQUIVALENT(S): at 12:53

## 2021-09-04 RX ADMIN — Medication 40 MILLIGRAM(S): at 05:35

## 2021-09-04 NOTE — PROGRESS NOTE ADULT - PROVIDER SPECIALTY LIST ADULT
Heme/Onc
Heme/Onc
Internal Medicine
Internal Medicine
Heme/Onc
Heme/Onc
Internal Medicine
Internal Medicine
Podiatry
Heme/Onc
Internal Medicine

## 2021-09-04 NOTE — PROGRESS NOTE ADULT - ASSESSMENT
- lymphoma: d/w Dr. Larios: s/p immunotherapy  and first dose    pt s/p 1st rx with opdivo 7/6. Planning for sec dose     - L knee swelling : check Xray   s/p artherocentesis : neg for crystals . infection   fu culture      - DM : claus FS     - anemia: monitor H/H post transfusion, stable.     - voice changes: CT neck : Asymmetric enlargement of the left palatine tonsil, suspicious for lymphomatous involvement given history. Correlate with direct visualization.  ENT had eval pt: no gross pathology on visualization   S/S eval: MBS done.    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    
1.  Classic Hodgkin's disease.  Will likely give 1 more course of treatment with Opdivo, we will hopefully be able to switch the patient over to regular chemotherapy with subsequent cycles.  -He will need pulmonary function test before this is possible  - opdivo planned for wed.  case d/w CTX RN- [address]     2.  Neuro-oncology.  Extradural disease.  While in house, I will arrange for the patient to be seen by neuro oncology     3.  Knee pain.  Left.  ortho on board     4.  Indwelling Sauceda catheter.  Consider trial of voiding during hospital stay      
Hodgkin's lymphoma for treatment with Opdivo today and then likely back to rehab in coming days.  per primary oncologist Dr. Larios plan eventually for more traditional chemotherapy once PS improves more.  He does appear to be slowly improving.    Anemia is mild and is likely an AOCD process due to malignancy and Hgb adequate and can monitor at this time.    Leucopenia but ANC is okay so just monitor and will dose G-CSF  as needed.      Check an LDH and uric acid in AM.
Hodgkins lymphoma s/p dose of Opdivo awaiting to go back to rehab but apparently there are insurance issues. No treatment planned while he is at rehab.    Anemia AOCd is mild and can monitor.    leucopenia is better.     d/w attending dr garcia
Patient visited at bedside INAD. Patient relates long thick painful toenails. No signs of any Ankle wounds or ulcerations  DP and PT non-palpable both feet  No signs of cellulitis no signs of interspace maceration. Debride all thick elongated mycotic dystrophic toenails 1,2,3,4,5 both feet with sterile nail clipper without incident  recommend patient continue with prevalon boots for ulcer prevention  reconsult podiatry as needed  
  - lymphoma: d/w Dr. Larios: s/p immunotherapy  and first dose    pt s/p 1st rx with opdivo 7/6.  no s/p sec immunotherapy       - L knee swelling : s/p artherocentesis : neg for crystals . infection     culture neg   pt still with swelling and difficulty walking  will give one dose of steroids IV once    - DM : monitor FS     - anemia: monitor H/H     - voice changes: CT neck : Asymmetric enlargement of the left palatine tonsil, suspicious for lymphomatous involvement given history. Correlate with direct visualization.  ENT had eval pt: no gross pathology on visualization   S/S eval: MBS done.    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    
patient with HL.  he has had a poor PS so he has been treated with Opdivo as opposed to more traditional chemotherapy, however plan is to give him chemo once he does get stronger. he had Opdivo yesterday.  He is to go back to rehab at some point.  LDH and uric acid are not elevated and kidney function is okay.  Mild anemia is an AOCD process and can monitor.  leucopenia is better today.     needs to have escamilla re inserted and house team notified.  they will d/w attending dr garcia
  - lymphoma: d/w Dr. Larios: s/p immunotherapy  and first dose    pt s/p 1st rx with opdivo 7/6.  no s/p sec immunotherapy       - L knee swelling : s/p artherocentesis : neg for crystals . infection     culture neg   pt still with swelling and difficulty walking  will give one dose of steroids IV once    - DM : monitor FS     - anemia: monitor H/H     - voice changes: CT neck : Asymmetric enlargement of the left palatine tonsil, suspicious for lymphomatous involvement given history. Correlate with direct visualization.  ENT had eval pt: no gross pathology on visualization   S/S eval: MBS done.    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    
  - lymphoma: d/w Dr. Larios: s/p immunotherapy  and first dose    pt s/p 1st rx with opdivo 7/6. Planning for sec dose     - L knee swelling : check Xray   s/p artherocentesis : neg for crystals . infection   fu culture      - DM : claus FS     - anemia: monitor H/H post transfusion, stable.     - voice changes: CT neck : Asymmetric enlargement of the left palatine tonsil, suspicious for lymphomatous involvement given history. Correlate with direct visualization.  ENT had eval pt: no gross pathology on visualization   S/S eval: MBS done.    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    
Hodgkin's lymphoma advanced disease at this time being treated with Opdivo and had a dose a few days ago.  Hope is to give chemo in coming months once his PS improves.    Anemia is mild.  It is an AOCD process. Hgb adequate and can monitor for now.  
  - lymphoma: d/w Dr. Larios: s/p immunotherapy  and first dose    pt s/p 1st rx with opdivo 7/6.  no s/p sec immunotherapy       - L knee swelling : s/p artherocentesis : neg for crystals . infection     culture neg   pt still with swelling and difficulty walking  will give one dose of steroids IV once    - DM : monitor FS     - anemia: monitor H/H     - voice changes: CT neck : Asymmetric enlargement of the left palatine tonsil, suspicious for lymphomatous involvement given history. Correlate with direct visualization.  ENT had eval pt: no gross pathology on visualization   S/S eval: MBS done.    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

## 2021-09-04 NOTE — PROGRESS NOTE ADULT - SUBJECTIVE AND OBJECTIVE BOX
Date of service: 09-01-21 @ 23:20      Patient is a 71y old  Male who presents with a chief complaint of Planned chemo (01 Sep 2021 13:05)                                                               INTERVAL HPI/OVERNIGHT EVENTS:    REVIEW OF SYSTEMS:     CONSTITUTIONAL: No weakness, fevers or chills  EYES/ENT: No visual changes , no ear ache   NECK: No pain or stiffness  RESPIRATORY: No cough, wheezing,  No shortness of breath  CARDIOVASCULAR: No chest pain or palpitations  GASTROINTESTINAL: No abdominal pain  . No nausea, vomiting, or hematemesis; No diarrhea or constipation. No melena or hematochezia.  GENITOURINARY: No dysuria, frequency or hematuria  NEUROLOGICAL: No numbness or weakness  SKIN: No itching, burning, rashes, or lesions                                                                                                                                                                                                                                                                                 Medications:  MEDICATIONS  (STANDING):  allopurinol 300 milliGRAM(s) Oral daily  aspirin  chewable 81 milliGRAM(s) Oral daily  cefTRIAXone   IVPB 1000 milliGRAM(s) IV Intermittent every 24 hours  dextrose 40% Gel 15 Gram(s) Oral once  dextrose 5%. 1000 milliLiter(s) (50 mL/Hr) IV Continuous <Continuous>  dextrose 5%. 1000 milliLiter(s) (100 mL/Hr) IV Continuous <Continuous>  dextrose 50% Injectable 12.5 Gram(s) IV Push once  dextrose 50% Injectable 25 Gram(s) IV Push once  dextrose 50% Injectable 25 Gram(s) IV Push once  finasteride 5 milliGRAM(s) Oral daily  glucagon  Injectable 1 milliGRAM(s) IntraMuscular once  insulin glargine Injectable (LANTUS) 45 Unit(s) SubCutaneous at bedtime  insulin lispro (ADMELOG) corrective regimen sliding scale   SubCutaneous three times a day before meals  insulin lispro (ADMELOG) corrective regimen sliding scale   SubCutaneous at bedtime  insulin lispro Injectable (ADMELOG) 15 Unit(s) SubCutaneous three times a day before meals  melatonin 5 milliGRAM(s) Oral at bedtime  pantoprazole   Suspension 40 milliGRAM(s) Oral daily  QUEtiapine 25 milliGRAM(s) Oral at bedtime  senna 2 Tablet(s) Oral at bedtime  sodium chloride 0.9%. 1000 milliLiter(s) (60 mL/Hr) IV Continuous <Continuous>  tamsulosin 0.4 milliGRAM(s) Oral at bedtime    MEDICATIONS  (PRN):  acetaminophen   Tablet .. 650 milliGRAM(s) Oral every 6 hours PRN Mild Pain (1 - 3), Moderate Pain (4 - 6)  bisacodyl 5 milliGRAM(s) Oral every 12 hours PRN Constipation  polyethylene glycol 3350 17 Gram(s) Oral daily PRN Constipation       Allergies    No Known Allergies    Intolerances      Vital Signs Last 24 Hrs  T(C): 36.8 (01 Sep 2021 20:36), Max: 36.8 (01 Sep 2021 20:36)  T(F): 98.3 (01 Sep 2021 20:36), Max: 98.3 (01 Sep 2021 20:36)  HR: 88 (01 Sep 2021 20:36) (85 - 110)  BP: 137/85 (01 Sep 2021 20:36) (123/77 - 137/85)  BP(mean): --  RR: 18 (01 Sep 2021 20:36) (17 - 18)  SpO2: 98% (01 Sep 2021 20:36) (96% - 99%)  CAPILLARY BLOOD GLUCOSE      POCT Blood Glucose.: 141 mg/dL (01 Sep 2021 22:40)  POCT Blood Glucose.: 98 mg/dL (01 Sep 2021 21:52)  POCT Blood Glucose.: 94 mg/dL (01 Sep 2021 17:25)  POCT Blood Glucose.: 113 mg/dL (01 Sep 2021 12:23)  POCT Blood Glucose.: 145 mg/dL (01 Sep 2021 08:58)      08-31 @ 07:01 - 09-01 @ 07:00  --------------------------------------------------------  IN: 240 mL / OUT: 1050 mL / NET: -810 mL    09-01 @ 07:01 - 09-01 @ 23:20  --------------------------------------------------------  IN: 98 mL / OUT: 720 mL / NET: -622 mL      Physical Exam:    Daily     Daily   General:  Well appearing, NAD, not cachetic  HEENT:  Nonicteric, PERRLA  CV:  RRR, S1S2   Lungs:  CTA B/L, no wheezes, rales, rhonchi  Abdomen:  Soft, non-tender, no distended, positive BS  Extremities:  2+ pulses, no c/c, no edema  Skin:  Warm and dry, no rashes  :  No escamilla  Neuro:  AAOx3, non-focal, grossly intact                                                                                                                                                                                                                                                                                                LABS:                               12.5   3.99  )-----------( 280      ( 31 Aug 2021 07:11 )             37.5                      08-31    137  |  100  |  10  ----------------------------<  104<H>  3.6   |  23  |  0.62    Ca    9.5      31 Aug 2021 07:11    TPro  6.6  /  Alb  3.3  /  TBili  0.4  /  DBili  x   /  AST  20  /  ALT  17  /  AlkPhos  195<H>  08-31                       RADIOLOGY & ADDITIONAL TESTS         I personally reviewed: [  ]EKG   [  ]CXR    [  ] CT      A/P:         Discussed with :     [facility] consultants' Notes   Time spent :  
  Patient is a 71y old  Male who presents with a chief complaint of Pt 70 y/o M with PMH as per chart: CVA this past August and got TPA per family member, DM, BPH with obstruction S/P escamilla catheter, S/P ERCP w Sphincterectomy, recent admission to [facility] for sepsis, Hodgkin lymphoma, received Opdivo last admission, sent to rehab, now returning with left knee pain and swelling and for planned chemo. (02 Sep 2021 10:05)    says that he feels okay.  No pain. Appetite good.  Weakness remains and encouraged him to get out of bed with assistance.  has a escamilla in. No fevers. No N/V. No CP or SOB.    Medication:   acetaminophen   Tablet .. 650 milliGRAM(s) Oral every 6 hours PRN  allopurinol 300 milliGRAM(s) Oral daily  aspirin  chewable 81 milliGRAM(s) Oral daily  bisacodyl 5 milliGRAM(s) Oral every 12 hours PRN  dextrose 40% Gel 15 Gram(s) Oral once  dextrose 5%. 1000 milliLiter(s) IV Continuous <Continuous>  dextrose 5%. 1000 milliLiter(s) IV Continuous <Continuous>  dextrose 50% Injectable 25 Gram(s) IV Push once  dextrose 50% Injectable 12.5 Gram(s) IV Push once  dextrose 50% Injectable 25 Gram(s) IV Push once  finasteride 5 milliGRAM(s) Oral daily  glucagon  Injectable 1 milliGRAM(s) IntraMuscular once  insulin glargine Injectable (LANTUS) 45 Unit(s) SubCutaneous at bedtime  insulin lispro (ADMELOG) corrective regimen sliding scale   SubCutaneous three times a day before meals  insulin lispro (ADMELOG) corrective regimen sliding scale   SubCutaneous at bedtime  insulin lispro Injectable (ADMELOG) 15 Unit(s) SubCutaneous three times a day before meals  melatonin 5 milliGRAM(s) Oral at bedtime  pantoprazole   Suspension 40 milliGRAM(s) Oral daily  polyethylene glycol 3350 17 Gram(s) Oral daily PRN  predniSONE   Tablet 40 milliGRAM(s) Oral daily  QUEtiapine 25 milliGRAM(s) Oral at bedtime  senna 2 Tablet(s) Oral at bedtime  sodium chloride 0.9%. 1000 milliLiter(s) IV Continuous <Continuous>  tamsulosin 0.4 milliGRAM(s) Oral at bedtime      Physical exam    T(C): 36.4 (09-04-21 @ 12:27), Max: 36.7 (09-04-21 @ 06:38)  HR: 73 (09-04-21 @ 12:27) (70 - 73)  BP: 151/81 (09-04-21 @ 12:27) (141/83 - 151/81)  RR: 18 (09-04-21 @ 12:27) (18 - 18)  SpO2: 99% (09-04-21 @ 12:27) (98% - 99%)  Wt(kg): --    alert NAD  EOMI anicteric sclera  Cv s1 S2 RRR  Lungs clear B/L  abd soft NT   No LE edema or tenderness    Labs                        11.5   5.43  )-----------( 281      ( 04 Sep 2021 07:41 )             34.7       09-04    138  |  106  |  19  ----------------------------<  186<H>  3.3<L>   |  20<L>  |  0.65    Ca    9.2      04 Sep 2021 07:41    TPro  6.4  /  Alb  3.2<L>  /  TBili  0.3  /  DBili  x   /  AST  46<H>  /  ALT  32  /  AlkPhos  223<H>  09-03      LIVER FUNCTIONS - ( 03 Sep 2021 07:31 )  Alb: 3.2 g/dL / Pro: 6.4 g/dL / ALK PHOS: 223 U/L / ALT: 32 U/L / AST: 46 U/L / GGT: x             [id number]
  Patient is a 71y old  Male who presents with a chief complaint of here for treatment    says that he feels good. he is walking with assistance.  Appetite is good.  No fevers or sweats.  No HA, dizziness, nosebleeds, sore throat, CP, SOB, cough, hemoptysis. N/V/D/C, abd pain, dysuria, hematuria, leg pain, swelling    Medication:   acetaminophen   Tablet .. 650 milliGRAM(s) Oral every 6 hours PRN  allopurinol 300 milliGRAM(s) Oral daily  aspirin  chewable 81 milliGRAM(s) Oral daily  bisacodyl 5 milliGRAM(s) Oral every 12 hours PRN  cefTRIAXone   IVPB 1000 milliGRAM(s) IV Intermittent every 24 hours  dextrose 40% Gel 15 Gram(s) Oral once  dextrose 5%. 1000 milliLiter(s) IV Continuous <Continuous>  dextrose 5%. 1000 milliLiter(s) IV Continuous <Continuous>  dextrose 50% Injectable 25 Gram(s) IV Push once  dextrose 50% Injectable 12.5 Gram(s) IV Push once  dextrose 50% Injectable 25 Gram(s) IV Push once  finasteride 5 milliGRAM(s) Oral daily  glucagon  Injectable 1 milliGRAM(s) IntraMuscular once  insulin glargine Injectable (LANTUS) 45 Unit(s) SubCutaneous at bedtime  insulin lispro (ADMELOG) corrective regimen sliding scale   SubCutaneous three times a day before meals  insulin lispro (ADMELOG) corrective regimen sliding scale   SubCutaneous at bedtime  insulin lispro Injectable (ADMELOG) 15 Unit(s) SubCutaneous three times a day before meals  melatonin 5 milliGRAM(s) Oral at bedtime  nivolumab IVPB (eMAR) 480 milliGRAM(s) IV Intermittent once  pantoprazole   Suspension 40 milliGRAM(s) Oral daily  polyethylene glycol 3350 17 Gram(s) Oral daily PRN  QUEtiapine 25 milliGRAM(s) Oral at bedtime  senna 2 Tablet(s) Oral at bedtime  sodium chloride 0.9%. 1000 milliLiter(s) IV Continuous <Continuous>  tamsulosin 0.4 milliGRAM(s) Oral at bedtime      Physical exam    T(C): 36.6 (09-01-21 @ 10:16), Max: 36.7 (08-31-21 @ 12:22)  HR: 110 (09-01-21 @ 10:16) (85 - 110)  BP: 123/77 (09-01-21 @ 10:16) (122/88 - 137/83)  RR: 18 (09-01-21 @ 10:16) (16 - 18)  SpO2: 96% (09-01-21 @ 10:16) (96% - 100%)  Wt(kg): --    alert NAD  EOMI anicteric sclera  Neck No LNA  Cv s1 S2 RRR  Lungs clear B/L  abd soft NT ND +BS  No LE edema or tenderness    Labs                      12.5   3.99  )-----------( 280      ( 31 Aug 2021 07:11 )             37.5       08-31    137  |  100  |  10  ----------------------------<  104<H>  3.6   |  23  |  0.62    Ca    9.5      31 Aug 2021 07:11    TPro  6.6  /  Alb  3.3  /  TBili  0.4  /  DBili  x   /  AST  20  /  ALT  17  /  AlkPhos  195<H>  08-31      LIVER FUNCTIONS - ( 31 Aug 2021 07:11 )  Alb: 3.3 g/dL / Pro: 6.6 g/dL / ALK PHOS: 195 U/L / ALT: 17 U/L / AST: 20 U/L / GGT: x             [id number]
Date of service: 09-04-21 @ 23:58      Patient is a 71y old  Male who presents with a chief complaint of Pt 72 y/o M with PMH as per chart: CVA this past August and got TPA per family member, DM, BPH with obstruction S/P escamilla catheter, S/P ERCP w Sphincterectomy, recent admission to [facility] for sepsis, Hodgkin lymphoma, received Opdivo last admission, sent to rehab, now returning with left knee pain and swelling and for planned chemo. (02 Sep 2021 10:05)                                                               INTERVAL HPI/OVERNIGHT EVENTS:    REVIEW OF SYSTEMS:     CONSTITUTIONAL: No weakness, fevers or chills  EYES/ENT: No visual changes , no ear ache   NECK: No pain or stiffness  RESPIRATORY: No cough, wheezing,  No shortness of breath  CARDIOVASCULAR: No chest pain or palpitations  GASTROINTESTINAL: No abdominal pain  . No nausea, vomiting, or hematemesis; No diarrhea or constipation. No melena or hematochezia.  GENITOURINARY: No dysuria, frequency or hematuria  NEUROLOGICAL: No numbness or weakness  SKIN: No itching, burning, rashes, or lesions                                                                                                                                                                                                                                                                                 Medications:  MEDICATIONS  (STANDING):  allopurinol 300 milliGRAM(s) Oral daily  aspirin  chewable 81 milliGRAM(s) Oral daily  dextrose 40% Gel 15 Gram(s) Oral once  dextrose 5%. 1000 milliLiter(s) (50 mL/Hr) IV Continuous <Continuous>  dextrose 5%. 1000 milliLiter(s) (100 mL/Hr) IV Continuous <Continuous>  dextrose 50% Injectable 25 Gram(s) IV Push once  dextrose 50% Injectable 12.5 Gram(s) IV Push once  dextrose 50% Injectable 25 Gram(s) IV Push once  finasteride 5 milliGRAM(s) Oral daily  glucagon  Injectable 1 milliGRAM(s) IntraMuscular once  insulin glargine Injectable (LANTUS) 45 Unit(s) SubCutaneous at bedtime  insulin lispro (ADMELOG) corrective regimen sliding scale   SubCutaneous three times a day before meals  insulin lispro (ADMELOG) corrective regimen sliding scale   SubCutaneous at bedtime  insulin lispro Injectable (ADMELOG) 15 Unit(s) SubCutaneous three times a day before meals  melatonin 5 milliGRAM(s) Oral at bedtime  pantoprazole   Suspension 40 milliGRAM(s) Oral daily  predniSONE   Tablet 40 milliGRAM(s) Oral daily  QUEtiapine 25 milliGRAM(s) Oral at bedtime  senna 2 Tablet(s) Oral at bedtime  sodium chloride 0.9%. 1000 milliLiter(s) (60 mL/Hr) IV Continuous <Continuous>  tamsulosin 0.4 milliGRAM(s) Oral at bedtime    MEDICATIONS  (PRN):  acetaminophen   Tablet .. 650 milliGRAM(s) Oral every 6 hours PRN Mild Pain (1 - 3), Moderate Pain (4 - 6)  bisacodyl 5 milliGRAM(s) Oral every 12 hours PRN Constipation  polyethylene glycol 3350 17 Gram(s) Oral daily PRN Constipation       Allergies    No Known Allergies    Intolerances      Vital Signs Last 24 Hrs  T(C): 36.4 (04 Sep 2021 12:27), Max: 36.7 (04 Sep 2021 06:38)  T(F): 97.5 (04 Sep 2021 12:27), Max: 98.1 (04 Sep 2021 06:38)  HR: 73 (04 Sep 2021 12:27) (70 - 73)  BP: 151/81 (04 Sep 2021 12:27) (141/83 - 151/81)  BP(mean): --  RR: 18 (04 Sep 2021 12:27) (18 - 18)  SpO2: 99% (04 Sep 2021 12:27) (98% - 99%)  CAPILLARY BLOOD GLUCOSE      POCT Blood Glucose.: 284 mg/dL (04 Sep 2021 22:15)  POCT Blood Glucose.: 294 mg/dL (04 Sep 2021 17:38)  POCT Blood Glucose.: 330 mg/dL (04 Sep 2021 13:24)  POCT Blood Glucose.: 155 mg/dL (04 Sep 2021 09:02)      09-03 @ 07:01  -  09-04 @ 07:00  --------------------------------------------------------  IN: 1680 mL / OUT: 2050 mL / NET: -370 mL    09-04 @ 07:01 - 09-04 @ 23:58  --------------------------------------------------------  IN: 0 mL / OUT: 1200 mL / NET: -1200 mL      Physical Exam:    Daily     Daily   General:  Well appearing, NAD, not cachetic  HEENT:  Nonicteric, PERRLA  CV:  RRR, S1S2   Lungs:  CTA B/L, no wheezes, rales, rhonchi  Abdomen:  Soft, non-tender, no distended, positive BS  Extremities:  2+ pulses, no c/c, no edema  Skin:  Warm and dry, no rashes  :  No escamilla  Neuro:  AAOx3, non-focal, grossly intact                                                                                                                                                                                                                                                                                                LABS:                               11.5   5.43  )-----------( 281      ( 04 Sep 2021 07:41 )             34.7                      09-04    138  |  106  |  19  ----------------------------<  186<H>  3.3<L>   |  20<L>  |  0.65    Ca    9.2      04 Sep 2021 07:41    TPro  6.4  /  Alb  3.2<L>  /  TBili  0.3  /  DBili  x   /  AST  46<H>  /  ALT  32  /  AlkPhos  223<H>  09-03                       RADIOLOGY & ADDITIONAL TESTS         I personally reviewed: [  ]EKG   [  ]CXR    [  ] CT      A/P:         Discussed with :     [facility] consultants' Notes   Time spent :  
  LIZETT RIVERA  MRN-[id number]    Patient is a 71y old  Male who presents with a chief complaint of     Review of System    NAD    Current Meds  MEDICATIONS  (STANDING):  allopurinol 300 milliGRAM(s) Oral daily  aspirin  chewable 81 milliGRAM(s) Oral daily  dextrose 40% Gel 15 Gram(s) Oral once  dextrose 5%. 1000 milliLiter(s) (50 mL/Hr) IV Continuous <Continuous>  dextrose 5%. 1000 milliLiter(s) (100 mL/Hr) IV Continuous <Continuous>  dextrose 50% Injectable 25 Gram(s) IV Push once  dextrose 50% Injectable 12.5 Gram(s) IV Push once  dextrose 50% Injectable 25 Gram(s) IV Push once  finasteride 5 milliGRAM(s) Oral daily  glucagon  Injectable 1 milliGRAM(s) IntraMuscular once  insulin glargine Injectable (LANTUS) 45 Unit(s) SubCutaneous at bedtime  insulin lispro (ADMELOG) corrective regimen sliding scale   SubCutaneous three times a day before meals  insulin lispro (ADMELOG) corrective regimen sliding scale   SubCutaneous at bedtime  insulin lispro Injectable (ADMELOG) 15 Unit(s) SubCutaneous three times a day before meals  melatonin 5 milliGRAM(s) Oral at bedtime  pantoprazole   Suspension 40 milliGRAM(s) Oral daily  QUEtiapine 25 milliGRAM(s) Oral at bedtime  senna 2 Tablet(s) Oral at bedtime  sodium chloride 0.9%. 1000 milliLiter(s) (60 mL/Hr) IV Continuous <Continuous>  tamsulosin 0.4 milliGRAM(s) Oral at bedtime    MEDICATIONS  (PRN):  acetaminophen   Tablet .. 650 milliGRAM(s) Oral every 6 hours PRN Mild Pain (1 - 3), Moderate Pain (4 - 6)  bisacodyl 5 milliGRAM(s) Oral every 12 hours PRN Constipation  polyethylene glycol 3350 17 Gram(s) Oral daily PRN Constipation      Vitals  Vital Signs Last 24 Hrs  T(C): 37.1 (31 Aug 2021 04:47), Max: 37.1 (31 Aug 2021 04:47)  T(F): 98.7 (31 Aug 2021 04:47), Max: 98.7 (31 Aug 2021 04:47)  HR: 93 (31 Aug 2021 04:47) (92 - 100)  BP: 127/81 (31 Aug 2021 04:47) (127/81 - 135/98)  BP(mean): --  RR: 16 (31 Aug 2021 04:47) (15 - 18)  SpO2: 98% (31 Aug 2021 04:47) (98% - 100%)    Physical Exam     NAD          Lab  CBC Full  -  ( 31 Aug 2021 07:11 )  WBC Count : 3.99 K/uL  RBC Count : 3.88 M/uL  Hemoglobin : 12.5 g/dL  Hematocrit : 37.5 %  Platelet Count - Automated : 280 K/uL  Mean Cell Volume : 96.6 fl  Mean Cell Hemoglobin : 32.2 pg  Mean Cell Hemoglobin Concentration : 33.3 gm/dL  Auto Neutrophil # : 2.53 K/uL  Auto Lymphocyte # : 0.83 K/uL  Auto Monocyte # : 0.52 K/uL  Auto Eosinophil # : 0.07 K/uL  Auto Basophil # : 0.03 K/uL  Auto Neutrophil % : 63.3 %  Auto Lymphocyte % : 20.8 %  Auto Monocyte % : 13.0 %  Auto Eosinophil % : 1.8 %  Auto Basophil % : 0.8 %    08-30    134<L>  |  99  |  12  ----------------------------<  243<H>  3.9   |  23  |  0.61    Ca    9.7      30 Aug 2021 08:28    TPro  7.1  /  Alb  3.6  /  TBili  0.5  /  DBili  x   /  AST  24  /  ALT  22  /  AlkPhos  236<H>  08-30        Rad:    Assessment/Plan
  Patient is a 71y old  Male who presents with a chief complaint of Pt 70 y/o M with PMH as per chart: CVA this past August and got TPA per family member, DM, BPH with obstruction S/P escamilla catheter, S/P ERCP w Sphincterectomy, recent admission to [facility] for sepsis, Hodgkin lymphoma, received Opdivo last admission, sent to rehab, now returning with left knee pain and swelling and for planned chemo. (02 Sep 2021 10:05)    says that infusion of Opdivo went well but now with trouble urinating since the escamilla catheter was removed and he is uncomfortable.     Denies fevers, chills, sweats, HA, dizziness, sore throat, CP, SOB, cough, hemoptysis, N/V/D, abd pain, leg pain, swelling    Medication:   acetaminophen   Tablet .. 650 milliGRAM(s) Oral every 6 hours PRN  allopurinol 300 milliGRAM(s) Oral daily  aspirin  chewable 81 milliGRAM(s) Oral daily  bisacodyl 5 milliGRAM(s) Oral every 12 hours PRN  dextrose 40% Gel 15 Gram(s) Oral once  dextrose 5%. 1000 milliLiter(s) IV Continuous <Continuous>  dextrose 5%. 1000 milliLiter(s) IV Continuous <Continuous>  dextrose 50% Injectable 25 Gram(s) IV Push once  dextrose 50% Injectable 12.5 Gram(s) IV Push once  dextrose 50% Injectable 25 Gram(s) IV Push once  finasteride 5 milliGRAM(s) Oral daily  glucagon  Injectable 1 milliGRAM(s) IntraMuscular once  insulin glargine Injectable (LANTUS) 45 Unit(s) SubCutaneous at bedtime  insulin lispro (ADMELOG) corrective regimen sliding scale   SubCutaneous three times a day before meals  insulin lispro (ADMELOG) corrective regimen sliding scale   SubCutaneous at bedtime  insulin lispro Injectable (ADMELOG) 15 Unit(s) SubCutaneous three times a day before meals  melatonin 5 milliGRAM(s) Oral at bedtime  pantoprazole   Suspension 40 milliGRAM(s) Oral daily  polyethylene glycol 3350 17 Gram(s) Oral daily PRN  QUEtiapine 25 milliGRAM(s) Oral at bedtime  senna 2 Tablet(s) Oral at bedtime  sodium chloride 0.9%. 1000 milliLiter(s) IV Continuous <Continuous>  tamsulosin 0.4 milliGRAM(s) Oral at bedtime      Physical exam    T(C): 36.6 (09-02-21 @ 12:20), Max: 36.8 (09-02-21 @ 04:49)  HR: 100 (09-02-21 @ 13:01) (83 - 100)  BP: 143/88 (09-02-21 @ 13:01) (143/88 - 145/91)  RR: 18 (09-02-21 @ 12:20) (18 - 18)  SpO2: 100% (09-02-21 @ 13:01) (100% - 100%)  Wt(kg): --    alert NAD  EOMI anicteric sclera  Cv s1 S2 RRR  Lungs clear B/L anteriorly  abd soft NT ND +BS  No LE edema or tenderness    Labs                        12.5   6.30  )-----------( 275      ( 02 Sep 2021 13:51 )             37.1       09-02    138  |  103  |  12  ----------------------------<  121<H>  3.6   |  19<L>  |  0.53    Ca    9.0      02 Sep 2021 13:51    TPro  6.4  /  Alb  3.0<L>  /  TBili  0.3  /  DBili  x   /  AST  44<H>  /  ALT  28  /  AlkPhos  228<H>  09-02      LIVER FUNCTIONS - ( 02 Sep 2021 13:51 )  Alb: 3.0 g/dL / Pro: 6.4 g/dL / ALK PHOS: 228 U/L / ALT: 28 U/L / AST: 44 U/L / GGT: x           LDH   198  uric acid  3.8     [id number]
  Patient is a 71y old  Male who presents with a chief complaint of Pt 72 y/o M with PMH as per chart: CVA this past August and got TPA per family member, DM, BPH with obstruction S/P escamilla catheter, S/P ERCP w Sphincterectomy, recent admission to [facility] for sepsis, Hodgkin lymphoma, received Opdivo last admission, sent to rehab, now returning with left knee pain and swelling and for planned chemo. (02 Sep 2021 10:05)    says he feels better after escamilla reinserted.  appetite is good. No N/V/D. No CP or SOB. No HA. No fevers.    Medication:   acetaminophen   Tablet .. 650 milliGRAM(s) Oral every 6 hours PRN  allopurinol 300 milliGRAM(s) Oral daily  aspirin  chewable 81 milliGRAM(s) Oral daily  bisacodyl 5 milliGRAM(s) Oral every 12 hours PRN  dextrose 40% Gel 15 Gram(s) Oral once  dextrose 5%. 1000 milliLiter(s) IV Continuous <Continuous>  dextrose 5%. 1000 milliLiter(s) IV Continuous <Continuous>  dextrose 50% Injectable 25 Gram(s) IV Push once  dextrose 50% Injectable 12.5 Gram(s) IV Push once  dextrose 50% Injectable 25 Gram(s) IV Push once  finasteride 5 milliGRAM(s) Oral daily  glucagon  Injectable 1 milliGRAM(s) IntraMuscular once  insulin glargine Injectable (LANTUS) 45 Unit(s) SubCutaneous at bedtime  insulin lispro (ADMELOG) corrective regimen sliding scale   SubCutaneous three times a day before meals  insulin lispro (ADMELOG) corrective regimen sliding scale   SubCutaneous at bedtime  insulin lispro Injectable (ADMELOG) 15 Unit(s) SubCutaneous three times a day before meals  melatonin 5 milliGRAM(s) Oral at bedtime  pantoprazole   Suspension 40 milliGRAM(s) Oral daily  polyethylene glycol 3350 17 Gram(s) Oral daily PRN  predniSONE   Tablet 40 milliGRAM(s) Oral daily  QUEtiapine 25 milliGRAM(s) Oral at bedtime  senna 2 Tablet(s) Oral at bedtime  sodium chloride 0.9%. 1000 milliLiter(s) IV Continuous <Continuous>  tamsulosin 0.4 milliGRAM(s) Oral at bedtime      Physical exam    T(C): 36.6 (09-03-21 @ 12:25), Max: 36.9 (09-02-21 @ 20:36)  HR: 89 (09-03-21 @ 12:25) (80 - 100)  BP: 142/83 (09-03-21 @ 12:25) (125/85 - 143/88)  RR: 18 (09-03-21 @ 12:25) (18 - 18)  SpO2: 96% (09-03-21 @ 12:25) (96% - 100%)  Wt(kg): --    alert NAD  EOMI anicteric sclera  Cv s1 S2 RRR  Lungs clear B/L anteriorly  abd soft NT ND +BS  No LE edema or tenderness    Labs                        12.1   6.01  )-----------( 285      ( 03 Sep 2021 07:31 )             36.5       09-03    139  |  103  |  14  ----------------------------<  162<H>  3.8   |  20<L>  |  0.63    Ca    9.6      03 Sep 2021 07:31    TPro  6.4  /  Alb  3.2<L>  /  TBili  0.3  /  DBili  x   /  AST  46<H>  /  ALT  32  /  AlkPhos  223<H>  09-03      LIVER FUNCTIONS - ( 03 Sep 2021 07:31 )  Alb: 3.2 g/dL / Pro: 6.4 g/dL / ALK PHOS: 223 U/L / ALT: 32 U/L / AST: 46 U/L / GGT: x             [id number]
Date of service: 08-31-21 @ 23:55      Patient is a 71y old  Male who presents with a chief complaint of                                                              INTERVAL HPI/OVERNIGHT EVENTS:    REVIEW OF SYSTEMS:     CONSTITUTIONAL: No weakness, fevers or chills  EYES/ENT: No visual changes , no ear ache   NECK: No pain or stiffness  RESPIRATORY: No cough, wheezing,  No shortness of breath  CARDIOVASCULAR: No chest pain or palpitations  GASTROINTESTINAL: No abdominal pain  . No nausea, vomiting, or hematemesis; No diarrhea or constipation. No melena or hematochezia.  GENITOURINARY: No dysuria, frequency or hematuria  NEUROLOGICAL: No numbness or weakness  SKIN: No itching, burning, rashes, or lesions                                                                                                                                                                                                                                                                                 Medications:  MEDICATIONS  (STANDING):  allopurinol 300 milliGRAM(s) Oral daily  aspirin  chewable 81 milliGRAM(s) Oral daily  dextrose 40% Gel 15 Gram(s) Oral once  dextrose 5%. 1000 milliLiter(s) (50 mL/Hr) IV Continuous <Continuous>  dextrose 5%. 1000 milliLiter(s) (100 mL/Hr) IV Continuous <Continuous>  dextrose 50% Injectable 25 Gram(s) IV Push once  dextrose 50% Injectable 12.5 Gram(s) IV Push once  dextrose 50% Injectable 25 Gram(s) IV Push once  finasteride 5 milliGRAM(s) Oral daily  glucagon  Injectable 1 milliGRAM(s) IntraMuscular once  insulin glargine Injectable (LANTUS) 45 Unit(s) SubCutaneous at bedtime  insulin lispro (ADMELOG) corrective regimen sliding scale   SubCutaneous three times a day before meals  insulin lispro (ADMELOG) corrective regimen sliding scale   SubCutaneous at bedtime  insulin lispro Injectable (ADMELOG) 15 Unit(s) SubCutaneous three times a day before meals  melatonin 5 milliGRAM(s) Oral at bedtime  pantoprazole   Suspension 40 milliGRAM(s) Oral daily  QUEtiapine 25 milliGRAM(s) Oral at bedtime  senna 2 Tablet(s) Oral at bedtime  sodium chloride 0.9%. 1000 milliLiter(s) (60 mL/Hr) IV Continuous <Continuous>  tamsulosin 0.4 milliGRAM(s) Oral at bedtime    MEDICATIONS  (PRN):  acetaminophen   Tablet .. 650 milliGRAM(s) Oral every 6 hours PRN Mild Pain (1 - 3), Moderate Pain (4 - 6)  bisacodyl 5 milliGRAM(s) Oral every 12 hours PRN Constipation  polyethylene glycol 3350 17 Gram(s) Oral daily PRN Constipation       Allergies    No Known Allergies    Intolerances      Vital Signs Last 24 Hrs  T(C): 36.7 (31 Aug 2021 20:46), Max: 37.1 (31 Aug 2021 04:47)  T(F): 98.1 (31 Aug 2021 20:46), Max: 98.7 (31 Aug 2021 04:47)  HR: 91 (31 Aug 2021 20:46) (86 - 99)  BP: 137/83 (31 Aug 2021 20:46) (122/88 - 137/83)  BP(mean): --  RR: 18 (31 Aug 2021 20:46) (16 - 18)  SpO2: 99% (31 Aug 2021 20:46) (98% - 100%)  CAPILLARY BLOOD GLUCOSE      POCT Blood Glucose.: 140 mg/dL (31 Aug 2021 21:33)  POCT Blood Glucose.: 145 mg/dL (31 Aug 2021 17:37)  POCT Blood Glucose.: 119 mg/dL (31 Aug 2021 13:19)  POCT Blood Glucose.: 119 mg/dL (31 Aug 2021 09:15)      08-31 @ 07:01  -  08-31 @ 23:55  --------------------------------------------------------  IN: 240 mL / OUT: 600 mL / NET: -360 mL      Physical Exam:    Daily Height in cm: 193.04 (31 Aug 2021 14:21)    Daily   General:  Well appearing, NAD, not cachetic  HEENT:  Nonicteric, PERRLA  CV:  RRR, S1S2   Lungs:  CTA B/L, no wheezes, rales, rhonchi  Abdomen:  Soft, non-tender, no distended, positive BS  Extremities:  2+ pulses, no c/c, no edema  Skin:  Warm and dry, no rashes  :  No escamilla  Neuro:  AAOx3, non-focal, grossly intact                                                                                                                                                                                                                                                                                                LABS:                               12.5   3.99  )-----------( 280      ( 31 Aug 2021 07:11 )             37.5                      08-31    137  |  100  |  10  ----------------------------<  104<H>  3.6   |  23  |  0.62    Ca    9.5      31 Aug 2021 07:11    TPro  6.6  /  Alb  3.3  /  TBili  0.4  /  DBili  x   /  AST  20  /  ALT  17  /  AlkPhos  195<H>  08-31                       RADIOLOGY & ADDITIONAL TESTS         I personally reviewed: [  ]EKG   [  ]CXR    [  ] CT      A/P:         Discussed with :     [facility] consultants' Notes   Time spent :  
Date of service: 09-03-21 @ 21:46      Patient is a 71y old  Male who presents with a chief complaint of Pt 72 y/o M with PMH as per chart: CVA this past August and got TPA per family member, DM, BPH with obstruction S/P escamilla catheter, S/P ERCP w Sphincterectomy, recent admission to [facility] for sepsis, Hodgkin lymphoma, received Opdivo last admission, sent to rehab, now returning with left knee pain and swelling and for planned chemo. (02 Sep 2021 10:05)                                                               INTERVAL HPI/OVERNIGHT EVENTS:    REVIEW OF SYSTEMS:     CONSTITUTIONAL: No weakness, fevers or chills  EYES/ENT: No visual changes , no ear ache   NECK: No pain or stiffness  RESPIRATORY: No cough, wheezing,  No shortness of breath  CARDIOVASCULAR: No chest pain or palpitations  GASTROINTESTINAL: No abdominal pain  . No nausea, vomiting, or hematemesis; No diarrhea or constipation. No melena or hematochezia.  GENITOURINARY: No dysuria, frequency or hematuria  NEUROLOGICAL: No numbness or weakness  SKIN: No itching, burning, rashes, or lesions                                                                                                                                                                                                                                                                                 Medications:  MEDICATIONS  (STANDING):  allopurinol 300 milliGRAM(s) Oral daily  aspirin  chewable 81 milliGRAM(s) Oral daily  dextrose 40% Gel 15 Gram(s) Oral once  dextrose 5%. 1000 milliLiter(s) (100 mL/Hr) IV Continuous <Continuous>  dextrose 5%. 1000 milliLiter(s) (50 mL/Hr) IV Continuous <Continuous>  dextrose 50% Injectable 25 Gram(s) IV Push once  dextrose 50% Injectable 12.5 Gram(s) IV Push once  dextrose 50% Injectable 25 Gram(s) IV Push once  finasteride 5 milliGRAM(s) Oral daily  glucagon  Injectable 1 milliGRAM(s) IntraMuscular once  insulin glargine Injectable (LANTUS) 45 Unit(s) SubCutaneous at bedtime  insulin lispro (ADMELOG) corrective regimen sliding scale   SubCutaneous three times a day before meals  insulin lispro (ADMELOG) corrective regimen sliding scale   SubCutaneous at bedtime  insulin lispro Injectable (ADMELOG) 15 Unit(s) SubCutaneous three times a day before meals  melatonin 5 milliGRAM(s) Oral at bedtime  pantoprazole   Suspension 40 milliGRAM(s) Oral daily  predniSONE   Tablet 40 milliGRAM(s) Oral daily  QUEtiapine 25 milliGRAM(s) Oral at bedtime  senna 2 Tablet(s) Oral at bedtime  sodium chloride 0.9%. 1000 milliLiter(s) (60 mL/Hr) IV Continuous <Continuous>  tamsulosin 0.4 milliGRAM(s) Oral at bedtime    MEDICATIONS  (PRN):  acetaminophen   Tablet .. 650 milliGRAM(s) Oral every 6 hours PRN Mild Pain (1 - 3), Moderate Pain (4 - 6)  bisacodyl 5 milliGRAM(s) Oral every 12 hours PRN Constipation  polyethylene glycol 3350 17 Gram(s) Oral daily PRN Constipation       Allergies    No Known Allergies    Intolerances      Vital Signs Last 24 Hrs  T(C): 36.6 (09-03-21 @ 12:25), Max: 36.9 (09-02-21 @ 20:36)  HR: 89 (09-03-21 @ 12:25) (80 - 100)  BP: 142/83 (09-03-21 @ 12:25) (125/85 - 143/88)  RR: 18 (09-03-21 @ 12:25) (18 - 18)  SpO2: 96% (09-03-21 @ 12:25) (96% - 100%)  Wt(kg): --          Physical Exam:    Daily     Daily   General:  Well appearing, NAD, not cachetic  HEENT:  Nonicteric, PERRLA  CV:  RRR, S1S2   Lungs:  CTA B/L, no wheezes, rales, rhonchi  Abdomen:  Soft, non-tender, no distended, positive BS  Extremities: no edema  escamilla in place   Neuro:  AAOx3, non-focal, grossly intact                                                                                                                                                                                                                                                                                                LABS:             12.1   6.01  )-----------( 285      ( 03 Sep 2021 07:31 )             36.5       09-03    139  |  103  |  14  ----------------------------<  162<H>  3.8   |  20<L>  |  0.63    Ca    9.6      03 Sep 2021 07:31    TPro  6.4  /  Alb  3.2<L>  /  TBili  0.3  /  DBili  x   /  AST  46<H>  /  ALT  32  /  AlkPhos  223<H>  09-03                              Time spent :  
Patient is a 71y old  Male who presents with a chief complaint of Planned chemo (01 Sep 2021 13:05)      HPI:    70 y/o M w/ PMHx of CVA this past August and got TPA per family member, DM, BPH with obstruction s/p escamilla catheter, s/p ERCP w Sphincterectomy recent admission to [facility] for sepsis,  Hodgkin lymphoma received opdivo last admission and sent to rehab   now returning with L knee pain and swelling and for planned chemo  overall has been doing well   no truama to L knee   no fever or chills   no N/V/D   no cp /sob    (30 Aug 2021 14:42)      PAST MEDICAL & SURGICAL HISTORY:  Cerebrovascular accident (CVA)    Diabetes mellitus    Hodgkin lymphoma    No significant past surgical history        MEDICATIONS  (STANDING):  allopurinol 300 milliGRAM(s) Oral daily  aspirin  chewable 81 milliGRAM(s) Oral daily  cefTRIAXone   IVPB 1000 milliGRAM(s) IV Intermittent every 24 hours  dextrose 40% Gel 15 Gram(s) Oral once  dextrose 5%. 1000 milliLiter(s) (50 mL/Hr) IV Continuous <Continuous>  dextrose 5%. 1000 milliLiter(s) (100 mL/Hr) IV Continuous <Continuous>  dextrose 50% Injectable 25 Gram(s) IV Push once  dextrose 50% Injectable 12.5 Gram(s) IV Push once  dextrose 50% Injectable 25 Gram(s) IV Push once  finasteride 5 milliGRAM(s) Oral daily  glucagon  Injectable 1 milliGRAM(s) IntraMuscular once  insulin glargine Injectable (LANTUS) 45 Unit(s) SubCutaneous at bedtime  insulin lispro (ADMELOG) corrective regimen sliding scale   SubCutaneous three times a day before meals  insulin lispro (ADMELOG) corrective regimen sliding scale   SubCutaneous at bedtime  insulin lispro Injectable (ADMELOG) 15 Unit(s) SubCutaneous three times a day before meals  melatonin 5 milliGRAM(s) Oral at bedtime  pantoprazole   Suspension 40 milliGRAM(s) Oral daily  QUEtiapine 25 milliGRAM(s) Oral at bedtime  senna 2 Tablet(s) Oral at bedtime  sodium chloride 0.9%. 1000 milliLiter(s) (60 mL/Hr) IV Continuous <Continuous>  tamsulosin 0.4 milliGRAM(s) Oral at bedtime    MEDICATIONS  (PRN):  acetaminophen   Tablet .. 650 milliGRAM(s) Oral every 6 hours PRN Mild Pain (1 - 3), Moderate Pain (4 - 6)  bisacodyl 5 milliGRAM(s) Oral every 12 hours PRN Constipation  polyethylene glycol 3350 17 Gram(s) Oral daily PRN Constipation      Allergies    No Known Allergies    Intolerances        VITALS:    Vital Signs Last 24 Hrs  T(C): 36.6 (01 Sep 2021 14:03), Max: 36.7 (31 Aug 2021 20:46)  T(F): 97.9 (01 Sep 2021 14:03), Max: 98.1 (31 Aug 2021 20:46)  HR: 100 (01 Sep 2021 14:03) (85 - 110)  BP: 134/81 (01 Sep 2021 14:03) (123/77 - 137/83)  BP(mean): --  RR: 18 (01 Sep 2021 14:03) (17 - 18)  SpO2: 99% (01 Sep 2021 14:03) (96% - 99%)    LABS:                          12.5   3.99  )-----------( 280      ( 31 Aug 2021 07:11 )             37.5       08-31    137  |  100  |  10  ----------------------------<  104<H>  3.6   |  23  |  0.62    Ca    9.5      31 Aug 2021 07:11    TPro  6.6  /  Alb  3.3  /  TBili  0.4  /  DBili  x   /  AST  20  /  ALT  17  /  AlkPhos  195<H>  08-31      CAPILLARY BLOOD GLUCOSE      POCT Blood Glucose.: 113 mg/dL (01 Sep 2021 12:23)  POCT Blood Glucose.: 145 mg/dL (01 Sep 2021 08:58)  POCT Blood Glucose.: 140 mg/dL (31 Aug 2021 21:33)  POCT Blood Glucose.: 145 mg/dL (31 Aug 2021 17:37)          LOWER EXTREMITY PHYSICAL EXAM:    Vasular: DP/PT _/4, B/L, CFT <_ seconds B/L, Temperature gradient _, B/L.   Neuro: Epicritic sensation _ to the level of _, B/L.  Musculoskeletal/Ortho:  Skin:  Wound #1:   Location:  Size:  Depth:  Wound bed:   Drainage:   Odor:   Periwound:  Etiology:     RADIOLOGY & ADDITIONAL STUDIES:    
Date of service: 09-02-21 @ 17:11      Patient is a 71y old  Male who presents with a chief complaint of Pt 70 y/o M with PMH as per chart: CVA this past August and got TPA per family member, DM, BPH with obstruction S/P escamilla catheter, S/P ERCP w Sphincterectomy, recent admission to [facility] for sepsis, Hodgkin lymphoma, received Opdivo last admission, sent to rehab, now returning with left knee pain and swelling and for planned chemo. (02 Sep 2021 10:05)                                                               INTERVAL HPI/OVERNIGHT EVENTS:    REVIEW OF SYSTEMS:     CONSTITUTIONAL: No weakness, fevers or chills  RESPIRATORY: No cough, wheezing,  No shortness of breath  CARDIOVASCULAR: No chest pain or palpitations  GASTROINTESTINAL: No abdominal pain  . No nausea, vomiting, or hematemesis; No diarrhea or constipation. No melena or hematochezia.  GENITOURINARY: No dysuria, frequency or hematuria  NEUROLOGICAL: No numbness or weakness  L knee pain                                                                                                                                                                                                                                                                               Medications:  MEDICATIONS  (STANDING):  allopurinol 300 milliGRAM(s) Oral daily  aspirin  chewable 81 milliGRAM(s) Oral daily  cefTRIAXone   IVPB 1000 milliGRAM(s) IV Intermittent every 24 hours  dextrose 40% Gel 15 Gram(s) Oral once  dextrose 5%. 1000 milliLiter(s) (50 mL/Hr) IV Continuous <Continuous>  dextrose 5%. 1000 milliLiter(s) (100 mL/Hr) IV Continuous <Continuous>  dextrose 50% Injectable 25 Gram(s) IV Push once  dextrose 50% Injectable 12.5 Gram(s) IV Push once  dextrose 50% Injectable 25 Gram(s) IV Push once  finasteride 5 milliGRAM(s) Oral daily  glucagon  Injectable 1 milliGRAM(s) IntraMuscular once  insulin glargine Injectable (LANTUS) 45 Unit(s) SubCutaneous at bedtime  insulin lispro (ADMELOG) corrective regimen sliding scale   SubCutaneous three times a day before meals  insulin lispro (ADMELOG) corrective regimen sliding scale   SubCutaneous at bedtime  insulin lispro Injectable (ADMELOG) 15 Unit(s) SubCutaneous three times a day before meals  melatonin 5 milliGRAM(s) Oral at bedtime  methylPREDNISolone sodium succinate Injectable 30 milliGRAM(s) IV Push once  pantoprazole   Suspension 40 milliGRAM(s) Oral daily  QUEtiapine 25 milliGRAM(s) Oral at bedtime  senna 2 Tablet(s) Oral at bedtime  sodium chloride 0.9%. 1000 milliLiter(s) (60 mL/Hr) IV Continuous <Continuous>  tamsulosin 0.4 milliGRAM(s) Oral at bedtime    MEDICATIONS  (PRN):  acetaminophen   Tablet .. 650 milliGRAM(s) Oral every 6 hours PRN Mild Pain (1 - 3), Moderate Pain (4 - 6)  bisacodyl 5 milliGRAM(s) Oral every 12 hours PRN Constipation  polyethylene glycol 3350 17 Gram(s) Oral daily PRN Constipation       Allergies    No Known Allergies    Intolerances      Vital Signs Last 24 Hrs  T(C): 36.6 (02 Sep 2021 12:20), Max: 36.8 (01 Sep 2021 20:36)  T(F): 97.8 (02 Sep 2021 12:20), Max: 98.3 (01 Sep 2021 20:36)  HR: 100 (02 Sep 2021 13:01) (83 - 100)  BP: 143/88 (02 Sep 2021 13:01) (137/85 - 145/91)  BP(mean): --  RR: 18 (02 Sep 2021 12:20) (18 - 18)  SpO2: 100% (02 Sep 2021 13:01) (98% - 100%)  CAPILLARY BLOOD GLUCOSE      POCT Blood Glucose.: 119 mg/dL (02 Sep 2021 12:22)  POCT Blood Glucose.: 128 mg/dL (02 Sep 2021 08:23)  POCT Blood Glucose.: 141 mg/dL (01 Sep 2021 22:40)  POCT Blood Glucose.: 98 mg/dL (01 Sep 2021 21:52)  POCT Blood Glucose.: 94 mg/dL (01 Sep 2021 17:25)      09-01 @ 07:01 - 09-02 @ 07:00  --------------------------------------------------------  IN: 98 mL / OUT: 1170 mL / NET: -1072 mL    09-02 @ 07:01 - 09-02 @ 17:11  --------------------------------------------------------  IN: 600 mL / OUT: 400 mL / NET: 200 mL      Physical Exam:    Daily     Daily   General:  NAD   HEENT:  Nonicteric, PERRLA  CV:  RRR, S1S2   Lungs:  CTA B/L, no wheezes, rales, rhonchi  Abdomen:  Soft, non-tender, no distended, positive BS  Extremities:  no edema   Neuro:  AAOx3, non-focal, grossly intact                                                                                                                                                                                                                                                                                                LABS:                               12.5   6.30  )-----------( 275      ( 02 Sep 2021 13:51 )             37.1                      09-02    138  |  103  |  12  ----------------------------<  121<H>  3.6   |  19<L>  |  0.53    Ca    9.0      02 Sep 2021 13:51    TPro  6.4  /  Alb  3.0<L>  /  TBili  0.3  /  DBili  x   /  AST  44<H>  /  ALT  28  /  AlkPhos  228<H>  09-02                       RADIOLOGY & ADDITIONAL TESTS         I personally reviewed: [  ]EKG   [  ]CXR    [  ] CT      A/P:         Discussed with :     [facility] consultants' Notes   Time spent :

## 2021-09-05 ENCOUNTER — TRANSCRIPTION ENCOUNTER (OUTPATIENT)
Age: 71
End: 2021-09-05

## 2021-09-05 VITALS
OXYGEN SATURATION: 98 % | RESPIRATION RATE: 18 BRPM | SYSTOLIC BLOOD PRESSURE: 143 MMHG | DIASTOLIC BLOOD PRESSURE: 82 MMHG | TEMPERATURE: 98 F | HEART RATE: 71 BPM

## 2021-09-05 LAB
ANION GAP SERPL CALC-SCNC: 11 MMOL/L — SIGNIFICANT CHANGE UP (ref 5–17)
BUN SERPL-MCNC: 19 MG/DL — SIGNIFICANT CHANGE UP (ref 7–23)
CALCIUM SERPL-MCNC: 9 MG/DL — SIGNIFICANT CHANGE UP (ref 8.4–10.5)
CHLORIDE SERPL-SCNC: 108 MMOL/L — SIGNIFICANT CHANGE UP (ref 96–108)
CO2 SERPL-SCNC: 21 MMOL/L — LOW (ref 22–31)
CREAT SERPL-MCNC: 0.53 MG/DL — SIGNIFICANT CHANGE UP (ref 0.5–1.3)
GLUCOSE BLDC GLUCOMTR-MCNC: 149 MG/DL — HIGH (ref 70–99)
GLUCOSE SERPL-MCNC: 205 MG/DL — HIGH (ref 70–99)
MAGNESIUM SERPL-MCNC: 1.4 MG/DL — LOW (ref 1.6–2.6)
POTASSIUM SERPL-MCNC: 3.1 MMOL/L — LOW (ref 3.5–5.3)
POTASSIUM SERPL-SCNC: 3.1 MMOL/L — LOW (ref 3.5–5.3)
SODIUM SERPL-SCNC: 140 MMOL/L — SIGNIFICANT CHANGE UP (ref 135–145)

## 2021-09-05 PROCEDURE — 87077 CULTURE AEROBIC IDENTIFY: CPT

## 2021-09-05 PROCEDURE — 81001 URINALYSIS AUTO W/SCOPE: CPT

## 2021-09-05 PROCEDURE — 87205 SMEAR GRAM STAIN: CPT

## 2021-09-05 PROCEDURE — 84550 ASSAY OF BLOOD/URIC ACID: CPT

## 2021-09-05 PROCEDURE — 80048 BASIC METABOLIC PNL TOTAL CA: CPT

## 2021-09-05 PROCEDURE — 93005 ELECTROCARDIOGRAM TRACING: CPT

## 2021-09-05 PROCEDURE — 87075 CULTR BACTERIA EXCEPT BLOOD: CPT

## 2021-09-05 PROCEDURE — U0003: CPT

## 2021-09-05 PROCEDURE — 89060 EXAM SYNOVIAL FLUID CRYSTALS: CPT

## 2021-09-05 PROCEDURE — 83735 ASSAY OF MAGNESIUM: CPT

## 2021-09-05 PROCEDURE — 97161 PT EVAL LOW COMPLEX 20 MIN: CPT

## 2021-09-05 PROCEDURE — 89051 BODY FLUID CELL COUNT: CPT

## 2021-09-05 PROCEDURE — 87070 CULTURE OTHR SPECIMN AEROBIC: CPT

## 2021-09-05 PROCEDURE — 85027 COMPLETE CBC AUTOMATED: CPT

## 2021-09-05 PROCEDURE — 80053 COMPREHEN METABOLIC PANEL: CPT

## 2021-09-05 PROCEDURE — 83615 LACTATE (LD) (LDH) ENZYME: CPT

## 2021-09-05 PROCEDURE — 86140 C-REACTIVE PROTEIN: CPT

## 2021-09-05 PROCEDURE — 85652 RBC SED RATE AUTOMATED: CPT

## 2021-09-05 PROCEDURE — 99285 EMERGENCY DEPT VISIT HI MDM: CPT

## 2021-09-05 PROCEDURE — 86769 SARS-COV-2 COVID-19 ANTIBODY: CPT

## 2021-09-05 PROCEDURE — 82962 GLUCOSE BLOOD TEST: CPT

## 2021-09-05 PROCEDURE — 73560 X-RAY EXAM OF KNEE 1 OR 2: CPT

## 2021-09-05 PROCEDURE — 85025 COMPLETE CBC W/AUTO DIFF WBC: CPT

## 2021-09-05 PROCEDURE — 87086 URINE CULTURE/COLONY COUNT: CPT

## 2021-09-05 PROCEDURE — U0005: CPT

## 2021-09-05 RX ORDER — MAGNESIUM OXIDE 400 MG ORAL TABLET 241.3 MG
400 TABLET ORAL
Refills: 0 | Status: DISCONTINUED | OUTPATIENT
Start: 2021-09-05 | End: 2021-09-05

## 2021-09-05 RX ORDER — POTASSIUM CHLORIDE 20 MEQ
2 PACKET (EA) ORAL
Qty: 0 | Refills: 0 | DISCHARGE
Start: 2021-09-05

## 2021-09-05 RX ORDER — MAGNESIUM SULFATE 500 MG/ML
1 VIAL (ML) INJECTION ONCE
Refills: 0 | Status: DISCONTINUED | OUTPATIENT
Start: 2021-09-05 | End: 2021-09-05

## 2021-09-05 RX ORDER — MAGNESIUM OXIDE 400 MG ORAL TABLET 241.3 MG
0 TABLET ORAL
Qty: 0 | Refills: 0 | DISCHARGE
Start: 2021-09-05

## 2021-09-05 RX ORDER — POTASSIUM CHLORIDE 20 MEQ
40 PACKET (EA) ORAL EVERY 4 HOURS
Refills: 0 | Status: DISCONTINUED | OUTPATIENT
Start: 2021-09-05 | End: 2021-09-05

## 2021-09-05 RX ORDER — POTASSIUM CHLORIDE 20 MEQ
40 PACKET (EA) ORAL ONCE
Refills: 0 | Status: DISCONTINUED | OUTPATIENT
Start: 2021-09-05 | End: 2021-09-05

## 2021-09-05 RX ADMIN — Medication 40 MILLIEQUIVALENT(S): at 11:39

## 2021-09-05 RX ADMIN — Medication 40 MILLIGRAM(S): at 11:28

## 2021-09-05 RX ADMIN — Medication 300 MILLIGRAM(S): at 11:28

## 2021-09-05 RX ADMIN — Medication 81 MILLIGRAM(S): at 11:41

## 2021-09-05 RX ADMIN — Medication 15 UNIT(S): at 07:00

## 2021-09-05 RX ADMIN — FINASTERIDE 5 MILLIGRAM(S): 5 TABLET, FILM COATED ORAL at 11:28

## 2021-09-05 NOTE — DISCHARGE NOTE PROVIDER - PROVIDER TOKENS
PROVIDER:[TOKEN:[[id number]],ESTABLISHEDPATIENT:[T]],PROVIDER:[TOKEN:[[id number]],ESTABLISHEDPATIENT:[T]]

## 2021-09-05 NOTE — DISCHARGE NOTE NURSING/CASE MANAGEMENT/SOCIAL WORK - NSDCVIVACCINE_GEN_ALL_CORE_FT
COVID-19, mRNA, LNP-S, PF, 30 mcg/0.3 mL dose (Pfizer); 23-Jul-2021 11:06; Conrad Woodard (RN); [employer]; [id number] (Exp. Date: 31-Aug-2021); IntraMuscular; Deltoid Left.; 0.3 milliLiter(s);

## 2021-09-05 NOTE — DISCHARGE NOTE PROVIDER - HOSPITAL COURSE
72 y/o M w/ PMHx of CVA this past August and got TPA per family member, DM, BPH with obstruction s/p escamilla catheter, s/p ERCP w Sphincterectomy recent admission to [facility] for sepsis,  Hodgkin lymphoma received opdivo last admission and sent to rehab   now returning with L knee pain and swelling and for planned chemo  overall has been doing well   no truama to L knee   no fever or chills   no N/V/D   no cp /sob      72 y/o M w/ PMHx of CVA this past August and got TPA per family member, DM, BPH with obstruction s/p escamilla catheter, s/p ERCP w Sphincterectomy recent admission to [facility] for sepsis,  Hodgkin lymphoma received opdivo last admission and sent to rehab   now returning with L knee pain and swelling and for planned chemo  overall has been doing well   no truama to L knee   no fever or chills   no N/V/D   no cp /sob   Ortho consulted- - L knee swelling : s/p arthrocentesis : neg for crystals . infection     culture neg   pt still with swelling and difficulty walking  will give one dose of steroids IV once. pt will go to [facility]    heme consulted. 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  no s/p sec immunotherapy     - voice changes: CT neck : Asymmetric enlargement of the left palatine tonsil, suspicious for lymphomatous involvement given history. Correlate with direct visualization.  ENT had eval pt: no gross pathology on visualization   S/S eval: MBS done.    - Afib: brief last admit , no AC     - urinary retention: escamilla re inserted     COVID PCR: neg 8/30  COVID vaccine:s/p Geoff    cleared for discharge to [facility] by Dr Alas     70 y/o M w/ PMHx of CVA this past August and got TPA per family member, DM, BPH with obstruction s/p escamilla catheter, s/p ERCP w Sphincterectomy recent admission to [facility] for sepsis,  Hodgkin lymphoma received opdivo last admission and sent to rehab   now returning with L knee pain and swelling and for planned chemo  overall has been doing well   no truama to L knee   no fever or chills   no N/V/D   no cp /sob   Ortho consulted- - L knee swelling : s/p arthrocentesis : neg for crystals . infection     culture neg   pt still with swelling and difficulty walking  will give one dose of steroids IV once. pt will go to [facility]    heme consulted. 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  no s/p sec immunotherapy     - voice changes: CT neck : Asymmetric enlargement of the left palatine tonsil, suspicious for lymphomatous involvement given history. Correlate with direct visualization.  ENT had eval pt: no gross pathology on visualization   S/S eval: MBS done.    - Afib: brief last admit , no AC     - urinary retention: escamilla re inserted     hypomagnesemia and hypokalemia- supplemented- f/u repeat labs in am     COVID PCR: neg 8/30  COVID vaccine:s/p Geoff    cleared for discharge to [facility] by Dr Alas

## 2021-09-05 NOTE — DISCHARGE NOTE NURSING/CASE MANAGEMENT/SOCIAL WORK - PATIENT PORTAL LINK FT
You can access the FollowMyHealth Patient Portal offered by [facility] by registering at the following website: [email or web address]. By joining [employer]’s FollowMyHealth portal, you will also be able to view your health information using other applications (apps) compatible with our system.

## 2021-09-05 NOTE — DISCHARGE NOTE PROVIDER - CARE PROVIDER_API CALL
GUZMAN GUERRERO  Internal Medicine  [address] [place] [address]  Phone: [phone]  Fax: [phone]  Established Patient  Follow Up Time:     Zay Crowe)  Hematology; Internal Medicine; Medical Oncology  [address] [facility], [address]  [place] [address]  Phone: [phone]  Fax: [phone]  Established Patient  Follow Up Time:

## 2021-09-05 NOTE — DISCHARGE NOTE PROVIDER - NSDCMRMEDTOKEN_GEN_ALL_CORE_FT
acetaminophen 325 mg oral tablet: 2 tab(s) orally every 6 hours, As needed, Temp greater or equal to 38C (100.4F), Moderate Pain (4 - 6)  allopurinol 300 mg oral tablet: 1 tab(s) orally once a day  aspirin 81 mg oral tablet, chewable: 1 tab(s) orally once a day  Bacid (LAC) oral tablet: 1 tab(s) orally 2 times a day  bisacodyl 5 mg oral delayed release tablet: 1 tab(s) orally every 12 hours, As needed, Constipation  finasteride 5 mg oral tablet: 1 tab(s) orally once a day  insulin glargine: 45 unit(s) subcutaneous once a day (at bedtime)  insulin lispro 100 units/mL injectable solution: 15 unit(s) injectable 3 times a day  melatonin 5 mg oral tablet: 1 tab(s) orally once a day (at bedtime)  omeprazole 20 mg oral delayed release capsule: 2 cap(s) orally once a day  polyethylene glycol 3350 oral powder for reconstitution: 17 gram(s) orally once a day, As needed, Constipation  QUEtiapine 25 mg oral tablet: 1 tab(s) orally once a day (at bedtime)  senna oral tablet: 2 tab(s) orally once a day (at bedtime)  tamsulosin 0.4 mg oral capsule: 1 cap(s) orally once a day (at bedtime)   acetaminophen 325 mg oral tablet: 2 tab(s) orally every 6 hours, As needed, Temp greater or equal to 38C (100.4F), Moderate Pain (4 - 6)  allopurinol 300 mg oral tablet: 1 tab(s) orally once a day  aspirin 81 mg oral tablet, chewable: 1 tab(s) orally once a day  Bacid (LAC) oral tablet: 1 tab(s) orally 2 times a day  bisacodyl 5 mg oral delayed release tablet: 1 tab(s) orally every 12 hours, As needed, Constipation  finasteride 5 mg oral tablet: 1 tab(s) orally once a day  insulin glargine: 45 unit(s) subcutaneous once a day (at bedtime)  insulin lispro 100 units/mL injectable solution: 15 unit(s) injectable 3 times a day  melatonin 5 mg oral tablet: 1 tab(s) orally once a day (at bedtime)  omeprazole 20 mg oral delayed release capsule: 2 cap(s) orally once a day  polyethylene glycol 3350 oral powder for reconstitution: 17 gram(s) orally once a day, As needed, Constipation  potassium chloride 20 mEq oral tablet, extended release: 2 tab(s) orally every 4 hours stop after 2 doses  QUEtiapine 25 mg oral tablet: 1 tab(s) orally once a day (at bedtime)  senna oral tablet: 2 tab(s) orally once a day (at bedtime)  tamsulosin 0.4 mg oral capsule: 1 cap(s) orally once a day (at bedtime)   acetaminophen 325 mg oral tablet: 2 tab(s) orally every 6 hours, As needed, Temp greater or equal to 38C (100.4F), Moderate Pain (4 - 6)  allopurinol 300 mg oral tablet: 1 tab(s) orally once a day  aspirin 81 mg oral tablet, chewable: 1 tab(s) orally once a day  Bacid (LAC) oral tablet: 1 tab(s) orally 2 times a day  bisacodyl 5 mg oral delayed release tablet: 1 tab(s) orally every 12 hours, As needed, Constipation  finasteride 5 mg oral tablet: 1 tab(s) orally once a day  insulin glargine: 45 unit(s) subcutaneous once a day (at bedtime)  insulin lispro 100 units/mL injectable solution: 15 unit(s) injectable 3 times a day  magnesium oxide 400 mg oral tablet: tab(s) orally 3 times a day (with meals) x 3 days  melatonin 5 mg oral tablet: 1 tab(s) orally once a day (at bedtime)  omeprazole 20 mg oral delayed release capsule: 2 cap(s) orally once a day  polyethylene glycol 3350 oral powder for reconstitution: 17 gram(s) orally once a day, As needed, Constipation  potassium chloride 20 mEq oral tablet, extended release: 2 tab(s) orally every 4 hours stop after 2 doses  predniSONE 20 mg oral tablet: 2 tab(s) orally once a day x 2 days / and   QUEtiapine 25 mg oral tablet: 1 tab(s) orally once a day (at bedtime)  senna oral tablet: 2 tab(s) orally once a day (at bedtime)  tamsulosin 0.4 mg oral capsule: 1 cap(s) orally once a day (at bedtime)

## 2021-09-05 NOTE — DISCHARGE NOTE PROVIDER - NSDCCPCAREPLAN_GEN_ALL_CORE_FT
PRINCIPAL DISCHARGE DIAGNOSIS  Diagnosis: Acute cystitis  Assessment and Plan of Treatment: urine culture no growth, ceftriaxone stopped after 2 days. follow up after rehab      SECONDARY DISCHARGE DIAGNOSES  Diagnosis: Acute knee pain  Assessment and Plan of Treatment: rehab then follow up with pcp    Diagnosis: Gait instability  Assessment and Plan of Treatment: rehab    Diagnosis: T2DM (type 2 diabetes mellitus)  Assessment and Plan of Treatment: A1C  7.7  take insulin as above and follow up glucose in rehab then PCP    Diagnosis: Hypokalemia  Assessment and Plan of Treatment: supplemented, repeat BMP lab tomorrow    Diagnosis: Hypomagnesemia  Assessment and Plan of Treatment: supplemented, follow yo repeat magnesium level in am    Diagnosis: Lymphoma  Assessment and Plan of Treatment: follow up with heme onc after rehab    Diagnosis: Chronic atrial fibrillation  Assessment and Plan of Treatment: no ac     PRINCIPAL DISCHARGE DIAGNOSIS  Diagnosis: Acute cystitis  Assessment and Plan of Treatment: urine culture no growth, ceftriaxone stopped after 2 days. follow up after rehab      SECONDARY DISCHARGE DIAGNOSES  Diagnosis: Acute knee pain  Assessment and Plan of Treatment: rehab then follow up with pcp    Diagnosis: Gait instability  Assessment and Plan of Treatment: rehab    Diagnosis: T2DM (type 2 diabetes mellitus)  Assessment and Plan of Treatment: A1C  7.7  take insulin as above and follow up glucose in rehab then PCP    Diagnosis: Hypokalemia  Assessment and Plan of Treatment: supplemented, repeat BMP lab tomorrow    Diagnosis: Hypomagnesemia  Assessment and Plan of Treatment: supplemented, follow yo repeat magnesium level in am    Diagnosis: Lymphoma  Assessment and Plan of Treatment: follow up with heme onc after rehab    Diagnosis: Chronic atrial fibrillation  Assessment and Plan of Treatment: no ac    Diagnosis: Urinary retention  Assessment and Plan of Treatment: [facility]

## 2021-09-13 LAB
CULTURE RESULTS: SIGNIFICANT CHANGE UP
SPECIMEN SOURCE: SIGNIFICANT CHANGE UP

## 2022-12-21 NOTE — DISCHARGE NOTE PROVIDER - NSDCCPCAREPLAN_GEN_ALL_CORE_FT
PRINCIPAL DISCHARGE DIAGNOSIS  Diagnosis: Weakness  Assessment and Plan of Treatment: Improving, no infection.  HOME CARE INSTRUCTIONS  Have someone stay with you until you feel stable.  Do not drive, operate machinery, or play sports until your caregiver says it is okay.  Keep all follow-up appointments as directed by your caregiver.   Lie down right away if you start feeling like you might faint. Breathe deeply and steadily. Wait until all the symptoms have passed.Drink enough fluids to keep your urine clear or pale yellow.  If you are taking blood pressure or heart medicine, get up slowly, taking several minutes to sit and then stand. This can reduce dizziness.  SEEK IMMEDIATE MEDICAL CARE IF:  You have a severe headache.  You have unusual pain in the chest, abdomen, or back.  You are bleeding from the mouth or rectum, or you have black or tarry stool.  You have an irregular or very fast heartbeat.  You have pain with breathing.  You have repeated fainting or seizure-like jerking during an episode.  You faint when sitting or lying down.  You have confusion.  You have difficulty walking.  You have severe weakness.  You have vision problems.        SECONDARY DISCHARGE DIAGNOSES  Diagnosis: Diabetes mellitus  Assessment and Plan of Treatment: HgA1C this admission 7.7  Make sure you get your HgA1c checked every three months.  If you take oral diabetes medications, check your blood glucose two times a day.  If you take insulin, check your blood glucose before meals and at bedtime.  It's important not to skip any meals.  Keep a log of your blood glucose results and always take it with you to your doctor appointments.  Keep a list of your current medications including injectables and over the counter medications and bring this medication list with you to all your doctor appointments.  If you have not seen your ophthalmologist this year call for appointment.  Check your feet daily for redness, sores, or openings. Do not self treat. If no improvement in two days call your primary care physician for an appointment.  Low blood sugar (hypoglycemia) is a blood sugar below 70mg/dl. Check your blood sugar if you feel signs/symptoms of hypoglycemia. If your blood sugar is below 70 take 15 grams of carbohydrates (ex 4 oz of apple juice, 3-4 glucose tablets, or 4-6 oz of regular soda) wait 15 minutes and repeat blood sugar to make sure it comes up above 70.  If your blood sugar is above 70 and you are due for a meal, have a meal.  If you are not due for a meal have a snack.  This snack helps keeps your blood sugar at a safe range.      Diagnosis: Anemia  Assessment and Plan of Treatment: Follow up with Dr. Dewey for managment    Diagnosis: Afib  Assessment and Plan of Treatment: Atrial fibrillation is the most common heart rhythm problem.  The condition puts you at risk for has stroke and heart attack  It helps if you control your blood pressure, not drink more than 1-2 alcohol drinks per day, cut down on caffeine, getting treatment for over active thyroid gland, and get regular exercise  Call your doctor if you feel your heart racing or beating unusually, chest tightness or pain, lightheaded, faint, shortness of breath especially with exercise  It is important to take your heart medication as prescribed  You may be on anticoagulation which is very important to take as directed - you may need blood work to monitor drug levels      Diagnosis: Lymphoma  Assessment and Plan of Treatment: - patient status post 1st rx with opdivo 7/6. next due on 8/3.  - in light of recent developments, extradural disease, may need to move up timetable on use of systemic CTX, Oziel/vinblastine/dacarbazine. Will ask pulm to eval PFT's, unsure we will be able to include bleo at this time.   Follow up with Dr. Larios in the office after RYLEY    Diagnosis: Dysphagia  Assessment and Plan of Treatment: Continue dysphagia 3/nector     PRINCIPAL DISCHARGE DIAGNOSIS  Diagnosis: Weakness  Assessment and Plan of Treatment: Improving, no infection.  HOME CARE INSTRUCTIONS  Have someone stay with you until you feel stable.  Do not drive, operate machinery, or play sports until your caregiver says it is okay.  Keep all follow-up appointments as directed by your caregiver.   Lie down right away if you start feeling like you might faint. Breathe deeply and steadily. Wait until all the symptoms have passed.Drink enough fluids to keep your urine clear or pale yellow.  If you are taking blood pressure or heart medicine, get up slowly, taking several minutes to sit and then stand. This can reduce dizziness.  SEEK IMMEDIATE MEDICAL CARE IF:  You have a severe headache.  You have unusual pain in the chest, abdomen, or back.  You are bleeding from the mouth or rectum, or you have black or tarry stool.  You have an irregular or very fast heartbeat.  You have pain with breathing.  You have repeated fainting or seizure-like jerking during an episode.  You faint when sitting or lying down.  You have confusion.  You have difficulty walking.  You have severe weakness.  You have vision problems.        SECONDARY DISCHARGE DIAGNOSES  Diagnosis: Diabetes mellitus  Assessment and Plan of Treatment: HgA1C this admission 7.7  Make sure you get your HgA1c checked every three months.  If you take oral diabetes medications, check your blood glucose two times a day.  If you take insulin, check your blood glucose before meals and at bedtime.  It's important not to skip any meals.  Keep a log of your blood glucose results and always take it with you to your doctor appointments.  Keep a list of your current medications including injectables and over the counter medications and bring this medication list with you to all your doctor appointments.  If you have not seen your ophthalmologist this year call for appointment.  Check your feet daily for redness, sores, or openings. Do not self treat. If no improvement in two days call your primary care physician for an appointment.  Low blood sugar (hypoglycemia) is a blood sugar below 70mg/dl. Check your blood sugar if you feel signs/symptoms of hypoglycemia. If your blood sugar is below 70 take 15 grams of carbohydrates (ex 4 oz of apple juice, 3-4 glucose tablets, or 4-6 oz of regular soda) wait 15 minutes and repeat blood sugar to make sure it comes up above 70.  If your blood sugar is above 70 and you are due for a meal, have a meal.  If you are not due for a meal have a snack.  This snack helps keeps your blood sugar at a safe range.      Diagnosis: Anemia  Assessment and Plan of Treatment: Follow up with Dr. Dewey for managment    Diagnosis: Afib  Assessment and Plan of Treatment: Atrial fibrillation is the most common heart rhythm problem.  The condition puts you at risk for has stroke and heart attack  It helps if you control your blood pressure, not drink more than 1-2 alcohol drinks per day, cut down on caffeine, getting treatment for over active thyroid gland, and get regular exercise  Call your doctor if you feel your heart racing or beating unusually, chest tightness or pain, lightheaded, faint, shortness of breath especially with exercise  It is important to take your heart medication as prescribed  You may be on anticoagulation which is very important to take as directed - you may need blood work to monitor drug levels      Diagnosis: Lymphoma  Assessment and Plan of Treatment: - patient status post 1st rx with opdivo 7/6. next due on 8/3.  - in light of recent developments, extradural disease, may need to move up timetable on use of systemic CTX, Oziel/vinblastine/dacarbazine. Will ask pulm to eval PFT's, unsure we will be able to include bleo at this time.   Follow up with Dr. Larios in the office after RYLEY    Diagnosis: Dysphagia  Assessment and Plan of Treatment: Continue dysphagia 3/nector    Diagnosis: Metastasis to spinal cord  Assessment and Plan of Treatment: Diffuse mets in lumbar spine w/ enhancing non-compressive lesion in dorsal R thecal sac @ L4-L5. CTH neg for acute process.   Follow up with Dr. Pereira and Dr. Larios       Diagnosis: Urinary retention  Assessment and Plan of Treatment: Failed Void trial . Discharge with Sauceda, Follow up with urology     PRINCIPAL DISCHARGE DIAGNOSIS  Diagnosis: Weakness  Assessment and Plan of Treatment: Improving, no infection.  HOME CARE INSTRUCTIONS  Have someone stay with you until you feel stable.  Do not drive, operate machinery, or play sports until your caregiver says it is okay.  Keep all follow-up appointments as directed by your caregiver.   Lie down right away if you start feeling like you might faint. Breathe deeply and steadily. Wait until all the symptoms have passed.Drink enough fluids to keep your urine clear or pale yellow.  If you are taking blood pressure or heart medicine, get up slowly, taking several minutes to sit and then stand. This can reduce dizziness.  SEEK IMMEDIATE MEDICAL CARE IF:  You have a severe headache.  You have unusual pain in the chest, abdomen, or back.  You are bleeding from the mouth or rectum, or you have black or tarry stool.  You have an irregular or very fast heartbeat.  You have pain with breathing.  You have repeated fainting or seizure-like jerking during an episode.  You faint when sitting or lying down.  You have confusion.  You have difficulty walking.  You have severe weakness.  You have vision problems.        SECONDARY DISCHARGE DIAGNOSES  Diagnosis: Lymphoma  Assessment and Plan of Treatment: - patient status post 1st rx with opdivo 7/6. next due on 8/3.  - in light of recent developments, extradural disease, may need to move up timetable on use of systemic CTX, Oziel/vinblastine/dacarbazine. Will ask pulm to eval PFT's, unsure we will be able to include bleo at this time.   Follow up with Dr. Larios in the office after RYLEY    Diagnosis: Diabetes mellitus  Assessment and Plan of Treatment: HgA1C this admission 7.7  Make sure you get your HgA1c checked every three months.  If you take oral diabetes medications, check your blood glucose two times a day.  If you take insulin, check your blood glucose before meals and at bedtime.  It's important not to skip any meals.  Keep a log of your blood glucose results and always take it with you to your doctor appointments.  Keep a list of your current medications including injectables and over the counter medications and bring this medication list with you to all your doctor appointments.  If you have not seen your ophthalmologist this year call for appointment.  Check your feet daily for redness, sores, or openings. Do not self treat. If no improvement in two days call your primary care physician for an appointment.  Low blood sugar (hypoglycemia) is a blood sugar below 70mg/dl. Check your blood sugar if you feel signs/symptoms of hypoglycemia. If your blood sugar is below 70 take 15 grams of carbohydrates (ex 4 oz of apple juice, 3-4 glucose tablets, or 4-6 oz of regular soda) wait 15 minutes and repeat blood sugar to make sure it comes up above 70.  If your blood sugar is above 70 and you are due for a meal, have a meal.  If you are not due for a meal have a snack.  This snack helps keeps your blood sugar at a safe range.      Diagnosis: Anemia  Assessment and Plan of Treatment: Follow up with Dr. Dewey for managment    Diagnosis: Afib  Assessment and Plan of Treatment: Atrial fibrillation is the most common heart rhythm problem.  The condition puts you at risk for has stroke and heart attack  It helps if you control your blood pressure, not drink more than 1-2 alcohol drinks per day, cut down on caffeine, getting treatment for over active thyroid gland, and get regular exercise  Call your doctor if you feel your heart racing or beating unusually, chest tightness or pain, lightheaded, faint, shortness of breath especially with exercise  It is important to take your heart medication as prescribed  You may be on anticoagulation which is very important to take as directed - you may need blood work to monitor drug levels      Diagnosis: Dysphagia  Assessment and Plan of Treatment: Continue dysphagia 3/nector    Diagnosis: Metastasis to spinal cord  Assessment and Plan of Treatment: Diffuse mets in lumbar spine w/ enhancing non-compressive lesion in dorsal R thecal sac @ L4-L5. CTH neg for acute process.   Follow up with Dr. Pereira and Dr. Larios       Diagnosis: Urinary retention  Assessment and Plan of Treatment: Failed Void trial . Discharge with Sauceda, Follow up with urology     no PRINCIPAL DISCHARGE DIAGNOSIS  Diagnosis: Weakness  Assessment and Plan of Treatment: Improving, no infection.  HOME CARE INSTRUCTIONS  Have someone stay with you until you feel stable.  Do not drive, operate machinery, or play sports until your caregiver says it is okay.  Keep all follow-up appointments as directed by your caregiver.   Lie down right away if you start feeling like you might faint. Breathe deeply and steadily. Wait until all the symptoms have passed.Drink enough fluids to keep your urine clear or pale yellow.  If you are taking blood pressure or heart medicine, get up slowly, taking several minutes to sit and then stand. This can reduce dizziness.  SEEK IMMEDIATE MEDICAL CARE IF:  You have a severe headache.  You have unusual pain in the chest, abdomen, or back.  You are bleeding from the mouth or rectum, or you have black or tarry stool.  You have an irregular or very fast heartbeat.  You have pain with breathing.  You have repeated fainting or seizure-like jerking during an episode.  You faint when sitting or lying down.  You have confusion.  You have difficulty walking.  You have severe weakness.  You have vision problems.        SECONDARY DISCHARGE DIAGNOSES  Diagnosis: Diabetes mellitus  Assessment and Plan of Treatment: HgA1C this admission 7.7  Make sure you get your HgA1c checked every three months.  If you take oral diabetes medications, check your blood glucose two times a day.  If you take insulin, check your blood glucose before meals and at bedtime.  It's important not to skip any meals.  Keep a log of your blood glucose results and always take it with you to your doctor appointments.  Keep a list of your current medications including injectables and over the counter medications and bring this medication list with you to all your doctor appointments.  If you have not seen your ophthalmologist this year call for appointment.  Check your feet daily for redness, sores, or openings. Do not self treat. If no improvement in two days call your primary care physician for an appointment.  Low blood sugar (hypoglycemia) is a blood sugar below 70mg/dl. Check your blood sugar if you feel signs/symptoms of hypoglycemia. If your blood sugar is below 70 take 15 grams of carbohydrates (ex 4 oz of apple juice, 3-4 glucose tablets, or 4-6 oz of regular soda) wait 15 minutes and repeat blood sugar to make sure it comes up above 70.  If your blood sugar is above 70 and you are due for a meal, have a meal.  If you are not due for a meal have a snack.  This snack helps keeps your blood sugar at a safe range.      Diagnosis: Anemia  Assessment and Plan of Treatment: Follow up with Dr. Dewey for managment    Diagnosis: Afib  Assessment and Plan of Treatment: Atrial fibrillation is the most common heart rhythm problem.  The condition puts you at risk for has stroke and heart attack  It helps if you control your blood pressure, not drink more than 1-2 alcohol drinks per day, cut down on caffeine, getting treatment for over active thyroid gland, and get regular exercise  Call your doctor if you feel your heart racing or beating unusually, chest tightness or pain, lightheaded, faint, shortness of breath especially with exercise  It is important to take your heart medication as prescribed  You may be on anticoagulation which is very important to take as directed - you may need blood work to monitor drug levels      Diagnosis: Lymphoma  Assessment and Plan of Treatment: - patient status post 1st rx with opdivo 7/6. next due on 8/3.  - in light of recent developments, extradural disease, may need to move up timetable on use of systemic CTX, Oziel/vinblastine/dacarbazine. Will ask pulm to eval PFT's, unsure we will be able to include bleo at this time.   Follow up with Dr. Larios in the office after RYLEY    Diagnosis: Dysphagia  Assessment and Plan of Treatment: Continue dysphagia 3/nector    Diagnosis: Metastasis to spinal cord  Assessment and Plan of Treatment: Diffuse mets in lumbar spine w/ enhancing non-compressive lesion in dorsal R thecal sac @ L4-L5. CTH neg for acute process.   Follow up with Dr. Pereira and Dr. Larios       Diagnosis: Urinary retention  Assessment and Plan of Treatment: Failed Void trial . Discharge with Sauceda, Follow up with urology    Diagnosis: Abnormal chest CT  Assessment and Plan of Treatment: CT chest findings 6/28 with patchy GGO and scattered nodular opacities throughout all lobes of the lungs  Follow up with repeat CT chest in  1 month     PRINCIPAL DISCHARGE DIAGNOSIS  Diagnosis: Weakness  Assessment and Plan of Treatment: Improving, no infection.  HOME CARE INSTRUCTIONS  Have someone stay with you until you feel stable.  Do not drive, operate machinery, or play sports until your caregiver says it is okay.  Keep all follow-up appointments as directed by your caregiver.   Lie down right away if you start feeling like you might faint. Breathe deeply and steadily. Wait until all the symptoms have passed.Drink enough fluids to keep your urine clear or pale yellow.  If you are taking blood pressure or heart medicine, get up slowly, taking several minutes to sit and then stand. This can reduce dizziness.  SEEK IMMEDIATE MEDICAL CARE IF:  You have a severe headache.  You have unusual pain in the chest, abdomen, or back.  You are bleeding from the mouth or rectum, or you have black or tarry stool.  You have an irregular or very fast heartbeat.  You have pain with breathing.  You have repeated fainting or seizure-like jerking during an episode.  You faint when sitting or lying down.  You have confusion.  You have difficulty walking.  You have severe weakness.  You have vision problems.        SECONDARY DISCHARGE DIAGNOSES  Diagnosis: Diabetes mellitus  Assessment and Plan of Treatment: HgA1C this admission 7.7  Make sure you get your HgA1c checked every three months.  If you take oral diabetes medications, check your blood glucose two times a day.  If you take insulin, check your blood glucose before meals and at bedtime.  It's important not to skip any meals.  Keep a log of your blood glucose results and always take it with you to your doctor appointments.  Keep a list of your current medications including injectables and over the counter medications and bring this medication list with you to all your doctor appointments.  If you have not seen your ophthalmologist this year call for appointment.  Check your feet daily for redness, sores, or openings. Do not self treat. If no improvement in two days call your primary care physician for an appointment.  Low blood sugar (hypoglycemia) is a blood sugar below 70mg/dl. Check your blood sugar if you feel signs/symptoms of hypoglycemia. If your blood sugar is below 70 take 15 grams of carbohydrates (ex 4 oz of apple juice, 3-4 glucose tablets, or 4-6 oz of regular soda) wait 15 minutes and repeat blood sugar to make sure it comes up above 70.  If your blood sugar is above 70 and you are due for a meal, have a meal.  If you are not due for a meal have a snack.  This snack helps keeps your blood sugar at a safe range.  *** Insulin adjusted as decadron 2mg daily x 4 more days. Please readjust in [facility] if needed ****       Diagnosis: Anemia  Assessment and Plan of Treatment: Follow up with Dr. Dewey for managment    Diagnosis: Afib  Assessment and Plan of Treatment: Atrial fibrillation is the most common heart rhythm problem.  The condition puts you at risk for has stroke and heart attack  It helps if you control your blood pressure, not drink more than 1-2 alcohol drinks per day, cut down on caffeine, getting treatment for over active thyroid gland, and get regular exercise  Call your doctor if you feel your heart racing or beating unusually, chest tightness or pain, lightheaded, faint, shortness of breath especially with exercise  It is important to take your heart medication as prescribed  You may be on anticoagulation which is very important to take as directed - you may need blood work to monitor drug levels      Diagnosis: Lymphoma  Assessment and Plan of Treatment: - patient status post 1st rx with opdivo 7/6. next due on 8/3.  - in light of recent developments, extradural disease, may need to move up timetable on use of systemic CTX, Oziel/vinblastine/dacarbazine. Will ask pulm to eval PFT's, unsure we will be able to include bleo at this time.   Follow up with Dr. Larios in the office after RYLEY    Diagnosis: Dysphagia  Assessment and Plan of Treatment: Continue dysphagia 3/nector    Diagnosis: Metastasis to spinal cord  Assessment and Plan of Treatment: Diffuse mets in lumbar spine w/ enhancing non-compressive lesion in dorsal R thecal sac @ L4-L5. CTH neg for acute process.   Follow up with Dr. Pereira and Dr. Larios       Diagnosis: Urinary retention  Assessment and Plan of Treatment: Failed Void trial . Discharge with Sau[facility]a, Follow up with urology    Diagnosis: Abnormal chest CT  Assessment and Plan of Treatment: CT chest findings 6/28 with patchy GGO and scattered nodular opacities throughout all lobes of the lungs  Follow up with repeat CT chest in  1 month

## 2023-05-02 NOTE — PROGRESS NOTE ADULT - ASSESSMENT
Patient called back possibly requesting to see [facility]. Spoke with patient to let him know that per [facility], he recommends that he see Dr. Kumar just to see if an MRI is necessary after xrays are taken in the clinic       Assessment  DMT2: 71y Male with DM T2 with hyperglycemia, A1C 8.8%, was on oral meds/Janumet at home, admitted with weakness/fatigue and hyperglycemia, on  basal insulin and coverage, blood sugars trending down, no hypoglycemic episodes, eating partial meals.  Lymphoma: on medications, monitored, FU Heme/Onc.  Anemia: stable, monitored.      Shahriar Kahn MD  Cell: [phone]  Office: [phone]
